# Patient Record
Sex: FEMALE | Race: ASIAN | NOT HISPANIC OR LATINO | Employment: FULL TIME | URBAN - METROPOLITAN AREA
[De-identification: names, ages, dates, MRNs, and addresses within clinical notes are randomized per-mention and may not be internally consistent; named-entity substitution may affect disease eponyms.]

---

## 2017-02-08 ENCOUNTER — OFFICE VISIT (OUTPATIENT)
Dept: FAMILY MEDICINE | Facility: CLINIC | Age: 57
End: 2017-02-08
Payer: COMMERCIAL

## 2017-02-08 VITALS
RESPIRATION RATE: 14 BRPM | HEIGHT: 65 IN | TEMPERATURE: 98.2 F | OXYGEN SATURATION: 95 % | HEART RATE: 104 BPM | SYSTOLIC BLOOD PRESSURE: 128 MMHG | WEIGHT: 187.5 LBS | BODY MASS INDEX: 31.24 KG/M2 | DIASTOLIC BLOOD PRESSURE: 60 MMHG

## 2017-02-08 DIAGNOSIS — Z79.4 TYPE 2 DIABETES MELLITUS WITH HYPERGLYCEMIA, WITH LONG-TERM CURRENT USE OF INSULIN (H): ICD-10-CM

## 2017-02-08 DIAGNOSIS — Z11.59 NEED FOR HEPATITIS C SCREENING TEST: ICD-10-CM

## 2017-02-08 DIAGNOSIS — E11.65 TYPE 2 DIABETES MELLITUS WITH HYPERGLYCEMIA, WITH LONG-TERM CURRENT USE OF INSULIN (H): ICD-10-CM

## 2017-02-08 DIAGNOSIS — I10 ESSENTIAL HYPERTENSION: ICD-10-CM

## 2017-02-08 DIAGNOSIS — J20.9 ACUTE BRONCHITIS, UNSPECIFIED ORGANISM: ICD-10-CM

## 2017-02-08 PROCEDURE — 82043 UR ALBUMIN QUANTITATIVE: CPT | Performed by: FAMILY MEDICINE

## 2017-02-08 PROCEDURE — 99213 OFFICE O/P EST LOW 20 MIN: CPT | Performed by: PHYSICIAN ASSISTANT

## 2017-02-08 PROCEDURE — 84443 ASSAY THYROID STIM HORMONE: CPT | Performed by: FAMILY MEDICINE

## 2017-02-08 PROCEDURE — 86803 HEPATITIS C AB TEST: CPT | Performed by: FAMILY MEDICINE

## 2017-02-08 PROCEDURE — 36415 COLL VENOUS BLD VENIPUNCTURE: CPT | Performed by: FAMILY MEDICINE

## 2017-02-08 PROCEDURE — 80053 COMPREHEN METABOLIC PANEL: CPT | Performed by: FAMILY MEDICINE

## 2017-02-08 RX ORDER — AZITHROMYCIN 250 MG/1
TABLET, FILM COATED ORAL
Qty: 6 TABLET | Refills: 0 | Status: SHIPPED | OUTPATIENT
Start: 2017-02-08 | End: 2017-03-06

## 2017-02-08 NOTE — PROGRESS NOTES
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 Ortiz.    2nd week of cough.  Cough is productive with white sputum.  Feels tight in chest.  Using albuterol inhaler every 4 hours which causes heart to race.  When symptoms started seemed more likely related to allergies and started allegra and nasal spray and mucinex.  No fever.  Has had body aches.    Blood sugar of 238.  Has been playing with her short acting insulin since blood sugars have been high.  Added 25-30 units.  No low blood sugars.     Problem list and histories reviewed & adjusted, as indicated.  Additional history: as documented    Patient Active Problem List   Diagnosis     Type 2 diabetes mellitus with hyperglycemia (H)     ? 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     Displacement of lumbar intervertebral disc without myelopathy     Nonallopathic lesion of lumbar region     Nonallopathic lesion of thoracic region     Nonallopathic lesion of sacral region     Bartholin gland cyst     Cataract     Irritable bowel syndrome     Esophageal reflux     Seasonal allergic rhinitis     Latex sensitivity     Colon polyp     Adenomatous polyp     Subclinical hyperthyroidism      HYPERLIPIDEMIA LDL GOAL <100     Ovarian cyst     Breast pain     Essential hypertension     Advanced directives, counseling/discussion     Obesity     Hypomagnesemia     Type 2 diabetes mellitus with hyperglycemia, with long-term current use of insulin (H)     Past Surgical History   Procedure Laterality Date     Hc dilation/curettage diag/ther non ob       Hc tympanoplasty w/o mastoid, init/rev w/o oss chain reconst       Cataract iol, rt/lt  2008     LE     Yag capsulotomy os (left eye)  2011       Social History   Substance Use Topics     Smoking status: Never Smoker      Smokeless tobacco: Never Used     Alcohol Use: Yes      Comment: rarely     Family History   Problem Relation Age of Onset     DIABETES Father       of dm 70's     C.A.D. Father      DIABETES Paternal Grandfather      DIABETES Brother      CANCER Brother 59     Lung cancer     DIABETES Sister      CANCER Maternal Aunt      ovarian cancer.      DIABETES Brother      DIABETES Brother      DIABETES Sister      Glaucoma Maternal Grandmother          Current Outpatient Prescriptions   Medication Sig Dispense Refill            blood glucose monitoring (NO BRAND SPECIFIED) meter device kit Use to test blood sugar 4 times daily or as directed. 1 kit 0     exenatide ER (BYDUREON) 2 MG pen Inject 2 mg Subcutaneous once a week 12 each 3     rosuvastatin (CRESTOR) 10 MG tablet Take 1 tablet (10 mg) by mouth daily 90 tablet 3     hydrochlorothiazide 12.5 MG TABS TAKE 1 TABLET BY MOUTH ONCE DAILY 90 tablet 1     albuterol (PROAIR HFA, PROVENTIL HFA, VENTOLIN HFA) 108 (90 BASE) MCG/ACT inhaler Inhale 2 puffs into the lungs every 4 hours as needed for shortness of breath / dyspnea 1 Inhaler 1     LANTUS SOLOSTAR 100 UNIT/ML soln INJECT 70-80 UNITS UNDER THE SKIN PER DAY 75 mL 3     NOVOLOG FLEXPEN 100 UNIT/ML soln INJECT UP  UNITS UNDER THE SKIN PER DAY AS DIRECTED 120 mL 3     pantoprazole (PROTONIX) 40 MG enteric coated tablet  "Take 1 tablet (40 mg) by mouth daily 90 tablet 3     insulin glargine (LANTUS SOLOSTAR) 100 UNIT/ML PEN 80 units daily 75 mL 3     amLODIPine (NORVASC) 5 MG tablet Take 1 tablet (5 mg) by mouth daily 90 tablet 3     pentoxifylline (TRENTAL) 400 MG CR tablet Take 1 tablet (400 mg) by mouth 2 times daily 180 tablet 3     metFORMIN (GLUCOPHAGE-XR) 500 MG 24 hr tablet Take 2 tablets (1,000 mg) by mouth 2 times daily 360 tablet 3     losartan (COZAAR) 100 MG tablet Take 1 tablet (100 mg) by mouth daily 90 tablet 3     magnesium 250 MG tablet Take 1 tablet by mouth daily 30 tablet 5     fluticasone (FLONASE) 50 MCG/ACT nasal spray Spray 1-2 sprays into both nostrils daily 16 g 11     NEEDLES, ANY SIZE Pen needles for Novolog insulin pen. Use to inject 5-6  times daily. 4 Box 3     ORDER FOR DME Equipment being ordered: [id number] Walking Boot, Size Small 1 Device 0     FREESTYLE LITE test strip Test 4-5 times daily 450 strip 3     NEEDLES, ANY SIZE Pen needles for Victoza pen. 1 Box 11     fish oil-omega-3 fatty acids (FISH OIL) 1000 MG capsule Take 1 capsule by mouth daily.       aspirin EC 81 MG tablet Take 325 mg by mouth daily        fexofenadine (ALLEGRA) 180 MG tablet Take 1 tablet by mouth daily. 1 TABLET DAILY Failed claritin for symptom cotrol. Zyrtec increases heart rate. 90 tablet 3     VITAMIN D 2000 UNIT OR TABS Take 1 tablet by mouth daily.       MULTIVITAMIN OR Take 1 tablet by mouth daily.       CALCIUM + D OR Take 1 tablet by mouth 2 times daily.         ROS:  Constitutional, HEENT, cardiovascular, pulmonary, gi and gu systems are negative, except as otherwise noted.    OBJECTIVE:                                                    /60 mmHg  Pulse 104  Temp(Src) 98.2  F (36.8  C) (Oral)  Resp 14  Ht 1.638 m (5' 4.5\")  Wt 85.049 kg (187 lb 8 oz)  BMI 31.70 kg/m2  SpO2 95%  LMP 03/19/2009  Body mass index is 31.7 kg/(m^2).  GENERAL: healthy, alert and no distress  EYES: Eyes grossly normal to " inspection, PERRL and conjunctivae and sclerae normal  HENT: ear canals and TM's normal, nose and mouth without ulcers or lesions  NECK: no adenopathy, no asymmetry, masses, or scars and thyroid normal to palpation  RESP: lungs clear to auscultation - no rales, rhonchi or wheezes  CV: regular rate and rhythm, normal S1 S2, no S3 or S4, no murmur, click or rub, no peripheral edema and peripheral pulses strong  MS: no gross musculoskeletal defects noted, no edema    Diagnostic Test Results:  none      ASSESSMENT/PLAN:                                                            1. Acute bronchitis, unspecified organism  Did not want to prescribe oral steroid given diabetes and high blood sugars.  Will treat with antibiotic since going on week two.   Lungs sound clear to auscultation did not feel needed chest xray   - azithromycin (ZITHROMAX) 250 MG tablet; Two tablets first day, then one tablet daily for four days.  Dispense: 6 tablet; Refill: 0    2. Type 2 diabetes mellitus with hyperglycemia, with long-term current use of insulin (H)  Glucometer is broken.  Blood sugars have been high.  Follows with endocrinology and due for follow up with them in march.  Would like labs drawn today.  Not fasting so will hold off on lipids.   - blood glucose monitoring (NO BRAND SPECIFIED) meter device kit; Use to test blood sugar 4 times daily or as directed.  Dispense: 1 kit; Refill: 0    Patient Instructions   Take zithromax daily for five days  Follow up with us if no improvement over the next 5 days  Return urgently if any change in symptoms like fever, increasing cough or other change in symptoms.          Tesha Carcamo PA-C  [facility]

## 2017-02-08 NOTE — NURSING NOTE
"Chief Complaint   Patient presents with     URI       Initial /60 mmHg  Pulse 104  Temp(Src) 98.2  F (36.8  C) (Oral)  Resp 14  Ht 1.638 m (5' 4.5\")  Wt 85.049 kg (187 lb 8 oz)  BMI 31.70 kg/m2  SpO2 95%  LMP 03/19/2009 Estimated body mass index is 31.7 kg/(m^2) as calculated from the following:    Height as of this encounter: 1.638 m (5' 4.5\").    Weight as of this encounter: 85.049 kg (187 lb 8 oz).  Medication Reconciliation: complete     Adela Santillan        "

## 2017-02-08 NOTE — PATIENT INSTRUCTIONS
Take zithromax daily for five days  Follow up with us if no improvement over the next 5 days  Return urgently if any change in symptoms like fever, increasing cough or other change in symptoms.

## 2017-02-08 NOTE — MR AVS SNAPSHOT
After Visit Summary   2/8/2017    Maliha Pedro    MRN: [id number]           Patient Information     Date Of Birth          1960        Visit Information        Provider Department      2/8/2017 5:40 PM Tesha Carcamo PA-C [facility]        Today's Diagnoses     Asymptomatic postmenopausal status    -  1     Need for hepatitis C screening test         Screening for diabetic peripheral neuropathy         Need for prophylactic vaccination and inoculation against influenza         Acute bronchitis, unspecified organism         Type 2 diabetes mellitus with hyperglycemia, with long-term current use of insulin (H)           Care Instructions    Take zithromax daily for five days  Follow up with us if no improvement over the next 5 days  Return urgently if any change in symptoms like fever, increasing cough or other change in symptoms.         Follow-ups after your visit        Your next 10 appointments already scheduled     Mar 08, 2017  8:00 AM   (Arrive by 7:30 AM)   RETURN DIABETES with Shaheen Mccarthy MD   [facility] Endocrinology ([facility])    [address] [facility] [address] [facility]  [address] [facility] [id number] [phone]            Apr 19, 2017  8:45 AM   VISUAL FIELD with [facility] EYE VISUAL FIELD   [facility] ([facility])    Nathaniel Haleg  [address] [place] [address] Clin [address] [place] [id number] [phone]            Apr 19, 2017  9:15 AM   RETURN GLAUCOMA with Umu Rosario MD   [facility] ([facility])    Nathaniel Haleg  [address] Clin [address] [place] [id number]   [phone]              Who to contact     If you have questions or need follow up information about today's clinic visit or your schedule please contact [facility] directly at [phone].  Normal or non-critical lab and imaging results will be communicated to you by MyChart, letter or phone within 4 business  "days after the clinic has received the results. If you do not hear from us within 7 days, please contact the clinic through [employer] or phone. If you have a critical or abnormal lab result, we will notify you by phone as soon as possible.  Submit refill requests through [employer] or call your pharmacy and they will forward the refill request to us. Please allow 3 business days for your refill to be completed.          Additional Information About Your Visit        [employer] Information     [employer] lets you send messages to your doctor, view your test results, renew your prescriptions, schedule appointments and more. To sign up, go to [email or web address] . Click on \"Log in\" on the left side of the screen, which will take you to the Welcome page. Then click on \"Sign up Now\" on the right side of the page.     You will be asked to enter the access code listed below, as well as some personal information. Please follow the directions to create your username and password.     Your access code is: [email or web address]  Expires: 3/7/2017  6:31 AM     Your access code will  in 90 days. If you need help or a new code, please call your [facility] clinic or [phone].        Care EveryWhere ID     This is your Care EveryWhere ID. This could be used by other organizations to access your [facility] medical records  [id number]        Your Vitals Were     Pulse Temperature Respirations Height BMI (Body Mass Index) Pulse Oximetry    104 98.2  F (36.8  C) (Oral) 14 1.638 m (5' 4.5\") 31.70 kg/m2 95%    Last Period                   2009            Blood Pressure from Last 3 Encounters:   17 128/60   16 136/88   16 124/75    Weight from Last 3 Encounters:   17 85.049 kg (187 lb 8 oz)   16 85.322 kg (188 lb 1.6 oz)   16 83.462 kg (184 lb)              Today, you had the following     No orders found for display         Today's Medication Changes          These changes are accurate as of: 17  " 5:48 PM.  If you have any questions, ask your nurse or doctor.               Start taking these medicines.        Dose/Directions    azithromycin 250 MG tablet   Commonly known as:  ZITHROMAX   Used for:  Acute bronchitis, unspecified organism   Started by:  Tesha Carcamo PA-C        Two tablets first day, then one tablet daily for four days.   Quantity:  6 tablet   Refills:  0       blood glucose monitoring meter device kit   Commonly known as:  no brand specified   Used for:  Type 2 diabetes mellitus with hyperglycemia, with long-term current use of insulin (H)   Started by:  Tesha Carcamo PA-C        Use to test blood sugar 4 times daily or as directed.   Quantity:  1 kit   Refills:  0            Where to get your medicines      These medications were sent to [facility] [place], [place] - [address]  [address] [facility] [address]     Phone:  [phone]    - azithromycin 250 MG tablet  - blood glucose monitoring meter device kit             Primary Care Provider Office Phone # Fax #    Elva Peggy Ortiz [phone]       [address]  [facility] [address]        Thank you!     Thank you for choosing [facility]  for your care. Our goal is always to provide you with excellent care. Hearing back from our patients is one way we can continue to improve our services. Please take a few minutes to complete the written survey that you may receive in the mail after your visit with us. Thank you!             Your Updated Medication List - Protect others around you: Learn how to safely use, store and throw away your medicines at www.disposemymeds.org.          This list is accurate as of: 2/8/17  5:48 PM.  Always use your most recent med list.                   Brand Name Dispense Instructions for use    albuterol 108 (90 BASE) MCG/ACT Inhaler    PROAIR HFA/PROVENTIL HFA/VENTOLIN HFA    1 Inhaler     Inhale 2 puffs into the lungs every 4 hours as needed for shortness of breath / dyspnea       amLODIPine 5 MG tablet    NORVASC    90 tablet    Take 1 tablet (5 mg) by mouth daily       aspirin EC 81 MG EC tablet      Take 325 mg by mouth daily       azithromycin 250 MG tablet    ZITHROMAX    6 tablet    Two tablets first day, then one tablet daily for four days.       blood glucose monitoring meter device kit    no brand specified    1 kit    Use to test blood sugar 4 times daily or as directed.       CALCIUM + D PO      Take 1 tablet by mouth 2 times daily.       exenatide ER 2 MG pen    BYDUREON    12 each    Inject 2 mg Subcutaneous once a week       fexofenadine 180 MG tablet    ALLEGRA    90 tablet    Take 1 tablet by mouth daily. 1 TABLET DAILY Failed claritin for symptom cotrol. Zyrtec increases heart rate.       fish oil-omega-3 fatty acids 1000 MG capsule      Take 1 capsule by mouth daily.       fluticasone 50 MCG/ACT spray    FLONASE    16 g    Spray 1-2 sprays into both nostrils daily       FREESTYLE LITE test strip   Generic drug:  blood glucose monitoring     450 strip    Test 4-5 times daily       hydrochlorothiazide 12.5 MG Tabs tablet     90 tablet    TAKE 1 TABLET BY MOUTH ONCE DAILY       * insulin glargine 100 UNIT/ML injection    LANTUS SOLOSTAR    75 mL    80 units daily       * LANTUS SOLOSTAR 100 UNIT/ML injection   Generic drug:  insulin glargine     75 mL    INJECT 70-80 UNITS UNDER THE SKIN PER DAY       losartan 100 MG tablet    COZAAR    90 tablet    Take 1 tablet (100 mg) by mouth daily       magnesium 250 MG tablet     30 tablet    Take 1 tablet by mouth daily       metFORMIN 500 MG 24 hr tablet    GLUCOPHAGE-XR    360 tablet    Take 2 tablets (1,000 mg) by mouth 2 times daily       MULTIVITAMIN PO      Take 1 tablet by mouth daily.       NEEDLES, ANY SIZE     1 Box    Pen needles for Victoza pen.       NEEDLES, ANY SIZE     4 Box    Pen needles for Novolog insulin pen. Use to inject  5-6  times daily.       NovoLOG FLEXPEN 100 UNIT/ML injection   Generic drug:  insulin aspart     120 mL    INJECT UP  UNITS UNDER THE SKIN PER DAY AS DIRECTED       order for DME     1 Device    Equipment being ordered: [id number] Walking Boot, Size Small       pantoprazole 40 MG EC tablet    PROTONIX    90 tablet    Take 1 tablet (40 mg) by mouth daily       pentoxifylline 400 MG CR tablet    TRENtal    180 tablet    Take 1 tablet (400 mg) by mouth 2 times daily       rosuvastatin 10 MG tablet    CRESTOR    90 tablet    Take 1 tablet (10 mg) by mouth daily       vitamin D 2000 UNITS tablet      Take 1 tablet by mouth daily.       * Notice:  This list has 2 medication(s) that are the same as other medications prescribed for you. Read the directions carefully, and ask your doctor or other care provider to review them with you.

## 2017-02-09 LAB
ALBUMIN SERPL-MCNC: 3.9 G/DL (ref 3.4–5)
ALP SERPL-CCNC: 66 U/L (ref 40–150)
ALT SERPL W P-5'-P-CCNC: 70 U/L (ref 0–50)
ANION GAP SERPL CALCULATED.3IONS-SCNC: 10 MMOL/L (ref 3–14)
AST SERPL W P-5'-P-CCNC: 47 U/L (ref 0–45)
BILIRUB SERPL-MCNC: 0.3 MG/DL (ref 0.2–1.3)
BUN SERPL-MCNC: 11 MG/DL (ref 7–30)
CALCIUM SERPL-MCNC: 9.1 MG/DL (ref 8.5–10.1)
CHLORIDE SERPL-SCNC: 99 MMOL/L (ref 94–109)
CO2 SERPL-SCNC: 27 MMOL/L (ref 20–32)
CREAT SERPL-MCNC: 0.68 MG/DL (ref 0.52–1.04)
CREAT UR-MCNC: 159 MG/DL
GFR SERPL CREATININE-BSD FRML MDRD: 88 ML/MIN/1.7M2
GLUCOSE SERPL-MCNC: 249 MG/DL (ref 70–99)
HCV AB SERPL QL IA: NORMAL
MICROALBUMIN UR-MCNC: 10 MG/L
MICROALBUMIN/CREAT UR: 6.28 MG/G CR (ref 0–25)
POTASSIUM SERPL-SCNC: 4 MMOL/L (ref 3.4–5.3)
PROT SERPL-MCNC: 7.4 G/DL (ref 6.8–8.8)
SODIUM SERPL-SCNC: 136 MMOL/L (ref 133–144)
TSH SERPL DL<=0.005 MIU/L-ACNC: 0.76 MU/L (ref 0.4–4)

## 2017-02-22 ENCOUNTER — RADIANT APPOINTMENT (OUTPATIENT)
Dept: MAMMOGRAPHY | Facility: CLINIC | Age: 57
End: 2017-02-22

## 2017-02-22 DIAGNOSIS — Z12.31 VISIT FOR SCREENING MAMMOGRAM: ICD-10-CM

## 2017-03-06 ENCOUNTER — OFFICE VISIT (OUTPATIENT)
Dept: FAMILY MEDICINE | Facility: CLINIC | Age: 57
End: 2017-03-06
Payer: COMMERCIAL

## 2017-03-06 VITALS
WEIGHT: 188 LBS | HEIGHT: 64 IN | BODY MASS INDEX: 32.1 KG/M2 | DIASTOLIC BLOOD PRESSURE: 80 MMHG | SYSTOLIC BLOOD PRESSURE: 136 MMHG | OXYGEN SATURATION: 98 % | TEMPERATURE: 98.4 F | HEART RATE: 79 BPM

## 2017-03-06 DIAGNOSIS — Z78.0 MENOPAUSE: ICD-10-CM

## 2017-03-06 DIAGNOSIS — R79.89 ELEVATED LFTS: ICD-10-CM

## 2017-03-06 DIAGNOSIS — E83.42 HYPOMAGNESEMIA: ICD-10-CM

## 2017-03-06 DIAGNOSIS — I10 ESSENTIAL HYPERTENSION: ICD-10-CM

## 2017-03-06 DIAGNOSIS — E05.90 SUBCLINICAL HYPERTHYROIDISM: ICD-10-CM

## 2017-03-06 DIAGNOSIS — J20.9 ACUTE BRONCHITIS, UNSPECIFIED ORGANISM: ICD-10-CM

## 2017-03-06 DIAGNOSIS — Z00.00 ENCOUNTER FOR ROUTINE ADULT HEALTH EXAMINATION WITHOUT ABNORMAL FINDINGS: Primary | ICD-10-CM

## 2017-03-06 DIAGNOSIS — E66.9 NON MORBID OBESITY, UNSPECIFIED OBESITY TYPE: ICD-10-CM

## 2017-03-06 DIAGNOSIS — R10.13 DYSPEPSIA: ICD-10-CM

## 2017-03-06 DIAGNOSIS — E78.5 HYPERLIPIDEMIA LDL GOAL <100: ICD-10-CM

## 2017-03-06 DIAGNOSIS — Z79.4 TYPE 2 DIABETES MELLITUS WITH HYPERGLYCEMIA, WITH LONG-TERM CURRENT USE OF INSULIN (H): ICD-10-CM

## 2017-03-06 DIAGNOSIS — E11.9 TYPE 2 DIABETES MELLITUS WITHOUT OPHTHALMIC MANIFESTATIONS (H): ICD-10-CM

## 2017-03-06 DIAGNOSIS — E11.65 TYPE 2 DIABETES MELLITUS WITH HYPERGLYCEMIA, WITH LONG-TERM CURRENT USE OF INSULIN (H): ICD-10-CM

## 2017-03-06 LAB
CHOLEST SERPL-MCNC: 165 MG/DL
HDLC SERPL-MCNC: 52 MG/DL
LDLC SERPL CALC-MCNC: 73 MG/DL
MAGNESIUM SERPL-MCNC: 1.7 MG/DL (ref 1.6–2.3)
NONHDLC SERPL-MCNC: 113 MG/DL
TRIGL SERPL-MCNC: 199 MG/DL
VIT B12 SERPL-MCNC: 726 PG/ML (ref 193–986)

## 2017-03-06 PROCEDURE — 36415 COLL VENOUS BLD VENIPUNCTURE: CPT | Performed by: FAMILY MEDICINE

## 2017-03-06 PROCEDURE — 99396 PREV VISIT EST AGE 40-64: CPT | Performed by: FAMILY MEDICINE

## 2017-03-06 PROCEDURE — 82607 VITAMIN B-12: CPT | Performed by: FAMILY MEDICINE

## 2017-03-06 PROCEDURE — G0145 SCR C/V CYTO,THINLAYER,RESCR: HCPCS | Performed by: FAMILY MEDICINE

## 2017-03-06 PROCEDURE — 87624 HPV HI-RISK TYP POOLED RSLT: CPT | Performed by: FAMILY MEDICINE

## 2017-03-06 PROCEDURE — 83735 ASSAY OF MAGNESIUM: CPT | Performed by: FAMILY MEDICINE

## 2017-03-06 PROCEDURE — 83690 ASSAY OF LIPASE: CPT | Performed by: FAMILY MEDICINE

## 2017-03-06 PROCEDURE — 80061 LIPID PANEL: CPT | Performed by: FAMILY MEDICINE

## 2017-03-06 RX ORDER — BLOOD SUGAR DIAGNOSTIC
STRIP MISCELLANEOUS
COMMUNITY
Start: 2017-02-08 | End: 2017-04-06

## 2017-03-06 RX ORDER — MULTIVITAMIN WITH IRON
1 TABLET ORAL DAILY
Qty: 90 TABLET | Refills: 3 | Status: SHIPPED | OUTPATIENT
Start: 2017-03-06

## 2017-03-06 NOTE — PATIENT INSTRUCTIONS
Please call [facility] (formerly called [facility]) at [phone] to schedule abdominal ultrasound and bone density scan.     Message me in 1 week if your respiratory symptoms are not improving and you are still using inhaler regularly and I will send in a prescription for a steroid inhaler.     Discuss abdominal symptoms with Dr. Mccarthy during 3/8 appointment.       Preventive Health Recommendations  Female Ages 50 - 64    Yearly exam: See your health care provider every year in order to  o Review health changes.   o Discuss preventive care.    o Review your medicines if your doctor has prescribed any.      Get a Pap test every three years (unless you have an abnormal result and your provider advises testing more often).    If you get Pap tests with HPV test, you only need to test every 5 years, unless you have an abnormal result.     You do not need a Pap test if your uterus was removed (hysterectomy) and you have not had cancer.    You should be tested each year for STDs (sexually transmitted diseases) if you're at risk.     Have a mammogram every 1 to 2 years.    Have a colonoscopy at age 50, or have a yearly FIT test (stool test). These exams screen for colon cancer.      Have a cholesterol test every 5 years, or more often if advised.    Have a diabetes test (fasting glucose) every three years. If you are at risk for diabetes, you should have this test more often.     If you are at risk for osteoporosis (brittle bone disease), think about having a bone density scan (DEXA).    Shots: Get a flu shot each year. Get a tetanus shot every 10 years.    Nutrition:     Eat at least 5 servings of fruits and vegetables each day.    Eat whole-grain bread, whole-wheat pasta and brown rice instead of white grains and rice.    Talk to your provider about Calcium and Vitamin D.     Lifestyle    Exercise at least 150 minutes a week (30 minutes a day, 5 days a week). This will  help you control your weight and prevent disease.    Limit alcohol to one drink per day.    No smoking.     Wear sunscreen to prevent skin cancer.     See your dentist every six months for an exam and cleaning.    See your eye doctor every 1 to 2 years.

## 2017-03-06 NOTE — LETTER
[address]  [address]  [phone]    March 7, 2017      Maliha Pedro  [address] [facility] [address]              Dear Maliha,    It was a pleasure seeing you at your recent visit. Your labs have been reviewed and are attached.     The magnesium and vitamin B12 levels are normal. Please continue your current supplements.   The cholesterol looks good except for the elevated triglyceride level. Keeping your blood sugars controlled and loosing weight will help to lower the triglyceride level.       Sincerely,    Elva Ortiz M.D.        Results for orders placed or performed in visit on 03/06/17   Lipid panel reflex to direct LDL   Result Value Ref Range    Cholesterol 165 <200 mg/dL    Triglycerides 199 (H) <150 mg/dL    HDL Cholesterol 52 >49 mg/dL    LDL Cholesterol Calculated 73 <100 mg/dL    Non HDL Cholesterol 113 <130 mg/dL   Magnesium   Result Value Ref Range    Magnesium 1.7 1.6 - 2.3 mg/dL   Vitamin B12   Result Value Ref Range    Vitamin B12 726 193 - 986 pg/mL

## 2017-03-06 NOTE — MR AVS SNAPSHOT
After Visit Summary   3/6/2017    Maliha Pedro    MRN: [id number]           Patient Information     Date Of Birth          1960        Visit Information        Provider Department      3/6/2017 8:20 AM Elva Ortiz MD [facility]        Today's Diagnoses     Encounter for routine adult health examination without abnormal findings    -  1    Type 2 diabetes mellitus with hyperglycemia, with long-term current use of insulin (H)        Type 2 diabetes mellitus without ophthalmic manifestations (H)        Subclinical hyperthyroidism        Essential hypertension        Non morbid obesity, unspecified obesity type        Hyperlipidemia LDL goal <100        Dyspepsia        Elevated LFTs        Acute bronchitis, unspecified organism        Hypomagnesemia        Menopause          Care Instructions    Please call [facility] (formerly called [facility]) at [phone] to schedule abdominal ultrasound and bone density scan.     Message me in 1 week if your respiratory symptoms are not improving and you are still using inhaler regularly and I will send in a prescription for a steroid inhaler.     Discuss abdominal symptoms with Dr. Mccarthy during 3/8 appointment.       Preventive Health Recommendations  Female Ages 50 - 64    Yearly exam: See your health care provider every year in order to  o Review health changes.   o Discuss preventive care.    o Review your medicines if your doctor has prescribed any.      Get a Pap test every three years (unless you have an abnormal result and your provider advises testing more often).    If you get Pap tests with HPV test, you only need to test every 5 years, unless you have an abnormal result.     You do not need a Pap test if your uterus was removed (hysterectomy) and you have not had cancer.    You should be tested each year for STDs (sexually transmitted diseases) if you're at  risk.     Have a mammogram every 1 to 2 years.    Have a colonoscopy at age 50, or have a yearly FIT test (stool test). These exams screen for colon cancer.      Have a cholesterol test every 5 years, or more often if advised.    Have a diabetes test (fasting glucose) every three years. If you are at risk for diabetes, you should have this test more often.     If you are at risk for osteoporosis (brittle bone disease), think about having a bone density scan (DEXA).    Shots: Get a flu shot each year. Get a tetanus shot every 10 years.    Nutrition:     Eat at least 5 servings of fruits and vegetables each day.    Eat whole-grain bread, whole-wheat pasta and brown rice instead of white grains and rice.    Talk to your provider about Calcium and Vitamin D.     Lifestyle    Exercise at least 150 minutes a week (30 minutes a day, 5 days a week). This will help you control your weight and prevent disease.    Limit alcohol to one drink per day.    No smoking.     Wear sunscreen to prevent skin cancer.     See your dentist every six months for an exam and cleaning.    See your eye doctor every 1 to 2 years.          Follow-ups after your visit        Your next 10 appointments already scheduled     Mar 08, 2017  8:00 AM CST   (Arrive by 7:30 AM)   RETURN DIABETES with Shaheen Mccarthy MD   [facility] ([facility])    [address] [place] [id number]   [phone]            Apr 19, 2017  8:45 AM CDT   VISUAL FIELD with [facility] EYE VISUAL FIELD   Eye Clinic ([facility])    Nathaniel Haleg  [address] [place] [address] Clin [address] [place] [id number]   [phone]            Apr 19, 2017  9:15 AM CDT   RETURN GLAUCOMA with Umu Rosario MD   Eye Clinic ([facility])    Nathaniel Haleg  [address] [place] [id number]   [phone]              Future tests that were ordered for you today     Open Future  "Orders        Priority Expected Expires Ordered    DX Hip/Pelvis/Spine Routine  3/6/2018 3/6/2017    US Abdomen Complete Routine 2017 3/6/2018 3/6/2017            Who to contact     If you have questions or need follow up information about today's clinic visit or your schedule please contact [facility] directly at [phone].  Normal or non-critical lab and imaging results will be communicated to you by MyChart, letter or phone within 4 business days after the clinic has received the results. If you do not hear from us within 7 days, please contact the clinic through "[employer]hart or phone. If you have a critical or abnormal lab result, we will notify you by phone as soon as possible.  Submit refill requests through [employer] or call your pharmacy and they will forward the refill request to us. Please allow 3 business days for your refill to be completed.          Additional Information About Your Visit        "[employer] Information     [employer] lets you send messages to your doctor, view your test results, renew your prescriptions, schedule appointments and more. To sign up, go to [email or web address] . Click on \"Log in\" on the left side of the screen, which will take you to the Welcome page. Then click on \"Sign up Now\" on the right side of the page.     You will be asked to enter the access code listed below, as well as some personal information. Please follow the directions to create your username and password.     Your access code is: [email or web address]  Expires: 3/7/2017  6:31 AM     Your access code will  in 90 days. If you need help or a new code, please call your [facility] clinic or [phone].        Care EveryWhere ID     This is your Care EveryWhere ID. This could be used by other organizations to access your [facility] medical records  [id number]        Your Vitals Were     Pulse Temperature Height Last Period Pulse Oximetry Breastfeeding?    79 98.4  F (36.9  C) (Oral) 1.619 m (5' 3.75\") " 03/19/2009 98% No    BMI (Body Mass Index)                   32.52 kg/m2            Blood Pressure from Last 3 Encounters:   03/06/17 136/80   02/08/17 128/60   12/21/16 136/88    Weight from Last 3 Encounters:   03/06/17 85.3 kg (188 lb)   02/08/17 85 kg (187 lb 8 oz)   12/21/16 85.3 kg (188 lb 1.6 oz)              We Performed the Following     HPV High Risk Types DNA Cervical     Lipid panel reflex to direct LDL     Magnesium     Pap imaged thin layer screen with HPV - recommended age 30 - 65 years (select HPV order below)     Vitamin B12          Today's Medication Changes          These changes are accurate as of: 3/6/17  9:08 AM.  If you have any questions, ask your nurse or doctor.               These medicines have changed or have updated prescriptions.        Dose/Directions    ACCU-CHEK VANESA PLUS test strip   This may have changed:  Another medication with the same name was removed. Continue taking this medication, and follow the directions you see here.   Generic drug:  blood glucose monitoring   Changed by:  Elva Ortiz MD        Refills:  0       insulin glargine 100 UNIT/ML injection   Commonly known as:  LANTUS SOLOSTAR   This may have changed:  Another medication with the same name was removed. Continue taking this medication, and follow the directions you see here.   Used for:  Diabetes mellitus, type 2 (H)   Changed by:  Kirstin Hickman RN        80 units daily   Quantity:  75 mL   Refills:  3         Stop taking these medicines if you haven't already. Please contact your care team if you have questions.     azithromycin 250 MG tablet   Commonly known as:  ZITHROMAX   Stopped by:  Elva Ortiz MD           order for DME   Stopped by:  Elva Ortiz MD                Where to get your medicines      These medications were sent to [facility] - [place], [place] - [address], [address]  [address], [facility]  [place] [address]     Phone:  [phone]     magnesium 250 MG tablet                Primary Care Provider Office Phone # Fax #    Elva Ortiz [phone]       [facility] [address]  [place] [address]        Thank you!     Thank you for choosing [facility]  for your care. Our goal is always to provide you with excellent care. Hearing back from our patients is one way we can continue to improve our services. Please take a few minutes to complete the written survey that you may receive in the mail after your visit with us. Thank you!             Your Updated Medication List - Protect others around you: Learn how to safely use, store and throw away your medicines at www.disposemymeds.org.          This list is accurate as of: 3/6/17  9:08 AM.  Always use your most recent med list.                   Brand Name Dispense Instructions for use    ACCU-CHEK VANESA PLUS test strip   Generic drug:  blood glucose monitoring          albuterol 108 (90 BASE) MCG/ACT Inhaler    PROAIR HFA/PROVENTIL HFA/VENTOLIN HFA    1 Inhaler    Inhale 2 puffs into the lungs every 4 hours as needed for shortness of breath / dyspnea       amLODIPine 5 MG tablet    NORVASC    90 tablet    Take 1 tablet (5 mg) by mouth daily       aspirin EC 81 MG EC tablet      Take 325 mg by mouth daily       blood glucose monitoring meter device kit    no brand specified    1 kit    Use to test blood sugar 4 times daily or as directed.       CALCIUM + D PO      Take 1 tablet by mouth 2 times daily.       exenatide ER 2 MG pen    BYDUREON    12 each    Inject 2 mg Subcutaneous once a week       fexofenadine 180 MG tablet    ALLEGRA    90 tablet    Take 1 tablet by mouth daily. 1 TABLET DAILY Failed claritin for symptom cotrol. Zyrtec increases heart rate.       fish oil-omega-3 fatty acids 1000 MG capsule      Take 1 capsule by mouth daily.       fluticasone 50 MCG/ACT spray    FLONASE    16 g     Spray 1-2 sprays into both nostrils daily       hydrochlorothiazide 12.5 MG Tabs tablet     90 tablet    TAKE 1 TABLET BY MOUTH ONCE DAILY       insulin glargine 100 UNIT/ML injection    LANTUS SOLOSTAR    75 mL    80 units daily       losartan 100 MG tablet    COZAAR    90 tablet    Take 1 tablet (100 mg) by mouth daily       magnesium 250 MG tablet     90 tablet    Take 1 tablet by mouth daily       metFORMIN 500 MG 24 hr tablet    GLUCOPHAGE-XR    360 tablet    Take 2 tablets (1,000 mg) by mouth 2 times daily       MULTIVITAMIN PO      Take 1 tablet by mouth daily.       NEEDLES, ANY SIZE     1 Box    Pen needles for Victoza pen.       NEEDLES, ANY SIZE     4 Box    Pen needles for Novolog insulin pen. Use to inject 5-6  times daily.       NovoLOG FLEXPEN 100 UNIT/ML injection   Generic drug:  insulin aspart     120 mL    INJECT UP  UNITS UNDER THE SKIN PER DAY AS DIRECTED       pantoprazole 40 MG EC tablet    PROTONIX    90 tablet    Take 1 tablet (40 mg) by mouth daily       pentoxifylline 400 MG CR tablet    TRENtal    180 tablet    Take 1 tablet (400 mg) by mouth 2 times daily       rosuvastatin 10 MG tablet    CRESTOR    90 tablet    Take 1 tablet (10 mg) by mouth daily       vitamin D 2000 UNITS tablet      Take 1 tablet by mouth daily.

## 2017-03-06 NOTE — LETTER
[address] [id number] [phone]      March 12, 2017    Maliha Pedro  [address] [phone]    Dear Maliha,  We are happy to inform you that your PAP smear result from 3/6/17 is normal.  We are now able to do a follow up test on PAP smears. The DNA test is for HPV (Human Papilloma Virus). Cervical cancer is closely linked with certain types of HPV. Your result showed no evidence of high risk HPV.  Therefore we recommend you return in 3 years for your next pap smear.  You will still need to return to the clinic every year for an annual exam and other preventive tests.  Please contact the clinic with any questions.  Sincerely,  Elva Ortiz MD/romina

## 2017-03-06 NOTE — PROGRESS NOTES
SUBJECTIVE:     CC: Maliha Pedro is an 57 year old woman who presents for preventive health visit.     Healthy Habits:    Do you get at least three servings of calcium containing foods daily (dairy, green leafy vegetables, etc.)? yes    Amount of exercise or daily activities, outside of work: 3 day(s) per week    Problems taking medications regularly No    Medication side effects: Yes occasional low blood sugar    Have you had an eye exam in the past two years? yes    Do you see a dentist twice per year? yes    Do you have sleep apnea, excessive snoring or daytime drowsiness?no          Diabetes Follow-up    Patient is checking blood sugars: twice daily.    Blood sugar testing frequency justification: diabetic  Results are as follows:         8:00 am  elevated         6:00 pm elevated    Diabetic concerns: blood sugar frequently over 200     Symptoms of hypoglycemia (low blood sugar): shaky     Paresthesias (numbness or burning in feet) or sores: No     Date of last diabetic eye exam: will be due this April--has exam scheduled     Hyperlipidemia Follow-Up      Rate your low fat/cholesterol diet?: not monitoring fat    Taking statin?  Yes, no muscle aches from statin    Other lipid medications/supplements?:  Fish oil/Omega 3, dose  without side effects     Hypertension Follow-up      Outpatient blood pressures are being checked at home.  Results are good.    Low Salt Diet: low salt         Today's PHQ-2 Score:   PHQ-2 ( 1999 Pfizer) 3/6/2017 9/10/2015   Q1: Little interest or pleasure in doing things 0 0   Q2: Feeling down, depressed or hopeless 0 0   PHQ-2 Score 0 0       Abuse: Current or Past(Physical, Sexual or Emotional)- No  Do you feel safe in your environment - Yes    Social History   Substance Use Topics     Smoking status: Never Smoker     Smokeless tobacco: Never Used     Alcohol use Yes      Comment: rarely     The patient does not drink >3 drinks per day nor >7 drinks per week.    Recent Labs    Lab Test  05/04/15   1016  12/16/13   1028   CHOL  170  192   HDL  46*  42*   LDL  90  125   TRIG  171*  124   CHOLHDLRATIO  3.7  4.6       Reviewed orders with patient.  Reviewed health maintenance and updated orders accordingly - Yes    Cold: Maliha was here 3 weeks ago for a cough and was given Z-cruz and she declined steroid medication due to her blood sugars. Had diarrhea for 5 days with Z-pac and she started taking yogurt which was helpful. Denies nausea, vomiting. She reports she is 80% better but the cough has persisted and she is still having some mucus in her throat. Denies sinus pressure/pain. She is still having some wheezing. Initially, she was using albuterol inhaler every 4 hours, but now she is using it 1x a day. Notes heart races with inhaler- she has checked her pulse with this and it can be in the 130 but it is regular. She also notes the heart racing is likely psychological. She will try techniques to calm herself down and this usually resolves. In general, when she has trouble breathing she feels panicky.     Additional Notes.   -she is taking Flonase and Allegra daily which controls her allergies. Notes she was on vacation she missed two days of taking her Allegra and this could have contributed to the flare of her sx's  -Her blood sugars have been elevated. She is on Bydureon. She has a f/u with Dr. Mccarthy this Wednesday (3/8) and will check A1c with him. Notes that when she overeats and she is full, her stomach feels full and it will go into her back and believes this started with Bydureon. Notes she d/c soda 8 months ago.   -LFTs were elevated with her last labs. She had an abdominal US(2/5/16) which showed a fatty liver and mildly dilated bile duct. Reports she is sensitive to fatty foods. She discussed US with a  surgeon at the hospital who recommended just monitoring as she is asymptomatic. Prior to that I had recommended GI f/u  -She had a colonoscopy last year which showed polyps.  F/U Q5 years.   -she states that she believes she has overstretched her left hip, pain is worsening. She states that yoga, stretching, and liminent oil havve been helpful for management.   -She is seeing an ophthalmologist for glaucoma and cataracts.    -She has been using Aquaphor for her legs for what she believes is psoriasis.  -She is taking Protonix daily and states heartburn is controlled.   -has some leaking with coughing and some vaginal dryness.   -She is considering quitting her jobs in 22 months and retiring in the [email or web address].   -Muscle twitching in face has resolved with the start of magnesium. She has not been taking B12 supplement.       Mammo Decision Support:  Patient over age 50, mutual decision to screen reflected in health maintenance.    Pertinent mammograms are reviewed under the imaging tab.  History of abnormal Pap smear:   Last 3 Pap Results:   PAP (no units)   Date Value   2013 NIL   2011 NIL   03/10/2008 NIL       Reviewed and updated as needed this visit by clinical staff  Tobacco  Meds  Med Hx  Surg Hx  Fam Hx  Soc Hx        Reviewed and updated as needed this visit by Provider        Past Medical History   Diagnosis Date     Adjustment disorder with mixed anxiety and depressed mood      following death of her adoptive father and then again after adoptive mother      Allergic rhinitis, cause unspecified      uses benadryl prn (sneezing/hoarse voice)     Bartholin gland cyst 2008     Chest pain, unspecified      neg dobutamine stress test  (reacted to dobutamine)     Dry eyes      ERM OD (epiretinal membrane, right eye)      BE mild     Esophageal reflux      EGD: -neg     Glaucoma suspect      Irregular menstrual cycle      s/p D&C, since then more regular     Irritable bowel syndrome      colonoscopy -neg. Sx's especially prior to menses     MEDICAL HISTORY OF -      had health directive with : Karl Arias, DANIEL.  Full Code. Aunt (Charlie  Maggi) will be decision maker     Meningitis, unspecified(322.9) age 8     hospitalized for 9 mo     Need for prophylactic vaccination with tuberculosis (BCG) vaccine      Has pos Mantoux. Gets yearly cxr, all neg.      Other and unspecified hyperlipidemia      Pain in joint, shoulder region      bone spurs on MRI, s/p pool therapy     Type II or unspecified type diabetes mellitus without mention of complication, not stated as uncontrolled Age 32     Follows [facility] endocrine [facility]: Shayne Lane MD     Unspecified cataract      RE     Unspecified sinusitis (chronic)           Wheezing      Has seen pulm  & 2007. Told night ashtma, ? vocal chord spasm due to cold air.       Obstetric History       T0      TAB0   SAB0   E0   M0   L0            Patient Active Problem List   Diagnosis     Type 2 diabetes mellitus with hyperglycemia (H)     ? 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     Displacement of lumbar intervertebral disc without myelopathy     Nonallopathic lesion of lumbar region     Nonallopathic lesion of thoracic region     Nonallopathic lesion of sacral region     Bartholin gland cyst     Cataract     Irritable bowel syndrome     Esophageal reflux     Seasonal allergic rhinitis     Latex sensitivity     Colon polyp     Adenomatous polyp     Subclinical hyperthyroidism     HYPERLIPIDEMIA LDL GOAL <100     Ovarian cyst     Breast pain     Essential hypertension     Advanced directives, counseling/discussion     Obesity     Hypomagnesemia     Type 2 diabetes mellitus with hyperglycemia, with long-term current use of insulin (H)     Past Surgical History   Procedure Laterality Date     Hc dilation/curettage diag/ther non ob       Hc tympanoplasty w/o mastoid, init/rev w/o oss chain reconst       Cataract iol, rt/lt  2008     LE     Yag capsulotomy os (left eye)  2011       Social History   Substance Use Topics     Smoking status: Never Smoker      Smokeless tobacco: Never Used     Alcohol use Yes      Comment: rarely     Family History   Problem Relation Age of Onset     DIABETES Brother      CANCER Brother 59     Lung cancer     DIABETES Father       of dm 70's     C.A.D. Father      DIABETES Paternal Grandfather      DIABETES Sister      CANCER Maternal Aunt      ovarian cancer.      DIABETES Brother      DIABETES Brother      DIABETES Sister      Glaucoma Maternal Grandmother          Current Outpatient Prescriptions   Medication Sig Dispense Refill     ACCU-CHEK VANESA PLUS test strip        blood glucose monitoring (NO BRAND SPECIFIED) meter device kit Use to test blood sugar 4 times daily or as directed. 1 kit 0     exenatide ER (BYDUREON) 2 MG pen Inject 2 mg Subcutaneous once a week 12 each 3     rosuvastatin (CRESTOR) 10 MG tablet Take 1 tablet (10 mg) by mouth daily 90 tablet 3     hydrochlorothiazide 12.5 MG TABS TAKE 1 TABLET BY MOUTH ONCE DAILY 90 tablet 1     albuterol (PROAIR HFA, PROVENTIL HFA, VENTOLIN HFA) 108 (90 BASE) MCG/ACT inhaler Inhale 2 puffs into the lungs every 4 hours as needed for shortness of breath / dyspnea 1 Inhaler 1     NOVOLOG FLEXPEN 100 UNIT/ML soln INJECT UP  UNITS UNDER THE SKIN PER DAY AS DIRECTED 120 mL 3     pantoprazole (PROTONIX) 40 MG enteric coated tablet Take 1 tablet (40 mg) by mouth daily 90 tablet 3     insulin glargine (LANTUS SOLOSTAR) 100 UNIT/ML PEN 80 units daily 75 mL 3     amLODIPine (NORVASC) 5 MG tablet Take 1 tablet (5 mg) by mouth daily 90 tablet 3     pentoxifylline (TRENTAL) 400 MG CR tablet Take 1 tablet (400 mg) by mouth 2 times daily 180 tablet 3     metFORMIN (GLUCOPHAGE-XR) 500 MG 24 hr tablet Take 2 tablets (1,000 mg) by mouth 2 times daily 360 tablet 3     losartan (COZAAR) 100 MG tablet Take 1 tablet (100 mg) by mouth daily 90 tablet 3     magnesium 250 MG tablet Take 1 tablet by mouth daily 30 tablet 5     fluticasone (FLONASE) 50 MCG/ACT nasal spray Spray 1-2 sprays into both  "nostrils daily 16 g 11     NEEDLES, ANY SIZE Pen needles for Novolog insulin pen. Use to inject 5-6  times daily. 4 Box 3     NEEDLES, ANY SIZE Pen needles for Victoza pen. 1 Box 11     fish oil-omega-3 fatty acids (FISH OIL) 1000 MG capsule Take 1 capsule by mouth daily.       aspirin EC 81 MG tablet Take 325 mg by mouth daily        fexofenadine (ALLEGRA) 180 MG tablet Take 1 tablet by mouth daily. 1 TABLET DAILY Failed claritin for symptom cotrol. Zyrtec increases heart rate. 90 tablet 3     VITAMIN D 2000 UNIT OR TABS Take 1 tablet by mouth daily.       MULTIVITAMIN OR Take 1 tablet by mouth daily.       CALCIUM + D OR Take 1 tablet by mouth 2 times daily.       Allergies   Allergen Reactions     Latex Swelling     Mold      Tetanus Antitoxin Swelling     Tetracycline Swelling       ROS:   ROS: 10 point ROS neg other than the symptoms noted above in the HPI.      OBJECTIVE:     /80 (BP Location: Right arm, Patient Position: Chair, Cuff Size: Adult Large)  Pulse 79  Temp 98.4  F (36.9  C) (Oral)  Ht 1.619 m (5' 3.75\")  Wt 85.3 kg (188 lb)  LMP 03/19/2009  SpO2 98%  Breastfeeding? No  BMI 32.52 kg/m2  EXAM:  GENERAL APPEARANCE: healthy, alert and no distress  EYES: Eyes grossly normal to inspection, PERRL and conjunctivae and sclerae normal  HENT: ear canals normal. Air fluid levels present behind TM's bilaterally, nasal mucosal edema. Mouth without ulcers or lesions, oropharynx clear and oral mucous membranes moist. Mild sinus tenderness  NECK: no adenopathy, no asymmetry, masses, or scars and thyroid normal to palpation  RESP: lungs clear to auscultation - no rales, rhonchi or wheezes  BREAST: normal without masses, tenderness or nipple discharge and no palpable axillary masses or adenopathy. Dense breast tissue.   CV: regular rate and rhythm, normal S1 S2, no S3 or S4, no murmur, click or rub, no peripheral edema and peripheral pulses strong  ABDOMEN: soft, nontender, no hepatosplenomegaly, no masses and " bowel sounds normal   (female): normal female external genitalia, normal urethral meatus, vaginal mucosal atrophy noted, normal cervix, adnexae, and uterus without masses or abnormal discharge. Difficult exam due to patient's discomfort. Pap was done partially blinded with pediatric speculum.   MS: no musculoskeletal defects are noted and gait is age appropriate without ataxia  SKIN: no suspicious lesions or rashes. hyperPigmentation in mid-abdomen.   NEURO: Normal strength and tone, sensory exam grossly normal, mentation intact and speech normal  PSYCH: mentation appears normal and affect normal/bright    Component      Latest Ref Rng & Units 2/8/2017 3/6/2017   Sodium      133 - 144 mmol/L 136    Potassium      3.4 - 5.3 mmol/L 4.0    Chloride      94 - 109 mmol/L 99    Carbon Dioxide      20 - 32 mmol/L 27    Anion Gap      3 - 14 mmol/L 10    Glucose      70 - 99 mg/dL 249 (H)    Urea Nitrogen      7 - 30 mg/dL 11    Creatinine      0.52 - 1.04 mg/dL 0.68    GFR Estimate      >60 mL/min/1.7m2 88    GFR Estimate If Black      >60 mL/min/1.7m2 >90 . . .    Calcium      8.5 - 10.1 mg/dL 9.1    Bilirubin Total      0.2 - 1.3 mg/dL 0.3    Albumin      3.4 - 5.0 g/dL 3.9    Protein Total      6.8 - 8.8 g/dL 7.4    Alkaline Phosphatase      40 - 150 U/L 66    ALT      0 - 50 U/L 70 (H)    AST      0 - 45 U/L 47 (H)    Creatinine Urine      mg/dL 159    Albumin Urine mg/L      mg/L 10    Albumin Urine mg/g Cr      0 - 25 mg/g Cr 6.28    TSH      0.40 - 4.00 mU/L 0.76    Hepatitis C Antibody      NR Nonreactive . . .    Magnesium      1.6 - 2.3 mg/dL  1.7   Vitamin B12      193 - 986 pg/mL  726   Study Result   Abdominal ultrasound.     HISTORY: History of fall with left upper quadrant pain     COMPARISON: None available.     FINDINGS: The liver measures 17.2 cm in length and has diffusely  increased echogenicity. This limits evaluation for intrahepatic mass.  No definite biliary dilatation is noted.. . The  gallbladder  contains several mobile shadowing stones. The gallbladder wall is  within normal limits. Sonographic Guido's sign is negative. The  common bile duct measures 6.5 mm in diameter. This is mildly dilated..  The pancreas body is visualized and appears within normal limits, but  the head and tail are obscured.. The spleen measures 9.1 cm in length  and has normal echogenicity. The aorta and inferior vena cava are  visualized. The right kidney measures 12.2 cm and the left kidney  measures 11.8 cm. There is normal echogenicity with no evidence for  hydronephrosis, mass or stone in either kidney.         IMPRESSION:   1. Hepatic steatosis with hepatomegaly.  2. Cholelithiasis without evidence for cholecystitis.     SANDRA MARTÍNEZ MD         ASSESSMENT/PLAN:     1. Encounter for routine adult health examination without abnormal findings  - Pap imaged thin layer screen with HPV - recommended age 30 - 65 years (select HPV order below)  - HPV High Risk Types DNA Cervical    2. Type 2 diabetes mellitus with hyperglycemia, with long-term current use of insulin (H)  3. Type 2 diabetes mellitus without ophthalmic manifestations (H)  Not controlled. F/U with endocrinology as planned.   - Magnesium  - Vitamin B12    4. Subclinical hyperthyroidism  Monitoring function, no meds currently.     5. Essential hypertension  Controlled. Continue current medications.    6. Non morbid obesity, unspecified obesity type  Reviewed diet and exercise.     7. Hyperlipidemia LDL goal <100  Labs today.   - Lipid panel reflex to direct LDL    8. Dyspepsia  Possibly from bydureon. F/U for US abdomen to monitor the borderline dilated cbd. May need further w/u with egd/ct abd if ongoing sx's/abnl u/s abd.   - US Abdomen Complete; Future    9. Elevated LFTs  Known fatty liver/poorly controlled dm/onstatin. F/U for US abdomen. Continue low fat-diet. Will need to monitor. Consider hep B testing (has been vaccinated/RN)  - US Abdomen Complete;  Future    10. Acute bronchitis, unspecified organism  Mild persistent sx's present still. Pt hesitant about starting steroid inhaler. Will monitor resp sx's for now. Cont albuterol prn. If not improving, message me and I will send in Rx for inhaler.     11. Hypomagnesemia  Due to Metformin. Currently supplementing.   - magnesium 250 MG tablet; Take 1 tablet by mouth daily  Dispense: 90 tablet; Refill: 3  - Magnesium    12. Menopause  F/U for DEXA scan.   - DX Hip/Pelvis/Spine; Future    Patient Instructions   Please call [facility] ([facility] at [phone] to schedule abdominal ultrasound and bone density scan.     Message me in 1 week if your respiratory symptoms are not improving and you are still using inhaler regularly and I will send in a prescription for a steroid inhaler.     Discuss abdominal symptoms with Dr. Mccarthy during 3/8 appointment.       Preventive Health Recommendations  Female Ages 50 - 64    Yearly exam: See your health care provider every year in order to  o Review health changes.   o Discuss preventive care.    o Review your medicines if your doctor has prescribed any.      Get a Pap test every three years (unless you have an abnormal result and your provider advises testing more often).    If you get Pap tests with HPV test, you only need to test every 5 years, unless you have an abnormal result.     You do not need a Pap test if your uterus was removed (hysterectomy) and you have not had cancer.    You should be tested each year for STDs (sexually transmitted diseases) if you're at risk.     Have a mammogram every 1 to 2 years.    Have a colonoscopy at age 50, or have a yearly FIT test (stool test). These exams screen for colon cancer.      Have a cholesterol test every 5 years, or more often if advised.    Have a diabetes test (fasting glucose) every three years. If you are at risk for diabetes, you should have this test  "more often.     If you are at risk for osteoporosis (brittle bone disease), think about having a bone density scan (DEXA).    Shots: Get a flu shot each year. Get a tetanus shot every 10 years.    Nutrition:     Eat at least 5 servings of fruits and vegetables each day.    Eat whole-grain bread, whole-wheat pasta and brown rice instead of white grains and rice.    Talk to your provider about Calcium and Vitamin D.     Lifestyle    Exercise at least 150 minutes a week (30 minutes a day, 5 days a week). This will help you control your weight and prevent disease.    Limit alcohol to one drink per day.    No smoking.     Wear sunscreen to prevent skin cancer.     See your dentist every six months for an exam and cleaning.    See your eye doctor every 1 to 2 years.          COUNSELING:   Reviewed preventive health counseling, as reflected in patient instructions       Regular exercise       Healthy diet/nutrition       Vision screening       Hearing screening       Osteoporosis Prevention/Bone Health       Colon cancer screening            reports that she has never smoked. She has never used smokeless tobacco.    Estimated body mass index is 32.52 kg/(m^2) as calculated from the following:    Height as of this encounter: 1.619 m (5' 3.75\").    Weight as of this encounter: 85.3 kg (188 lb).   Weight management plan: Discussed healthy diet and exercise guidelines and patient will follow up in 12 months in clinic to re-evaluate.    Counseling Resources:  ATP IV Guidelines  Pooled Cohorts Equation Calculator  Breast Cancer Risk Calculator  FRAX Risk Assessment  ICSI Preventive Guidelines  Dietary Guidelines for Americans, 2010  USDA's MyPlate  ASA Prophylaxis  Lung CA Screening    This document serves as a record of the services and decisions personally performed and made by Elva Ortiz MD. It was created on her behalf by Tamika Ramirez,a trained medical scribe. The creation of this document is based the " provider's statements to the medical scribe.  Tamikachance Ramirez March 6, 2017 9:46 AM     The information in this document, created by a scribe for me, accurately reflects the services I personally performed and the decisions made by me. I have reviewed and approved this document for accuracy.    MD Elva Flores MD  [facility]

## 2017-03-06 NOTE — NURSING NOTE
"Chief Complaint   Patient presents with     Physical       Initial /80 (BP Location: Right arm, Patient Position: Chair, Cuff Size: Adult Large)  Pulse 79  Temp 98.4  F (36.9  C) (Oral)  Ht 1.619 m (5' 3.75\")  Wt 85.3 kg (188 lb)  LMP 03/19/2009  SpO2 98%  Breastfeeding? No  BMI 32.52 kg/m2 Estimated body mass index is 32.52 kg/(m^2) as calculated from the following:    Height as of this encounter: 1.619 m (5' 3.75\").    Weight as of this encounter: 85.3 kg (188 lb).  Medication Reconciliation: complete   Halle Oneil CMA  March 6, 2017 8:08 AM        "

## 2017-03-07 LAB — LIPASE SERPL-CCNC: 207 U/L (ref 73–393)

## 2017-03-08 ENCOUNTER — OFFICE VISIT (OUTPATIENT)
Dept: ENDOCRINOLOGY | Facility: CLINIC | Age: 57
End: 2017-03-08

## 2017-03-08 VITALS
DIASTOLIC BLOOD PRESSURE: 75 MMHG | WEIGHT: 188 LBS | HEART RATE: 80 BPM | SYSTOLIC BLOOD PRESSURE: 114 MMHG | BODY MASS INDEX: 32.1 KG/M2 | HEIGHT: 64 IN

## 2017-03-08 DIAGNOSIS — Z79.4 TYPE 2 DIABETES MELLITUS WITH HYPERGLYCEMIA, WITH LONG-TERM CURRENT USE OF INSULIN (H): Primary | ICD-10-CM

## 2017-03-08 DIAGNOSIS — E11.65 TYPE 2 DIABETES MELLITUS WITH HYPERGLYCEMIA, WITH LONG-TERM CURRENT USE OF INSULIN (H): Primary | ICD-10-CM

## 2017-03-08 LAB
COPATH REPORT: NORMAL
HBA1C MFR BLD: 9.2 % (ref 4.3–6)
PAP: NORMAL

## 2017-03-08 ASSESSMENT — PAIN SCALES - GENERAL: PAINLEVEL: NO PAIN (0)

## 2017-03-08 NOTE — PROGRESS NOTES
DIAGNOSIS:  A 57-year-old woman with type 2 diabetes since age 32.  Microalbuminuria on an angiotensin receptor blocker, but no other evidence of significant microvascular complications.  Severe insulin resistance requiring high doses of insulin and multiple medications for management of diabetes.      HISTORY OF PRESENT ILLNESS:  Ms. Pedro is here for a followup visit.  I had seen her in mid December.  At that time, her A1c was 8.4%, which was an improved level for her.  We decided not to make any major changes in her regimen of Lantus, metformin, NovoLog and Bydureon.  We did talk about trying to make sure she takes her Bydureon on a regular basis as she was forgetting it some weeks.      She returns to followup.  She has had a prolonged cold and bronchitis she says over the past couple of months.  Her blood sugars have been higher during that time.  She temporarily increased her Lantus from 75 to 100 units a day.  She also increased her NovoLog with meals up to 30 units, sometimes with meals.  She is feeling somewhat better now, so she has cut her Lantus back to the previous dose of 75 units.      Her hemoglobin A1c today was 9.2%, so her control has deteriorated.  She did bring her meter in, but she is checking her blood sugars quite sporadically.  Looking at the pattern, it appears she tends to check more frequently on days she is not working, but often on days she is working she may check only once a day and sometimes not at all.        Her average glucose from the meter does not all correlate with her A1c.  Her average meter glucose is 150.  One of the things that complicates her management is she works nights and does not check her blood sugar at night.  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  We had previously done continuous glucose monitoring several years ago and it appeared the  major time her blood sugars were high was predictably at night.      She also says she tends to have early satiety.  She tends to have fullness over the left side of the abdomen after she eats.  She is not having any problems with constipation.  She does not feel like she is eating that much, but I do note that her weight has not changed.      At this point, her regimen is Lantus insulin 75 units once a day.  She takes this in the afternoon/evening when she gets up before she goes to work.  Metformin 1000 mg twice a day.  NovoLog with her meals, typically only 2 meals a day on work days.  On non-work days, she may eat 3 meals.  She typically will take about 25-30 units of NovoLog with her meals.  Bydureon 2 mg once a week.      She has had some occasional blood sugars in the 60s but has had no severe hypoglycemic episodes.      She has no acute visual complaints.  She had an eye exam in October.  There was no evidence of retinopathy.  She has no symptoms of peripheral neuropathy.      MEDICATIONS:  Her other medications include hydrochlorothiazide, amlodipine and losartan for blood pressure.  She is on Crestor for lipid control.      REVIEW OF SYSTEMS:  Her primary physician had recommended going on a steroid inhaler to help with her respiratory complaints with bronchitis.  She was concerned about this as far as her diabetes so has not started it yet.  The GI complaints as above.  She still continues to do some yoga.      PHYSICAL EXAMINATION:   VITAL SIGNS:  Her weight is 188 pounds, identical to last visit.  Pulse 80, blood pressure 114/75.     GENERAL:  She is anxious, but appears well.     HEENT:  Conjunctivae are clear.   CHEST:  Clear breath sounds.  I do not hear any wheezes.   CARDIAC:  Distant heart sounds, but regular rate and rhythm.   ABDOMEN:  Obese, but no masses noted.   EXTREMITIES:  No edema.  Feet are in good repair.  No ulcers or sores.  She has intact sensation to light touch with filament  testing.      LABORATORY TESTS:  She had recent laboratory tests done through her primary clinic for renal function, urine microalbumin and lipid levels.  These were all fine.  She also had a normal TSH.      ASSESSMENT:  A 57-year-old woman who has had longstanding type 2 diabetes now for 25 years.  The patient had some microalbuminuria, but no other evidence of significant microvascular complications.  Ms. Pedro's diabetes management is complicated by her work schedule.  She has also had problems or issues with a number of diabetes medications over the years for various reasons.      I discussed options with her.  If the Bydureon is causing her a lot of gastrointestinal complaints, we could stop that, but after reflecting on things she is not inclined to do that at this point.  She had been on Actos in the past.  She was concerned about bladder cancer.  I told her I thought this was low risk and we could certainly consider going back to that, but she did not want to try that.  We discussed trying one of the new SGLT2 inhibitors.  She does have a history of problems with yeast infection, so she is not inclined to try that at this point, although I think that could be an option.      I discussed other options such as stopping the Lantus and NovoLog and going to 3 times a day NPH insulin.  Finally, we also discussed once again trying to check blood sugars at night when she is at work and taking some NovoLog at night if the blood sugars are high.  After reflecting on all the options, she is agreeable to that.      PLAN:   1.  Continue the Lantus 75 units once a day for now, continue the NovoLog with meals during the day as she is doing.   2.  We will continue the Bydureon and the metformin.   3.  She is going to take her glucose meter and NovoLog pen to work.  She says she does not eat a meal at work, although I suspect she probably has some food.  At any rate, I suggested checking her blood sugars about midway  through her shift.  If her blood sugar is in the high 100 range of 180 or greater then she could take some NovoLog.  We discussed starting with about 10-15 units.   4.  We will see how things go with this.  If necessary, we can consider other options such as a trial of a SGLT2 inhibitor, possibly 3 times a day NPH.      She will bring her meter in in about a month to download in clinic.  We will have her come back to clinic in 3-4 months.

## 2017-03-08 NOTE — PATIENT INSTRUCTIONS
A1c = 9.2% - I suspect the high bs are mainly at work.    Continue the metformin, bydureon, lantus 75 units and Novolog during the day with meals.    Take your meter and Novolog to work - check bs about midway during shift and if bs is 180 or greater, take a dose of Novolog 10 - 15 units to help bring it down at work.    Can bring your meter in to clinic in about 1 month to download.

## 2017-03-08 NOTE — LETTER
3/8/2017       RE: Maliha Pedro  [address]  [place] [address]     Dear Colleague,    Thank you for referring your patient, Maliha Pedro, to the [facility]. Please see a copy of my visit note below.    DIAGNOSIS:  A 57-year-old woman with type 2 diabetes since age 32.  Microalbuminuria on an angiotensin receptor blocker, but no other evidence of significant microvascular complications.  Severe insulin resistance requiring high doses of insulin and multiple medications for management of diabetes.      HISTORY OF PRESENT ILLNESS:  Ms. Pedro is here for a followup visit.  I had seen her in mid December.  At that time, her A1c was 8.4%, which was an improved level for her.  We decided not to make any major changes in her regimen of Lantus, metformin, NovoLog and Bydureon.  We did talk about trying to make sure she takes her Bydureon on a regular basis as she was forgetting it some weeks.      She returns to followup.  She has had a prolonged cold and bronchitis she says over the past couple of months.  Her blood sugars have been higher during that time.  She temporarily increased her Lantus from 75 to 100 units a day.  She also increased her NovoLog with meals up to 30 units, sometimes with meals.  She is feeling somewhat better now, so she has cut her Lantus back to the previous dose of 75 units.      Her hemoglobin A1c today was 9.2%, so her control has deteriorated.  She did bring her meter in, but she is checking her blood sugars quite sporadically.  Looking at the pattern, it appears she tends to check more frequently on days she is not working, but often on days she is working she may check only once a day and sometimes not at all.        Her average glucose from the meter does not all correlate with her A1c.  Her average meter glucose is 150.  One of the things that complicates her management is she works nights and  does not check her blood sugar at night.  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  We had previously done continuous glucose monitoring several years ago and it appeared the major time her blood sugars were high was predictably at night.      She also says she tends to have early satiety.  She tends to have fullness over the left side of the abdomen after she eats.  She is not having any problems with constipation.  She does not feel like she is eating that much, but I do note that her weight has not changed.      At this point, her regimen is Lantus insulin 75 units once a day.  She takes this in the afternoon/evening when she gets up before she goes to work.  Metformin 1000 mg twice a day.  NovoLog with her meals, typically only 2 meals a day on work days.  On non-work days, she may eat 3 meals.  She typically will take about 25-30 units of NovoLog with her meals.  Bydureon 2 mg once a week.      She has had some occasional blood sugars in the 60s but has had no severe hypoglycemic episodes.      She has no acute visual complaints.  She had an eye exam in October.  There was no evidence of retinopathy.  She has no symptoms of peripheral neuropathy.      MEDICATIONS:  Her other medications include hydrochlorothiazide, amlodipine and losartan for blood pressure.  She is on Crestor for lipid control.      REVIEW OF SYSTEMS:  Her primary physician had recommended going on a steroid inhaler to help with her respiratory complaints with bronchitis.  She was concerned about this as far as her diabetes so has not started it yet.  The GI complaints as above.  She still continues to do some yoga.      PHYSICAL EXAMINATION:   VITAL SIGNS:  Her weight is 188 pounds, identical to last visit.  Pulse 80, blood pressure 114/75.     GENERAL:  She is anxious, but appears well.     HEENT:  Conjunctivae are clear.    CHEST:  Clear breath sounds.  I do not hear any wheezes.   CARDIAC:  Distant heart sounds, but regular rate and rhythm.   ABDOMEN:  Obese, but no masses noted.   EXTREMITIES:  No edema.  Feet are in good repair.  No ulcers or sores.  She has intact sensation to light touch with filament testing.      LABORATORY TESTS:  She had recent laboratory tests done through her primary clinic for renal function, urine microalbumin and lipid levels.  These were all fine.  She also had a normal TSH.      ASSESSMENT:  A 57-year-old woman who has had longstanding type 2 diabetes now for 25 years.  The patient had some microalbuminuria, but no other evidence of significant microvascular complications.  Ms. Pedro's diabetes management is complicated by her work schedule.  She has also had problems or issues with a number of diabetes medications over the years for various reasons.      I discussed options with her.  If the Bydureon is causing her a lot of gastrointestinal complaints, we could stop that, but after reflecting on things she is not inclined to do that at this point.  She had been on Actos in the past.  She was concerned about bladder cancer.  I told her I thought this was low risk and we could certainly consider going back to that, but she did not want to try that.  We discussed trying one of the new SGLT2 inhibitors.  She does have a history of problems with yeast infection, so she is not inclined to try that at this point, although I think that could be an option.      I discussed other options such as stopping the Lantus and NovoLog and going to 3 times a day NPH insulin.  Finally, we also discussed once again trying to check blood sugars at night when she is at work and taking some NovoLog at night if the blood sugars are high.  After reflecting on all the options, she is agreeable to that.      PLAN:   1.  Continue the Lantus 75 units once a day for now, continue the NovoLog with meals during the day as she is  doing.   2.  We will continue the Bydureon and the metformin.   3.  She is going to take her glucose meter and NovoLog pen to work.  She says she does not eat a meal at work, although I suspect she probably has some food.  At any rate, I suggested checking her blood sugars about midway through her shift.  If her blood sugar is in the high 100 range of 180 or greater then she could take some NovoLog.  We discussed starting with about 10-15 units.   4.  We will see how things go with this.  If necessary, we can consider other options such as a trial of a SGLT2 inhibitor, possibly 3 times a day NPH.      She will bring her meter in in about a month to download in clinic.  We will have her come back to clinic in 3-4 months.       Again, thank you for allowing me to participate in the care of your patient.      Sincerely,    Shaheen Mccarthy MD

## 2017-03-08 NOTE — NURSING NOTE
Chief Complaint   Patient presents with     RECHECK     F/U TYPE II DM     Alyse Stafford, CMA  Endocrinology & Diabetes 3G    Capillary Fingerstick performed for an Hemoglobin A1C test

## 2017-03-08 NOTE — MR AVS SNAPSHOT
After Visit Summary   3/8/2017    Maliha Pedro    MRN: [id number]           Patient Information     Date Of Birth          1960        Visit Information        Provider Department      3/8/2017 8:00 AM Shaheen Mccarthy MD [facility] End[facility]        Today's Diagnoses     Type 2 diabetes mellitus with hyperglycemia, with long-term current use of insulin (H)    -  1      Care Instructions    A1c = 9.2% - I suspect the high bs are mainly at work.    Continue the metformin, bydureon, lantus 75 units and Novolog during the day with meals.    Take your meter and Novolog to work - check bs about midway during shift and if bs is 180 or greater, take a dose of Novolog 10 - 15 units to help bring it down at work.    Can bring your meter in to clinic in about 1 month to download.        Follow-ups after your visit        Follow-up notes from your care team     Return in about 3 months (around 6/8/2017).      Your next 10 appointments already scheduled     Mar 20, 2017  8:30 AM CDT   DX HIP/PELVIS/SPINE with MGDX[facility]   [facility]    [address] [place] [address] [phone]           Please do not take any of the following 48 hours prior to your exam: vitamins, calcium tablets, antacids.            Mar 20, 2017  9:00 AM CDT   US ABDOMEN COMPLETE with MGUS1, MG US TECH   [facility]    [address] [place] [address] [phone]           Please bring a list of your medicines (including vitamins, minerals and over-the-counter drugs). Also, tell your doctor about any allergies you may have. Wear comfortable clothes and leave your valuables at home.  Adults: No eating or drinking for 8 hours before the exam. You may take medicine with a small sip of water.  Children: - Children 6+ years: No food or drink for 6 hours before exam. - Children 1-5 years: No food or drink for  4 hours before exam. - Infants, breast-fed: may have breast milk up to 2 hours before exam. - Infants, formula: may have bottle until 4 hours before exam.  Please call the Imaging Department at your exam site with any questions.            Apr 19, 2017  8:45 AM CDT   VISUAL FIELD with [facility] EYE VISUAL FIELD   [facility] ([facility])    [address]  [address]   [address] [address]  [place] [address]   [phone]            Apr 19, 2017  9:15 AM CDT   RETURN GLAUCOMA with Umu Rosario MD   [facility] Clinic ([facility])    [address]  [address]   [address]  [facility] [address]   [phone]            Jun 14, 2017 10:00 AM CDT   (Arrive by 9:45 AM)   RETURN DIABETES with Shaheen Mccarthy MD   [facility] Endocrinology ([facility])    [address]  3rd Floor  [place] [address] [phone]              Who to contact     Please call your clinic at [phone] to:    Ask questions about your health    Make or cancel appointments    Discuss your medicines    Learn about your test results    Speak to your doctor   If you have compliments or concerns about an experience at your clinic, or if you wish to file a complaint, please contact [facility] Patient Relations at [phone] or email us at [email or web address]         Additional Information About Your Visit        [employer] Information     [employer] is an electronic gateway that provides easy, online access to your medical records. With [email or web address], you can request a clinic appointment, read your test results, renew a prescription or communicate with your care team.     To sign up for [email or web address] visit the website at [email or web address]   You will be asked to enter the access code listed below, as well as some personal information. Please follow the directions to create your username and password.     Your access code is:  "[email or web address]  Expires: 2017 10:06 AM     Your access code will  in 90 days. If you need help or a new code, please contact your [facility] or call [phone] for assistance.        Care EveryWhere ID     This is your Care EveryWhere ID. This could be used by other organizations to access your [facility] medical records  [id number]        Your Vitals Were     Pulse Height Last Period BMI (Body Mass Index)          80 1.618 m (5' 3.7\") 2009 32.57 kg/m2         Blood Pressure from Last 3 Encounters:   17 114/75   17 136/80   17 128/60    Weight from Last 3 Encounters:   17 85.3 kg (188 lb)   17 85.3 kg (188 lb)   17 85 kg (187 lb 8 oz)              We Performed the Following     Hemoglobin A1c POCT          Today's Medication Changes          These changes are accurate as of: 3/8/17 11:59 PM.  If you have any questions, ask your nurse or doctor.               Start taking these medicines.        Dose/Directions    blood glucose monitoring lancets   Used for:  Type 2 diabetes mellitus with hyperglycemia, with long-term current use of insulin (H)   Started by:  Shaheen Mccarthy MD        Use to test blood sugar 3 times daily or as directed.   Quantity:  5 Box   Refills:  3            Where to get your medicines      These medications were sent to [facility] - [place], [place] - [address], [address]  [address], [address], [facility] [address]     Phone:  [phone]     blood glucose monitoring lancets                Primary Care Provider Office Phone # Fax #    Elva Ortiz [phone]       [facility] [address] [place] [address] N  [facility] [address]        Thank you!     Thank you for choosing [facility] ENDOCRINOLOGY  for your care. Our goal is always to provide you with excellent care. Hearing back from our patients is one way we can continue to improve our " services. Please take a few minutes to complete the written survey that you may receive in the mail after your visit with us. Thank you!             Your Updated Medication List - Protect others around you: Learn how to safely use, store and throw away your medicines at www.disposemymeds.org.          This list is accurate as of: 3/8/17 11:59 PM.  Always use your most recent med list.                   Brand Name Dispense Instructions for use    ACCU-CHEK VANESA PLUS test strip   Generic drug:  blood glucose monitoring          albuterol 108 (90 BASE) MCG/ACT Inhaler    PROAIR HFA/PROVENTIL HFA/VENTOLIN HFA    1 Inhaler    Inhale 2 puffs into the lungs every 4 hours as needed for shortness of breath / dyspnea       amLODIPine 5 MG tablet    NORVASC    90 tablet    Take 1 tablet (5 mg) by mouth daily       aspirin EC 81 MG EC tablet      Take 325 mg by mouth daily       blood glucose monitoring lancets     5 Box    Use to test blood sugar 3 times daily or as directed.       blood glucose monitoring meter device kit    no brand specified    1 kit    Use to test blood sugar 4 times daily or as directed.       CALCIUM + D PO      Take 1 tablet by mouth 2 times daily.       exenatide ER 2 MG pen    BYDUREON    12 each    Inject 2 mg Subcutaneous once a week       fexofenadine 180 MG tablet    ALLEGRA    90 tablet    Take 1 tablet by mouth daily. 1 TABLET DAILY Failed claritin for symptom cotrol. Zyrtec increases heart rate.       fish oil-omega-3 fatty acids 1000 MG capsule      Take 1 capsule by mouth daily.       fluticasone 50 MCG/ACT spray    FLONASE    16 g    Spray 1-2 sprays into both nostrils daily       hydrochlorothiazide 12.5 MG Tabs tablet     90 tablet    TAKE 1 TABLET BY MOUTH ONCE DAILY       insulin glargine 100 UNIT/ML injection    LANTUS SOLOSTAR    75 mL    80 units daily       magnesium 250 MG tablet     90 tablet    Take 1 tablet by mouth daily       metFORMIN 500 MG 24 hr tablet    GLUCOPHAGE-XR     360 tablet    Take 2 tablets (1,000 mg) by mouth 2 times daily       MULTIVITAMIN PO      Take 1 tablet by mouth daily.       NEEDLES, ANY SIZE     1 Box    Pen needles for Victoza pen.       NEEDLES, ANY SIZE     4 Box    Pen needles for Novolog insulin pen. Use to inject 5-6  times daily.       NovoLOG FLEXPEN 100 UNIT/ML injection   Generic drug:  insulin aspart     120 mL    INJECT UP  UNITS UNDER THE SKIN PER DAY AS DIRECTED       pantoprazole 40 MG EC tablet    PROTONIX    90 tablet    Take 1 tablet (40 mg) by mouth daily       pentoxifylline 400 MG CR tablet    TRENtal    180 tablet    Take 1 tablet (400 mg) by mouth 2 times daily       rosuvastatin 10 MG tablet    CRESTOR    90 tablet    Take 1 tablet (10 mg) by mouth daily       vitamin D 2000 UNITS tablet      Take 1 tablet by mouth daily.

## 2017-03-09 DIAGNOSIS — I10 HYPERTENSION GOAL BP (BLOOD PRESSURE) < 140/90: ICD-10-CM

## 2017-03-09 LAB
FINAL DIAGNOSIS: NORMAL
HPV HR 12 DNA CVX QL NAA+PROBE: NEGATIVE
HPV16 DNA SPEC QL NAA+PROBE: NEGATIVE
HPV18 DNA SPEC QL NAA+PROBE: NEGATIVE
SPECIMEN DESCRIPTION: NORMAL

## 2017-03-09 RX ORDER — LOSARTAN POTASSIUM 100 MG/1
TABLET ORAL
Qty: 90 TABLET | Refills: 3 | Status: SHIPPED | OUTPATIENT
Start: 2017-03-09 | End: 2018-05-25

## 2017-03-09 NOTE — TELEPHONE ENCOUNTER
Hello,  last fill date:11-  Last quantity:90    Thank You,  Anisha Raygoza  Pharmacy Technician  [facility]  [phone]

## 2017-03-13 RX ORDER — LANCETS
EACH MISCELLANEOUS
Qty: 5 BOX | Refills: 3 | Status: SHIPPED | OUTPATIENT
Start: 2017-03-13 | End: 2018-08-06

## 2017-03-20 ENCOUNTER — RADIANT APPOINTMENT (OUTPATIENT)
Dept: ULTRASOUND IMAGING | Facility: CLINIC | Age: 57
End: 2017-03-20
Attending: FAMILY MEDICINE
Payer: COMMERCIAL

## 2017-03-20 ENCOUNTER — RADIANT APPOINTMENT (OUTPATIENT)
Dept: BONE DENSITY | Facility: CLINIC | Age: 57
End: 2017-03-20
Attending: FAMILY MEDICINE
Payer: COMMERCIAL

## 2017-03-20 DIAGNOSIS — Z78.0 MENOPAUSE: ICD-10-CM

## 2017-03-20 DIAGNOSIS — R79.89 ELEVATED LFTS: ICD-10-CM

## 2017-03-20 DIAGNOSIS — R10.13 DYSPEPSIA: ICD-10-CM

## 2017-03-20 PROCEDURE — 76700 US EXAM ABDOM COMPLETE: CPT

## 2017-03-20 PROCEDURE — 77080 DXA BONE DENSITY AXIAL: CPT | Performed by: RADIOLOGY

## 2017-04-06 DIAGNOSIS — E11.65 TYPE 2 DIABETES MELLITUS WITH HYPERGLYCEMIA, WITH LONG-TERM CURRENT USE OF INSULIN (H): Primary | ICD-10-CM

## 2017-04-06 DIAGNOSIS — Z79.4 TYPE 2 DIABETES MELLITUS WITH HYPERGLYCEMIA, WITH LONG-TERM CURRENT USE OF INSULIN (H): Primary | ICD-10-CM

## 2017-04-06 RX ORDER — BLOOD SUGAR DIAGNOSTIC
STRIP MISCELLANEOUS
Qty: 360 STRIP | Refills: 3 | Status: SHIPPED | OUTPATIENT
Start: 2017-04-06 | End: 2017-11-01

## 2017-04-06 NOTE — TELEPHONE ENCOUNTER
Prescription refilled.  Please verify with patient if would like 3 month supply as I sent that way but may only get one month at a time if desires.

## 2017-04-06 NOTE — TELEPHONE ENCOUNTER
Hello,  last fill date:02-  Last quantity:100    Thank You,  Anisha Raygoza  Pharmacy Technician  [facility]  [phone]

## 2017-04-19 ENCOUNTER — OFFICE VISIT (OUTPATIENT)
Dept: OPHTHALMOLOGY | Facility: CLINIC | Age: 57
End: 2017-04-19
Attending: OPHTHALMOLOGY
Payer: COMMERCIAL

## 2017-04-19 DIAGNOSIS — H40.003 BORDERLINE GLAUCOMA, BILATERAL: ICD-10-CM

## 2017-04-19 PROCEDURE — 99214 OFFICE O/P EST MOD 30 MIN: CPT | Mod: 25

## 2017-04-19 PROCEDURE — 92083 EXTENDED VISUAL FIELD XM: CPT | Mod: ZF | Performed by: OPHTHALMOLOGY

## 2017-04-19 ASSESSMENT — VISUAL ACUITY
CORRECTION_TYPE: GLASSES
OD_CC: 20/20
METHOD: SNELLEN - LINEAR
OS_CC: 20/20

## 2017-04-19 ASSESSMENT — CUP TO DISC RATIO
OS_RATIO: 0.7
OD_RATIO: 0.9

## 2017-04-19 ASSESSMENT — CONF VISUAL FIELD
OS_NORMAL: 1
METHOD: COUNTING FINGERS
OD_NORMAL: 1

## 2017-04-19 ASSESSMENT — SLIT LAMP EXAM - LIDS
COMMENTS: MEIBOMIAN GLAND DYSFUNCTION
COMMENTS: MEIBOMIAN GLAND DYSFUNCTION

## 2017-04-19 ASSESSMENT — EXTERNAL EXAM - RIGHT EYE: OD_EXAM: DERMATOCHALASIS

## 2017-04-19 ASSESSMENT — TONOMETRY
IOP_METHOD: APPLANATION
OS_IOP_MMHG: 16
OD_IOP_MMHG: 15

## 2017-04-19 ASSESSMENT — EXTERNAL EXAM - LEFT EYE: OS_EXAM: DERMATOCHALASIS

## 2017-04-19 ASSESSMENT — REFRACTION_WEARINGRX
SPECS_TYPE: PAL
OS_SPHERE: -4.25
OD_CYLINDER: +0.75
OS_ADD: +2.50
OS_CYLINDER: +1.25
OD_AXIS: 100
OD_ADD: +2.50
OS_AXIS: 085
OD_SPHERE: -4.25

## 2017-04-19 ASSESSMENT — REFRACTION_MANIFEST
OD_ADD: +2.50
OS_CYLINDER: +1.25
OS_AXIS: 085
OD_SPHERE: -3.50
OD_CYLINDER: +0.50
OS_SPHERE: -4.25
OD_AXIS: 120
OS_ADD: +2.50

## 2017-04-19 NOTE — PROGRESS NOTES
Denies any vision changes.     Ocular meds:  ATs as needed     Ocular history:  Cataract extraction / intraocular lens left eye 7-8 years ago     A/P:  1) Glaucoma suspect, bilateral  - large cup to disc ratio R>L  - favorable pachmetry 583/613  - Octopus visual field   Right eye: Reliable,stable   Left eye: Reliable. stable.     2) Type 2 diabetes mellitus. A1c 9.1% (March 2017, had been ill). On insulin  - without retinopathy  - follows Dr. Tracy     3) Cataract, right eye  -still seeing well  -observe     3) Pseudophakia, left eye    Follow up 6 months with with OCT retinal nerve fiber layer      Attending Physician Attestation:  Complete documentation of historical and exam elements from today's encounter can be found in the full encounter summary report (not reduplicated in this progress note). I personally obtained the chief complaint(s) and history of present illness. I confirmed and edited asnecessary the review of systems, past medical/surgical history, family history, social history, and examination findings as documented by others; and I examined the patient myself. I personally reviewed the relevant tests, images, and reports as documented above. I formulated and edited as necessary the assessment and plan and discussed the findings and management plan with the patient and family.  - Umu Rosario MD 9:56 AM 4/19/2017

## 2017-04-19 NOTE — NURSING NOTE
Chief Complaints and History of Present Illnesses   Patient presents with     Glaucoma Suspect Follow Up     HPI    Affected eye(s):  Both   Symptoms:     No decreased vision   Floaters   Flashes         Do you have eye pain now?:  No      Comments:  No VA changes. Hasn't gotten new glasses yet.     Gayatri Heard COA April 19, 2017 9:08 AM

## 2017-04-19 NOTE — MR AVS SNAPSHOT
After Visit Summary   4/19/2017    Maliha Pedro    MRN: [id number]           Patient Information     Date Of Birth          1960        Visit Information        Provider Department      4/19/2017 9:15 AM Umu Rosario MD [facility]        Today's Diagnoses     Borderline glaucoma, bilateral [H40.003]           Follow-ups after your visit        Follow-up notes from your care team     Return in about 6 months (around 10/19/2017) for OCT rnfl.      Your next 10 appointments already scheduled     Jun 14, 2017 10:00 AM CDT   (Arrive by 9:45 AM)   RETURN DIABETES with Shaheen Mccarthy MD   [facility] Endocrinology ([facility])    [address]  3rd Floor  [place] [facility] [address] [phone]            Jun 21, 2017  8:45 AM CDT   RETURN RETINA with Lesly Tracy MD   Eye Clinic ([facility])    [address]  [address]   [address] [address]  [place] [facility] [address] [phone]            Oct 25, 2017  8:45 AM CDT   RETURN GLAUCOMA with Umu Rosario MD   Eye Clinic ([facility])    [address]  [address]   [address] [address]  [facility] [address] [phone]              Who to contact     Please call your clinic at [phone] to:    Ask questions about your health    Make or cancel appointments    Discuss your medicines    Learn about your test results    Speak to your doctor   If you have compliments or concerns about an experience at your clinic, or if you wish to file a complaint, please contact [facility] Patient Relations at [phone] or email us at [email or web address]         Additional Information About Your Visit        MyChart Information     iHeartt is an electronic gateway that provides easy, online access to your medical records. With [employer], you can request a clinic appointment, read your test results, renew a prescription or  communicate with your care team.     To sign up for [employer] visit the website at [email or web address]   You will be asked to enter the access code listed below, as well as some personal information. Please follow the directions to create your username and password.     Your access code is: [email or web address]  Expires: 2017 10:06 AM     Your access code will  in 90 days. If you need help or a new code, please contact your [facility] or call [phone] for assistance.        Care EveryWhere ID     This is your Care EveryWhere ID. This could be used by other organizations to access your [place] medical records  [id number]        Your Vitals Were     Last Period                   2009            Blood Pressure from Last 3 Encounters:   17 114/75   17 136/80   17 128/60    Weight from Last 3 Encounters:   17 85.3 kg (188 lb)   17 85.3 kg (188 lb)   17 85 kg (187 lb 8 oz)              We Performed the Following     OVF 24-2 Dynamic OU        Primary Care Provider Office Phone # Fax #    Elva Ortiz [phone]       [address] [address]        Thank you!     Thank you for choosing [facility]IN[facility]  for your care. Our goal is always to provide you with excellent care. Hearing back from our patients is one way we can continue to improve our services. Please take a few minutes to complete the written survey that you may receive in the mail after your visit with us. Thank you!             Your Updated Medication List - Protect others around you: Learn how to safely use, store and throw away your medicines at www.disposemymeds.org.          This list is accurate as of: 17 10:06 AM.  Always use your most recent med list.                   Brand Name Dispense Instructions for use    ACCU-CHEK VANESA PLUS test strip   Generic drug:  blood glucose monitoring     360 strip     Test four times a day       albuterol 108 (90 BASE) MCG/ACT Inhaler    PROAIR HFA/PROVENTIL HFA/VENTOLIN HFA    1 Inhaler    Inhale 2 puffs into the lungs every 4 hours as needed for shortness of breath / dyspnea       amLODIPine 5 MG tablet    NORVASC    90 tablet    Take 1 tablet (5 mg) by mouth daily       aspirin EC 81 MG EC tablet      Take 325 mg by mouth daily       blood glucose monitoring lancets     5 Box    Use to test blood sugar 3 times daily or as directed.       blood glucose monitoring meter device kit    no brand specified    1 kit    Use to test blood sugar 4 times daily or as directed.       CALCIUM + D PO      Take 1 tablet by mouth 2 times daily.       exenatide ER 2 MG pen    BYDUREON    12 each    Inject 2 mg Subcutaneous once a week       fexofenadine 180 MG tablet    ALLEGRA    90 tablet    Take 1 tablet by mouth daily. 1 TABLET DAILY Failed claritin for symptom cotrol. Zyrtec increases heart rate.       fish oil-omega-3 fatty acids 1000 MG capsule      Take 1 capsule by mouth daily.       fluticasone 50 MCG/ACT spray    FLONASE    16 g    Spray 1-2 sprays into both nostrils daily       hydrochlorothiazide 12.5 MG Tabs tablet     90 tablet    TAKE 1 TABLET BY MOUTH ONCE DAILY       insulin glargine 100 UNIT/ML injection    LANTUS SOLOSTAR    75 mL    80 units daily       losartan 100 MG tablet    COZAAR    90 tablet    TAKE ONE TABLET BY MOUTH EVERY DAY       magnesium 250 MG tablet     90 tablet    Take 1 tablet by mouth daily       metFORMIN 500 MG 24 hr tablet    GLUCOPHAGE-XR    360 tablet    Take 2 tablets (1,000 mg) by mouth 2 times daily       MULTIVITAMIN PO      Take 1 tablet by mouth daily.       NEEDLES, ANY SIZE     1 Box    Pen needles for Victoza pen.       NEEDLES, ANY SIZE     4 Box    Pen needles for Novolog insulin pen. Use to inject 5-6  times daily.       NovoLOG FLEXPEN 100 UNIT/ML injection   Generic drug:  insulin aspart     120 mL    INJECT UP  UNITS UNDER  THE SKIN PER DAY AS DIRECTED       pantoprazole 40 MG EC tablet    PROTONIX    90 tablet    Take 1 tablet (40 mg) by mouth daily       pentoxifylline 400 MG CR tablet    TRENtal    180 tablet    Take 1 tablet (400 mg) by mouth 2 times daily       rosuvastatin 10 MG tablet    CRESTOR    90 tablet    Take 1 tablet (10 mg) by mouth daily       vitamin D 2000 UNITS tablet      Take 1 tablet by mouth daily.

## 2017-06-13 DIAGNOSIS — I10 ESSENTIAL HYPERTENSION: ICD-10-CM

## 2017-06-13 DIAGNOSIS — E10.9 DIABETES MELLITUS TYPE I (H): ICD-10-CM

## 2017-06-13 DIAGNOSIS — E11.9 DIABETES MELLITUS, TYPE II (H): ICD-10-CM

## 2017-06-13 RX ORDER — HYDROCHLOROTHIAZIDE 12.5 MG/1
TABLET ORAL
Qty: 90 TABLET | Refills: 1 | Status: SHIPPED | OUTPATIENT
Start: 2017-06-13 | End: 2017-06-14

## 2017-06-13 RX ORDER — PENTOXIFYLLINE 400 MG/1
TABLET, EXTENDED RELEASE ORAL
Qty: 180 TABLET | Refills: 3 | Status: CANCELLED | OUTPATIENT
Start: 2017-06-13

## 2017-06-13 RX ORDER — AMLODIPINE BESYLATE 5 MG/1
TABLET ORAL
Qty: 90 TABLET | Refills: 3 | Status: SHIPPED | OUTPATIENT
Start: 2017-06-13 | End: 2017-06-14

## 2017-06-13 RX ORDER — METFORMIN HCL 500 MG
TABLET, EXTENDED RELEASE 24 HR ORAL
Qty: 360 TABLET | Refills: 3 | Status: CANCELLED | OUTPATIENT
Start: 2017-06-13

## 2017-06-13 NOTE — TELEPHONE ENCOUNTER
Hello,  last fill date:03-  Last quantity:90 days    Thank You,  Anisha Raygoza  Pharmacy Technician  [facility]  [phone]

## 2017-06-13 NOTE — TELEPHONE ENCOUNTER
hydrochlorothiazide 12.5 MG TABS      Last Written Prescription Date: 11/17/16  Last Fill Quantity: 90, # refills: 1  Last Office Visit with [facility], [facility] or [facility] prescribing provider: 03/06/17 Dr. Ortiz         Potassium   Date Value Ref Range Status   02/08/2017 4.0 3.4 - 5.3 mmol/L Final     Creatinine   Date Value Ref Range Status   02/08/2017 0.68 0.52 - 1.04 mg/dL Final     BP Readings from Last 3 Encounters:   03/08/17 114/75   03/06/17 136/80   02/08/17 128/60     amLODIPine (NORVASC) 5 MG tablet      Last Written Prescription Date: 606/07/16  Last Fill Quantity: 90, # refills: 3    Last Office Visit with [facility], [facility] or [facility] prescribing provider:  03/06/17 Dr. Ortiz  Future Office Visit:        BP Readings from Last 3 Encounters:   03/08/17 114/75   03/06/17 136/80   02/08/17 128/60

## 2017-06-14 ENCOUNTER — OFFICE VISIT (OUTPATIENT)
Dept: ENDOCRINOLOGY | Facility: CLINIC | Age: 57
End: 2017-06-14

## 2017-06-14 VITALS
DIASTOLIC BLOOD PRESSURE: 76 MMHG | HEART RATE: 72 BPM | HEIGHT: 64 IN | WEIGHT: 188 LBS | SYSTOLIC BLOOD PRESSURE: 110 MMHG | BODY MASS INDEX: 32.1 KG/M2

## 2017-06-14 DIAGNOSIS — E11.21 TYPE 2 DIABETES MELLITUS WITH DIABETIC NEPHROPATHY, WITH LONG-TERM CURRENT USE OF INSULIN (H): ICD-10-CM

## 2017-06-14 DIAGNOSIS — I10 ESSENTIAL HYPERTENSION: ICD-10-CM

## 2017-06-14 DIAGNOSIS — Z79.4 TYPE 2 DIABETES MELLITUS WITH DIABETIC NEPHROPATHY, WITH LONG-TERM CURRENT USE OF INSULIN (H): ICD-10-CM

## 2017-06-14 LAB — HBA1C MFR BLD: 8.8 % (ref 4.3–6)

## 2017-06-14 ASSESSMENT — PAIN SCALES - GENERAL: PAINLEVEL: NO PAIN (0)

## 2017-06-14 NOTE — PROGRESS NOTES
DIAGNOSIS:  A 57-year-old woman with type 2 diabetes since age 32.  Microalbuminuria, on an angiotensin receptor blocker.  No other evidence of significant microvascular complications.  History of difficulty tolerating medications for diabetes.      HISTORY OF PRESENT ILLNESS:  Ms. Pedro is here for a followup visit.  I had seen her in March.  At that time, her hemoglobin A1c had deteriorated some, and it was 9.2%.  We discussed various options.  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  She works overnight, and typically has not been checking her blood sugars during that time.      She returns to follow up.  She has had no major issues or problems.  She says that earlier in the spring she was walking more regularly, about 35-45 minutes a day.  She had stopped drinking regular soda, and she found that her blood sugars were much better.  However, more recently over the last few weeks she has been less active because of the weather, she says.  She started drinking some sweetened iced tea, which she thinks probably has a lot of sugar.  Because of this, she thinks that her blood sugars have been higher of late.      Significantly, she stopped the Bydureon since we saw her last.  It was giving her abdominal symptoms.  She continues on metformin 1000 mg twice a day, Lantus once a day (her dose is 75 units at night), she is taking NovoLog 25 units in the morning when she gets home from work before her morning meal.  If it is her day off or if she gets up during the day around midday and has lunch, she will take about 10 units of NovoLog.  Otherwise, if she is sleeping throughout the day, she will take NovoLog again about 25 units in the late afternoon before she goes to work.  She says she had tried to check her blood sugars some at work, and had been able to do that for the first few weeks after  we saw her, but then things were just too busy, so she has not been monitoring them.      Her hemoglobin A1c today was 8.8%, so it has improved slightly.  She has been checking her blood sugars about 1-2 times a day on average.  Overall, her average blood sugar over the last month is 188.  Her blood sugars average around 180-190 in the morning when she first checks.  Later in the day, from around 1-6 p.m., her blood sugars average around 180-200.  She has no blood sugars recorded below 70.  She has had no severe hypoglycemia.      She had an eye check in April.  There was no evidence of retinopathy.  She has no symptoms of peripheral neuropathy.      MEDICATIONS:  Her diabetes medications include amlodipine, hydrochlorothiazide, losartan.  She takes a magnesium supplement.  Crestor.  She has a prescription for an albuterol inhaler.  Trental.  She takes a vitamin D supplement, a multivitamin, and an aspirin daily.      REVIEW OF SYSTEMS:  She feels well.  She has no acute complaints today.  She is continuing to do her yoga.      PHYSICAL EXAMINATION:   VITAL SIGNS:  Her weight is 188 pounds.  It has been unchanged for the last 6 months.  Pulse 72, blood pressure 110/76.   GENERAL:  She appears well.  She has centralized obesity.   CHEST:  Clear.   CARDIAC:  Regular rate and rhythm, normal heart sounds.   EXTREMITIES:  She has no edema.  She has grossly intact sensation to light touch.        She is up-to-date on routine monitoring.      ASSESSMENT:  A 57-year-old woman who has had type 2 diabetes for 25 years now.  Ms. Pedro had good diabetes control for a number of years, but we have struggled somewhat over the last 3-4 years.  We have tried multiple medications and regimens.  She has had difficulty tolerating many of these.  There has been concern about side effects.      We discussed options again.  We have not tried the SGLT2 inhibitors.  She is worried about yeast infections with these.  She feels that if she  cuts back some on the iced tea she is drinking and gets more active, this will help the blood sugars.      After discussion, we agreed on the following plan:   1.  We will continue the Lantus, NovoLog and metformin for now with her current regimen.   2.  She will make an effort to make the lifestyle changes above, and we will see where her diabetes control is at the next visit.   3.  We agreed that a reasonable goal was try to get her hemoglobin A1c below 8%.  We may need to make alterations in her insulin regimen, as it appears that insulin and metformin are the primary medications she is able to tolerate or wants to take at this point.  She is requiring quite a bit of Lantus, so we could consider going back to twice a day Lantus or changing to 2 or 3 injections a day of NPH intermediate-acting insulin.

## 2017-06-14 NOTE — PATIENT INSTRUCTIONS
A1c = 8.8% - has come down some.    Try cutting back on the sweetened tea; try to check some blood sugars at work if you can - can take some Novolog at work if you find the blood sugar is high.

## 2017-06-14 NOTE — MR AVS SNAPSHOT
After Visit Summary   6/14/2017    Maliha Pedro    MRN: [id number]           Patient Information     Date Of Birth          1960        Visit Information        Provider Department      6/14/2017 10:00 AM Shaheen Mccarthy MD  [facility]        Today's Diagnoses     Essential hypertension        Diabetes mellitus, type II (H)        Diabetes mellitus type I (H)          Care Instructions    A1c = 8.8% - has come down some.    Try cutting back on the sweetened tea; try to check some blood sugars at work if you can - can take some Novolog at work if you find the blood sugar is high.          Follow-ups after your visit        Follow-up notes from your care team     Return in about 3 months (around 9/14/2017).      Your next 10 appointments already scheduled     Jun 14, 2017 10:00 AM CDT   (Arrive by 9:45 AM)   RETURN DIABETES with Shaheen Mccarthy MD   [facility] Endocrinology ([facility])    [address] [id number] [phone]            Jun 21, 2017  8:45 AM CDT   RETURN RETINA with Lesly Tracy MD   Eye Clinic ([facility])    [address]  [address]  [facility] [address]   [phone]            Oct 25, 2017  8:45 AM CDT   RETURN GLAUCOMA with Umu Rosario MD   Eye Clinic ([facility])    [address]  [address]  [address]  [facility] [address]   [phone]            Nov 01, 2017  9:30 AM CDT   (Arrive by 9:15 AM)   RETURN DIABETES with Shaheen Mccarthy MD   [facility] Endocrinology ([facility])    [address] [id number] [phone]              Who to contact     Please call your clinic at [phone] to:    Ask questions about your health    Make or cancel appointments    Discuss your medicines    Learn about your test results    Speak to your doctor  "  If you have compliments or concerns about an experience at your clinic, or if you wish to file a complaint, please contact [facility] Patient Relations at [phone] or email us at [email or web address]         Additional Information About Your Visit        Upptalk[employer]ET Information Systems Information     [employer] is an electronic gateway that provides easy, online access to your medical records. With [employer], you can request a clinic appointment, read your test results, renew a prescription or communicate with your care team.     To sign up for [employer] visit the website at [email or web address]   You will be asked to enter the access code listed below, as well as some personal information. Please follow the directions to create your username and password.     Your access code is: [email or web address]  Expires: 2017  9:17 AM     Your access code will  in 90 days. If you need help or a new code, please contact your [facility] or call [phone] for assistance.        Care EveryWhere ID     This is your Care EveryWhere ID. This could be used by other organizations to access your [place] medical records  [id number]        Your Vitals Were     Pulse Height Last Period BMI (Body Mass Index)          72 1.618 m (5' 3.7\") 2009 32.57 kg/m2         Blood Pressure from Last 3 Encounters:   17 110/76   17 114/75   17 136/80    Weight from Last 3 Encounters:   17 85.3 kg (188 lb)   17 85.3 kg (188 lb)   17 85.3 kg (188 lb)              Today, you had the following     No orders found for display         Today's Medication Changes          These changes are accurate as of: 17  9:17 AM.  If you have any questions, ask your nurse or doctor.               Stop taking these medicines if you haven't already. Please contact your care team if you have questions.     exenatide ER 2 MG pen   Commonly known as:  BYDUREON   Stopped " by:  Shaheen Mccarthy MD                    Primary Care Provider Office Phone # Fax #    Elva Peggy Ortiz [phone]       [facility] [address] [place] [address] [address]  [facility] [address]        Thank you!     Thank you for choosing [facility]  for your care. Our goal is always to provide you with excellent care. Hearing back from our patients is one way we can continue to improve our services. Please take a few minutes to complete the written survey that you may receive in the mail after your visit with us. Thank you!             Your Updated Medication List - Protect others around you: Learn how to safely use, store and throw away your medicines at www.disposemymeds.org.          This list is accurate as of: 6/14/17  9:17 AM.  Always use your most recent med list.                   Brand Name Dispense Instructions for use    ACCU-CHEK VANESA PLUS test strip   Generic drug:  blood glucose monitoring     360 strip    Test four times a day       albuterol 108 (90 BASE) MCG/ACT Inhaler    PROAIR HFA/PROVENTIL HFA/VENTOLIN HFA    1 Inhaler    Inhale 2 puffs into the lungs every 4 hours as needed for shortness of breath / dyspnea       amLODIPine 5 MG tablet    NORVASC    90 tablet    TAKE ONE TABLET BY MOUTH EVERY DAY       aspirin EC 81 MG EC tablet      Take 325 mg by mouth daily       blood glucose monitoring lancets     5 Box    Use to test blood sugar 3 times daily or as directed.       blood glucose monitoring meter device kit    no brand specified    1 kit    Use to test blood sugar 4 times daily or as directed.       CALCIUM + D PO      Take 1 tablet by mouth 2 times daily.       fexofenadine 180 MG tablet    ALLEGRA    90 tablet    Take 1 tablet by mouth daily. 1 TABLET DAILY Failed claritin for symptom cotrol. Zyrtec increases heart rate.       fish oil-omega-3 fatty acids 1000 MG capsule      Take 1 capsule by mouth daily.       fluticasone 50 MCG/ACT spray    FLONASE     16 g    Spray 1-2 sprays into both nostrils daily       hydrochlorothiazide 12.5 MG Tabs tablet     90 tablet    TAKE 1 TABLET BY MOUTH ONCE DAILY       insulin glargine 100 UNIT/ML injection    LANTUS SOLOSTAR    75 mL    80 units daily       losartan 100 MG tablet    COZAAR    90 tablet    TAKE ONE TABLET BY MOUTH EVERY DAY       magnesium 250 MG tablet     90 tablet    Take 1 tablet by mouth daily       metFORMIN 500 MG 24 hr tablet    GLUCOPHAGE-XR    360 tablet    Take 2 tablets (1,000 mg) by mouth 2 times daily       MULTIVITAMIN PO      Take 1 tablet by mouth daily.       NEEDLES, ANY SIZE     1 Box    Pen needles for Victoza pen.       NEEDLES, ANY SIZE     4 Box    Pen needles for Novolog insulin pen. Use to inject 5-6  times daily.       NovoLOG FLEXPEN 100 UNIT/ML injection   Generic drug:  insulin aspart     120 mL    INJECT UP  UNITS UNDER THE SKIN PER DAY AS DIRECTED       pantoprazole 40 MG EC tablet    PROTONIX    90 tablet    Take 1 tablet (40 mg) by mouth daily       pentoxifylline 400 MG CR tablet    TRENtal    180 tablet    Take 1 tablet (400 mg) by mouth 2 times daily       rosuvastatin 10 MG tablet    CRESTOR    90 tablet    Take 1 tablet (10 mg) by mouth daily       vitamin D 2000 UNITS tablet      Take 1 tablet by mouth daily.

## 2017-06-14 NOTE — LETTER
6/14/2017       RE: Maliha Pedro  [address]  [place] [facility] [address]     Dear Colleague,    Thank you for referring your patient, Maliha Pedro, to the [facility] ENDOCRINOLOGY at [facility]. Please see a copy of my visit note below.    DIAGNOSIS:  A 57-year-old woman with type 2 diabetes since age 32.  Microalbuminuria, on an angiotensin receptor blocker.  No other evidence of significant microvascular complications.  History of difficulty tolerating medications for diabetes.      HISTORY OF PRESENT ILLNESS:  Ms. Pedro is here for a followup visit.  I had seen her in March.  At that time, her hemoglobin A1c had deteriorated some, and it was 9.2%.  We discussed various options.  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  She works overnight, and typically has not been checking her blood sugars during that time.      She returns to follow up.  She has had no major issues or problems.  She says that earlier in the spring she was walking more regularly, about 35-45 minutes a day.  She had stopped drinking regular soda, and she found that her blood sugars were much better.  However, more recently over the last few weeks she has been less active because of the weather, she says.  She started drinking some sweetened iced tea, which she thinks probably has a lot of sugar.  Because of this, she thinks that her blood sugars have been higher of late.      Significantly, she stopped the Bydureon since we saw her last.  It was giving her abdominal symptoms.  She continues on metformin 1000 mg twice a day, Lantus once a day (her dose is 75 units at night), she is taking NovoLog 25 units in the morning when she gets home from work before her morning meal.  If it is her day off or if she gets up during the day around midday and has lunch, she will  take about 10 units of NovoLog.  Otherwise, if she is sleeping throughout the day, she will take NovoLog again about 25 units in the late afternoon before she goes to work.  She says she had tried to check her blood sugars some at work, and had been able to do that for the first few weeks after we saw her, but then things were just too busy, so she has not been monitoring them.      Her hemoglobin A1c today was 8.8%, so it has improved slightly.  She has been checking her blood sugars about 1-2 times a day on average.  Overall, her average blood sugar over the last month is 188.  Her blood sugars average around 180-190 in the morning when she first checks.  Later in the day, from around 1-6 p.m., her blood sugars average around 180-200.  She has no blood sugars recorded below 70.  She has had no severe hypoglycemia.      She had an eye check in April.  There was no evidence of retinopathy.  She has no symptoms of peripheral neuropathy.      MEDICATIONS:  Her diabetes medications include amlodipine, hydrochlorothiazide, losartan.  She takes a magnesium supplement.  Crestor.  She has a prescription for an albuterol inhaler.  Trental.  She takes a vitamin D supplement, a multivitamin, and an aspirin daily.      REVIEW OF SYSTEMS:  She feels well.  She has no acute complaints today.  She is continuing to do her yoga.      PHYSICAL EXAMINATION:   VITAL SIGNS:  Her weight is 188 pounds.  It has been unchanged for the last 6 months.  Pulse 72, blood pressure 110/76.   GENERAL:  She appears well.  She has centralized obesity.   CHEST:  Clear.   CARDIAC:  Regular rate and rhythm, normal heart sounds.   EXTREMITIES:  She has no edema.  She has grossly intact sensation to light touch.        She is up-to-date on routine monitoring.      ASSESSMENT:  A 57-year-old woman who has had type 2 diabetes for 25 years now.  Ms. Pedro had good diabetes control for a number of years, but we have struggled somewhat over the last 3-4  years.  We have tried multiple medications and regimens.  She has had difficulty tolerating many of these.  There has been concern about side effects.      We discussed options again.  We have not tried the SGLT2 inhibitors.  She is worried about yeast infections with these.  She feels that if she cuts back some on the iced tea she is drinking and gets more active, this will help the blood sugars.      After discussion, we agreed on the following plan:   1.  We will continue the Lantus, NovoLog and metformin for now with her current regimen.   2.  She will make an effort to make the lifestyle changes above, and we will see where her diabetes control is at the next visit.   3.  We agreed that a reasonable goal was try to get her hemoglobin A1c below 8%.  We may need to make alterations in her insulin regimen, as it appears that insulin and metformin are the primary medications she is able to tolerate or wants to take at this point.  She is requiring quite a bit of Lantus, so we could consider going back to twice a day Lantus or changing to 2 or 3 injections a day of NPH intermediate-acting insulin.     Shaheen Mccarthy MD

## 2017-06-15 DIAGNOSIS — E11.65 TYPE 2 DIABETES MELLITUS WITH HYPERGLYCEMIA, UNSPECIFIED LONG TERM INSULIN USE STATUS: Primary | ICD-10-CM

## 2017-06-16 ENCOUNTER — TELEPHONE (OUTPATIENT)
Dept: ENDOCRINOLOGY | Facility: CLINIC | Age: 57
End: 2017-06-16

## 2017-06-16 RX ORDER — METFORMIN HCL 500 MG
TABLET, EXTENDED RELEASE 24 HR ORAL
Qty: 360 TABLET | Refills: 3 | Status: SHIPPED | OUTPATIENT
Start: 2017-06-16 | End: 2017-11-01

## 2017-06-16 RX ORDER — PENTOXIFYLLINE 400 MG/1
TABLET, EXTENDED RELEASE ORAL
Qty: 180 TABLET | Refills: 3 | Status: SHIPPED | OUTPATIENT
Start: 2017-06-16 | End: 2018-07-03

## 2017-06-16 NOTE — TELEPHONE ENCOUNTER
Verbal orders called to [facility]. Approved by Dr. ROSA Reyes in absence of Dr. Mccarthy. Called in by writer. Prescriptions for metformin and trental.

## 2017-06-21 ENCOUNTER — OFFICE VISIT (OUTPATIENT)
Dept: OPHTHALMOLOGY | Facility: CLINIC | Age: 57
End: 2017-06-21
Attending: OPHTHALMOLOGY
Payer: COMMERCIAL

## 2017-06-21 DIAGNOSIS — Z96.1 PSEUDOPHAKIA: ICD-10-CM

## 2017-06-21 DIAGNOSIS — Z79.4 TYPE 2 DIABETES MELLITUS WITH HYPERGLYCEMIA, WITH LONG-TERM CURRENT USE OF INSULIN (H): Primary | ICD-10-CM

## 2017-06-21 DIAGNOSIS — H26.9 CATARACTS, BILATERAL: ICD-10-CM

## 2017-06-21 DIAGNOSIS — E11.65 TYPE 2 DIABETES MELLITUS WITH HYPERGLYCEMIA, WITH LONG-TERM CURRENT USE OF INSULIN (H): Primary | ICD-10-CM

## 2017-06-21 DIAGNOSIS — H43.812 VITREOUS DEGENERATION, LEFT: ICD-10-CM

## 2017-06-21 PROCEDURE — 99213 OFFICE O/P EST LOW 20 MIN: CPT | Mod: ZF

## 2017-06-21 RX ORDER — PENTOXIFYLLINE 400 MG/1
400 TABLET, EXTENDED RELEASE ORAL 2 TIMES DAILY
Qty: 180 TABLET | Refills: 3 | Status: SHIPPED | OUTPATIENT
Start: 2017-06-21 | End: 2017-10-30

## 2017-06-21 RX ORDER — AMLODIPINE BESYLATE 5 MG/1
5 TABLET ORAL DAILY
Qty: 90 TABLET | Refills: 3 | Status: SHIPPED | OUTPATIENT
Start: 2017-06-21 | End: 2018-07-31

## 2017-06-21 RX ORDER — HYDROCHLOROTHIAZIDE 12.5 MG/1
12.5 TABLET ORAL DAILY
Qty: 90 TABLET | Refills: 1 | Status: SHIPPED | OUTPATIENT
Start: 2017-06-21 | End: 2018-05-24

## 2017-06-21 RX ORDER — METFORMIN HCL 500 MG
1000 TABLET, EXTENDED RELEASE 24 HR ORAL 2 TIMES DAILY
Qty: 360 TABLET | Refills: 3 | Status: SHIPPED | OUTPATIENT
Start: 2017-06-21 | End: 2017-10-30

## 2017-06-21 ASSESSMENT — REFRACTION_WEARINGRX
OS_AXIS: 085
OS_ADD: +2.50
OS_SPHERE: -4.25
OD_AXIS: 100
OD_ADD: +2.50
OS_CYLINDER: +1.25
OD_CYLINDER: +0.75
OD_SPHERE: -4.25
SPECS_TYPE: PAL

## 2017-06-21 ASSESSMENT — CUP TO DISC RATIO
OD_RATIO: 0.9
OS_RATIO: 0.7

## 2017-06-21 ASSESSMENT — VISUAL ACUITY
OS_CC: 20/20
OD_CC: 20/25
METHOD: SNELLEN - LINEAR

## 2017-06-21 ASSESSMENT — SLIT LAMP EXAM - LIDS
COMMENTS: MEIBOMIAN GLAND DYSFUNCTION
COMMENTS: MEIBOMIAN GLAND DYSFUNCTION

## 2017-06-21 ASSESSMENT — CONF VISUAL FIELD
OD_NORMAL: 1
OS_NORMAL: 1
METHOD: COUNTING FINGERS

## 2017-06-21 ASSESSMENT — TONOMETRY
OD_IOP_MMHG: 16
OS_IOP_MMHG: 16
IOP_METHOD: APPLANATION

## 2017-06-21 ASSESSMENT — EXTERNAL EXAM - RIGHT EYE: OD_EXAM: DERMATOCHALASIS

## 2017-06-21 ASSESSMENT — EXTERNAL EXAM - LEFT EYE: OS_EXAM: DERMATOCHALASIS

## 2017-06-21 NOTE — MR AVS SNAPSHOT
After Visit Summary   6/21/2017    Maliha Pedro    MRN: [id number]           Patient Information     Date Of Birth          1960        Visit Information        Provider Department      6/21/2017 8:45 AM Lesly Tracy MD [facility]        Today's Diagnoses     Type 2 diabetes mellitus with hyperglycemia, with long-term current use of insulin (H)    -  1    Vitreous degeneration, left        Pseudophakia        Cataracts, bilateral - Right Eye           Follow-ups after your visit        Follow-up notes from your care team     Return in about 1 year (around 6/21/2018).      Your next 10 appointments already scheduled     Oct 25, 2017  8:45 AM CDT   RETURN GLAUCOMA with Umu Rosario MD   Eye Clinic ([facility])    [address]  [address]  [facility] [id number] [phone]            Nov 01, 2017  9:30 AM CDT   (Arrive by 9:15 AM)   RETURN DIABETES with Shaheen Mccarthy MD   [facility] ([facility])    [address] [address] [phone]              Who to contact     Please call your clinic at [phone] to:    Ask questions about your health    Make or cancel appointments    Discuss your medicines    Learn about your test results    Speak to your doctor   If you have compliments or concerns about an experience at your clinic, or if you wish to file a complaint, please contact [facility] Patient Relations at [phone] or email us at [email or web address]         Additional Information About Your Visit        MyChart Information     [employer] gives you secure access to your electronic health record. If you see a primary care provider, you can also send messages to your care team and make appointments. If you have questions, please call your primary care clinic.  If you do not have a primary care provider, please call  [phone] and they will assist you.      [employer] is an electronic gateway that provides easy, online access to your medical records. With [employer], you can request a clinic appointment, read your test results, renew a prescription or communicate with your care team.     To access your existing account, please contact your [facility] or call [phone] for assistance.        Care EveryWhere ID     This is your Care EveryWhere ID. This could be used by other organizations to access your [place] medical records  [id number]        Your Vitals Were     Last Period                   03/19/2009            Blood Pressure from Last 3 Encounters:   06/14/17 110/76   03/08/17 114/75   03/06/17 136/80    Weight from Last 3 Encounters:   06/14/17 85.3 kg (188 lb)   03/08/17 85.3 kg (188 lb)   03/06/17 85.3 kg (188 lb)              Today, you had the following     No orders found for display       Primary Care Provider Office Phone # Fax #    Elva Peggy Ortiz [phone]       [facility] [address]  [facility] [address]        Equal Access to Services     JAMES COLLINS : Hadii aad ku hadasho Soomaali, waaxda luqadaha, qaybta kaalmada adeegyada, poly roberts. So [facility] [phone].    ATENCIÓN: Si habla español, tiene a ford disposición servicios gratuitos de asistencia lingüística. Hernando al [phone].    We comply with applicable federal civil rights laws and [place] laws. We do not discriminate on the basis of race, color, national origin, age, disability sex, sexual orientation or gender identity.            Thank you!     Thank you for choosing EYE CLINIC  for your care. Our goal is always to provide you with excellent care. Hearing back from our patients is one way we can continue to improve our services. Please take a few minutes to complete the written survey that you may receive in the mail after your visit with  us. Thank you!             Your Updated Medication List - Protect others around you: Learn how to safely use, store and throw away your medicines at www.disposemymeds.org.          This list is accurate as of: 6/21/17  9:48 AM.  Always use your most recent med list.                   Brand Name Dispense Instructions for use Diagnosis    ACCU-CHEK VANESA PLUS test strip   Generic drug:  blood glucose monitoring     360 strip    Test four times a day    Type 2 diabetes mellitus with hyperglycemia, with long-term current use of insulin (H)       albuterol 108 (90 BASE) MCG/ACT Inhaler    PROAIR HFA/PROVENTIL HFA/VENTOLIN HFA    1 Inhaler    Inhale 2 puffs into the lungs every 4 hours as needed for shortness of breath / dyspnea    Cough       amLODIPine 5 MG tablet    NORVASC    90 tablet    TAKE ONE TABLET BY MOUTH EVERY DAY    Essential hypertension       aspirin EC 81 MG EC tablet      Take 325 mg by mouth daily        blood glucose monitoring lancets     5 Box    Use to test blood sugar 3 times daily or as directed.    Type 2 diabetes mellitus with hyperglycemia, with long-term current use of insulin (H)       blood glucose monitoring meter device kit    no brand specified    1 kit    Use to test blood sugar 4 times daily or as directed.    Type 2 diabetes mellitus with hyperglycemia, with long-term current use of insulin (H)       CALCIUM + D PO      Take 1 tablet by mouth 2 times daily.        fexofenadine 180 MG tablet    ALLEGRA    90 tablet    Take 1 tablet by mouth daily. 1 TABLET DAILY Failed claritin for symptom cotrol. Zyrtec increases heart rate.    Seasonal allergic rhinitis       fish oil-omega-3 fatty acids 1000 MG capsule      Take 1 capsule by mouth daily.        fluticasone 50 MCG/ACT spray    FLONASE    16 g    Spray 1-2 sprays into both nostrils daily    Seasonal allergic rhinitis       hydrochlorothiazide 12.5 MG Tabs tablet     90 tablet    TAKE 1 TABLET BY MOUTH ONCE DAILY    Essential  hypertension       insulin glargine 100 UNIT/ML injection    LANTUS SOLOSTAR    75 mL    80 units daily    Diabetes mellitus, type 2 (H)       losartan 100 MG tablet    COZAAR    90 tablet    TAKE ONE TABLET BY MOUTH EVERY DAY    Hypertension goal BP (blood pressure) < 140/90       magnesium 250 MG tablet     90 tablet    Take 1 tablet by mouth daily    Hypomagnesemia       metFORMIN 500 MG 24 hr tablet    GLUCOPHAGE-XR    360 tablet    TAKE TWO TABLETS BY MOUTH TWICE A DAY    Type 2 diabetes mellitus with hyperglycemia, unspecified long term insulin use status (H)       MULTIVITAMIN PO      Take 1 tablet by mouth daily.        NEEDLES, ANY SIZE     1 Box    Pen needles for Victoza pen.    Type 2 diabetes, HbA1c goal < 7% (H)       NEEDLES, ANY SIZE     4 Box    Pen needles for Novolog insulin pen. Use to inject 5-6  times daily.    Type 2 diabetes, HbA1c goal < 7% (H)       NovoLOG FLEXPEN 100 UNIT/ML injection   Generic drug:  insulin aspart     120 mL    INJECT UP  UNITS UNDER THE SKIN PER DAY AS DIRECTED    Diabetes mellitus, type 2 (H)       pantoprazole 40 MG EC tablet    PROTONIX    90 tablet    Take 1 tablet (40 mg) by mouth daily    Gastroesophageal reflux disease       pentoxifylline 400 MG CR tablet    TRENtal    180 tablet    TAKE ONE TABLET BY MOUTH TWICE A DAY    Type 2 diabetes mellitus with hyperglycemia, unspecified long term insulin use status (H)       rosuvastatin 10 MG tablet    CRESTOR    90 tablet    Take 1 tablet (10 mg) by mouth daily    Hyperlipidemia LDL goal <100       vitamin D 2000 UNITS tablet      Take 1 tablet by mouth daily.

## 2017-06-21 NOTE — NURSING NOTE
Chief Complaints and History of Present Illnesses   Patient presents with     Eye Exam For Diabetes     HPI    Affected eye(s):  Both   Symptoms:     No decreased vision   No floaters   No flashes         Do you have eye pain now?:  No      Comments:  No Va changes since last exam.     a1c 6/14/17 8.8    Gayatri WILLIAMSON June 21, 2017 8:31 AM

## 2017-07-27 ENCOUNTER — OFFICE VISIT (OUTPATIENT)
Dept: FAMILY MEDICINE | Facility: CLINIC | Age: 57
End: 2017-07-27
Payer: COMMERCIAL

## 2017-07-27 VITALS
HEART RATE: 84 BPM | DIASTOLIC BLOOD PRESSURE: 82 MMHG | WEIGHT: 188.8 LBS | TEMPERATURE: 97.9 F | BODY MASS INDEX: 32.71 KG/M2 | SYSTOLIC BLOOD PRESSURE: 130 MMHG | RESPIRATION RATE: 21 BRPM | OXYGEN SATURATION: 98 %

## 2017-07-27 DIAGNOSIS — E11.65 TYPE 2 DIABETES MELLITUS WITH HYPERGLYCEMIA, WITH LONG-TERM CURRENT USE OF INSULIN (H): ICD-10-CM

## 2017-07-27 DIAGNOSIS — L03.90 CELLULITIS, UNSPECIFIED CELLULITIS SITE: Primary | ICD-10-CM

## 2017-07-27 DIAGNOSIS — Z79.4 TYPE 2 DIABETES MELLITUS WITH HYPERGLYCEMIA, WITH LONG-TERM CURRENT USE OF INSULIN (H): ICD-10-CM

## 2017-07-27 PROCEDURE — 99214 OFFICE O/P EST MOD 30 MIN: CPT | Performed by: PHYSICIAN ASSISTANT

## 2017-07-27 RX ORDER — CEPHALEXIN 500 MG/1
500 CAPSULE ORAL 4 TIMES DAILY
Qty: 40 CAPSULE | Refills: 0 | Status: SHIPPED | OUTPATIENT
Start: 2017-07-27 | End: 2017-10-30

## 2017-07-27 ASSESSMENT — PAIN SCALES - GENERAL: PAINLEVEL: MILD PAIN (2)

## 2017-07-27 NOTE — PROGRESS NOTES
SUBJECTIVE:                                                    Maliha Pedro is a 57 year old female who presents to clinic today for the following health issues:      Stung by bees 7/24/2017.       Was out in garden weeding and stung by bees.  Areas of edema, redness, swelling and pain seem to be increasing in size.  No fever, sweats ,chills.   No difficulty swallowing or change in breathing.     History of diabetes.  Last A1C was 8.8 in June.   Reports this morning blood sugar was 140.  Takes lantus and novolog insulin (25 units before she eats)  If takes lantus (75 units) too early has dropped blood sugar to 55 but back to 100 with juice within 15 minutes.  Is followed by endocrine  Is walking 30-35 minutes a day and yoga twice a week    Problem list and histories reviewed & adjusted, as indicated.  Additional history: as documented    Patient Active Problem List   Diagnosis     Type 2 diabetes mellitus with hyperglycemia (H)     ? 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     Displacement of lumbar intervertebral disc without myelopathy     Nonallopathic lesion of lumbar region     Nonallopathic lesion of thoracic region     Nonallopathic lesion of sacral region     Bartholin gland cyst     Cataract     Irritable bowel syndrome     Esophageal reflux     Seasonal allergic rhinitis     Latex sensitivity     Colon polyp     Adenomatous polyp     Subclinical hyperthyroidism     HYPERLIPIDEMIA LDL GOAL <100     Ovarian cyst     Breast pain     Essential hypertension     Advanced directives, counseling/discussion     Obesity     Hypomagnesemia     Type 2 diabetes mellitus with hyperglycemia, with long-term current use of insulin (H)     Past Surgical History:   Procedure Laterality Date     CATARACT IOL, RT/LT  9/23/2008    LE     HC DILATION/CURETTAGE DIAG/THER NON OB  1992     HC TYMPANOPLASTY W/O MASTOID, INIT/REV W/O OSS CHAIN RECONST  12/09     YAG CAPSULOTOMY OS (LEFT EYE)  4/23/2011        Social History   Substance Use Topics     Smoking status: Never Smoker     Smokeless tobacco: Never Used     Alcohol use Yes      Comment: rarely     Family History   Problem Relation Age of Onset     DIABETES Brother      CANCER Brother 59     Lung cancer     DIABETES Father       of dm 70's     C.A.D. Father      DIABETES Paternal Grandfather      DIABETES Sister      CANCER Maternal Aunt      ovarian cancer.      DIABETES Brother      DIABETES Brother      DIABETES Sister      Glaucoma Maternal Grandmother          Current Outpatient Prescriptions   Medication Sig Dispense Refill            hydrochlorothiazide 12.5 MG TABS tablet Take 1 tablet (12.5 mg) by mouth daily 90 tablet 1     metFORMIN (GLUCOPHAGE-XR) 500 MG 24 hr tablet Take 2 tablets (1,000 mg) by mouth 2 times daily 360 tablet 3     pentoxifylline (TRENTAL) 400 MG CR tablet Take 1 tablet (400 mg) by mouth 2 times daily 180 tablet 3     amLODIPine (NORVASC) 5 MG tablet Take 1 tablet (5 mg) by mouth daily 90 tablet 3     metFORMIN (GLUCOPHAGE-XR) 500 MG 24 hr tablet TAKE TWO TABLETS BY MOUTH TWICE A  tablet 3     ACCU-CHEK VANESA PLUS test strip Test four times a day 360 strip 3     blood glucose monitoring (SOFTCLIX) lancets Use to test blood sugar 3 times daily or as directed. 5 Box 3     losartan (COZAAR) 100 MG tablet TAKE ONE TABLET BY MOUTH EVERY DAY 90 tablet 3     magnesium 250 MG tablet Take 1 tablet by mouth daily 90 tablet 3     blood glucose monitoring (NO BRAND SPECIFIED) meter device kit Use to test blood sugar 4 times daily or as directed. 1 kit 0     rosuvastatin (CRESTOR) 10 MG tablet Take 1 tablet (10 mg) by mouth daily 90 tablet 3     albuterol (PROAIR HFA, PROVENTIL HFA, VENTOLIN HFA) 108 (90 BASE) MCG/ACT inhaler Inhale 2 puffs into the lungs every 4 hours as needed for shortness of breath / dyspnea 1 Inhaler 1     NOVOLOG FLEXPEN 100 UNIT/ML soln INJECT UP  UNITS UNDER THE SKIN PER DAY AS DIRECTED 120 mL 3      pantoprazole (PROTONIX) 40 MG enteric coated tablet Take 1 tablet (40 mg) by mouth daily 90 tablet 3     insulin glargine (LANTUS SOLOSTAR) 100 UNIT/ML PEN 80 units daily 75 mL 3     fluticasone (FLONASE) 50 MCG/ACT nasal spray Spray 1-2 sprays into both nostrils daily 16 g 11     NEEDLES, ANY SIZE Pen needles for Novolog insulin pen. Use to inject 5-6  times daily. 4 Box 3     NEEDLES, ANY SIZE Pen needles for Victoza pen. 1 Box 11     fish oil-omega-3 fatty acids (FISH OIL) 1000 MG capsule Take 1 capsule by mouth daily.       aspirin EC 81 MG tablet Take 325 mg by mouth daily        fexofenadine (ALLEGRA) 180 MG tablet Take 1 tablet by mouth daily. 1 TABLET DAILY Failed claritin for symptom cotrol. Zyrtec increases heart rate. 90 tablet 3     VITAMIN D 2000 UNIT OR TABS Take 1 tablet by mouth daily.       MULTIVITAMIN OR Take 1 tablet by mouth daily.       CALCIUM + D OR Take 1 tablet by mouth 2 times daily.       pentoxifylline (TRENTAL) 400 MG CR tablet TAKE ONE TABLET BY MOUTH TWICE A DAY (Patient not taking: Reported on 7/27/2017) 180 tablet 3         Reviewed and updated as needed this visit by clinical staffTobacco  Allergies  Meds       Reviewed and updated as needed this visit by Provider         ROS:  Constitutional, HEENT, cardiovascular, pulmonary, gi and gu systems are negative, except as otherwise noted.      OBJECTIVE:   /82 (BP Location: Right arm, Patient Position: Chair, Cuff Size: Adult Regular)  Pulse 84  Temp 97.9  F (36.6  C) (Oral)  Resp 21  Wt 85.6 kg (188 lb 12.8 oz)  LMP 03/19/2009  SpO2 98%  BMI 32.71 kg/m2  Body mass index is 32.71 kg/(m^2).  GENERAL: alert, no distress and obese  NECK: no adenopathy, no asymmetry, masses, or scars and thyroid normal to palpation  RESP: lungs clear to auscultation - no rales, rhonchi or wheezes  CV: regular rate and rhythm, normal S1 S2, no S3 or S4, no murmur, click or rub, no peripheral edema and peripheral pulses strong  MS: no gross  musculoskeletal defects noted, no edema  SKIN: left abdomen with puncture wound with approximately 1 centimeter diameter of erythema.  Left lower leg with puncture wound with approximately 8 mm of erythema and indurated.  Right medial thigh near groin with puncture wound with erythema approximately 5 mm erythema.  No areas of fluctuance or pustule    Diagnostic Test Results:  none     ASSESSMENT/PLAN:             1. Cellulitis, unspecified cellulitis site  Will treat with keflex.  Seem more reactionary than infectious cellulitis but patient very concerned given her diabetes.      - cephALEXin (KEFLEX) 500 MG capsule; Take 1 capsule (500 mg) by mouth 4 times daily  Dispense: 40 capsule; Refill: 0    2. Type 2 diabetes mellitus with hyperglycemia, with long-term current use of insulin (H)  Blood sugars improving.   Congratulated on lifestyle changes and encouraged to continue.      CC chart to PCP for irasema Carcamo PA-C  [facility]

## 2017-07-27 NOTE — NURSING NOTE
"Chief Complaint   Patient presents with     Insect Bites     several bee stings on monday       Initial /82 (BP Location: Right arm, Patient Position: Chair, Cuff Size: Adult Regular)  Pulse 84  Temp 97.9  F (36.6  C) (Oral)  Resp 21  Wt 85.6 kg (188 lb 12.8 oz)  LMP 03/19/2009  SpO2 98%  BMI 32.71 kg/m2 Estimated body mass index is 32.71 kg/(m^2) as calculated from the following:    Height as of 6/14/17: 1.618 m (5' 3.7\").    Weight as of this encounter: 85.6 kg (188 lb 12.8 oz).  Medication Reconciliation: complete   Neena Savage CMA      "

## 2017-07-27 NOTE — MR AVS SNAPSHOT
After Visit Summary   7/27/2017    Maliha Pedro    MRN: [id number]           Patient Information     Date Of Birth          1960        Visit Information        Provider Department      7/27/2017 9:40 AM Tesha Carcamo PA-C [facility]        Today's Diagnoses     Cellulitis, unspecified cellulitis site    -  1      Care Instructions    Take keflex 500mg four times a day for 10 days  Return urgently if any change in symptoms like increasing redness, increasing pain, increasing swelling, fever, or other change in symptoms.           Follow-ups after your visit        Your next 10 appointments already scheduled     Oct 25, 2017  8:45 AM CDT   RETURN GLAUCOMA with Umu Rosario MD   Eye Clinic ([facility])    [address]  [address]  [address] [address]  [facility] [id number] [phone]            Nov 01, 2017  9:30 AM CDT   (Arrive by 9:15 AM)   RETURN DIABETES with Shaheen Mccarthy MD   [facility] ([facility])    [address]  [address] [facility] [address] [phone]              Who to contact     If you have questions or need follow up information about today's clinic visit or your schedule please contact [facility] directly at [phone].  Normal or non-critical lab and imaging results will be communicated to you by MyChart, letter or phone within 4 business days after the clinic has received the results. If you do not hear from us within 7 days, please contact the clinic through MyChart or phone. If you have a critical or abnormal lab result, we will notify you by phone as soon as possible.  Submit refill requests through [employer] or call your pharmacy and they will forward the refill request to us. Please allow 3 business days for your refill to be completed.          Additional Information About Your Visit        [employer] Information     [employer] gives you  secure access to your electronic health record. If you see a primary care provider, you can also send messages to your care team and make appointments. If you have questions, please call your primary care clinic.  If you do not have a primary care provider, please call [phone] and they will assist you.        Care EveryWhere ID     This is your Care EveryWhere ID. This could be used by other organizations to access your [facility] medical records  [id number]        Your Vitals Were     Pulse Temperature Respirations Last Period Pulse Oximetry BMI (Body Mass Index)    84 97.9  F (36.6  C) (Oral) 21 03/19/2009 98% 32.71 kg/m2       Blood Pressure from Last 3 Encounters:   07/27/17 130/82   06/14/17 110/76   03/08/17 114/75    Weight from Last 3 Encounters:   07/27/17 85.6 kg (188 lb 12.8 oz)   06/14/17 85.3 kg (188 lb)   03/08/17 85.3 kg (188 lb)              Today, you had the following     No orders found for display         Today's Medication Changes          These changes are accurate as of: 7/27/17 10:03 AM.  If you have any questions, ask your nurse or doctor.               Start taking these medicines.        Dose/Directions    cephALEXin 500 MG capsule   Commonly known as:  KEFLEX   Used for:  Cellulitis, unspecified cellulitis site   Started by:  Tesha Carcamo PA-C        Dose:  500 mg   Take 1 capsule (500 mg) by mouth 4 times daily   Quantity:  40 capsule   Refills:  0            Where to get your medicines      These medications were sent to [facility] - [place], [place] - [address], [address]  [address], [address], [facility] [address]     Phone:  [phone]     cephALEXin 500 MG capsule                Primary Care Provider Office Phone # Fax #    Elva Ortiz [phone]       [facility] [address] [place] [address] N  [facility] [address]        Equal Access to Services     JASSON COLLINS AH: Hadii fortino Barth  deliavicente, poly montiel. So [facility] [phone].    ATENCIÓN: Si roney pulido, tiene a ford disposición servicios gratuitos de asistencia lingüística. Hernando al [phone].    We comply with applicable federal civil rights laws and [place] laws. We do not discriminate on the basis of race, color, national origin, age, disability sex, sexual orientation or gender identity.            Thank you!     Thank you for choosing [facility]  for your care. Our goal is always to provide you with excellent care. Hearing back from our patients is one way we can continue to improve our services. Please take a few minutes to complete the written survey that you may receive in the mail after your visit with us. Thank you!             Your Updated Medication List - Protect others around you: Learn how to safely use, store and throw away your medicines at www.disposemymeds.org.          This list is accurate as of: 7/27/17 10:03 AM.  Always use your most recent med list.                   Brand Name Dispense Instructions for use Diagnosis    ACCU-CHEK VANESA PLUS test strip   Generic drug:  blood glucose monitoring     360 strip    Test four times a day    Type 2 diabetes mellitus with hyperglycemia, with long-term current use of insulin (H)       albuterol 108 (90 BASE) MCG/ACT Inhaler    PROAIR HFA/PROVENTIL HFA/VENTOLIN HFA    1 Inhaler    Inhale 2 puffs into the lungs every 4 hours as needed for shortness of breath / dyspnea    Cough       amLODIPine 5 MG tablet    NORVASC    90 tablet    Take 1 tablet (5 mg) by mouth daily    Essential hypertension, Type 2 diabetes mellitus with diabetic nephropathy, with long-term current use of insulin (H)       aspirin EC 81 MG EC tablet      Take 325 mg by mouth daily        blood glucose monitoring lancets     5 Box    Use to test blood sugar 3 times daily or as directed.    Type 2 diabetes mellitus with hyperglycemia,  with long-term current use of insulin (H)       blood glucose monitoring meter device kit    no brand specified    1 kit    Use to test blood sugar 4 times daily or as directed.    Type 2 diabetes mellitus with hyperglycemia, with long-term current use of insulin (H)       CALCIUM + D PO      Take 1 tablet by mouth 2 times daily.        cephALEXin 500 MG capsule    KEFLEX    40 capsule    Take 1 capsule (500 mg) by mouth 4 times daily    Cellulitis, unspecified cellulitis site       fexofenadine 180 MG tablet    ALLEGRA    90 tablet    Take 1 tablet by mouth daily. 1 TABLET DAILY Failed claritin for symptom cotrol. Zyrtec increases heart rate.    Seasonal allergic rhinitis       fish oil-omega-3 fatty acids 1000 MG capsule      Take 1 capsule by mouth daily.        fluticasone 50 MCG/ACT spray    FLONASE    16 g    Spray 1-2 sprays into both nostrils daily    Seasonal allergic rhinitis       hydrochlorothiazide 12.5 MG Tabs tablet     90 tablet    Take 1 tablet (12.5 mg) by mouth daily    Essential hypertension, Type 2 diabetes mellitus with diabetic nephropathy, with long-term current use of insulin (H)       insulin glargine 100 UNIT/ML injection    LANTUS SOLOSTAR    75 mL    80 units daily    Diabetes mellitus, type 2 (H)       losartan 100 MG tablet    COZAAR    90 tablet    TAKE ONE TABLET BY MOUTH EVERY DAY    Hypertension goal BP (blood pressure) < 140/90       magnesium 250 MG tablet     90 tablet    Take 1 tablet by mouth daily    Hypomagnesemia       * metFORMIN 500 MG 24 hr tablet    GLUCOPHAGE-XR    360 tablet    TAKE TWO TABLETS BY MOUTH TWICE A DAY    Type 2 diabetes mellitus with hyperglycemia, unspecified long term insulin use status (H)       * metFORMIN 500 MG 24 hr tablet    GLUCOPHAGE-XR    360 tablet    Take 2 tablets (1,000 mg) by mouth 2 times daily    Type 2 diabetes mellitus with diabetic nephropathy, with long-term current use of insulin (H), Essential hypertension       MULTIVITAMIN PO       Take 1 tablet by mouth daily.        NEEDLES, ANY SIZE     1 Box    Pen needles for Victoza pen.    Type 2 diabetes, HbA1c goal < 7% (H)       NEEDLES, ANY SIZE     4 Box    Pen needles for Novolog insulin pen. Use to inject 5-6  times daily.    Type 2 diabetes, HbA1c goal < 7% (H)       NovoLOG FLEXPEN 100 UNIT/ML injection   Generic drug:  insulin aspart     120 mL    INJECT UP  UNITS UNDER THE SKIN PER DAY AS DIRECTED    Diabetes mellitus, type 2 (H)       pantoprazole 40 MG EC tablet    PROTONIX    90 tablet    Take 1 tablet (40 mg) by mouth daily    Gastroesophageal reflux disease       * pentoxifylline 400 MG CR tablet    TRENtal    180 tablet    TAKE ONE TABLET BY MOUTH TWICE A DAY    Type 2 diabetes mellitus with hyperglycemia, unspecified long term insulin use status (H)       * pentoxifylline 400 MG CR tablet    TRENtal    180 tablet    Take 1 tablet (400 mg) by mouth 2 times daily    Essential hypertension, Type 2 diabetes mellitus with diabetic nephropathy, with long-term current use of insulin (H)       rosuvastatin 10 MG tablet    CRESTOR    90 tablet    Take 1 tablet (10 mg) by mouth daily    Hyperlipidemia LDL goal <100       vitamin D 2000 UNITS tablet      Take 1 tablet by mouth daily.        * Notice:  This list has 4 medication(s) that are the same as other medications prescribed for you. Read the directions carefully, and ask your doctor or other care provider to review them with you.

## 2017-07-27 NOTE — PATIENT INSTRUCTIONS
Take keflex 500mg four times a day for 10 days  Return urgently if any change in symptoms like increasing redness, increasing pain, increasing swelling, fever, or other change in symptoms.

## 2017-08-04 DIAGNOSIS — K21.9 GASTROESOPHAGEAL REFLUX DISEASE: ICD-10-CM

## 2017-08-04 RX ORDER — PANTOPRAZOLE SODIUM 40 MG/1
40 TABLET, DELAYED RELEASE ORAL DAILY
Qty: 90 TABLET | Refills: 3 | Status: SHIPPED | OUTPATIENT
Start: 2017-08-04 | End: 2017-11-01

## 2017-08-04 NOTE — TELEPHONE ENCOUNTER
pantoprazole (PROTONIX) 40 MG EC tablet      Last Written Prescription Date:  6-7-17  Last Fill Quantity: 90,   # refills: 3  Last Office Visit : 9-8-17  Future Office visit:  11-1-17    Kathleen M Doege RN

## 2017-09-15 DIAGNOSIS — E11.9 DIABETES MELLITUS, TYPE 2 (H): ICD-10-CM

## 2017-09-15 DIAGNOSIS — J30.2 SEASONAL ALLERGIC RHINITIS, UNSPECIFIED CHRONICITY, UNSPECIFIED TRIGGER: Primary | ICD-10-CM

## 2017-09-15 RX ORDER — FLUTICASONE PROPIONATE 50 MCG
1-2 SPRAY, SUSPENSION (ML) NASAL DAILY
Qty: 16 G | Refills: 11 | Status: SHIPPED | OUTPATIENT
Start: 2017-09-15 | End: 2019-05-28

## 2017-09-15 RX ORDER — INSULIN ASPART 100 [IU]/ML
INJECTION, SOLUTION INTRAVENOUS; SUBCUTANEOUS
Qty: 120 ML | Refills: 3 | Status: CANCELLED | OUTPATIENT
Start: 2017-09-15

## 2017-09-15 NOTE — TELEPHONE ENCOUNTER
Flonase      Last Written Prescription Date: 9-16-15  Last Fill Quantity: 1,  # refills: 11   Last Office Visit with [facility], [facility] or [facility] prescribing provider: 7-27-17    Manjeet Bauer  [facility]  On Behalf of [facility]

## 2017-10-11 ENCOUNTER — ALLIED HEALTH/NURSE VISIT (OUTPATIENT)
Dept: FAMILY MEDICINE | Facility: CLINIC | Age: 57
End: 2017-10-11
Payer: COMMERCIAL

## 2017-10-11 DIAGNOSIS — I10 ESSENTIAL HYPERTENSION: Primary | ICD-10-CM

## 2017-10-11 PROCEDURE — 99207 ZZC NO CHARGE NURSE ONLY: CPT | Performed by: FAMILY MEDICINE

## 2017-10-11 NOTE — MR AVS SNAPSHOT
After Visit Summary   10/11/2017    Maliha Pedro    MRN: [id number]           Patient Information     Date Of Birth          1960        Visit Information        Provider Department      10/11/2017 8:38 AM Elva Ortiz MD [facility]        Today's Diagnoses     Essential hypertension    -  1       Follow-ups after your visit        Your next 10 appointments already scheduled     Oct 25, 2017  8:45 AM CDT   RETURN GLAUCOMA with Umu Rosario MD   Eye Clinic ([facility])    [address]  [address]  [address] Clin 9a  [facility] [id number] [phone]            Nov 01, 2017  9:30 AM CDT   (Arrive by 9:15 AM)   RETURN DIABETES with Shaheen Mccarthy MD   [facility] ([facility])    [address]  [address]rd [facility] [id number] [phone]              Who to contact     If you have questions or need follow up information about today's clinic visit or your schedule please contact [facility] directly at [phone].  Normal or non-critical lab and imaging results will be communicated to you by [employer], letter or phone within 4 business days after the clinic has received the results. If you do not hear from us within 7 days, please contact the clinic through [employer] or phone. If you have a critical or abnormal lab result, we will notify you by phone as soon as possible.  Submit refill requests through [employer] or call your pharmacy and they will forward the refill request to us. Please allow 3 business days for your refill to be completed.          Additional Information About Your Visit        [employer] Information     [employer] gives you secure access to your electronic health record. If you see a primary care provider, you can also send messages to your care team and make appointments. If you have questions, please call your primary care clinic.  If you do not have a  primary care provider, please call [phone] and they will assist you.        Care EveryWhere ID     This is your Care EveryWhere ID. This could be used by other organizations to access your [place] medical records  [id number]        Your Vitals Were     Pulse Last Period                91 03/19/2009           Blood Pressure from Last 3 Encounters:   10/11/17 129/74   07/27/17 130/82   06/14/17 110/76    Weight from Last 3 Encounters:   07/27/17 85.6 kg (188 lb 12.8 oz)   06/14/17 85.3 kg (188 lb)   03/08/17 85.3 kg (188 lb)              Today, you had the following     No orders found for display       Primary Care Provider Office Phone # Fax #    Elva Ortiz [phone] [address]  [facility] [address]        Equal Access to Services     [facility]: Hadii aad ku hadasho Sovanessaali, waaxda luqadaha, qaybta kaalmada adeegyada, poly izaguirre haybrettn roxanna harrison . So [facility] [phone].    ATENCIÓN: Si habla español, tiene a ford disposición servicios gratuitos de asistencia lingüística. Hernando al [phone].    We comply with applicable federal civil rights laws and [place] laws. We do not discriminate on the basis of race, color, national origin, age, disability, sex, sexual orientation, or gender identity.            Thank you!     Thank you for choosing [facility]  for your care. Our goal is always to provide you with excellent care. Hearing back from our patients is one way we can continue to improve our services. Please take a few minutes to complete the written survey that you may receive in the mail after your visit with us. Thank you!             Your Updated Medication List - Protect others around you: Learn how to safely use, store and throw away your medicines at www.disposemymeds.org.          This list is accurate as of: 10/11/17 11:59 PM.  Always use your most recent med list.                   Brand Name Dispense Instructions  for use Diagnosis    ACCU-CHEK VANESA PLUS test strip   Generic drug:  blood glucose monitoring     360 strip    Test four times a day    Type 2 diabetes mellitus with hyperglycemia, with long-term current use of insulin (H)       albuterol 108 (90 BASE) MCG/ACT Inhaler    PROAIR HFA/PROVENTIL HFA/VENTOLIN HFA    1 Inhaler    Inhale 2 puffs into the lungs every 4 hours as needed for shortness of breath / dyspnea    Cough       amLODIPine 5 MG tablet    NORVASC    90 tablet    Take 1 tablet (5 mg) by mouth daily    Essential hypertension, Type 2 diabetes mellitus with diabetic nephropathy, with long-term current use of insulin (H)       aspirin EC 81 MG EC tablet      Take 325 mg by mouth daily        blood glucose monitoring lancets     5 Box    Use to test blood sugar 3 times daily or as directed.    Type 2 diabetes mellitus with hyperglycemia, with long-term current use of insulin (H)       blood glucose monitoring meter device kit    no brand specified    1 kit    Use to test blood sugar 4 times daily or as directed.    Type 2 diabetes mellitus with hyperglycemia, with long-term current use of insulin (H)       CALCIUM + D PO      Take 1 tablet by mouth 2 times daily.        cephALEXin 500 MG capsule    KEFLEX    40 capsule    Take 1 capsule (500 mg) by mouth 4 times daily    Cellulitis, unspecified cellulitis site       fexofenadine 180 MG tablet    ALLEGRA    90 tablet    Take 1 tablet by mouth daily. 1 TABLET DAILY Failed claritin for symptom cotrol. Zyrtec increases heart rate.    Seasonal allergic rhinitis       fish oil-omega-3 fatty acids 1000 MG capsule      Take 1 capsule by mouth daily.        fluticasone 50 MCG/ACT spray    FLONASE    16 g    Spray 1-2 sprays into both nostrils daily    Seasonal allergic rhinitis, unspecified chronicity, unspecified trigger       hydrochlorothiazide 12.5 MG Tabs tablet     90 tablet    Take 1 tablet (12.5 mg) by mouth daily    Essential hypertension, Type 2 diabetes  mellitus with diabetic nephropathy, with long-term current use of insulin (H)       insulin aspart 100 UNIT/ML injection    NovoLOG FLEXPEN    120 mL    Uses  120 units daily subcutaneously    Diabetes mellitus, type 2 (H)       insulin glargine 100 UNIT/ML injection    LANTUS SOLOSTAR    75 mL    80 units daily    Diabetes mellitus, type 2 (H)       losartan 100 MG tablet    COZAAR    90 tablet    TAKE ONE TABLET BY MOUTH EVERY DAY    Hypertension goal BP (blood pressure) < 140/90       magnesium 250 MG tablet     90 tablet    Take 1 tablet by mouth daily    Hypomagnesemia       * metFORMIN 500 MG 24 hr tablet    GLUCOPHAGE-XR    360 tablet    TAKE TWO TABLETS BY MOUTH TWICE A DAY    Type 2 diabetes mellitus with hyperglycemia, unspecified long term insulin use status (H)       * metFORMIN 500 MG 24 hr tablet    GLUCOPHAGE-XR    360 tablet    Take 2 tablets (1,000 mg) by mouth 2 times daily    Type 2 diabetes mellitus with diabetic nephropathy, with long-term current use of insulin (H), Essential hypertension       MULTIVITAMIN PO      Take 1 tablet by mouth daily.        NEEDLES, ANY SIZE     1 Box    Pen needles for Victoza pen.    Type 2 diabetes, HbA1c goal < 7% (H)       NEEDLES, ANY SIZE     4 Box    Pen needles for Novolog insulin pen. Use to inject 5-6  times daily.    Type 2 diabetes, HbA1c goal < 7% (H)       pantoprazole 40 MG EC tablet    PROTONIX    90 tablet    Take 1 tablet (40 mg) by mouth daily    Gastroesophageal reflux disease       * pentoxifylline 400 MG CR tablet    TRENtal    180 tablet    TAKE ONE TABLET BY MOUTH TWICE A DAY    Type 2 diabetes mellitus with hyperglycemia, unspecified long term insulin use status (H)       * pentoxifylline 400 MG CR tablet    TRENtal    180 tablet    Take 1 tablet (400 mg) by mouth 2 times daily    Essential hypertension, Type 2 diabetes mellitus with diabetic nephropathy, with long-term current use of insulin (H)       rosuvastatin 10 MG tablet    CRESTOR     90 tablet    Take 1 tablet (10 mg) by mouth daily    Hyperlipidemia LDL goal <100       vitamin D 2000 UNITS tablet      Take 1 tablet by mouth daily.        * Notice:  This list has 4 medication(s) that are the same as other medications prescribed for you. Read the directions carefully, and ask your doctor or other care provider to review them with you.

## 2017-10-13 VITALS — DIASTOLIC BLOOD PRESSURE: 74 MMHG | HEART RATE: 91 BPM | SYSTOLIC BLOOD PRESSURE: 129 MMHG

## 2017-10-13 NOTE — PROGRESS NOTES
Maliha Pedro is enrolled/participating in the retail pharmacy Blood Pressure Goals Achievement Program (BPGAP).  Maliha Pedro was evaluated at [facility] on October 13, 2017 at which time her blood pressure was:    BP Readings from Last 3 Encounters:   10/11/17 129/74   07/27/17 130/82   06/14/17 110/76     Reviewed lifestyle modifications for blood pressure control and reduction: including making healthy food choices, managing weight, getting regular exercise, smoking cessation, reducing alcohol consumption, monitoring blood pressure regularly.     Maliha Pedro is not experiencing symptoms.    Follow-Up: BP is at goal of < 140/90mmHg (patient 18+ years of age with or without diabetes).  Recommended follow-up at pharmacy in 6 months.     Recommendation to Provider: no changes    Maliha Pedro was evaluated for enrollment into the PGEN study today.    Patient eligible for enrollment:  No  Patient interested in enrollment:  No    Completed by: VINICIUS House. Ph.  [facility]

## 2017-10-25 ENCOUNTER — OFFICE VISIT (OUTPATIENT)
Dept: OPHTHALMOLOGY | Facility: CLINIC | Age: 57
End: 2017-10-25
Attending: OPHTHALMOLOGY
Payer: COMMERCIAL

## 2017-10-25 DIAGNOSIS — H40.003 BORDERLINE GLAUCOMA, BILATERAL: ICD-10-CM

## 2017-10-25 DIAGNOSIS — H40.9 GLAUCOMA: Primary | ICD-10-CM

## 2017-10-25 PROCEDURE — 99212 OFFICE O/P EST SF 10 MIN: CPT | Mod: ZF

## 2017-10-25 PROCEDURE — 92133 CPTRZD OPH DX IMG PST SGM ON: CPT | Mod: ZF | Performed by: OPHTHALMOLOGY

## 2017-10-25 ASSESSMENT — REFRACTION_WEARINGRX
OS_CYLINDER: +1.25
OD_AXIS: 100
OS_AXIS: 085
OS_ADD: +2.50
OD_SPHERE: -4.25
OD_CYLINDER: +0.75
OD_ADD: +2.50
SPECS_TYPE: PAL
OS_SPHERE: -4.25

## 2017-10-25 ASSESSMENT — TONOMETRY
OD_IOP_MMHG: 16
IOP_METHOD: APPLANATION
OS_IOP_MMHG: 18

## 2017-10-25 ASSESSMENT — VISUAL ACUITY
OS_CC: 20/25
OS_CC+: +1
CORRECTION_TYPE: GLASSES
OD_CC: 20/20
OD_CC+: -2
METHOD: SNELLEN - LINEAR

## 2017-10-25 ASSESSMENT — EXTERNAL EXAM - LEFT EYE: OS_EXAM: DERMATOCHALASIS

## 2017-10-25 ASSESSMENT — SLIT LAMP EXAM - LIDS
COMMENTS: MEIBOMIAN GLAND DYSFUNCTION
COMMENTS: MEIBOMIAN GLAND DYSFUNCTION

## 2017-10-25 ASSESSMENT — CUP TO DISC RATIO
OD_RATIO: 0.9
OS_RATIO: 0.7

## 2017-10-25 ASSESSMENT — EXTERNAL EXAM - RIGHT EYE: OD_EXAM: DERMATOCHALASIS

## 2017-10-25 ASSESSMENT — CONF VISUAL FIELD
OD_NORMAL: 1
OS_NORMAL: 1

## 2017-10-25 NOTE — NURSING NOTE
Chief Complaints and History of Present Illnesses   Patient presents with     Follow Up For     s/p Borderline glaucoma, bilateral      HPI    Affected eye(s):  Both   Symptoms:     No blurred vision   No decreased vision   No floaters   No flashes   Dryness      Duration:  6 months   Frequency:  Constant       Do you have eye pain now?:  No      Comments:  States va is the same since last visit.     Last A1C  8.8   6-14-17                       A1C                      9.8                 12/16/2013                 A1C                      9.5                 04/15/2013                 A1C                      9.6                 01/16/2013                 A1C                      9.4                 09/28/2012                 A1C                      8.3                 06/22/2012    BS:  ?           Mario Rene  8:39 AM October 25, 2017

## 2017-10-25 NOTE — MR AVS SNAPSHOT
After Visit Summary   10/25/2017    Maliha Pedro    MRN: [id number]           Patient Information     Date Of Birth          1960        Visit Information        Provider Department      10/25/2017 8:45 AM [facility]        Today's Diagnoses     Glaucoma    -  1    Borderline glaucoma, bilateral [H40.003]           Follow-ups after your visit        Follow-up notes from your care team     Return in about 6 months (around 4/25/2018) for visual field 24-2.      Your next 10 appointments already scheduled     Oct 30, 2017  8:20 AM CDT   Office Visit with Elva Ortiz MD   [facility] ([facility])    [address] [place] [id number] [phone]           Bring a current list of meds and any records pertaining to this visit. For Physicals, please bring immunization records and any forms needing to be filled out. Please arrive 10 minutes early to complete paperwork.            Nov 01, 2017  9:30 AM CDT   (Arrive by 9:15 AM)   RETURN DIABETES with Shaheen Mccarthy MD   [facility] ([facility])    [address] [id number] [phone]            May 09, 2018  8:45 AM CDT   VISUAL FIELD with [facility] EYE VISUAL FIELD   Eye Clinic ([facility])    [address]  [address] [place] [id number]   [phone]            May 09, 2018  9:15 AM CDT   RETURN GLAUCOMA with Umu Rosario MD   [facility] ([facility])    [address]  [address] [place] [address]   [phone]              Who to contact     Please call your clinic at [phone] to:    Ask questions about your health    Make or cancel appointments    Discuss your medicines    Learn about your test results    Speak to your doctor   If you have compliments or concerns about an experience at  your clinic, or if you wish to file a complaint, please contact [facility] Patient Relations at [phone] or email us at [email or web address]         Additional Information About Your Visit        ShareThehart Information     Loccie gives you secure access to your electronic health record. If you see a primary care provider, you can also send messages to your care team and make appointments. If you have questions, please call your primary care clinic.  If you do not have a primary care provider, please call [phone] and they will assist you.      Loccie is an electronic gateway that provides easy, online access to your medical records. With Loccie, you can request a clinic appointment, read your test results, renew a prescription or communicate with your care team.     To access your existing account, please contact your [facility] or call [phone] for assistance.        Care EveryWhere ID     This is your Care EveryWhere ID. This could be used by other organizations to access your [facility] medical records  [id number]        Your Vitals Were     Last Period                   03/19/2009            Blood Pressure from Last 3 Encounters:   10/11/17 129/74   07/27/17 130/82   06/14/17 110/76    Weight from Last 3 Encounters:   07/27/17 85.6 kg (188 lb 12.8 oz)   06/14/17 85.3 kg (188 lb)   03/08/17 85.3 kg (188 lb)              We Performed the Following     OCT Optic Nerve RNFL Spectralis OU (both eyes)        Primary Care Provider Office Phone # Fax #    Elva Peggy Ortiz [phone] [address]  [facility] [address]        Equal Access to Services     JASSON COLLINS : Hadii angelica Khan, wakinseyda luromero, qaybta kaalpoly tate. So [facility] [phone].    ATENCIÓN: Si habla español, tiene a ford disposición servicios gratuitos de asistencia  lingüísticaAnjum Villagomez al [phone].    We comply with applicable federal civil rights laws and [place] laws. We do not discriminate on the basis of race, color, national origin, age, disability, sex, sexual orientation, or gender identity.            Thank you!     Thank you for choosing EYE CLINIC  for your care. Our goal is always to provide you with excellent care. Hearing back from our patients is one way we can continue to improve our services. Please take a few minutes to complete the written survey that you may receive in the mail after your visit with us. Thank you!             Your Updated Medication List - Protect others around you: Learn how to safely use, store and throw away your medicines at www.disposemymeds.org.          This list is accurate as of: 10/25/17  9:32 AM.  Always use your most recent med list.                   Brand Name Dispense Instructions for use Diagnosis    ACCU-CHEK VANESA PLUS test strip   Generic drug:  blood glucose monitoring     360 strip    Test four times a day    Type 2 diabetes mellitus with hyperglycemia, with long-term current use of insulin (H)       albuterol 108 (90 BASE) MCG/ACT Inhaler    PROAIR HFA/PROVENTIL HFA/VENTOLIN HFA    1 Inhaler    Inhale 2 puffs into the lungs every 4 hours as needed for shortness of breath / dyspnea    Cough       amLODIPine 5 MG tablet    NORVASC    90 tablet    Take 1 tablet (5 mg) by mouth daily    Essential hypertension, Type 2 diabetes mellitus with diabetic nephropathy, with long-term current use of insulin (H)       aspirin EC 81 MG EC tablet      Take 325 mg by mouth daily        blood glucose monitoring lancets     5 Box    Use to test blood sugar 3 times daily or as directed.    Type 2 diabetes mellitus with hyperglycemia, with long-term current use of insulin (H)       blood glucose monitoring meter device kit    no brand specified    1 kit    Use to test blood sugar 4 times daily or as directed.    Type 2 diabetes mellitus  with hyperglycemia, with long-term current use of insulin (H)       CALCIUM + D PO      Take 1 tablet by mouth 2 times daily.        cephALEXin 500 MG capsule    KEFLEX    40 capsule    Take 1 capsule (500 mg) by mouth 4 times daily    Cellulitis, unspecified cellulitis site       fexofenadine 180 MG tablet    ALLEGRA    90 tablet    Take 1 tablet by mouth daily. 1 TABLET DAILY Failed claritin for symptom cotrol. Zyrtec increases heart rate.    Seasonal allergic rhinitis       fish oil-omega-3 fatty acids 1000 MG capsule      Take 1 capsule by mouth daily.        fluticasone 50 MCG/ACT spray    FLONASE    16 g    Spray 1-2 sprays into both nostrils daily    Seasonal allergic rhinitis, unspecified chronicity, unspecified trigger       hydrochlorothiazide 12.5 MG Tabs tablet     90 tablet    Take 1 tablet (12.5 mg) by mouth daily    Essential hypertension, Type 2 diabetes mellitus with diabetic nephropathy, with long-term current use of insulin (H)       insulin aspart 100 UNIT/ML injection    NovoLOG FLEXPEN    120 mL    Uses  120 units daily subcutaneously    Diabetes mellitus, type 2 (H)       insulin glargine 100 UNIT/ML injection    LANTUS SOLOSTAR    75 mL    80 units daily    Diabetes mellitus, type 2 (H)       losartan 100 MG tablet    COZAAR    90 tablet    TAKE ONE TABLET BY MOUTH EVERY DAY    Hypertension goal BP (blood pressure) < 140/90       magnesium 250 MG tablet     90 tablet    Take 1 tablet by mouth daily    Hypomagnesemia       * metFORMIN 500 MG 24 hr tablet    GLUCOPHAGE-XR    360 tablet    TAKE TWO TABLETS BY MOUTH TWICE A DAY    Type 2 diabetes mellitus with hyperglycemia, unspecified long term insulin use status (H)       * metFORMIN 500 MG 24 hr tablet    GLUCOPHAGE-XR    360 tablet    Take 2 tablets (1,000 mg) by mouth 2 times daily    Type 2 diabetes mellitus with diabetic nephropathy, with long-term current use of insulin (H), Essential hypertension       MULTIVITAMIN PO      Take 1 tablet  by mouth daily.        NEEDLES, ANY SIZE     1 Box    Pen needles for Victoza pen.    Type 2 diabetes, HbA1c goal < 7% (H)       NEEDLES, ANY SIZE     4 Box    Pen needles for Novolog insulin pen. Use to inject 5-6  times daily.    Type 2 diabetes, HbA1c goal < 7% (H)       pantoprazole 40 MG EC tablet    PROTONIX    90 tablet    Take 1 tablet (40 mg) by mouth daily    Gastroesophageal reflux disease       * pentoxifylline 400 MG CR tablet    TRENtal    180 tablet    TAKE ONE TABLET BY MOUTH TWICE A DAY    Type 2 diabetes mellitus with hyperglycemia, unspecified long term insulin use status (H)       * pentoxifylline 400 MG CR tablet    TRENtal    180 tablet    Take 1 tablet (400 mg) by mouth 2 times daily    Essential hypertension, Type 2 diabetes mellitus with diabetic nephropathy, with long-term current use of insulin (H)       rosuvastatin 10 MG tablet    CRESTOR    90 tablet    Take 1 tablet (10 mg) by mouth daily    Hyperlipidemia LDL goal <100       vitamin D 2000 UNITS tablet      Take 1 tablet by mouth daily.        * Notice:  This list has 4 medication(s) that are the same as other medications prescribed for you. Read the directions carefully, and ask your doctor or other care provider to review them with you.

## 2017-10-25 NOTE — PROGRESS NOTES
Interval history:     Ocular meds:  ATs as needed     Ocular history:  Cataract extraction / intraocular lens left eye 7-8 years ago       OCT retinal nerve fiber layer 10/25/17:  Right eye: stable superior temporal and inferior thinning, watch IT area-may be progressing   Left eye:  Stable superior, inferior, and nasal thinning.    A/P:  1) Glaucoma suspect, bilateral  - large cup to disc ratio R>L  - favorable pachmetry 583/613    -IOP stable mid-upper teens today    2) Type 2 diabetes mellitus. A1c 9.1% (March 2017, had been ill). On insulin  - without retinopathy  - follows Dr. Tracy     3) Cataract, right eye  -still seeing well  -observe     3) Pseudophakia, left eye    Follow up 6 months with with visual field testing       Attending Physician Attestation:  Complete documentation of historical and exam elements from today's encounter can be found in the full encounter summary report (not reduplicated in this progress note). I personally obtained the chief complaint(s) and history of present illness. I confirmed and edited asnecessary the review of systems, past medical/surgical history, family history, social history, and examination findings as documented by others; and I examined the patient myself. I personally reviewed the relevant tests, images, and reports as documented above. I formulated and edited as necessary the assessment and plan and discussed the findings and management plan with the patient and family.  - Umu Rosario MD 9:21 AM 10/25/2017

## 2017-10-30 ENCOUNTER — OFFICE VISIT (OUTPATIENT)
Dept: FAMILY MEDICINE | Facility: CLINIC | Age: 57
End: 2017-10-30
Payer: COMMERCIAL

## 2017-10-30 VITALS
SYSTOLIC BLOOD PRESSURE: 134 MMHG | HEIGHT: 64 IN | TEMPERATURE: 98.2 F | DIASTOLIC BLOOD PRESSURE: 78 MMHG | OXYGEN SATURATION: 97 % | BODY MASS INDEX: 32.35 KG/M2 | RESPIRATION RATE: 16 BRPM | WEIGHT: 189.5 LBS | HEART RATE: 72 BPM

## 2017-10-30 DIAGNOSIS — K12.1 STOMATITIS: Primary | ICD-10-CM

## 2017-10-30 DIAGNOSIS — Z79.4 TYPE 2 DIABETES MELLITUS WITH HYPERGLYCEMIA, WITH LONG-TERM CURRENT USE OF INSULIN (H): ICD-10-CM

## 2017-10-30 DIAGNOSIS — E11.65 TYPE 2 DIABETES MELLITUS WITH HYPERGLYCEMIA, WITH LONG-TERM CURRENT USE OF INSULIN (H): ICD-10-CM

## 2017-10-30 LAB
ALBUMIN UR-MCNC: NEGATIVE MG/DL
APPEARANCE UR: CLEAR
BASOPHILS # BLD AUTO: 0 10E9/L (ref 0–0.2)
BASOPHILS NFR BLD AUTO: 0.5 %
BILIRUB UR QL STRIP: NEGATIVE
COLOR UR AUTO: YELLOW
DIFFERENTIAL METHOD BLD: ABNORMAL
EOSINOPHIL # BLD AUTO: 0.4 10E9/L (ref 0–0.7)
EOSINOPHIL NFR BLD AUTO: 5 %
ERYTHROCYTE [DISTWIDTH] IN BLOOD BY AUTOMATED COUNT: 14.3 % (ref 10–15)
ERYTHROCYTE [SEDIMENTATION RATE] IN BLOOD BY WESTERGREN METHOD: 11 MM/H (ref 0–30)
GLUCOSE UR STRIP-MCNC: 500 MG/DL
HCT VFR BLD AUTO: 45.4 % (ref 35–47)
HGB BLD-MCNC: 15.1 G/DL (ref 11.7–15.7)
HGB UR QL STRIP: NEGATIVE
HIV 1+2 AB+HIV1 P24 AG SERPL QL IA: NONREACTIVE
KETONES UR STRIP-MCNC: NEGATIVE MG/DL
LEUKOCYTE ESTERASE UR QL STRIP: NEGATIVE
LYMPHOCYTES # BLD AUTO: 2.7 10E9/L (ref 0.8–5.3)
LYMPHOCYTES NFR BLD AUTO: 32.5 %
MCH RBC QN AUTO: 27.4 PG (ref 26.5–33)
MCHC RBC AUTO-ENTMCNC: 33.3 G/DL (ref 31.5–36.5)
MCV RBC AUTO: 82 FL (ref 78–100)
MONOCYTES # BLD AUTO: 0.6 10E9/L (ref 0–1.3)
MONOCYTES NFR BLD AUTO: 7.7 %
NEUTROPHILS # BLD AUTO: 4.5 10E9/L (ref 1.6–8.3)
NEUTROPHILS NFR BLD AUTO: 54.3 %
NITRATE UR QL: NEGATIVE
PH UR STRIP: 7.5 PH (ref 5–7)
PLATELET # BLD AUTO: 349 10E9/L (ref 150–450)
RBC # BLD AUTO: 5.51 10E12/L (ref 3.8–5.2)
SOURCE: ABNORMAL
SP GR UR STRIP: 1.02 (ref 1–1.03)
T PALLIDUM IGG+IGM SER QL: NEGATIVE
TSH SERPL DL<=0.005 MIU/L-ACNC: 1.29 MU/L (ref 0.4–4)
UROBILINOGEN UR STRIP-ACNC: 0.2 EU/DL (ref 0.2–1)
VIT B12 SERPL-MCNC: 1001 PG/ML (ref 193–986)
WBC # BLD AUTO: 8.4 10E9/L (ref 4–11)

## 2017-10-30 PROCEDURE — 85025 COMPLETE CBC W/AUTO DIFF WBC: CPT | Performed by: FAMILY MEDICINE

## 2017-10-30 PROCEDURE — 36415 COLL VENOUS BLD VENIPUNCTURE: CPT | Performed by: FAMILY MEDICINE

## 2017-10-30 PROCEDURE — 86780 TREPONEMA PALLIDUM: CPT | Performed by: FAMILY MEDICINE

## 2017-10-30 PROCEDURE — 86431 RHEUMATOID FACTOR QUANT: CPT | Performed by: FAMILY MEDICINE

## 2017-10-30 PROCEDURE — 87389 HIV-1 AG W/HIV-1&-2 AB AG IA: CPT | Performed by: FAMILY MEDICINE

## 2017-10-30 PROCEDURE — 85652 RBC SED RATE AUTOMATED: CPT | Performed by: FAMILY MEDICINE

## 2017-10-30 PROCEDURE — 86038 ANTINUCLEAR ANTIBODIES: CPT | Performed by: FAMILY MEDICINE

## 2017-10-30 PROCEDURE — 99214 OFFICE O/P EST MOD 30 MIN: CPT | Performed by: FAMILY MEDICINE

## 2017-10-30 PROCEDURE — 84443 ASSAY THYROID STIM HORMONE: CPT | Performed by: FAMILY MEDICINE

## 2017-10-30 PROCEDURE — 82607 VITAMIN B-12: CPT | Performed by: FAMILY MEDICINE

## 2017-10-30 PROCEDURE — 81003 URINALYSIS AUTO W/O SCOPE: CPT | Performed by: FAMILY MEDICINE

## 2017-10-30 ASSESSMENT — PAIN SCALES - GENERAL: PAINLEVEL: NO PAIN (0)

## 2017-10-30 NOTE — NURSING NOTE
"Chief Complaint   Patient presents with     UTI       Initial /78 (BP Location: Right arm, Patient Position: Right side, Cuff Size: Adult Regular)  Pulse 72  Temp 98.2  F (36.8  C) (Oral)  Resp 16  Ht 1.618 m (5' 3.7\")  Wt 86 kg (189 lb 8 oz)  LMP 03/19/2009  SpO2 97%  BMI 32.83 kg/m2 Estimated body mass index is 32.83 kg/(m^2) as calculated from the following:    Height as of this encounter: 1.618 m (5' 3.7\").    Weight as of this encounter: 86 kg (189 lb 8 oz).  Medication Reconciliation: complete       Will Candida DUFF      "

## 2017-10-30 NOTE — PROGRESS NOTES
"  SUBJECTIVE:   Maliha Pedro is a 57 year old female who presents to clinic today for the following health issues:      Concern - sores in mouth  Onset: 3 weeks, but an ongoing issue    Description:   Painful, white, intermittent sx's (have had them on upper lip, under tongue, cheek, gums)  - currently doesn't have any sx's except a healing sore on her upper lip    Intensity: no sx's currently    Progression of Symptoms:  waxing and waning    Accompanying Signs & Symptoms:  Unable to eat, drink, talk with sx's  Denies fever/chills.  Body aches - yes but no different from baseline  Other URI symptoms: no  Vaginal sores: no  Nose sores: no  - She also notes \"nasty crusts\" on her lips around the sores which she washes with salt water    Previous history of similar problem:   Has had sores in mouth before (over a year ago) - normally just one at a time, has been worse    Precipitating factors:   Worsened by: eating, drinking -- unsure what causes sores (maybe biting on cheek?)    Alleviating factors:  Improved by: Valcyte - given by surgical fellow at her workplace. This was \"maybe\" helpful - she does note she has no active sores today in clinic.   - She also brushes the sores because she thought this would clear up the white parts of the sore. She also rinses with salt water.   - Nystatin swish and swallow which helps to numb the area so she can eat without pain    Therapies Tried and outcome: see above    The last time she was tested for HIV was around 18 years ago, but she denies any risk factors for this.   Has had joint pains (PIP hand joints and hip joints and wrist joints), especially in the cold. Hip pain is chronic issue for her.     -She also notes her skin is getting sensitive. She got bitten by a bee on her arm and stomach, which irritated her skin and made it very swollen.   - Her blood glucose readings have been high; she is scheduled with her endocrinologist this week. She is struggling with her " diabetes, and is on 25 units of insulin which is distressing her.   - She also notes LUQ abdominal soreness which she notices on exertion at work. This area will feel hard as well, but she does not notice any bulging. She massages this area which helps.     Problem list and histories reviewed & adjusted, as indicated.  Additional history: as documented    Patient Active Problem List   Diagnosis     Type 2 diabetes mellitus with hyperglycemia (H)     ? 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     Displacement of lumbar intervertebral disc without myelopathy     Nonallopathic lesion of lumbar region     Nonallopathic lesion of thoracic region     Nonallopathic lesion of sacral region     Bartholin gland cyst     Cataract     Irritable bowel syndrome     Esophageal reflux     Seasonal allergic rhinitis     Latex sensitivity     Colon polyp     Adenomatous polyp     Subclinical hyperthyroidism     HYPERLIPIDEMIA LDL GOAL <100     Ovarian cyst     Breast pain     Essential hypertension     Advanced directives, counseling/discussion     Obesity     Hypomagnesemia     Type 2 diabetes mellitus with hyperglycemia, with long-term current use of insulin (H)     Past Surgical History:   Procedure Laterality Date     CATARACT IOL, RT/LT  2008    LE     HC DILATION/CURETTAGE DIAG/THER NON OB       HC TYMPANOPLASTY W/O MASTOID, INIT/REV W/O OSS CHAIN RECONST       YAG CAPSULOTOMY OS (LEFT EYE)  2011       Social History   Substance Use Topics     Smoking status: Never Smoker     Smokeless tobacco: Never Used     Alcohol use Yes      Comment: rarely     Family History   Problem Relation Age of Onset     DIABETES Brother      CANCER Brother 59     Lung cancer     DIABETES Father       of dm 70's     C.A.D. Father      DIABETES Paternal Grandfather      DIABETES Sister      CANCER Maternal Aunt      ovarian cancer.      DIABETES Brother      DIABETES Brother      DIABETES Sister      Glaucoma  "Maternal Grandmother            ROS:  Constitutional, HEENT, cardiovascular, pulmonary, gi and gu systems are negative, except as otherwise noted.    This document serves as a record of the services and decisions personally performed and made by Elva Ortiz MD. It was created on her behalf by Tonja Pathak, a trained medical scribe. The creation of this document is based the provider's statements to the medical scribe.  Tonja Pathak October 30, 2017 8:59 AM    OBJECTIVE:   /78 (BP Location: Right arm, Patient Position: Right side, Cuff Size: Adult Regular)  Pulse 72  Temp 98.2  F (36.8  C) (Oral)  Resp 16  Ht 1.618 m (5' 3.7\")  Wt 86 kg (189 lb 8 oz)  LMP 03/19/2009  SpO2 97%  BMI 32.83 kg/m2  Body mass index is 32.83 kg/(m^2).     GENERAL: healthy, alert and no distress  EYES: Eyes grossly normal to inspection, PERRL and conjunctivae and sclerae normal  HENT: ear canals and TM's normal, nose and mouth without ulcers or lesions  NECK: no adenopathy, no asymmetry, masses, or scars and thyroid normal to palpation  RESP: lungs clear to auscultation - no rales, rhonchi or wheezes  CV: regular rate and rhythm, normal S1 S2, no S3 or S4, no murmur, click or rub, no peripheral edema and peripheral pulses strong  ABDOMEN: soft, nontender, no hepatosplenomegaly, no masses and bowel sounds normal   PSYCH: mentation appears normal, affect normal/bright    Diagnostic Test Results:  none     ASSESSMENT/PLAN:     1. Stomatitis  Resolving. Likely viral. Follow up will depend on lab results. She can call to request Magic Mouthwash if she develops more sores.   - HIV Antigen Antibody Combo  - TSH with free T4 reflex  - CBC with platelets differential  - Vitamin B12  - Anti Treponema  - Rheumatoid factor  - Erythrocyte sedimentation rate auto  - Anti Nuclear Katherine IgG by IFA with Reflex      2. Type 2 diabetes mellitus with hyperglycemia, with long-term current use of insulin (H)  Patient requested a " UA to check for ketones in her urine. She is having no UTI symptoms.  - UA reflex to Microscopic and Culture     The information in this document, created by the medical scribe for me, accurately reflects the services I personally performed and the decisions made by me. I have reviewed and approved this document for accuracy prior to leaving the patient care area. October 30, 2017 9:00 AM       Elva Ortiz MD  [facility]

## 2017-10-30 NOTE — MR AVS SNAPSHOT
After Visit Summary   10/30/2017    Maliha Pedro    MRN: [id number]           Patient Information     Date Of Birth          1960        Visit Information        Provider Department      10/30/2017 8:20 AM Elva Ortiz MD [facility]        Today's Diagnoses     Stomatitis    -  1       Follow-ups after your visit        Your next 10 appointments already scheduled     Nov 01, 2017  9:30 AM CDT   (Arrive by 9:15 AM)   RETURN DIABETES with Shaheen Mccarthy MD   [facility] ([facility] [place])    [address]  3rd Floor  [facility] [address]   [phone]            May 09, 2018  8:45 AM CDT   VISUAL FIELD with [facility] EYE VISUAL FIELD   Eye Clinic ([facility])    [address]  [address] [address] [phone]            May 09, 2018  9:15 AM CDT   RETURN GLAUCOMA with Umu Rosario MD   Eye Clinic ([facility])    [address]  [address] [address]   [phone]              Who to contact     If you have questions or need follow up information about today's clinic visit or your schedule please contact [facility] directly at [phone].  Normal or non-critical lab and imaging results will be communicated to you by MyChart, letter or phone within 4 business days after the clinic has received the results. If you do not hear from us within 7 days, please contact the clinic through MyChart or phone. If you have a critical or abnormal lab result, we will notify you by phone as soon as possible.  Submit refill requests through [employer] or call your pharmacy and they will forward the refill request to us. Please allow 3 business days for your refill to be completed.          Additional Information About Your Visit        [employer] Information     [employer] gives you secure access to your electronic  "health record. If you see a primary care provider, you can also send messages to your care team and make appointments. If you have questions, please call your primary care clinic.  If you do not have a primary care provider, please call [phone] and they will assist you.        Care EveryWhere ID     This is your Care EveryWhere ID. This could be used by other organizations to access your [place] medical records  [id number]        Your Vitals Were     Pulse Temperature Respirations Height Last Period Pulse Oximetry    72 98.2  F (36.8  C) (Oral) 16 1.618 m (5' 3.7\") 03/19/2009 97%    BMI (Body Mass Index)                   32.83 kg/m2            Blood Pressure from Last 3 Encounters:   10/30/17 134/78   10/11/17 129/74   07/27/17 130/82    Weight from Last 3 Encounters:   10/30/17 86 kg (189 lb 8 oz)   07/27/17 85.6 kg (188 lb 12.8 oz)   06/14/17 85.3 kg (188 lb)              We Performed the Following     Anti Nuclear Katherine IgG by IFA with Reflex     Anti Treponema     CBC with platelets differential     Erythrocyte sedimentation rate auto     HIV Antigen Antibody Combo     Rheumatoid factor     TSH with free T4 reflex     Vitamin B12        Primary Care Provider Office Phone # Fax #    Elva Peggy Ortiz [phone] [address]  [facility] [address]        Equal Access to Services     [facility]: Hadii aad ku hadasho Soomaali, waaxda luqadaha, qaybta kaalmada adeegyada, poly harrison . So [facility] [phone].    ATENCIÓN: Si habla español, tiene a ford disposición servicios gratuitos de asistencia lingüística. Llame al [phone].    We comply with applicable federal civil rights laws and [place] laws. We do not discriminate on the basis of race, color, national origin, age, disability, sex, sexual orientation, or gender identity.            Thank you!     Thank you for choosing [facility]  for your care. Our goal is always to " provide you with excellent care. Hearing back from our patients is one way we can continue to improve our services. Please take a few minutes to complete the written survey that you may receive in the mail after your visit with us. Thank you!             Your Updated Medication List - Protect others around you: Learn how to safely use, store and throw away your medicines at www.disposemymeds.org.          This list is accurate as of: 10/30/17  9:20 AM.  Always use your most recent med list.                   Brand Name Dispense Instructions for use Diagnosis    ACCU-CHEK VANESA PLUS test strip   Generic drug:  blood glucose monitoring     360 strip    Test four times a day    Type 2 diabetes mellitus with hyperglycemia, with long-term current use of insulin (H)       albuterol 108 (90 BASE) MCG/ACT Inhaler    PROAIR HFA/PROVENTIL HFA/VENTOLIN HFA    1 Inhaler    Inhale 2 puffs into the lungs every 4 hours as needed for shortness of breath / dyspnea    Cough       amLODIPine 5 MG tablet    NORVASC    90 tablet    Take 1 tablet (5 mg) by mouth daily    Essential hypertension, Type 2 diabetes mellitus with diabetic nephropathy, with long-term current use of insulin (H)       aspirin EC 81 MG EC tablet      Take 325 mg by mouth daily        blood glucose monitoring lancets     5 Box    Use to test blood sugar 3 times daily or as directed.    Type 2 diabetes mellitus with hyperglycemia, with long-term current use of insulin (H)       blood glucose monitoring meter device kit    no brand specified    1 kit    Use to test blood sugar 4 times daily or as directed.    Type 2 diabetes mellitus with hyperglycemia, with long-term current use of insulin (H)       CALCIUM + D PO      Take 1 tablet by mouth 2 times daily.        fexofenadine 180 MG tablet    ALLEGRA    90 tablet    Take 1 tablet by mouth daily. 1 TABLET DAILY Failed claritin for symptom cotrol. Zyrtec increases heart rate.    Seasonal allergic rhinitis       fish  oil-omega-3 fatty acids 1000 MG capsule      Take 1 capsule by mouth daily.        fluticasone 50 MCG/ACT spray    FLONASE    16 g    Spray 1-2 sprays into both nostrils daily    Seasonal allergic rhinitis, unspecified chronicity, unspecified trigger       hydrochlorothiazide 12.5 MG Tabs tablet     90 tablet    Take 1 tablet (12.5 mg) by mouth daily    Essential hypertension, Type 2 diabetes mellitus with diabetic nephropathy, with long-term current use of insulin (H)       insulin aspart 100 UNIT/ML injection    NovoLOG FLEXPEN    120 mL    Uses  120 units daily subcutaneously    Diabetes mellitus, type 2 (H)       insulin glargine 100 UNIT/ML injection    LANTUS SOLOSTAR    75 mL    80 units daily    Diabetes mellitus, type 2 (H)       losartan 100 MG tablet    COZAAR    90 tablet    TAKE ONE TABLET BY MOUTH EVERY DAY    Hypertension goal BP (blood pressure) < 140/90       magnesium 250 MG tablet     90 tablet    Take 1 tablet by mouth daily    Hypomagnesemia       metFORMIN 500 MG 24 hr tablet    GLUCOPHAGE-XR    360 tablet    TAKE TWO TABLETS BY MOUTH TWICE A DAY    Type 2 diabetes mellitus with hyperglycemia, unspecified long term insulin use status (H)       MULTIVITAMIN PO      Take 1 tablet by mouth daily.        NEEDLES, ANY SIZE     1 Box    Pen needles for Victoza pen.    Type 2 diabetes, HbA1c goal < 7% (H)       NEEDLES, ANY SIZE     4 Box    Pen needles for Novolog insulin pen. Use to inject 5-6  times daily.    Type 2 diabetes, HbA1c goal < 7% (H)       pantoprazole 40 MG EC tablet    PROTONIX    90 tablet    Take 1 tablet (40 mg) by mouth daily    Gastroesophageal reflux disease       pentoxifylline 400 MG CR tablet    TRENtal    180 tablet    TAKE ONE TABLET BY MOUTH TWICE A DAY    Type 2 diabetes mellitus with hyperglycemia, unspecified long term insulin use status (H)       rosuvastatin 10 MG tablet    CRESTOR    90 tablet    Take 1 tablet (10 mg) by mouth daily    Hyperlipidemia LDL goal <100        vitamin D 2000 UNITS tablet      Take 1 tablet by mouth daily.

## 2017-10-31 LAB
ANA SER QL IF: NEGATIVE
RHEUMATOID FACT SER NEPH-ACNC: <20 IU/ML (ref 0–20)

## 2017-11-01 ENCOUNTER — TELEPHONE (OUTPATIENT)
Dept: ENDOCRINOLOGY | Facility: CLINIC | Age: 57
End: 2017-11-01

## 2017-11-01 ENCOUNTER — OFFICE VISIT (OUTPATIENT)
Dept: ENDOCRINOLOGY | Facility: CLINIC | Age: 57
End: 2017-11-01

## 2017-11-01 VITALS
SYSTOLIC BLOOD PRESSURE: 142 MMHG | BODY MASS INDEX: 32.9 KG/M2 | HEIGHT: 64 IN | WEIGHT: 192.7 LBS | HEART RATE: 74 BPM | DIASTOLIC BLOOD PRESSURE: 90 MMHG

## 2017-11-01 DIAGNOSIS — E11.65 TYPE 2 DIABETES MELLITUS WITH HYPERGLYCEMIA, UNSPECIFIED LONG TERM INSULIN USE STATUS: ICD-10-CM

## 2017-11-01 DIAGNOSIS — E11.65 TYPE 2 DIABETES MELLITUS WITH HYPERGLYCEMIA, WITH LONG-TERM CURRENT USE OF INSULIN (H): ICD-10-CM

## 2017-11-01 DIAGNOSIS — Z79.4 TYPE 2 DIABETES MELLITUS WITH HYPERGLYCEMIA, WITH LONG-TERM CURRENT USE OF INSULIN (H): ICD-10-CM

## 2017-11-01 LAB — HBA1C MFR BLD: 9.1 % (ref 4.3–6)

## 2017-11-01 RX ORDER — PANTOPRAZOLE SODIUM 40 MG/1
40 TABLET, DELAYED RELEASE ORAL DAILY
Qty: 90 TABLET | Refills: 3 | Status: SHIPPED | OUTPATIENT
Start: 2017-11-01 | End: 2018-11-06

## 2017-11-01 RX ORDER — BLOOD SUGAR DIAGNOSTIC
STRIP MISCELLANEOUS
Qty: 360 STRIP | Refills: 3 | Status: SHIPPED | OUTPATIENT
Start: 2017-11-01 | End: 2018-08-06

## 2017-11-01 RX ORDER — METFORMIN HCL 500 MG
TABLET, EXTENDED RELEASE 24 HR ORAL
Qty: 360 TABLET | Refills: 3 | Status: SHIPPED | OUTPATIENT
Start: 2017-11-01 | End: 2019-01-17

## 2017-11-01 NOTE — PROGRESS NOTES
DIAGNOSIS:  A 57-year-old woman with type 2 diabetes since age 32.  Microalbuminuria on an angiotensin receptor blocker.  No other evidence of significant microvascular complications.  Suboptimal control with significant insulin resistance and difficulty with cost issues or tolerating various diabetes medications in the past.      HISTORY OF PRESENT ILLNESS:  Ms. Pedro is here for a followup visit.  I had seen her in June.  At that time her hemoglobin A1c was 8.8%.  We discussed various options.  At that time she was not interested in trying the new SGLT2 inhibitors.  We agreed we would continue her regimen of Lantus, NovoLog and metformin and she would work on trying to make lifestyle changes.      She returns to follow up.  She has had no major medical issues or problems.  However, her diabetes control continues to be suboptimal.  Her hemoglobin A1c today was 9.1%.      She continues on her regimen of Lantus insulin 75 units in the evening when she gets up to go to work.  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  She takes NovoLog with meals.  She is now taking a fixed dose of 25 units.  On days she works she only eats 2 meals, in the morning and in the early evening before she goes to work.  On days when she is not working if she is eating 3 meals she will take the NovoLog 3 times a day.      She did not bring a meter in with her and she says she has not been checking her blood sugars recently.  She was frustrated by the high blood sugars.  She has had no problems with hypoglycemia.      She had an eye exam in October.  She has no history of diabetic retinopathy.  She does have cataracts and is being followed for glaucoma.  She has no symptoms of peripheral neuropathy.      In addition to her Lantus and NovoLog insulin, she is taking metformin 1000 mg twice a day.      MEDICATIONS:  Other medications are Protonix, hydrochlorothiazide, amlodipine and losartan, Crestor.  She takes  several over-the-counter vitamin supplements.      REVIEW OF SYSTEMS:  She is frustrated by her diabetes control, but has no acute cardiac or respiratory complaints.  Overall, she feels well.      PHYSICAL EXAMINATION:   VITAL SIGNS:  Her weight 192 pounds.  There has been little change since June.  Pulse 74, blood pressure 142/90.   GENERAL:  She has generalized obesity.   EXTREMITIES:  She has no edema.      She had tests done last March.  She had a recent TSH normal at 1.29.  Hemoglobin was 15.1.      ASSESSMENT:  A 57-year-old woman who has had type 2 diabetes since age 32.  The patient had good control for a number of years but has struggles over the last 4-5 years.  We tried numerous medications and virtually every class of diabetes medications and she has had difficulty finding a stable regimen due to a variety of issues including cost, concern about adverse effects and side effects of some medications.      We once again reviewed our options.  She did have good results in the past on Actos, but she is reluctant to restart that.  After discussion, we finally agreed we would try an SGLT2 inhibitor.  She does have a history of bladder and genitourinary infections so we will need to monitor that.  I did point out that hyperglycemia with glycosuria might contribute to these so things might actually improve if we can improve her blood sugar control.      PLAN:  After assessment, we agreed on the following plan:   1.  Continue current doses of Lantus and NovoLog for now.   2.  We will start Jardiance 10 mg once a day.  We did discuss its possible side effects and the importance of making sure she stays adequately hydrated.   3.  I urged her to check blood sugars 2-3 times a day at least initially when we start the Jardiance as if she starts to have low blood sugars we may need to adjust her insulin regimen.  We would start with the rapid acting insulin.   4.  Her diastolic blood pressure was marginal today.  It has  been in a good range in the past.  We can follow this.  If she tolerates the Jardiance this might potentially help her blood pressure as well.      [facility].  We will plan to have her return in 3-4 months to follow up with one of my colleagues as I will be retiring from clinical practice at the end of the year.

## 2017-11-01 NOTE — PATIENT INSTRUCTIONS
A1c = 9.1%    We can try the Jardiance - let Dr Mccarthy know via my chart how it is going.  Be sure and check blood sugars 2-3 times a day when you start it as we may need to cut your insulin doses back if it works well.

## 2017-11-01 NOTE — MR AVS SNAPSHOT
After Visit Summary   11/1/2017    Maliha Pedro    MRN: [id number]           Patient Information     Date Of Birth          1960        Visit Information        Provider Department      11/1/2017 9:30 AM Shaheen Mccarthy MD [facility]        Today's Diagnoses     Type 2 diabetes mellitus with hyperglycemia, unspecified long term insulin use status (H)        Type 2 diabetes mellitus with hyperglycemia, with long-term current use of insulin (H)        Gastroesophageal reflux disease          Care Instructions    A1c = 9.1%    We can try the Jardiance - let Dr Mccarthy know via my chart how it is going.  Be sure and check blood sugars 2-3 times a day when you start it as we may need to cut your insulin doses back if it works well.          Follow-ups after your visit        Follow-up notes from your care team     Return in about 3 months (around 2/1/2018).      Your next 10 appointments already scheduled     Feb 02, 2018  9:00 AM CST   (Arrive by 8:45 AM)   RETURN DIABETES with Shai Dong MD   [facility] ([facility])    [address] [id number] [phone]            May 09, 2018  8:45 AM CDT   VISUAL FIELD with [facility] EYE VISUAL FIELD   Eye Clinic ([facility])    [address]  [address] [place] [address]   [phone]            May 09, 2018  9:15 AM CDT   RETURN GLAUCOMA with Umu Rosario MD   Eye Clinic ([facility])    [address]  [address] [place] [address]   [phone]              Who to contact     Please call your clinic at [phone] to:    Ask questions about your health    Make or cancel appointments    Discuss your medicines    Learn about your test results    Speak to your doctor   If you have compliments or concerns about an experience at your clinic, or if you wish to file a  "complaint, please contact [facility] Patient Relations at [phone] or email us at [email or web address]         Additional Information About Your Visit        [employer] Information     [employer] gives you secure access to your electronic health record. If you see a primary care provider, you can also send messages to your care team and make appointments. If you have questions, please call your primary care clinic.  If you do not have a primary care provider, please call [phone] and they will assist you.      [employer] is an electronic gateway that provides easy, online access to your medical records. With [employer], you can request a clinic appointment, read your test results, renew a prescription or communicate with your care team.     To access your existing account, please contact your [facility] or call [phone] for assistance.        Care EveryWhere ID     This is your Care EveryWhere ID. This could be used by other organizations to access your [place] medical records  [id number]        Your Vitals Were     Pulse Height Last Period BMI (Body Mass Index)          74 1.626 m (5' 4\") 03/19/2009 33.08 kg/m2         Blood Pressure from Last 3 Encounters:   11/01/17 142/90   10/30/17 134/78   10/11/17 129/74    Weight from Last 3 Encounters:   11/01/17 87.4 kg (192 lb 11.2 oz)   10/30/17 86 kg (189 lb 8 oz)   07/27/17 85.6 kg (188 lb 12.8 oz)              Today, you had the following     No orders found for display         Today's Medication Changes          These changes are accurate as of: 11/1/17 10:25 AM.  If you have any questions, ask your nurse or doctor.               Start taking these medicines.        Dose/Directions    empagliflozin 10 MG Tabs tablet   Commonly known as:  JARDIANCE   Used for:  Type 2 diabetes mellitus with hyperglycemia, with long-term current use of insulin (H)   Started by:  Shaheen Mccarthy MD        Dose: "  10 mg   Take 1 tablet (10 mg) by mouth daily   Quantity:  30 tablet   Refills:  11         These medicines have changed or have updated prescriptions.        Dose/Directions    metFORMIN 500 MG 24 hr tablet   Commonly known as:  GLUCOPHAGE-XR   This may have changed:  See the new instructions.   Used for:  Type 2 diabetes mellitus with hyperglycemia, unspecified long term insulin use status (H)   Changed by:  Shaheen Mccarthy MD        Take 2 tabs twice a day   Quantity:  360 tablet   Refills:  3            Where to get your medicines      These medications were sent to [facility] - [place], [place] - [address], [address]  [address], [facility] [address]     Phone:  [phone]     ACCU-CHEK VANESA PLUS test strip    empagliflozin 10 MG Tabs tablet    insulin glargine 100 UNIT/ML injection    metFORMIN 500 MG 24 hr tablet    NEEDLES, ANY SIZE    pantoprazole 40 MG EC tablet                Primary Care Provider Office Phone # Fax #    Elva Ortiz [phone] [address]  [facility] [address]        Equal Access to Services     [facility]: Hadii angelica ku hadasho Soomaali, waaxda luqadaha, qaybta kaalmada adeegyada, poly izaguirre haycindi harrison . So [facility] [phone].    ATENCIÓN: Si habla español, tiene a ford disposición servicios gratuitos de asistencia lingüística. [facility] [phone].    We comply with applicable federal civil rights laws and [place] laws. We do not discriminate on the basis of race, color, national origin, age, disability, sex, sexual orientation, or gender identity.            Thank you!     Thank you for choosing [facility]OCRIN[facility]  for your care. Our goal is always to provide you with excellent care. Hearing back from our patients is one way we can continue to improve our services. Please take a few minutes to complete the written survey that you may receive in the mail after  your visit with us. Thank you!             Your Updated Medication List - Protect others around you: Learn how to safely use, store and throw away your medicines at www.disposemymeds.org.          This list is accurate as of: 11/1/17 10:25 AM.  Always use your most recent med list.                   Brand Name Dispense Instructions for use Diagnosis    ACCU-CHEK VANESA PLUS test strip   Generic drug:  blood glucose monitoring     360 strip    Test four times a day    Type 2 diabetes mellitus with hyperglycemia, with long-term current use of insulin (H)       albuterol 108 (90 BASE) MCG/ACT Inhaler    PROAIR HFA/PROVENTIL HFA/VENTOLIN HFA    1 Inhaler    Inhale 2 puffs into the lungs every 4 hours as needed for shortness of breath / dyspnea    Cough       amLODIPine 5 MG tablet    NORVASC    90 tablet    Take 1 tablet (5 mg) by mouth daily    Essential hypertension, Type 2 diabetes mellitus with diabetic nephropathy, with long-term current use of insulin (H)       aspirin EC 81 MG EC tablet      Take 325 mg by mouth daily        blood glucose monitoring lancets     5 Box    Use to test blood sugar 3 times daily or as directed.    Type 2 diabetes mellitus with hyperglycemia, with long-term current use of insulin (H)       blood glucose monitoring meter device kit    no brand specified    1 kit    Use to test blood sugar 4 times daily or as directed.    Type 2 diabetes mellitus with hyperglycemia, with long-term current use of insulin (H)       CALCIUM + D PO      Take 1 tablet by mouth 2 times daily.        empagliflozin 10 MG Tabs tablet    JARDIANCE    30 tablet    Take 1 tablet (10 mg) by mouth daily    Type 2 diabetes mellitus with hyperglycemia, with long-term current use of insulin (H)       fexofenadine 180 MG tablet    ALLEGRA    90 tablet    Take 1 tablet by mouth daily. 1 TABLET DAILY Failed claritin for symptom cotrol. Zyrtec increases heart rate.    Seasonal allergic rhinitis       fish oil-omega-3 fatty  acids 1000 MG capsule      Take 1 capsule by mouth daily.        fluticasone 50 MCG/ACT spray    FLONASE    16 g    Spray 1-2 sprays into both nostrils daily    Seasonal allergic rhinitis, unspecified chronicity, unspecified trigger       hydrochlorothiazide 12.5 MG Tabs tablet     90 tablet    Take 1 tablet (12.5 mg) by mouth daily    Essential hypertension, Type 2 diabetes mellitus with diabetic nephropathy, with long-term current use of insulin (H)       insulin aspart 100 UNIT/ML injection    NovoLOG FLEXPEN    120 mL    Uses  120 units daily subcutaneously    Diabetes mellitus, type 2 (H)       insulin glargine 100 UNIT/ML injection    LANTUS SOLOSTAR    75 mL    80 units daily    Type 2 diabetes mellitus with hyperglycemia, unspecified long term insulin use status (H)       losartan 100 MG tablet    COZAAR    90 tablet    TAKE ONE TABLET BY MOUTH EVERY DAY    Hypertension goal BP (blood pressure) < 140/90       magnesium 250 MG tablet     90 tablet    Take 1 tablet by mouth daily    Hypomagnesemia       metFORMIN 500 MG 24 hr tablet    GLUCOPHAGE-XR    360 tablet    Take 2 tabs twice a day    Type 2 diabetes mellitus with hyperglycemia, unspecified long term insulin use status (H)       MULTIVITAMIN PO      Take 1 tablet by mouth daily.        NEEDLES, ANY SIZE     1 Box    Pen needles for Victoza pen.    Type 2 diabetes, HbA1c goal < 7% (H)       NEEDLES, ANY SIZE     4 Box    Pen needles for Novolog insulin pen. Use to inject 5-6  times daily.    Type 2 diabetes mellitus with hyperglycemia, with long-term current use of insulin (H)       pantoprazole 40 MG EC tablet    PROTONIX    90 tablet    Take 1 tablet (40 mg) by mouth daily    Gastroesophageal reflux disease       pentoxifylline 400 MG CR tablet    TRENtal    180 tablet    TAKE ONE TABLET BY MOUTH TWICE A DAY    Type 2 diabetes mellitus with hyperglycemia, unspecified long term insulin use status (H)       rosuvastatin 10 MG tablet    CRESTOR    90  tablet    Take 1 tablet (10 mg) by mouth daily    Hyperlipidemia LDL goal <100       vitamin D 2000 UNITS tablet      Take 1 tablet by mouth daily.

## 2017-11-01 NOTE — NURSING NOTE
"Chief Complaint   Patient presents with     RECHECK     DIABETES TYPE 2 F/U        Initial /90 (BP Location: Right arm, Patient Position: Sitting, Cuff Size: Adult Regular)  Pulse 74  Ht 1.626 m (5' 4\")  Wt 87.4 kg (192 lb 11.2 oz)  LMP 03/19/2009  BMI 33.08 kg/m2 Estimated body mass index is 33.08 kg/(m^2) as calculated from the following:    Height as of this encounter: 1.626 m (5' 4\").    Weight as of this encounter: 87.4 kg (192 lb 11.2 oz).  Medication Reconciliation: complete     Performed A1C test - patient tolerated well.    [place], [facility]       "

## 2017-11-01 NOTE — LETTER
11/1/2017       RE: Maliha Pedro  [address]  [place] [facility] [address]     Dear Colleague,    Thank you for referring your patient, Maliha Pedro, to the [facility] ENDOCRINOLOGY at [facility]. Please see a copy of my visit note below.    DIAGNOSIS:  A 57-year-old woman with type 2 diabetes since age 32.  Microalbuminuria on an angiotensin receptor blocker.  No other evidence of significant microvascular complications.  Suboptimal control with significant insulin resistance and difficulty with cost issues or tolerating various diabetes medications in the past.      HISTORY OF PRESENT ILLNESS:  Ms. Pedro is here for a followup visit.  I had seen her in June.  At that time her hemoglobin A1c was 8.8%.  We discussed various options.  At that time she was not interested in trying the new SGLT2 inhibitors.  We agreed we would continue her regimen of Lantus, NovoLog and metformin and she would work on trying to make lifestyle changes.      She returns to follow up.  She has had no major medical issues or problems.  However, her diabetes control continues to be suboptimal.  Her hemoglobin A1c today was 9.1%.      She continues on her regimen of Lantus insulin 75 units in the evening when she gets up to go to work.  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  She takes NovoLog with meals.  She is now taking a fixed dose of 25 units.  On days she works she only eats 2 meals, in the morning and in the early evening before she goes to work.  On days when she is not working if she is eating 3 meals she will take the NovoLog 3 times a day.      She did not bring a meter in with her and she says she has not been checking her blood sugars recently.  She was frustrated by the high blood sugars.  She has had no problems with hypoglycemia.      She had an eye exam in October.  She has no history of diabetic retinopathy.  She does have cataracts and  is being followed for glaucoma.  She has no symptoms of peripheral neuropathy.      In addition to her Lantus and NovoLog insulin, she is taking metformin 1000 mg twice a day.      MEDICATIONS:  Other medications are Protonix, hydrochlorothiazide, amlodipine and losartan, Crestor.  She takes several over-the-counter vitamin supplements.      REVIEW OF SYSTEMS:  She is frustrated by her diabetes control, but has no acute cardiac or respiratory complaints.  Overall, she feels well.      PHYSICAL EXAMINATION:   VITAL SIGNS:  Her weight 192 pounds.  There has been little change since June.  Pulse 74, blood pressure 142/90.   GENERAL:  She has generalized obesity.   EXTREMITIES:  She has no edema.      She had tests done last March.  She had a recent TSH normal at 1.29.  Hemoglobin was 15.1.      ASSESSMENT:  A 57-year-old woman who has had type 2 diabetes since age 32.  The patient had good control for a number of years but has struggles over the last 4-5 years.  We tried numerous medications and virtually every class of diabetes medications and she has had difficulty finding a stable regimen due to a variety of issues including cost, concern about adverse effects and side effects of some medications.      We once again reviewed our options.  She did have good results in the past on Actos, but she is reluctant to restart that.  After discussion, we finally agreed we would try an SGLT2 inhibitor.  She does have a history of bladder and genitourinary infections so we will need to monitor that.  I did point out that hyperglycemia with glycosuria might contribute to these so things might actually improve if we can improve her blood sugar control.      PLAN:  After assessment, we agreed on the following plan:   1.  Continue current doses of Lantus and NovoLog for now.   2.  We will start Jardiance 10 mg once a day.  We did discuss its possible side effects and the importance of making sure she stays adequately hydrated.   3.   I urged her to check blood sugars 2-3 times a day at least initially when we start the Jardiance as if she starts to have low blood sugars we may need to adjust her insulin regimen.  We would start with the rapid acting insulin.   4.  Her diastolic blood pressure was marginal today.  It has been in a good range in the past.  We can follow this.  If she tolerates the Jardiance this might potentially help her blood pressure as well.      She will follow up with me through [facility] on the experience with the Jardiance.  We will plan to have her return in 3-4 months to follow up with one of my colleagues as I will be retiring from clinical practice at the end of the year.           Again, thank you for allowing me to participate in the care of your patient.      Sincerely,    Shaheen Mccarthy MD

## 2017-11-01 NOTE — TELEPHONE ENCOUNTER
.Prior Authorization Retail Medication Request  Medication/Dose: Jardiance  Diagnosis and ICD code: Type 2 diabetes mellitus with hyperglycemia, with long-term current use of insulin (H) [E11.65, Z79.4]   New/Renewal/Insurance Change PA: See chart  Previously Tried and Failed Therapies: See chart    Insurance ID (if provided): See chart  Insurance Phone (if provided): See chart    Any additional info from fax request:     If you received a fax notification from an outside Pharmacy:  Pharmacy Name: [facility]  Pharmacy #: [phone]  Pharmacy Fax: [phone]

## 2017-11-02 NOTE — TELEPHONE ENCOUNTER
[facility] Prior Authorization Team   Phone: [phone]  Fax: [phone]      PA Initiation    Medication: Jardiance  Insurance Company: [employer] - Phone [phone] Fax [phone]  Pharmacy Filling the Rx: [facility] [place], [place] - [address], [address]  Filling Pharmacy Phone: [phone]  Filling Pharmacy Fax:    Start Date: 11/2/2017

## 2017-11-03 NOTE — TELEPHONE ENCOUNTER
[facility] Prior Authorization Team   Phone: [phone]  Fax: [phone]      Prior Authorization Not Needed per Insurance    Medication: Jardiance- PA not needed  Insurance Company: [employer] - Phone [phone] Fax [phone]  Expected CoPay: $60.00    Pharmacy Filling the Rx: [facility] [place], [place] - [address], [address]  Pharmacy Notified: Yes  Patient Notified: Yes

## 2017-11-07 ENCOUNTER — TELEPHONE (OUTPATIENT)
Dept: ENDOCRINOLOGY | Facility: CLINIC | Age: 57
End: 2017-11-07

## 2017-11-07 NOTE — TELEPHONE ENCOUNTER
----- Message from Shaheen Mccarthy MD sent at 11/3/2017 11:07 AM CDT -----  Regarding: RE: not covered  Is there an SGLT2 drug they do cover?  If so we can try that.    BR      ----- Message -----     From: Maegan Miramontes RN     Sent: 11/2/2017  11:12 PM       To: Shaheen Mccarthy MD  Subject: not covered                                      Jardiance not covered by insurance . Do youwant to change it or do a PA

## 2017-11-07 NOTE — TELEPHONE ENCOUNTER
----- Message from Mulu Flanagan sent at 11/7/2017 11:21 AM CST -----  Regarding: FW: PA Jardiance  10 mg daily  urgent   Pa was not needed patient has a $60.00 copay documented in chart   ----- Message -----     From: Maegan Miramontes RN     Sent: 11/7/2017  10:51 AM       To: [facility]  Subject: PA Jardiance  10 mg daily  urgent                PA for Jardiance 10 mg daily  For type 2 diabetes E 11.9  Aic  9.1 %     With Suboptimal control of diabetes. The Jrdiance will help  Improve  Blood sugars and well as Blood pressure as she is at a cardiac increased risk.   She  Is currently on Metformin and  Insulin .  Has tried and failed   Glipizide, Rafauvia, Bydureon, byetta, Victoza , Actos

## 2017-12-01 DIAGNOSIS — Z79.4 TYPE 2 DIABETES MELLITUS WITH HYPERGLYCEMIA, WITH LONG-TERM CURRENT USE OF INSULIN (H): ICD-10-CM

## 2017-12-01 DIAGNOSIS — E11.65 TYPE 2 DIABETES MELLITUS WITH HYPERGLYCEMIA, WITH LONG-TERM CURRENT USE OF INSULIN (H): ICD-10-CM

## 2017-12-01 NOTE — TELEPHONE ENCOUNTER
empagliflozin (JARDIANCE) . Pt requests 90 day supply. rx sent per  WO DR Mccarthy/ LADARIUS Pierson RN

## 2017-12-27 DIAGNOSIS — E11.65 TYPE 2 DIABETES MELLITUS WITH HYPERGLYCEMIA, UNSPECIFIED LONG TERM INSULIN USE STATUS: ICD-10-CM

## 2018-01-20 ENCOUNTER — MYC MEDICAL ADVICE (OUTPATIENT)
Dept: FAMILY MEDICINE | Facility: CLINIC | Age: 58
End: 2018-01-20

## 2018-01-20 DIAGNOSIS — Z13.6 SCREENING FOR ABDOMINAL AORTIC ANEURYSM: Primary | ICD-10-CM

## 2018-01-20 DIAGNOSIS — R93.7 ABNORMAL X-RAY OF LUMBAR SPINE: ICD-10-CM

## 2018-01-22 ENCOUNTER — ALLIED HEALTH/NURSE VISIT (OUTPATIENT)
Dept: PEDIATRICS | Facility: CLINIC | Age: 58
End: 2018-01-22
Payer: COMMERCIAL

## 2018-01-22 ENCOUNTER — RADIANT APPOINTMENT (OUTPATIENT)
Dept: ULTRASOUND IMAGING | Facility: CLINIC | Age: 58
End: 2018-01-22
Attending: FAMILY MEDICINE
Payer: COMMERCIAL

## 2018-01-22 VITALS — HEART RATE: 85 BPM | SYSTOLIC BLOOD PRESSURE: 125 MMHG | DIASTOLIC BLOOD PRESSURE: 66 MMHG

## 2018-01-22 DIAGNOSIS — Z01.30 BLOOD PRESSURE CHECK: Primary | ICD-10-CM

## 2018-01-22 DIAGNOSIS — Z13.6 SCREENING FOR ABDOMINAL AORTIC ANEURYSM: ICD-10-CM

## 2018-01-22 DIAGNOSIS — R93.7 ABNORMAL X-RAY OF LUMBAR SPINE: ICD-10-CM

## 2018-01-22 PROCEDURE — 76775 US EXAM ABDO BACK WALL LIM: CPT | Performed by: RADIOLOGY

## 2018-01-22 PROCEDURE — 99207 ZZC NO CHARGE NURSE ONLY: CPT | Performed by: FAMILY MEDICINE

## 2018-01-22 NOTE — MR AVS SNAPSHOT
After Visit Summary   1/22/2018    Maliha Pedro    MRN: [id number]           Patient Information     Date Of Birth          1960        Visit Information        Provider Department      1/22/2018 6:29 PM Elva Ortiz MD [facility]        Today's Diagnoses     Blood pressure check    -  1       Follow-ups after your visit        Follow-up notes from your care team     Return in about 6 months (around 7/22/2018).      Your next 10 appointments already scheduled     Feb 02, 2018  9:00 AM CST   (Arrive by 8:45 AM)   RETURN DIABETES with Shai Dong MD   [facility] End[facility] ([email or web address])    [address]  3rd Floor  [facility] [address]   [phone]            May 09, 2018  8:45 AM CDT   VISUAL FIELD with [facility]   [facility] Clinic ([facility])    [address]  [address] [place] [address]   [phone]            May 09, 2018  9:15 AM CDT   RETURN GLAUCOMA with Umu Rosario MD   [facility] Clinic ([facility])    [address]  [address] [facility]  [address] [address]   [phone]              Who to contact     If you have questions or need follow up information about today's clinic visit or your schedule please contact [facility] directly at [phone].  Normal or non-critical lab and imaging results will be communicated to you by MyChart, letter or phone within 4 business days after the clinic has received the results. If you do not hear from us within 7 days, please contact the clinic through MyChart or phone. If you have a critical or abnormal lab result, we will notify you by phone as soon as possible.  Submit refill requests through [employer] or call your pharmacy and they will forward the refill request to us. Please allow 3 business days for your refill to be completed.          Additional  Information About Your Visit        OpenStudyhart Information     Joberator gives you secure access to your electronic health record. If you see a primary care provider, you can also send messages to your care team and make appointments. If you have questions, please call your primary care clinic.  If you do not have a primary care provider, please call [phone] and they will assist you.      Joberator is an electronic gateway that provides easy, online access to your medical records. With Joberator, you can request a clinic appointment, read your test results, renew a prescription or communicate with your care team.     To access your existing account, please contact your [facility] or call [phone] for assistance.        Care EveryWhere ID     This is your Care EveryWhere ID. This could be used by other organizations to access your [facility] medical records  [id number]        Your Vitals Were     Pulse Last Period                85 03/19/2009           Blood Pressure from Last 3 Encounters:   01/22/18 125/66   11/01/17 142/90   10/30/17 134/78    Weight from Last 3 Encounters:   11/01/17 192 lb 11.2 oz (87.4 kg)   10/30/17 189 lb 8 oz (86 kg)   07/27/17 188 lb 12.8 oz (85.6 kg)              Today, you had the following     No orders found for display       Primary Care Provider Office Phone # Fax #    Elva Ortiz [phone] [address] [facility] [address]        Equal Access to Services     JAMES [facility] : Hadii angelica ku hadasho Soomaali, waaxda luqadaha, qaybta kaalmada adeegyada, waxkristin zina harrison . So [facility] [phone].    ATENCIÓN: Si habla español, tiene a ford disposición servicios gratuitos de asistencia lingüística. Llame al [phone].    We comply with applicable federal civil rights laws and [place] laws. We do not discriminate on the basis of race, color, national origin, age, disability, sex, sexual  orientation, or gender identity.            Thank you!     Thank you for choosing [facility]  for your care. Our goal is always to provide you with excellent care. Hearing back from our patients is one way we can continue to improve our services. Please take a few minutes to complete the written survey that you may receive in the mail after your visit with us. Thank you!             Your Updated Medication List - Protect others around you: Learn how to safely use, store and throw away your medicines at www.disposemymeds.org.          This list is accurate as of: 1/22/18  6:30 PM.  Always use your most recent med list.                   Brand Name Dispense Instructions for use Diagnosis    ACCU-CHEK VANESA PLUS test strip   Generic drug:  blood glucose monitoring     360 strip    Test four times a day    Type 2 diabetes mellitus with hyperglycemia, with long-term current use of insulin (H)       albuterol 108 (90 BASE) MCG/ACT Inhaler    PROAIR HFA/PROVENTIL HFA/VENTOLIN HFA    1 Inhaler    Inhale 2 puffs into the lungs every 4 hours as needed for shortness of breath / dyspnea    Cough       amLODIPine 5 MG tablet    NORVASC    90 tablet    Take 1 tablet (5 mg) by mouth daily    Essential hypertension, Type 2 diabetes mellitus with diabetic nephropathy, with long-term current use of insulin (H)       aspirin EC 81 MG EC tablet      Take 325 mg by mouth daily        blood glucose monitoring lancets     5 Box    Use to test blood sugar 3 times daily or as directed.    Type 2 diabetes mellitus with hyperglycemia, with long-term current use of insulin (H)       blood glucose monitoring meter device kit    no brand specified    1 kit    Use to test blood sugar 4 times daily or as directed.    Type 2 diabetes mellitus with hyperglycemia, with long-term current use of insulin (H)       CALCIUM + D PO      Take 1 tablet by mouth 2 times daily.        empagliflozin 10 MG Tabs tablet    JARDIANCE    90 tablet     Take 1 tablet (10 mg) by mouth daily    Type 2 diabetes mellitus with hyperglycemia, with long-term current use of insulin (H)       fexofenadine 180 MG tablet    ALLEGRA    90 tablet    Take 1 tablet by mouth daily. 1 TABLET DAILY Failed claritin for symptom cotrol. Zyrtec increases heart rate.    Seasonal allergic rhinitis       fish oil-omega-3 fatty acids 1000 MG capsule      Take 1 capsule by mouth daily.        fluticasone 50 MCG/ACT spray    FLONASE    16 g    Spray 1-2 sprays into both nostrils daily    Seasonal allergic rhinitis, unspecified chronicity, unspecified trigger       hydrochlorothiazide 12.5 MG Tabs tablet     90 tablet    Take 1 tablet (12.5 mg) by mouth daily    Essential hypertension, Type 2 diabetes mellitus with diabetic nephropathy, with long-term current use of insulin (H)       insulin aspart 100 UNIT/ML injection    NovoLOG FLEXPEN    120 mL    Uses  120 units daily subcutaneously    Diabetes mellitus, type 2 (H)       insulin glargine 100 UNIT/ML injection    LANTUS SOLOSTAR    75 mL    80 units daily    Type 2 diabetes mellitus with hyperglycemia, unspecified long term insulin use status (H)       losartan 100 MG tablet    COZAAR    90 tablet    TAKE ONE TABLET BY MOUTH EVERY DAY    Hypertension goal BP (blood pressure) < 140/90       magnesium 250 MG tablet     90 tablet    Take 1 tablet by mouth daily    Hypomagnesemia       metFORMIN 500 MG 24 hr tablet    GLUCOPHAGE-XR    360 tablet    Take 2 tabs twice a day    Type 2 diabetes mellitus with hyperglycemia, unspecified long term insulin use status (H)       MULTIVITAMIN PO      Take 1 tablet by mouth daily.        NEEDLES, ANY SIZE     1 Box    Pen needles for Victoza pen.    Type 2 diabetes, HbA1c goal < 7% (H)       NEEDLES, ANY SIZE     4 Box    Pen needles for Novolog insulin pen. Use to inject 5-6  times daily.    Type 2 diabetes mellitus with hyperglycemia, with long-term current use of insulin (H)       pantoprazole 40 MG  EC tablet    PROTONIX    90 tablet    Take 1 tablet (40 mg) by mouth daily        pentoxifylline 400 MG CR tablet    TRENtal    180 tablet    TAKE ONE TABLET BY MOUTH TWICE A DAY    Type 2 diabetes mellitus with hyperglycemia, unspecified long term insulin use status (H)       rosuvastatin 10 MG tablet    CRESTOR    90 tablet    Take 1 tablet (10 mg) by mouth daily    Hyperlipidemia LDL goal <100       vitamin D 2000 UNITS tablet      Take 1 tablet by mouth daily.

## 2018-01-23 NOTE — PROGRESS NOTES
Maliha Pedro is enrolled/participating in the retail pharmacy Blood Pressure Goals Achievement Program (BPGAP).  Maliha Pedro was evaluated at [facility] on January 22, 2018 at which time her blood pressure was:    BP Readings from Last 3 Encounters:   01/22/18 125/66   11/01/17 142/90   10/30/17 134/78     Reviewed lifestyle modifications for blood pressure control and reduction: including making healthy food choices, managing weight, getting regular exercise, smoking cessation, reducing alcohol consumption, monitoring blood pressure regularly.     Maliha Pedro is not experiencing symptoms.    Follow-Up: BP is at goal of < 140/90mmHg (patient 18+ years of age with or without diabetes).  Recommended follow-up at pharmacy in 6 months.     Recommendation to Provider: none     Maliha Pedro was evaluated for enrollment into the PGEN study today.    Patient eligible for enrollment:  No  Patient interested in enrollment:  No    Completed by: Aidan Dunbar Pharm. D.  [facility] Pharmacy Manager  [phone]  1/22/2018

## 2018-01-29 ENCOUNTER — OFFICE VISIT (OUTPATIENT)
Dept: FAMILY MEDICINE | Facility: CLINIC | Age: 58
End: 2018-01-29
Payer: COMMERCIAL

## 2018-01-29 VITALS
WEIGHT: 188 LBS | OXYGEN SATURATION: 97 % | RESPIRATION RATE: 16 BRPM | HEIGHT: 65 IN | BODY MASS INDEX: 31.32 KG/M2 | SYSTOLIC BLOOD PRESSURE: 118 MMHG | HEART RATE: 86 BPM | DIASTOLIC BLOOD PRESSURE: 60 MMHG | TEMPERATURE: 98.1 F

## 2018-01-29 DIAGNOSIS — Z13.6 SCREENING FOR ISCHEMIC HEART DISEASE: Primary | ICD-10-CM

## 2018-01-29 DIAGNOSIS — Z79.4 TYPE 2 DIABETES MELLITUS WITH HYPERGLYCEMIA, WITH LONG-TERM CURRENT USE OF INSULIN (H): ICD-10-CM

## 2018-01-29 DIAGNOSIS — R93.89 ABNORMAL X-RAY: ICD-10-CM

## 2018-01-29 DIAGNOSIS — R06.09 DYSPNEA ON EXERTION: ICD-10-CM

## 2018-01-29 DIAGNOSIS — E78.5 HYPERLIPIDEMIA LDL GOAL <100: ICD-10-CM

## 2018-01-29 DIAGNOSIS — E11.65 TYPE 2 DIABETES MELLITUS WITH HYPERGLYCEMIA, WITH LONG-TERM CURRENT USE OF INSULIN (H): ICD-10-CM

## 2018-01-29 DIAGNOSIS — I10 ESSENTIAL HYPERTENSION: ICD-10-CM

## 2018-01-29 PROCEDURE — 99214 OFFICE O/P EST MOD 30 MIN: CPT | Performed by: FAMILY MEDICINE

## 2018-01-29 NOTE — PROGRESS NOTES
SUBJECTIVE:   Maliha Pedro is a 58 year old female who presents to clinic today for the following health issues:      Patient returned to the clinic to go over Abdominal Ultrasound with PCP. Patient was sent to [facility] for US on 1/22/18.      Pt went to the chiropractor because she was feeling sore in her back. They repeated x-rays of her spine and told her they were worried about an aneuyrsm in aorta due bulging/whiteness on the xray. They recommended she f/u with me and have further imaging. Pt has a disc with results from the xray (we are unable to pull up the image here).     Patient does have gaspar but not sure if due to aging or more concerning etiology. No cp, orthpnea, abd pain    She has completed advanced directives with her today.    Pt has f/u with new endocrinologist this week.     Diabetes: Blood sugars are well controlled- around 150. She decreased from 80 units of insulinto 75 units.  Lab Results   Component Value Date    A1C 9.8 12/16/2013    A1C 9.5 04/15/2013    A1C 9.6 01/16/2013    A1C 9.4 09/28/2012    A1C 8.3 06/22/2012         Problem list and histories reviewed & adjusted, as indicated.  Additional history: as documented    Patient Active Problem List   Diagnosis     Type 2 diabetes mellitus with hyperglycemia (H)     ? 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     Displacement of lumbar intervertebral disc without myelopathy     Nonallopathic lesion of lumbar region     Nonallopathic lesion of thoracic region     Nonallopathic lesion of sacral region     Bartholin gland cyst     Cataract     Irritable bowel syndrome     Esophageal reflux     Seasonal allergic rhinitis     Latex sensitivity     Colon polyp     Adenomatous polyp     Subclinical hyperthyroidism     HYPERLIPIDEMIA LDL GOAL <100     Ovarian cyst     Breast pain     Essential hypertension     Advanced directives, counseling/discussion     Obesity     Hypomagnesemia     Type 2 diabetes mellitus with  hyperglycemia, with long-term current use of insulin (H)     Past Surgical History:   Procedure Laterality Date     CATARACT IOL, RT/LT  2008    LE     HC DILATION/CURETTAGE DIAG/THER NON OB       HC TYMPANOPLASTY W/O MASTOID, INIT/REV W/O OSS CHAIN RECONST       YAG CAPSULOTOMY OS (LEFT EYE)  2011       Social History   Substance Use Topics     Smoking status: Never Smoker     Smokeless tobacco: Never Used     Alcohol use Yes      Comment: rarely     Family History   Problem Relation Age of Onset     DIABETES Brother      CANCER Brother 59     Lung cancer     DIABETES Father       of dm 70's     C.A.D. Father      DIABETES Paternal Grandfather      DIABETES Sister      CANCER Maternal Aunt      ovarian cancer.      DIABETES Brother      DIABETES Brother      DIABETES Sister      Glaucoma Maternal Grandmother          Current Outpatient Prescriptions   Medication Sig Dispense Refill     Glucosamine-Chondroitin (GLUCOSAMINE CHONDR COMPLEX PO)        insulin glargine (LANTUS SOLOSTAR) 100 UNIT/ML injection 80 units daily 75 mL 3     empagliflozin (JARDIANCE) 10 MG TABS tablet Take 1 tablet (10 mg) by mouth daily 90 tablet 3     metFORMIN (GLUCOPHAGE-XR) 500 MG 24 hr tablet Take 2 tabs twice a day 360 tablet 3     ACCU-CHEK VANESA PLUS test strip Test four times a day 360 strip 3     NEEDLES, ANY SIZE Pen needles for Novolog insulin pen. Use to inject 5-6  times daily. 4 Box 3     pantoprazole (PROTONIX) 40 MG EC tablet Take 1 tablet (40 mg) by mouth daily 90 tablet 3     fluticasone (FLONASE) 50 MCG/ACT spray Spray 1-2 sprays into both nostrils daily 16 g 11     insulin aspart (NOVOLOG FLEXPEN) 100 UNIT/ML injection Uses  120 units daily subcutaneously 120 mL 3     hydrochlorothiazide 12.5 MG TABS tablet Take 1 tablet (12.5 mg) by mouth daily 90 tablet 1     amLODIPine (NORVASC) 5 MG tablet Take 1 tablet (5 mg) by mouth daily 90 tablet 3     pentoxifylline (TRENTAL) 400 MG CR tablet TAKE ONE  TABLET BY MOUTH TWICE A  tablet 3     blood glucose monitoring (SOFTCLIX) lancets Use to test blood sugar 3 times daily or as directed. 5 Box 3     losartan (COZAAR) 100 MG tablet TAKE ONE TABLET BY MOUTH EVERY DAY 90 tablet 3     magnesium 250 MG tablet Take 1 tablet by mouth daily 90 tablet 3     blood glucose monitoring (NO BRAND SPECIFIED) meter device kit Use to test blood sugar 4 times daily or as directed. 1 kit 0     rosuvastatin (CRESTOR) 10 MG tablet Take 1 tablet (10 mg) by mouth daily 90 tablet 3     albuterol (PROAIR HFA, PROVENTIL HFA, VENTOLIN HFA) 108 (90 BASE) MCG/ACT inhaler Inhale 2 puffs into the lungs every 4 hours as needed for shortness of breath / dyspnea 1 Inhaler 1     NEEDLES, ANY SIZE Pen needles for Victoza pen. 1 Box 11     fish oil-omega-3 fatty acids (FISH OIL) 1000 MG capsule Take 1 capsule by mouth daily.       aspirin EC 81 MG tablet Take 325 mg by mouth daily        fexofenadine (ALLEGRA) 180 MG tablet Take 1 tablet by mouth daily. 1 TABLET DAILY Failed claritin for symptom cotrol. Zyrtec increases heart rate. 90 tablet 3     VITAMIN D 2000 UNIT OR TABS Take 1 tablet by mouth daily.       MULTIVITAMIN OR Take 1 tablet by mouth daily.       CALCIUM + D OR Take 1 tablet by mouth 2 times daily.       Allergies   Allergen Reactions     Latex Swelling     Mold      Tetanus Antitoxin Swelling     Tetracycline Swelling       Reviewed and updated as needed this visit by clinical staff  Tobacco  Allergies  Meds  Med Hx  Surg Hx  Fam Hx  Soc Hx      Reviewed and updated as needed this visit by Provider Tobacco  Allergies  Meds  Med Hx  Surg Hx  Fam Hx  Soc Hx            ROS:  Constitutional, HEENT, cardiovascular, pulmonary, gi and gu systems are negative, except as otherwise noted.    This document serves as a record of the services and decisions personally performed and made by Elva Ortiz MD. It was created on her behalf by Stacia Morales, a trained  "medical scribe. The creation of this document is based the provider's statements to the medical scribe.  Stacia Morales January 29, 2018 2:34 PM      OBJECTIVE:     /60 (BP Location: Right arm, Patient Position: Sitting, Cuff Size: Adult Regular)  Pulse 86  Temp 98.1  F (36.7  C) (Oral)  Resp 16  Ht 1.638 m (5' 4.5\")  Wt 85.3 kg (188 lb)  LMP 03/19/2009  SpO2 97%  BMI 31.77 kg/m2  Body mass index is 31.77 kg/(m^2).  GENERAL: healthy, alert and no distress, obese  EYES: Eyes grossly normal to inspection, PERRL and conjunctivae and sclerae normal  HENT: ear canals and TM's normal, nose and mouth without ulcers or lesions  NECK: no adenopathy, no asymmetry, masses, or scars and thyroid normal to palpation  RESP: lungs clear to auscultation - no rales, rhonchi or wheezes  CV: regular rate and rhythm, normal S1 S2, no S3 or S4, no murmur, click or rub, no peripheral edema and peripheral pulses strong  ABDOMEN: soft, nontender, no hepatosplenomegaly, no masses and bowel sounds normal, no abdominal bruits   SKIN: no suspicious lesions or rashes to visible skin  NEURO: Normal strength and tone, mentation intact and speech normal  PSYCH: mentation appears normal, affect normal/bright    Diagnostic Test Results:  No results found for this or any previous visit (from the past 24 hour(s)).      EXAMINATION: US ABDOMINAL AORTA? IMAGING, 1/22/2018 10:12 AM    COMPARISON: None.   HISTORY: Screening for abdominal aortic aneurysm. Abnormal x-ray of  lumbar spine   TECHNIQUE: Gray-scale and color Doppler ultrasound of the abdominal  aorta.  FINDINGS: Examination of the abdominal aorta is performed.  Proximal abdominal aorta: 2.1 cm.   Mid abdominal aorta: 1.6 cm.   Distal abdominal aorta: 2.0 cm.   Proximal right common iliac artery is normal in caliber measuring 0.9  cm in maximal diameter.   Proximal left common iliac artery is normal in caliber measuring 0.9  cm in maximal diameter.   IMPRESSION:  No evidence of an " abdominal aortic aneurysm with maximal infrarenal  abdominal aorta measuring 2.0 cm.   I have personally reviewed the examination and initial interpretation  and I agree with the findings.  REAL DRISCOLL MD    ASSESSMENT/PLAN:     1. Screening for ischemic heart disease  2. Dyspnea on exertion  3. Abnormal x-ray  Reassuring us of aorta. However given her gaspar and multiple rf's for heart disease, pt is to f/u with CT cardiac. Unable to review the disk containing her xray in the office as it did not work. . Reviewed red flag symptoms that would precipitate the need for routine, urgent or emergent f/u  - CT Coronary Calcium Scan; Future    4. Type 2 diabetes mellitus with hyperglycemia, with long-term current use of insulin (H)  Following with endocrinology. Reporting improving glucose levels    5. Essential hypertension  Controlled. Continue same medication.     6. Hyperlipidemia LDL goal <100  on statin      Patient Instructions   Please call [facility] (formerly called [facility]) at [phone] to schedule CT      The information in this document, created by the medical scribe for me, accurately reflects the services I personally performed and the decisions made by me. I have reviewed and approved this document for accuracy.   MD Elva Chavis MD  [facility]

## 2018-01-29 NOTE — NURSING NOTE
"Chief Complaint   Patient presents with     Results     go over abdominal US       Initial Ht 1.638 m (5' 4.5\")  Wt 85.3 kg (188 lb)  LMP 03/19/2009  BMI 31.77 kg/m2 Estimated body mass index is 31.77 kg/(m^2) as calculated from the following:    Height as of this encounter: 1.638 m (5' 4.5\").    Weight as of this encounter: 85.3 kg (188 lb).  Medication Reconciliation: complete     Susan Granados, Medical Assistant      "

## 2018-01-29 NOTE — LETTER
[facility]  [address] [id number] [phone]          January 29, 2018    RE:  Maliha Pedro                                                                                                                                                       [address] [address]            To whom it may concern:    Maliha Pedro was seen in the office 1/29/2018. Her recent aortic ultrasound was reviewed and was normal. She has no significant aneurysm.       Sincerely,        Elva Ortiz MD      Results for orders placed or performed in visit on 01/22/18   US abdominal aorta limited    Narrative    EXAMINATION: US ABDOMINAL AORTA? IMAGING, 1/22/2018 10:12 AM     COMPARISON: None.    HISTORY: Screening for abdominal aortic aneurysm. Abnormal x-ray of  lumbar spine    TECHNIQUE: Gray-scale and color Doppler ultrasound of the abdominal  aorta.    FINDINGS: Examination of the abdominal aorta is performed.    Proximal abdominal aorta: 2.1 cm.    Mid abdominal aorta: 1.6 cm.    Distal abdominal aorta: 2.0 cm.    Proximal right common iliac artery is normal in caliber measuring 0.9  cm in maximal diameter.    Proximal left common iliac artery is normal in caliber measuring 0.9  cm in maximal diameter.      Impression    IMPRESSION:  No evidence of an abdominal aortic aneurysm with maximal infrarenal  abdominal aorta measuring 2.0 cm.       I have personally reviewed the examination and initial interpretation  and I agree with the findings.    REAL DRISCOLL MD

## 2018-01-29 NOTE — MR AVS SNAPSHOT
After Visit Summary   1/29/2018    Maliha Pedro    MRN: [id number]           Patient Information     Date Of Birth          1960        Visit Information        Provider Department      1/29/2018 2:00 PM Elva Ortiz MD [facility]        Today's Diagnoses     Screening for ischemic heart disease    -  1    Dyspnea on exertion        Abnormal x-ray        Type 2 diabetes mellitus with hyperglycemia, with long-term current use of insulin (H)        Essential hypertension        Hyperlipidemia LDL goal <100          Care Instructions    Please call [facility] (formerly called [facility]) at [phone] to schedule CT          Follow-ups after your visit        Your next 10 appointments already scheduled     Feb 02, 2018  9:00 AM CST   (Arrive by 8:45 AM)   RETURN DIABETES with Shai Dong MD   [facility] ([facility])    [address] [facility] [address] [phone]            May 09, 2018  8:45 AM CDT   VISUAL FIELD with [facility] EYE VISUAL FIELD   [facility] ([facility])    [address]  [address] [place] [address]   [phone]            May 09, 2018  9:15 AM CDT   RETURN GLAUCOMA with Umu Rosario MD   [facility] Clinic ([facility])    [address]  [address]  [facility] [address]   [phone]              Future tests that were ordered for you today     Open Future Orders        Priority Expected Expires Ordered    CT Coronary Calcium Scan Routine  1/29/2019 1/29/2018            Who to contact     If you have questions or need follow up information about today's clinic visit or your schedule please contact [facility] directly at [phone].  Normal or non-critical lab and imaging results will be communicated to you by Patty, letter  "or phone within 4 business days after the clinic has received the results. If you do not hear from us within 7 days, please contact the clinic through [employer] or phone. If you have a critical or abnormal lab result, we will notify you by phone as soon as possible.  Submit refill requests through [employer] or call your pharmacy and they will forward the refill request to us. Please allow 3 business days for your refill to be completed.          Additional Information About Your Visit        [employer] Information     [employer] gives you secure access to your electronic health record. If you see a primary care provider, you can also send messages to your care team and make appointments. If you have questions, please call your primary care clinic.  If you do not have a primary care provider, please call [phone] and they will assist you.        Care EveryWhere ID     This is your Care EveryWhere ID. This could be used by other organizations to access your [place] medical records  [id number]        Your Vitals Were     Pulse Temperature Respirations Height Last Period Pulse Oximetry    86 98.1  F (36.7  C) (Oral) 16 1.638 m (5' 4.5\") 03/19/2009 97%    BMI (Body Mass Index)                   31.77 kg/m2            Blood Pressure from Last 3 Encounters:   01/29/18 118/60   01/22/18 125/66   11/01/17 142/90    Weight from Last 3 Encounters:   01/29/18 85.3 kg (188 lb)   11/01/17 87.4 kg (192 lb 11.2 oz)   10/30/17 86 kg (189 lb 8 oz)               Primary Care Provider Office Phone # Fax #    Elva Ortiz [phone] [address]  [facility] [address]        Equal Access to Services     [facility] Hospital[facility] : Hadii angelica Khan, wakinseyda luqadaha, qaybta kaalmada rebecca, poly roberts. So [facility] [phone].    ATENCIÓN: Si habla español, tiene a ford disposición servicios gratuitos de asistencia lingüística. Llame al [phone].    We comply with " applicable federal civil rights laws and [place] laws. We do not discriminate on the basis of race, color, national origin, age, disability, sex, sexual orientation, or gender identity.            Thank you!     Thank you for choosing [facility]  for your care. Our goal is always to provide you with excellent care. Hearing back from our patients is one way we can continue to improve our services. Please take a few minutes to complete the written survey that you may receive in the mail after your visit with us. Thank you!             Your Updated Medication List - Protect others around you: Learn how to safely use, store and throw away your medicines at www.disposemymeds.org.          This list is accurate as of 1/29/18  2:53 PM.  Always use your most recent med list.                   Brand Name Dispense Instructions for use Diagnosis    ACCU-CHEK VANESA PLUS test strip   Generic drug:  blood glucose monitoring     360 strip    Test four times a day    Type 2 diabetes mellitus with hyperglycemia, with long-term current use of insulin (H)       albuterol 108 (90 BASE) MCG/ACT Inhaler    PROAIR HFA/PROVENTIL HFA/VENTOLIN HFA    1 Inhaler    Inhale 2 puffs into the lungs every 4 hours as needed for shortness of breath / dyspnea    Cough       amLODIPine 5 MG tablet    NORVASC    90 tablet    Take 1 tablet (5 mg) by mouth daily    Essential hypertension, Type 2 diabetes mellitus with diabetic nephropathy, with long-term current use of insulin (H)       aspirin EC 81 MG EC tablet      Take 325 mg by mouth daily        blood glucose monitoring lancets     5 Box    Use to test blood sugar 3 times daily or as directed.    Type 2 diabetes mellitus with hyperglycemia, with long-term current use of insulin (H)       blood glucose monitoring meter device kit    no brand specified    1 kit    Use to test blood sugar 4 times daily or as directed.    Type 2 diabetes mellitus with hyperglycemia, with long-term  current use of insulin (H)       CALCIUM + D PO      Take 1 tablet by mouth 2 times daily.        empagliflozin 10 MG Tabs tablet    JARDIANCE    90 tablet    Take 1 tablet (10 mg) by mouth daily    Type 2 diabetes mellitus with hyperglycemia, with long-term current use of insulin (H)       fexofenadine 180 MG tablet    ALLEGRA    90 tablet    Take 1 tablet by mouth daily. 1 TABLET DAILY Failed claritin for symptom cotrol. Zyrtec increases heart rate.    Seasonal allergic rhinitis       fish oil-omega-3 fatty acids 1000 MG capsule      Take 1 capsule by mouth daily.        fluticasone 50 MCG/ACT spray    FLONASE    16 g    Spray 1-2 sprays into both nostrils daily    Seasonal allergic rhinitis, unspecified chronicity, unspecified trigger       GLUCOSAMINE CHONDR COMPLEX PO           hydrochlorothiazide 12.5 MG Tabs tablet     90 tablet    Take 1 tablet (12.5 mg) by mouth daily    Essential hypertension, Type 2 diabetes mellitus with diabetic nephropathy, with long-term current use of insulin (H)       insulin aspart 100 UNIT/ML injection    NovoLOG FLEXPEN    120 mL    Uses  120 units daily subcutaneously    Diabetes mellitus, type 2 (H)       insulin glargine 100 UNIT/ML injection    LANTUS SOLOSTAR    75 mL    80 units daily    Type 2 diabetes mellitus with hyperglycemia, unspecified long term insulin use status (H)       losartan 100 MG tablet    COZAAR    90 tablet    TAKE ONE TABLET BY MOUTH EVERY DAY    Hypertension goal BP (blood pressure) < 140/90       magnesium 250 MG tablet     90 tablet    Take 1 tablet by mouth daily    Hypomagnesemia       metFORMIN 500 MG 24 hr tablet    GLUCOPHAGE-XR    360 tablet    Take 2 tabs twice a day    Type 2 diabetes mellitus with hyperglycemia, unspecified long term insulin use status (H)       MULTIVITAMIN PO      Take 1 tablet by mouth daily.        NEEDLES, ANY SIZE     1 Box    Pen needles for Victoza pen.    Type 2 diabetes, HbA1c goal < 7% (H)       NEEDLES, ANY SIZE      4 Box    Pen needles for Novolog insulin pen. Use to inject 5-6  times daily.    Type 2 diabetes mellitus with hyperglycemia, with long-term current use of insulin (H)       pantoprazole 40 MG EC tablet    PROTONIX    90 tablet    Take 1 tablet (40 mg) by mouth daily        pentoxifylline 400 MG CR tablet    TRENtal    180 tablet    TAKE ONE TABLET BY MOUTH TWICE A DAY    Type 2 diabetes mellitus with hyperglycemia, unspecified long term insulin use status (H)       rosuvastatin 10 MG tablet    CRESTOR    90 tablet    Take 1 tablet (10 mg) by mouth daily    Hyperlipidemia LDL goal <100       vitamin D 2000 UNITS tablet      Take 1 tablet by mouth daily.

## 2018-01-29 NOTE — PATIENT INSTRUCTIONS
Please call [facility] (formerly called [facility]) at [phone] to schedule CT

## 2018-02-02 ENCOUNTER — OFFICE VISIT (OUTPATIENT)
Dept: ENDOCRINOLOGY | Facility: CLINIC | Age: 58
End: 2018-02-02
Payer: COMMERCIAL

## 2018-02-02 VITALS
HEART RATE: 94 BPM | HEIGHT: 65 IN | WEIGHT: 186.7 LBS | SYSTOLIC BLOOD PRESSURE: 119 MMHG | DIASTOLIC BLOOD PRESSURE: 79 MMHG | BODY MASS INDEX: 31.11 KG/M2

## 2018-02-02 DIAGNOSIS — I10 ESSENTIAL HYPERTENSION: ICD-10-CM

## 2018-02-02 DIAGNOSIS — E11.21 TYPE 2 DIABETES MELLITUS WITH DIABETIC NEPHROPATHY, WITH LONG-TERM CURRENT USE OF INSULIN (H): Primary | ICD-10-CM

## 2018-02-02 DIAGNOSIS — Z79.4 TYPE 2 DIABETES MELLITUS WITH DIABETIC NEPHROPATHY, WITH LONG-TERM CURRENT USE OF INSULIN (H): Primary | ICD-10-CM

## 2018-02-02 DIAGNOSIS — E78.5 HYPERLIPIDEMIA LDL GOAL <100: ICD-10-CM

## 2018-02-02 LAB — HBA1C MFR BLD: 8.4 % (ref 4.3–6)

## 2018-02-02 ASSESSMENT — PAIN SCALES - GENERAL: PAINLEVEL: NO PAIN (0)

## 2018-02-02 NOTE — PROGRESS NOTES
Endocrinology Clinic Visit 2/2/2018    NAME:  Maliha Pedro  PCP:  Elva Ortiz  MRN:  [id number]  Reason for Consult:  Type 2 Diabetes  Requesting Provider:  Elva Ackerman*    Chief Complaint     Chief Complaint   Patient presents with     RECHECK     diabetes follow up        History of Present Illness     Maliha Pedro is a 58 year old female who is seen in clinic for diabetes management.   She is a former patient of Dr. Mccarthy who is transitioning care since his USP.   She has had diabetes since age 32. Has tried many different meds. Did not tolerate GLP-1 agonists. Stopped Actos due to concerns about adverse effects. Was on Lantus and Novolog with poor control, with recent improvement since addition of Jardiance November 2017. She has retinopathy and nephropathy.     She recently had Xray of the chest showing a shadow around the arota, which prompted evaluation of AAA, which was negative. She is getting a cardiac CT next week.     A1c today is 8.4 improved from 9.1    Associated Signs/Symptoms  Hypoglycemia: no. Hyperglycemia: none.Neuropathy: none. Vascular Symtpoms: none. Angina/CHF: dyspnea on exertion. Ulcers: No. Amputations: No    Current treatment strategy: Novolog 15-20 with main meals, 10-15 with snack. Lantus 75 units daily. Metformin 1000 mg po BID, Jardiance 10 mg po daily    Blood Glucose Monitoring: did not bring meter but reports BG improved since starting Jardiance.     Diet: 2-3 meals per day. Occasional snacks.   Cut down on rice. Eating more vegetables and fish.     Exercise: yoga.     Weight:   Wt Readings from Last 4 Encounters:   02/02/18 84.7 kg (186 lb 11.2 oz)   01/29/18 85.3 kg (188 lb)   11/01/17 87.4 kg (192 lb 11.2 oz)   10/30/17 86 kg (189 lb 8 oz)     Problem List     Patient Active Problem List   Diagnosis     Type 2 diabetes mellitus with hyperglycemia (H)     ? 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     Displacement  of lumbar intervertebral disc without myelopathy     Nonallopathic lesion of lumbar region     Nonallopathic lesion of thoracic region     Nonallopathic lesion of sacral region     Bartholin gland cyst     Cataract     Irritable bowel syndrome     Esophageal reflux     Seasonal allergic rhinitis     Latex sensitivity     Colon polyp     Adenomatous polyp     Subclinical hyperthyroidism     HYPERLIPIDEMIA LDL GOAL <100     Ovarian cyst     Breast pain     Essential hypertension     Advanced directives, counseling/discussion     Obesity     Hypomagnesemia     Type 2 diabetes mellitus with hyperglycemia, with long-term current use of insulin (H)        Medications     Current Outpatient Prescriptions   Medication     empagliflozin (JARDIANCE) 25 MG TABS tablet     Glucosamine-Chondroitin (GLUCOSAMINE CHONDR COMPLEX PO)     insulin glargine (LANTUS SOLOSTAR) 100 UNIT/ML injection     metFORMIN (GLUCOPHAGE-XR) 500 MG 24 hr tablet     ACCU-CHEK VANESA PLUS test strip     NEEDLES, ANY SIZE     pantoprazole (PROTONIX) 40 MG EC tablet     fluticasone (FLONASE) 50 MCG/ACT spray     insulin aspart (NOVOLOG FLEXPEN) 100 UNIT/ML injection     hydrochlorothiazide 12.5 MG TABS tablet     amLODIPine (NORVASC) 5 MG tablet     pentoxifylline (TRENTAL) 400 MG CR tablet     blood glucose monitoring (SOFTCLIX) lancets     losartan (COZAAR) 100 MG tablet     magnesium 250 MG tablet     blood glucose monitoring (NO BRAND SPECIFIED) meter device kit     rosuvastatin (CRESTOR) 10 MG tablet     albuterol (PROAIR HFA, PROVENTIL HFA, VENTOLIN HFA) 108 (90 BASE) MCG/ACT inhaler     NEEDLES, ANY SIZE     fish oil-omega-3 fatty acids (FISH OIL) 1000 MG capsule     aspirin EC 81 MG tablet     fexofenadine (ALLEGRA) 180 MG tablet     VITAMIN D 2000 UNIT OR TABS     MULTIVITAMIN OR     CALCIUM + D OR     No current facility-administered medications for this visit.      Facility-Administered Medications Ordered in Other Visits   Medication      midazolam (VERSED) injection 0.5-1 mg     flumazenil (ROMAZICON) injection 0.2 mg     fentaNYL (SUBLIMAZE) injection 25-50 mcg     naloxone (NARCAN) injection 0.1-0.4 mg        Allergies     Allergies   Allergen Reactions     Latex Swelling     Mold      Tetanus Antitoxin Swelling     Tetracycline Swelling       Medical / Surgical History     Past Medical History:   Diagnosis Date     Adjustment disorder with mixed anxiety and depressed mood     following death of her adoptive father and then again after adoptive mother      Allergic rhinitis, cause unspecified     uses benadryl prn (sneezing/hoarse voice)     Bartholin gland cyst 2008     Chest pain, unspecified     neg dobutamine stress test  (reacted to dobutamine)     Dry eyes      ERM OD (epiretinal membrane, right eye)     BE mild     Esophageal reflux     EGD: -neg     Glaucoma suspect      Hypertension      Irregular menstrual cycle     s/p D&C, since then more regular     Irritable bowel syndrome     colonoscopy -neg. Sx's especially prior to menses     MEDICAL HISTORY OF -     had health directive with : Karl Arias, DANIEL.  Full Code. Aunt (Charlie Tay) will be decision maker     Meningitis, unspecified(322.9) age 8    hospitalized for 9 mo     Need for prophylactic vaccination with tuberculosis (BCG) vaccine     Has pos Mantoux. Gets yearly cxr, all neg.      Other and unspecified hyperlipidemia      Pain in joint, shoulder region     bone spurs on MRI, s/p pool therapy     Type II or unspecified type diabetes mellitus without mention of complication, not stated as uncontrolled Age 32    Follows with endocrine at [facility]: Shayne Lane MD     Unspecified cataract     RE     Unspecified sinusitis (chronic)     2006     Wheezing     Has seen pulm  & . Told night ashtma, ? vocal chord spasm due to cold air.      Past Surgical History:   Procedure Laterality Date     CATARACT IOL, RT/LT  2008    LE       DILATION/CURETTAGE DIAG/THER NON OB       HC TYMPANOPLASTY W/O MASTOID, INIT/REV W/O OSS CHAIN RECONST       YAG CAPSULOTOMY OS (LEFT EYE)  2011       Social History     Social History     Social History     Marital status: Single     Spouse name: N/A     Number of children: N/A     Years of education: N/A     Occupational History     Not on file.     Social History Main Topics     Smoking status: Never Smoker     Smokeless tobacco: Never Used     Alcohol use Yes      Comment: rarely     Drug use: No     Sexual activity: Not Currently     Other Topics Concern     Parent/Sibling W/ Cabg, Mi Or Angioplasty Before 65f 55m? 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 Moved to  in . Initially lived in [place].    No children        2 cats       Family History     Family History   Problem Relation Age of Onset     DIABETES Brother      CANCER Brother 59     Lung cancer     DIABETES Father       of dm 70's     C.A.D. Father      DIABETES Paternal Grandfather      DIABETES Sister      CANCER Maternal Aunt      ovarian cancer.      DIABETES Brother      DIABETES Brother      DIABETES Sister      Glaucoma Maternal Grandmother        ROS     Constitutional: no fevers, chills, night sweats. No weight loss or fatigue. Good appetite  Eyes: no vision changes, no eye redness, no diplopia  Ears, Nose, mouth, throat: no hearing changes, no tinnitus, no rhinorrhea, no nasal congestion  Cardiovascular: no chest pain, no orthopnea or PND, no edema, no palpitations  Respiratory: no dyspnea, no cough,  "no sputum, no wheezing  Gastrointestinal: no nausea, no vomiting, no abdominal pain, no diarrhea, no constipation  Genitourinary: no dysuria, no frequency, no urgency, no nocturia  Musculoskeletal: no joint pains, no back pain, no cramps, no fractures  Skin: no rash, no itching, no dryness, no ulcers, no hair loss  Neurological: no headache, no weakness, no numbness, no dizziness, no tremors  Psychiatric: no anxiety, no sadness  Hematologic/lymphatic: no easy bruising, no bleeding, no palor    Physical Exam   /79  Pulse 94  Ht 1.638 m (5' 4.5\")  Wt 84.7 kg (186 lb 11.2 oz)  LMP 03/19/2009  BMI 31.55 kg/m2     General: Comfortable, no obvious distress, normal body habitus  Eyes: Sclera anicteric, moist conjunctiva  HENT: Atraumatic, oropharynx clear, moist mucous membranes with no mucosal ulcerations  Neck: Trachea midline, supple. Thyroid: Thyroid is normal in size and texture  CV: Regular rhythm, normal rate. No murmurs auscultated  Resp: Clear to auscultation bilaterally, good effort  Abdomen:  Soft, non tender, non distended. Bowel sounds heard. No organomegaly.  Skin: No rashes, lesions, or subcutaneous nodules.   Psych: Alert and oriented x 3. Appropriate affect, good insight  Extremities: No peripheral edema  Foot exam:  Pulses:  Dorsalis pedis: present bilaterally  Posterior tibial: present bilaterally  Foot exam:  Vibration/Light touch: sensation present bilaterally  Skin of foot: intact bilaterally  Musculoskeletal: Appropriate muscle bulk and strength  Lymphatic: No cervical lymphadenopathy  Neuro: Moves all four extremities. No focal deficits on limited exam. Gait normal.       Labs/Imaging and Outside Records     Pertinent Labs were reviewed and updated in [facility].  Radiology Results were  reviewed and updated in EPIC.    Summary of recent findings:   Lab Results   Component Value Date    A1C 9.8 12/16/2013    A1C 9.5 04/15/2013    A1C 9.6 01/16/2013    A1C 9.4 09/28/2012    A1C 8.3 06/22/2012 "       TSH   Date Value Ref Range Status   10/30/2017 1.29 0.40 - 4.00 mU/L Final   02/08/2017 0.76 0.40 - 4.00 mU/L Final   11/17/2015 0.98 0.40 - 4.00 mU/L Final   05/04/2015 0.76 0.40 - 4.00 mU/L Final   12/16/2013 1.24 0.4 - 5.0 mU/L Final     T4 Free   Date Value Ref Range Status   06/21/2010 1.50 0.70 - 1.85 ng/dL Final       Creatinine   Date Value Ref Range Status   02/08/2017 0.68 0.52 - 1.04 mg/dL Final       Recent Labs   Lab Test  03/06/17   0905  05/04/15   1016  12/16/13   1028   CHOL  165  170  192   HDL  52  46*  42*   LDL  73  90  125   TRIG  199*  171*  124   CHOLHDLRATIO   --   3.7  4.6     Impression / Plan     1. Diabetes Mellitus: Type 2  Current glycemic control can be considered fair.  It has improved since last visit.   Patient is fearful of low BG. She is happy with the progress she is making.   I discussed increasing Jardiance dose to 25 mg since she is tolerating it well. I do not expect a huge change in BG, but they may get a bit better.   Patient is motivated to make more changes in her lifestyle.     I discussed dietary concepts today with the patient, including: weight reduction and consistent meals.     I also discussed exercise recommendations with the patient, advising for a goal of moderate physical activity 30 minutes 5 days a week. Specific examples were discussed such as brisk walking.    2. Diabetes Complications: With retinopathy and nephropathy.   - she is getting workup for CAD. I will follow up omn this next week and advise.     3. Blood Pressure Management: Blood pressure is controlled. Currently is on pharmacotherapy for this.     4.Lipid Management: Per the new ACC/JILLIAN/NHLBI guidelines, statins are recommended for individuals with diabetes aged 40-75 with LDL  without ASCVD, and for any individual with ASCVD. Currently the patient is on a statin.     5. Smoking Status: Patient Pt is smoke free..     Follow up: 3 months      Shai Dong MD  Endocrinology, Diabetes  and Metabolism  [facility]

## 2018-02-02 NOTE — MR AVS SNAPSHOT
After Visit Summary   2/2/2018    Maliha Pedro    MRN: [id number]           Patient Information     Date Of Birth          1960        Visit Information        Provider Department      2/2/2018 9:00 AM Shai Dong MD [facility] Endocrinology        Today's Diagnoses     Type 2 diabetes mellitus with diabetic nephropathy, with long-term current use of insulin (H)    -  1       Follow-ups after your visit        Follow-up notes from your care team     Return in about 3 months (around 5/2/2018).      Your next 10 appointments already scheduled     Feb 05, 2018  9:45 AM CST   (Arrive by 9:30 AM)   CT CALCIUM SCREENING with MGCT1, MG IMAGING NURSE   [facility]    [address] [place] [address] [phone]           It is best to avoid caffeine on the day of your test.  Be sure to tell your doctor:   If there s any chance you are pregnant.   If you have any special needs.  Please wear loose clothing, such as a sweat suit or jogging clothes. Avoid snaps, zippers and other metal. We may ask you to undress and put on a hospital gown.  If you have any questions, please call the Imaging Department where you will have your exam.            May 09, 2018  8:45 AM CDT   VISUAL FIELD with [facility] EYE VISUAL FIELD   [facility]    [address]  [address] [place] [address]   [phone]            May 09, 2018  9:15 AM CDT   RETURN GLAUCOMA with Umu Rosario MD   [facility]    [address]  [address] [address]   [phone]            May 11, 2018  8:40 AM CDT   (Arrive by 8:25 AM)   RETURN DIABETES with Shai Dong MD   [facility] Endocrinology ([facility])    [address] [facility]  [address] [facility] [id number] [phone]              Who to contact     Please  "call your clinic at [phone] to:    Ask questions about your health    Make or cancel appointments    Discuss your medicines    Learn about your test results    Speak to your doctor   If you have compliments or concerns about an experience at your clinic, or if you wish to file a complaint, please contact [facility] Patient Relations at [phone] or email us at [email or web address]         Additional Information About Your Visit        [employer] Information     [employer] gives you secure access to your electronic health record. If you see a primary care provider, you can also send messages to your care team and make appointments. If you have questions, please call your primary care clinic.  If you do not have a primary care provider, please call [phone] and they will assist you.      [employer] is an electronic gateway that provides easy, online access to your medical records. With [employer], you can request a clinic appointment, read your test results, renew a prescription or communicate with your care team.     To access your existing account, please contact your [facility] or call [phone] for assistance.        Care EveryWhere ID     This is your Care EveryWhere ID. This could be used by other organizations to access your [facility] medical records  [id number]        Your Vitals Were     Pulse Height Last Period BMI (Body Mass Index)          94 1.638 m (5' 4.5\") 03/19/2009 31.55 kg/m2         Blood Pressure from Last 3 Encounters:   02/02/18 119/79   01/29/18 118/60   01/22/18 125/66    Weight from Last 3 Encounters:   02/02/18 84.7 kg (186 lb 11.2 oz)   01/29/18 85.3 kg (188 lb)   11/01/17 87.4 kg (192 lb 11.2 oz)              Today, you had the following     No orders found for display         Today's Medication Changes          These changes are accurate as of 2/2/18  9:28 AM.  If you have any questions, ask your nurse or doctor.    "            These medicines have changed or have updated prescriptions.        Dose/Directions    empagliflozin 25 MG Tabs tablet   Commonly known as:  JARDIANCE   This may have changed:    - medication strength  - how much to take   Used for:  Type 2 diabetes mellitus with diabetic nephropathy, with long-term current use of insulin (H)        Dose:  25 mg   Take 1 tablet (25 mg) by mouth daily   Quantity:  90 tablet   Refills:  3            Where to get your medicines      These medications were sent to [facility] - [place], [place] - [address], [address]  [address], [facility] [address]     Phone:  [phone]     empagliflozin 25 MG Tabs tablet                Primary Care Provider Office Phone # Fax #    Elva Ortiz [phone] [address] [facility] [address]  [facility] [address]        Equal Access to Services     [facility]: Hadii angelica yepez hadasho Soomaali, waaxda luqadaha, qaybta kaalmada adeegyada, poly izaguirre haycindi harrison . So [facility] [phone].    ATENCIÓN: Si habla español, tiene a ford disposición servicios gratuitos de asistencia lingüística. Hernando al [phone].    We comply with applicable federal civil rights laws and [place] laws. We do not discriminate on the basis of race, color, national origin, age, disability, sex, sexual orientation, or gender identity.            Thank you!     Thank you for choosing [facility] ENDOCRINOLOGY  for your care. Our goal is always to provide you with excellent care. Hearing back from our patients is one way we can continue to improve our services. Please take a few minutes to complete the written survey that you may receive in the mail after your visit with us. Thank you!             Your Updated Medication List - Protect others around you: Learn how to safely use, store and throw away your medicines at www.disposemymeds.org.          This list is accurate as of 2/2/18  9:28  AM.  Always use your most recent med list.                   Brand Name Dispense Instructions for use Diagnosis    ACCU-CHEK VANESA PLUS test strip   Generic drug:  blood glucose monitoring     360 strip    Test four times a day    Type 2 diabetes mellitus with hyperglycemia, with long-term current use of insulin (H)       albuterol 108 (90 BASE) MCG/ACT Inhaler    PROAIR HFA/PROVENTIL HFA/VENTOLIN HFA    1 Inhaler    Inhale 2 puffs into the lungs every 4 hours as needed for shortness of breath / dyspnea    Cough       amLODIPine 5 MG tablet    NORVASC    90 tablet    Take 1 tablet (5 mg) by mouth daily    Essential hypertension, Type 2 diabetes mellitus with diabetic nephropathy, with long-term current use of insulin (H)       aspirin EC 81 MG EC tablet      Take 325 mg by mouth daily        blood glucose monitoring lancets     5 Box    Use to test blood sugar 3 times daily or as directed.    Type 2 diabetes mellitus with hyperglycemia, with long-term current use of insulin (H)       blood glucose monitoring meter device kit    no brand specified    1 kit    Use to test blood sugar 4 times daily or as directed.    Type 2 diabetes mellitus with hyperglycemia, with long-term current use of insulin (H)       CALCIUM + D PO      Take 1 tablet by mouth 2 times daily.        empagliflozin 25 MG Tabs tablet    JARDIANCE    90 tablet    Take 1 tablet (25 mg) by mouth daily    Type 2 diabetes mellitus with diabetic nephropathy, with long-term current use of insulin (H)       fexofenadine 180 MG tablet    ALLEGRA    90 tablet    Take 1 tablet by mouth daily. 1 TABLET DAILY Failed claritin for symptom cotrol. Zyrtec increases heart rate.    Seasonal allergic rhinitis       fish oil-omega-3 fatty acids 1000 MG capsule      Take 1 capsule by mouth daily.        fluticasone 50 MCG/ACT spray    FLONASE    16 g    Spray 1-2 sprays into both nostrils daily    Seasonal allergic rhinitis, unspecified chronicity, unspecified trigger        GLUCOSAMINE CHONDR COMPLEX PO           hydrochlorothiazide 12.5 MG Tabs tablet     90 tablet    Take 1 tablet (12.5 mg) by mouth daily    Essential hypertension, Type 2 diabetes mellitus with diabetic nephropathy, with long-term current use of insulin (H)       insulin aspart 100 UNIT/ML injection    NovoLOG FLEXPEN    120 mL    Uses  120 units daily subcutaneously    Diabetes mellitus, type 2 (H)       insulin glargine 100 UNIT/ML injection    LANTUS SOLOSTAR    75 mL    80 units daily    Type 2 diabetes mellitus with hyperglycemia, unspecified long term insulin use status (H)       losartan 100 MG tablet    COZAAR    90 tablet    TAKE ONE TABLET BY MOUTH EVERY DAY    Hypertension goal BP (blood pressure) < 140/90       magnesium 250 MG tablet     90 tablet    Take 1 tablet by mouth daily    Hypomagnesemia       metFORMIN 500 MG 24 hr tablet    GLUCOPHAGE-XR    360 tablet    Take 2 tabs twice a day    Type 2 diabetes mellitus with hyperglycemia, unspecified long term insulin use status (H)       MULTIVITAMIN PO      Take 1 tablet by mouth daily.        NEEDLES, ANY SIZE     1 Box    Pen needles for Victoza pen.    Type 2 diabetes, HbA1c goal < 7% (H)       NEEDLES, ANY SIZE     4 Box    Pen needles for Novolog insulin pen. Use to inject 5-6  times daily.    Type 2 diabetes mellitus with hyperglycemia, with long-term current use of insulin (H)       pantoprazole 40 MG EC tablet    PROTONIX    90 tablet    Take 1 tablet (40 mg) by mouth daily        pentoxifylline 400 MG CR tablet    TRENtal    180 tablet    TAKE ONE TABLET BY MOUTH TWICE A DAY    Type 2 diabetes mellitus with hyperglycemia, unspecified long term insulin use status (H)       rosuvastatin 10 MG tablet    CRESTOR    90 tablet    Take 1 tablet (10 mg) by mouth daily    Hyperlipidemia LDL goal <100       vitamin D 2000 UNITS tablet      Take 1 tablet by mouth daily.

## 2018-02-02 NOTE — LETTER
2/2/2018       RE: Maliha Pedro  [address]  [place] [facility] [address]     Dear Colleague,    Thank you for referring your patient, Maliha Pedro, to the [facility] ENDOCRINOLOGY at [facility]. Please see a copy of my visit note below.    Endocrinology Clinic Visit 2/2/2018    NAME:  Maliha Pedro  PCP:  Elva Ortiz  MRN:  [id number]  Reason for Consult:  Type 2 Diabetes  Requesting Provider:  Elva Ackerman*    Chief Complaint     Chief Complaint   Patient presents with     RECHECK     diabetes follow up        History of Present Illness     Maliha Pedro is a 58 year old female who is seen in clinic for diabetes management.   She is a former patient of Dr. Mccarthy who is transitioning care since his longterm.   She has had diabetes since age 32. Has tried many different meds. Did not tolerate GLP-1 agonists. Stopped Actos due to concerns about adverse effects. Was on Lantus and Novolog with poor control, with recent improvement since addition of Jardiance November 2017. She has retinopathy and nephropathy.     She recently had Xray of the chest showing a shadow around the arota, which prompted evaluation of AAA, which was negative. She is getting a cardiac CT next week.     A1c today is 8.4 improved from 9.1    Associated Signs/Symptoms  Hypoglycemia: no. Hyperglycemia: none.Neuropathy: none. Vascular Symtpoms: none. Angina/CHF: dyspnea on exertion. Ulcers: No. Amputations: No    Current treatment strategy: Novolog 15-20 with main meals, 10-15 with snack. Lantus 75 units daily. Metformin 1000 mg po BID, Jardiance 10 mg po daily    Blood Glucose Monitoring: did not bring meter but reports BG improved since starting Jardiance.     Diet: 2-3 meals per day. Occasional snacks.   Cut down on rice. Eating more vegetables and fish.     Exercise: yoga.     Weight:   Wt Readings from Last 4 Encounters:   02/02/18 84.7 kg (186 lb 11.2  oz)   01/29/18 85.3 kg (188 lb)   11/01/17 87.4 kg (192 lb 11.2 oz)   10/30/17 86 kg (189 lb 8 oz)     Problem List     Patient Active Problem List   Diagnosis     Type 2 diabetes mellitus with hyperglycemia (H)     ? 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     Displacement of lumbar intervertebral disc without myelopathy     Nonallopathic lesion of lumbar region     Nonallopathic lesion of thoracic region     Nonallopathic lesion of sacral region     Bartholin gland cyst     Cataract     Irritable bowel syndrome     Esophageal reflux     Seasonal allergic rhinitis     Latex sensitivity     Colon polyp     Adenomatous polyp     Subclinical hyperthyroidism     HYPERLIPIDEMIA LDL GOAL <100     Ovarian cyst     Breast pain     Essential hypertension     Advanced directives, counseling/discussion     Obesity     Hypomagnesemia     Type 2 diabetes mellitus with hyperglycemia, with long-term current use of insulin (H)        Medications     Current Outpatient Prescriptions   Medication     empagliflozin (JARDIANCE) 25 MG TABS tablet     Glucosamine-Chondroitin (GLUCOSAMINE CHONDR COMPLEX PO)     insulin glargine (LANTUS SOLOSTAR) 100 UNIT/ML injection     metFORMIN (GLUCOPHAGE-XR) 500 MG 24 hr tablet     ACCU-CHEK VANESA PLUS test strip     NEEDLES, ANY SIZE     pantoprazole (PROTONIX) 40 MG EC tablet     fluticasone (FLONASE) 50 MCG/ACT spray     insulin aspart (NOVOLOG FLEXPEN) 100 UNIT/ML injection     hydrochlorothiazide 12.5 MG TABS tablet     amLODIPine (NORVASC) 5 MG tablet     pentoxifylline (TRENTAL) 400 MG CR tablet     blood glucose monitoring (SOFTCLIX) lancets     losartan (COZAAR) 100 MG tablet     magnesium 250 MG tablet     blood glucose monitoring (NO BRAND SPECIFIED) meter device kit     rosuvastatin (CRESTOR) 10 MG tablet     albuterol (PROAIR HFA, PROVENTIL HFA, VENTOLIN HFA) 108 (90 BASE) MCG/ACT inhaler     NEEDLES, ANY SIZE     fish oil-omega-3 fatty acids (FISH OIL) 1000 MG  capsule     aspirin EC 81 MG tablet     fexofenadine (ALLEGRA) 180 MG tablet     VITAMIN D 2000 UNIT OR TABS     MULTIVITAMIN OR     CALCIUM + D OR     No current facility-administered medications for this visit.      Facility-Administered Medications Ordered in Other Visits   Medication     midazolam (VERSED) injection 0.5-1 mg     flumazenil (ROMAZICON) injection 0.2 mg     fentaNYL (SUBLIMAZE) injection 25-50 mcg     naloxone (NARCAN) injection 0.1-0.4 mg        Allergies     Allergies   Allergen Reactions     Latex Swelling     Mold      Tetanus Antitoxin Swelling     Tetracycline Swelling       Medical / Surgical History     Past Medical History:   Diagnosis Date     Adjustment disorder with mixed anxiety and depressed mood     following death of her adoptive father and then again after adoptive mother      Allergic rhinitis, cause unspecified     uses benadryl prn (sneezing/hoarse voice)     Bartholin gland cyst 2008     Chest pain, unspecified     neg dobutamine stress test  (reacted to dobutamine)     Dry eyes      ERM OD (epiretinal membrane, right eye)     BE mild     Esophageal reflux     EGD: -neg     Glaucoma suspect      Hypertension      Irregular menstrual cycle     s/p D&C, since then more regular     Irritable bowel syndrome     colonoscopy -neg. Sx's especially prior to menses     MEDICAL HISTORY OF -     had health directive with : DANIEL Ford.  Full Code. Aunt (Charlie Tay) will be decision maker     Meningitis, unspecified(322.9) age 8    hospitalized for 9 mo     Need for prophylactic vaccination with tuberculosis (BCG) vaccine     Has pos Mantoux. Gets yearly cxr, all neg.      Other and unspecified hyperlipidemia      Pain in joint, shoulder region     bone spurs on MRI, s/p pool therapy     Type II or unspecified type diabetes mellitus without mention of complication, not stated as uncontrolled Age 32    Follows with endocrine at : [facility]ayne [facility] MD      Unspecified cataract     RE     Unspecified sinusitis (chronic)     2006     Wheezing     Has seen pulm  & . Told night ashtma, ? vocal chord spasm due to cold air.      Past Surgical History:   Procedure Laterality Date     CATARACT IOL, RT/LT  2008    LE     HC DILATION/CURETTAGE DIAG/THER NON OB       HC TYMPANOPLASTY W/O MASTOID, INIT/REV W/O OSS CHAIN RECONST       YAG CAPSULOTOMY OS (LEFT EYE)  2011       Social History     Social History     Social History     Marital status: Single     Spouse name: N/A     Number of children: N/A     Years of education: N/A     Occupational History     Not on file.     Social History Main Topics     Smoking status: Never Smoker     Smokeless tobacco: Never Used     Alcohol use Yes      Comment: rarely     Drug use: No     Sexual activity: Not Currently     Other Topics Concern     Parent/Sibling W/ Cabg, Mi Or Angioplasty Before 65f 55m? 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 Moved to  in . Initially lived in [place].    No children        2 cats       Family History     Family History   Problem Relation Age of Onset     DIABETES Brother      CANCER Brother 59     Lung cancer     DIABETES Father       of dm 70's     C.A.D. Father      DIABETES Paternal Grandfather      DIABETES Sister      CANCER Maternal Aunt      ovarian cancer.      DIABETES Brother      DIABETES Brother      DIABETES Sister      Glaucoma Maternal Grandmother        ROS     Constitutional: no fevers, chills, night sweats. No weight loss or  "fatigue. Good appetite  Eyes: no vision changes, no eye redness, no diplopia  Ears, Nose, mouth, throat: no hearing changes, no tinnitus, no rhinorrhea, no nasal congestion  Cardiovascular: no chest pain, no orthopnea or PND, no edema, no palpitations  Respiratory: no dyspnea, no cough, no sputum, no wheezing  Gastrointestinal: no nausea, no vomiting, no abdominal pain, no diarrhea, no constipation  Genitourinary: no dysuria, no frequency, no urgency, no nocturia  Musculoskeletal: no joint pains, no back pain, no cramps, no fractures  Skin: no rash, no itching, no dryness, no ulcers, no hair loss  Neurological: no headache, no weakness, no numbness, no dizziness, no tremors  Psychiatric: no anxiety, no sadness  Hematologic/lymphatic: no easy bruising, no bleeding, no palor    Physical Exam   /79  Pulse 94  Ht 1.638 m (5' 4.5\")  Wt 84.7 kg (186 lb 11.2 oz)  LMP 03/19/2009  BMI 31.55 kg/m2     General: Comfortable, no obvious distress, normal body habitus  Eyes: Sclera anicteric, moist conjunctiva  HENT: Atraumatic, oropharynx clear, moist mucous membranes with no mucosal ulcerations  Neck: Trachea midline, supple. Thyroid: Thyroid is normal in size and texture  CV: Regular rhythm, normal rate. No murmurs auscultated  Resp: Clear to auscultation bilaterally, good effort  Abdomen:  Soft, non tender, non distended. Bowel sounds heard. No organomegaly.  Skin: No rashes, lesions, or subcutaneous nodules.   Psych: Alert and oriented x 3. Appropriate affect, good insight  Extremities: No peripheral edema  Foot exam:  Pulses:  Dorsalis pedis: present bilaterally  Posterior tibial: present bilaterally  Foot exam:  Vibration/Light touch: sensation present bilaterally  Skin of foot: intact bilaterally  Musculoskeletal: Appropriate muscle bulk and strength  Lymphatic: No cervical lymphadenopathy  Neuro: Moves all four extremities. No focal deficits on limited exam. Gait normal.       Labs/Imaging and Outside Records "     Pertinent [facility]  Radiology Results were  reviewed and updated in EPIC.    Summary of recent findings:   Lab Results   Component Value Date    A1C 9.8 12/16/2013    A1C 9.5 04/15/2013    A1C 9.6 01/16/2013    A1C 9.4 09/28/2012    A1C 8.3 06/22/2012       TSH   Date Value Ref Range Status   10/30/2017 1.29 0.40 - 4.00 mU/L Final   02/08/2017 0.76 0.40 - 4.00 mU/L Final   11/17/2015 0.98 0.40 - 4.00 mU/L Final   05/04/2015 0.76 0.40 - 4.00 mU/L Final   12/16/2013 1.24 0.4 - 5.0 mU/L Final     T4 Free   Date Value Ref Range Status   06/21/2010 1.50 0.70 - 1.85 ng/dL Final       Creatinine   Date Value Ref Range Status   02/08/2017 0.68 0.52 - 1.04 mg/dL Final       Recent Labs   Lab Test  03/06/17   0905  05/04/15   1016  12/16/13   1028   CHOL  165  170  192   HDL  52  46*  42*   LDL  73  90  125   TRIG  199*  171*  124   CHOLHDLRATIO   --   3.7  4.6     Impression / Plan     1. Diabetes Mellitus: Type 2  Current glycemic control can be considered fair.  It has improved since last visit.   Patient is fearful of low BG. She is happy with the progress she is making.   I discussed increasing Jardiance dose to 25 mg since she is tolerating it well. I do not expect a huge change in BG, but they may get a bit better.   Patient is motivated to make more changes in her lifestyle.     I discussed dietary concepts today with the patient, including: weight reduction and consistent meals.     I also discussed exercise recommendations with the patient, advising for a goal of moderate physical activity 30 minutes 5 days a week. Specific examples were discussed such as brisk walking.    2. Diabetes Complications: With retinopathy and nephropathy.   - she is getting workup for CAD. I will follow up omn this next week and advise.     3. Blood Pressure Management: Blood pressure is controlled. Currently is on pharmacotherapy for this.     4.Lipid Management: Per the new ACC/JILLIAN/NHLBI guidelines,  statins are recommended for individuals with diabetes aged 40-75 with LDL  without ASCVD, and for any individual with ASCVD. Currently the patient is on a statin.     5. Smoking Status: Patient Pt is smoke free..     Follow up: 3 months      Shai Dong MD  Endocrinology, Diabetes and Metabolism  [facility]

## 2018-02-05 ENCOUNTER — RADIANT APPOINTMENT (OUTPATIENT)
Dept: CT IMAGING | Facility: CLINIC | Age: 58
End: 2018-02-05
Attending: FAMILY MEDICINE
Payer: COMMERCIAL

## 2018-02-05 DIAGNOSIS — R06.09 DYSPNEA ON EXERTION: ICD-10-CM

## 2018-02-05 DIAGNOSIS — E78.5 HYPERLIPIDEMIA LDL GOAL <100: ICD-10-CM

## 2018-02-05 DIAGNOSIS — Z13.6 SCREENING FOR ISCHEMIC HEART DISEASE: ICD-10-CM

## 2018-02-05 PROCEDURE — 75571 CT HRT W/O DYE W/CA TEST: CPT | Performed by: INTERNAL MEDICINE

## 2018-02-05 RX ORDER — ROSUVASTATIN CALCIUM 10 MG/1
TABLET, COATED ORAL
Qty: 90 TABLET | Refills: 3 | Status: CANCELLED | OUTPATIENT
Start: 2018-02-05

## 2018-02-05 RX ORDER — ROSUVASTATIN CALCIUM 10 MG/1
10 TABLET, COATED ORAL DAILY
Qty: 90 TABLET | Refills: 3 | Status: SHIPPED | OUTPATIENT
Start: 2018-02-05 | End: 2019-03-05

## 2018-02-06 DIAGNOSIS — R93.1 AGATSTON CORONARY ARTERY CALCIUM SCORE BETWEEN 200 AND 399: Primary | ICD-10-CM

## 2018-02-11 ENCOUNTER — MYC MEDICAL ADVICE (OUTPATIENT)
Dept: FAMILY MEDICINE | Facility: CLINIC | Age: 58
End: 2018-02-11

## 2018-02-12 ENCOUNTER — MYC MEDICAL ADVICE (OUTPATIENT)
Dept: ENDOCRINOLOGY | Facility: CLINIC | Age: 58
End: 2018-02-12

## 2018-02-22 DIAGNOSIS — G56.01 CARPAL TUNNEL SYNDROME OF RIGHT WRIST: Primary | ICD-10-CM

## 2018-02-28 ENCOUNTER — RADIANT APPOINTMENT (OUTPATIENT)
Dept: MAMMOGRAPHY | Facility: CLINIC | Age: 58
End: 2018-02-28
Payer: COMMERCIAL

## 2018-02-28 DIAGNOSIS — Z12.31 VISIT FOR SCREENING MAMMOGRAM: ICD-10-CM

## 2018-03-13 ENCOUNTER — OFFICE VISIT (OUTPATIENT)
Dept: CARDIOLOGY | Facility: CLINIC | Age: 58
End: 2018-03-13
Attending: FAMILY MEDICINE
Payer: COMMERCIAL

## 2018-03-13 VITALS
SYSTOLIC BLOOD PRESSURE: 127 MMHG | BODY MASS INDEX: 31.89 KG/M2 | WEIGHT: 188.7 LBS | HEART RATE: 78 BPM | DIASTOLIC BLOOD PRESSURE: 77 MMHG | OXYGEN SATURATION: 98 %

## 2018-03-13 DIAGNOSIS — R06.09 DOE (DYSPNEA ON EXERTION): Primary | ICD-10-CM

## 2018-03-13 PROCEDURE — 99214 OFFICE O/P EST MOD 30 MIN: CPT | Performed by: INTERNAL MEDICINE

## 2018-03-13 PROCEDURE — 93000 ELECTROCARDIOGRAM COMPLETE: CPT | Performed by: INTERNAL MEDICINE

## 2018-03-13 ASSESSMENT — PAIN SCALES - GENERAL: PAINLEVEL: NO PAIN (0)

## 2018-03-13 NOTE — PROGRESS NOTES
2018          [place] [address]      RE:  Maliha Pedro, MR# [id number],  1960      Dear Dr. Ortiz:      It was a pleasure participating in the care of your patient, Ms. Maliha Pedro.  As you know, she is a 58-year-old lady who I see today for exertional dyspnea.      Her past medical history is significant for the followin.  Type 2 diabetes.   2.  Hypertension.   3.  Hyperlipidemia   4.  Hyperthyroidism.   5.  Colon polyps.   6.  Seasonal allergies.   7.  Latex allergy   8.  IBS.      She denies having a significant history of cardiac disease.      In terms of her present symptom complex, apparently she had an abdominal ultrasound that revealed some calcifications.  Therefore, you had ordered a calcium score test on 2018 which showed an elevated calcium score of 335.  She was referred here for further evaluation.        In terms of the patient's symptoms, she complains that over the past year to year and a half she has been short of breath.  When she carries a laundry basket up the 4 levels in her home she gets quite short of breath and has to stop and rest.  She does walk for 30 minutes a day, but if she has to walk up any type of incline she will have to stop due to exertional dyspnea.  It is not accompanied by gross chest pains or pressure.  There is no radiation of symptoms.  No nausea, vomiting, or diaphoresis; she simply loses her breath.      She otherwise has been doing well.  Blood pressures have been under good control.  She otherwise denies significant PND, orthopnea, edema, palpitations, syncope or near-syncope.      In terms of her cardiac risk factors, she has had diabetes for the past 28 years.  She does have hypertension.  She does not smoke.  She drinks rarely.  Her aunt had a heart attack in her 70s.  She does have hyperlipidemia.      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      SOCIAL HISTORY:  She is planning to retire next year and would like to return to the [facility] with I think 4 or 5 siblings that currently live there now, 2 step siblings, as well and her mother who is still living at age 94.      CURRENT MEDICATIONS:  Crestor 10 mg a day, empagliflozin, metformin, pantoprazole, insulin, hydrochlorothiazide 12.5 mg a day, amlodipine 5 mg a day, Losartan 100 mg every evening, aspirin 81 mg a day.      PHYSICAL EXAMINATION:   VITAL SIGNS:  Blood pressure is 127/77 with a pulse of 78.  Her weight is 188 pounds.   NECK:  Normal jugular venous pressure.  No significant hepatojugular reflux.  No carotid bruits are appreciated.   LUNGS:  Clear to auscultation.  Respiratory effort is normal.   CARDIAC:  Regular rate and rhythm.  No obvious murmur or gallop appreciated.   ABDOMEN:  Soft, nontender.   EXTREMITIES:  Without gross edema.      EKG today reveals normal sinus rhythm, borderline axis.  There is a Q-wave in lead III, indeterminate and aVF.      Calcium score 02/05/2018, total score 335.     Left main 57.   .   Circumflex 121.   RCA 0.      LABORATORY DATA:  On 02/08/2017, potassium 4.0, GFR normal.      On 10/30/2017, hemoglobin and TSH normal.      Abdominal ultrasound 01/22/2018 was negative for gross aneurysm.        IMPRESSION:      Fco is a 58-year-old lady without a prior documented history of cardiac disease who presents with exertional dyspnea and positive calcium score.  Her symptoms of exertional dyspnea have worsened over the past year to year and a half and happen every time she carries laundry up the stairway in her home.  She also gets short of breath walking up an incline.  With her symptoms of exertional dyspnea in combination with her long 28-year history of diabetes, hypertension, hyperlipidemia and recent positive calcium score, further noninvasive evaluation would be indicated.        PLAN:     1.   Echocardiogram to rule out significant structural pathology.     2.  Pharmacologic nuclear stress test in order to rule out significant inducible ischemia.      Further updates will follow pending the above test results.      Once again, it was a pleasure participating in the care of your patient, Ms. Deven Becker.  Please feel free to contact me anytime if you have any questions regarding her care in the future.         Sincerely,      JHOAN MATOS MD             D: 2018   T: 2018   MT: JOSS      Name:     DEVEN BECKRE   MRN:      [id number]        Account:      [id number]   :      1960      Document: [id number]       cc: Elva Ortiz MD

## 2018-03-13 NOTE — PATIENT INSTRUCTIONS
The following is a summary of your office visit:    Medications started today:none    Medications stopped today:none    Medication dose change: none    Nurse contact information: Margareth Whipple RN  Cardiology Care Coordinator  [phone] Phone  [phone] Fax    Appointments made today: Echo and issa stress test    Patient instructions:No prep for echo, issa prep below    Please arrive at check-in B at least 15 minutes prior to your scheduled time.    Why do I need this test?  Your doctor has ordered a nuclear stress test to check how well blood is flowing through your heart. Please allow 3  to 4 hours for this test.  How do I get ready for the test?  - You may need to stop some medicines before the  test.  - For patients with diabetes: Call your diabetic care team. Ask if you should take a   dose the morning of your test.  - If you take diabetes medicine by mouth, don t take it on the morning of your test. Bring it with you to take after the test. (If you have questions, call your diabetes  care team.)  - Do not take nitrates on the day of your test. Do not wear your Nitro-Patch.  - Stop all caffeine 12 hours before the test.  This includes coffee, tea, soda pop, chocolate and certain medicines (such as Anacin, Excedrin and NoDoz). Also avoid decaf coffee and tea, as these  contain caffeine.  - No alcohol, smoking or other tobacco for 12 hours before the test.  - Stop eating 3 hours before the test. You may drink water or juice.  - Wear a loose, two piece outfit.  When you arrive, please tell us if you:  - have diabetes  - are breast-feeding  - may be pregnant  - have an implanted pacemaker or defibrillator (ICD)  What happens during this test?  1. We will place an IV (small needle) in the vein of your arm or hand.  2. We will inject a liquid (tracer) through the IV. The liquid contains radioactive material. This helps your heart show up better on the pictures we will take.  3. About 30 minutes later, you  will lie down on a table. A special camera will rotate around your chest taking pictures of your heart. This lasts about 20 minutes.  4. You may return to the waiting room for about an hour, or we may start the stress test right away in a different room.  5. To prepare for the stress test, we will check your blood pressure. We will also attach small pads to your chest. The pads are hooked to EKG  (electrocardiogram) machine. The machine shows how your heart is working during the test.  6. You will begin the stress test.  - You will be given medicine through the IV that will mimic the effects of exercise. The medicine goes slowly through the IV.  7. During the stress test, we will again inject liquid (tracer) through your IV. (See step 2.)  8. After the stress test, you will wait 20 minutes. We will then take more pictures of your heart.  What are the risks and benefits of this test?  This test will tell us more about your heart. It helps your doctor decide what kind of treatment you may need (if any). The test carries some risks. Common risks include:  - changes in blood pressure  - headache  - nausea  -flushing or sweating  - feeling dizzy or sweating  - rapid or slow heart rate  --- If you receive medicine to mimic exercise, you may  also have:  - shortness of breath  - irregular heart beat  - pain in chest, neck, jaw, head or back  - tingling or numbness in the arms  - feeling light-headed  The risk for heart attack, stroke or death is very low. Please tell your care team if you have any symptoms. Before and during the test, your care team will check your heart, pulse and blood pressure often. If you have concerns about the radiation used during this test, please talk to your doctor.        If you have had any blood work, imaging or other testing completed we will be in touch within 1-2 weeks regarding the results. If you have any questions, concerns or need to schedule a follow up, please contact us at  [phone]. If you are needing refills please contact your pharmacy. For urgent after hour care please call the [facility] at [phone] or the [facility] at [phone] and ask to speak to the cardiologist on call.    It was a pleasure meeting with you today. Please let us know if there is anything else we can do for you so that we can be sure you are leaving completely satisfied with your care experience.     Your Cardiology Team at [facility]  RN Care Coordinator: Margareth  CMA: Pat

## 2018-03-13 NOTE — NURSING NOTE
"Maliha Pedro's goals for this visit include: .,rfv    She requests these members of her care team be copied on today's visit information: PCP    PCP: Elva Ortiz    Referring Provider:  Elva Ortiz MD  [address] [place] [address]    Chief Complaint   Patient presents with     Consult     Abnormal CTA       Initial /77 (BP Location: Left arm, Patient Position: Chair, Cuff Size: Adult Large)  Pulse 78  Wt 85.6 kg (188 lb 11.2 oz)  LMP 03/19/2009  SpO2 98%  BMI 31.89 kg/m2 Estimated body mass index is 31.89 kg/(m^2) as calculated from the following:    Height as of 2/2/18: 1.638 m (5' 4.5\").    Weight as of this encounter: 85.6 kg (188 lb 11.2 oz).  Medication Reconciliation: complete         Medication Refills: none        Pat Johnson CMA        "

## 2018-03-13 NOTE — MR AVS SNAPSHOT
After Visit Summary   3/13/2018    Maliha Pedro    MRN: [id number]           Patient Information     Date Of Birth          1960        Visit Information        Provider Department      3/13/2018 1:00 PM Javier Newton MD [facility]        Today's Diagnoses     PEREZ (dyspnea on exertion)    -  1      Care Instructions      The following is a summary of your office visit:    Medications started today:none    Medications stopped today:none    Medication dose change: none    Nurse contact information: Margareth Whipple RN  Cardiology Care Coordinator  [phone] Phone  [phone] Fax    Appointments made today: Echo and issa stress test    Patient instructions:No prep for echo, issa prep below    Please arrive at check-in B at least 15 minutes prior to your scheduled time.    Why do I need this test?  Your doctor has ordered a nuclear stress test to check how well blood is flowing through your heart. Please allow 3  to 4 hours for this test.  How do I get ready for the test?  - You may need to stop some medicines before the  test.  - For patients with diabetes: Call your diabetic care team. Ask if you should take a   dose the morning of your test.  - If you take diabetes medicine by mouth, don t take it on the morning of your test. Bring it with you to take after the test. (If you have questions, call your diabetes  care team.)  - Do not take nitrates on the day of your test. Do not wear your Nitro-Patch.  - Stop all caffeine 12 hours before the test.  This includes coffee, tea, soda pop, chocolate and certain medicines (such as Anacin, Excedrin and NoDoz). Also avoid decaf coffee and tea, as these  contain caffeine.  - No alcohol, smoking or other tobacco for 12 hours before the test.  - Stop eating 3 hours before the test. You may drink water or juice.  - Wear a loose, two piece outfit.  When you arrive, please tell us if you:  - have diabetes  - are breast-feeding  -  may be pregnant  - have an implanted pacemaker or defibrillator (ICD)  What happens during this test?  1. We will place an IV (small needle) in the vein of your arm or hand.  2. We will inject a liquid (tracer) through the IV. The liquid contains radioactive material. This helps your heart show up better on the pictures we will take.  3. About 30 minutes later, you will lie down on a table. A special camera will rotate around your chest taking pictures of your heart. This lasts about 20 minutes.  4. You may return to the waiting room for about an hour, or we may start the stress test right away in a different room.  5. To prepare for the stress test, we will check your blood pressure. We will also attach small pads to your chest. The pads are hooked to EKG  (electrocardiogram) machine. The machine shows how your heart is working during the test.  6. You will begin the stress test.  - You will be given medicine through the IV that will mimic the effects of exercise. The medicine goes slowly through the IV.  7. During the stress test, we will again inject liquid (tracer) through your IV. (See step 2.)  8. After the stress test, you will wait 20 minutes. We will then take more pictures of your heart.  What are the risks and benefits of this test?  This test will tell us more about your heart. It helps your doctor decide what kind of treatment you may need (if any). The test carries some risks. Common risks include:  - changes in blood pressure  - headache  - nausea  -flushing or sweating  - feeling dizzy or sweating  - rapid or slow heart rate  --- If you receive medicine to mimic exercise, you may  also have:  - shortness of breath  - irregular heart beat  - pain in chest, neck, jaw, head or back  - tingling or numbness in the arms  - feeling light-headed  The risk for heart attack, stroke or death is very low. Please tell your care team if you have any symptoms. Before and during the test, your care team will check  your heart, pulse and blood pressure often. If you have concerns about the radiation used during this test, please talk to your doctor.        If you have had any blood work, imaging or other testing completed we will be in touch within 1-2 weeks regarding the results. If you have any questions, concerns or need to schedule a follow up, please contact us at [phone]. If you are needing refills please contact your pharmacy. For urgent after hour care please call the [facility] at [phone] or the [facility] at [phone] and ask to speak to the cardiologist on call.    It was a pleasure meeting with you today. Please let us know if there is anything else we can do for you so that we can be sure you are leaving completely satisfied with your care experience.     Your Cardiology Team at [facility]  RN Care Coordinator: Margareth Stewart                    Follow-ups after your visit        Your next 10 appointments already scheduled     Apr 16, 2018  8:30 AM CDT   (Arrive by 8:15 AM)   NM INJECTION with MGNMINJ1   [facility])    [address] [place] [address]   [phone]            Apr 16, 2018  9:15 AM CDT   (Arrive by 9:00 AM)   NM SCAN3 with MGNM1   [facility])    [address] [place] [address]   [phone]            Apr 16, 2018  9:45 AM CDT   (Arrive by 9:30 AM)   Ekg Stress Nm Lexiscan with MG STRESS RM, MG IMAGING NURSE, MG PC CARD, MG CV TECH   [facility])    [address] [place] [address]   [phone]            Apr 16, 2018 10:15 AM CDT   (Arrive by 10:00 AM)   NM MPI WITH LEXISCAN with MGNM1   [facility])    [address] [facility] [address]   [phone]           For a ONE day exam:  Allow 3-4 hours for test. For a TWO day exam: Allow  minutes PER day for test.  On the day of your resting scan: Please stop eating 3 hours before the test. You may drink water or juice.  On the day of your stress test: Stop all caffeine 12 hours before the test. This includes coffee, tea, soda pop, chocolate and certain medicines (such as Anacin, Excedrin and NoDoz). Also avoid decaf coffee and tea, as these contain small amounts of caffeine.  Stop eating 3 hours before the test. You may drink water.  You may need to stop some medicines before the test. Follow your doctor s orders. - If you take a beta blocker: Do not take your beta-blocker on the day before your test, unless specifically told to by your doctor. And do not take it on the day of your test. Bring it with you to take after the test. - If you take Aggrenox or dipyridamole (Persantine, Permole), stop taking it 48 hours before your test. - If you take Viagra, Cialis or Levitra, stop taking it 48 hours before your test. - If you take theophylline or aminophylline, stop taking it 12 hours before your test.  Do not take nitrates on the day of your test. Do not wear your Nitro-Patch.  Please wear a loose two-piece outfit. If you will have an exercise test, bring rubber-soled walking shoes.  When you arrive, please tell us if you have a pacemaker or ICD (implantable defibrillator).  Please call your Imaging Department at your exam site with any questions.            Apr 16, 2018 11:30 AM CDT   Ech Complete with MGECHR1, MG ECHO TECH   [facility] ([facility])    [address] [place] [address] [phone]           1. Please bring or wear a comfortable two-piece outfit. 2. You may eat, drink and take your normal medicines. 3. For any questions that cannot be answered, please contact the ordering physician            May 09, 2018  8:45 AM CDT   VISUAL FIELD with [facility] EYE VISUAL FIELD   Eye Clinic ([facility]  [facility])    [address]  9th [address] Clin 9a  [facility] [address]   [phone]              Future tests that were ordered for you today     Open Future Orders        Priority Expected Expires Ordered    Echocardiogram Routine  3/13/2019 3/13/2018    NM Lexiscan stress test Routine  3/13/2019 3/13/2018            Who to contact     If you have questions or need follow up information about today's clinic visit or your schedule please contact [facility] directly at [phone].  Normal or non-critical lab and imaging results will be communicated to you by [employer], letter or phone within 4 business days after the clinic has received the results. If you do not hear from us within 7 days, please contact the clinic through [employer] or phone. If you have a critical or abnormal lab result, we will notify you by phone as soon as possible.  Submit refill requests through [employer] or call your pharmacy and they will forward the refill request to us. Please allow 3 business days for your refill to be completed.          Additional Information About Your Visit        Niko [employer]o Information     [employer] gives you secure access to your electronic health record. If you see a primary care provider, you can also send messages to your care team and make appointments. If you have questions, please call your primary care clinic.  If you do not have a primary care provider, please call [phone] and they will assist you.      [employer] is an electronic gateway that provides easy, online access to your medical records. With [employer], you can request a clinic appointment, read your test results, renew a prescription or communicate with your care team.     To access your existing account, please contact your [facility] or call [phone] for assistance.        Care EveryWhere ID     This is your Care EveryWhere ID. This could be used by other  organizations to access your [facility] medical records  [id number]        Your Vitals Were     Pulse Last Period Pulse Oximetry BMI (Body Mass Index)          78 03/19/2009 98% 31.89 kg/m2         Blood Pressure from Last 3 Encounters:   03/13/18 127/77   02/02/18 119/79   01/29/18 118/60    Weight from Last 3 Encounters:   03/13/18 85.6 kg (188 lb 11.2 oz)   02/02/18 84.7 kg (186 lb 11.2 oz)   01/29/18 85.3 kg (188 lb)               Primary Care Provider Office Phone # Fax #    Elva Peggy Ortiz [phone] [address] [facility] [address]        Equal Access to Services     JASSON COLLINS : Hadii angelica yepez hadasho Soomaali, waaxda luqadaha, qaybta kaalmada adeegyada, poly izaguirre haycindi harrison . So [facility] [phone].    ATENCIÓN: Si habla español, tiene a ford disposición servicios gratuitos de asistencia lingüística. Llame al [phone].    We comply with applicable federal civil rights laws and [place] laws. We do not discriminate on the basis of race, color, national origin, age, disability, sex, sexual orientation, or gender identity.            Thank you!     Thank you for choosing [facility]  for your care. Our goal is always to provide you with excellent care. Hearing back from our patients is one way we can continue to improve our services. Please take a few minutes to complete the written survey that you may receive in the mail after your visit with us. Thank you!             Your Updated Medication List - Protect others around you: Learn how to safely use, store and throw away your medicines at www.disposemymeds.org.          This list is accurate as of 3/13/18  2:09 PM.  Always use your most recent med list.                   Brand Name Dispense Instructions for use Diagnosis    ACCU-CHEK VANESA PLUS test strip   Generic drug:  blood glucose monitoring     360 strip    Test four times a day    Type 2 diabetes mellitus with  hyperglycemia, with long-term current use of insulin (H)       albuterol 108 (90 BASE) MCG/ACT Inhaler    PROAIR HFA/PROVENTIL HFA/VENTOLIN HFA    1 Inhaler    Inhale 2 puffs into the lungs every 4 hours as needed for shortness of breath / dyspnea    Cough       amLODIPine 5 MG tablet    NORVASC    90 tablet    Take 1 tablet (5 mg) by mouth daily    Essential hypertension, Type 2 diabetes mellitus with diabetic nephropathy, with long-term current use of insulin (H)       aspirin EC 81 MG EC tablet      Take 325 mg by mouth daily        blood glucose monitoring lancets     5 Box    Use to test blood sugar 3 times daily or as directed.    Type 2 diabetes mellitus with hyperglycemia, with long-term current use of insulin (H)       blood glucose monitoring meter device kit    no brand specified    1 kit    Use to test blood sugar 4 times daily or as directed.    Type 2 diabetes mellitus with hyperglycemia, with long-term current use of insulin (H)       CALCIUM + D PO      Take 1 tablet by mouth 2 times daily.        empagliflozin 25 MG Tabs tablet    JARDIANCE    90 tablet    Take 1 tablet (25 mg) by mouth daily    Type 2 diabetes mellitus with diabetic nephropathy, with long-term current use of insulin (H)       fexofenadine 180 MG tablet    ALLEGRA    90 tablet    Take 1 tablet by mouth daily. 1 TABLET DAILY Failed claritin for symptom cotrol. Zyrtec increases heart rate.    Seasonal allergic rhinitis       fish oil-omega-3 fatty acids 1000 MG capsule      Take 1 capsule by mouth daily.        fluticasone 50 MCG/ACT spray    FLONASE    16 g    Spray 1-2 sprays into both nostrils daily    Seasonal allergic rhinitis, unspecified chronicity, unspecified trigger       GLUCOSAMINE CHONDR COMPLEX PO           hydrochlorothiazide 12.5 MG Tabs tablet     90 tablet    Take 1 tablet (12.5 mg) by mouth daily    Essential hypertension, Type 2 diabetes mellitus with diabetic nephropathy, with long-term current use of insulin (H)        insulin aspart 100 UNIT/ML injection    NovoLOG FLEXPEN    120 mL    Uses  120 units daily subcutaneously    Diabetes mellitus, type 2 (H)       insulin glargine 100 UNIT/ML injection    LANTUS SOLOSTAR    75 mL    80 units daily    Type 2 diabetes mellitus with hyperglycemia, unspecified long term insulin use status (H)       losartan 100 MG tablet    COZAAR    90 tablet    TAKE ONE TABLET BY MOUTH EVERY DAY    Hypertension goal BP (blood pressure) < 140/90       magnesium 250 MG tablet     90 tablet    Take 1 tablet by mouth daily    Hypomagnesemia       metFORMIN 500 MG 24 hr tablet    GLUCOPHAGE-XR    360 tablet    Take 2 tabs twice a day    Type 2 diabetes mellitus with hyperglycemia, unspecified long term insulin use status (H)       MULTIVITAMIN PO      Take 1 tablet by mouth daily.        NEEDLES, ANY SIZE     1 Box    Pen needles for Victoza pen.    Type 2 diabetes, HbA1c goal < 7% (H)       NEEDLES, ANY SIZE     4 Box    Pen needles for Novolog insulin pen. Use to inject 5-6  times daily.    Type 2 diabetes mellitus with hyperglycemia, with long-term current use of insulin (H)       pantoprazole 40 MG EC tablet    PROTONIX    90 tablet    Take 1 tablet (40 mg) by mouth daily        pentoxifylline 400 MG CR tablet    TRENtal    180 tablet    TAKE ONE TABLET BY MOUTH TWICE A DAY    Type 2 diabetes mellitus with hyperglycemia, unspecified long term insulin use status (H)       rosuvastatin 10 MG tablet    CRESTOR    90 tablet    Take 1 tablet (10 mg) by mouth daily    Hyperlipidemia LDL goal <100       vitamin D 2000 UNITS tablet      Take 1 tablet by mouth daily.

## 2018-04-13 ENCOUNTER — RADIANT APPOINTMENT (OUTPATIENT)
Dept: CARDIOLOGY | Facility: CLINIC | Age: 58
End: 2018-04-13
Attending: INTERNAL MEDICINE
Payer: COMMERCIAL

## 2018-04-13 ENCOUNTER — TELEPHONE (OUTPATIENT)
Dept: CARDIOLOGY | Facility: CLINIC | Age: 58
End: 2018-04-13

## 2018-04-13 DIAGNOSIS — R06.09 DOE (DYSPNEA ON EXERTION): ICD-10-CM

## 2018-04-13 PROCEDURE — 93306 TTE W/DOPPLER COMPLETE: CPT

## 2018-04-13 NOTE — TELEPHONE ENCOUNTER
Spoke with patient at clinic today to review preparation prior to nuclear stress test (Lexiscan) scheduled for Apr 17 and to assess for any questions or concerns. Reminded patient to remain NPO for 3 hours prior to test with the exception of water, to remain free of caffeine (including decaf, chocolate or Excedrin) for 12 hr, to take all medications as scheduled especially for blood pressure. Pt verbalized understanding, all questions answered.

## 2018-04-13 NOTE — NURSING NOTE
Patient presents today for resting echo ordered by MD.   Echo Tech provided patient education.    Echo technician completed resting echo. Data transferred to [facility] for final interpretation through [employer].   Patient education provided about cardiology interpretation and primary provider will be notified of results.    Halle Florez RDCS

## 2018-04-27 ENCOUNTER — TELEPHONE (OUTPATIENT)
Dept: CARDIOLOGY | Facility: CLINIC | Age: 58
End: 2018-04-27

## 2018-04-27 NOTE — TELEPHONE ENCOUNTER
Called patient to review preparation prior to nuclear stress test (Lexiscan) scheduled for Apr 30 and to assess for any questions or concerns. Reminded patient to remain NPO for 3 hours prior to test with the exception of water, to remain free of caffeine (including decaf, chocolate or Excedrin) for 12 hr, to take all medications as scheduled especially for blood pressure. Pt verbalized understanding, all questions answered.

## 2018-04-30 ENCOUNTER — RADIANT APPOINTMENT (OUTPATIENT)
Dept: NUCLEAR MEDICINE | Facility: CLINIC | Age: 58
End: 2018-04-30
Attending: INTERNAL MEDICINE
Payer: COMMERCIAL

## 2018-04-30 ENCOUNTER — OFFICE VISIT (OUTPATIENT)
Dept: CARDIOLOGY | Facility: CLINIC | Age: 58
End: 2018-04-30
Attending: INTERNAL MEDICINE
Payer: COMMERCIAL

## 2018-04-30 VITALS — SYSTOLIC BLOOD PRESSURE: 116 MMHG | DIASTOLIC BLOOD PRESSURE: 77 MMHG | HEART RATE: 77 BPM

## 2018-04-30 DIAGNOSIS — R06.09 DOE (DYSPNEA ON EXERTION): ICD-10-CM

## 2018-04-30 DIAGNOSIS — R06.09 DOE (DYSPNEA ON EXERTION): Primary | ICD-10-CM

## 2018-04-30 PROCEDURE — 78452 HT MUSCLE IMAGE SPECT MULT: CPT | Performed by: INTERNAL MEDICINE

## 2018-04-30 PROCEDURE — 93016 CV STRESS TEST SUPVJ ONLY: CPT

## 2018-04-30 PROCEDURE — A9502 TC99M TETROFOSMIN: HCPCS | Performed by: INTERNAL MEDICINE

## 2018-04-30 PROCEDURE — 93017 CV STRESS TEST TRACING ONLY: CPT

## 2018-04-30 RX ORDER — REGADENOSON 0.08 MG/ML
0.4 INJECTION, SOLUTION INTRAVENOUS ONCE
Status: COMPLETED | OUTPATIENT
Start: 2018-04-30 | End: 2018-04-30

## 2018-04-30 RX ADMIN — REGADENOSON 0.4 MG: 0.08 INJECTION, SOLUTION INTRAVENOUS at 09:37

## 2018-04-30 NOTE — MR AVS SNAPSHOT
MRN:[id number]                      After Visit Summary   4/30/2018    Maliha Pedro    MRN: [id number]           Visit Information        Provider Department      4/30/2018 9:45 AM MG CV TECH; MG PC CARD; MG IMAGING NURSE; MG STRESS RM [facility]        Your next 10 appointments already scheduled     Apr 30, 2018  9:45 AM CDT   (Arrive by 9:30 AM)   Ekg Stress Nm Lexiscan with MG STRESS RM, MG IMAGING NURSE, MG PC CARD, MG CV TECH   [facility])    [address] [place] [id number] [phone]            Apr 30, 2018 10:15 AM CDT   (Arrive by 10:00 AM)   NM MPI WITH LEXISCAN with MGNM1   [facility])    [address] [place] [id number] [phone]           For a ONE day exam: Allow 3-4 hours for test. For a TWO day exam: Allow  minutes PER day for test.  On the day of your resting scan: Please stop eating 3 hours before the test. You may drink water or juice.  On the day of your stress test: Stop all caffeine 12 hours before the test. This includes coffee, tea, soda pop, chocolate and certain medicines (such as Anacin, Excedrin and NoDoz). Also avoid decaf coffee and tea, as these contain small amounts of caffeine.  Stop eating 3 hours before the test. You may drink water.  You may need to stop some medicines before the test. Follow your doctor s orders. - If you take a beta blocker: Do not take your beta-blocker on the day before your test, unless specifically told to by your doctor. And do not take it on the day of your test. Bring it with you to take after the test. - If you take Aggrenox or dipyridamole (Persantine, Permole), stop taking it 48 hours before your test. - If you take Viagra, Cialis or Levitra, stop taking it 48 hours before your test. - If you take theophylline or aminophylline, stop taking it 12 hours before your test.  Do  not take nitrates on the day of your test. Do not wear your Nitro-Patch.  Please wear a loose two-piece outfit. If you will have an exercise test, bring rubber-soled walking shoes.  When you arrive, please tell us if you have a pacemaker or ICD (implantable defibrillator).  Please call your Imaging Department at your exam site with any questions.            May 09, 2018  8:45 AM CDT   VISUAL FIELD with [facility] EYE VISUAL FIELD   Eye Clinic ([facility])    [address] [address]   [phone]            May 09, 2018  9:15 AM CDT   RETURN GLAUCOMA with Umu Rosario MD   Eye Clinic ([facility])    [address] [address] [phone]            May 11, 2018  8:40 AM CDT   (Arrive by 8:25 AM)   RETURN DIABETES with Shai Dong MD   [facility] ([facility])    [address] [id number] [phone]              Numari Information     Numari gives you secure access to your electronic health record. If you see a primary care provider, you can also send messages to your care team and make appointments. If you have questions, please call your primary care clinic.  If you do not have a primary care provider, please call [phone] and they will assist you.      Numari is an electronic gateway that provides easy, online access to your medical records. With [employer], you can request a clinic appointment, read your test results, renew a prescription or communicate with your care team.     To access your existing account, please contact your [facility] or call [phone] for assistance.        Care EveryWhere ID     This is your Care EveryWhere ID. This could be used by other organizations to access your [facility] medical records  [id number]        Equal Access to  Services     [facility]: Yohana Khan, wakinseyda luqadaha, qaybta kaalradha fernandez, poly roberts. [facility] [phone].    ATENCIÓN: Si habla español, tiene a ford disposición servicios gratuitos de asistencia lingüística. Llame al [phone].    We comply with applicable federal civil rights laws and [place] laws. We do not discriminate on the basis of race, color, national origin, age, disability, sex, sexual orientation, or gender identity.

## 2018-05-09 ENCOUNTER — OFFICE VISIT (OUTPATIENT)
Dept: OPHTHALMOLOGY | Facility: CLINIC | Age: 58
End: 2018-05-09
Attending: OPHTHALMOLOGY
Payer: COMMERCIAL

## 2018-05-09 DIAGNOSIS — H40.003 BORDERLINE GLAUCOMA, BILATERAL: ICD-10-CM

## 2018-05-09 DIAGNOSIS — H40.9 GLAUCOMA: Primary | ICD-10-CM

## 2018-05-09 PROCEDURE — G0463 HOSPITAL OUTPT CLINIC VISIT: HCPCS | Mod: ZF

## 2018-05-09 PROCEDURE — 92083 EXTENDED VISUAL FIELD XM: CPT | Mod: ZF | Performed by: OPHTHALMOLOGY

## 2018-05-09 ASSESSMENT — REFRACTION_WEARINGRX
OD_AXIS: 135
SPECS_TYPE: PAL
OD_SPHERE: -3.25
OS_AXIS: 090
OS_SPHERE: -4.00
OD_ADD: +2.50
OD_CYLINDER: +0.50
OS_ADD: +2.50
OS_CYLINDER: +1.50

## 2018-05-09 ASSESSMENT — TONOMETRY
OD_IOP_MMHG: 17
OS_IOP_MMHG: 17
IOP_METHOD: TONOPEN

## 2018-05-09 ASSESSMENT — VISUAL ACUITY
METHOD: SNELLEN - LINEAR
OD_CC: 20/20
OS_CC: 20/20
CORRECTION_TYPE: GLASSES

## 2018-05-09 ASSESSMENT — EXTERNAL EXAM - LEFT EYE: OS_EXAM: DERMATOCHALASIS

## 2018-05-09 ASSESSMENT — EXTERNAL EXAM - RIGHT EYE: OD_EXAM: DERMATOCHALASIS

## 2018-05-09 ASSESSMENT — CUP TO DISC RATIO
OD_RATIO: 0.9
OS_RATIO: 0.7

## 2018-05-09 ASSESSMENT — SLIT LAMP EXAM - LIDS
COMMENTS: MEIBOMIAN GLAND DYSFUNCTION
COMMENTS: MEIBOMIAN GLAND DYSFUNCTION

## 2018-05-09 ASSESSMENT — CONF VISUAL FIELD
OS_NORMAL: 1
OD_NORMAL: 1

## 2018-05-09 NOTE — MR AVS SNAPSHOT
After Visit Summary   5/9/2018    Maliha Pedro    MRN: [id number]           Patient Information     Date Of Birth          1960        Visit Information        Provider Department      5/9/2018 9:15 AM [facility]ario [facility]        Today's Diagnoses     Glaucoma    -  1    Borderline glaucoma, bilateral [H40.003]           Follow-ups after your visit        Follow-up notes from your care team     Return in about 6 months (around 11/9/2018) for OCT rnfl.      Your next 10 appointments already scheduled     May 11, 2018  8:40 AM CDT   (Arrive by 8:25 AM)   RETURN DIABETES with Shai Dong MD   [facility] Endocrinology ([facility])    [address]  [address] [facility] [address] [phone]            Jun 27, 2018  8:45 AM CDT   RETURN RETINA with Lesly Tracy MD   [facility] Clinic ([facility])    [address]  [facility] [id number] [phone]            Nov 14, 2018  8:45 AM CST   RETURN GLAUCOMA with Umu Rosario MD   [facility] Clinic ([facility])    [address]  [facility] [address] [phone]              Who to contact     Please call your clinic at [phone] to:    Ask questions about your health    Make or cancel appointments    Discuss your medicines    Learn about your test results    Speak to your doctor            Additional Information About Your Visit        GigDropperhart Information     V[employer] Vio gives you secure access to your electronic health record. If you see a primary care provider, you can also send messages to your care team and make appointments. If you have questions, please call your primary care clinic.  If you do not have a primary care provider, please call [phone] and they will assist you.      [employer] V[employer] is an electronic gateway that provides easy, online access to your medical  records. With Cell[employer], you can request a clinic appointment, read your test results, renew a prescription or communicate with your care team.     To access your existing account, please contact your [facility] or call [phone] for assistance.        Care EveryWhere ID     This is your Care EveryWhere ID. This could be used by other organizations to access your [facility] medical records  [id number]        Your Vitals Were     Last Period                   03/19/2009            Blood Pressure from Last 3 Encounters:   04/30/18 116/77   03/13/18 127/77   02/02/18 119/79    Weight from Last 3 Encounters:   03/13/18 85.6 kg (188 lb 11.2 oz)   02/02/18 84.7 kg (186 lb 11.2 oz)   01/29/18 85.3 kg (188 lb)              We Performed the Following     OVF 24-2 Dynamic OU        Primary Care Provider Office Phone # Fax #    Elva Ortiz [phone] [address]  [facility] [address]        Equal Access to Services     [facility]: Hadii aad ku hadasho Soomaali, waaxda luqadaha, qaybta kaalmada adeegyada, waxkristin izaguirre haycindi harrison . So [facility] [phone].    ATENCIÓN: Si habla español, tiene a ford disposición servicios gratuitos de asistencia lingüística. Llame al [phone].    We comply with applicable federal civil rights laws and [place] laws. We do not discriminate on the basis of race, color, national origin, age, disability, sex, sexual orientation, or gender identity.            Thank you!     Thank you for choosing EYE CLINIC  for your care. Our goal is always to provide you with excellent care. Hearing back from our patients is one way we can continue to improve our services. Please take a few minutes to complete the written survey that you may receive in the mail after your visit with us. Thank you!             Your Updated Medication List - Protect others around you: Learn how to safely use, store and throw away your  medicines at www.disposemymeds.org.          This list is accurate as of 5/9/18 10:40 AM.  Always use your most recent med list.                   Brand Name Dispense Instructions for use Diagnosis    ACCU-CHEK VANESA PLUS test strip   Generic drug:  blood glucose monitoring     360 strip    Test four times a day    Type 2 diabetes mellitus with hyperglycemia, with long-term current use of insulin (H)       albuterol 108 (90 Base) MCG/ACT Inhaler    PROAIR HFA/PROVENTIL HFA/VENTOLIN HFA    1 Inhaler    Inhale 2 puffs into the lungs every 4 hours as needed for shortness of breath / dyspnea    Cough       amLODIPine 5 MG tablet    NORVASC    90 tablet    Take 1 tablet (5 mg) by mouth daily    Essential hypertension, Type 2 diabetes mellitus with diabetic nephropathy, with long-term current use of insulin (H)       aspirin EC 81 MG EC tablet      Take 325 mg by mouth daily        blood glucose monitoring lancets     5 Box    Use to test blood sugar 3 times daily or as directed.    Type 2 diabetes mellitus with hyperglycemia, with long-term current use of insulin (H)       blood glucose monitoring meter device kit    no brand specified    1 kit    Use to test blood sugar 4 times daily or as directed.    Type 2 diabetes mellitus with hyperglycemia, with long-term current use of insulin (H)       CALCIUM + D PO      Take 1 tablet by mouth 2 times daily.        empagliflozin 25 MG Tabs tablet    JARDIANCE    90 tablet    Take 1 tablet (25 mg) by mouth daily    Type 2 diabetes mellitus with diabetic nephropathy, with long-term current use of insulin (H)       fexofenadine 180 MG tablet    ALLEGRA    90 tablet    Take 1 tablet by mouth daily. 1 TABLET DAILY Failed claritin for symptom cotrol. Zyrtec increases heart rate.    Seasonal allergic rhinitis       fish oil-omega-3 fatty acids 1000 MG capsule      Take 1 capsule by mouth daily.        fluticasone 50 MCG/ACT spray    FLONASE    16 g    Spray 1-2 sprays into both  nostrils daily    Seasonal allergic rhinitis, unspecified chronicity, unspecified trigger       GLUCOSAMINE CHONDR COMPLEX PO           hydrochlorothiazide 12.5 MG Tabs tablet     90 tablet    Take 1 tablet (12.5 mg) by mouth daily    Essential hypertension, Type 2 diabetes mellitus with diabetic nephropathy, with long-term current use of insulin (H)       insulin aspart 100 UNIT/ML injection    NovoLOG FLEXPEN    120 mL    Uses  120 units daily subcutaneously    Diabetes mellitus, type 2 (H)       insulin glargine 100 UNIT/ML injection    LANTUS SOLOSTAR    75 mL    80 units daily    Type 2 diabetes mellitus with hyperglycemia, unspecified long term insulin use status (H)       losartan 100 MG tablet    COZAAR    90 tablet    TAKE ONE TABLET BY MOUTH EVERY DAY    Hypertension goal BP (blood pressure) < 140/90       magnesium 250 MG tablet     90 tablet    Take 1 tablet by mouth daily    Hypomagnesemia       metFORMIN 500 MG 24 hr tablet    GLUCOPHAGE-XR    360 tablet    Take 2 tabs twice a day    Type 2 diabetes mellitus with hyperglycemia, unspecified long term insulin use status (H)       MULTIVITAMIN PO      Take 1 tablet by mouth daily.        NEEDLES, ANY SIZE     1 Box    Pen needles for Victoza pen.    Type 2 diabetes, HbA1c goal < 7% (H)       NEEDLES, ANY SIZE     4 Box    Pen needles for Novolog insulin pen. Use to inject 5-6  times daily.    Type 2 diabetes mellitus with hyperglycemia, with long-term current use of insulin (H)       pantoprazole 40 MG EC tablet    PROTONIX    90 tablet    Take 1 tablet (40 mg) by mouth daily        pentoxifylline 400 MG CR tablet    TRENtal    180 tablet    TAKE ONE TABLET BY MOUTH TWICE A DAY    Type 2 diabetes mellitus with hyperglycemia, unspecified long term insulin use status (H)       rosuvastatin 10 MG tablet    CRESTOR    90 tablet    Take 1 tablet (10 mg) by mouth daily    Hyperlipidemia LDL goal <100       vitamin D 2000 units tablet      Take 1 tablet by mouth  daily.

## 2018-05-09 NOTE — PROGRESS NOTES
Interval history:     Ocular meds:  ATs as needed     Ocular history:  Cataract extraction / intraocular lens left eye 7-8 years ago     Octopus visual field 24-2    Stable both eyes     A/P:  1) Glaucoma suspect, bilateral  - large cup to disc ratio R>L  - favorable pachmetry 583/613    -IOP stable mid-upper teens today    2) Type 2 diabetes mellitus. A1c 9.1% (March 2017, had been ill). On insulin  - without retinopathy  - follows Dr. Tracy     3) Cataract, right eye  -still seeing well  -observe     3) Pseudophakia, left eye    Follow up 6 months with with OCT retinal nerve fiber layer     Attending Physician Attestation:  Complete documentation of historical and exam elements from today's encounter can be found in the full encounter summary report (not reduplicated in this progress note). I personally obtained the chief complaint(s) and history of present illness. I confirmed and edited asnecessary the review of systems, past medical/surgical history, family history, social history, and examination findings as documented by others; and I examined the patient myself. I personally reviewed the relevant tests, images, and reports as documented above. I formulated and edited as necessary the assessment and plan and discussed the findings and management plan with the patient and family.  - Umu Rosario MD 10:28 AM 5/9/2018

## 2018-05-09 NOTE — NURSING NOTE
Chief Complaints and History of Present Illnesses   Patient presents with     Follow Up For      Glaucoma suspect, bilateral     HPI    Affected eye(s):  Both   Symptoms:        Duration:  9 months   Frequency:  Constant       Do you have eye pain now?:  No      Comments:  Pt. States that she is doing well.  New glasses are working well.  No c/o comfort BE.  Savannah Chamberlain COT 9:05 AM May 9, 2018

## 2018-05-11 ENCOUNTER — OFFICE VISIT (OUTPATIENT)
Dept: ENDOCRINOLOGY | Facility: CLINIC | Age: 58
End: 2018-05-11
Payer: COMMERCIAL

## 2018-05-11 VITALS
DIASTOLIC BLOOD PRESSURE: 75 MMHG | HEART RATE: 76 BPM | BODY MASS INDEX: 31.47 KG/M2 | HEIGHT: 65 IN | WEIGHT: 188.9 LBS | SYSTOLIC BLOOD PRESSURE: 117 MMHG

## 2018-05-11 DIAGNOSIS — Z79.4 TYPE 2 DIABETES MELLITUS WITH DIABETIC NEPHROPATHY, WITH LONG-TERM CURRENT USE OF INSULIN (H): Primary | ICD-10-CM

## 2018-05-11 DIAGNOSIS — E11.21 TYPE 2 DIABETES MELLITUS WITH DIABETIC NEPHROPATHY, WITH LONG-TERM CURRENT USE OF INSULIN (H): Primary | ICD-10-CM

## 2018-05-11 LAB — HBA1C MFR BLD: 8.2 % (ref 4.3–6)

## 2018-05-11 NOTE — LETTER
5/11/2018       RE: Maliha Pedro  [address]  [place] [facility] [address]     Dear Colleague,    Thank you for referring your patient, Maliha Pedro, to the [facility] ENDOCRINOLOGY at [facility]. Please see a copy of my visit note below.    Endocrinology Clinic Visit 05/11/18  NAME:  Maliha Pedro  PCP:  Elva Ortiz  MRN:  [id number]  Reason for Consult:  Type 2 Diabetes  Requesting Provider:  Elva Ackerman*    Chief Complaint     Chief Complaint   Patient presents with     RECHECK     Type II Diabetes f/u        History of Present Illness     Maliha Pedro is a 58 year old female who is seen in clinic for diabetes management.   She is a former patient of Dr. Mccarthy who is transitioning care since his USP.   She has had diabetes since age 32. Has tried many different meds. Did not tolerate GLP-1 agonists. Stopped Actos due to concerns about adverse effects. Was on Lantus and Novolog with poor control, with recent improvement since addition of Jardiance November 2017. She has retinopathy and nephropathy.     Interval History:   A1c today is 8.2 today (down from 8.4)    Associated Signs/Symptoms  Hypoglycemia: no. Hyperglycemia: none.Neuropathy: none. Vascular Symtpoms: none. Angina/CHF: dyspnea on exertion. Ulcers: No. Amputations: No    Current treatment strategy: Novolog 20 with main meals, 10-15 with snack. Lantus 75 units daily. Metformin 1000 mg po BID, Jardiance 25 mg po daily    Blood Glucose Monitoring: did not bring meter     Diet: 2-3 meals per day. Occasional snacks.   Cut down on rice. Eating more vegetables and fish.     Exercise: yoga.     Weight:   Wt Readings from Last 4 Encounters:   05/11/18 85.7 kg (188 lb 14.4 oz)   03/13/18 85.6 kg (188 lb 11.2 oz)   02/02/18 84.7 kg (186 lb 11.2 oz)   01/29/18 85.3 kg (188 lb)     Problem List     Patient Active Problem List   Diagnosis     Type 2 diabetes mellitus  with hyperglycemia (H)     ? 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     Displacement of lumbar intervertebral disc without myelopathy     Nonallopathic lesion of lumbar region     Nonallopathic lesion of thoracic region     Nonallopathic lesion of sacral region     Bartholin gland cyst     Cataract     Irritable bowel syndrome     Esophageal reflux     Seasonal allergic rhinitis     Latex sensitivity     Colon polyp     Adenomatous polyp     Subclinical hyperthyroidism     HYPERLIPIDEMIA LDL GOAL <100     Ovarian cyst     Breast pain     Essential hypertension     Advanced directives, counseling/discussion     Obesity     Hypomagnesemia     Type 2 diabetes mellitus with hyperglycemia, with long-term current use of insulin (H)        Medications     Current Outpatient Prescriptions   Medication     ACCU-CHEK VANESA PLUS test strip     albuterol (PROAIR HFA, PROVENTIL HFA, VENTOLIN HFA) 108 (90 BASE) MCG/ACT inhaler     amLODIPine (NORVASC) 5 MG tablet     aspirin EC 81 MG tablet     blood glucose monitoring (NO BRAND SPECIFIED) meter device kit     blood glucose monitoring (SOFTCLIX) lancets     CALCIUM + D OR     empagliflozin (JARDIANCE) 25 MG TABS tablet     fexofenadine (ALLEGRA) 180 MG tablet     fish oil-omega-3 fatty acids (FISH OIL) 1000 MG capsule     fluticasone (FLONASE) 50 MCG/ACT spray     Glucosamine-Chondroitin (GLUCOSAMINE CHONDR COMPLEX PO)     hydrochlorothiazide 12.5 MG TABS tablet     insulin aspart (NOVOLOG FLEXPEN) 100 UNIT/ML injection     insulin glargine (LANTUS SOLOSTAR) 100 UNIT/ML injection     losartan (COZAAR) 100 MG tablet     magnesium 250 MG tablet     metFORMIN (GLUCOPHAGE-XR) 500 MG 24 hr tablet     MULTIVITAMIN OR     NEEDLES, ANY SIZE     NEEDLES, ANY SIZE     pantoprazole (PROTONIX) 40 MG EC tablet     pentoxifylline (TRENTAL) 400 MG CR tablet     rosuvastatin (CRESTOR) 10 MG tablet     VITAMIN D 2000 UNIT OR TABS     No current facility-administered  medications for this visit.      Facility-Administered Medications Ordered in Other Visits   Medication     fentaNYL (SUBLIMAZE) injection 25-50 mcg     flumazenil (ROMAZICON) injection 0.2 mg     midazolam (VERSED) injection 0.5-1 mg     naloxone (NARCAN) injection 0.1-0.4 mg        Allergies     Allergies   Allergen Reactions     Latex Swelling     Mold      Tetanus Antitoxin Swelling     Tetracycline Swelling       Medical / Surgical History     Past Medical History:   Diagnosis Date     Adjustment disorder with mixed anxiety and depressed mood     following death of her adoptive father and then again after adoptive mother      Allergic rhinitis, cause unspecified     uses benadryl prn (sneezing/hoarse voice)     Bartholin gland cyst 2008     Chest pain, unspecified     neg dobutamine stress test  (reacted to dobutamine)     Dry eyes      ERM OD (epiretinal membrane, right eye)     BE mild     Esophageal reflux     EGD: -neg     Glaucoma suspect      Hypertension      Irregular menstrual cycle     s/p D&C, since then more regular     Irritable bowel syndrome     colonoscopy -neg. Sx's especially prior to menses     MEDICAL HISTORY OF -     had health directive with : DANIEL Ford.  Full Code. Aunt (Charlie Tay) will be decision maker     Meningitis, unspecified(322.9) age 8    hospitalized for 9 mo     Need for prophylactic vaccination with tuberculosis (BCG) vaccine     Has pos Mantoux. Gets yearly cxr, all neg.      Other and unspecified hyperlipidemia      Pain in joint, shoulder region     bone spurs on MRI, s/p pool therapy     Type II or unspecified type diabetes mellitus without mention of complication, not stated as uncontrolled Age 32    Follows with endocrine at [facility]: Shayne Lane MD     Unspecified cataract     RE     Unspecified sinusitis (chronic)     2006     Wheezing     Has seen pulm  & . Told night ashtma, ? vocal chord spasm due to cold air.      Past  Surgical History:   Procedure Laterality Date     CATARACT IOL, RT/LT  2008    LE     HC DILATION/CURETTAGE DIAG/THER NON OB       HC TYMPANOPLASTY W/O MASTOID, INIT/REV W/O OSS CHAIN RECONST       YAG CAPSULOTOMY OS (LEFT EYE)  2011       Social History     Social History     Social History     Marital status: Single     Spouse name: N/A     Number of children: N/A     Years of education: N/A     Occupational History     Not on file.     Social History Main Topics     Smoking status: Never Smoker     Smokeless tobacco: Never Used     Alcohol use Yes      Comment: rarely     Drug use: No     Sexual activity: Not Currently     Other Topics Concern     Parent/Sibling W/ Cabg, Mi Or Angioplasty Before 65f 55m? 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 Moved to  in . Initially lived in [place].    No children        2 cats       Family History     Family History   Problem Relation Age of Onset     DIABETES Brother      CANCER Brother 59     Lung cancer     DIABETES Father       of dm 70's     C.A.D. Father      DIABETES Paternal Grandfather      DIABETES Sister      CANCER Maternal Aunt      ovarian cancer.      DIABETES Brother      DIABETES Brother      DIABETES Sister      Glaucoma Maternal Grandmother        ROS     Constitutional: no fevers, chills, night sweats. No weight loss or fatigue. Good appetite  Eyes: no vision changes, no eye redness, no diplopia  Ears, Nose, mouth, throat: no hearing changes, no tinnitus, no rhinorrhea, no nasal congestion  Cardiovascular: no  "chest pain, no orthopnea or PND, no edema, no palpitations  Respiratory: no dyspnea, no cough, no sputum, no wheezing  Gastrointestinal: no nausea, no vomiting, no abdominal pain, no diarrhea, no constipation  Genitourinary: no dysuria, no frequency, no urgency, no nocturia  Musculoskeletal: no joint pains, no back pain, no cramps, no fractures  Skin: no rash, no itching, no dryness, no ulcers, no hair loss  Neurological: no headache, no weakness, no numbness, no dizziness, no tremors  Psychiatric: no anxiety, no sadness  Hematologic/lymphatic: no easy bruising, no bleeding, no palor    Physical Exam   /75 (BP Location: Right arm, Patient Position: Sitting, Cuff Size: Adult Regular)  Pulse 76  Ht 1.638 m (5' 4.5\")  Wt 85.7 kg (188 lb 14.4 oz)  LMP 03/19/2009  BMI 31.92 kg/m2     General: Comfortable, no obvious distress, normal body habitus  Eyes: Sclera anicteric, moist conjunctiva  HENT: Atraumatic, oropharynx clear, moist mucous membranes with no mucosal ulcerations  Neck: Trachea midline, supple. Thyroid: Thyroid is normal in size and texture  CV: Regular rhythm, normal rate. No murmurs auscultated  Resp: Clear to auscultation bilaterally, good effort  Abdomen:  Soft, non tender, non distended. Bowel sounds heard. No organomegaly.  Skin: No rashes, lesions, or subcutaneous nodules.   Psych: Alert and oriented x 3. Appropriate affect, good insight  Extremities: No peripheral edema  Musculoskeletal: Appropriate muscle bulk and strength  Lymphatic: No cervical lymphadenopathy  Neuro: Moves all four extremities. No focal deficits on limited exam. Gait normal.       Labs/Imaging and Outside Records     Pertinent Labs were reviewed and updated in EPIC.    Summary of recent findings:   Lab Results   Component Value Date    A1C 9.8 12/16/2013    A1C 9.5 04/15/2013    A1C 9.6 01/16/2013    A1C 9.4 09/28/2012    A1C 8.3 06/22/2012       TSH   Date Value Ref Range Status   10/30/2017 1.29 0.40 - 4.00 mU/L Final "   02/08/2017 0.76 0.40 - 4.00 mU/L Final   11/17/2015 0.98 0.40 - 4.00 mU/L Final   05/04/2015 0.76 0.40 - 4.00 mU/L Final   12/16/2013 1.24 0.4 - 5.0 mU/L Final     T4 Free   Date Value Ref Range Status   06/21/2010 1.50 0.70 - 1.85 ng/dL Final       Creatinine   Date Value Ref Range Status   02/08/2017 0.68 0.52 - 1.04 mg/dL Final       Recent Labs   Lab Test  03/06/17   0905  05/04/15   1016  12/16/13   1028   CHOL  165  170  192   HDL  52  46*  42*   LDL  73  90  125   TRIG  199*  171*  124   CHOLHDLRATIO   --   3.7  4.6     Impression / Plan     1. Diabetes Mellitus: Type 2  Current glycemic control can be considered suboptimal.   It is slowly improving but no at goal. She did not bring her BG meter.   The patient is expecting an improvement in her activity and diet during the summer months.   I suggested that she may need a slight increase in Lantus, but we will defer this decision until the next visit, since she is expecting some improvements.     2. Diabetes Complications: With retinopathy and nephropathy.   Recent CAD workup: normal stress test and echo    3. Blood Pressure Management: Blood pressure is controlled. Currently is on pharmacotherapy for this.     4.Lipid Management: Per the new ACC/JILLIAN/NHLBI guidelines, statins are recommended for individuals with diabetes aged 40-75 with LDL  without ASCVD, and for any individual with ASCVD. Currently the patient is on a statin.     5. Smoking Status: Patient Pt is smoke free..     Follow up: 3 months      Shai Dong MD  Endocrinology, Diabetes and Metabolism  [facility]    Again, thank you for allowing me to participate in the care of your patient.      Sincerely,    Shai Dong MD

## 2018-05-11 NOTE — MR AVS SNAPSHOT
After Visit Summary   5/11/2018    Maliha Pedro    MRN: [id number]           Patient Information     Date Of Birth          1960        Visit Information        Provider Department      5/11/2018 8:40 AM Shai Dong MD [facility] Endocrinology        Today's Diagnoses     Diabetes mellitus, type 2 (H)    -  1       Follow-ups after your visit        Follow-up notes from your care team     Return in about 3 months (around 8/11/2018).      Your next 10 appointments already scheduled     Jun 27, 2018  8:45 AM CDT   RETURN RETINA with Lesly Tracy MD   Eye Clinic ([facility])    [facility]  [address]  [address] [address]  [facility] [address] [phone]            Aug 10, 2018  8:40 AM CDT   (Arrive by 8:25 AM)   RETURN DIABETES with Shai Dong MD   [facility] Endocrinology ([facility])    [address] [id number] [phone]            Sep 12, 2018  9:00 AM CDT   (Arrive by 8:45 AM)   RETURN DIABETES with Shai Dong MD   [facility] Endocrinology ([facility])    [address] [id number] [phone]            Nov 14, 2018  8:45 AM CST   RETURN GLAUCOMA with Umu Rosario MD   Eye Clinic ([facility])    [facility]  [address]  [address] [address]  [facility] [address] [phone]              Who to contact     Please call your clinic at [phone] to:    Ask questions about your health    Make or cancel appointments    Discuss your medicines    Learn about your test results    Speak to your doctor            Additional Information About Your Visit        MyChart Information     [employer] gives you secure access to your electronic health record. If you see a primary care provider, you can also send messages to your care team and make appointments. If you have questions,  "please call your primary care clinic.  If you do not have a primary care provider, please call [phone] and they will assist you.      [employer] is an electronic gateway that provides easy, online access to your medical records. With [employer], you can request a clinic appointment, read your test results, renew a prescription or communicate with your care team.     To access your existing account, please contact your [facility] or call [phone] for assistance.        Care EveryWhere ID     This is your Care EveryWhere ID. This could be used by other organizations to access your [place] medical records  [id number]        Your Vitals Were     Pulse Height Last Period BMI (Body Mass Index)          76 1.638 m (5' 4.5\") 03/19/2009 31.92 kg/m2         Blood Pressure from Last 3 Encounters:   05/11/18 117/75   04/30/18 116/77   03/13/18 127/77    Weight from Last 3 Encounters:   05/11/18 85.7 kg (188 lb 14.4 oz)   03/13/18 85.6 kg (188 lb 11.2 oz)   02/02/18 84.7 kg (186 lb 11.2 oz)              We Performed the Following     Hemoglobin A1c POCT        Primary Care Provider Office Phone # Fax #    Elva Ortiz [phone] [address]  [facility] [address]        Equal Access to Services     [facility]: Hadii aad ku hadasho Soomaali, waaxda luqadaha, qaybta kaalmada adeegyada, poly harrison . So [facility] [phone].    ATENCIÓN: Si habla español, tiene a ford disposición servicios gratuitos de asistencia lingüística. Llame al [phone].    We comply with applicable federal civil rights laws and [place] laws. We do not discriminate on the basis of race, color, national origin, age, disability, sex, sexual orientation, or gender identity.            Thank you!     Thank you for choosing [facility]  for your care. Our goal is always to provide you with excellent care. Hearing back from our patients is " one way we can continue to improve our services. Please take a few minutes to complete the written survey that you may receive in the mail after your visit with us. Thank you!             Your Updated Medication List - Protect others around you: Learn how to safely use, store and throw away your medicines at www.disposemymeds.org.          This list is accurate as of 5/11/18  9:19 AM.  Always use your most recent med list.                   Brand Name Dispense Instructions for use Diagnosis    ACCU-CHEK VANESA PLUS test strip   Generic drug:  blood glucose monitoring     360 strip    Test four times a day    Type 2 diabetes mellitus with hyperglycemia, with long-term current use of insulin (H)       albuterol 108 (90 Base) MCG/ACT Inhaler    PROAIR HFA/PROVENTIL HFA/VENTOLIN HFA    1 Inhaler    Inhale 2 puffs into the lungs every 4 hours as needed for shortness of breath / dyspnea    Cough       amLODIPine 5 MG tablet    NORVASC    90 tablet    Take 1 tablet (5 mg) by mouth daily    Essential hypertension, Type 2 diabetes mellitus with diabetic nephropathy, with long-term current use of insulin (H)       aspirin 81 MG EC tablet      Take 325 mg by mouth daily        blood glucose monitoring lancets     5 Box    Use to test blood sugar 3 times daily or as directed.    Type 2 diabetes mellitus with hyperglycemia, with long-term current use of insulin (H)       blood glucose monitoring meter device kit    no brand specified    1 kit    Use to test blood sugar 4 times daily or as directed.    Type 2 diabetes mellitus with hyperglycemia, with long-term current use of insulin (H)       CALCIUM + D PO      Take 1 tablet by mouth 2 times daily.        empagliflozin 25 MG Tabs tablet    JARDIANCE    90 tablet    Take 1 tablet (25 mg) by mouth daily    Type 2 diabetes mellitus with diabetic nephropathy, with long-term current use of insulin (H)       fexofenadine 180 MG tablet    ALLEGRA    90 tablet    Take 1 tablet by mouth  daily. 1 TABLET DAILY Failed claritin for symptom cotrol. Zyrtec increases heart rate.    Seasonal allergic rhinitis       fish oil-omega-3 fatty acids 1000 MG capsule      Take 1 capsule by mouth daily.        fluticasone 50 MCG/ACT spray    FLONASE    16 g    Spray 1-2 sprays into both nostrils daily    Seasonal allergic rhinitis, unspecified chronicity, unspecified trigger       GLUCOSAMINE CHONDR COMPLEX PO           hydrochlorothiazide 12.5 MG Tabs tablet     90 tablet    Take 1 tablet (12.5 mg) by mouth daily    Essential hypertension, Type 2 diabetes mellitus with diabetic nephropathy, with long-term current use of insulin (H)       insulin aspart 100 UNIT/ML injection    NovoLOG FLEXPEN    120 mL    Uses  120 units daily subcutaneously    Diabetes mellitus, type 2 (H)       insulin glargine 100 UNIT/ML injection    LANTUS SOLOSTAR    75 mL    80 units daily    Type 2 diabetes mellitus with hyperglycemia, unspecified long term insulin use status (H)       losartan 100 MG tablet    COZAAR    90 tablet    TAKE ONE TABLET BY MOUTH EVERY DAY    Hypertension goal BP (blood pressure) < 140/90       magnesium 250 MG tablet     90 tablet    Take 1 tablet by mouth daily    Hypomagnesemia       metFORMIN 500 MG 24 hr tablet    GLUCOPHAGE-XR    360 tablet    Take 2 tabs twice a day    Type 2 diabetes mellitus with hyperglycemia, unspecified long term insulin use status (H)       MULTIVITAMIN PO      Take 1 tablet by mouth daily.        NEEDLES, ANY SIZE     1 Box    Pen needles for Victoza pen.    Type 2 diabetes, HbA1c goal < 7% (H)       NEEDLES, ANY SIZE     4 Box    Pen needles for Novolog insulin pen. Use to inject 5-6  times daily.    Type 2 diabetes mellitus with hyperglycemia, with long-term current use of insulin (H)       pantoprazole 40 MG EC tablet    PROTONIX    90 tablet    Take 1 tablet (40 mg) by mouth daily        pentoxifylline 400 MG CR tablet    TRENtal    180 tablet    TAKE ONE TABLET BY MOUTH TWICE  A DAY    Type 2 diabetes mellitus with hyperglycemia, unspecified long term insulin use status (H)       rosuvastatin 10 MG tablet    CRESTOR    90 tablet    Take 1 tablet (10 mg) by mouth daily    Hyperlipidemia LDL goal <100       vitamin D 2000 units tablet      Take 1 tablet by mouth daily.

## 2018-05-11 NOTE — PROGRESS NOTES
Endocrinology Clinic Visit 05/11/18  NAME:  Maliha Pedro  PCP:  Elva Ortiz  MRN:  [id number]  Reason for Consult:  Type 2 Diabetes  Requesting Provider:  Elva Ackerman*    Chief Complaint     Chief Complaint   Patient presents with     RECHECK     Type II Diabetes f/u        History of Present Illness     Maliha Pedro is a 58 year old female who is seen in clinic for diabetes management.   She is a former patient of Dr. Mccarthy who is transitioning care since his senior care.   She has had diabetes since age 32. Has tried many different meds. Did not tolerate GLP-1 agonists. Stopped Actos due to concerns about adverse effects. Was on Lantus and Novolog with poor control, with recent improvement since addition of Jardiance November 2017. She has retinopathy and nephropathy.     Interval History:   A1c today is 8.2 today (down from 8.4)    Associated Signs/Symptoms  Hypoglycemia: no. Hyperglycemia: none.Neuropathy: none. Vascular Symtpoms: none. Angina/CHF: dyspnea on exertion. Ulcers: No. Amputations: No    Current treatment strategy: Novolog 20 with main meals, 10-15 with snack. Lantus 75 units daily. Metformin 1000 mg po BID, Jardiance 25 mg po daily    Blood Glucose Monitoring: did not bring meter     Diet: 2-3 meals per day. Occasional snacks.   Cut down on rice. Eating more vegetables and fish.     Exercise: yoga.     Weight:   Wt Readings from Last 4 Encounters:   05/11/18 85.7 kg (188 lb 14.4 oz)   03/13/18 85.6 kg (188 lb 11.2 oz)   02/02/18 84.7 kg (186 lb 11.2 oz)   01/29/18 85.3 kg (188 lb)     Problem List     Patient Active Problem List   Diagnosis     Type 2 diabetes mellitus with hyperglycemia (H)     ? 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     Displacement of lumbar intervertebral disc without myelopathy     Nonallopathic lesion of lumbar region     Nonallopathic lesion of thoracic region     Nonallopathic lesion of sacral region     [place]  gland cyst     Cataract     Irritable bowel syndrome     Esophageal reflux     Seasonal allergic rhinitis     Latex sensitivity     Colon polyp     Adenomatous polyp     Subclinical hyperthyroidism     HYPERLIPIDEMIA LDL GOAL <100     Ovarian cyst     Breast pain     Essential hypertension     Advanced directives, counseling/discussion     Obesity     Hypomagnesemia     Type 2 diabetes mellitus with hyperglycemia, with long-term current use of insulin (H)        Medications     Current Outpatient Prescriptions   Medication     ACCU-CHEK VANESA PLUS test strip     albuterol (PROAIR HFA, PROVENTIL HFA, VENTOLIN HFA) 108 (90 BASE) MCG/ACT inhaler     amLODIPine (NORVASC) 5 MG tablet     aspirin EC 81 MG tablet     blood glucose monitoring (NO BRAND SPECIFIED) meter device kit     blood glucose monitoring (SOFTCLIX) lancets     CALCIUM + D OR     empagliflozin (JARDIANCE) 25 MG TABS tablet     fexofenadine (ALLEGRA) 180 MG tablet     fish oil-omega-3 fatty acids (FISH OIL) 1000 MG capsule     fluticasone (FLONASE) 50 MCG/ACT spray     Glucosamine-Chondroitin (GLUCOSAMINE CHONDR COMPLEX PO)     hydrochlorothiazide 12.5 MG TABS tablet     insulin aspart (NOVOLOG FLEXPEN) 100 UNIT/ML injection     insulin glargine (LANTUS SOLOSTAR) 100 UNIT/ML injection     losartan (COZAAR) 100 MG tablet     magnesium 250 MG tablet     metFORMIN (GLUCOPHAGE-XR) 500 MG 24 hr tablet     MULTIVITAMIN OR     NEEDLES, ANY SIZE     NEEDLES, ANY SIZE     pantoprazole (PROTONIX) 40 MG EC tablet     pentoxifylline (TRENTAL) 400 MG CR tablet     rosuvastatin (CRESTOR) 10 MG tablet     VITAMIN D 2000 UNIT OR TABS     No current facility-administered medications for this visit.      Facility-Administered Medications Ordered in Other Visits   Medication     fentaNYL (SUBLIMAZE) injection 25-50 mcg     flumazenil (ROMAZICON) injection 0.2 mg     midazolam (VERSED) injection 0.5-1 mg     naloxone (NARCAN) injection 0.1-0.4 mg        Allergies      Allergies   Allergen Reactions     Latex Swelling     Mold      Tetanus Antitoxin Swelling     Tetracycline Swelling       Medical / Surgical History     Past Medical History:   Diagnosis Date     Adjustment disorder with mixed anxiety and depressed mood     following death of her adoptive father and then again after adoptive mother      Allergic rhinitis, cause unspecified     uses benadryl prn (sneezing/hoarse voice)     Bartholin gland cyst 2008     Chest pain, unspecified     neg dobutamine stress test  (reacted to dobutamine)     Dry eyes      ERM OD (epiretinal membrane, right eye)     BE mild     Esophageal reflux     EGD: -neg     Glaucoma suspect      Hypertension      Irregular menstrual cycle     s/p D&C, since then more regular     Irritable bowel syndrome     colonoscopy -neg. Sx's especially prior to menses     MEDICAL HISTORY OF -     had health directive with : DANIEL Ford.  Full Code. Aunt (Charlie Tay) will be decision maker     Meningitis, unspecified(322.9) age 8    hospitalized for 9 mo     Need for prophylactic vaccination with tuberculosis (BCG) vaccine     Has pos Mantoux. Gets yearly cxr, all neg.      Other and unspecified hyperlipidemia      Pain in joint, shoulder region     bone spurs on MRI, s/p pool therapy     Type II or unspecified type diabetes mellitus without mention of complication, not stated as uncontrolled Age 32    Follows with endocrine at [facility]: Shayne Lane MD     Unspecified cataract     RE     Unspecified sinusitis (chronic)     2006     Wheezing     Has seen pulm  & . Told night ashtma, ? vocal chord spasm due to cold air.      Past Surgical History:   Procedure Laterality Date     CATARACT IOL, RT/LT  2008    LE     HC DILATION/CURETTAGE DIAG/THER NON OB       HC TYMPANOPLASTY W/O MASTOID, INIT/REV W/O OSS CHAIN RECONST       YAG CAPSULOTOMY OS (LEFT EYE)  2011       Social History     Social History      Social History     Marital status: Single     Spouse name: N/A     Number of children: N/A     Years of education: N/A     Occupational History     Not on file.     Social History Main Topics     Smoking status: Never Smoker     Smokeless tobacco: Never Used     Alcohol use Yes      Comment: rarely     Drug use: No     Sexual activity: Not Currently     Other Topics Concern     Parent/Sibling W/ Cabg, Mi Or Angioplasty Before 65f 55m? 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 Moved to  in . Initially lived in [place].    No children        2 cats       Family History     Family History   Problem Relation Age of Onset     DIABETES Brother      CANCER Brother 59     Lung cancer     DIABETES Father       of dm 70's     C.A.D. Father      DIABETES Paternal Grandfather      DIABETES Sister      CANCER Maternal Aunt      ovarian cancer.      DIABETES Brother      DIABETES Brother      DIABETES Sister      Glaucoma Maternal Grandmother        ROS     Constitutional: no fevers, chills, night sweats. No weight loss or fatigue. Good appetite  Eyes: no vision changes, no eye redness, no diplopia  Ears, Nose, mouth, throat: no hearing changes, no tinnitus, no rhinorrhea, no nasal congestion  Cardiovascular: no chest pain, no orthopnea or PND, no edema, no palpitations  Respiratory: no dyspnea, no cough, no sputum, no wheezing  Gastrointestinal: no nausea, no vomiting, no abdominal pain, no diarrhea, no constipation  Genitourinary: no dysuria, no frequency, no urgency, no nocturia  Musculoskeletal:  "no joint pains, no back pain, no cramps, no fractures  Skin: no rash, no itching, no dryness, no ulcers, no hair loss  Neurological: no headache, no weakness, no numbness, no dizziness, no tremors  Psychiatric: no anxiety, no sadness  Hematologic/lymphatic: no easy bruising, no bleeding, no palor    Physical Exam   /75 (BP Location: Right arm, Patient Position: Sitting, Cuff Size: Adult Regular)  Pulse 76  Ht 1.638 m (5' 4.5\")  Wt 85.7 kg (188 lb 14.4 oz)  LMP 03/19/2009  BMI 31.92 kg/m2     General: Comfortable, no obvious distress, normal body habitus  Eyes: Sclera anicteric, moist conjunctiva  HENT: Atraumatic, oropharynx clear, moist mucous membranes with no mucosal ulcerations  Neck: Trachea midline, supple. Thyroid: Thyroid is normal in size and texture  CV: Regular rhythm, normal rate. No murmurs auscultated  Resp: Clear to auscultation bilaterally, good effort  Abdomen:  Soft, non tender, non distended. Bowel sounds heard. No organomegaly.  Skin: No rashes, lesions, or subcutaneous nodules.   Psych: Alert and oriented x 3. Appropriate affect, good insight  Extremities: No peripheral edema  Musculoskeletal: Appropriate muscle bulk and strength  Lymphatic: No cervical lymphadenopathy  Neuro: Moves all four extremities. No focal deficits on limited exam. Gait normal.       Labs/Imaging and Outside Records     Pertinent Labs were reviewed and updated in EPIC.    Summary of recent findings:   Lab Results   Component Value Date    A1C 9.8 12/16/2013    A1C 9.5 04/15/2013    A1C 9.6 01/16/2013    A1C 9.4 09/28/2012    A1C 8.3 06/22/2012       TSH   Date Value Ref Range Status   10/30/2017 1.29 0.40 - 4.00 mU/L Final   02/08/2017 0.76 0.40 - 4.00 mU/L Final   11/17/2015 0.98 0.40 - 4.00 mU/L Final   05/04/2015 0.76 0.40 - 4.00 mU/L Final   12/16/2013 1.24 0.4 - 5.0 mU/L Final     T4 Free   Date Value Ref Range Status   06/21/2010 1.50 0.70 - 1.85 ng/dL Final       Creatinine   Date Value Ref Range Status "   02/08/2017 0.68 0.52 - 1.04 mg/dL Final       Recent Labs   Lab Test  03/06/17   0905  05/04/15   1016  12/16/13   1028   CHOL  165  170  192   HDL  52  46*  42*   LDL  73  90  125   TRIG  199*  171*  124   CHOLHDLRATIO   --   3.7  4.6     Impression / Plan     1. Diabetes Mellitus: Type 2  Current glycemic control can be considered suboptimal.   It is slowly improving but no at goal. She did not bring her BG meter.   The patient is expecting an improvement in her activity and diet during the summer months.   I suggested that she may need a slight increase in Lantus, but we will defer this decision until the next visit, since she is expecting some improvements.     2. Diabetes Complications: With retinopathy and nephropathy.   Recent CAD workup: normal stress test and echo    3. Blood Pressure Management: Blood pressure is controlled. Currently is on pharmacotherapy for this.     4.Lipid Management: Per the new ACC/JILLIAN/NHLBI guidelines, statins are recommended for individuals with diabetes aged 40-75 with LDL  without ASCVD, and for any individual with ASCVD. Currently the patient is on a statin.     5. Smoking Status: Patient Pt is smoke free..     Follow up: 3 months      Shai Dong MD  Endocrinology, Diabetes and Metabolism  [facility]

## 2018-05-24 DIAGNOSIS — E11.21 TYPE 2 DIABETES MELLITUS WITH DIABETIC NEPHROPATHY, WITH LONG-TERM CURRENT USE OF INSULIN (H): ICD-10-CM

## 2018-05-24 DIAGNOSIS — Z79.4 TYPE 2 DIABETES MELLITUS WITH DIABETIC NEPHROPATHY, WITH LONG-TERM CURRENT USE OF INSULIN (H): ICD-10-CM

## 2018-05-24 DIAGNOSIS — I10 ESSENTIAL HYPERTENSION: ICD-10-CM

## 2018-05-25 ENCOUNTER — TELEPHONE (OUTPATIENT)
Dept: FAMILY MEDICINE | Facility: CLINIC | Age: 58
End: 2018-05-25

## 2018-05-25 DIAGNOSIS — Z79.4 TYPE 2 DIABETES MELLITUS WITH HYPERGLYCEMIA, WITH LONG-TERM CURRENT USE OF INSULIN (H): Primary | ICD-10-CM

## 2018-05-25 DIAGNOSIS — I10 HYPERTENSION GOAL BP (BLOOD PRESSURE) < 140/90: ICD-10-CM

## 2018-05-25 DIAGNOSIS — E11.65 TYPE 2 DIABETES MELLITUS WITH HYPERGLYCEMIA, WITH LONG-TERM CURRENT USE OF INSULIN (H): Primary | ICD-10-CM

## 2018-05-25 RX ORDER — LOSARTAN POTASSIUM 100 MG/1
TABLET ORAL
Qty: 90 TABLET | Refills: 0 | Status: SHIPPED | OUTPATIENT
Start: 2018-05-25 | End: 2018-08-06

## 2018-05-25 NOTE — TELEPHONE ENCOUNTER
Routing refill request to provider for review/approval because:  Labs not current:  Potassium, creatinine    Reta Hathaway RN, BSN

## 2018-05-25 NOTE — TELEPHONE ENCOUNTER
Please send  mychart to patient, she is due for fasting labs   Inge palomino sent  Placed lab orders.

## 2018-05-25 NOTE — TELEPHONE ENCOUNTER
"Requested Prescriptions   Pending Prescriptions Disp Refills     losartan (COZAAR) 100 MG tablet [Pharmacy Med Name: LOSARTAN POTASSIUM 100MG TABS]  Last Written Prescription Date:  3/9/17  Last Fill Quantity: 90 tablet,  # refills: 3   Last office visit: 1/29/2018 with prescribing provider:  Dr. Ortiz  Future Office Visit:   90 tablet 3     Sig: TAKE ONE TABLET BY MOUTH EVERY DAY    Angiotensin-II Receptors Failed    5/25/2018 11:35 AM       Failed - Normal serum creatinine on file in past 12 months    Recent Labs   Lab Test  02/08/17   1754   CR  0.68            Failed - Normal serum potassium on file in past 12 months    Recent Labs   Lab Test  02/08/17   1754   POTASSIUM  4.0                   Passed - Blood pressure under 140/90 in past 12 months    BP Readings from Last 3 Encounters:   05/11/18 117/75   04/30/18 116/77   03/13/18 127/77                Passed - Recent (12 mo) or future (30 days) visit within the authorizing provider's specialty    Patient had office visit in the last 12 months or has a visit in the next 30 days with authorizing provider or within the authorizing provider's specialty.  See \"Patient Info\" tab in inbasket, or \"Choose Columns\" in Meds & Orders section of the refill encounter.           Passed - Patient is age 18 or older       Passed - No active pregnancy on record       Passed - No positive pregnancy test in past 12 months          "

## 2018-05-26 RX ORDER — HYDROCHLOROTHIAZIDE 12.5 MG/1
12.5 TABLET ORAL DAILY
Qty: 90 TABLET | Refills: 3 | Status: SHIPPED | OUTPATIENT
Start: 2018-05-26 | End: 2019-06-19

## 2018-05-26 NOTE — TELEPHONE ENCOUNTER
hydrochlorothiazide 12.5 MG TABS tablet     Last Written Prescription Date:  6/21/17  Last Fill Quantity: 90,   # refills: 1  Last Office Visit :  5/11/18  Future Office visit:  8/10/18      Routing refill request to provider for review/approval because: labs

## 2018-06-27 ENCOUNTER — OFFICE VISIT (OUTPATIENT)
Dept: OPHTHALMOLOGY | Facility: CLINIC | Age: 58
End: 2018-06-27
Attending: OPHTHALMOLOGY
Payer: COMMERCIAL

## 2018-06-27 DIAGNOSIS — Z79.4 TYPE 2 DIABETES MELLITUS WITH HYPERGLYCEMIA, WITH LONG-TERM CURRENT USE OF INSULIN (H): ICD-10-CM

## 2018-06-27 DIAGNOSIS — H40.003 BORDERLINE GLAUCOMA, BILATERAL: ICD-10-CM

## 2018-06-27 DIAGNOSIS — E11.65 TYPE 2 DIABETES MELLITUS WITH HYPERGLYCEMIA, WITH LONG-TERM CURRENT USE OF INSULIN (H): ICD-10-CM

## 2018-06-27 DIAGNOSIS — H43.812 VITREOUS DEGENERATION, LEFT: Primary | ICD-10-CM

## 2018-06-27 PROCEDURE — G0463 HOSPITAL OUTPT CLINIC VISIT: HCPCS | Mod: ZF

## 2018-06-27 ASSESSMENT — SLIT LAMP EXAM - LIDS
COMMENTS: MEIBOMIAN GLAND DYSFUNCTION
COMMENTS: MEIBOMIAN GLAND DYSFUNCTION

## 2018-06-27 ASSESSMENT — TONOMETRY
OD_IOP_MMHG: 16
OS_IOP_MMHG: 16
IOP_METHOD: TONOPEN

## 2018-06-27 ASSESSMENT — CONF VISUAL FIELD
OD_NORMAL: 1
OS_NORMAL: 1
METHOD: COUNTING FINGERS

## 2018-06-27 ASSESSMENT — REFRACTION_WEARINGRX
OS_ADD: +2.50
OD_ADD: +2.50
OS_SPHERE: -4.00
OD_SPHERE: -3.25
OS_CYLINDER: +1.50
OD_AXIS: 135
OS_AXIS: 090
OD_CYLINDER: +0.50
SPECS_TYPE: PAL

## 2018-06-27 ASSESSMENT — VISUAL ACUITY
OS_CC: 20/25
OS_CC+: +1
CORRECTION_TYPE: GLASSES
METHOD: SNELLEN - LINEAR
OD_CC+: -1
OD_CC: 20/20

## 2018-06-27 ASSESSMENT — EXTERNAL EXAM - LEFT EYE: OS_EXAM: DERMATOCHALASIS

## 2018-06-27 ASSESSMENT — CUP TO DISC RATIO
OS_RATIO: 0.7
OD_RATIO: 0.9

## 2018-06-27 ASSESSMENT — EXTERNAL EXAM - RIGHT EYE: OD_EXAM: DERMATOCHALASIS

## 2018-06-27 NOTE — MR AVS SNAPSHOT
After Visit Summary   6/27/2018    Maliha Pedro    MRN: [id number]           Patient Information     Date Of Birth          1960        Visit Information        Provider Department      6/27/2018 8:45 AM Lesly Tracy MD [facility]        Today's Diagnoses     Vitreous degeneration, left - Both Eyes    -  1    Borderline glaucoma, bilateral [H40.003] - Both Eyes        Type 2 diabetes mellitus with hyperglycemia, with long-term current use of insulin (H)           Follow-ups after your visit        Follow-up notes from your care team     Return in about 1 year (around 6/27/2019) for OCT.      Your next 10 appointments already scheduled     Jul 09, 2018  8:20 AM CDT   PHYSICAL with Elva Ortiz MD   [facility] ([address] [address]   [phone]            Aug 10, 2018  8:30 AM CDT   (Arrive by 8:15 AM)   RETURN DIABETES with Shai Dong MD   [facility] ([facility])    [address] [place] [id number] [phone]            Sep 12, 2018  9:00 AM CDT   (Arrive by 8:45 AM)   RETURN DIABETES with Shai Dong MD   [facility] ([facility])    [address] [place] [id number] [phone]            Nov 14, 2018  8:45 AM CST   RETURN GLAUCOMA with Umu Rosario MD   [facility] ([facility])    [address] [id number] [phone]              Who to contact     Please call your clinic at [phone] to:    Ask questions about your health    Make or cancel appointments    Discuss your medicines    Learn about your test results    Speak to your doctor            Additional Information About Your Visit        MyChart Information     [employer] gives you secure access to your  electronic health record. If you see a primary care provider, you can also send messages to your care team and make appointments. If you have questions, please call your primary care clinic.  If you do not have a primary care provider, please call [phone] and they will assist you.      [employer] is an electronic gateway that provides easy, online access to your medical records. With [employer], you can request a clinic appointment, read your test results, renew a prescription or communicate with your care team.     To access your existing account, please contact your [facility] or call [phone] for assistance.        Care EveryWhere ID     This is your Care EveryWhere ID. This could be used by other organizations to access your [facility] medical records  [id number]        Your Vitals Were     Last Period                   03/19/2009            Blood Pressure from Last 3 Encounters:   05/11/18 117/75   04/30/18 116/77   03/13/18 127/77    Weight from Last 3 Encounters:   05/11/18 85.7 kg (188 lb 14.4 oz)   03/13/18 85.6 kg (188 lb 11.2 oz)   02/02/18 84.7 kg (186 lb 11.2 oz)              Today, you had the following     No orders found for display       Primary Care Provider Office Phone # Fax #    Elva Ortiz [phone] [address]  [facility] [address]        Equal Access to Services     [facility]: Hadii aad ku hadasho Soomaali, waaxda luqadaha, qaybta kaalmada adeegyada, poly harrison . So [facility] [phone].    ATENCIÓN: Si habla español, tiene a ford disposición servicios gratuitos de asistencia lingüística. Llame al [phone].    We comply with applicable federal civil rights laws and [place] laws. We do not discriminate on the basis of race, color, national origin, age, disability, sex, sexual orientation, or gender identity.            Thank you!     Thank you for choosing [facility]  for your  care. Our goal is always to provide you with excellent care. Hearing back from our patients is one way we can continue to improve our services. Please take a few minutes to complete the written survey that you may receive in the mail after your visit with us. Thank you!             Your Updated Medication List - Protect others around you: Learn how to safely use, store and throw away your medicines at www.disposemymeds.org.          This list is accurate as of 6/27/18  9:56 AM.  Always use your most recent med list.                   Brand Name Dispense Instructions for use Diagnosis    ACCU-CHEK VANESA PLUS test strip   Generic drug:  blood glucose monitoring     360 strip    Test four times a day    Type 2 diabetes mellitus with hyperglycemia, with long-term current use of insulin (H)       albuterol 108 (90 Base) MCG/ACT Inhaler    PROAIR HFA/PROVENTIL HFA/VENTOLIN HFA    1 Inhaler    Inhale 2 puffs into the lungs every 4 hours as needed for shortness of breath / dyspnea    Cough       amLODIPine 5 MG tablet    NORVASC    90 tablet    Take 1 tablet (5 mg) by mouth daily    Essential hypertension, Type 2 diabetes mellitus with diabetic nephropathy, with long-term current use of insulin (H)       aspirin 81 MG EC tablet      Take 325 mg by mouth daily        blood glucose monitoring lancets     5 Box    Use to test blood sugar 3 times daily or as directed.    Type 2 diabetes mellitus with hyperglycemia, with long-term current use of insulin (H)       blood glucose monitoring meter device kit    no brand specified    1 kit    Use to test blood sugar 4 times daily or as directed.    Type 2 diabetes mellitus with hyperglycemia, with long-term current use of insulin (H)       CALCIUM + D PO      Take 1 tablet by mouth 2 times daily.        empagliflozin 25 MG Tabs tablet    JARDIANCE    90 tablet    Take 1 tablet (25 mg) by mouth daily    Type 2 diabetes mellitus with diabetic nephropathy, with long-term current use of  insulin (H)       fexofenadine 180 MG tablet    ALLEGRA    90 tablet    Take 1 tablet by mouth daily. 1 TABLET DAILY Failed claritin for symptom cotrol. Zyrtec increases heart rate.    Seasonal allergic rhinitis       fish oil-omega-3 fatty acids 1000 MG capsule      Take 1 capsule by mouth daily.        fluticasone 50 MCG/ACT spray    FLONASE    16 g    Spray 1-2 sprays into both nostrils daily    Seasonal allergic rhinitis, unspecified chronicity, unspecified trigger       GLUCOSAMINE CHONDR COMPLEX PO           hydrochlorothiazide 12.5 MG Tabs tablet     90 tablet    Take 1 tablet (12.5 mg) by mouth daily    Essential hypertension, Type 2 diabetes mellitus with diabetic nephropathy, with long-term current use of insulin (H)       insulin aspart 100 UNIT/ML injection    NovoLOG FLEXPEN    120 mL    Uses  120 units daily subcutaneously    Diabetes mellitus, type 2 (H)       insulin glargine 100 UNIT/ML injection    LANTUS SOLOSTAR    75 mL    80 units daily    Type 2 diabetes mellitus with hyperglycemia, unspecified long term insulin use status (H)       losartan 100 MG tablet    COZAAR    90 tablet    TAKE ONE TABLET BY MOUTH EVERY DAY    Hypertension goal BP (blood pressure) < 140/90       magnesium 250 MG tablet     90 tablet    Take 1 tablet by mouth daily    Hypomagnesemia       metFORMIN 500 MG 24 hr tablet    GLUCOPHAGE-XR    360 tablet    Take 2 tabs twice a day    Type 2 diabetes mellitus with hyperglycemia, unspecified long term insulin use status (H)       MULTIVITAMIN PO      Take 1 tablet by mouth daily.        NEEDLES, ANY SIZE     1 Box    Pen needles for Victoza pen.    Type 2 diabetes, HbA1c goal < 7% (H)       NEEDLES, ANY SIZE     4 Box    Pen needles for Novolog insulin pen. Use to inject 5-6  times daily.    Type 2 diabetes mellitus with hyperglycemia, with long-term current use of insulin (H)       pantoprazole 40 MG EC tablet    PROTONIX    90 tablet    Take 1 tablet (40 mg) by mouth daily         pentoxifylline 400 MG CR tablet    TRENtal    180 tablet    TAKE ONE TABLET BY MOUTH TWICE A DAY    Type 2 diabetes mellitus with hyperglycemia, unspecified long term insulin use status (H)       rosuvastatin 10 MG tablet    CRESTOR    90 tablet    Take 1 tablet (10 mg) by mouth daily    Hyperlipidemia LDL goal <100       vitamin D 2000 units tablet      Take 1 tablet by mouth daily.

## 2018-06-27 NOTE — PROGRESS NOTES
Maliha Pedro is a  58 year old year-old patient presenting for follow up Diabetes mellitus, states Diabetes mellitus is under better control. No new new flashes and floaters and VA stable     Optical Coherence Tomography 5-13-15  Right eye:  (was 261) good foveal contour, no cysts or subretinal fluid   Left eye  (was 266) good foveal contour, no cysts or subretinal fluid     Assessment & Plan:     1. Type 2 diabetes mellitus without retinopathy  -hgb a1c 8.8   6-14-17   Blood pressure (<120/80) and blood glucose (HbA1c <7.0) control discussed with patient. Patient advised that failure to adequately control each may lead to vision loss. The patient expressed understanding.    2. Glaucoma suspect, bilateral  - large cup to disc ratio R>L  - favorable pachmetry 583/613  - Octopus visual field   Right eye: Reliable,stable   Left eye: Reliable. Largely normal.   - OCT retinal nerve fiber layer 10/14/15   Stable both eyes   Follow up on nov with dr. Rosario     3. Cataract, right eye  -early visually significant  -observe     4. Pseudophakia, left eye  -stable  -observe    5. Posterior vitreous detachment of right eye  -stable  -observe    6. Old inferior temporal small tear surrounded by pigment, no subretinal fluid right eye   Retina attached, no other tears  Retina detachment precautions were discussed with the patient (presence or increased in flashes, floaters or a curtain in the visual field) and was asked to return if any of the those occur    7. Dry eye syndrome  artificial tears  As needed and warm compresses      Follow up 1 yr sooner as needed       ~~~~~~~~~~~~~~~~~~~~~~~~~~~~~~~~~~   Complete documentation of historical and exam elements from today's encounter can be found in the full encounter summary report (not reduplicated in this progress note).  I personally obtained the chief complaint(s) and history of present illness.  I confirmed and edited as necessary the review of systems,  past medical/surgical history, family history, social history, and examination findings as documented by others; and I examined the patient myself.  I personally reviewed the relevant tests, images, and reports as documented above.  I formulated and edited as necessary the assessment and plan and discussed the findings and management plan with the patient and family    Lesly Tracy MD  .  Retina Service   Department of Ophthalmology and Visual Neurosciences   [facility]  Phone: [phone]   Fax: [phone]

## 2018-06-27 NOTE — NURSING NOTE
Chief Complaints and History of Present Illnesses   Patient presents with     Eye Exam For Diabetes     Yearly follow up both eyes.     HPI    Affected eye(s):  Both   Symptoms:     Floaters (Comment: Occasional floaters in BE, no changes.)   No flashes   No redness   No itching         Do you have eye pain now?:  No      Comments:  Pt states vision is the same as last visit. Dryness in both eyes, relief with drops.  DM2 BS: Pt doesn't check sugars daily.  Lab Results       Component                Value               Date                  A1C: 8.1  Taken about 1 month ago per pt.      A1C                      9.8                 12/16/2013                 A1C                      9.5                 04/15/2013                 A1C                      9.6                 01/16/2013                 A1C                      9.4                 09/28/2012                 A1C                      8.3                 06/22/2012                 Monica VOGEL June 27, 2018 8:57 AM

## 2018-07-03 DIAGNOSIS — E11.65 TYPE 2 DIABETES MELLITUS WITH HYPERGLYCEMIA, UNSPECIFIED LONG TERM INSULIN USE STATUS: ICD-10-CM

## 2018-07-05 NOTE — TELEPHONE ENCOUNTER
pentoxifylline (TRENTAL) 400 MG CR tablet  Last Written Prescription Date:  6/16/17  Last Fill Quantity: 180,   # refills: 3  Last Office Visit : 5/11/18  Future Office visit: 9/12/18    Routing refill request to provider for review/approval because:  Not on protocol,  labs

## 2018-07-06 RX ORDER — PENTOXIFYLLINE 400 MG/1
400 TABLET, EXTENDED RELEASE ORAL 2 TIMES DAILY
Qty: 60 TABLET | Refills: 0 | Status: SHIPPED | OUTPATIENT
Start: 2018-07-06 | End: 2018-07-31

## 2018-07-30 DIAGNOSIS — E11.65 TYPE 2 DIABETES MELLITUS WITH HYPERGLYCEMIA, UNSPECIFIED LONG TERM INSULIN USE STATUS: ICD-10-CM

## 2018-07-31 ENCOUNTER — MYC MEDICAL ADVICE (OUTPATIENT)
Dept: ENDOCRINOLOGY | Facility: CLINIC | Age: 58
End: 2018-07-31

## 2018-07-31 DIAGNOSIS — Z79.4 TYPE 2 DIABETES MELLITUS WITH DIABETIC NEPHROPATHY, WITH LONG-TERM CURRENT USE OF INSULIN (H): ICD-10-CM

## 2018-07-31 DIAGNOSIS — E11.65 TYPE 2 DIABETES MELLITUS WITH HYPERGLYCEMIA, UNSPECIFIED LONG TERM INSULIN USE STATUS: ICD-10-CM

## 2018-07-31 DIAGNOSIS — E11.65 TYPE 2 DIABETES MELLITUS WITH HYPERGLYCEMIA (H): Primary | ICD-10-CM

## 2018-07-31 DIAGNOSIS — I10 ESSENTIAL HYPERTENSION: ICD-10-CM

## 2018-07-31 DIAGNOSIS — E11.21 TYPE 2 DIABETES MELLITUS WITH DIABETIC NEPHROPATHY, WITH LONG-TERM CURRENT USE OF INSULIN (H): ICD-10-CM

## 2018-07-31 RX ORDER — PENTOXIFYLLINE 400 MG/1
400 TABLET, EXTENDED RELEASE ORAL 2 TIMES DAILY
Qty: 60 TABLET | Refills: 0 | Status: CANCELLED | OUTPATIENT
Start: 2018-07-31

## 2018-07-31 NOTE — TELEPHONE ENCOUNTER
pentoxifylline (TRENTAL) 400 MG      Last Written Prescription Date:  7/6/18  Last Fill Quantity: 60,   # refills: 0  Last Office Visit : 5/11/18  Future Office visit: 912/18    Routing refill request to provider for review/approval because:  Drug not on the refill protocol   30 DAY RF PENDING ( REPEAT) OVER DUE LABS  AST/ALT

## 2018-07-31 NOTE — TELEPHONE ENCOUNTER
"Requested Prescriptions   Pending Prescriptions Disp Refills     pentoxifylline (TRENTAL) 400 MG CR tablet  Last Written Prescription Date:  7/6/18  Last Fill Quantity: 60 tablet,  # refills: 0   Last office visit: 1/29/2018 with prescribing provider:  Dr. Ortiz   Future Office Visit:   Next 5 appointments (look out 90 days)     Aug 06, 2018  8:20 AM CDT   PHYSICAL with Elva Ortiz MD   [facility] ([address] [address] [phone]                60 tablet 0     Sig: Take 1 tablet (400 mg) by mouth 2 times daily    Platelet Inhibitors Failed    7/31/2018  4:38 PM       Failed - Normal ALT on file in past 12 months    Recent Labs   Lab Test  02/08/17   1754   ALT  70*            Failed - Normal AST on file in past 12 months    Recent Labs   Lab Test  02/08/17   1754   AST  47*            Failed - Normal serum creatinine on file in past 12 months    Recent Labs   Lab Test  02/08/17   1754   CR  0.68            Passed - Normal HGB on file in past 12 months    Recent Labs   Lab Test  10/30/17   0922   HGB  15.1              Passed - Normal Platelets on file in past 12 months    Recent Labs   Lab Test  10/30/17   0922   PLT  349              Passed - Recent (12 mo) or future (30 days) visit within the authorizing provider's specialty    Patient had office visit in the last 12 months or has a visit in the next 30 days with authorizing provider or within the authorizing provider's specialty.  See \"Patient Info\" tab in inbasket, or \"Choose Columns\" in Meds & Orders section of the refill encounter.           Passed - Patient is age 18 or older       Passed - No active pregnancy on record       Passed - No positive pregnancy test in past 12 months                  amLODIPine (NORVASC) 5 MG tablet  Last Written Prescription Date:  6/21/17  Last Fill Quantity: 90 tablet,  # refills: 3  Last office visit: 1/29/2018 with " "prescribing provider:  Dr. Ortiz   Future Office Visit:   Next 5 appointments (look out 90 days)     Aug 06, 2018  8:20 AM CDT   [facility]    [address] [place] [address] [phone]                90 tablet 3     Sig: Take 1 tablet (5 mg) by mouth daily    Calcium Channel Blockers Protocol  Failed    7/31/2018  4:38 PM       Failed - Normal serum creatinine on file in past 12 months    Recent Labs   Lab Test  02/08/17   1754   CR  0.68            Passed - Blood pressure under 140/90 in past 12 months    BP Readings from Last 3 Encounters:   05/11/18 117/75   04/30/18 116/77   03/13/18 127/77                Passed - Recent (12 mo) or future (30 days) visit within the authorizing provider's specialty    Patient had office visit in the last 12 months or has a visit in the next 30 days with authorizing provider or within the authorizing provider's specialty.  See \"Patient Info\" tab in inbasket, or \"Choose Columns\" in Meds & Orders section of the refill encounter.           Passed - Patient is age 18 or older       Passed - No active pregnancy on record       Passed - No positive pregnancy test in past 12 months          "

## 2018-08-01 ENCOUNTER — MYC MEDICAL ADVICE (OUTPATIENT)
Dept: ENDOCRINOLOGY | Facility: CLINIC | Age: 58
End: 2018-08-01

## 2018-08-01 DIAGNOSIS — E11.42 TYPE 2 DIABETES MELLITUS WITH DIABETIC POLYNEUROPATHY, WITH LONG-TERM CURRENT USE OF INSULIN (H): Primary | ICD-10-CM

## 2018-08-01 DIAGNOSIS — Z79.4 TYPE 2 DIABETES MELLITUS WITH DIABETIC POLYNEUROPATHY, WITH LONG-TERM CURRENT USE OF INSULIN (H): Primary | ICD-10-CM

## 2018-08-01 RX ORDER — FLASH GLUCOSE SENSOR
1 KIT MISCELLANEOUS DAILY
Qty: 1 DEVICE | Refills: 1 | Status: SHIPPED | OUTPATIENT
Start: 2018-08-01 | End: 2018-12-12

## 2018-08-01 RX ORDER — FLASH GLUCOSE SENSOR
KIT MISCELLANEOUS
Qty: 10 EACH | Refills: 6 | Status: SHIPPED | OUTPATIENT
Start: 2018-08-01 | End: 2018-12-12

## 2018-08-01 RX ORDER — FLASH GLUCOSE SENSOR
KIT MISCELLANEOUS
Qty: 3 EACH | Refills: 11 | Status: SHIPPED | OUTPATIENT
Start: 2018-08-01 | End: 2018-08-06

## 2018-08-01 RX ORDER — FLASH GLUCOSE SENSOR
1 KIT MISCELLANEOUS DAILY
Qty: 1 DEVICE | Refills: 1 | Status: SHIPPED | OUTPATIENT
Start: 2018-08-01 | End: 2018-08-06

## 2018-08-01 NOTE — TELEPHONE ENCOUNTER
Seramary was notified to conact her PCP for  refills on Trental. This is not an Endocrine medication.

## 2018-08-03 DIAGNOSIS — E11.65 TYPE 2 DIABETES MELLITUS WITH HYPERGLYCEMIA, UNSPECIFIED LONG TERM INSULIN USE STATUS: ICD-10-CM

## 2018-08-03 RX ORDER — PENTOXIFYLLINE 400 MG/1
400 TABLET, EXTENDED RELEASE ORAL 2 TIMES DAILY
Qty: 60 TABLET | Refills: 0 | Status: SHIPPED | OUTPATIENT
Start: 2018-08-03 | End: 2018-08-06

## 2018-08-03 RX ORDER — AMLODIPINE BESYLATE 5 MG/1
5 TABLET ORAL DAILY
Qty: 90 TABLET | Refills: 0 | Status: SHIPPED | OUTPATIENT
Start: 2018-08-03 | End: 2018-08-06

## 2018-08-03 NOTE — TELEPHONE ENCOUNTER
Routing refill request to provider for review/approval because:  Labs not current:  None of the following are available.   Normal ALT on file in past 12 months    Normal AST on file in past 12 months    Normal serum creatinine on file in past 12 months       Elo Angeles RN, [facility]

## 2018-08-06 ENCOUNTER — OFFICE VISIT (OUTPATIENT)
Dept: FAMILY MEDICINE | Facility: CLINIC | Age: 58
End: 2018-08-06
Payer: COMMERCIAL

## 2018-08-06 VITALS
DIASTOLIC BLOOD PRESSURE: 78 MMHG | WEIGHT: 186 LBS | SYSTOLIC BLOOD PRESSURE: 120 MMHG | TEMPERATURE: 98.2 F | HEIGHT: 64 IN | HEART RATE: 70 BPM | OXYGEN SATURATION: 97 % | RESPIRATION RATE: 18 BRPM | BODY MASS INDEX: 31.76 KG/M2

## 2018-08-06 DIAGNOSIS — Z23 NEED FOR VACCINATION: ICD-10-CM

## 2018-08-06 DIAGNOSIS — J30.2 SEASONAL ALLERGIC RHINITIS, UNSPECIFIED CHRONICITY, UNSPECIFIED TRIGGER: ICD-10-CM

## 2018-08-06 DIAGNOSIS — I10 ESSENTIAL HYPERTENSION: ICD-10-CM

## 2018-08-06 DIAGNOSIS — Z79.4 TYPE 2 DIABETES MELLITUS WITH DIABETIC NEPHROPATHY, WITH LONG-TERM CURRENT USE OF INSULIN (H): ICD-10-CM
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
## 2018-08-06 DIAGNOSIS — Z13.89 SCREENING FOR DIABETIC PERIPHERAL NEUROPATHY: ICD-10-CM

## 2018-08-06 DIAGNOSIS — E11.21 TYPE 2 DIABETES MELLITUS WITH DIABETIC NEPHROPATHY, WITH LONG-TERM CURRENT USE OF INSULIN (H): ICD-10-CM

## 2018-08-06 DIAGNOSIS — R06.09 DYSPNEA ON EXERTION: ICD-10-CM

## 2018-08-06 DIAGNOSIS — E11.65 TYPE 2 DIABETES MELLITUS WITH HYPERGLYCEMIA, UNSPECIFIED LONG TERM INSULIN USE STATUS: ICD-10-CM

## 2018-08-06 DIAGNOSIS — Z00.00 ENCOUNTER FOR ROUTINE ADULT HEALTH EXAMINATION WITHOUT ABNORMAL FINDINGS: Primary | ICD-10-CM

## 2018-08-06 LAB
ALBUMIN SERPL-MCNC: 4.1 G/DL (ref 3.4–5)
ALP SERPL-CCNC: 54 U/L (ref 40–150)
ALT SERPL W P-5'-P-CCNC: 39 U/L (ref 0–50)
ANION GAP SERPL CALCULATED.3IONS-SCNC: 10 MMOL/L (ref 3–14)
AST SERPL W P-5'-P-CCNC: 22 U/L (ref 0–45)
BILIRUB SERPL-MCNC: 0.4 MG/DL (ref 0.2–1.3)
BUN SERPL-MCNC: 19 MG/DL (ref 7–30)
CALCIUM SERPL-MCNC: 9.5 MG/DL (ref 8.5–10.1)
CHLORIDE SERPL-SCNC: 103 MMOL/L (ref 94–109)
CHOLEST SERPL-MCNC: 142 MG/DL
CO2 SERPL-SCNC: 27 MMOL/L (ref 20–32)
CREAT SERPL-MCNC: 0.64 MG/DL (ref 0.52–1.04)
CREAT UR-MCNC: 70 MG/DL
GFR SERPL CREATININE-BSD FRML MDRD: >90 ML/MIN/1.7M2
GLUCOSE SERPL-MCNC: 164 MG/DL (ref 70–99)
HDLC SERPL-MCNC: 41 MG/DL
LDLC SERPL CALC-MCNC: 60 MG/DL
MICROALBUMIN UR-MCNC: 7 MG/L
MICROALBUMIN/CREAT UR: 10.37 MG/G CR (ref 0–25)
NONHDLC SERPL-MCNC: 101 MG/DL
POTASSIUM SERPL-SCNC: 3.8 MMOL/L (ref 3.4–5.3)
PROT SERPL-MCNC: 7.8 G/DL (ref 6.8–8.8)
SODIUM SERPL-SCNC: 140 MMOL/L (ref 133–144)
TRIGL SERPL-MCNC: 206 MG/DL

## 2018-08-06 PROCEDURE — 82043 UR ALBUMIN QUANTITATIVE: CPT | Performed by: FAMILY MEDICINE

## 2018-08-06 PROCEDURE — 80061 LIPID PANEL: CPT | Performed by: FAMILY MEDICINE

## 2018-08-06 PROCEDURE — 90750 HZV VACC RECOMBINANT IM: CPT | Performed by: FAMILY MEDICINE

## 2018-08-06 PROCEDURE — 80053 COMPREHEN METABOLIC PANEL: CPT | Performed by: FAMILY MEDICINE

## 2018-08-06 PROCEDURE — 90471 IMMUNIZATION ADMIN: CPT | Performed by: FAMILY MEDICINE

## 2018-08-06 PROCEDURE — 99396 PREV VISIT EST AGE 40-64: CPT | Mod: 25 | Performed by: FAMILY MEDICINE

## 2018-08-06 PROCEDURE — 36415 COLL VENOUS BLD VENIPUNCTURE: CPT | Performed by: FAMILY MEDICINE

## 2018-08-06 RX ORDER — MONTELUKAST SODIUM 10 MG/1
10 TABLET ORAL AT BEDTIME
Qty: 30 TABLET | Refills: 11 | Status: SHIPPED | OUTPATIENT
Start: 2018-08-06 | End: 2019-08-12

## 2018-08-06 RX ORDER — PENTOXIFYLLINE 400 MG/1
TABLET, EXTENDED RELEASE ORAL
Qty: 60 TABLET | Refills: 0 | OUTPATIENT
Start: 2018-08-06

## 2018-08-06 RX ORDER — AMLODIPINE BESYLATE 5 MG/1
5 TABLET ORAL DAILY
Qty: 90 TABLET | Refills: 3 | Status: SHIPPED | OUTPATIENT
Start: 2018-08-06 | End: 2019-06-19

## 2018-08-06 RX ORDER — LOSARTAN POTASSIUM 100 MG/1
100 TABLET ORAL DAILY
Qty: 90 TABLET | Refills: 3 | Status: SHIPPED | OUTPATIENT
Start: 2018-08-06 | End: 2019-06-19

## 2018-08-06 RX ORDER — TRIAMCINOLONE ACETONIDE 1 MG/G
CREAM TOPICAL
Qty: 80 G | Refills: 0 | Status: SHIPPED | OUTPATIENT
Start: 2018-08-06 | End: 2018-12-17

## 2018-08-06 RX ORDER — PENTOXIFYLLINE 400 MG/1
400 TABLET, EXTENDED RELEASE ORAL 2 TIMES DAILY
Qty: 180 TABLET | Refills: 3 | Status: SHIPPED | OUTPATIENT
Start: 2018-08-06 | End: 2019-08-12

## 2018-08-06 ASSESSMENT — PAIN SCALES - GENERAL: PAINLEVEL: NO PAIN (0)

## 2018-08-06 NOTE — NURSING NOTE
Screening Questionnaire for Adult Immunization    Are you sick today?   No   Do you have allergies to medications, food, a vaccine component or latex?   Yes   Have you ever had a serious reaction after receiving a vaccination?   No   Do you have a long-term health problem with heart disease, lung disease, asthma, kidney disease, metabolic disease (e.g. diabetes), anemia, or other blood disorder?   Yes   Do you have cancer, leukemia, HIV/AIDS, or any other immune system problem?   No   In the past 3 months, have you taken medications that affect  your immune system, such as prednisone, other steroids, or anticancer drugs; drugs for the treatment of rheumatoid arthritis, Crohn s disease, or psoriasis; or have you had radiation treatments?   No   Have you had a seizure, or a brain or other nervous system problem?   No   During the past year, have you received a transfusion of blood or blood     products, or been given immune (gamma) globulin or antiviral drug?   No   For women: Are you pregnant or is there a chance you could become        pregnant during the next month?   No   Have you received any vaccinations in the past 4 weeks?   No     Immunization questionnaire was positive for at least one answer.  Notified known by provider.        Patient instructed to remain in clinic for 15 minutes afterwards, and to report any adverse reaction to me immediately.       Screening performed by Emperatriz Goddard on 8/6/2018 at 9:56 AM.

## 2018-08-06 NOTE — PATIENT INSTRUCTIONS
You can try wipes instead of bug spray.     Try Singulair at your bedtime to help your morning congestion.     At [facility], we strive to deliver an exceptional experience to you, every time we see you.  If you receive a survey in the mail, please send us back your thoughts. We really do value your feedback.    Suggested websites for health information:  Www.[place].org : Up to date and easily searchable information on multiple topics.  Www.medlineplus.gov : medication info, interactive tutorials, watch real surgeries online  Www.familydoctor.org : good info from the Academy of Family Physicians  Www.cdc.gov : public health info, travel advisories, epidemics (H1N1)  Www.aap.org : children's health info, normal development, vaccinations  Www.health.[facility].[place].us : [place] dept of health, public health issues in [place], N1N1    Your care team:     Family Medicine   MELANIE Escudero MD Emily Bunt, [facility]   S. MD Marilia Park MD Angela Wermerskirchen, MD         Clinic hours: Monday - Wednesday 7 am-7 pm   Thursdays and Fridays 7 am-5 pm.     [facility]: Monday - Friday 11 am-9 pm,   Saturday and Sunday 9 am-5 pm.    [facility]: Monday -Thursday 8 am-8 pm; Friday 8 am-6 pm; Saturday and Sunday 9 am-5 pm.     [facility]: Monday Thursday 8 am   7 pm; Friday 8 am   6 pm    Clinic: [phone]   [facility]: [phone]   [facility]: [phone]            Preventive Health Recommendations  Female Ages 50 - 64    Yearly exam: See your health care provider every year in order to  o Review health changes.   o Discuss preventive care.    o Review your medicines if your doctor has prescribed any.      Get a Pap test every three years (unless you have an abnormal result and your provider advises testing more often).    If you get Pap tests with HPV test, you only need to test  every 5 years, unless you have an abnormal result.     You do not need a Pap test if your uterus was removed (hysterectomy) and you have not had cancer.    You should be tested each year for STDs (sexually transmitted diseases) if you're at risk.     Have a mammogram every 1 to 2 years.    Have a colonoscopy at age 50, or have a yearly FIT test (stool test). These exams screen for colon cancer.      Have a cholesterol test every 5 years, or more often if advised.    Have a diabetes test (fasting glucose) every three years. If you are at risk for diabetes, you should have this test more often.     If you are at risk for osteoporosis (brittle bone disease), think about having a bone density scan (DEXA).    Shots: Get a flu shot each year. Get a tetanus shot every 10 years.    Nutrition:     Eat at least 5 servings of fruits and vegetables each day.    Eat whole-grain bread, whole-wheat pasta and brown rice instead of white grains and rice.    Get adequate Calcium and Vitamin D.     Lifestyle    Exercise at least 150 minutes a week (30 minutes a day, 5 days a week). This will help you control your weight and prevent disease.    Limit alcohol to one drink per day.    No smoking.     Wear sunscreen to prevent skin cancer.     See your dentist every six months for an exam and cleaning.    See your eye doctor every 1 to 2 years.

## 2018-08-06 NOTE — MR AVS SNAPSHOT
After Visit Summary   8/6/2018    Maliha Pedro    MRN: [id number]           Patient Information     Date Of Birth          1960        Visit Information        Provider Department      8/6/2018 8:20 AM Elva Ortiz MD [facility]        Today's Diagnoses     Encounter for routine adult health examination without abnormal findings    -  1    Type 2 diabetes mellitus with hyperglycemia, unspecified long term insulin use status (H)        Essential hypertension        Type 2 diabetes mellitus with diabetic nephropathy, with long-term current use of insulin (H)        Screening for diabetic peripheral neuropathy        Seasonal allergic rhinitis, unspecified chronicity, unspecified trigger        Bug bite, initial encounter        Dyspnea on exertion        Need for vaccination          Care Instructions    You can try wipes instead of bug spray.     Try Singulair at your bedtime to help your morning congestion.     At [facility], we strive to deliver an exceptional experience to you, every time we see you.  If you receive a survey in the mail, please send us back your thoughts. We really do value your feedback.    Suggested websites for health information:  Www.[facility] HospitalPrimoris Energy Solutions.MyNewFinancialAdvisor : Up to date and easily searchable information on multiple topics.  Www.medlineplus.gov : medication info, interactive tutorials, watch real surgeries online  Www.familydoctor.org : good info from the Academy of Family Physicians  Www.cdc.gov : public health info, travel advisories, epidemics (H1N1)  Www.aap.org : children's health info, normal development, vaccinations  Www.health.state.[place] : [place] dept of health, public health issues in [place], N1N1    Your care team:     Family Medicine   MELANIE Escudero MD Emily Bunt, APRN [facility]   S. MD Marilia Park MD Angela Wermerskirchen, MD         Clinic hours: Monday - Wednesday 7 am-7  pm   Thursdays and Fridays 7 am-5 pm.     [facility]: Monday - Friday 11 am-9 pm,   Saturday and Sunday 9 am-5 pm.    [facility]: Monday -Thursday 8 am-8 pm; Friday 8 am-6 pm; Saturday and Sunday 9 am-5 pm.     [facility]: Monday - Thursday 8 am - 7 pm; Friday 8 am - 6 pm    Clinic: [phone]   [facility]: [phone]   [facility]: [phone]            Preventive Health Recommendations  Female Ages 50 - 64    Yearly exam: See your health care provider every year in order to  o Review health changes.   o Discuss preventive care.    o Review your medicines if your doctor has prescribed any.      Get a Pap test every three years (unless you have an abnormal result and your provider advises testing more often).    If you get Pap tests with HPV test, you only need to test every 5 years, unless you have an abnormal result.     You do not need a Pap test if your uterus was removed (hysterectomy) and you have not had cancer.    You should be tested each year for STDs (sexually transmitted diseases) if you're at risk.     Have a mammogram every 1 to 2 years.    Have a colonoscopy at age 50, or have a yearly FIT test (stool test). These exams screen for colon cancer.      Have a cholesterol test every 5 years, or more often if advised.    Have a diabetes test (fasting glucose) every three years. If you are at risk for diabetes, you should have this test more often.     If you are at risk for osteoporosis (brittle bone disease), think about having a bone density scan (DEXA).    Shots: Get a flu shot each year. Get a tetanus shot every 10 years.    Nutrition:     Eat at least 5 servings of fruits and vegetables each day.    Eat whole-grain bread, whole-wheat pasta and brown rice instead of white grains and rice.    Get adequate Calcium and Vitamin D.     Lifestyle    Exercise at least 150 minutes a week (30 minutes a day, 5 days a week).  This will help you control your weight and prevent disease.    Limit alcohol to one drink per day.    No smoking.     Wear sunscreen to prevent skin cancer.     See your dentist every six months for an exam and cleaning.    See your eye doctor every 1 to 2 years.            Follow-ups after your visit        Your next 10 appointments already scheduled     Sep 12, 2018  9:00 AM CDT   (Arrive by 8:45 AM)   RETURN DIABETES with Shai Dong MD   [facility] ([facility])    [address]  3rd Floor  [facility] [id number] [phone]            Nov 14, 2018  8:45 AM CST   RETURN GLAUCOMA with Umu Rosario MD   [facility] ([facility])    [address] [id number] [phone]              Who to contact     If you have questions or need follow up information about today's clinic visit or your schedule please contact [facility] directly at [phone].  Normal or non-critical lab and imaging results will be communicated to you by [employer], letter or phone within 4 business days after the clinic has received the results. If you do not hear from us within 7 days, please contact the clinic through [employer] or phone. If you have a critical or abnormal lab result, we will notify you by phone as soon as possible.  Submit refill requests through [employer] or call your pharmacy and they will forward the refill request to us. Please allow 3 business days for your refill to be completed.          Additional Information About Your Visit        Metail Information     [employer] gives you secure access to your electronic health record. If you see a primary care provider, you can also send messages to your care team and make appointments. If you have questions, please call your primary care clinic.  If you do not have a primary care provider, please call [phone] and they will assist you.    "     Care EveryWhere ID     This is your Care EveryWhere ID. This could be used by other organizations to access your [facility] medical records  [id number]        Your Vitals Were     Pulse Temperature Respirations Height Last Period Pulse Oximetry    70 98.2  F (36.8  C) (Oral) 18 1.619 m (5' 3.75\") 03/19/2009 (Exact Date) 97%    Breastfeeding? BMI (Body Mass Index)                No 32.18 kg/m2           Blood Pressure from Last 3 Encounters:   08/06/18 120/78   05/11/18 117/75   04/30/18 116/77    Weight from Last 3 Encounters:   08/06/18 84.4 kg (186 lb)   05/11/18 85.7 kg (188 lb 14.4 oz)   03/13/18 85.6 kg (188 lb 11.2 oz)              We Performed the Following     ADMIN 1st VACCINE     Albumin Random Urine Quantitative with Creat Ratio     Comprehensive metabolic panel     Lipid panel reflex to direct LDL Fasting     ZOSTER VACCINE RECOMBINANT ADJUVANTED IM NJX          Today's Medication Changes          These changes are accurate as of 8/6/18  9:27 AM.  If you have any questions, ask your nurse or doctor.               Start taking these medicines.        Dose/Directions    montelukast 10 MG tablet   Commonly known as:  SINGULAIR   Used for:  Seasonal allergic rhinitis, unspecified chronicity, unspecified trigger   Started by:  Elva Ortiz MD        Dose:  10 mg   Take 1 tablet (10 mg) by mouth At Bedtime   Quantity:  30 tablet   Refills:  11       triamcinolone 0.1 % cream   Commonly known as:  KENALOG   Used for:  Bug bite, initial encounter   Started by:  Elva Ortiz MD        Apply sparingly to affected area three times daily as needed   Quantity:  80 g   Refills:  0         These medicines have changed or have updated prescriptions.        Dose/Directions    losartan 100 MG tablet   Commonly known as:  COZAAR   This may have changed:  See the new instructions.   Used for:  Essential hypertension   Changed by:  Elva Ortiz MD        Dose:  100 mg   Take " 1 tablet (100 mg) by mouth daily   Quantity:  90 tablet   Refills:  3            Where to get your medicines      These medications were sent to [facility] - [place], [place] - [address], [address]  [address], [address], [facility] [address]     Phone:  [phone]     amLODIPine 5 MG tablet    losartan 100 MG tablet    montelukast 10 MG tablet    pentoxifylline 400 MG CR tablet    triamcinolone 0.1 % cream                Primary Care Provider Office Phone # Fax #    Elva Ortiz [phone] [address]  [facility] [address]        Equal Access to Services     JAMES COLLINS : Hadii aad ku hadasho Sodaija, waaxda luqadaha, qaybta kaalmada adeegyada, poly harrison . So [facility] [phone].    ATENCIÓN: Si habla español, tiene a ford disposición servicios gratuitos de asistencia lingüística. [facility] [phone].    We comply with applicable federal civil rights laws and [place] laws. We do not discriminate on the basis of race, color, national origin, age, disability, sex, sexual orientation, or gender identity.            Thank you!     Thank you for choosing [facility]  for your care. Our goal is always to provide you with excellent care. Hearing back from our patients is one way we can continue to improve our services. Please take a few minutes to complete the written survey that you may receive in the mail after your visit with us. Thank you!             Your Updated Medication List - Protect others around you: Learn how to safely use, store and throw away your medicines at www.disposemymeds.org.          This list is accurate as of 8/6/18  9:27 AM.  Always use your most recent med list.                   Brand Name Dispense Instructions for use Diagnosis    albuterol 108 (90 Base) MCG/ACT Inhaler    PROAIR HFA/PROVENTIL HFA/VENTOLIN HFA    1 Inhaler    Inhale 2 puffs into the lungs every 4  hours as needed for shortness of breath / dyspnea    Cough       amLODIPine 5 MG tablet    NORVASC    90 tablet    Take 1 tablet (5 mg) by mouth daily    Essential hypertension, Type 2 diabetes mellitus with diabetic nephropathy, with long-term current use of insulin (H)       aspirin 81 MG EC tablet      Take 325 mg by mouth daily        CALCIUM + D PO      Take 1 tablet by mouth 2 times daily.        continuous blood glucose monitoring sensor     10 each    For use with Freestyle Leslie Flash  for continuous monitioring of blood glucose levels. Replace sensor every 10 days.    Type 2 diabetes mellitus with diabetic polyneuropathy, with long-term current use of insulin (H)       empagliflozin 25 MG Tabs tablet    JARDIANCE    90 tablet    Take 1 tablet (25 mg) by mouth daily    Type 2 diabetes mellitus with diabetic nephropathy, with long-term current use of insulin (H)       fexofenadine 180 MG tablet    ALLEGRA    90 tablet    Take 1 tablet by mouth daily. 1 TABLET DAILY Failed claritin for symptom cotrol. Zyrtec increases heart rate.    Seasonal allergic rhinitis       fish oil-omega-3 fatty acids 1000 MG capsule      Take 1 capsule by mouth daily.        fluticasone 50 MCG/ACT spray    FLONASE    16 g    Spray 1-2 sprays into both nostrils daily    Seasonal allergic rhinitis, unspecified chronicity, unspecified trigger       FREESTYLE LESLIE READER Felipa     1 Device    1 each daily    Type 2 diabetes mellitus with hyperglycemia (H)       GLUCOSAMINE CHONDR COMPLEX PO           hydrochlorothiazide 12.5 MG Tabs tablet     90 tablet    Take 1 tablet (12.5 mg) by mouth daily    Essential hypertension, Type 2 diabetes mellitus with diabetic nephropathy, with long-term current use of insulin (H)       insulin aspart 100 UNIT/ML injection    NovoLOG FLEXPEN    120 mL    Uses  120 units daily subcutaneously    Diabetes mellitus, type 2 (H)       insulin glargine 100 UNIT/ML injection    LANTUS SOLOSTAR    75 mL     80 units daily    Type 2 diabetes mellitus with hyperglycemia, unspecified long term insulin use status (H)       losartan 100 MG tablet    COZAAR    90 tablet    Take 1 tablet (100 mg) by mouth daily    Essential hypertension       magnesium 250 MG tablet     90 tablet    Take 1 tablet by mouth daily    Hypomagnesemia       metFORMIN 500 MG 24 hr tablet    GLUCOPHAGE-XR    360 tablet    Take 2 tabs twice a day    Type 2 diabetes mellitus with hyperglycemia, unspecified long term insulin use status (H)       montelukast 10 MG tablet    SINGULAIR    30 tablet    Take 1 tablet (10 mg) by mouth At Bedtime    Seasonal allergic rhinitis, unspecified chronicity, unspecified trigger       MULTIVITAMIN PO      Take 1 tablet by mouth daily.        NEEDLES, ANY SIZE     4 Box    Pen needles for Novolog insulin pen. Use to inject 5-6  times daily.    Type 2 diabetes mellitus with hyperglycemia, with long-term current use of insulin (H)       pantoprazole 40 MG EC tablet    PROTONIX    90 tablet    Take 1 tablet (40 mg) by mouth daily        pentoxifylline 400 MG CR tablet    TRENtal    180 tablet    Take 1 tablet (400 mg) by mouth 2 times daily    Type 2 diabetes mellitus with hyperglycemia, unspecified long term insulin use status (H)       rosuvastatin 10 MG tablet    CRESTOR    90 tablet    Take 1 tablet (10 mg) by mouth daily    Hyperlipidemia LDL goal <100       triamcinolone 0.1 % cream    KENALOG    80 g    Apply sparingly to affected area three times daily as needed    Bug bite, initial encounter       vitamin D 2000 units tablet      Take 1 tablet by mouth daily.

## 2018-08-06 NOTE — PROGRESS NOTES
SUBJECTIVE:   CC: Maliha Pedro is an 58 year old woman who presents for preventive health visit.     Healthy Habits:    Do you get at least three servings of calcium containing foods daily (dairy, green leafy vegetables, etc.)? yes    Amount of exercise or daily activities, outside of work: Yoga    Problems taking medications regularly No    Medication side effects: No    Have you had an eye exam in the past two years? yes    Do you see a dentist twice per year? yes    Do you have sleep apnea, excessive snoring or daytime drowsiness?yes    -Patient has been seeing an endocrinologist for her diabetes. Her dose of jardiance was increase to 25 mg daily, and this has been going well. She has a continuous glucose monitor at this time. She will follow up later this month with endocrine .  -Patient mentions that she has not taken her Norvasc for 3 days. .  -Patient has excoriations on her LE due to bug bites. She has been putting hydrocortisone cream on them, but they have not been healing well. She doesn't use bug spray due to allergies and the smell makes her wheeze. She feels her allergies are well controlled on Allegra. She will have some congestion and post nasal drip in the morning, but this resolves throughout the day.   - Patient reports that she has been feeling depressed lately due to a close friend of hers passing away. She has some support from her friends, and feels that this is enough for her at this time. She denies a change in appetite, but reports she may be sleeping a little more than usual.   -Patient mentions that she has been having ongoing dyspnea on exertion. She has been worked up by cardiology for this in the past. She has not had PFT's. Her albuterol and nasal spray seem to help with this. She feels that this could also be due to deconditioning, she has not bee exercising like usual. She usually would go to yoga but has not since grieving.   -Patient has been taking trental for leg  cramps, and this has been working well for her. She has tried to discontinue this in the past, but her leg cramps returned. Was rx'd by endocrine in past but since change of endocrinologist requests this be rx'd here.         Today's PHQ-2 Score:   PHQ-2 ( 1999 Pf[employer]) 8/6/2018 1/29/2018   Q1: Little interest or pleasure in doing things 0 0   Q2: Feeling down, depressed or hopeless 0 0   PHQ-2 Score 0 0       Abuse: Current or Past(Physical, Sexual or Emotional)- No  Do you feel safe in your environment - Yes    Social History   Substance Use Topics     Smoking status: Never Smoker     Smokeless tobacco: Never Used     Alcohol use Yes      Comment: rarely     If you drink alcohol do you typically have >3 drinks per day or >7 drinks per week? No                     Reviewed orders with patient.  Reviewed health maintenance and updated orders accordingly - Yes  Patient Active Problem List   Diagnosis     Type 2 diabetes mellitus with hyperglycemia (H)     ? 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     Displacement of lumbar intervertebral disc without myelopathy     Nonallopathic lesion of lumbar region     Nonallopathic lesion of thoracic region     Nonallopathic lesion of sacral region     Bartholin gland cyst     Cataract     Irritable bowel syndrome     Esophageal reflux     Seasonal allergic rhinitis     Latex sensitivity     Colon polyp     Adenomatous polyp     Subclinical hyperthyroidism     HYPERLIPIDEMIA LDL GOAL <100     Ovarian cyst     Breast pain     Essential hypertension     Advanced directives, counseling/discussion     Obesity     Hypomagnesemia     Type 2 diabetes mellitus with hyperglycemia, with long-term current use of insulin (H)     Past Surgical History:   Procedure Laterality Date     CATARACT IOL, RT/LT  9/23/2008    LE     HC DILATION/CURETTAGE DIAG/THER NON OB  1992     HC TYMPANOPLASTY W/O MASTOID, INIT/REV W/O OSS CHAIN RECONST  12/09     YAG CAPSULOTOMY OS (LEFT EYE)   2011       Social History   Substance Use Topics     Smoking status: Never Smoker     Smokeless tobacco: Never Used     Alcohol use Yes      Comment: rarely     Family History   Problem Relation Age of Onset     Diabetes Brother      Cancer Brother 59     Lung cancer     Diabetes Father       of dm 70's     C.A.D. Father      Diabetes Paternal Grandfather      Diabetes Sister      Cancer Maternal Aunt      ovarian cancer.      Diabetes Brother      Diabetes Brother      Diabetes Sister      Glaucoma Maternal Grandmother      Macular Degeneration No family hx of            Patient over age 50, mutual decision to screen reflected in health maintenance.    Pertinent mammograms are reviewed under the imaging tab.  History of abnormal Pap smear: NO - age 30- 65 PAP every 3 years recommended  PAP / HPV Latest Ref Rng & Units 3/6/2017 2013 2011   PAP - NIL NIL NIL   HPV 16 DNA NEG Negative - -   HPV 18 DNA NEG Negative - -   OTHER HR HPV NEG Negative - -     Reviewed and updated as needed this visit by clinical staff  Tobacco  Allergies  Meds  Med Hx  Surg Hx  Fam Hx  Soc Hx        Reviewed and updated as needed this visit by Provider            ROS:  CONSTITUTIONAL: NEGATIVE for fever, chills, change in weight  INTEGUMENTARY/SKIN: NEGATIVE for worrisome rashes, moles or lesions  EYES: NEGATIVE for vision changes or irritation  ENT: NEGATIVE for ear, mouth and throat problems  RESP: NEGATIVE for significant cough or SOB  BREAST: NEGATIVE for masses, tenderness or discharge  CV: NEGATIVE for chest pain, palpitations or peripheral edema  GI: NEGATIVE for nausea, abdominal pain, heartburn, or change in bowel habits  : NEGATIVE for unusual urinary or vaginal symptoms. No vaginal bleeding.  MUSCULOSKELETAL: NEGATIVE for significant arthralgias or myalgia  NEURO: NEGATIVE for weakness, dizziness or paresthesias  PSYCHIATRIC: NEGATIVE for changes in mood or affect     This document serves as a record  "of the services and decisions personally performed by DARRICK ACHARYA. It was created on his/her behalf by Esperanza Ravi, a trained medical scribe. The creation of this document is based on the provider's statements to the medical scribe. Esperanza Ravi, August 6, 2018 8:49 AM    OBJECTIVE:   /78 (BP Location: Right arm, Patient Position: Sitting, Cuff Size: Adult Large)  Pulse 70  Temp 98.2  F (36.8  C) (Oral)  Resp 18  Ht 1.619 m (5' 3.75\")  Wt 84.4 kg (186 lb)  LMP 03/19/2009 (Exact Date)  SpO2 97%  Breastfeeding? No  BMI 32.18 kg/m2  EXAM:  GENERAL APPEARANCE: healthy, alert and no distress  EYES: Eyes grossly normal to inspection, PERRL and conjunctivae and sclerae normal  HENT: ear canals and TM's normal, nose and mouth without ulcers or lesions, oropharynx clear and oral mucous membranes moist  NECK: no adenopathy, no asymmetry, masses, or scars and thyroid normal to palpation  RESP: lungs clear to auscultation - no rales, rhonchi or wheezes  BREAST: normal without masses, tenderness or nipple discharge and no palpable axillary masses or adenopathy  CV: regular rate and rhythm, normal S1 S2, no S3 or S4, no murmur, click or rub, no peripheral edema and peripheral pulses strong  ABDOMEN: soft, nontender, no hepatosplenomegaly, no masses and bowel sounds normal   (female): normal female external genitalia  MS: no musculoskeletal defects are noted and gait is age appropriate without ataxia  SKIN: multiple bites with inflammation/excoritations on LE o/w no suspicious lesions or rashes  NEURO: Normal strength and tone, sensory exam grossly normal, mentation intact and speech normal  PSYCH: mentation appears normal and affect normal/bright    Reviewed cardiac ct      ASSESSMENT/PLAN:   1. Encounter for routine adult health examination without abnormal findings  All other health maintenance updated per chart.    2. Type 2 diabetes mellitus with hyperglycemia, unspecified long term insulin use " status (H)   has been poorly controlled in the past, Patient is followed by endocrinology and medications are being titrated.  - Lipid panel reflex to direct LDL Fasting  - Albumin Random Urine Quantitative with Creat Ratio  - pentoxifylline (TRENTAL) 400 MG CR tablet; Take 1 tablet (400 mg) by mouth 2 times daily  Dispense: 180 tablet; Refill: 3  - Comprehensive metabolic panel    3. Essential hypertension  At goal.  Continue current medications.  - losartan (COZAAR) 100 MG tablet; Take 1 tablet (100 mg) by mouth daily  Dispense: 90 tablet; Refill: 3  - amLODIPine (NORVASC) 5 MG tablet; Take 1 tablet (5 mg) by mouth daily  Dispense: 90 tablet; Refill: 3    4. Type 2 diabetes mellitus with diabetic nephropathy, with long-term current use of insulin (H)  See above.  - amLODIPine (NORVASC) 5 MG tablet; Take 1 tablet (5 mg) by mouth daily  Dispense: 90 tablet; Refill: 3    5. Screening for diabetic peripheral neuropathy  Health maintenance.    6. Seasonal allergic rhinitis, unspecified chronicity, unspecified trigger  Patient reports congestion and post nasal drip in the morning that resolves throughout the day. I prescribed her Singulair to help control this congestion and advised her to take this at her bedtime. ? If this will also help her dyspnea- ? If mild exercise induced asthma  - montelukast (SINGULAIR) 10 MG tablet; Take 1 tablet (10 mg) by mouth At Bedtime  Dispense: 30 tablet; Refill: 11    7. Bug bite, initial encounter  Patient had multiple bug bites on her lower extremities. Trial stronger steroid to help with itch and hopefully help healing. Discussed importance of skin protection from insects. Consider deet towlette's as the spray triggers allergy sx's for her.   - triamcinolone (KENALOG) 0.1 % cream; Apply sparingly to affected area three times daily as needed  Dispense: 80 g; Refill: 0    8. Dyspnea on exertion   She has had a full cardiac workup. I ordered PFT's  For next step. Also will trial  "singulair.      9. Need for vaccination  Health maintenance.  - ZOSTER VACCINE RECOMBINANT ADJUVANTED IM NJX  - ADMIN 1st VACCINE    COUNSELING:   Reviewed preventive health counseling, as reflected in patient instructions    BP Readings from Last 1 Encounters:   08/06/18 120/78     Estimated body mass index is 32.18 kg/(m^2) as calculated from the following:    Height as of this encounter: 1.619 m (5' 3.75\").    Weight as of this encounter: 84.4 kg (186 lb).      Weight management plan: Discussed healthy diet and exercise guidelines and patient will follow up in 12 months in clinic to re-evaluate.     reports that she has never smoked. She has never used smokeless tobacco.    Patient Instructions     You can try wipes instead of bug spray.     Try Singulair at your bedtime to help your morning congestion.     At [facility], we strive to deliver an exceptional experience to you, every time we see you.  If you receive a survey in the mail, please send us back your thoughts. We really do value your feedback.    Suggested websites for health information:  Www.[email or web address].[email or web address] : Up to date and easily searchable information on multiple topics.  Www.medlineplus.gov : medication info, interactive tutorials, watch real surgeries online  Www.familydoctor.org : good info from the Academy of Family Physicians  Www.cdc.gov : public health info, travel advisories, epidemics (H1N1)  Www.aap.org : children's health info, normal development, vaccinations  Www.health.[facility].[place].us : [place] dept of health, public health issues in [place], N1N1    Your care team:     Family Medicine   MELANIE Escudero MD Emily Bunt, APRN CNP   S. MD Marilia Park MD Angela Wermerskirchen, MD         Clinic hours: Monday - Wednesday 7 am-7 pm   Thursdays and Fridays 7 am-5 pm.     [facility]: Monday - Friday 11 am-9 pm,   Saturday and Sunday 9 am-5 pm.    [facility]: " Monday -Thursday 8 am-8 pm; Friday 8 am-6 pm; Saturday and Sunday 9 am-5 pm.     [facility]: Monday - Thursday 8 am - 7 pm; Friday 8 am - 6 pm    Clinic: [phone]   [facility]: [phone]   [facility]: [phone]            Preventive Health Recommendations  Female Ages 50 - 64    Yearly exam: See your health care provider every year in order to  o Review health changes.   o Discuss preventive care.    o Review your medicines if your doctor has prescribed any.      Get a Pap test every three years (unless you have an abnormal result and your provider advises testing more often).    If you get Pap tests with HPV test, you only need to test every 5 years, unless you have an abnormal result.     You do not need a Pap test if your uterus was removed (hysterectomy) and you have not had cancer.    You should be tested each year for STDs (sexually transmitted diseases) if you're at risk.     Have a mammogram every 1 to 2 years.    Have a colonoscopy at age 50, or have a yearly FIT test (stool test). These exams screen for colon cancer.      Have a cholesterol test every 5 years, or more often if advised.    Have a diabetes test (fasting glucose) every three years. If you are at risk for diabetes, you should have this test more often.     If you are at risk for osteoporosis (brittle bone disease), think about having a bone density scan (DEXA).    Shots: Get a flu shot each year. Get a tetanus shot every 10 years.    Nutrition:     Eat at least 5 servings of fruits and vegetables each day.    Eat whole-grain bread, whole-wheat pasta and brown rice instead of white grains and rice.    Get adequate Calcium and Vitamin D.     Lifestyle    Exercise at least 150 minutes a week (30 minutes a day, 5 days a week). This will help you control your weight and prevent disease.    Limit alcohol to one drink per day.    No smoking.     Wear sunscreen to prevent skin cancer.      See your dentist every six months for an exam and cleaning.    See your eye doctor every 1 to 2 years.          Counseling Resources:  ATP IV Guidelines  Pooled Cohorts Equation Calculator  Breast Cancer Risk Calculator  FRAX Risk Assessment  ICSI Preventive Guidelines  Dietary Guidelines for Americans, 2010  USDA's MyPlate  ASA Prophylaxis  Lung CA Screening    Elva Ortiz MD  [facility]    The information in this document, created by the medical scribe Esperanza Ravi for me, accurately reflects the services I personally performed and the decisions made by me. I have reviewed and approved this document for accuracy prior to leaving the patient care area.

## 2018-09-07 DIAGNOSIS — G56.21 ULNAR NEUROPATHY AT ELBOW OF RIGHT UPPER EXTREMITY: Primary | ICD-10-CM

## 2018-09-12 ENCOUNTER — OFFICE VISIT (OUTPATIENT)
Dept: ENDOCRINOLOGY | Facility: CLINIC | Age: 58
End: 2018-09-12
Payer: COMMERCIAL

## 2018-09-12 ENCOUNTER — TELEPHONE (OUTPATIENT)
Dept: SURGERY | Facility: CLINIC | Age: 58
End: 2018-09-12

## 2018-09-12 VITALS
DIASTOLIC BLOOD PRESSURE: 78 MMHG | HEIGHT: 64 IN | SYSTOLIC BLOOD PRESSURE: 121 MMHG | BODY MASS INDEX: 32.35 KG/M2 | HEART RATE: 80 BPM | WEIGHT: 189.5 LBS

## 2018-09-12 DIAGNOSIS — R21 RASH AND NONSPECIFIC SKIN ERUPTION: ICD-10-CM

## 2018-09-12 DIAGNOSIS — B00.9 HSV (HERPES SIMPLEX VIRUS) INFECTION: Primary | ICD-10-CM

## 2018-09-12 DIAGNOSIS — E11.42 TYPE 2 DIABETES MELLITUS WITH DIABETIC POLYNEUROPATHY, WITH LONG-TERM CURRENT USE OF INSULIN (H): Primary | ICD-10-CM

## 2018-09-12 DIAGNOSIS — Z79.4 TYPE 2 DIABETES MELLITUS WITH DIABETIC POLYNEUROPATHY, WITH LONG-TERM CURRENT USE OF INSULIN (H): Primary | ICD-10-CM

## 2018-09-12 RX ORDER — ACYCLOVIR 50 MG/G
CREAM TOPICAL
Qty: 5 G | Refills: 3 | Status: SHIPPED | OUTPATIENT
Start: 2018-09-12 | End: 2018-12-17

## 2018-09-12 ASSESSMENT — PAIN SCALES - GENERAL: PAINLEVEL: NO PAIN (0)

## 2018-09-12 NOTE — PROGRESS NOTES
Endocrinology Clinic Visit 18  NAME:  Maliha Pedro  PCP:  Elva Ortiz  MRN:  [id number]    Chief Complaint     Chief Complaint   Patient presents with     RECHECK     DM 2       History of Present Illness     Maliha Pedro is a 58 year old female who is seen in clinic for diabetes management.   She is a former patient of Dr. Mccarthy who is transitioning care since his half-way.   She has had diabetes since age 32. Has tried many different meds. Did not tolerate GLP-1 agonists. Stopped Actos due to concerns about adverse effects. Was on Lantus and Novolog with poor control, with recent improvement since addition of Jardiance 2017. She has retinopathy and nephropathy.     Interval History:   A1c today is 8.2 today, which is stable.   Since last visit, she has been using the Abbott freestyle shannon system. She likes it a lot. Her BG improved with using that, ut the past month has been stressful with the death of her close friend.    Associated Signs/Symptoms  Hypoglycemia: no. Hyperglycemia: none.Neuropathy: none. Vascular Symtpoms: none. Angina/CHF: dyspnea on exertion. Ulcers: No. Amputations: No    Current treatment strategy: Novolog 20 with main meals, 10-15 with snack. Lantus 75 units daily. Metformin 1000 mg po BID, Jardiance 25 mg po daily    Blood Glucose Monitoring: CGM download: shannon: 14 day data:  Avg 143. SD 85.8. Hypo: 22%, within ran%, above range: 30%.     Diet: 2-3 meals per day. Occasional snacks.   Cut down on rice. Eating more vegetables and fish.     Exercise: yoga.     Weight:   Wt Readings from Last 4 Encounters:   18 86 kg (189 lb 8 oz)   18 84.4 kg (186 lb)   18 85.7 kg (188 lb 14.4 oz)   18 85.6 kg (188 lb 11.2 oz)     Problem List     Patient Active Problem List   Diagnosis     Type 2 diabetes mellitus with hyperglycemia (H)     ? 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     Displacement of lumbar  intervertebral disc without myelopathy     Nonallopathic lesion of lumbar region     Nonallopathic lesion of thoracic region     Nonallopathic lesion of sacral region     Bartholin gland cyst     Cataract     Irritable bowel syndrome     Esophageal reflux     Seasonal allergic rhinitis     Latex sensitivity     Colon polyp     Adenomatous polyp     Subclinical hyperthyroidism     HYPERLIPIDEMIA LDL GOAL <100     Ovarian cyst     Breast pain     Essential hypertension     Advanced directives, counseling/discussion     Obesity     Hypomagnesemia     Type 2 diabetes mellitus with hyperglycemia, with long-term current use of insulin (H)        Medications     Current Outpatient Prescriptions   Medication     albuterol (PROAIR HFA, PROVENTIL HFA, VENTOLIN HFA) 108 (90 BASE) MCG/ACT inhaler     amLODIPine (NORVASC) 5 MG tablet     aspirin EC 81 MG tablet     CALCIUM + D OR     Continuous Blood Gluc  (FREESTYLE BUSHRA READER) KIRIT     continuous blood glucose monitoring (FREESTYLE BUSHRA) sensor     empagliflozin (JARDIANCE) 25 MG TABS tablet     fexofenadine (ALLEGRA) 180 MG tablet     fish oil-omega-3 fatty acids (FISH OIL) 1000 MG capsule     fluticasone (FLONASE) 50 MCG/ACT spray     Glucosamine-Chondroitin (GLUCOSAMINE CHONDR COMPLEX PO)     hydrochlorothiazide 12.5 MG TABS tablet     insulin aspart (NOVOLOG FLEXPEN) 100 UNIT/ML injection     insulin glargine (LANTUS SOLOSTAR) 100 UNIT/ML injection     losartan (COZAAR) 100 MG tablet     magnesium 250 MG tablet     metFORMIN (GLUCOPHAGE-XR) 500 MG 24 hr tablet     montelukast (SINGULAIR) 10 MG tablet     MULTIVITAMIN OR     NEEDLES, ANY SIZE     pantoprazole (PROTONIX) 40 MG EC tablet     pentoxifylline (TRENTAL) 400 MG CR tablet     rosuvastatin (CRESTOR) 10 MG tablet     triamcinolone (KENALOG) 0.1 % cream     VITAMIN D 2000 UNIT OR TABS     No current facility-administered medications for this visit.      Facility-Administered Medications Ordered in Other  Visits   Medication     fentaNYL (SUBLIMAZE) injection 25-50 mcg     flumazenil (ROMAZICON) injection 0.2 mg     midazolam (VERSED) injection 0.5-1 mg     naloxone (NARCAN) injection 0.1-0.4 mg        Allergies     Allergies   Allergen Reactions     Latex Swelling     Mold      Tetanus Antitoxin Swelling     Tetracycline Swelling       Medical / Surgical History     Past Medical History:   Diagnosis Date     Adjustment disorder with mixed anxiety and depressed mood     following death of her adoptive father and then again after adoptive mother      Allergic rhinitis, cause unspecified     uses benadryl prn (sneezing/hoarse voice)     Bartholin gland cyst 2008     Chest pain, unspecified     neg dobutamine stress test  (reacted to dobutamine)     Dry eyes      ERM OD (epiretinal membrane, right eye)     BE mild     Esophageal reflux     EGD: -neg     Glaucoma suspect      Hypertension      Irregular menstrual cycle     s/p D&C, since then more regular     Irritable bowel syndrome     colonoscopy -neg. Sx's especially prior to menses     MEDICAL HISTORY OF -     had health directive with : DANIEL Ford.  Full Code. Aunt (Charlie Tay) will be decision maker     Meningitis, unspecified(322.9) age 8    hospitalized for 9 mo     Need for prophylactic vaccination with tuberculosis (BCG) vaccine     Has pos Mantoux. Gets yearly cxr, all neg.      Other and unspecified hyperlipidemia      Pain in joint, shoulder region     bone spurs on MRI, s/p pool therapy     Type II or unspecified type diabetes mellitus without mention of complication, not stated as uncontrolled Age 32    [facility]s [facility] endocrine [facility]: Shayne Lane MD     Unspecified cataract     RE     Unspecified sinusitis (chronic)     2006     Wheezing     Has seen pulm  & 2007. Told night ashtma, ? vocal chord spasm due to cold air.      Past Surgical History:   Procedure Laterality Date     CATARACT IOL, RT/LT  2008     LE     HC DILATION/CURETTAGE DIAG/THER NON OB       HC TYMPANOPLASTY W/O MASTOID, INIT/REV W/O OSS CHAIN RECONST       YAG CAPSULOTOMY OS (LEFT EYE)  2011       Social History     Social History     Social History     Marital status: Single     Spouse name: N/A     Number of children: N/A     Years of education: N/A     Occupational History     Not on file.     Social History Main Topics     Smoking status: Never Smoker     Smokeless tobacco: Never Used     Alcohol use Yes      Comment: rarely     Drug use: No     Sexual activity: Not Currently     Partners: Male     Other Topics Concern     Parent/Sibling W/ Cabg, Mi Or Angioplasty Before 65f 55m? 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 Moved to  in . Initially lived in [place].    No children        2 cats       Family History     Family History   Problem Relation Age of Onset     Diabetes Brother      Cancer Brother 59     Lung cancer     Diabetes Father       of dm 70's     C.A.D. Father      Diabetes Paternal Grandfather      Diabetes Sister      Cancer Maternal Aunt      ovarian cancer.      Diabetes Brother      Diabetes Brother      Diabetes Sister      Glaucoma Maternal Grandmother      Macular Degeneration No family hx of        ROS     Constitutional: no fevers, chills, night sweats. No weight loss or fatigue. Good appetite  Eyes: no vision changes, no eye redness, no diplopia  Ears, Nose, mouth, throat: no hearing changes, no tinnitus, no rhinorrhea, no nasal congestion  Cardiovascular: no chest pain, no orthopnea  "or PND, no edema, no palpitations  Respiratory: no dyspnea, no cough, no sputum, no wheezing  Gastrointestinal: no nausea, no vomiting, no abdominal pain, no diarrhea, no constipation  Genitourinary: no dysuria, no frequency, no urgency, no nocturia  Musculoskeletal: no joint pains, no back pain, no cramps, no fractures  Skin: no rash, no itching, no dryness, no ulcers, no hair loss  Neurological: no headache, no weakness, no numbness, no dizziness, no tremors  Psychiatric: no anxiety, no sadness  Hematologic/lymphatic: no easy bruising, no bleeding, no palor    Physical Exam   /78  Pulse 80  Ht 1.619 m (5' 3.74\")  Wt 86 kg (189 lb 8 oz)  LMP 03/19/2009 (Exact Date)  BMI 32.79 kg/m2     General: Comfortable, no obvious distress, normal body habitus  Eyes: Sclera anicteric, moist conjunctiva  HENT: Atraumatic, oropharynx clear, moist mucous membranes with no mucosal ulcerations  Neck: Trachea midline, supple. Thyroid: Thyroid is normal in size and texture  CV: Regular rhythm, normal rate. No murmurs auscultated  Resp: Clear to auscultation bilaterally, good effort  Abdomen:  Soft, non tender, non distended. Bowel sounds heard. No organomegaly.  Skin: No rashes, lesions, or subcutaneous nodules.   Psych: Alert and oriented x 3. Appropriate affect, good insight  Extremities: No peripheral edema  Musculoskeletal: Appropriate muscle bulk and strength  Lymphatic: No cervical lymphadenopathy  Neuro: Moves all four extremities. No focal deficits on limited exam. Gait normal.       Labs/Imaging and Outside Records     Pertinent Labs were reviewed and updated in EPIC.    Summary of recent findings:   Lab Results   Component Value Date    A1C 9.8 12/16/2013    A1C 9.5 04/15/2013    A1C 9.6 01/16/2013    A1C 9.4 09/28/2012    A1C 8.3 06/22/2012       TSH   Date Value Ref Range Status   10/30/2017 1.29 0.40 - 4.00 mU/L Final   02/08/2017 0.76 0.40 - 4.00 mU/L Final   11/17/2015 0.98 0.40 - 4.00 mU/L Final   05/04/2015 " 0.76 0.40 - 4.00 mU/L Final   12/16/2013 1.24 0.4 - 5.0 mU/L Final     T4 Free   Date Value Ref Range Status   06/21/2010 1.50 0.70 - 1.85 ng/dL Final       Creatinine   Date Value Ref Range Status   08/06/2018 0.64 0.52 - 1.04 mg/dL Final       Recent Labs   Lab Test  03/06/17   0905  05/04/15   1016  12/16/13   1028   CHOL  165  170  192   HDL  52  46*  42*   LDL  73  90  125   TRIG  199*  171*  124   CHOLHDLRATIO   --   3.7  4.6     Impression / Plan     1. Diabetes Mellitus: Type 2  Current glycemic control can be considered suboptimal.   It is slowly improving but no at goal.   Plan:  - continue to use the shannon sensor  - continue efforts with diet: less carbs  - continue exercise efforts  - compliance with diabetes meds.     2. Diabetes Complications: With retinopathy and nephropathy.   Recent CAD workup: normal stress test and echo    3. Blood Pressure Management: Blood pressure is controlled. Currently is on pharmacotherapy for this.     4.Lipid Management: Per the new ACC/JILLIAN/NHLBI guidelines, statins are recommended for individuals with diabetes aged 40-75 with LDL  without ASCVD, and for any individual with ASCVD. Currently the patient is on a statin.     5. Smoking Status: Patient Pt is smoke free..     6. Skin rash:   From bug bites. Is healing very slowly.   If not healed in next few weeks, we may refer to dermatology.     Follow up: 3 months      Shai Dong MD  Endocrinology, Diabetes and Metabolism  [facility]

## 2018-09-12 NOTE — LETTER
2018       RE: Maliha Pedro  [address]  [place] [address]     Dear Colleague,    Thank you for referring your patient, Maliha Pedro, to the [facility] ENDOCRINOLOGY at [facility]. Please see a copy of my visit note below.    Endocrinology Clinic Visit 18  NAME:  Maliha Pedro  PCP:  Elva Ortiz  MRN:  [id number]    Chief Complaint     Chief Complaint   Patient presents with     RECHECK     DM 2       History of Present Illness     Maliha Pedro is a 58 year old female who is seen in clinic for diabetes management.   She is a former patient of Dr. Mccarthy who is transitioning care since his [facility].   She has had diabetes since age 32. Has tried many different meds. Did not tolerate GLP-1 agonists. Stopped Actos due to concerns about adverse effects. Was on Lantus and Novolog with poor control, with recent improvement since addition of Jardiance 2017. She has retinopathy and nephropathy.     Interval History:   A1c today is 8.2 today, which is stable.   Since last visit, she has been using the Abbott freestyle shannon system. She likes it a lot. Her BG improved with using that, ut the past month has been stressful with the death of her close friend.    Associated Signs/Symptoms  Hypoglycemia: no. Hyperglycemia: none.Neuropathy: none. Vascular Symtpoms: none. Angina/CHF: dyspnea on exertion. Ulcers: No. Amputations: No    Current treatment strategy: Novolog 20 with main meals, 10-15 with snack. Lantus 75 units daily. Metformin 1000 mg po BID, Jardiance 25 mg po daily    Blood Glucose Monitoring: CGM download: shannon: 14 day data:  Avg 143. SD 85.8. Hypo: 22%, within ran%, above range: 30%.     Diet: 2-3 meals per day. Occasional snacks.   Cut down on rice. Eating more vegetables and fish.     Exercise: yoga.     Weight:   Wt Readings from Last 4 Encounters:   18 86 kg (189 lb 8 oz)   18 84.4  kg (186 lb)   05/11/18 85.7 kg (188 lb 14.4 oz)   03/13/18 85.6 kg (188 lb 11.2 oz)     Problem List     Patient Active Problem List   Diagnosis     Type 2 diabetes mellitus with hyperglycemia (H)     ? 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     Displacement of lumbar intervertebral disc without myelopathy     Nonallopathic lesion of lumbar region     Nonallopathic lesion of thoracic region     Nonallopathic lesion of sacral region     Bartholin gland cyst     Cataract     Irritable bowel syndrome     Esophageal reflux     Seasonal allergic rhinitis     Latex sensitivity     Colon polyp     Adenomatous polyp     Subclinical hyperthyroidism     HYPERLIPIDEMIA LDL GOAL <100     Ovarian cyst     Breast pain     Essential hypertension     Advanced directives, counseling/discussion     Obesity     Hypomagnesemia     Type 2 diabetes mellitus with hyperglycemia, with long-term current use of insulin (H)        Medications     Current Outpatient Prescriptions   Medication     albuterol (PROAIR HFA, PROVENTIL HFA, VENTOLIN HFA) 108 (90 BASE) MCG/ACT inhaler     amLODIPine (NORVASC) 5 MG tablet     aspirin EC 81 MG tablet     CALCIUM + D OR     Continuous Blood Gluc  (FREESTYLE BUSHRA READER) KIRIT     continuous blood glucose monitoring (FREESTYLE BUSHRA) sensor     empagliflozin (JARDIANCE) 25 MG TABS tablet     fexofenadine (ALLEGRA) 180 MG tablet     fish oil-omega-3 fatty acids (FISH OIL) 1000 MG capsule     fluticasone (FLONASE) 50 MCG/ACT spray     Glucosamine-Chondroitin (GLUCOSAMINE CHONDR COMPLEX PO)     hydrochlorothiazide 12.5 MG TABS tablet     insulin aspart (NOVOLOG FLEXPEN) 100 UNIT/ML injection     insulin glargine (LANTUS SOLOSTAR) 100 UNIT/ML injection     losartan (COZAAR) 100 MG tablet     magnesium 250 MG tablet     metFORMIN (GLUCOPHAGE-XR) 500 MG 24 hr tablet     montelukast (SINGULAIR) 10 MG tablet     MULTIVITAMIN OR     NEEDLES, ANY SIZE     pantoprazole (PROTONIX) 40 MG EC  tablet     pentoxifylline (TRENTAL) 400 MG CR tablet     rosuvastatin (CRESTOR) 10 MG tablet     triamcinolone (KENALOG) 0.1 % cream     VITAMIN D 2000 UNIT OR TABS     No current facility-administered medications for this visit.      Facility-Administered Medications Ordered in Other Visits   Medication     fentaNYL (SUBLIMAZE) injection 25-50 mcg     flumazenil (ROMAZICON) injection 0.2 mg     midazolam (VERSED) injection 0.5-1 mg     naloxone (NARCAN) injection 0.1-0.4 mg        Allergies     Allergies   Allergen Reactions     Latex Swelling     Mold      Tetanus Antitoxin Swelling     Tetracycline Swelling       Medical / Surgical History     Past Medical History:   Diagnosis Date     Adjustment disorder with mixed anxiety and depressed mood     following death of her adoptive father and then again after adoptive mother      Allergic rhinitis, cause unspecified     uses benadryl prn (sneezing/hoarse voice)     Bartholin gland cyst 2008     Chest pain, unspecified     neg dobutamine stress test  (reacted to dobutamine)     Dry eyes      ERM OD (epiretinal membrane, right eye)     BE mild     Esophageal reflux     EGD: -neg     Glaucoma suspect      Hypertension      Irregular menstrual cycle     s/p D&C, since then more regular     Irritable bowel syndrome     colonoscopy -neg. Sx's especially prior to menses     MEDICAL HISTORY OF -     had health directive with : DANIEL Ford.  Full Code. Aunt (Charlie Tay) will be decision maker     Meningitis, unspecified(322.9) age 8    hospitalized for 9 mo     Need for prophylactic vaccination with tuberculosis (BCG) vaccine     Has pos Mantoux. Gets yearly cxr, all neg.      Other and unspecified hyperlipidemia      Pain in joint, shoulder region     bone spurs on MRI, s/p pool therapy     Type II or unspecified type diabetes mellitus without mention of complication, not stated as uncontrolled Age 32    Follows with endocrine at [facility]:  Shayne Lane MD     Unspecified cataract     RE     Unspecified sinusitis (chronic)     2006     Wheezing     Has seen pulm  & . Told night ashtma, ? vocal chord spasm due to cold air.      Past Surgical History:   Procedure Laterality Date     CATARACT IOL, RT/LT  2008    LE     HC DILATION/CURETTAGE DIAG/THER NON OB       HC TYMPANOPLASTY W/O MASTOID, INIT/REV W/O OSS CHAIN RECONST       YAG CAPSULOTOMY OS (LEFT EYE)  2011       Social History     Social History     Social History     Marital status: Single     Spouse name: N/A     Number of children: N/A     Years of education: N/A     Occupational History     Not on file.     Social History Main Topics     Smoking status: Never Smoker     Smokeless tobacco: Never Used     Alcohol use Yes      Comment: rarely     Drug use: No     Sexual activity: Not Currently     Partners: Male     Other Topics Concern     Parent/Sibling W/ Cabg, Mi Or Angioplasty Before 65f 55m? 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 Moved to  in . Initially lived in [place].    No children        2 cats       Family History     Family History   Problem Relation Age of Onset     Diabetes Brother      Cancer Brother 59     Lung cancer     Diabetes Father       of dm 70's     C.A.D. Father      Diabetes Paternal Grandfather      Diabetes Sister      Cancer Maternal Aunt      ovarian cancer.      Diabetes Brother      Diabetes Brother      Diabetes Sister      Glaucoma Maternal Grandmother      Macular Degeneration No family hx of        ROS  "    Constitutional: no fevers, chills, night sweats. No weight loss or fatigue. Good appetite  Eyes: no vision changes, no eye redness, no diplopia  Ears, Nose, mouth, throat: no hearing changes, no tinnitus, no rhinorrhea, no nasal congestion  Cardiovascular: no chest pain, no orthopnea or PND, no edema, no palpitations  Respiratory: no dyspnea, no cough, no sputum, no wheezing  Gastrointestinal: no nausea, no vomiting, no abdominal pain, no diarrhea, no constipation  Genitourinary: no dysuria, no frequency, no urgency, no nocturia  Musculoskeletal: no joint pains, no back pain, no cramps, no fractures  Skin: no rash, no itching, no dryness, no ulcers, no hair loss  Neurological: no headache, no weakness, no numbness, no dizziness, no tremors  Psychiatric: no anxiety, no sadness  Hematologic/lymphatic: no easy bruising, no bleeding, no palor    Physical Exam   /78  Pulse 80  Ht 1.619 m (5' 3.74\")  Wt 86 kg (189 lb 8 oz)  LMP 03/19/2009 (Exact Date)  BMI 32.79 kg/m2     General: Comfortable, no obvious distress, normal body habitus  Eyes: Sclera anicteric, moist conjunctiva  HENT: Atraumatic, oropharynx clear, moist mucous membranes with no mucosal ulcerations  Neck: Trachea midline, supple. Thyroid: Thyroid is normal in size and texture  CV: Regular rhythm, normal rate. No murmurs auscultated  Resp: Clear to auscultation bilaterally, good effort  Abdomen:  Soft, non tender, non distended. Bowel sounds heard. No organomegaly.  Skin: No rashes, lesions, or subcutaneous nodules.   Psych: Alert and oriented x 3. Appropriate affect, good insight  Extremities: No peripheral edema  Musculoskeletal: Appropriate muscle bulk and strength  Lymphatic: No cervical lymphadenopathy  Neuro: Moves all four extremities. No focal deficits on limited exam. Gait normal.       Labs/Imaging and Outside Records     Pertinent Labs were reviewed and updated in EPIC.    Summary of recent findings:   Lab Results   Component Value " Date    A1C 9.8 12/16/2013    A1C 9.5 04/15/2013    A1C 9.6 01/16/2013    A1C 9.4 09/28/2012    A1C 8.3 06/22/2012       TSH   Date Value Ref Range Status   10/30/2017 1.29 0.40 - 4.00 mU/L Final   02/08/2017 0.76 0.40 - 4.00 mU/L Final   11/17/2015 0.98 0.40 - 4.00 mU/L Final   05/04/2015 0.76 0.40 - 4.00 mU/L Final   12/16/2013 1.24 0.4 - 5.0 mU/L Final     T4 Free   Date Value Ref Range Status   06/21/2010 1.50 0.70 - 1.85 ng/dL Final       Creatinine   Date Value Ref Range Status   08/06/2018 0.64 0.52 - 1.04 mg/dL Final       Recent Labs   Lab Test  03/06/17   0905  05/04/15   1016  12/16/13   1028   CHOL  165  170  192   HDL  52  46*  42*   LDL  73  90  125   TRIG  199*  171*  124   CHOLHDLRATIO   --   3.7  4.6     Impression / Plan     1. Diabetes Mellitus: Type 2  Current glycemic control can be considered suboptimal.   It is slowly improving but no at goal.   Plan:  - continue to use the shanonn sensor  - continue efforts with diet: less carbs  - continue exercise efforts  - compliance with diabetes meds.     2. Diabetes Complications: With retinopathy and nephropathy.   Recent CAD workup: normal stress test and echo    3. Blood Pressure Management: Blood pressure is controlled. Currently is on pharmacotherapy for this.     4.Lipid Management: Per the new ACC/JILLIAN/NHLBI guidelines, statins are recommended for individuals with diabetes aged 40-75 with LDL  without ASCVD, and for any individual with ASCVD. Currently the patient is on a statin.     5. Smoking Status: Patient Pt is smoke free..     6. Skin rash:   From bug bites. Is healing very slowly.   If not healed in next few weeks, we may refer to dermatology.     Follow up: 3 months      Shai Dong MD  Endocrinology, Diabetes and Metabolism  [facility]

## 2018-09-12 NOTE — MR AVS SNAPSHOT
After Visit Summary   9/12/2018    Maliha Pedro    MRN: [id number]           Patient Information     Date Of Birth          1960        Visit Information        Provider Department      9/12/2018 9:00 AM Shai Dong MD  [facility]        Today's Diagnoses     Type 2 diabetes mellitus with diabetic polyneuropathy, with long-term current use of insulin (H)    -  1    Rash and nonspecific skin eruption           Follow-ups after your visit        Follow-up notes from your care team     Return in about 3 months (around 12/12/2018).      Your next 10 appointments already scheduled     Nov 14, 2018  8:45 AM CST   RETURN GLAUCOMA with Umu Rosario MD   Eye Clinic ([facility])    [address]  [address] [id number] [phone]            Dec 12, 2018  9:00 AM CST   (Arrive by 8:45 AM)   RETURN DIABETES with MD BIANKA Salcedo [facility] Endocrinology ([facility])    [address]  [address] [facility] [address] [phone]              Who to contact     Please call your clinic at [phone] to:    Ask questions about your health    Make or cancel appointments    Discuss your medicines    Learn about your test results    Speak to your doctor            Additional Information About Your Visit        MyChart Information     [employer] gives you secure access to your electronic health record. If you see a primary care provider, you can also send messages to your care team and make appointments. If you have questions, please call your primary care clinic.  If you do not have a primary care provider, please call [phone] and they will assist you.      [employer] is an electronic gateway that provides easy, online access to your medical records. With [employer], you can request a clinic appointment, read your test results, renew a prescription or communicate with your care  "team.     To access your existing account, please contact your [facility] or call [phone] for assistance.        Care EveryWhere ID     This is your Care EveryWhere ID. This could be used by other organizations to access your [place] medical records  [id number]        Your Vitals Were     Pulse Height Last Period BMI (Body Mass Index)          80 1.619 m (5' 3.74\") 03/19/2009 (Exact Date) 32.79 kg/m2         Blood Pressure from Last 3 Encounters:   09/12/18 121/78   08/06/18 120/78   05/11/18 117/75    Weight from Last 3 Encounters:   09/12/18 86 kg (189 lb 8 oz)   08/06/18 84.4 kg (186 lb)   05/11/18 85.7 kg (188 lb 14.4 oz)              We Performed the Following     Continuous Glucose Monitoring >=72 hours PHYS [facility]        Primary Care Provider Office Phone # Fax #    Elva Ortiz [phone] [address]  [facility] [address]        Equal Access to Services     [facility]: Hadii aad ku hadasho Soomaali, waaxda luqadaha, qaybta kaalmada adeegyaady, poly harrison . So [facility] [phone].    ATENCIÓN: Si habla español, tiene a ford disposición servicios gratuitos de asistencia lingüística. [facility] [phone].    We comply with applicable federal civil rights laws and [place] laws. We do not discriminate on the basis of race, color, national origin, age, disability, sex, sexual orientation, or gender identity.            Thank you!     Thank you for choosing [facility] ENDOCRINOLOGY  for your care. Our goal is always to provide you with excellent care. Hearing back from our patients is one way we can continue to improve our services. Please take a few minutes to complete the written survey that you may receive in the mail after your visit with us. Thank you!             Your Updated Medication List - Protect others around you: Learn how to safely use, store and throw away your medicines at " www.disposemymeds.org.          This list is accurate as of 9/12/18  9:20 AM.  Always use your most recent med list.                   Brand Name Dispense Instructions for use Diagnosis    albuterol 108 (90 Base) MCG/ACT inhaler    PROAIR HFA/PROVENTIL HFA/VENTOLIN HFA    1 Inhaler    Inhale 2 puffs into the lungs every 4 hours as needed for shortness of breath / dyspnea    Cough       amLODIPine 5 MG tablet    NORVASC    90 tablet    Take 1 tablet (5 mg) by mouth daily    Essential hypertension, Type 2 diabetes mellitus with diabetic nephropathy, with long-term current use of insulin (H)       aspirin 81 MG EC tablet      Take 325 mg by mouth daily        CALCIUM + D PO      Take 1 tablet by mouth 2 times daily.        continuous blood glucose monitoring sensor     10 each    For use with Freestyle Leslie Flash  for continuous monitioring of blood glucose levels. Replace sensor every 10 days.    Type 2 diabetes mellitus with diabetic polyneuropathy, with long-term current use of insulin (H)       empagliflozin 25 MG Tabs tablet    JARDIANCE    90 tablet    Take 1 tablet (25 mg) by mouth daily    Type 2 diabetes mellitus with diabetic nephropathy, with long-term current use of insulin (H)       fexofenadine 180 MG tablet    ALLEGRA    90 tablet    Take 1 tablet by mouth daily. 1 TABLET DAILY Failed claritin for symptom cotrol. Zyrtec increases heart rate.    Seasonal allergic rhinitis       fish oil-omega-3 fatty acids 1000 MG capsule      Take 1 capsule by mouth daily.        fluticasone 50 MCG/ACT spray    FLONASE    16 g    Spray 1-2 sprays into both nostrils daily    Seasonal allergic rhinitis, unspecified chronicity, unspecified trigger       FREESTYLE LESLIE READER Felipa     1 Device    1 each daily    Type 2 diabetes mellitus with hyperglycemia (H)       GLUCOSAMINE CHONDR COMPLEX PO           hydrochlorothiazide 12.5 MG Tabs tablet     90 tablet    Take 1 tablet (12.5 mg) by mouth daily    Essential  hypertension, Type 2 diabetes mellitus with diabetic nephropathy, with long-term current use of insulin (H)       insulin aspart 100 UNIT/ML injection    NovoLOG FLEXPEN    120 mL    Uses  120 units daily subcutaneously    Diabetes mellitus, type 2 (H)       insulin glargine 100 UNIT/ML injection    LANTUS SOLOSTAR    75 mL    80 units daily    Type 2 diabetes mellitus with hyperglycemia, unspecified long term insulin use status (H)       losartan 100 MG tablet    COZAAR    90 tablet    Take 1 tablet (100 mg) by mouth daily    Essential hypertension       magnesium 250 MG tablet     90 tablet    Take 1 tablet by mouth daily    Hypomagnesemia       metFORMIN 500 MG 24 hr tablet    GLUCOPHAGE-XR    360 tablet    Take 2 tabs twice a day    Type 2 diabetes mellitus with hyperglycemia, unspecified long term insulin use status (H)       montelukast 10 MG tablet    SINGULAIR    30 tablet    Take 1 tablet (10 mg) by mouth At Bedtime    Seasonal allergic rhinitis, unspecified chronicity, unspecified trigger       MULTIVITAMIN PO      Take 1 tablet by mouth daily.        NEEDLES, ANY SIZE     4 Box    Pen needles for Novolog insulin pen. Use to inject 5-6  times daily.    Type 2 diabetes mellitus with hyperglycemia, with long-term current use of insulin (H)       pantoprazole 40 MG EC tablet    PROTONIX    90 tablet    Take 1 tablet (40 mg) by mouth daily        pentoxifylline 400 MG CR tablet    TRENtal    180 tablet    Take 1 tablet (400 mg) by mouth 2 times daily    Type 2 diabetes mellitus with hyperglycemia, unspecified long term insulin use status (H)       rosuvastatin 10 MG tablet    CRESTOR    90 tablet    Take 1 tablet (10 mg) by mouth daily    Hyperlipidemia LDL goal <100       triamcinolone 0.1 % cream    KENALOG    80 g    Apply sparingly to affected area three times daily as needed    Bug bite, initial encounter       vitamin D 2000 units tablet      Take 1 tablet by mouth daily.

## 2018-09-19 ENCOUNTER — PATIENT OUTREACH (OUTPATIENT)
Dept: NEUROSURGERY | Facility: CLINIC | Age: 58
End: 2018-09-19

## 2018-09-19 NOTE — PROGRESS NOTES
Margir called and spoke with the pt and called again and left a voice mail.  Margir left the phone number of the scheduling line so she could set up an EMG.  Margir asked pt to call back once the EMG has been set so she can be scheduled to see Dr. Moran.

## 2018-09-26 ENCOUNTER — PATIENT OUTREACH (OUTPATIENT)
Dept: NEUROSURGERY | Facility: CLINIC | Age: 58
End: 2018-09-26

## 2018-09-26 NOTE — PROGRESS NOTES
Returned call.  Pt stated that she needs an appointment with Dr. Moran.  She stated that her PCP wrote an order for an EMG and a referral to neurosurgery clinic.  Writer did not see a referral to neurosurgery.

## 2018-10-15 ENCOUNTER — OFFICE VISIT (OUTPATIENT)
Dept: NEUROLOGY | Facility: CLINIC | Age: 58
End: 2018-10-15
Attending: NEUROLOGICAL SURGERY
Payer: COMMERCIAL

## 2018-10-15 DIAGNOSIS — G56.01 CARPAL TUNNEL SYNDROME OF RIGHT WRIST: ICD-10-CM

## 2018-10-15 DIAGNOSIS — G56.02 CARPAL TUNNEL SYNDROME OF LEFT WRIST: Primary | ICD-10-CM

## 2018-10-15 NOTE — MR AVS SNAPSHOT
After Visit Summary   10/15/2018    Maliha Pedro    MRN: [id number]           Patient Information     Date Of Birth          1960        Visit Information        Provider Department      10/15/2018 2:15 PM Delvis Figueroa MD [facility] EMG        Today's Diagnoses     Carpal tunnel syndrome of left wrist    -  1    Carpal tunnel syndrome of right wrist           Follow-ups after your visit        Your next 10 appointments already scheduled     Nov 14, 2018  8:45 AM CST   RETURN GLAUCOMA with Umu Rosario MD   Eye Clinic ([facility])    [address]  [address] [address]  [facility] [id number] [phone]            Dec 12, 2018  9:00 AM CST   (Arrive by 8:45 AM)   RETURN DIABETES with Shai Dong MD   [facility] Endocrinology ([facility])    [address]  [address] [facility] [address] [phone]              Who to contact     Please call your clinic at [phone] to:    Ask questions about your health    Make or cancel appointments    Discuss your medicines    Learn about your test results    Speak to your doctor            Additional Information About Your Visit        [email or web address]) Information     [employer] gives you secure access to your electronic health record. If you see a primary care provider, you can also send messages to your care team and make appointments. If you have questions, please call your primary care clinic.  If you do not have a primary care provider, please call [phone] and they will assist you.      [employer] is an electronic gateway that provides easy, online access to your medical records. With [employer], you can request a clinic appointment, read your test results, renew a prescription or communicate with your care team.     To access your existing account, please contact your [facility] or call [phone] for assistance.        Care  EveryWhere ID     This is your Care EveryWhere ID. This could be used by other organizations to access your [place] medical records  [id number]        Your Vitals Were     Last Period                   03/19/2009 (Exact Date)            Blood Pressure from Last 3 Encounters:   09/12/18 121/78   08/06/18 120/78   05/11/18 117/75    Weight from Last 3 Encounters:   09/12/18 86 kg (189 lb 8 oz)   08/06/18 84.4 kg (186 lb)   05/11/18 85.7 kg (188 lb 14.4 oz)              We Performed the Following     EMG     HC NCS MOTOR W OR W/O F-WAVE, 11 OR 12     HC NEEDLE EMG EA EXTREMTY W/PARASPINAL AREA COMPLETE        Primary Care Provider Office Phone # Fax #    Elva Ortiz [phone] [address]  [facility] [address]        Equal Access to Services     JAMES COLLINS : Hadii aad ku hadasho Soomaali, waaxda luqadaha, qaybta kaalmada adeegyada, waxay idiin haybrettn roxanna harrison . So [facility] [phone].    ATENCIÓN: Si habla español, tiene a ford disposición servicios gratuitos de asistencia lingüística. Hernando al [phone].    We comply with applicable federal civil rights laws and [place] laws. We do not discriminate on the basis of race, color, national origin, age, disability, sex, sexual orientation, or gender identity.            Thank you!     Thank you for choosing [facility]  for your care. Our goal is always to provide you with excellent care. Hearing back from our patients is one way we can continue to improve our services. Please take a few minutes to complete the written survey that you may receive in the mail after your visit with us. Thank you!             Your Updated Medication List - Protect others around you: Learn how to safely use, store and throw away your medicines at www.disposemymeds.org.          This list is accurate as of 10/15/18  2:21 PM.  Always use your most recent med list.                   Brand Name Dispense Instructions for use  Diagnosis    acyclovir 5 % cream    ZOVIRAX    5 g    Apply topically 5 times daily    HSV (herpes simplex virus) infection       albuterol 108 (90 Base) MCG/ACT inhaler    PROAIR HFA/PROVENTIL HFA/VENTOLIN HFA    1 Inhaler    Inhale 2 puffs into the lungs every 4 hours as needed for shortness of breath / dyspnea    Cough       amLODIPine 5 MG tablet    NORVASC    90 tablet    Take 1 tablet (5 mg) by mouth daily    Essential hypertension, Type 2 diabetes mellitus with diabetic nephropathy, with long-term current use of insulin (H)       aspirin 81 MG EC tablet      Take 325 mg by mouth daily        CALCIUM + D PO      Take 1 tablet by mouth 2 times daily.        continuous blood glucose monitoring sensor     10 each    For use with Freestyle Leslie Flash  for continuous monitioring of blood glucose levels. Replace sensor every 10 days.    Type 2 diabetes mellitus with diabetic polyneuropathy, with long-term current use of insulin (H)       empagliflozin 25 MG Tabs tablet    JARDIANCE    90 tablet    Take 1 tablet (25 mg) by mouth daily    Type 2 diabetes mellitus with diabetic nephropathy, with long-term current use of insulin (H)       fexofenadine 180 MG tablet    ALLEGRA    90 tablet    Take 1 tablet by mouth daily. 1 TABLET DAILY Failed claritin for symptom cotrol. Zyrtec increases heart rate.    Seasonal allergic rhinitis       fish oil-omega-3 fatty acids 1000 MG capsule      Take 1 capsule by mouth daily.        fluticasone 50 MCG/ACT spray    FLONASE    16 g    Spray 1-2 sprays into both nostrils daily    Seasonal allergic rhinitis, unspecified chronicity, unspecified trigger       FREESTYLE LESLIE READER Felipa     1 Device    1 each daily    Type 2 diabetes mellitus with hyperglycemia (H)       GLUCOSAMINE CHONDR COMPLEX PO           hydrochlorothiazide 12.5 MG Tabs tablet     90 tablet    Take 1 tablet (12.5 mg) by mouth daily    Essential hypertension, Type 2 diabetes mellitus with diabetic  nephropathy, with long-term current use of insulin (H)       insulin aspart 100 UNIT/ML injection    NovoLOG FLEXPEN    120 mL    Uses  120 units daily subcutaneously    Diabetes mellitus, type 2 (H)       insulin glargine 100 UNIT/ML injection    LANTUS SOLOSTAR    75 mL    80 units daily    Type 2 diabetes mellitus with hyperglycemia, unspecified long term insulin use status       losartan 100 MG tablet    COZAAR    90 tablet    Take 1 tablet (100 mg) by mouth daily    Essential hypertension       magnesium 250 MG tablet     90 tablet    Take 1 tablet by mouth daily    Hypomagnesemia       metFORMIN 500 MG 24 hr tablet    GLUCOPHAGE-XR    360 tablet    Take 2 tabs twice a day    Type 2 diabetes mellitus with hyperglycemia, unspecified long term insulin use status       montelukast 10 MG tablet    SINGULAIR    30 tablet    Take 1 tablet (10 mg) by mouth At Bedtime    Seasonal allergic rhinitis, unspecified chronicity, unspecified trigger       MULTIVITAMIN PO      Take 1 tablet by mouth daily.        NEEDLES, ANY SIZE     4 Box    Pen needles for Novolog insulin pen. Use to inject 5-6  times daily.    Type 2 diabetes mellitus with hyperglycemia, with long-term current use of insulin (H)       pantoprazole 40 MG EC tablet    PROTONIX    90 tablet    Take 1 tablet (40 mg) by mouth daily        pentoxifylline 400 MG CR tablet    TRENtal    180 tablet    Take 1 tablet (400 mg) by mouth 2 times daily    Type 2 diabetes mellitus with hyperglycemia, unspecified long term insulin use status       rosuvastatin 10 MG tablet    CRESTOR    90 tablet    Take 1 tablet (10 mg) by mouth daily    Hyperlipidemia LDL goal <100       triamcinolone 0.1 % cream    KENALOG    80 g    Apply sparingly to affected area three times daily as needed    Bug bite, initial encounter       vitamin D 2000 units tablet      Take 1 tablet by mouth daily.

## 2018-10-15 NOTE — LETTER
10/15/2018       RE: Maliha Pedro  [address]  [place] [address]     Dear Colleague,    Thank you for referring your patient, Maliha Pedro, to the [facility] EMG at [facility]. Please see a copy of my visit note below.        [facility]  Electrodiagnostic Laboratory    Nerve Conduction & EMG Report          Patient: Maliha Pedro YOB: 1960  Patient ID: [id number] Age: 58 Years 8 Months  Gender: Female      History & Examination:  58 year old woman with weakness, numbness, and pain in the right > left hands. Also reports neck pain radiating down right arm. Eval for focal neuropathy vs radiculopathy.      Techniques:  Sensory and motor conduction studies were done with surface recording electrodes. EMG was done with a concentric needle electrode.     Results:  Nerve conduction studies:   1. Bilateral median-D2 sensory responses show moderately reduced amplitudes and severely slowed CVs.   2. Bilateral ulnar-D5 sensory responses are normal.   3. Right median ulnar palmar interlatency difference is prolonged.   4. Bilateral median-APB motor responses show moderate to severe DL prolongation, normal amplitude and normal CV.   5. Bilateral ulnar-ADM motor responses are normal.   6. Bilateral median lumbrical compared to ulnar interossei latencies are prolonged.     Needle EMG of selected proximal and distal right upper limb muscles was performed as tabulated below. No abnormal spontaneous activity was observed in the sampled muscles. Motor unit potential morphology and recruitment patterns were normal.     Interpretation:  This is an abnormal study. There is electrophysiologic evidence of moderate to severe median neuropathies at the wrists on both sides (e.g., bilateral carpal tunnel syndrome). There is no evidence of a cervical raiduclopathy affecting the right upper limb on the basis of this study. Clinical correlation is  recommended.     Delvis Figueroa MD  Department of Neurology            Sensory NCS      Nerve / Sites Rec. Site Onset Peak Ref. NP Amp Ref. PP Amp Dist Fede Ref. Temp     ms ms ms  V  V  V cm m/s m/s  C   R MEDIAN - Dig II Anti      Wrist Dig II 5.21 6.77  6.6 10.0 11.5 14 26.9 48.0 32.6   L MEDIAN - Dig II Anti      Wrist Dig II 4.74 6.41  7.6 10.0 16.5 14 29.5 48.0 32.4   R ULNAR - Dig V Anti      Wrist Dig V 2.08 2.76  18.9 8.0 30.5 12.5 60.0 48.0 32.9   L ULNAR - Dig V Anti      Wrist Dig V 1.98 2.76  16.4 8.0 31.3 12.5 63.2 48.0 32.4   R MEDIAN - Ulnar - Palmar      Median Wrist 4.22 5.63 2.40 10.8  15.2 8 19.0  32.6      Ulnar Wrist 1.41 2.03 2.40 9.0  9.3 8 56.9  32.6       Motor NCS      Nerve / Sites Rec. Site Lat Ref. Amp Ref. Rel Amp Dist Fede Ref. Dur. Area Temp.     ms ms mV mV % cm m/s m/s ms %  C   R MEDIAN - APB      Wrist APB 7.24 4.40 8.6 5.0 100 8   7.03 100 32.7      Elbow APB 11.61  8.5  99.1 21 48.0 48.0 7.14 95.8 32.7   L MEDIAN - APB      Wrist APB 6.41 4.40 6.2 5.0 100 8   7.71 100 32.2      Elbow APB 10.78  5.8  93.9 21 48.0 48.0 7.60 80 32.4   R ULNAR - ADM      Wrist ADM 2.45 3.50 13.1 5.0 100 8   5.47 100 32.6      B.Elbow ADM 5.94  12.4  94.4 19 54.4 48.0 6.41 94.9 32.6      A.Elbow ADM 7.40  12.3  93.4 8 54.9 48.0 6.30 95.9 32.6   L ULNAR - ADM      Wrist ADM 2.50 3.50 12.7 5.0 100 8   7.24 100 32.4      B.Elbow ADM 6.09  12.0  94.2 19 52.9 48.0 7.40 97.7 32.2      A.Elbow ADM 7.45  11.5  90.2 8 59.1 48.0 6.77 96.3 32.2   R MEDIAN - II Lumb      Median II Lumb 7.76  0.3  100 10   6.61 100 32.5      Ulnar Palm Int 2.92  1.8  613 10   5.16 355 32.5   L MEDIAN - II Lumb      Median II Lumb 6.56  0.5  100 10   5.16 100 31.5      Ulnar Palm Int 2.76  1.2  250 10   5.99 201 31.7       EMG Summary Table     Spontaneous MUAP Recruitment    IA Fib/PSW Fasc H.F. Amp Dur. PPP Pattern   R. FIRST D INTEROSS N None None None N N None Normal   R. PRON TERES N None None None N N None Normal   R. BICEPS N  None None None N N None Normal   R. TRICEPS N None None None N N None Normal   R. DELTOID N None None None N N None Normal                                  Again, thank you for allowing me to participate in the care of your patient.      Sincerely,    Delvis Figueroa MD

## 2018-10-22 ENCOUNTER — MYC MEDICAL ADVICE (OUTPATIENT)
Dept: FAMILY MEDICINE | Facility: CLINIC | Age: 58
End: 2018-10-22

## 2018-10-22 DIAGNOSIS — G56.03 BILATERAL CARPAL TUNNEL SYNDROME: Primary | ICD-10-CM

## 2018-10-23 ENCOUNTER — TELEPHONE (OUTPATIENT)
Dept: FAMILY MEDICINE | Facility: CLINIC | Age: 58
End: 2018-10-23

## 2018-10-23 NOTE — TELEPHONE ENCOUNTER
Panel Management Review      Lab Results   Component Value Date    A1C 9.8 12/16/2013    A1C 9.5 04/15/2013     Last Office Visit with this department: 8/6/2018    Patient sees james DACOSTA, MEDICAL ASSISTANT

## 2018-11-06 DIAGNOSIS — K21.9 GASTROESOPHAGEAL REFLUX DISEASE: Primary | ICD-10-CM

## 2018-11-06 RX ORDER — PANTOPRAZOLE SODIUM 40 MG/1
40 TABLET, DELAYED RELEASE ORAL DAILY
Qty: 90 TABLET | Refills: 3 | Status: SHIPPED | OUTPATIENT
Start: 2018-11-06 | End: 2019-02-04

## 2018-11-07 ENCOUNTER — OFFICE VISIT (OUTPATIENT)
Dept: NEUROSURGERY | Facility: CLINIC | Age: 58
End: 2018-11-07
Payer: COMMERCIAL

## 2018-11-07 VITALS
HEART RATE: 82 BPM | OXYGEN SATURATION: 95 % | BODY MASS INDEX: 32.44 KG/M2 | HEIGHT: 64 IN | SYSTOLIC BLOOD PRESSURE: 138 MMHG | DIASTOLIC BLOOD PRESSURE: 58 MMHG | WEIGHT: 190 LBS

## 2018-11-07 DIAGNOSIS — G56.03 BILATERAL CARPAL TUNNEL SYNDROME: Primary | ICD-10-CM

## 2018-11-07 ASSESSMENT — ENCOUNTER SYMPTOMS
PARALYSIS: 0
SPEECH CHANGE: 0
DIZZINESS: 0
MUSCLE CRAMPS: 1
MYALGIAS: 1
NUMBNESS: 1
DISTURBANCES IN COORDINATION: 0
SEIZURES: 0
HEADACHES: 0
MEMORY LOSS: 0
TREMORS: 0
TINGLING: 1
LOSS OF CONSCIOUSNESS: 0
WEAKNESS: 0

## 2018-11-07 ASSESSMENT — PAIN SCALES - GENERAL: PAINLEVEL: NO PAIN (0)

## 2018-11-07 NOTE — LETTER
11/7/2018       RE: Maliha Pedro  [address]  [place] [address]     Dear Colleague,    Thank you for referring your patient, Maliha Pedro, to the [facility] NEUROSURGERY at [facility]. Please see a copy of my visit note below.    Dear Dr. Ortiz:    We saw Ms. Maliha Pedro for the first time today in Neurosurgery Clinic for bilateral carpal tunnel syndrome. She is a 58 year old right-handed woman with a history of weakness, numbness, and pain in her hands bilaterally, although there is greater pain in the right than the left. She also reports neck pain that radiates down her right arm. She was evaluated by Dr. Figueroa on 10/15/18 with an EMG study. Results showed bilateral carpal tunnel syndrome but no evidence of cervical radiculopathy to affect the right upper limb. As of 9/12/18, her diabetic control has been improving but still suboptimal. Her A1c is decreasing with a current recording of 8.5. Her peak A1c was 12.    Currently, both of her hands are tingling and her wrist pain is exacerbated by activity.  Her carpal tunnel is worsening and every 10 minutes her hands will go numb as she loses strength and drops objects. Her right index, middle, and ring fingers are most affected but her little finger can also be affected with activity. Her symptoms are worst when trying to sleep. Handwriting is difficult but she can manage and she uses her left hand nowadays to eat.     MEDICAL HISTORY:   1. Type II Diabetes with diabetic retinopathy and nephropathy  2. Hypertension  3. Nonallopathic lesion of lumbar, thoracic, and sacral region  4. ERM right eye   5. Irritable bowel syndrome  6. Hyperlipidemia  7. Unspecified cataract    FAMILY HISTORY:   1. Father, paternal grandfather, sister, and brother - Diabetes  2. Brother - Lung cancer  3. Maternal aunt - Ovarian cancer  4. Maternal Grandmother - Glaucoma    SOCIAL HISTORY: Non-smoker. 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 We have known her for many years.  with no children. Has two cats and lives alone. Born in [facility] and moved to [place] in 1982. Has a good support system to help her after surgery.    PHYSICAL EXAM: On exam, Tinel's sign is present in both wrists. She has good strength in all individual hand muscles. She does not have any atrophy in the hand muscles. Her general appearance is good. Extraocular movements intact.     REVIEW OF STUDIES: We went over her EMG which confirms bilateral median neuropathies at the wrist.     IMPRESSION AND PLAN: We agree that she has bilateral carpal tunnel syndrome. From a symptomatic standpoint, her right side is worse. We agree she is a good candidate for carpal tunnel release and we will start with the right side first. The majority of this 30 minute visit was spent discussing details of carpal tunnel release and the post-operative course. We went over the risks of nerve injury, failure to improve, need for reoperation, infection, and hemorrhage. We also talked about timing of both surgeries and the expected amount of time off work. She understands all of this and we will get her scheduled for right-sided carpal tunnel release.     I appreciate your confidence in involving us in her care. Please do not hesitate to contact us with questions. We will keep you informed of her progress.     I, Annabel Talley, am serving as a scribe to document services personally performed by Ivania Moran MD, based upon my observations and the provider's statements to me. All documentation has been reviewed by the aforementioned doctor prior to being entered into the official medical record.    I, Ivania Moran, attest that above named individual is acting in scribe capacity, has observed my performance of the services and has documented them in accordance with my direction. The documentation recorded by the scribe  accurately reflects the service I personally performed and the decisions made by me.    Again, thank you for allowing me to participate in the care of your patient.      Sincerely,    Ivania Moran MD

## 2018-11-07 NOTE — NURSING NOTE
Chief Complaint   Patient presents with     RECHECK     DISCUSS SURGERY FOR ULNAR NEUROPATHY       Tesha Hwang MA

## 2018-11-07 NOTE — PROGRESS NOTES
Dear Dr. Ortiz:    We saw Ms. Maliha Pedro for the first time today in Neurosurgery Clinic for bilateral carpal tunnel syndrome. She is a 58 year old right-handed woman with a history of weakness, numbness, and pain in her hands bilaterally, although there is greater pain in the right than the left. She also reports neck pain that radiates down her right arm. She was evaluated by Dr. Figueroa on 10/15/18 with an EMG study. Results showed bilateral carpal tunnel syndrome but no evidence of cervical radiculopathy to affect the right upper limb. As of 9/12/18, her diabetic control has been improving but still suboptimal. Her A1c is decreasing with a current recording of 8.5. Her peak A1c was 12.    Currently, both of her hands are tingling and her wrist pain is exacerbated by activity.  Her carpal tunnel is worsening and every 10 minutes her hands will go numb as she loses strength and drops objects. Her right index, middle, and ring fingers are most affected but her little finger can also be affected with activity. Her symptoms are worst when trying to sleep. Handwriting is difficult but she can manage and she uses her left hand nowadays to eat.     MEDICAL HISTORY:   1. Type II Diabetes with diabetic retinopathy and nephropathy  2. Hypertension  3. Nonallopathic lesion of lumbar, thoracic, and sacral region  4. ERM right eye   5. Irritable bowel syndrome  6. Hyperlipidemia  7. Unspecified cataract    FAMILY HISTORY:   1. Father, paternal grandfather, sister, and brother - Diabetes  2. Brother - Lung cancer  3. Maternal aunt - Ovarian cancer  4. Maternal Grandmother - Glaucoma    SOCIAL HISTORY: Non-smoker. 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 We have known her for many years.  with no children. Has two cats and lives alone. Born in [facility] and moved to [place] in 1982. Has a good support system to help her after surgery.    PHYSICAL EXAM: On exam, Tinel's sign is  present in both wrists. She has good strength in all individual hand muscles. She does not have any atrophy in the hand muscles. Her general appearance is good. Extraocular movements intact.     REVIEW OF STUDIES: We went over her EMG which confirms bilateral median neuropathies at the wrist.     IMPRESSION AND PLAN: We agree that she has bilateral carpal tunnel syndrome. From a symptomatic standpoint, her right side is worse. We agree she is a good candidate for carpal tunnel release and we will start with the right side first. The majority of this 30 minute visit was spent discussing details of carpal tunnel release and the post-operative course. We went over the risks of nerve injury, failure to improve, need for reoperation, infection, and hemorrhage. We also talked about timing of both surgeries and the expected amount of time off work. She understands all of this and we will get her scheduled for right-sided carpal tunnel release.     I appreciate your confidence in involving us in her care. Please do not hesitate to contact us with questions. We will keep you informed of her progress.     MD NATALIE DAVIS Asef Chowdhury, am serving as a scribe to document services personally performed by Ivania Moran MD, based upon my observations and the provider's statements to me. All documentation has been reviewed by the aforementioned doctor prior to being entered into the official medical record.    I, Ivania Moran, attest that above named individual is acting in scribe capacity, has observed my performance of the services and has documented them in accordance with my direction. The documentation recorded by the scribe accurately reflects the service I personally performed and the decisions made by me.

## 2018-11-07 NOTE — MR AVS SNAPSHOT
After Visit Summary   11/7/2018    Maliha Pedro    MRN: [id number]           Patient Information     Date Of Birth          1960        Visit Information        Provider Department      11/7/2018 9:00 AM Ivania Moran MD [facility] Neurosurgery        Today's Diagnoses     Bilateral carpal tunnel syndrome    -  1       Follow-ups after your visit        Your next 10 appointments already scheduled     Nov 14, 2018  8:45 AM CST   RETURN GLAUCOMA with Umu Rosario MD   Eye Clinic ([facility])    [address]  [address] [address]  [facility] [address] [phone]            Dec 12, 2018  9:00 AM CST   (Arrive by 8:45 AM)   RETURN DIABETES with Shai Dong MD   [facility] Endocrinology ([facility])    [address]  [address] [facility] [address] [phone]              Who to contact     Please call your clinic at [phone] to:    Ask questions about your health    Make or cancel appointments    Discuss your medicines    Learn about your test results    Speak to your doctor            Additional Information About Your Visit        Syncurity[employer] Information     P[employer]AR gives you secure access to your electronic health record. If you see a primary care provider, you can also send messages to your care team and make appointments. If you have questions, please call your primary care clinic.  If you do not have a primary care provider, please call [phone] and they will assist you.      [employer]GAR is an electronic gateway that provides easy, online access to your medical records. With [employer]AR, you can request a clinic appointment, read your test results, renew a prescription or communicate with your care team.     To access your existing account, please contact your [facility] or call [phone] for assistance.        Care EveryWhere ID     This is your Care  "EveryWhere ID. This could be used by other organizations to access your [facility] medical records  [id number]        Your Vitals Were     Pulse Height Last Period Pulse Oximetry BMI (Body Mass Index)       82 1.619 m (5' 3.75\") 03/19/2009 (Exact Date) 95% 32.87 kg/m2        Blood Pressure from Last 3 Encounters:   11/07/18 138/58   09/12/18 121/78   08/06/18 120/78    Weight from Last 3 Encounters:   11/07/18 86.2 kg (190 lb)   09/12/18 86 kg (189 lb 8 oz)   08/06/18 84.4 kg (186 lb)              We Performed the Following     Lakeisha-Operative Worksheet, Single Procedure     Lakeisha-Operative Worksheet, Single Procedure        Primary Care Provider Office Phone # Fax #    Elva Ortiz [phone] [address]  [facility] [address]        Equal Access to Services     [facility]: Hadii aad ku hadasho Soomaali, waaxda luqadaha, qaybta kaalmada adeegyada, poly harrison . So [facility] [phone].    ATENCIÓN: Si migdaliala tess, tiene a ford disposición servicios gratuitos de asistencia lingüística. Llame al [phone].    We comply with applicable federal civil rights laws and [place] laws. We do not discriminate on the basis of race, color, national origin, age, disability, sex, sexual orientation, or gender identity.            Thank you!     Thank you for choosing [facility] NEUROSURGERY  for your care. Our goal is always to provide you with excellent care. Hearing back from our patients is one way we can continue to improve our services. Please take a few minutes to complete the written survey that you may receive in the mail after your visit with us. Thank you!             Your Updated Medication List - Protect others around you: Learn how to safely use, store and throw away your medicines at www.disposemymeds.org.          This list is accurate as of 11/7/18 11:59 PM.  Always use your most recent med list.                   Brand Name Dispense " Instructions for use Diagnosis    acyclovir 5 % cream    ZOVIRAX    5 g    Apply topically 5 times daily    HSV (herpes simplex virus) infection       albuterol 108 (90 Base) MCG/ACT inhaler    PROAIR HFA/PROVENTIL HFA/VENTOLIN HFA    1 Inhaler    Inhale 2 puffs into the lungs every 4 hours as needed for shortness of breath / dyspnea    Cough       amLODIPine 5 MG tablet    NORVASC    90 tablet    Take 1 tablet (5 mg) by mouth daily    Essential hypertension, Type 2 diabetes mellitus with diabetic nephropathy, with long-term current use of insulin (H)       aspirin 81 MG EC tablet      Take 325 mg by mouth daily        CALCIUM + D PO      Take 1 tablet by mouth 2 times daily.        continuous blood glucose monitoring sensor     10 each    For use with Freestyle Leslie Flash  for continuous monitioring of blood glucose levels. Replace sensor every 10 days.    Type 2 diabetes mellitus with diabetic polyneuropathy, with long-term current use of insulin (H)       empagliflozin 25 MG Tabs tablet    JARDIANCE    90 tablet    Take 1 tablet (25 mg) by mouth daily    Type 2 diabetes mellitus with diabetic nephropathy, with long-term current use of insulin (H)       fexofenadine 180 MG tablet    ALLEGRA    90 tablet    Take 1 tablet by mouth daily. 1 TABLET DAILY Failed claritin for symptom cotrol. Zyrtec increases heart rate.    Seasonal allergic rhinitis       fish oil-omega-3 fatty acids 1000 MG capsule      Take 1 capsule by mouth daily.        fluticasone 50 MCG/ACT spray    FLONASE    16 g    Spray 1-2 sprays into both nostrils daily    Seasonal allergic rhinitis, unspecified chronicity, unspecified trigger       FREESTYLE LESLIE READER Felipa     1 Device    1 each daily    Type 2 diabetes mellitus with hyperglycemia (H)       GLUCOSAMINE CHONDR COMPLEX PO           hydrochlorothiazide 12.5 MG Tabs tablet     90 tablet    Take 1 tablet (12.5 mg) by mouth daily    Essential hypertension, Type 2 diabetes mellitus  with diabetic nephropathy, with long-term current use of insulin (H)       insulin aspart 100 UNIT/ML injection    NovoLOG FLEXPEN    120 mL    Uses  120 units daily subcutaneously    Diabetes mellitus, type 2 (H)       insulin glargine 100 UNIT/ML injection    LANTUS SOLOSTAR    75 mL    80 units daily    Type 2 diabetes mellitus with hyperglycemia, unspecified long term insulin use status       losartan 100 MG tablet    COZAAR    90 tablet    Take 1 tablet (100 mg) by mouth daily    Essential hypertension       magnesium 250 MG tablet     90 tablet    Take 1 tablet by mouth daily    Hypomagnesemia       metFORMIN 500 MG 24 hr tablet    GLUCOPHAGE-XR    360 tablet    Take 2 tabs twice a day    Type 2 diabetes mellitus with hyperglycemia, unspecified long term insulin use status       montelukast 10 MG tablet    SINGULAIR    30 tablet    Take 1 tablet (10 mg) by mouth At Bedtime    Seasonal allergic rhinitis, unspecified chronicity, unspecified trigger       MULTIVITAMIN PO      Take 1 tablet by mouth daily.        NEEDLES, ANY SIZE     4 Box    Pen needles for Novolog insulin pen. Use to inject 5-6  times daily.    Type 2 diabetes mellitus with hyperglycemia, with long-term current use of insulin (H)       pantoprazole 40 MG EC tablet    PROTONIX    90 tablet    Take 1 tablet (40 mg) by mouth daily    Gastroesophageal reflux disease       pentoxifylline 400 MG CR tablet    TRENtal    180 tablet    Take 1 tablet (400 mg) by mouth 2 times daily    Type 2 diabetes mellitus with hyperglycemia, unspecified long term insulin use status       rosuvastatin 10 MG tablet    CRESTOR    90 tablet    Take 1 tablet (10 mg) by mouth daily    Hyperlipidemia LDL goal <100       triamcinolone 0.1 % cream    KENALOG    80 g    Apply sparingly to affected area three times daily as needed    Bug bite, initial encounter       vitamin D 2000 units tablet      Take 1 tablet by mouth daily.

## 2018-11-07 NOTE — LETTER
11/7/2018      RE: Maliha Pedro  [address]  [place] [address]       Dear Dr. Ortiz:    We saw Ms. Maliha Pedro for the first time today in Neurosurgery Clinic for bilateral carpal tunnel syndrome. She is a 58 year old right-handed woman with a history of weakness, numbness, and pain in her hands bilaterally, although there is greater pain in the right than the left. She also reports neck pain that radiates down her right arm. She was evaluated by Dr. Figueroa on 10/15/18 with an EMG study. Results showed bilateral carpal tunnel syndrome but no evidence of cervical radiculopathy to affect the right upper limb. As of 9/12/18, her diabetic control has been improving but still suboptimal. Her A1c is decreasing with a current recording of 8.5. Her peak A1c was 12.    Currently, both of her hands are tingling and her wrist pain is exacerbated by activity.  Her carpal tunnel is worsening and every 10 minutes her hands will go numb as she loses strength and drops objects. Her right index, middle, and ring fingers are most affected but her little finger can also be affected with activity. Her symptoms are worst when trying to sleep. Handwriting is difficult but she can manage and she uses her left hand nowadays to eat.     MEDICAL HISTORY:   1. Type II Diabetes with diabetic retinopathy and nephropathy  2. Hypertension  3. Nonallopathic lesion of lumbar, thoracic, and sacral region  4. ERM right eye   5. Irritable bowel syndrome  6. Hyperlipidemia  7. Unspecified cataract    FAMILY HISTORY:   1. Father, paternal grandfather, sister, and brother - Diabetes  2. Brother - Lung cancer  3. Maternal aunt - Ovarian cancer  4. Maternal Grandmother - Glaucoma    SOCIAL HISTORY: Non-smoker. 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  with no children. Has two cats and lives alone. Born in [facility] and moved to [place] in 1982. Has  a good support system to help her after surgery.    PHYSICAL EXAM: On exam, Tinel's sign is present in both wrists. She has good strength in all individual hand muscles. She does not have any atrophy in the hand muscles. Her general appearance is good. Extraocular movements intact.     REVIEW OF STUDIES: We went over her EMG which confirms bilateral median neuropathies at the wrist.     IMPRESSION AND PLAN: We agree that she has bilateral carpal tunnel syndrome. From a symptomatic standpoint, her right side is worse. We agree she is a good candidate for carpal tunnel release and we will start with the right side first. The majority of this 30 minute visit was spent discussing details of carpal tunnel release and the post-operative course. We went over the risks of nerve injury, failure to improve, need for reoperation, infection, and hemorrhage. We also talked about timing of both surgeries and the expected amount of time off work. She understands all of this and we will get her scheduled for right-sided carpal tunnel release.     I appreciate your confidence in involving us in her care. Please do not hesitate to contact us with questions. We will keep you informed of her progress.     I, Annabel Talley, am serving as a scribe to document services personally performed by Ivania Moran MD, based upon my observations and the provider's statements to me. All documentation has been reviewed by the aforementioned doctor prior to being entered into the official medical record.    I, Ivania Moran, attest that above named individual is acting in scribe capacity, has observed my performance of the services and has documented them in accordance with my direction. The documentation recorded by the scribe accurately reflects the service I personally performed and the decisions made by me.    Ivania Moran MD

## 2018-11-14 ENCOUNTER — RADIANT APPOINTMENT (OUTPATIENT)
Dept: GENERAL RADIOLOGY | Facility: CLINIC | Age: 58
End: 2018-11-14
Attending: FAMILY MEDICINE
Payer: COMMERCIAL

## 2018-11-14 ENCOUNTER — OFFICE VISIT (OUTPATIENT)
Dept: OPHTHALMOLOGY | Facility: CLINIC | Age: 58
End: 2018-11-14
Attending: OPHTHALMOLOGY
Payer: COMMERCIAL

## 2018-11-14 ENCOUNTER — OFFICE VISIT (OUTPATIENT)
Dept: FAMILY MEDICINE | Facility: CLINIC | Age: 58
End: 2018-11-14
Payer: COMMERCIAL

## 2018-11-14 VITALS
HEIGHT: 64 IN | SYSTOLIC BLOOD PRESSURE: 124 MMHG | RESPIRATION RATE: 18 BRPM | WEIGHT: 189 LBS | DIASTOLIC BLOOD PRESSURE: 78 MMHG | OXYGEN SATURATION: 96 % | BODY MASS INDEX: 32.27 KG/M2 | TEMPERATURE: 98 F | HEART RATE: 81 BPM

## 2018-11-14 DIAGNOSIS — R06.00 DYSPNEA, UNSPECIFIED TYPE: ICD-10-CM

## 2018-11-14 DIAGNOSIS — Z01.818 PREOP GENERAL PHYSICAL EXAM: Primary | ICD-10-CM

## 2018-11-14 DIAGNOSIS — H40.9 GLAUCOMA (INCREASED EYE PRESSURE): Primary | ICD-10-CM

## 2018-11-14 DIAGNOSIS — Z91.040 LATEX SENSITIVITY: ICD-10-CM

## 2018-11-14 DIAGNOSIS — G56.03 BILATERAL CARPAL TUNNEL SYNDROME: ICD-10-CM

## 2018-11-14 DIAGNOSIS — H40.003 BORDERLINE GLAUCOMA, BILATERAL: Primary | ICD-10-CM

## 2018-11-14 DIAGNOSIS — Z79.4 TYPE 2 DIABETES MELLITUS WITH HYPERGLYCEMIA, WITH LONG-TERM CURRENT USE OF INSULIN (H): ICD-10-CM

## 2018-11-14 DIAGNOSIS — I10 ESSENTIAL HYPERTENSION: ICD-10-CM

## 2018-11-14 DIAGNOSIS — E11.65 TYPE 2 DIABETES MELLITUS WITH HYPERGLYCEMIA, WITH LONG-TERM CURRENT USE OF INSULIN (H): ICD-10-CM

## 2018-11-14 DIAGNOSIS — H40.9 GLAUCOMA (INCREASED EYE PRESSURE): ICD-10-CM

## 2018-11-14 LAB — HBA1C MFR BLD: 8.2 % (ref 0–5.6)

## 2018-11-14 PROCEDURE — 99214 OFFICE O/P EST MOD 30 MIN: CPT | Performed by: FAMILY MEDICINE

## 2018-11-14 PROCEDURE — 93000 ELECTROCARDIOGRAM COMPLETE: CPT | Performed by: FAMILY MEDICINE

## 2018-11-14 PROCEDURE — 83036 HEMOGLOBIN GLYCOSYLATED A1C: CPT | Performed by: FAMILY MEDICINE

## 2018-11-14 PROCEDURE — G0463 HOSPITAL OUTPT CLINIC VISIT: HCPCS | Mod: ZF

## 2018-11-14 PROCEDURE — 92133 CPTRZD OPH DX IMG PST SGM ON: CPT | Mod: ZF | Performed by: OPHTHALMOLOGY

## 2018-11-14 PROCEDURE — 80048 BASIC METABOLIC PNL TOTAL CA: CPT | Performed by: FAMILY MEDICINE

## 2018-11-14 PROCEDURE — 83735 ASSAY OF MAGNESIUM: CPT | Performed by: FAMILY MEDICINE

## 2018-11-14 PROCEDURE — 36415 COLL VENOUS BLD VENIPUNCTURE: CPT | Performed by: FAMILY MEDICINE

## 2018-11-14 PROCEDURE — 71046 X-RAY EXAM CHEST 2 VIEWS: CPT | Mod: FY

## 2018-11-14 ASSESSMENT — TONOMETRY
OD_IOP_MMHG: 16
IOP_METHOD: APPLANATION
OS_IOP_MMHG: 17

## 2018-11-14 ASSESSMENT — EXTERNAL EXAM - RIGHT EYE: OD_EXAM: DERMATOCHALASIS

## 2018-11-14 ASSESSMENT — REFRACTION_WEARINGRX
SPECS_TYPE: PAL
OD_AXIS: 135
OS_CYLINDER: +1.50
OD_ADD: +2.50
OS_ADD: +2.50
OS_AXIS: 090
OD_CYLINDER: +0.50
OD_SPHERE: -3.25
OS_SPHERE: -4.00

## 2018-11-14 ASSESSMENT — VISUAL ACUITY
OS_CC: 20/20
METHOD: SNELLEN - LINEAR
OS_CC+: -1
CORRECTION_TYPE: GLASSES
OD_CC+: +2
OD_CC: 20/25

## 2018-11-14 ASSESSMENT — CUP TO DISC RATIO
OD_RATIO: 0.9
OS_RATIO: 0.7

## 2018-11-14 ASSESSMENT — CONF VISUAL FIELD
OD_NORMAL: 1
METHOD: COUNTING FINGERS
OS_NORMAL: 1

## 2018-11-14 ASSESSMENT — SLIT LAMP EXAM - LIDS
COMMENTS: MEIBOMIAN GLAND DYSFUNCTION
COMMENTS: MEIBOMIAN GLAND DYSFUNCTION

## 2018-11-14 ASSESSMENT — EXTERNAL EXAM - LEFT EYE: OS_EXAM: DERMATOCHALASIS

## 2018-11-14 NOTE — NURSING NOTE
Chief Complaints and History of Present Illnesses   Patient presents with     Glaucoma Follow Up     6 month follow up both eyes.     HPI    Affected eye(s):  Both   Symptoms:     No floaters   No flashes   No redness   No Dryness   No itching         Do you have eye pain now?:  No      Comments:  Pt states vision is the same as last visit. No eye pain today. Pt still having redness and dryness, relief with drops.  DM2 BS: 188 yesterday morning.  Lab Results       Component                Value               Date                  A1C:    8.2 per pt, taken a few months ago.   A1C                      9.8                 12/16/2013                 A1C                      9.5                 04/15/2013                 A1C                      9.6                 01/16/2013                 A1C                      9.4                 09/28/2012                 A1C                      8.3                 06/22/2012                [facility] November 14, 2018 8:29 AM

## 2018-11-14 NOTE — PROGRESS NOTES
Interval history:     Ocular meds:  ATs as needed     Ocular history:  Cataract extraction / intraocular lens left eye 7-8 years ago     Octopus visual field 24-2 5/2018   Stable both eyes     OCT rNFL 11/14/18   Stable OU    A/P:  1) Glaucoma suspect, bilateral  - large cup to disc ratio R>L  - favorable pachmetry 583/613    -IOP stable mid-upper teens today    2) Type 2 diabetes mellitus. A1c 8.2 5/2018. On insulin  - without retinopathy  - follows Dr. Tracy     3) Cataract, right eye  -still seeing well  -observe     3) Pseudophakia, left eye    Follow up 6 months with OVF 24-2 both eyes  Having  Carpel tunnel surgery this month    Attending Physician Attestation:  Complete documentation of historical and exam elements from today's encounter can be found in the full encounter summary report (not reduplicated in this progress note). I personally obtained the chief complaint(s) and history of present illness. I confirmed and edited asnecessary the review of systems, past medical/surgical history, family history, social history, and examination findings as documented by others; and I examined the patient myself. I personally reviewed the relevant tests, images, and reports as documented above. I formulated and edited as necessary the assessment and plan and discussed the findings and management plan with the patient and family.  - Umu Rosario MD 9:41 AM 11/14/2018

## 2018-11-14 NOTE — MR AVS SNAPSHOT
After Visit Summary   11/14/2018    Maliha Pedro    MRN: [id number]           Patient Information     Date Of Birth          1960        Visit Information        Provider Department      11/14/2018 3:20 PM Elva Ortiz MD [facility]        Today's Diagnoses     Preop general physical exam    -  1    Bilateral carpal tunnel syndrome        Type 2 diabetes mellitus with hyperglycemia, with long-term current use of insulin (H)        Latex sensitivity        Essential hypertension        Dyspnea, unspecified type          Care Instructions    The night before surgery cut your dose of lantus in half. Take 40 units instead of 80 units. Do not take any novolog the morning of surgery.     Hold your trental the week of surgery.     Do not take NSAIDs (ibuprofen, Aleve, naproxen, Advil, aspirin) the week before surgery. Tylenol is fine for pain.     Stop your asa, fish oil and glucosamine for the week before surgery.     Do not take the jardiance or metformin before surgery. Only take them later if you are eating.     Skip your losartan, magnesium, and all vitamins the morning of surgery.     Use two puffs of albuterol the morning of surgery. Take your amlodopine, allergy pill, protonix, and Flonase that morning.         Before Your Surgery      Call your surgeon if there is any change in your health. This includes signs of a cold or flu (such as a sore throat, runny nose, cough, rash or fever).    Do not smoke, drink alcohol or take over the counter medicine (unless your surgeon or primary care doctor tells you to) for the 24 hours before and after surgery.    If you take prescribed drugs: Follow your doctor s orders about which medicines to take and which to stop until after surgery.    Eating and drinking prior to surgery: follow the instructions from your surgeon    Take a shower or bath the night before surgery. Use the soap your surgeon gave you to gently clean  your skin. If you do not have soap from your surgeon, use your regular soap. Do not shave or scrub the surgery site.  Wear clean pajamas and have clean sheets on your bed.           Follow-ups after your visit        Your next 10 appointments already scheduled     Nov 19, 2018   Procedure with Ivania Moran MD   [facility] ([facility])    [address] [place] [id number] [phone]           Located in the Clinics and Surgery Center at [address] [place] [address].   parking is very convenient and highly recommended.  is a $6 flat rate fee.  Both  and self parkers should enter the main arrival plaza from [facility]; parking attendants will direct you based on your parking preference.            Dec 03, 2018 10:30 AM CST   (Arrive by 10:15 AM)   Return Visit with Radha Pascal PA-C   [facility] Neurosurgery ([facility])    [address] [place] [id number]   [phone]            Dec 12, 2018  9:00 AM CST   (Arrive by 8:45 AM)   RETURN DIABETES with Shai Dong MD   [facility] Endocrinology ([facility])    [address] [place] [id number] [phone]            Dec 17, 2018   Procedure with Ivania Moran MD   [facility] ([facility])    [address] [place] [address] [phone]           Located in the [facility] at [address] [place] [address].   parking is very convenient and highly recommended.  is a $6 flat rate fee.  Both  and self parkers should enter the main arrival plaza from [facility]; parking attendants will direct you based on your parking preference.            Jan 02, 2019 12:00 PM CST   (Arrive by 11:45 AM)   Return Visit with  Radha Pascal PA-C   [facility] ([facility])    [address]  3rd Floor  [facility] [address] [phone]            May 22, 2019  8:45 AM CDT   RETURN GLAUCOMA with Umu Rosario MD   [facility] ([facility])    [address]  9th [address] Clin [address]a  [facility] [id number] [phone]              Future tests that were ordered for you today     Open Future Orders        Priority Expected Expires Ordered    XR Chest 2 Views Routine 11/14/2018 11/14/2019 11/14/2018            Who to contact     If you have questions or need follow up information about today's clinic visit or your schedule please contact [facility] directly at [phone].  Normal or non-critical lab and imaging results will be communicated to you by Voicebasehart, letter or phone within 4 business days after the clinic has received the results. If you do not hear from us within 7 days, please contact the clinic through Voicebasehart or phone. If you have a critical or abnormal lab result, we will notify you by phone as soon as possible.  Submit refill requests through MyStarAutograph or call your pharmacy and they will forward the refill request to us. Please allow 3 business days for your refill to be completed.          Additional Information About Your Visit        VoicebaseharGuaranteach Information     MyStarAutograph gives you secure access to your electronic health record. If you see a primary care provider, you can also send messages to your care team and make appointments. If you have questions, please call your primary care clinic.  If you do not have a primary care provider, please call [phone] and they will assist you.        Care EveryWhere ID     This is your Care EveryWhere ID. This could be used by other organizations to access your [facility] medical records  [id number]        Your Vitals Were     Pulse Temperature Respirations Height Last Period Pulse  "Oximetry    81 98  F (36.7  C) (Oral) 18 1.619 m (5' 3.75\") 03/19/2009 (Exact Date) 96%    BMI (Body Mass Index)                   32.7 kg/m2            Blood Pressure from Last 3 Encounters:   11/14/18 124/78   11/07/18 138/58   09/12/18 121/78    Weight from Last 3 Encounters:   11/14/18 85.7 kg (189 lb)   11/07/18 86.2 kg (190 lb)   09/12/18 86 kg (189 lb 8 oz)              We Performed the Following     Basic metabolic panel     EKG 12-lead complete w/read - Clinics     HEMOGLOBIN A1C     Magnesium        Primary Care Provider Office Phone # Fax #    Elva Ortiz [phone] [address]  [facility] [address]        Equal Access to Services     [facility]: Hadii angelica yepez hadasho Sodaija, waaxda luqadaha, qaybta kaalmada adeegyada, poly izaguirre haycindi harrison . So [facility] [phone].    ATENCIÓN: Si habla español, tiene a ford disposición servicios gratuitos de asistencia lingüística. Llame al [phone].    We comply with applicable federal civil rights laws and [place] laws. We do not discriminate on the basis of race, color, national origin, age, disability, sex, sexual orientation, or gender identity.            Thank you!     Thank you for choosing [facility]  for your care. Our goal is always to provide you with excellent care. Hearing back from our patients is one way we can continue to improve our services. Please take a few minutes to complete the written survey that you may receive in the mail after your visit with us. Thank you!             Your Updated Medication List - Protect others around you: Learn how to safely use, store and throw away your medicines at www.disposemymeds.org.          This list is accurate as of 11/14/18  4:02 PM.  Always use your most recent med list.                   Brand Name Dispense Instructions for use Diagnosis    acyclovir 5 % cream    ZOVIRAX    5 g    Apply topically 5 times daily    HSV " (herpes simplex virus) infection       albuterol 108 (90 Base) MCG/ACT inhaler    PROAIR HFA/PROVENTIL HFA/VENTOLIN HFA    1 Inhaler    Inhale 2 puffs into the lungs every 4 hours as needed for shortness of breath / dyspnea    Cough       amLODIPine 5 MG tablet    NORVASC    90 tablet    Take 1 tablet (5 mg) by mouth daily    Essential hypertension, Type 2 diabetes mellitus with diabetic nephropathy, with long-term current use of insulin (H)       aspirin 81 MG EC tablet      Take 325 mg by mouth daily        CALCIUM + D PO      Take 1 tablet by mouth 2 times daily.        continuous blood glucose monitoring sensor     10 each    For use with Freestyle Leslie Flash  for continuous monitioring of blood glucose levels. Replace sensor every 10 days.    Type 2 diabetes mellitus with diabetic polyneuropathy, with long-term current use of insulin (H)       empagliflozin 25 MG Tabs tablet    JARDIANCE    90 tablet    Take 1 tablet (25 mg) by mouth daily    Type 2 diabetes mellitus with diabetic nephropathy, with long-term current use of insulin (H)       fexofenadine 180 MG tablet    ALLEGRA    90 tablet    Take 1 tablet by mouth daily. 1 TABLET DAILY Failed claritin for symptom cotrol. Zyrtec increases heart rate.    Seasonal allergic rhinitis       fish oil-omega-3 fatty acids 1000 MG capsule      Take 1 capsule by mouth daily.        fluticasone 50 MCG/ACT spray    FLONASE    16 g    Spray 1-2 sprays into both nostrils daily    Seasonal allergic rhinitis, unspecified chronicity, unspecified trigger       FREESTYLE LESLIE READER Felipa     1 Device    1 each daily    Type 2 diabetes mellitus with hyperglycemia (H)       GLUCOSAMINE CHONDR COMPLEX PO           hydrochlorothiazide 12.5 MG Tabs tablet     90 tablet    Take 1 tablet (12.5 mg) by mouth daily    Essential hypertension, Type 2 diabetes mellitus with diabetic nephropathy, with long-term current use of insulin (H)       insulin aspart 100 UNIT/ML injection     NovoLOG FLEXPEN    120 mL    Uses  120 units daily subcutaneously    Diabetes mellitus, type 2 (H)       insulin glargine 100 UNIT/ML injection    LANTUS SOLOSTAR    75 mL    80 units daily    Type 2 diabetes mellitus with hyperglycemia, unspecified long term insulin use status       losartan 100 MG tablet    COZAAR    90 tablet    Take 1 tablet (100 mg) by mouth daily    Essential hypertension       magnesium 250 MG tablet     90 tablet    Take 1 tablet by mouth daily    Hypomagnesemia       metFORMIN 500 MG 24 hr tablet    GLUCOPHAGE-XR    360 tablet    Take 2 tabs twice a day    Type 2 diabetes mellitus with hyperglycemia, unspecified long term insulin use status       montelukast 10 MG tablet    SINGULAIR    30 tablet    Take 1 tablet (10 mg) by mouth At Bedtime    Seasonal allergic rhinitis, unspecified chronicity, unspecified trigger       MULTIVITAMIN PO      Take 1 tablet by mouth daily.        NEEDLES, ANY SIZE     4 Box    Pen needles for Novolog insulin pen. Use to inject 5-6  times daily.    Type 2 diabetes mellitus with hyperglycemia, with long-term current use of insulin (H)       pantoprazole 40 MG EC tablet    PROTONIX    90 tablet    Take 1 tablet (40 mg) by mouth daily    Gastroesophageal reflux disease       pentoxifylline 400 MG CR tablet    TRENtal    180 tablet    Take 1 tablet (400 mg) by mouth 2 times daily    Type 2 diabetes mellitus with hyperglycemia, unspecified long term insulin use status       rosuvastatin 10 MG tablet    CRESTOR    90 tablet    Take 1 tablet (10 mg) by mouth daily    Hyperlipidemia LDL goal <100       triamcinolone 0.1 % cream    KENALOG    80 g    Apply sparingly to affected area three times daily as needed    Bug bite, initial encounter       vitamin D 2000 units tablet      Take 1 tablet by mouth daily.

## 2018-11-14 NOTE — PATIENT INSTRUCTIONS
The night before surgery cut your dose of lantus in half. Take 40 units instead of 80 units. Do not take any novolog the morning of surgery.     Hold your trental the week of surgery.     Do not take NSAIDs (ibuprofen, Aleve, naproxen, Advil, aspirin) the week before surgery. Tylenol is fine for pain.     Stop your asa, fish oil and glucosamine for the week before surgery.     Do not take the jardiance or metformin before surgery. Only take them later if you are eating.     Skip your losartan, hydrochlorothiazide, magnesium, and all vitamins the morning of surgery.     Use two puffs of albuterol the morning of surgery. Take your amlodopine, allergy pill, protonix, and Flonase that morning.         Before Your Surgery      Call your surgeon if there is any change in your health. This includes signs of a cold or flu (such as a sore throat, runny nose, cough, rash or fever).    Do not smoke, drink alcohol or take over the counter medicine (unless your surgeon or primary care doctor tells you to) for the 24 hours before and after surgery.    If you take prescribed drugs: Follow your doctor s orders about which medicines to take and which to stop until after surgery.    Eating and drinking prior to surgery: follow the instructions from your surgeon    Take a shower or bath the night before surgery. Use the soap your surgeon gave you to gently clean your skin. If you do not have soap from your surgeon, use your regular soap. Do not shave or scrub the surgery site.  Wear clean pajamas and have clean sheets on your bed.

## 2018-11-14 NOTE — PROGRESS NOTES
[address] [address]  [phone]  Dept: [phone]    PRE-OP EVALUATION:  Today's date: 2018    Maliha Pedro (: 1960) presents for pre-operative evaluation assessment as requested by Ivania Caldera.  She requires evaluation and anesthesia risk assessment prior to undergoing surgery/procedure for treatment of Right and Left Carpal Tunnel .    Proposed Surgery/ Procedure: Right and Left Carpal Tunnel.   Date of Surgery/ Procedure: 18 RT, 18 LT  Time of Surgery/ Procedure: RT 7AM, LT 7:15AM  Hospital/Surgical Facility: [facility]   Fax number for surgical facility:   Primary Physician: Elva Ortiz  Type of Anesthesia Anticipated: to be determined    Patient has a Health Care Directive or Living Will:  YES     1. NO - Do you have a history of heart attack, stroke, stent, bypass or surgery on an artery in the head, neck, heart or legs?  2. NO - Do you ever have any pain or discomfort in your chest?  3. NO - Do you have a history of  Heart Failure?  4. NO - Are you troubled by shortness of breath when: walking on the level, up a slight hill or at night?  5. NO - Do you currently have a cold, bronchitis or other respiratory infection?  6. NO - Do you have a cough, shortness of breath or wheezing?  7. NO - Do you sometimes get pains in the calves of your legs when you walk?  8. NO - Do you or anyone in your family have previous history of blood clots?  9. NO - Do you or does anyone in your family have a serious bleeding problem such as prolonged bleeding following surgeries or cuts?  10. NO - Have you ever had problems with anemia or been told to take iron pills?  11. NO - Have you had any abnormal blood loss such as black, tarry or bloody stools, or abnormal vaginal bleeding?  12. NO - Have you ever had a blood transfusion?  13. NO - Have you or any of your relatives ever had  problems with anesthesia?  14. NO - Do you have sleep apnea, excessive snoring or daytime drowsiness?  15. NO - Do you have any prosthetic heart valves?  16. NO - Do you have prosthetic joints?  17. NO - Is there any chance that you may be pregnant?      HPI:     HPI related to upcoming procedure: Patient is having surgery to relieve carpal tunnel syndrome. She has chronic pain and tingling in both upper extremities, R > L. Procedure will involve only regional anesthesia       See problem list for active medical problems.  Problems all longstanding and stable, except as noted/documented.  See ROS for pertinent symptoms related to these conditions.                                                                                                                                                          .    MEDICAL HISTORY:     Patient Active Problem List    Diagnosis Date Noted     Type 2 diabetes mellitus with hyperglycemia, with long-term current use of insulin (H) 02/08/2017     Priority: Medium     Hypomagnesemia 11/18/2015     Priority: Medium     Obesity 10/25/2015     Priority: Medium     Advanced directives, counseling/discussion 09/10/2015     Priority: Medium     Advance Care Planning 9/10/2015: ACP Review and Resources Provided:  Reviewed chart for advance care plan.  Maliha Pedro has no plan or code status on file.Mailed available resources and provided with information. Confirmed code status reflects current choices pending further ACP discussions.  Confirmed/documented legally designated decision maker(s). Added by Vanessa Nix       Essential hypertension 05/15/2015     Priority: Medium     Breast pain 10/31/2012     Priority: Medium     Ovarian cyst 10/10/2011     Priority: Medium     Benign appearing, seen 10/2011. Needs annual follow-up to ensure resolution.        HYPERLIPIDEMIA LDL GOAL <100 10/31/2010     Priority: Medium     Subclinical hyperthyroidism 06/23/2010     Priority: Medium      depressed TSH noted 6/10.        Adenomatous polyp 2009     Priority: Medium      colonoscopy. Due for repeat 2012       Colon polyp 2009     Priority: Medium     Needs next colonoscopy in 3 years,        Seasonal allergic rhinitis 2008     Priority: Medium     Latex sensitivity 2008     Priority: Medium     Cataract      Priority: Medium     Problem list name updated by automated process. Provider to review       Irritable bowel syndrome      Priority: Medium     colonoscopy , -adenomatous polyp.  Sx's especially prior to menses       Esophageal reflux      Priority: Medium     EGD: -neg       Bartholin gland cyst 2008     Priority: Medium     Nonallopathic lesion of thoracic region 2008     Priority: Medium     Problem list name updated by automated process. Provider to review       Nonallopathic lesion of sacral region 2008     Priority: Medium     Problem list name updated by automated process. Provider to review       Displacement of lumbar intervertebral disc without myelopathy 2008     Priority: Medium     Nonallopathic lesion of lumbar region 2008     Priority: Medium     Problem list name updated by automated process. Provider to review       ? 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 Gets yearly cxr, all neg.         Past Medical History:   Diagnosis Date     Adjustment disorder with mixed anxiety and depressed mood     following death of her adoptive father and then again after adoptive mother      Allergic rhinitis, cause unspecified     uses benadryl prn (sneezing/hoarse voice)     Bartholin gland cyst 2008     Chest pain, unspecified     neg dobutamine stress test  (reacted to dobutamine)     Dry  eyes      ERM OD (epiretinal membrane, right eye)     BE mild     Esophageal reflux     EGD: 2000-neg     Glaucoma suspect      Hypertension      Irregular menstrual cycle     s/p D&C, since then more regular     Irritable bowel syndrome     colonoscopy 2000-neg. Sx's especially prior to menses     MEDICAL HISTORY OF -     had health directive with : DANIEL Ford.  Full Code. Aunt (Charlie Tay) will be decision maker     Meningitis, unspecified(322.9) age 8    hospitalized for 9 mo     Need for prophylactic vaccination with tuberculosis (BCG) vaccine     Has pos Mantoux. Gets yearly cxr, all neg.      Other and unspecified hyperlipidemia      Pain in joint, shoulder region     bone spurs on MRI, s/p pool therapy     Type II or unspecified type diabetes mellitus without mention of complication, not stated as uncontrolled Age 32    Follows with endocrine at : Shayne Lane MD     Unspecified cataract     RE     Unspecified sinusitis (chronic)     2006     Wheezing     Has seen pulm 2006 & 2007. Told night ashtma, ? vocal chord spasm due to cold air.      Past Surgical History:   Procedure Laterality Date     CATARACT IOL, RT/LT  9/23/2008    LE     HC DILATION/CURETTAGE DIAG/THER NON OB  1992     HC TYMPANOPLASTY W/O MASTOID, INIT/REV W/O OSS CHAIN RECONST  12/09     YAG CAPSULOTOMY OS (LEFT EYE)  4/23/2011     Current Outpatient Prescriptions   Medication Sig Dispense Refill     acyclovir (ZOVIRAX) 5 % cream Apply topically 5 times daily 5 g 3     albuterol (PROAIR HFA, PROVENTIL HFA, VENTOLIN HFA) 108 (90 BASE) MCG/ACT inhaler Inhale 2 puffs into the lungs every 4 hours as needed for shortness of breath / dyspnea 1 Inhaler 1     amLODIPine (NORVASC) 5 MG tablet Take 1 tablet (5 mg) by mouth daily 90 tablet 3     aspirin EC 81 MG tablet Take 325 mg by mouth daily        CALCIUM + D OR Take 1 tablet by mouth 2 times daily.       Continuous Blood Gluc  (FREESTYLE BUSHRA READER) KIRIT 1 each  daily 1 Device 1     continuous blood glucose monitoring (FREESTYLE BUSHRA) sensor For use with Freestyle Bushra Flash  for continuous monitioring of blood glucose levels. Replace sensor every 10 days. 10 each 6     empagliflozin (JARDIANCE) 25 MG TABS tablet Take 1 tablet (25 mg) by mouth daily 90 tablet 3     fexofenadine (ALLEGRA) 180 MG tablet Take 1 tablet by mouth daily. 1 TABLET DAILY Failed claritin for symptom cotrol. Zyrtec increases heart rate. 90 tablet 3     fish oil-omega-3 fatty acids (FISH OIL) 1000 MG capsule Take 1 capsule by mouth daily.       fluticasone (FLONASE) 50 MCG/ACT spray Spray 1-2 sprays into both nostrils daily 16 g 11     Glucosamine-Chondroitin (GLUCOSAMINE CHONDR COMPLEX PO)        hydrochlorothiazide 12.5 MG TABS tablet Take 1 tablet (12.5 mg) by mouth daily 90 tablet 3     insulin aspart (NOVOLOG FLEXPEN) 100 UNIT/ML injection Uses  120 units daily subcutaneously 120 mL 3     insulin glargine (LANTUS SOLOSTAR) 100 UNIT/ML injection 80 units daily 75 mL 3     losartan (COZAAR) 100 MG tablet Take 1 tablet (100 mg) by mouth daily 90 tablet 3     magnesium 250 MG tablet Take 1 tablet by mouth daily 90 tablet 3     metFORMIN (GLUCOPHAGE-XR) 500 MG 24 hr tablet Take 2 tabs twice a day 360 tablet 3     montelukast (SINGULAIR) 10 MG tablet Take 1 tablet (10 mg) by mouth At Bedtime 30 tablet 11     MULTIVITAMIN OR Take 1 tablet by mouth daily.       NEEDLES, ANY SIZE Pen needles for Novolog insulin pen. Use to inject 5-6  times daily. 4 Box 3     pantoprazole (PROTONIX) 40 MG EC tablet Take 1 tablet (40 mg) by mouth daily 90 tablet 3     pentoxifylline (TRENTAL) 400 MG CR tablet Take 1 tablet (400 mg) by mouth 2 times daily 180 tablet 3     rosuvastatin (CRESTOR) 10 MG tablet Take 1 tablet (10 mg) by mouth daily 90 tablet 3     triamcinolone (KENALOG) 0.1 % cream Apply sparingly to affected area three times daily as needed 80 g 0     VITAMIN D 2000 UNIT OR TABS Take 1 tablet by mouth  daily.       OTC products: None, except as noted above    Allergies   Allergen Reactions     Latex Swelling     Mold      Tetanus Antitoxin Swelling     Tetracycline Swelling      Latex Allergy: YES    Social History   Substance Use Topics     Smoking status: Never Smoker     Smokeless tobacco: Never Used     Alcohol use Yes      Comment: rarely     History   Drug Use No       REVIEW OF SYSTEMS:   CONSTITUTIONAL: NEGATIVE for fever, chills, change in weight  INTEGUMENTARY/SKIN: NEGATIVE for worrisome rashes, moles or lesions  EYES: NEGATIVE for vision changes or irritation  ENT/MOUTH: Complains of sores around her lips onset one week ago after using chapstick. Patient has had this in the past and taken acyclovir. Currently she is applying Vaseline or aquaphor to lips. NEGATIVE for ear and throat problems  RESP: Complains of some SOB going up and down stairs, attributes this to deconditioning onset multiple years ago. Patient is able to do chores like vacuuming and dishes without difficulty. NEGATIVE for significant cough   BREAST: NEGATIVE for masses, tenderness or discharge  CV: NEGATIVE for chest pain, palpitations or peripheral edema  GI: NEGATIVE for nausea, abdominal pain, heartburn, or change in bowel habits  : NEGATIVE for frequency, dysuria, or hematuria, different from baseline. Patient does urinate frequently since jardiance was increased  MUSCULOSKELETAL: NEGATIVE for significant arthralgias or myalgia  NEURO: NEGATIVE for weakness, dizziness or paresthesias  ENDOCRINE: NEGATIVE for temperature intolerance, skin/hair changes  HEME: NEGATIVE for bleeding problems  PSYCHIATRIC: NEGATIVE for changes in mood or affect    This document serves as a record of the services and decisions personally performed by DARRICK ACHARYA. It was created on his/her behalf by Kya Valderrama, a trained medical scribe. The creation of this document is based on the provider's statements to the medical scribe. Kya  "Jannie, November 14, 2018 3:45 PM  EXAM:   /78 (BP Location: Right arm, Patient Position: Sitting, Cuff Size: Adult Large)  Pulse 81  Temp 98  F (36.7  C) (Oral)  Resp 18  Ht 1.619 m (5' 3.75\")  Wt 85.7 kg (189 lb)  LMP 03/19/2009 (Exact Date)  SpO2 96%  BMI 32.7 kg/m2    GENERAL APPEARANCE: healthy, alert and no distress     EYES: EOMI, PERRL     HENT: scarring and air-fluid level behind left TM. ear canals normal and nose and mouth without ulcers or lesions     NECK: no adenopathy, no asymmetry, masses, or scars and thyroid normal to palpation     RESP: lungs clear to auscultation - no rales, rhonchi or wheezes     CV: regular rates and rhythm, normal S1 S2, no S3 or S4 and no murmur, click or rub     ABDOMEN:  soft, nontender, no HSM or masses and bowel sounds normal     MS: extremities normal- no gross deformities noted, no evidence of inflammation in joints, FROM in all extremities.     SKIN: no suspicious lesions or rashes. Lower extremity scarring from insect bites      NEURO: Normal strength and tone, sensory exam grossly normal, mentation intact and speech normal     PSYCH: mentation appears normal. and affect normal/bright     LYMPHATICS: No cervical adenopathy    DIAGNOSTICS:     Labs Drawn and in Process:   Results for orders placed or performed in visit on 11/14/18   HEMOGLOBIN A1C   Result Value Ref Range    Hemoglobin A1C 8.2 (H) 0 - 5.6 %   Basic metabolic panel   Result Value Ref Range    Sodium 141 133 - 144 mmol/L    Potassium 3.7 3.4 - 5.3 mmol/L    Chloride 106 94 - 109 mmol/L    Carbon Dioxide 25 20 - 32 mmol/L    Anion Gap 10 3 - 14 mmol/L    Glucose 128 (H) 70 - 99 mg/dL    Urea Nitrogen 15 7 - 30 mg/dL    Creatinine 0.72 0.52 - 1.04 mg/dL    GFR Estimate 82 >60 mL/min/1.7m2    GFR Estimate If Black >90 >60 mL/min/1.7m2    Calcium 9.0 8.5 - 10.1 mg/dL   Magnesium   Result Value Ref Range    Magnesium 2.0 1.6 - 2.3 mg/dL   EKG 12-lead complete w/read - Clinics    Impression    " appears normal, NSR, normal axis, normal intervals, no acute ST/T changes c/w ischemia, no LVH by voltage criteria, unchanged from previous tracings         Recent Labs   Lab Test  08/06/18   0912  10/30/17   0922  02/08/17   1754   11/17/15   1252   12/16/13   1028  04/15/13   0505   HGB   --   15.1   --    --   15.0   --    --    --    PLT   --   349   --    --   320   --    --    --    NA  140   --   136   < >   --    < >  136   --    POTASSIUM  3.8   --   4.0   < >   --    < >  4.0   --    CR  0.64   --   0.68   < >   --    < >  0.58   --    A1C   --    --    --    --    --    --   9.8*  9.5*    < > = values in this interval not displayed.      04/13/18 ECHO: Interpretation Summary     Global and regional left ventricular function is hyperkinetic with an EF >70%.  There is increase intracavitary velocities with Valsalva, suggestive for  intracavitary dynamic obstruction but can not exclude systolic anterior motion  of the mitral valve (MINOO).  Both atria appear normal.  No valvular pathology.  This study was compared with the study from 5/4/06 . Increase concentric  hypertrophy, but does not qualify for HOCM at this time.    04/30/18 NM Lexiscan stress test: IMPRESSION:  1. Normal myocardial SPECT study with a summed stress score of  0 . A  summed stress score of 0 is associated with an annual event rate of  0.8% and 0.9% for myocardial infarction and cardiac death,  respectively (Mario. Circulation 1998;98:535-43).  2. Normal left ventricular systolic function with a left ventricular  ejection fraction of %.   3. .   4. No prior study available for comparison    11/14/18 CXR: normal. Xray personally reviewed and evaluated by me    11/14/18 EKG - appears normal, NSR, normal axis, normal intervals, no acute ST/T changes c/w ischemia, no LVH by voltage criteria, unchanged from previous tracings  IMPRESSION:   Reason for surgery/procedure: carpal tunnel     The proposed surgical procedure is considered  INTERMEDIATE risk.    REVISED CARDIAC RISK INDEX  The patient has the following serious cardiovascular risks for perioperative complications such as (MI, PE, VFib and 3  AV Block):  Diabetes Mellitus (on Insulin)  INTERPRETATION: 1 risks: Class II (low risk - 0.9% complication rate)    The patient has the following additional risks for perioperative complications:  Morbid obesity      ICD-10-CM    1. Preop general physical exam Z01.818 HEMOGLOBIN A1C     EKG 12-lead complete w/read - Clinics     Basic metabolic panel     Magnesium   2. Bilateral carpal tunnel syndrome G56.03    3. Type 2 diabetes mellitus with hyperglycemia, with long-term current use of insulin (H) E11.65 HEMOGLOBIN A1C    Z79.4 Basic metabolic panel     Magnesium   4. Latex sensitivity Z91.040    5. Essential hypertension I10 Basic metabolic panel     Magnesium   6. Dyspnea, unspecified type R06.00 XR Chest 2 Views       RECOMMENDATIONS:       Cardiovascular Risk  Performs 4 METs exercise without symptoms (Light housework (dusting, washing dishes)) .       --Patient is to take all scheduled medications on the day of surgery EXCEPT for modifications listed below.    Diabetes Medication Use    -----Hold usual oral and non-insulin diabetic meds (e.g. Metformin, Actos, Glipizide) while NPO.   -----Take 50% of long acting insulin (e.g. Lantus, NPH) while NPO (fasting)      Anticoagulant or Antiplatelet Medication Use  ASPIRIN: Discontinue ASA 7-10 days prior to procedure to reduce bleeding risk.  It should be resumed post-operatively.          ACE Inhibitor or Angiotensin Receptor Blocker (ARB) Use  Ace inhibitor or Angiotensin Receptor Blocker (ARB) and should HOLD this medication for the 24 hours prior to surgery.      APPROVAL GIVEN to proceed with proposed procedure, without further diagnostic evaluation    Patient Instructions   The night before surgery cut your dose of lantus in half. Take 40 units instead of 80 units. Do not take any novolog the  morning of surgery.     Hold your trental the week of surgery.     Do not take NSAIDs (ibuprofen, Aleve, naproxen, Advil, aspirin) the week before surgery. Tylenol is fine for pain.     Stop your asa, fish oil and glucosamine for the week before surgery.     Do not take the jardiance or metformin before surgery. Only take them later if you are eating.     Skip your losartan, hydrochlorothiazide, magnesium, and all vitamins the morning of surgery.     Use two puffs of albuterol the morning of surgery. Take your amlodopine, allergy pill, protonix, and Flonase that morning.         Before Your Surgery      Call your surgeon if there is any change in your health. This includes signs of a cold or flu (such as a sore throat, runny nose, cough, rash or fever).    Do not smoke, drink alcohol or take over the counter medicine (unless your surgeon or primary care doctor tells you to) for the 24 hours before and after surgery.    If you take prescribed drugs: Follow your doctor s orders about which medicines to take and which to stop until after surgery.    Eating and drinking prior to surgery: follow the instructions from your surgeon    Take a shower or bath the night before surgery. Use the soap your surgeon gave you to gently clean your skin. If you do not have soap from your surgeon, use your regular soap. Do not shave or scrub the surgery site.  Wear clean pajamas and have clean sheets on your bed.     The information in this document, created by the medical scribe for me, accurately reflects the services I personally performed and the decisions made by me. I have reviewed and approved this document for accuracy.   Signed Electronically by: Elva Ortiz MD    Copy of this evaluation report is provided to requesting physician.    [facility] Preop Guidelines    Revised Cardiac Risk Index

## 2018-11-14 NOTE — MR AVS SNAPSHOT
After Visit Summary   11/14/2018    Maliha Pedro    MRN: [id number]           Patient Information     Date Of Birth          1960        Visit Information        Provider Department      11/14/2018 8:45 AM [facility]        Today's Diagnoses     Borderline glaucoma, bilateral [H40.003] - Both Eyes    -  1    Glaucoma (increased eye pressure)           Follow-ups after your visit        Follow-up notes from your care team     Return in about 6 months (around 5/14/2019) for OVF 24-2.      Your next 10 appointments already scheduled     Nov 14, 2018  3:20 PM CST   Pre-Op physical with Elva Ortiz MD   [facility] ([facility])    [address] [place] [address] [id number] [phone]            Nov 19, 2018   Procedure with Ivania Moran MD   [facility] ([facility] [place])    [address] [place] [id number] [phone]           Located in the Clinics and Surgery Center at [address] [place] [address].   parking is very convenient and highly recommended.  is a $6 flat rate fee.  Both  and self parkers should enter the main arrival plaza from [facility]; parking attendants will direct you based on your parking preference.            Dec 03, 2018 10:30 AM CST   (Arrive by 10:15 AM)   Return Visit with Radha Pascal PA-C   [facility] ([facility] [place])    [address] [place] [id number] [phone]            Dec 12, 2018  9:00 AM CST   (Arrive by 8:45 AM)   RETURN DIABETES with Shai Dong MD   [facility] Endocrinology ([facility] [place])    [address] [place] [address] [id number] [phone]            Dec 17, 2018   Procedure with Ivania Moran MD   [facility] ([facility])    [address]  5th Floor  [facility] [id number] [phone]           Located in the Clinics and Surgery Center at [address], [facility] [address].   parking is very convenient and highly recommended.  is a $6 flat rate fee.  Both  and self parkers should enter the main arrival plaza from [facility]; parking attendants will direct you based on your parking preference.            Jan 02, 2019 12:00 PM CST   (Arrive by 11:45 AM)   Return Visit with Radha Pascal PA-C   [facility] ([facility] [place])    [address]  3rd Floor  [facility] [id number] [phone]            May 22, 2019  8:45 AM CDT   RETURN GLAUCOMA with Umu Rosario MD   [facility] ([facility])    [address]  [facility] [phone]              Who to contact     Please call your clinic at [phone] to:    Ask questions about your health    Make or cancel appointments    Discuss your medicines    Learn about your test results    Speak to your doctor            Additional Information About Your Visit        [employer] Information     [employer] gives you secure access to your electronic health record. If you see a primary care provider, you can also send messages to your care team and make appointments. If you have questions, please call your primary care clinic.  If you do not have a primary care provider, please call [phone] and they will assist you.      [employer] is an electronic gateway that provides easy, online access to your medical records. With [employer], you can request a clinic appointment, read your test results, renew a prescription or communicate with your care team.     To access your existing account, please contact your [facility] or call [phone] for assistance.        Care EveryWhere ID      This is your Care EveryWhere ID. This could be used by other organizations to access your [place] medical records  [id number]        Your Vitals Were     Last Period                   03/19/2009 (Exact Date)            Blood Pressure from Last 3 Encounters:   11/07/18 138/58   09/12/18 121/78   08/06/18 120/78    Weight from Last 3 Encounters:   11/07/18 86.2 kg (190 lb)   09/12/18 86 kg (189 lb 8 oz)   08/06/18 84.4 kg (186 lb)              We Performed the Following     OCT Optic Nerve RNFL Spectralis OU (both eyes)        Primary Care Provider Office Phone # Fax #    Elva Ortiz [phone] [address]  [facility] [address]        Equal Access to Services     [facility] Center[facility]INIC[facility]US : Hadii angelica magallaneso Sodaija, waaxda luqadaha, qaybta kaalmada adeegyada, poly harrison . So [facility] [phone].    ATENCIÓN: Si habla español, tiene a ford disposición servicios gratuitos de asistencia lingüística. Hernando al [phone].    We comply with applicable federal civil rights laws and [place] laws. We do not discriminate on the basis of race, color, national origin, age, disability, sex, sexual orientation, or gender identity.            Thank you!     Thank you for choosing [facility]  for your care. Our goal is always to provide you with excellent care. Hearing back from our patients is one way we can continue to improve our services. Please take a few minutes to complete the written survey that you may receive in the mail after your visit with us. Thank you!             Your Updated Medication List - Protect others around you: Learn how to safely use, store and throw away your medicines at www.disposemymeds.org.          This list is accurate as of 11/14/18  9:50 AM.  Always use your most recent med list.                   Brand Name Dispense Instructions for use Diagnosis    acyclovir 5 % cream    ZOVIRAX    5 g    Apply topically 5 times daily    HSV  (herpes simplex virus) infection       albuterol 108 (90 Base) MCG/ACT inhaler    PROAIR HFA/PROVENTIL HFA/VENTOLIN HFA    1 Inhaler    Inhale 2 puffs into the lungs every 4 hours as needed for shortness of breath / dyspnea    Cough       amLODIPine 5 MG tablet    NORVASC    90 tablet    Take 1 tablet (5 mg) by mouth daily    Essential hypertension, Type 2 diabetes mellitus with diabetic nephropathy, with long-term current use of insulin (H)       aspirin 81 MG EC tablet      Take 325 mg by mouth daily        CALCIUM + D PO      Take 1 tablet by mouth 2 times daily.        continuous blood glucose monitoring sensor     10 each    For use with Freestyle Leslie Flash  for continuous monitioring of blood glucose levels. Replace sensor every 10 days.    Type 2 diabetes mellitus with diabetic polyneuropathy, with long-term current use of insulin (H)       empagliflozin 25 MG Tabs tablet    JARDIANCE    90 tablet    Take 1 tablet (25 mg) by mouth daily    Type 2 diabetes mellitus with diabetic nephropathy, with long-term current use of insulin (H)       fexofenadine 180 MG tablet    ALLEGRA    90 tablet    Take 1 tablet by mouth daily. 1 TABLET DAILY Failed claritin for symptom cotrol. Zyrtec increases heart rate.    Seasonal allergic rhinitis       fish oil-omega-3 fatty acids 1000 MG capsule      Take 1 capsule by mouth daily.        fluticasone 50 MCG/ACT spray    FLONASE    16 g    Spray 1-2 sprays into both nostrils daily    Seasonal allergic rhinitis, unspecified chronicity, unspecified trigger       FREESTYLE LESLIE READER Felipa     1 Device    1 each daily    Type 2 diabetes mellitus with hyperglycemia (H)       GLUCOSAMINE CHONDR COMPLEX PO           hydrochlorothiazide 12.5 MG Tabs tablet     90 tablet    Take 1 tablet (12.5 mg) by mouth daily    Essential hypertension, Type 2 diabetes mellitus with diabetic nephropathy, with long-term current use of insulin (H)       insulin aspart 100 UNIT/ML injection     NovoLOG FLEXPEN    120 mL    Uses  120 units daily subcutaneously    Diabetes mellitus, type 2 (H)       insulin glargine 100 UNIT/ML injection    LANTUS SOLOSTAR    75 mL    80 units daily    Type 2 diabetes mellitus with hyperglycemia, unspecified long term insulin use status       losartan 100 MG tablet    COZAAR    90 tablet    Take 1 tablet (100 mg) by mouth daily    Essential hypertension       magnesium 250 MG tablet     90 tablet    Take 1 tablet by mouth daily    Hypomagnesemia       metFORMIN 500 MG 24 hr tablet    GLUCOPHAGE-XR    360 tablet    Take 2 tabs twice a day    Type 2 diabetes mellitus with hyperglycemia, unspecified long term insulin use status       montelukast 10 MG tablet    SINGULAIR    30 tablet    Take 1 tablet (10 mg) by mouth At Bedtime    Seasonal allergic rhinitis, unspecified chronicity, unspecified trigger       MULTIVITAMIN PO      Take 1 tablet by mouth daily.        NEEDLES, ANY SIZE     4 Box    Pen needles for Novolog insulin pen. Use to inject 5-6  times daily.    Type 2 diabetes mellitus with hyperglycemia, with long-term current use of insulin (H)       pantoprazole 40 MG EC tablet    PROTONIX    90 tablet    Take 1 tablet (40 mg) by mouth daily    Gastroesophageal reflux disease       pentoxifylline 400 MG CR tablet    TRENtal    180 tablet    Take 1 tablet (400 mg) by mouth 2 times daily    Type 2 diabetes mellitus with hyperglycemia, unspecified long term insulin use status       rosuvastatin 10 MG tablet    CRESTOR    90 tablet    Take 1 tablet (10 mg) by mouth daily    Hyperlipidemia LDL goal <100       triamcinolone 0.1 % cream    KENALOG    80 g    Apply sparingly to affected area three times daily as needed    Bug bite, initial encounter       vitamin D 2000 units tablet      Take 1 tablet by mouth daily.

## 2018-11-15 LAB
ANION GAP SERPL CALCULATED.3IONS-SCNC: 10 MMOL/L (ref 3–14)
BUN SERPL-MCNC: 15 MG/DL (ref 7–30)
CALCIUM SERPL-MCNC: 9 MG/DL (ref 8.5–10.1)
CHLORIDE SERPL-SCNC: 106 MMOL/L (ref 94–109)
CO2 SERPL-SCNC: 25 MMOL/L (ref 20–32)
CREAT SERPL-MCNC: 0.72 MG/DL (ref 0.52–1.04)
GFR SERPL CREATININE-BSD FRML MDRD: 82 ML/MIN/1.7M2
GLUCOSE SERPL-MCNC: 128 MG/DL (ref 70–99)
MAGNESIUM SERPL-MCNC: 2 MG/DL (ref 1.6–2.3)
POTASSIUM SERPL-SCNC: 3.7 MMOL/L (ref 3.4–5.3)
SODIUM SERPL-SCNC: 141 MMOL/L (ref 133–144)

## 2018-11-16 ENCOUNTER — ANESTHESIA EVENT (OUTPATIENT)
Dept: SURGERY | Facility: AMBULATORY SURGERY CENTER | Age: 58
End: 2018-11-16

## 2018-11-19 ENCOUNTER — SURGERY (OUTPATIENT)
Age: 58
End: 2018-11-19

## 2018-11-19 ENCOUNTER — ANESTHESIA (OUTPATIENT)
Dept: SURGERY | Facility: AMBULATORY SURGERY CENTER | Age: 58
End: 2018-11-19

## 2018-11-19 ENCOUNTER — HOSPITAL ENCOUNTER (OUTPATIENT)
Facility: AMBULATORY SURGERY CENTER | Age: 58
End: 2018-11-19
Attending: NEUROLOGICAL SURGERY
Payer: COMMERCIAL

## 2018-11-19 VITALS
SYSTOLIC BLOOD PRESSURE: 123 MMHG | DIASTOLIC BLOOD PRESSURE: 65 MMHG | HEART RATE: 78 BPM | WEIGHT: 189 LBS | OXYGEN SATURATION: 95 % | HEIGHT: 64 IN | RESPIRATION RATE: 18 BRPM | BODY MASS INDEX: 32.27 KG/M2 | TEMPERATURE: 97.8 F

## 2018-11-19 DIAGNOSIS — G56.01 CARPAL TUNNEL SYNDROME OF RIGHT WRIST: Primary | ICD-10-CM

## 2018-11-19 LAB
GLUCOSE BLDC GLUCOMTR-MCNC: 147 MG/DL (ref 70–99)
GLUCOSE BLDC GLUCOMTR-MCNC: 149 MG/DL (ref 70–99)

## 2018-11-19 RX ORDER — ONDANSETRON 2 MG/ML
4 INJECTION INTRAMUSCULAR; INTRAVENOUS EVERY 30 MIN PRN
Status: DISCONTINUED | OUTPATIENT
Start: 2018-11-19 | End: 2018-11-20 | Stop reason: HOSPADM

## 2018-11-19 RX ORDER — ONDANSETRON 2 MG/ML
INJECTION INTRAMUSCULAR; INTRAVENOUS PRN
Status: DISCONTINUED | OUTPATIENT
Start: 2018-11-19 | End: 2018-11-19

## 2018-11-19 RX ORDER — ASPIRIN 81 MG/1
325 TABLET ORAL DAILY
COMMUNITY
Start: 2018-11-19 | End: 2018-12-17

## 2018-11-19 RX ORDER — HYDROCODONE BITARTRATE AND ACETAMINOPHEN 5; 325 MG/1; MG/1
1-2 TABLET ORAL EVERY 4 HOURS PRN
Qty: 30 TABLET | Refills: 0 | Status: SHIPPED | OUTPATIENT
Start: 2018-11-19 | End: 2019-02-04

## 2018-11-19 RX ORDER — LIDOCAINE HYDROCHLORIDE AND EPINEPHRINE 10; 10 MG/ML; UG/ML
INJECTION, SOLUTION INFILTRATION; PERINEURAL PRN
Status: DISCONTINUED | OUTPATIENT
Start: 2018-11-19 | End: 2018-11-19 | Stop reason: HOSPADM

## 2018-11-19 RX ORDER — OXYCODONE HYDROCHLORIDE 5 MG/1
5 TABLET ORAL EVERY 4 HOURS PRN
Status: DISCONTINUED | OUTPATIENT
Start: 2018-11-19 | End: 2018-11-20 | Stop reason: HOSPADM

## 2018-11-19 RX ORDER — SODIUM CHLORIDE, SODIUM LACTATE, POTASSIUM CHLORIDE, CALCIUM CHLORIDE 600; 310; 30; 20 MG/100ML; MG/100ML; MG/100ML; MG/100ML
INJECTION, SOLUTION INTRAVENOUS CONTINUOUS
Status: DISCONTINUED | OUTPATIENT
Start: 2018-11-19 | End: 2018-11-20 | Stop reason: HOSPADM

## 2018-11-19 RX ORDER — ACETAMINOPHEN 325 MG/1
975 TABLET ORAL ONCE
Status: DISCONTINUED | OUTPATIENT
Start: 2018-11-19 | End: 2018-11-20 | Stop reason: HOSPADM

## 2018-11-19 RX ORDER — KETOROLAC TROMETHAMINE 30 MG/ML
30 INJECTION, SOLUTION INTRAMUSCULAR; INTRAVENOUS EVERY 6 HOURS PRN
Status: DISCONTINUED | OUTPATIENT
Start: 2018-11-19 | End: 2018-11-20 | Stop reason: HOSPADM

## 2018-11-19 RX ORDER — MEPERIDINE HYDROCHLORIDE 25 MG/ML
12.5 INJECTION INTRAMUSCULAR; INTRAVENOUS; SUBCUTANEOUS
Status: DISCONTINUED | OUTPATIENT
Start: 2018-11-19 | End: 2018-11-20 | Stop reason: HOSPADM

## 2018-11-19 RX ORDER — NALOXONE HYDROCHLORIDE 0.4 MG/ML
.1-.4 INJECTION, SOLUTION INTRAMUSCULAR; INTRAVENOUS; SUBCUTANEOUS
Status: DISCONTINUED | OUTPATIENT
Start: 2018-11-19 | End: 2018-11-20 | Stop reason: HOSPADM

## 2018-11-19 RX ORDER — FENTANYL CITRATE 50 UG/ML
INJECTION, SOLUTION INTRAMUSCULAR; INTRAVENOUS PRN
Status: DISCONTINUED | OUTPATIENT
Start: 2018-11-19 | End: 2018-11-19

## 2018-11-19 RX ORDER — CEFAZOLIN SODIUM 1 G/50ML
1 SOLUTION INTRAVENOUS SEE ADMIN INSTRUCTIONS
Status: DISCONTINUED | OUTPATIENT
Start: 2018-11-19 | End: 2018-11-20 | Stop reason: HOSPADM

## 2018-11-19 RX ORDER — CEFAZOLIN SODIUM 2 G/50ML
2 SOLUTION INTRAVENOUS
Status: DISCONTINUED | OUTPATIENT
Start: 2018-11-19 | End: 2018-11-20 | Stop reason: HOSPADM

## 2018-11-19 RX ORDER — FENTANYL CITRATE 50 UG/ML
25-50 INJECTION, SOLUTION INTRAMUSCULAR; INTRAVENOUS
Status: DISCONTINUED | OUTPATIENT
Start: 2018-11-19 | End: 2018-11-20 | Stop reason: HOSPADM

## 2018-11-19 RX ORDER — SODIUM CHLORIDE, SODIUM LACTATE, POTASSIUM CHLORIDE, CALCIUM CHLORIDE 600; 310; 30; 20 MG/100ML; MG/100ML; MG/100ML; MG/100ML
INJECTION, SOLUTION INTRAVENOUS CONTINUOUS PRN
Status: DISCONTINUED | OUTPATIENT
Start: 2018-11-19 | End: 2018-11-19

## 2018-11-19 RX ORDER — ONDANSETRON 4 MG/1
4 TABLET, ORALLY DISINTEGRATING ORAL EVERY 30 MIN PRN
Status: DISCONTINUED | OUTPATIENT
Start: 2018-11-19 | End: 2018-11-20 | Stop reason: HOSPADM

## 2018-11-19 RX ADMIN — CEFAZOLIN SODIUM 1 G: 1 SOLUTION INTRAVENOUS at 07:15

## 2018-11-19 RX ADMIN — CEFAZOLIN SODIUM 1 G: 1 SOLUTION INTRAVENOUS at 07:18

## 2018-11-19 RX ADMIN — ONDANSETRON 4 MG: 2 INJECTION INTRAMUSCULAR; INTRAVENOUS at 07:14

## 2018-11-19 RX ADMIN — FENTANYL CITRATE 50 MCG: 50 INJECTION, SOLUTION INTRAMUSCULAR; INTRAVENOUS at 07:47

## 2018-11-19 RX ADMIN — LIDOCAINE HYDROCHLORIDE AND EPINEPHRINE 7 ML: 10; 10 INJECTION, SOLUTION INFILTRATION; PERINEURAL at 07:45

## 2018-11-19 RX ADMIN — SODIUM CHLORIDE, SODIUM LACTATE, POTASSIUM CHLORIDE, CALCIUM CHLORIDE: 600; 310; 30; 20 INJECTION, SOLUTION INTRAVENOUS at 06:58

## 2018-11-19 NOTE — IP AVS SNAPSHOT
MRN:[id number]                      After Visit Summary   11/19/2018    Maliha Pedro    MRN: [id number]           Thank you!     Thank you for choosing [facility] for your care. Our goal is always to provide you with excellent care. Hearing back from our patients is one way we can continue to improve our services. Please take a few minutes to complete the written survey that you may receive in the mail after you visit with us. Thank you!        Patient Information     Date Of Birth          1960        About your hospital stay     You were admitted on:  November 19, 2018 [facility]    You were discharged on:  November 19, 2018        Reason for your hospital stay       Right carpal tunnel                  Who to Call     For medical emergencies, please call 911.  For non-urgent questions about your medical care, please call your primary care provider or clinic, [phone]  For questions related to your surgery, please call your surgery clinic        Attending Provider     Provider Ivania Gould MD Neurosurgery       Primary Care Provider Office Phone # Fax #    Elva Peggy Ortiz [phone]      After Care Instructions     Activity       Your activity upon discharge: see above            Diet       Follow this diet upon discharge: regular                  Follow-up Appointments     [facility]ult [facility] Follow-up and recommended labs and tests       You underwent surgery for right carpal tunnel by Ivania Moran MD       - You will have follow up scheduled with the physician assistants and/or nurse practitioners in our clinic 2 weeks after your surgery.  If you live far away, you may see your primary care doctor for a wound check at 2 weeks.     - You should have follow up in our clinic in 1 month to see the surgeon.     - If you have not heard from our clinic about your follow  up visit by 3-4 days following your discharge, please call our clinic at [phone] to schedule an appointment with Dr. Moran      After discharge, your activity restrictions are:   -We encourage short frequent walks, increasing as tolerated.  - Avoid housework, vacuuming, laundry, leaf raking, lawn mowing and snow removal.   - You should not drive while on narcotic pain medication.   -While resting, try to keep your arm/leg elevated.    - No strenuous activity.  - No lifting more than a toothbrush or silverware until seen in clinic.       Wound care  - You are ok to shower three days after your surgery (2018), but do not soak your incisions. Pat them dry if they get damp.   - No baths, hot tubs or pools for 4-6 weeks after surgery.   - remove the ACE bandage on 2018 afternoon  - remove gauze dressing on 2018  - attempt to keep your arm elevated to decrease swelling    - hold aspirin for 5 days  - you may take ibuprofen to help with pain starting tomorrow, 2018    Call if you have any of the followin. Temperature greater than 101.5 F.   2. Any redness, swelling or discharge from the wound.   3. Any new weakness, numbness or altered mental status.  4. Worsening pain that is not improving with the pain medications you were prescribed.     Call [phone] or after 5:00 pm or on weekends call [phone] and ask for the neurosurgery resident on call. Thank You.       Appointments on [place] and/or [facility] (with [facility] or [facility] provider or service). Call [phone] if you haven't heard regarding these appointments within 7 days of discharge.                  Your next 10 appointments already scheduled     Dec 03, 2018 10:30 AM CST   (Arrive by 10:15 AM)   Return Visit with Radha Pascal PA-C   [facility] Neurosurgery ([email or web address] [place])    [address] [place] [address] [phone]            Dec 12, 2018  9:00 AM CST    (Arrive by 8:45 AM)   RETURN DIABETES with Shai Dong MD   [facility] End[facility]inology ([facility])    [address]  3rd Floor  [facility] [address] [phone]            Dec 17, 2018   Procedure with Ivania Moran MD   [facility] ([facility])    [address]  [address]th [facility] [address] [phone]           Located in the Clinics and Surgery Center at [address], [facility] [address].   parking is very convenient and highly recommended.  is a $6 flat rate fee.  Both  and self parkers should enter the main arrival plaza from [facility]; parking attendants will direct you based on your parking preference.            Jan 02, 2019 12:00 PM CST   (Arrive by 11:45 AM)   Return Visit with Radha Pascal PA-C   [facility] Neurosurgery ([facility])    [address]  3rd [facility] [address]   [phone]            May 22, 2019  8:45 AM CDT   RETURN GLAUCOMA with Umu Rosario MD   [facility] ([facility])    [address]  [facility] [address] [phone]              Further instructions from your care team       [facility]  Home Care Following Anesthesia  For 24 hours after surgery:  1. Get plenty of rest.  A responsible adult must stay with you for at least 24 hours after you leave the surgery center.  2. Do not drive or use heavy equipment.  If you have weakness or tingling, don't drive or use heavy equipment until this feeling goes away.   3. Do not drink alcohol.   4. Avoid strenuous or risky activities.  Ask for help when climbing stairs.  5. You may feel lightheaded.  IF so, sit for a few minutes before standing.  Have someone help you get up.   6. If you have nausea (feel sick to your stomach): Drink only clear  liquids such as apple juice, ginger ale, broth or 7-Up.  Rest may also help.  Be sure to drink enough fluids.  Move to a regular diet as you feel able.   7. You may have a slight fever.  Call the doctor if your fever is over 100 F (37.7 C) (taken under the tongue) or lasts longer than 24 hours.  8. You may have a dry mouth, a sore throat, muscle aches or trouble sleeping. These should go away after 24 hours.  9. Do not make important or legal decisions.               Tips for taking pain medications  To get the best pain relief possible, remember these points:    Take pain medications as directed, before pain becomes severe.    Pain medication can upset your stomach: taking it with food may help.    Constipation is a common side effect of pain medication. Drink plenty of  fluids.    Eat foods high in fiber. Take a stool softener if recommended by your doctor or pharmacist.    Do not drink alcohol, drive or operate machinery while taking pain medications.    Ask about other ways to control pain, such as with heat, ice or relaxation.    Tylenol/Acetaminophen Consumption  To help encourage the safe use of acetaminophen, the makers of TYLENOL  have lowered the maximum daily dose for single-ingredient Extra Strength TYLENOL  (acetaminophen) products sold in the [place] from 8 pills per day (4,000 mg) to 6 pills per day (3,000 mg). The dosing interval has also changed from 2 pills every 4-6 hours to 2 pills every 6 hours.    If you feel your pain relief is insufficient, you may take Tylenol/Acetaminophen in addition to your narcotic pain medication.     Be careful not to exceed 3,000 mg of Tylenol/Acetaminophen in a 24 hour period from all sources.    If you are taking extra strength Tylenol/acetaminophen (500 mg), the maximum dose is 6 tablets in 24 hours.    If you are taking regular strength acetaminophen (325 mg), the maximum dose is 9 tablets in 24 hours.    Call a doctor for any of the followin. Signs of infection  "(fever, growing tenderness at the surgery site, a large amount of drainage or bleeding, severe pain, foul-smelling drainage, redness, swelling).  2. It has been over 8 to 10 hours since surgery and you are still not able to urinate (pass water).  3. Headache for over 24 hours.    Your doctor is:       Dr. Moran, Orthopaedics: [phone]               Or dial [phone] and ask for the resident on call for:  Orthopaedics  For emergency care, call the:  [facility] Emergency Department: [phone] (TTY for hearing impaired: [phone])                Pending Results     No orders found from 11/17/2018 to 11/20/2018.            Admission Information     Date & Time Provider Department Dept. Phone    11/19/2018 Ivania Moran MD [facility] [phone]      Your Vitals Were     Blood Pressure Pulse Temperature Respirations Height Weight    137/65 78 98.2  F (36.8  C) (Oral) 16 1.619 m (5' 3.74\") 85.7 kg (189 lb)    Last Period Pulse Oximetry BMI (Body Mass Index)             03/19/2009 (Exact Date) 97% 32.71 kg/m2         [employer] Information     [employer] gives you secure access to your electronic health record. If you see a primary care provider, you can also send messages to your care team and make appointments. If you have questions, please call your primary care clinic.  If you do not have a primary care provider, please call [phone] and they will assist you.      [employer] is an electronic gateway that provides easy, online access to your medical records. With [employer], you can request a clinic appointment, read your test results, renew a prescription or communicate with your care team.     To access your existing account, please contact your [facility] or call [phone] for assistance.        Care EveryWhere ID     This is your Care EveryWhere ID. This could be used by other organizations to access your [facility] medical " records  [id number]        Equal Access to Services     JAMES COLLINS : Hadii aad ku hadkathneal Sodaija, waaxda luqadaha, qaybta kasushilaady fernandez, poly roberts. So [facility] [phone].    ATENCIÓN: Si habla español, tiene a ford disposición servicios gratuitos de asistencia lingüística. Prudencioame al [phone].    We comply with applicable federal civil rights laws and [place] laws. We do not discriminate on the basis of race, color, national origin, age, disability, sex, sexual orientation, or gender identity.               Review of your medicines      START taking        Dose / Directions    HYDROcodone-acetaminophen 5-325 MG per tablet   Commonly known as:  NORCO   Used for:  Carpal tunnel syndrome of right wrist        Dose:  1-2 tablet   Take 1-2 tablets by mouth every 4 hours as needed for pain   Quantity:  30 tablet   Refills:  0         CONTINUE these medicines which may have CHANGED, or have new prescriptions. If we are uncertain of the size of tablets/capsules you have at home, strength may be listed as something that might have changed.        Dose / Directions    aspirin 81 MG EC tablet   This may have changed:  additional instructions        Dose:  325 mg   Take 4 tablets (325 mg) by mouth daily Please resume in 5 days.  (11/24/2018)   Refills:  0         CONTINUE these medicines which have NOT CHANGED        Dose / Directions    acyclovir 5 % cream   Commonly known as:  ZOVIRAX   Used for:  HSV (herpes simplex virus) infection        Apply topically 5 times daily   Quantity:  5 g   Refills:  3       albuterol 108 (90 Base) MCG/ACT inhaler   Commonly known as:  PROAIR HFA/PROVENTIL HFA/VENTOLIN HFA   Used for:  Cough        Dose:  2 puff   Inhale 2 puffs into the lungs every 4 hours as needed for shortness of breath / dyspnea   Quantity:  1 Inhaler   Refills:  1       amLODIPine 5 MG tablet   Commonly known as:  NORVASC   Used for:  Essential hypertension, Type 2 diabetes  mellitus with diabetic nephropathy, with long-term current use of insulin (H)        Dose:  5 mg   Take 1 tablet (5 mg) by mouth daily   Quantity:  90 tablet   Refills:  3       CALCIUM + D PO        Dose:  1 tablet   Take 1 tablet by mouth 2 times daily.   Refills:  0       continuous blood glucose monitoring sensor   Used for:  Type 2 diabetes mellitus with diabetic polyneuropathy, with long-term current use of insulin (H)        For use with Freestyle Leslie Flash  for continuous monitioring of blood glucose levels. Replace sensor every 10 days.   Quantity:  10 each   Refills:  6       empagliflozin 25 MG Tabs tablet   Commonly known as:  JARDIANCE   Used for:  Type 2 diabetes mellitus with diabetic nephropathy, with long-term current use of insulin (H)        Dose:  25 mg   Take 1 tablet (25 mg) by mouth daily   Quantity:  90 tablet   Refills:  3       fexofenadine 180 MG tablet   Commonly known as:  ALLEGRA   Used for:  Seasonal allergic rhinitis        Dose:  180 mg   Take 1 tablet by mouth daily. 1 TABLET DAILY Failed claritin for symptom cotrol. Zyrtec increases heart rate.   Quantity:  90 tablet   Refills:  3       fish oil-omega-3 fatty acids 1000 MG capsule        Dose:  1 capsule   Take 1 capsule by mouth daily.   Refills:  0       fluticasone 50 MCG/ACT spray   Commonly known as:  FLONASE   Used for:  Seasonal allergic rhinitis, unspecified chronicity, unspecified trigger        Dose:  1-2 spray   Spray 1-2 sprays into both nostrils daily   Quantity:  16 g   Refills:  11       FREESTYLE LESLIE READER Felipa   Used for:  Type 2 diabetes mellitus with hyperglycemia (H)        Dose:  1 each   1 each daily   Quantity:  1 Device   Refills:  1       GLUCOSAMINE CHONDR COMPLEX PO        Refills:  0       hydrochlorothiazide 12.5 MG Tabs tablet   Used for:  Essential hypertension, Type 2 diabetes mellitus with diabetic nephropathy, with long-term current use of insulin (H)        Dose:  12.5 mg   Take 1  tablet (12.5 mg) by mouth daily   Quantity:  90 tablet   Refills:  3       insulin aspart 100 UNIT/ML injection   Commonly known as:  NovoLOG FLEXPEN   Used for:  Diabetes mellitus, type 2 (H)        Uses  120 units daily subcutaneously   Quantity:  120 mL   Refills:  3       insulin glargine 100 UNIT/ML injection   Commonly known as:  LANTUS SOLOSTAR   Used for:  Type 2 diabetes mellitus with hyperglycemia, unspecified long term insulin use status        80 units daily   Quantity:  75 mL   Refills:  3       losartan 100 MG tablet   Commonly known as:  COZAAR   Used for:  Essential hypertension        Dose:  100 mg   Take 1 tablet (100 mg) by mouth daily   Quantity:  90 tablet   Refills:  3       magnesium 250 MG tablet   Used for:  Hypomagnesemia        Dose:  1 tablet   Take 1 tablet by mouth daily   Quantity:  90 tablet   Refills:  3       metFORMIN 500 MG 24 hr tablet   Commonly known as:  GLUCOPHAGE-XR   Used for:  Type 2 diabetes mellitus with hyperglycemia, unspecified long term insulin use status        Take 2 tabs twice a day   Quantity:  360 tablet   Refills:  3       montelukast 10 MG tablet   Commonly known as:  SINGULAIR   Used for:  Seasonal allergic rhinitis, unspecified chronicity, unspecified trigger        Dose:  10 mg   Take 1 tablet (10 mg) by mouth At Bedtime   Quantity:  30 tablet   Refills:  11       MULTIVITAMIN PO        Dose:  1 tablet   Take 1 tablet by mouth daily.   Refills:  0       NEEDLES, ANY SIZE   Used for:  Type 2 diabetes mellitus with hyperglycemia, with long-term current use of insulin (H)        Pen needles for Novolog insulin pen. Use to inject 5-6  times daily.   Quantity:  4 Box   Refills:  3       pantoprazole 40 MG EC tablet   Commonly known as:  PROTONIX   Used for:  Gastroesophageal reflux disease        Dose:  40 mg   Take 1 tablet (40 mg) by mouth daily   Quantity:  90 tablet   Refills:  3       pentoxifylline 400 MG CR tablet   Commonly known as:  TRENtal   Used for:   Type 2 diabetes mellitus with hyperglycemia, unspecified long term insulin use status        Dose:  400 mg   Take 1 tablet (400 mg) by mouth 2 times daily   Quantity:  180 tablet   Refills:  3       rosuvastatin 10 MG tablet   Commonly known as:  CRESTOR   Used for:  Hyperlipidemia LDL goal <100        Dose:  10 mg   Take 1 tablet (10 mg) by mouth daily   Quantity:  90 tablet   Refills:  3       triamcinolone 0.1 % cream   Commonly known as:  KENALOG   Used for:  Bug bite, initial encounter        Apply sparingly to affected area three times daily as needed   Quantity:  80 g   Refills:  0       vitamin D3 2000 units tablet   Commonly known as:  CHOLECALCIFEROL        Dose:  1 tablet   Take 1 tablet by mouth daily.   Refills:  0            Where to get your medicines      Some of these will need a paper prescription and others can be bought over the counter. Ask your nurse if you have questions.     Bring a paper prescription for each of these medications     HYDROcodone-acetaminophen 5-325 MG per tablet                Protect others around you: Learn how to safely use, store and throw away your medicines at www.disposemymeds.org.        Information about OPIOIDS     PRESCRIPTION OPIOIDS: WHAT YOU NEED TO KNOW   We gave you an opioid (narcotic) pain medicine. It is important to manage your pain, but opioids are not always the best choice. You should first try all the other options your care team gave you. Take this medicine for as short a time (and as few doses) as possible.    Some activities can increase your pain, such as bandage changes or therapy sessions. It may help to take your pain medicine 30 to 60 minutes before these activities. Reduce your stress by getting enough sleep, working on hobbies you enjoy and practicing relaxation or meditation. Talk to your care team about ways to manage your pain beyond prescription opioids.    These medicines have risks:    DO NOT drive when on new or higher doses of pain  medicine. These medicines can affect your alertness and reaction times, and you could be arrested for driving under the influence (DUI). If you need to use opioids long-term, talk to your care team about driving.    DO NOT operate heavy machinery    DO NOT do any other dangerous activities while taking these medicines.    DO NOT drink any alcohol while taking these medicines.     If the opioid prescribed includes acetaminophen, DO NOT take with any other medicines that contain acetaminophen. Read all labels carefully. Look for the word  acetaminophen  or  Tylenol.  Ask your pharmacist if you have questions or are unsure.    You can get addicted to pain medicines, especially if you have a history of addiction (chemical, alcohol or substance dependence). Talk to your care team about ways to reduce this risk.    All opioids tend to cause constipation. Drink plenty of water and eat foods that have a lot of fiber, such as fruits, vegetables, prune juice, apple juice and high-fiber cereal. Take a laxative (Miralax, milk of magnesia, Colace, Senna) if you don t move your bowels at least every other day. Other side effects include upset stomach, sleepiness, dizziness, throwing up, tolerance (needing more of the medicine to have the same effect), physical dependence and slowed breathing.    Store your pills in a secure place, locked if possible. We will not replace any lost or stolen medicine. If you don t finish your medicine, please throw away (dispose) as directed by your pharmacist. The [place] Pollution Control Agency has more information about safe disposal: https://[email or web address]/living-green/managing-unwanted-medications             Medication List: This is a list of all your medications and when to take them. Check marks below indicate your daily home schedule. Keep this list as a reference.      Medications           Morning Afternoon Evening Bedtime As Needed    acyclovir 5 % cream   Commonly known as:   ZOVIRAX   Apply topically 5 times daily                                albuterol 108 (90 Base) MCG/ACT inhaler   Commonly known as:  PROAIR HFA/PROVENTIL HFA/VENTOLIN HFA   Inhale 2 puffs into the lungs every 4 hours as needed for shortness of breath / dyspnea                                amLODIPine 5 MG tablet   Commonly known as:  NORVASC   Take 1 tablet (5 mg) by mouth daily                                aspirin 81 MG EC tablet   Take 4 tablets (325 mg) by mouth daily Please resume in 5 days.  (11/24/2018)                                CALCIUM + D PO   Take 1 tablet by mouth 2 times daily.                                continuous blood glucose monitoring sensor   For use with Freestyle Leslie Flash  for continuous monitioring of blood glucose levels. Replace sensor every 10 days.                                empagliflozin 25 MG Tabs tablet   Commonly known as:  JARDIANCE   Take 1 tablet (25 mg) by mouth daily                                fexofenadine 180 MG tablet   Commonly known as:  ALLEGRA   Take 1 tablet by mouth daily. 1 TABLET DAILY Failed claritin for symptom cotrol. Zyrtec increases heart rate.                                fish oil-omega-3 fatty acids 1000 MG capsule   Take 1 capsule by mouth daily.                                fluticasone 50 MCG/ACT spray   Commonly known as:  FLONASE   Spray 1-2 sprays into both nostrils daily                                FREESTYLE LESLIE READER Felipa   1 each daily                                GLUCOSAMINE CHONDR COMPLEX PO                                hydrochlorothiazide 12.5 MG Tabs tablet   Take 1 tablet (12.5 mg) by mouth daily                                HYDROcodone-acetaminophen 5-325 MG per tablet   Commonly known as:  NORCO   Take 1-2 tablets by mouth every 4 hours as needed for pain                                insulin aspart 100 UNIT/ML injection   Commonly known as:  NovoLOG FLEXPEN   Uses  120 units daily subcutaneously                                 insulin glargine 100 UNIT/ML injection   Commonly known as:  LANTUS SOLOSTAR   80 units daily                                losartan 100 MG tablet   Commonly known as:  COZAAR   Take 1 tablet (100 mg) by mouth daily                                magnesium 250 MG tablet   Take 1 tablet by mouth daily                                metFORMIN 500 MG 24 hr tablet   Commonly known as:  GLUCOPHAGE-XR   Take 2 tabs twice a day                                montelukast 10 MG tablet   Commonly known as:  SINGULAIR   Take 1 tablet (10 mg) by mouth At Bedtime                                MULTIVITAMIN PO   Take 1 tablet by mouth daily.                                NEEDLES, ANY SIZE   Pen needles for Novolog insulin pen. Use to inject 5-6  times daily.                                pantoprazole 40 MG EC tablet   Commonly known as:  PROTONIX   Take 1 tablet (40 mg) by mouth daily                                pentoxifylline 400 MG CR tablet   Commonly known as:  TRENtal   Take 1 tablet (400 mg) by mouth 2 times daily                                rosuvastatin 10 MG tablet   Commonly known as:  CRESTOR   Take 1 tablet (10 mg) by mouth daily                                triamcinolone 0.1 % cream   Commonly known as:  KENALOG   Apply sparingly to affected area three times daily as needed                                vitamin D3 2000 units tablet   Commonly known as:  CHOLECALCIFEROL   Take 1 tablet by mouth daily.

## 2018-11-19 NOTE — ANESTHESIA CARE TRANSFER NOTE
Patient: Maliha Pedro    Procedure(s):  Right Carpal Tunnel Release    Diagnosis: Right Carpal Tunnel Syndrome  Diagnosis Additional Information: No value filed.    Anesthesia Type:   MAC     Note:  Airway :Room Air  Patient transferred to:Phase II  Comments: Arrive Phase II, Stable, Airway Intact  147/76, 80,16,95%  All questions answered.  Handoff Report: Identifed the Patient, Identified the Reponsible Provider, Reviewed the pertinent medical history, Discussed the surgical course, Reviewed Intra-OP anesthesia mangement and issues during anesthesia, Set expectations for post-procedure period and Allowed opportunity for questions and acknowledgement of understanding      Vitals: (Last set prior to Anesthesia Care Transfer)    CRNA VITALS  11/19/2018 0739 - 11/19/2018 0814      11/19/2018             Pulse: 79    SpO2: 95 %    Resp Rate (set): 10                Electronically Signed By: JEREL Whitmore CRNA  November 19, 2018  8:14 AM

## 2018-11-19 NOTE — ANESTHESIA POSTPROCEDURE EVALUATION
Anesthesia POST Procedure Evaluation    Patient: Maliha Pedro   MRN:     [id number] Gender:   female   Age:    58 year old :      1960        Preoperative Diagnosis: Right Carpal Tunnel Syndrome   Procedure(s):  Right Carpal Tunnel Release   Postop Comments: No value filed.       Anesthesia Type:  MAC    Reportable Event: NO     PAIN: Uncomplicated   Sign Out status: Comfortable, Well controlled pain     PONV: No PONV   Sign Out status:  No Nausea or Vomiting     Neuro/Psych: Uneventful perioperative course   Sign Out Status: Preoperative baseline; Age appropriate mentation     Airway/Resp.: Uneventful perioperative course   Sign Out Status: Non labored breathing, age appropriate RR; Resp. Status within EXPECTED Parameters     CV: Uneventful perioperative course   Sign Out status: Appropriate BP and perfusion indices; Appropriate HR/Rhythm     Disposition:   Sign Out in:  Phase II  Disposition:  Home  Recovery Course: Uneventful  Follow-Up: Not required           Last Anesthesia Record Vitals:  CRNA VITALS  2018 0739 - 2018 0839      2018             Pulse: 79    SpO2: 95 %    Resp Rate (set): 10          Last PACU/Preop Vitals:  Vitals:    18 0814 18 0829 18 0840   BP: 147/76 103/64 123/65   Pulse:      Resp:    Temp: 36.6  C (97.8  F)  36.6  C (97.8  F)   SpO2: 94% 93% 95%         Electronically Signed By: Abel Fernandes MD, 2018, 9:05 AM

## 2018-11-19 NOTE — DISCHARGE INSTRUCTIONS
[facility]  Home Care Following Anesthesia  For 24 hours after surgery:  1. Get plenty of rest.  A responsible adult must stay with you for at least 24 hours after you leave the surgery center.  2. Do not drive or use heavy equipment.  If you have weakness or tingling, don't drive or use heavy equipment until this feeling goes away.   3. Do not drink alcohol.   4. Avoid strenuous or risky activities.  Ask for help when climbing stairs.  5. You may feel lightheaded.  IF so, sit for a few minutes before standing.  Have someone help you get up.   6. If you have nausea (feel sick to your stomach): Drink only clear liquids such as apple juice, ginger ale, broth or 7-Up.  Rest may also help.  Be sure to drink enough fluids.  Move to a regular diet as you feel able.   7. You may have a slight fever.  Call the doctor if your fever is over 100 F (37.7 C) (taken under the tongue) or lasts longer than 24 hours.  8. You may have a dry mouth, a sore throat, muscle aches or trouble sleeping. These should go away after 24 hours.  9. Do not make important or legal decisions.               Tips for taking pain medications  To get the best pain relief possible, remember these points:    Take pain medications as directed, before pain becomes severe.    Pain medication can upset your stomach: taking it with food may help.    Constipation is a common side effect of pain medication. Drink plenty of  fluids.    Eat foods high in fiber. Take a stool softener if recommended by your doctor or pharmacist.    Do not drink alcohol, drive or operate machinery while taking pain medications.    Ask about other ways to control pain, such as with heat, ice or relaxation.    Tylenol/Acetaminophen Consumption  To help encourage the safe use of acetaminophen, the makers of TYLENOL  have lowered the maximum daily dose for single-ingredient Extra Strength TYLENOL  (acetaminophen) products sold in the [place] from 8  pills per day (4,000 mg) to 6 pills per day (3,000 mg). The dosing interval has also changed from 2 pills every 4-6 hours to 2 pills every 6 hours.    If you feel your pain relief is insufficient, you may take Tylenol/Acetaminophen in addition to your narcotic pain medication.     Be careful not to exceed 3,000 mg of Tylenol/Acetaminophen in a 24 hour period from all sources.    If you are taking extra strength Tylenol/acetaminophen (500 mg), the maximum dose is 6 tablets in 24 hours.    If you are taking regular strength acetaminophen (325 mg), the maximum dose is 9 tablets in 24 hours.    Call a doctor for any of the followin. Signs of infection (fever, growing tenderness at the surgery site, a large amount of drainage or bleeding, severe pain, foul-smelling drainage, redness, swelling).  2. It has been over 8 to 10 hours since surgery and you are still not able to urinate (pass water).  3. Headache for over 24 hours.    Your doctor is:       Dr. Moran, Orthopaedics: [phone]               Or dial [phone] and ask for the resident on call for:  Orthopaedics  For emergency care, call the:  [facility] Emergency Department: [phone] (TTY for hearing impaired: [phone])

## 2018-11-19 NOTE — ANESTHESIA PREPROCEDURE EVALUATION
Anesthesia Pre-Procedure Evaluation    Patient: Maliha Pedro   MRN:     [id number] Gender:   female   Age:    58 year old :      1960        Preoperative Diagnosis: Right Carpal Tunnel Syndrome   Procedure(s):  Right Carpal Tunnel Release     Past Medical History:   Diagnosis Date     Adjustment disorder with mixed anxiety and depressed mood     following death of her adoptive father and then again after adoptive mother      Allergic rhinitis, cause unspecified     uses benadryl prn (sneezing/hoarse voice)     Bartholin gland cyst 2008     Chest pain, unspecified     neg dobutamine stress test  (reacted to dobutamine)     Dry eyes      ERM OD (epiretinal membrane, right eye)     BE mild     Esophageal reflux     EGD: -neg     Glaucoma suspect      Hypertension      Irregular menstrual cycle     s/p D&C, since then more regular     Irritable bowel syndrome     colonoscopy -neg. Sx's especially prior to menses     MEDICAL HISTORY OF -     had health directive with : DANIEL Ford.  Full Code. Aunt (Charlie Tay) will be decision maker     Meningitis, unspecified(322.9) age 8    hospitalized for 9 mo     Need for prophylactic vaccination with tuberculosis (BCG) vaccine     Has pos Mantoux. Gets yearly cxr, all neg.      Other and unspecified hyperlipidemia      Pain in joint, shoulder region     bone spurs on MRI, s/p pool therapy     Type II or unspecified type diabetes mellitus without mention of complication, not stated as uncontrolled Age 32    Follows with endocrine at [facility]: Shayne Lane MD     Unspecified cataract     RE     Unspecified sinusitis (chronic)          Wheezing     Has seen pulm  & . Told night ashtma, ? vocal chord spasm due to cold air.       Past Surgical History:   Procedure Laterality Date     CATARACT IOL, RT/LT  2008    LE     HC DILATION/CURETTAGE DIAG/THER NON OB       HC TYMPANOPLASTY W/O MASTOID, INIT/REV W/O OSS CHAIN  RECONST  12/09     YAG CAPSULOTOMY OS (LEFT EYE)  4/23/2011          Anesthesia Evaluation     . Pt has had prior anesthetic.     No history of anesthetic complications          ROS/MED HX    ENT/Pulmonary:     (+)allergic rhinitis, asthma , . .    Neurologic:       Cardiovascular:     (+) hypertension----. : . . . :. .       METS/Exercise Tolerance:  >4 METS   Hematologic:         Musculoskeletal:         GI/Hepatic:     (+) GERD Asymptomatic on medication,       Renal/Genitourinary:         Endo:     (+) type II DM thyroid problem (subclinical)  hyperthyroidism, Obesity, .      Psychiatric:     (+) psychiatric history anxiety      Infectious Disease:         Malignancy:         Other:                         PHYSICAL EXAM:   Mental Status/Neuro: A/A/O   Airway: Facies: Feasible  Mallampati: II  Mouth/Opening: Full  TM distance: > 6 cm  Neck ROM: Full   Respiratory: Auscultation: CTAB     Resp. Rate: Normal     Resp. Effort: Normal      CV:    Comments:      Dental: Normal                  Lab Results   Component Value Date    WBC 8.4 10/30/2017    HGB 15.1 10/30/2017    HCT 45.4 10/30/2017     10/30/2017    SED 11 10/30/2017     11/14/2018    POTASSIUM 3.7 11/14/2018    CHLORIDE 106 11/14/2018    CO2 25 11/14/2018    BUN 15 11/14/2018    CR 0.72 11/14/2018     (H) 11/14/2018    SUSHANT 9.0 11/14/2018    MAG 2.0 11/14/2018    ALBUMIN 4.1 08/06/2018    PROTTOTAL 7.8 08/06/2018    ALT 39 08/06/2018    AST 22 08/06/2018    ALKPHOS 54 08/06/2018    BILITOTAL 0.4 08/06/2018    LIPASE 207 03/06/2017    TSH 1.29 10/30/2017    T4 1.50 06/21/2010       Preop Vitals  BP Readings from Last 3 Encounters:   11/19/18 137/65   11/14/18 124/78   11/07/18 138/58    Pulse Readings from Last 3 Encounters:   11/19/18 78   11/14/18 81   11/07/18 82      Resp Readings from Last 3 Encounters:   11/19/18 16   11/14/18 18   08/06/18 18    SpO2 Readings from Last 3 Encounters:   11/19/18 97%   11/14/18 96%   11/07/18 95%     "  Temp Readings from Last 1 Encounters:   11/19/18 36.8  C (98.2  F) (Oral)    Ht Readings from Last 1 Encounters:   11/19/18 1.619 m (5' 3.74\")      Wt Readings from Last 1 Encounters:   11/19/18 85.7 kg (189 lb)    Estimated body mass index is 32.71 kg/(m^2) as calculated from the following:    Height as of this encounter: 1.619 m (5' 3.74\").    Weight as of this encounter: 85.7 kg (189 lb).     LDA:  Peripheral IV 11/19/18 Left Lower forearm (Active)   Site Assessment WDL 11/19/2018  6:46 AM   Line Status Infusing 11/19/2018  6:46 AM   Phlebitis Scale 0-->no symptoms 11/19/2018  6:46 AM   Infiltration Scale 0 11/19/2018  6:46 AM   Infiltration Site Treatment Method  None 11/19/2018  6:46 AM   Extravasation? No 11/19/2018  6:46 AM   Dressing Intervention New dressing  11/19/2018  6:46 AM   Number of days:0            Assessment:   ASA SCORE: 2    NPO Status: > 2 hours since completed Clear Liquids; > 6 hours since completed Solid Foods   Documentation: H&P complete; Preop Testing complete; Consents complete   Proceeding: Proceed without further delay  Tobacco Use:  NO Active use of Tobacco/UNKNOWN Tobacco use status     Plan:   Anes. Type:  MAC   Pre-Induction: Midazolam IV   Induction:  IV (Standard)   Airway: Native Airway   Access/Monitoring: PIV   Maintenance: Propofol; IV   Emergence: Procedure Site   Logistics: Same Day Surgery     Postop Pain/Sedation Strategy:  Standard-Options: Opioids PRN     PONV Management:  Adult Risk Factors: Female, Non-Smoker, Postop Opioids  Prevention: Propofol Infusion; Ondansetron     CONSENT: Direct conversation   Plan and risks discussed with: Patient   Blood Products: Consent Deferred (Minimal Blood Loss)                         Abel Fernandes MD  "

## 2018-11-19 NOTE — IP AVS SNAPSHOT
[facility]    [address] [place] [address]    Phone:  [phone]    Fax:  [phone]                                       After Visit Summary   11/19/2018    Maliha Pedro    MRN: [id number]           After Visit Summary Signature Page     I have received my discharge instructions, and my questions have been answered. I have discussed any challenges I see with this plan with the nurse or doctor.    ..........................................................................................................................................  Patient/Patient Representative Signature      ..........................................................................................................................................  Patient Representative Print Name and Relationship to Patient    ..................................................               ................................................  Date                                   Time    ..........................................................................................................................................  Reviewed by Signature/Title    ...................................................              ..............................................  Date                                               Time          22EPIC Rev 08/18

## 2018-11-19 NOTE — OP NOTE
OPERATIVE DATE:  11/19/2018     PREOPERATIVE DIAGNOSIS: Right carpal tunnel syndrome  POSTOPERATIVE DIAGNOSIS: Right carpal tunnel syndrome     PROCEDURE PERFORMED: Right carpal tunnel release     ATTENDING SURGEON: Dr. Moran  RESIDENT SURGEON: Leda Golden MD  ANESTHESIA: MAC and local  ESTIMATED BLOOD LOSS: <5 mL.      INDICATIONS FOR PROCEDURE: Ms. Maliha Pedro is a 58 year old right handed woman with bilateral carpal tunnel syndrome, confirmed by EMG.  Her symptoms have worsened and have not responded to conservative therapy.  Her symptoms are more severe on the right and she presents for right carpal tunnel release.     DESCRIPTION OF PROCEDURE: After informed consent was verified, the patient was brought to the operating room where she was positioned supine on her gurney with her right arm abducted. A time-out was performed. Conscious sedation was initiated by the anesthesiology team. We then prepared and draped her right hand and arm in the usual sterile manner and proposed an incision of approximately 4 cm in length parallelling and just on the ulnar side of the midline palmar crease. Then 1% lidocaine with Epinephrine 1:100,000 was injected along the proposed incision.      We then incised the skin with a #15 blade after adequate anesthesia was confirmed. We dissected the subcutaneous tissues. Underneath we visualized the transverse carpal ligament, which we proceeded to divide with the 15 blade. The median nerve was ultimately exposed and we completed the division of the carpal ligament distally and proximally with scissors. A Penfield probe was used to inspect the  proximal and distal length of the nerve to determine satisfactory decompression.     Hemostasis was achieved with bipolar cautery. The wound was irrigated. We closed the wound in a single layer with interrupted 3-0 nylon vertical mattress stitches for the skin. The wound was then sterilely dressed and the patient was taken to the  recovery area.      Sponge and needle counts were correct.      Dr. Moran was present for the entire procedure.     Prepared by:  Leda Golden MD       ATTESTATION: My signature attests that I was present for the entire operation described above. I agree with the above findings.    MD Ivania DAVIS MD

## 2018-12-03 ENCOUNTER — OFFICE VISIT (OUTPATIENT)
Dept: NEUROSURGERY | Facility: CLINIC | Age: 58
End: 2018-12-03
Payer: COMMERCIAL

## 2018-12-03 VITALS
HEIGHT: 63 IN | HEART RATE: 85 BPM | WEIGHT: 187.8 LBS | DIASTOLIC BLOOD PRESSURE: 84 MMHG | SYSTOLIC BLOOD PRESSURE: 138 MMHG | OXYGEN SATURATION: 96 % | BODY MASS INDEX: 33.27 KG/M2

## 2018-12-03 DIAGNOSIS — G56.03 BILATERAL CARPAL TUNNEL SYNDROME: Primary | ICD-10-CM

## 2018-12-03 DIAGNOSIS — Z98.890 POST-OPERATIVE STATE: ICD-10-CM

## 2018-12-03 ASSESSMENT — PAIN SCALES - GENERAL: PAINLEVEL: MILD PAIN (2)

## 2018-12-03 NOTE — LETTER
12/3/2018       RE: Maliha Pedro  [address]  [place] [address]     Dear Colleague,    Thank you for referring your patient, Maliha Pedro, to the [facility] NEUROSURGERY at [facility]. Please see a copy of my visit note below.      NEUROSURGERY WOUND CHECK  December 3, 2018    ATTENDING: Luisa  PROCEDURE:  11/19/18  Right carpal tunnel release  INDICATIONS FOR PROCEDURE: Ms. Maliha Pedro is a 58 year old right handed woman with bilateral carpal tunnel syndrome, confirmed by EMG.  Her symptoms have worsened and have not responded to conservative therapy.  Her symptoms are more severe on the right and she presents for right carpal tunnel release.  HPI:  Maliha Pedro is a pleasant 58 year old right handed female now 2 weeks status post the above procedure.  The procedure itself was without incident.*  She recovered well and was discharged home in good condition.     Pre op she had weakness, numbness, and pain in her hands bilaterally, although there is greater pain in the right than the left. She also reported neck pain that radiating down her right arm. She was evaluated by Dr. Figueroa on 10/15/18 with an EMG study. Results showed bilateral carpal tunnel syndrome but no evidence of cervical radiculopathy to affect the right upper limb. As of 9/12/18, her diabetic control had been improving but still suboptimal. Her A1c was decreasing to 8.5, peak A1c was 12.     Both of her hands were tingling and her wrist pain was exacerbated by activity.  Her carpal tunnel was worsening and every 10 minutes her hands went numb as she lost strength and dropped objects. Her right index, middle, and ring fingers were most affected but her little finger  was also affected with activity.      Since surgery she has noticed some minor improvements in her right hand, she knows it is still early days.  We discussed that with diabetics improvement will be hard to  "predict.  She is off all pain medication except as needed Advil.  She has been doing light range of motion and stretching exercises in her right hand.  She presents for routine post op visit and suture removal    EXAM:  /84 (BP Location: Left arm, Patient Position: Chair, Cuff Size: Adult Regular)  Pulse 85  Ht 1.607 m (5' 3.25\")  Wt 85.2 kg (187 lb 12.8 oz)  LMP 03/19/2009 (Exact Date)  SpO2 96%  BMI 33 kg/m2  Well developed well nourished female found seated comfortably in exam chair.  No apparent distress. She is unaccompanied.  A&O X3.  Mood and affect WNL. Language and fund of knowledge intact.  Is able to sit and rise independently.   She has a nicely healing incision.  I prepped the wound with chloroprep and cleanly removed nylon sutures without difficulty.  I lightly redressed the wound.    ASSESSMENT/PLAN  1. Bilateral carpal tunnel syndrome    2. Post-operative state      Doing well now 2 weeks sp right carpal tunnel release.    Wound looks great.  It has been redressed.  She can remove the dressing in 2 more days and leave it off.   May swim or immerse wound when all scabbing has disappeared.  Continue activity restrictions until 1 month post op. I urged her to reduce her range of motion exercises, and allow the hand to heal more before starting to use it.  She will be having the left operated on in 2 more weeks, she can start using the right side at that point.  Follow up NS clinic 2 weeks after her left hand surgery.     It has been a pleasure looking after this very nice patient. We appreciate your confidence in the [facility], Department of Neurosurgery.    Radha Pascal PA-C  [facility]  Department of Neurosurgery  Phone: [phone]  Fax: [phone]      This note was generated using voice recognition software. While edited for content some inaccurate phrasing may be found.    "

## 2018-12-03 NOTE — NURSING NOTE
Chief Complaint   Patient presents with     RECHECK     [facility] RETURN 2 WK FU       Odessa Castro, EMT

## 2018-12-03 NOTE — PROGRESS NOTES
NEUROSURGERY WOUND CHECK  December 3, 2018    ATTENDING: Luisa  PROCEDURE:  11/19/18  Right carpal tunnel release  INDICATIONS FOR PROCEDURE: Ms. Maliha Pedro is a 58 year old right handed woman with bilateral carpal tunnel syndrome, confirmed by EMG.  Her symptoms have worsened and have not responded to conservative therapy.  Her symptoms are more severe on the right and she presents for right carpal tunnel release.  HPI:  Maliha Pedro is a pleasant 58 year old right handed female now 2 weeks status post the above procedure.  The procedure itself was without incident.*  She recovered well and was discharged home in good condition.     Pre op she had weakness, numbness, and pain in her hands bilaterally, although there is greater pain in the right than the left. She also reported neck pain that radiating down her right arm. She was evaluated by Dr. Figueroa on 10/15/18 with an EMG study. Results showed bilateral carpal tunnel syndrome but no evidence of cervical radiculopathy to affect the right upper limb. As of 9/12/18, her diabetic control had been improving but still suboptimal. Her A1c was decreasing to 8.5, peak A1c was 12.     Both of her hands were tingling and her wrist pain was exacerbated by activity.  Her carpal tunnel was worsening and every 10 minutes her hands went numb as she lost strength and dropped objects. Her right index, middle, and ring fingers were most affected but her little finger was also affected with activity.      Since surgery she has noticed some minor improvements in her right hand, she knows it is still early days.  We discussed that with diabetics improvement will be hard to predict.  She is off all pain medication except as needed Advil.  She has been doing light range of motion and stretching exercises in her right hand.  She presents for routine post op visit and suture removal    EXAM:  /84 (BP Location: Left arm, Patient Position: Chair, Cuff Size: Adult  "Regular)  Pulse 85  Ht 1.607 m (5' 3.25\")  Wt 85.2 kg (187 lb 12.8 oz)  LMP 03/19/2009 (Exact Date)  SpO2 96%  BMI 33 kg/m2  Well developed well nourished female found seated comfortably in exam chair.  No apparent distress. She is unaccompanied.  A&O X3.  Mood and affect WNL. Language and fund of knowledge intact.  Is able to sit and rise independently.   She has a nicely healing incision.  I prepped the wound with chloroprep and cleanly removed nylon sutures without difficulty.  I lightly redressed the wound.    ASSESSMENT/PLAN  1. Bilateral carpal tunnel syndrome    2. Post-operative state      Doing well now 2 weeks sp right carpal tunnel release.    Wound looks great.  It has been redressed.  She can remove the dressing in 2 more days and leave it off.   May swim or immerse wound when all scabbing has disappeared.  Continue activity restrictions until 1 month post op. I urged her to reduce her range of motion exercises, and allow the hand to heal more before starting to use it.  She will be having the left operated on in 2 more weeks, she can start using the right side at that point.  Follow up NS clinic 2 weeks after her left hand surgery.     It has been a pleasure looking after this very nice patient. We appreciate your confidence in the [facility], Department of Neurosurgery.    Radha Pascal PA-C  [facility]  Department of Neurosurgery  Phone: [phone]  Fax: [phone]      This note was generated using voice recognition software. While edited for content some inaccurate phrasing may be found.    "

## 2018-12-03 NOTE — MR AVS SNAPSHOT
After Visit Summary   12/3/2018    Maliha Pedro    MRN: [id number]           Patient Information     Date Of Birth          1960        Visit Information        Provider Department      12/3/2018 10:30 AM Radha Pascal PA-C [facility]        Today's Diagnoses     Bilateral carpal tunnel syndrome    -  1    Post-operative state           Follow-ups after your visit        Your next 10 appointments already scheduled     Dec 12, 2018  9:00 AM CST   (Arrive by 8:45 AM)   RETURN DIABETES with Shai Dong MD   [facility] ([facility] [place])    [address] [place] [address] [phone]            Dec 12, 2018 12:00 PM CST   Office Visit with Elva Ortiz MD   [facility] ([facility])    [address] [place] [id number] [phone]           Bring a current list of meds and any records pertaining to this visit. For Physicals, please bring immunization records and any forms needing to be filled out. Please arrive 10 minutes early to complete paperwork.            Dec 17, 2018   Procedure with Ivania Moran MD   [facility] ([facility])    [address] [place] [address] [phone]           Located in the Clinics and Surgery Center at [address] [place] [address].   parking is very convenient and highly recommended.  is a $6 flat rate fee.  Both  and self parkers should enter the main arrival plaza from [facility]; parking attendants will direct you based on your parking preference.            Jan 02, 2019 12:00 PM CST   (Arrive by 11:45 AM)   Return Visit with MELANIE [facility] ([facility])    [address] [place], [place] [address] [phone]          "   May 22, 2019  8:45 AM CDT   RETURN GLAUCOMA with Umu Rosario MD   [facility] ([facility])    [address]  9[address] Clin 9a  [facility] [id number] [phone]              Who to contact     Please call your clinic at [phone] to:    Ask questions about your health    Make or cancel appointments    Discuss your medicines    Learn about your test results    Speak to your doctor            Additional Information About Your Visit        [employer] Information     [email or web address] gives you secure access to your electronic health record. If you see a primary care provider, you can also send messages to your care team and make appointments. If you have questions, please call your primary care clinic.  If you do not have a primary care provider, please call [phone] and they will assist you.      [email or web address] is an electronic gateway that provides easy, online access to your medical records. With [email or web address], you can request a clinic appointment, read your test results, renew a prescription or communicate with your care team.     To access your existing account, please contact your [facility] or call [phone] for assistance.        Care EveryWhere ID     This is your Care EveryWhere ID. This could be used by other organizations to access your [facility] records  [id number]        Your Vitals Were     Pulse Height Last Period Pulse Oximetry BMI (Body Mass Index)       85 1.607 m (5' 3.25\") 03/19/2009 (Exact Date) 96% 33 kg/m2        Blood Pressure from Last 3 Encounters:   12/03/18 138/84   11/19/18 123/65   11/14/18 124/78    Weight from Last 3 Encounters:   12/03/18 85.2 kg (187 lb 12.8 oz)   11/19/18 85.7 kg (189 lb)   11/14/18 85.7 kg (189 lb)              Today, you had the following     No orders found for display       Primary Care Provider Office Phone # Fax #    Elva Ortiz [phone]  [facility] [address]        Equal Access to Services     JASSON COLLINS : Hadii angelica ku brando Khan, waaxda luqvicente, qaybta kawarren mariaelenaearl, poly rhiannonin hayaabrianne ferrellrodrick paganabhi harrison . So [facility] [phone].    ATENCIÓN: Si habla español, tiene a ford disposición servicios gratuitos de asistencia lingüística. Llame al [phone].    We comply with applicable federal civil rights laws and [place] laws. We do not discriminate on the basis of race, color, national origin, age, disability, sex, sexual orientation, or gender identity.            Thank you!     Thank you for choosing [facility]  for your care. Our goal is always to provide you with excellent care. Hearing back from our patients is one way we can continue to improve our services. Please take a few minutes to complete the written survey that you may receive in the mail after your visit with us. Thank you!             Your Updated Medication List - Protect others around you: Learn how to safely use, store and throw away your medicines at www.disposemymeds.org.          This list is accurate as of 12/3/18 11:02 AM.  Always use your most recent med list.                   Brand Name Dispense Instructions for use Diagnosis    acyclovir 5 % external cream    ZOVIRAX    5 g    Apply topically 5 times daily    HSV (herpes simplex virus) infection       albuterol 108 (90 Base) MCG/ACT inhaler    PROAIR HFA/PROVENTIL HFA/VENTOLIN HFA    1 Inhaler    Inhale 2 puffs into the lungs every 4 hours as needed for shortness of breath / dyspnea    Cough       amLODIPine 5 MG tablet    NORVASC    90 tablet    Take 1 tablet (5 mg) by mouth daily    Essential hypertension, Type 2 diabetes mellitus with diabetic nephropathy, with long-term current use of insulin (H)       aspirin 81 MG EC tablet      Take 4 tablets (325 mg) by mouth daily Please resume in 5 days.  (11/24/2018)        CALCIUM + D PO      Take 1 tablet by mouth 2 times daily.         continuous blood glucose monitoring sensor     10 each    For use with Freestyle Leslie Flash  for continuous monitioring of blood glucose levels. Replace sensor every 10 days.    Type 2 diabetes mellitus with diabetic polyneuropathy, with long-term current use of insulin (H)       empagliflozin 25 MG Tabs tablet    JARDIANCE    90 tablet    Take 1 tablet (25 mg) by mouth daily    Type 2 diabetes mellitus with diabetic nephropathy, with long-term current use of insulin (H)       fexofenadine 180 MG tablet    ALLEGRA    90 tablet    Take 1 tablet by mouth daily. 1 TABLET DAILY Failed claritin for symptom cotrol. Zyrtec increases heart rate.    Seasonal allergic rhinitis       fish oil-omega-3 fatty acids 1000 MG capsule      Take 1 capsule by mouth daily.        fluticasone 50 MCG/ACT nasal spray    FLONASE    16 g    Spray 1-2 sprays into both nostrils daily    Seasonal allergic rhinitis, unspecified chronicity, unspecified trigger       FREESTYLE LESLIE READER Felipa     1 Device    1 each daily    Type 2 diabetes mellitus with hyperglycemia (H)       GLUCOSAMINE CHONDR COMPLEX PO           hydrochlorothiazide 12.5 MG tablet    HYDRODIURIL    90 tablet    Take 1 tablet (12.5 mg) by mouth daily    Essential hypertension, Type 2 diabetes mellitus with diabetic nephropathy, with long-term current use of insulin (H)       HYDROcodone-acetaminophen 5-325 MG tablet    NORCO    30 tablet    Take 1-2 tablets by mouth every 4 hours as needed for pain    Carpal tunnel syndrome of right wrist       insulin aspart 100 UNIT/ML pen    NovoLOG FLEXPEN    120 mL    Uses  120 units daily subcutaneously    Diabetes mellitus, type 2 (H)       insulin glargine 100 UNIT/ML pen    LANTUS SOLOSTAR    75 mL    80 units daily    Type 2 diabetes mellitus with hyperglycemia, unspecified long term insulin use status       losartan 100 MG tablet    COZAAR    90 tablet    Take 1 tablet (100 mg) by mouth daily    Essential hypertension        magnesium 250 MG tablet     90 tablet    Take 1 tablet by mouth daily    Hypomagnesemia       metFORMIN 500 MG 24 hr tablet    GLUCOPHAGE-XR    360 tablet    Take 2 tabs twice a day    Type 2 diabetes mellitus with hyperglycemia, unspecified long term insulin use status       montelukast 10 MG tablet    SINGULAIR    30 tablet    Take 1 tablet (10 mg) by mouth At Bedtime    Seasonal allergic rhinitis, unspecified chronicity, unspecified trigger       MULTIVITAMIN PO      Take 1 tablet by mouth daily.        NEEDLES, ANY SIZE     4 Box    Pen needles for Novolog insulin pen. Use to inject 5-6  times daily.    Type 2 diabetes mellitus with hyperglycemia, with long-term current use of insulin (H)       pantoprazole 40 MG EC tablet    PROTONIX    90 tablet    Take 1 tablet (40 mg) by mouth daily    Gastroesophageal reflux disease       pentoxifylline  MG CR tablet    TRENtal    180 tablet    Take 1 tablet (400 mg) by mouth 2 times daily    Type 2 diabetes mellitus with hyperglycemia, unspecified long term insulin use status       rosuvastatin 10 MG tablet    CRESTOR    90 tablet    Take 1 tablet (10 mg) by mouth daily    Hyperlipidemia LDL goal <100       triamcinolone 0.1 % external cream    KENALOG    80 g    Apply sparingly to affected area three times daily as needed    Bug bite, initial encounter       vitamin D3 2000 units tablet    CHOLECALCIFEROL     Take 1 tablet by mouth daily.

## 2018-12-04 DIAGNOSIS — E11.9 DIABETES MELLITUS, TYPE 2 (H): ICD-10-CM

## 2018-12-04 DIAGNOSIS — E11.9 TYPE 2 DIABETES MELLITUS (H): Primary | ICD-10-CM

## 2018-12-06 DIAGNOSIS — E11.9 DIABETES MELLITUS, TYPE 2 (H): Primary | ICD-10-CM

## 2018-12-12 ENCOUNTER — OFFICE VISIT (OUTPATIENT)
Dept: FAMILY MEDICINE | Facility: CLINIC | Age: 58
End: 2018-12-12
Payer: COMMERCIAL

## 2018-12-12 ENCOUNTER — OFFICE VISIT (OUTPATIENT)
Dept: ENDOCRINOLOGY | Facility: CLINIC | Age: 58
End: 2018-12-12
Payer: COMMERCIAL

## 2018-12-12 VITALS
TEMPERATURE: 98 F | HEART RATE: 96 BPM | RESPIRATION RATE: 18 BRPM | OXYGEN SATURATION: 98 % | SYSTOLIC BLOOD PRESSURE: 112 MMHG | DIASTOLIC BLOOD PRESSURE: 62 MMHG

## 2018-12-12 VITALS
SYSTOLIC BLOOD PRESSURE: 127 MMHG | HEART RATE: 97 BPM | BODY MASS INDEX: 33.66 KG/M2 | WEIGHT: 190 LBS | DIASTOLIC BLOOD PRESSURE: 76 MMHG | HEIGHT: 63 IN

## 2018-12-12 DIAGNOSIS — G56.03 BILATERAL CARPAL TUNNEL SYNDROME: ICD-10-CM

## 2018-12-12 DIAGNOSIS — Z79.4 TYPE 2 DIABETES MELLITUS WITH DIABETIC POLYNEUROPATHY, WITH LONG-TERM CURRENT USE OF INSULIN (H): Primary | ICD-10-CM

## 2018-12-12 DIAGNOSIS — Z79.4 TYPE 2 DIABETES MELLITUS WITH HYPERGLYCEMIA, WITH LONG-TERM CURRENT USE OF INSULIN (H): ICD-10-CM

## 2018-12-12 DIAGNOSIS — I10 ESSENTIAL HYPERTENSION: ICD-10-CM

## 2018-12-12 DIAGNOSIS — Z91.040 LATEX SENSITIVITY: ICD-10-CM

## 2018-12-12 DIAGNOSIS — E11.65 TYPE 2 DIABETES MELLITUS WITH HYPERGLYCEMIA, WITH LONG-TERM CURRENT USE OF INSULIN (H): ICD-10-CM

## 2018-12-12 DIAGNOSIS — E11.42 TYPE 2 DIABETES MELLITUS WITH DIABETIC POLYNEUROPATHY, WITH LONG-TERM CURRENT USE OF INSULIN (H): Primary | ICD-10-CM

## 2018-12-12 DIAGNOSIS — Z01.818 PREOP GENERAL PHYSICAL EXAM: Primary | ICD-10-CM

## 2018-12-12 LAB
ANION GAP SERPL CALCULATED.3IONS-SCNC: 10 MMOL/L (ref 3–14)
BUN SERPL-MCNC: 15 MG/DL (ref 7–30)
CALCIUM SERPL-MCNC: 9.4 MG/DL (ref 8.5–10.1)
CHLORIDE SERPL-SCNC: 103 MMOL/L (ref 94–109)
CO2 SERPL-SCNC: 25 MMOL/L (ref 20–32)
CREAT SERPL-MCNC: 0.69 MG/DL (ref 0.52–1.04)
ERYTHROCYTE [DISTWIDTH] IN BLOOD BY AUTOMATED COUNT: 14.9 % (ref 10–15)
GFR SERPL CREATININE-BSD FRML MDRD: 87 ML/MIN/1.7M2
GLUCOSE SERPL-MCNC: 287 MG/DL (ref 70–99)
HCT VFR BLD AUTO: 45 % (ref 35–47)
HGB BLD-MCNC: 15.3 G/DL (ref 11.7–15.7)
MCH RBC QN AUTO: 27.5 PG (ref 26.5–33)
MCHC RBC AUTO-ENTMCNC: 34 G/DL (ref 31.5–36.5)
MCV RBC AUTO: 81 FL (ref 78–100)
PLATELET # BLD AUTO: 319 10E9/L (ref 150–450)
POTASSIUM SERPL-SCNC: 3.5 MMOL/L (ref 3.4–5.3)
RBC # BLD AUTO: 5.56 10E12/L (ref 3.8–5.2)
SODIUM SERPL-SCNC: 138 MMOL/L (ref 133–144)
WBC # BLD AUTO: 9.9 10E9/L (ref 4–11)

## 2018-12-12 PROCEDURE — 36415 COLL VENOUS BLD VENIPUNCTURE: CPT | Performed by: FAMILY MEDICINE

## 2018-12-12 PROCEDURE — 85027 COMPLETE CBC AUTOMATED: CPT | Performed by: FAMILY MEDICINE

## 2018-12-12 PROCEDURE — 80048 BASIC METABOLIC PNL TOTAL CA: CPT | Performed by: FAMILY MEDICINE

## 2018-12-12 PROCEDURE — 99214 OFFICE O/P EST MOD 30 MIN: CPT | Performed by: FAMILY MEDICINE

## 2018-12-12 RX ORDER — FLASH GLUCOSE SENSOR
1 KIT MISCELLANEOUS
Qty: 2 EACH | Refills: 11 | Status: SHIPPED | OUTPATIENT
Start: 2018-12-12 | End: 2019-06-20

## 2018-12-12 RX ORDER — FLASH GLUCOSE SCANNING READER
1 EACH MISCELLANEOUS DAILY
Qty: 1 DEVICE | Refills: 1 | Status: SHIPPED | OUTPATIENT
Start: 2018-12-12 | End: 2019-07-15

## 2018-12-12 ASSESSMENT — PAIN SCALES - GENERAL: PAINLEVEL: NO PAIN (0)

## 2018-12-12 ASSESSMENT — MIFFLIN-ST. JEOR: SCORE: 1405.96

## 2018-12-12 NOTE — PROGRESS NOTES
Endocrinology Clinic Visit 18  NAME:  Maliha Pedro  PCP:  Elva Ortiz  MRN:  [id number]    Chief Complaint     Chief Complaint   Patient presents with     RECHECK     type 2        History of Present Illness     Maliha Pedro is a 58 year old female who is seen in clinic for diabetes management.   She is a former patient of Dr. Mccarthy who is transitioning care since his half-way.   She has had diabetes since age 32. Has tried many different meds. Did not tolerate GLP-1 agonists. Stopped Actos due to concerns about adverse effects. Was on Lantus and Novolog with poor control, with recent improvement since addition of Jardiance 2017. She has retinopathy and nephropathy.     Interval History:   A1c last month 8.2. She had carpal tunnel surgery last month and has been recovering at home. Has been snacking more. She has been using the [employer] freestyle shannon system.     Associated Signs/Symptoms  Hypoglycemia: no. Hyperglycemia: none.Neuropathy: none. Vascular Symtpoms: none. Angina/CHF: dyspnea on exertion. Ulcers: No. Amputations: No    Current treatment strategy: Novolog 20 with main meals. Lantus 80 units daily. Metformin 1000 mg po BID, Jardiance 25 mg po daily    Blood Glucose Monitoring: CGM download: shannon: 14 day data:  Avg 209. SD 67.4. Hypo: 1%, within ran%, above range: 66%.   Pattern: progressive rise during the day, drop overnight.     Diet: 2-3 meals per day. Occasional snacks.   Cut down on rice. Eating more vegetables and fish.     Exercise: yoga.     Weight:   Wt Readings from Last 4 Encounters:   18 86.2 kg (190 lb)   18 85.2 kg (187 lb 12.8 oz)   18 85.7 kg (189 lb 0 oz)   18 85.7 kg (189 lb)     Problem List     Patient Active Problem List   Diagnosis     Type 2 diabetes mellitus with hyperglycemia (H)     ? 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     Displacement of lumbar intervertebral disc without  myelopathy     Nonallopathic lesion of lumbar region     Nonallopathic lesion of thoracic region     Nonallopathic lesion of sacral region     Bartholin gland cyst     Cataract     Irritable bowel syndrome     Esophageal reflux     Seasonal allergic rhinitis     Latex sensitivity     Colon polyp     Adenomatous polyp     Subclinical hyperthyroidism     HYPERLIPIDEMIA LDL GOAL <100     Ovarian cyst     Breast pain     Essential hypertension     Advanced directives, counseling/discussion     Obesity     Hypomagnesemia     Type 2 diabetes mellitus with hyperglycemia, with long-term current use of insulin (H)        Medications     Current Outpatient Medications   Medication     acyclovir (ZOVIRAX) 5 % cream     albuterol (PROAIR HFA, PROVENTIL HFA, VENTOLIN HFA) 108 (90 BASE) MCG/ACT inhaler     amLODIPine (NORVASC) 5 MG tablet     aspirin 81 MG EC tablet     CALCIUM + D OR     Continuous Blood Gluc  (FREESTYLE BUSHRA READER) KIRIT     continuous blood glucose monitoring (FREESTYLE BUSHRA) sensor     empagliflozin (JARDIANCE) 25 MG TABS tablet     fexofenadine (ALLEGRA) 180 MG tablet     fish oil-omega-3 fatty acids (FISH OIL) 1000 MG capsule     fluticasone (FLONASE) 50 MCG/ACT spray     Glucosamine-Chondroitin (GLUCOSAMINE CHONDR COMPLEX PO)     hydrochlorothiazide 12.5 MG TABS tablet     HYDROcodone-acetaminophen (NORCO) 5-325 MG per tablet     insulin aspart (NOVOLOG FLEXPEN) 100 UNIT/ML pen     insulin glargine (LANTUS SOLOSTAR PEN) 100 UNIT/ML pen     insulin pen needle (BD LISA U/F) 32G X 4 MM miscellaneous     losartan (COZAAR) 100 MG tablet     magnesium 250 MG tablet     metFORMIN (GLUCOPHAGE-XR) 500 MG 24 hr tablet     montelukast (SINGULAIR) 10 MG tablet     MULTIVITAMIN OR     NEEDLES, ANY SIZE     pantoprazole (PROTONIX) 40 MG EC tablet     pentoxifylline (TRENTAL) 400 MG CR tablet     rosuvastatin (CRESTOR) 10 MG tablet     triamcinolone (KENALOG) 0.1 % cream     VITAMIN D 2000 UNIT OR TABS     No  current facility-administered medications for this visit.      Facility-Administered Medications Ordered in Other Visits   Medication     fentaNYL (SUBLIMAZE) injection 25-50 mcg     flumazenil (ROMAZICON) injection 0.2 mg     midazolam (VERSED) injection 0.5-1 mg     naloxone (NARCAN) injection 0.1-0.4 mg        Allergies     Allergies   Allergen Reactions     Latex Swelling     Mold      Tetanus Antitoxin Swelling     Tetracycline Swelling       Medical / Surgical History     Past Medical History:   Diagnosis Date     Adjustment disorder with mixed anxiety and depressed mood     following death of her adoptive father and then again after adoptive mother      Allergic rhinitis, cause unspecified     uses benadryl prn (sneezing/hoarse voice)     Bartholin gland cyst 2008     Chest pain, unspecified     neg dobutamine stress test  (reacted to dobutamine)     Dry eyes      ERM OD (epiretinal membrane, right eye)     BE mild     Esophageal reflux     EGD: -neg     Glaucoma suspect      Hypertension      Irregular menstrual cycle     s/p D&C, since then more regular     Irritable bowel syndrome     colonoscopy -neg. Sx's especially prior to menses     MEDICAL HISTORY OF -     had health directive with : DANIEL Ford.  Full Code. Aunt (Charlie Tay) will be decision maker     Meningitis, unspecified(322.9) age 8    hospitalized for 9 mo     Need for prophylactic vaccination with tuberculosis (BCG) vaccine     Has pos Mantoux. Gets yearly cxr, all neg.      Other and unspecified hyperlipidemia      Pain in joint, shoulder region     bone spurs on MRI, s/p pool therapy     Type II or unspecified type diabetes mellitus without mention of complication, not stated as uncontrolled Age 32    [facility]s [facility] at [facility]: Shayne Lane MD     Unspecified cataract     RE     Unspecified sinusitis (chronic)     2006     Wheezing     Has seen pulm  & 2007. Told night ashtma, ? vocal chord  spasm due to cold air.      Past Surgical History:   Procedure Laterality Date     CATARACT IOL, RT/LT  9/23/2008    LE     HC DILATION/CURETTAGE DIAG/THER NON OB  1992     HC TYMPANOPLASTY W/O MASTOID, INIT/REV W/O OSS CHAIN RECONST  12/09     RELEASE CARPAL TUNNEL Right 11/19/2018    Procedure: Right Carpal Tunnel Release;  Surgeon: Ivania Moran MD;  Location: [facility] OR     YAG CAPSULOTOMY OS (LEFT EYE)  4/23/2011       Social History     Social History     Socioeconomic History     Marital status: Single     Spouse name: Not on file     Number of children: Not on file     Years of education: Not on file     Highest education level: Not on file   Social Needs     Financial resource strain: Not on file     Food insecurity - worry: Not on file     Food insecurity - inability: Not on file     Transportation needs - medical: Not on file     Transportation needs - non-medical: Not on file   Occupational History     Not on file   Tobacco Use     Smoking status: Never Smoker     Smokeless tobacco: Never Used   Substance and Sexual Activity     Alcohol use: Yes     Comment: rarely     Drug use: No     Sexual activity: Not Currently     Partners: Male   Other Topics Concern     Parent/sibling w/ CABG, MI or angioplasty before 65F 55M? 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 Moved to  in 1982. Initially lived in [place].    No children        2 cats       Family History     Family History   Problem Relation Age of Onset     Diabetes Brother      Cancer Brother  "59        Lung cancer     Diabetes Father          of dm 70's     C.A.D. Father      Diabetes Paternal Grandfather      Diabetes Sister      Cancer Maternal Aunt         ovarian cancer.      Diabetes Brother      Diabetes Brother      Diabetes Sister      Glaucoma Maternal Grandmother      Macular Degeneration No family hx of        ROS     Constitutional: no fevers, chills, night sweats. No weight loss or fatigue. Good appetite  Eyes: no vision changes, no eye redness, no diplopia  Ears, Nose, mouth, throat: no hearing changes, no tinnitus, no rhinorrhea, no nasal congestion  Cardiovascular: no chest pain, no orthopnea or PND, no edema, no palpitations  Respiratory: no dyspnea, no cough, no sputum, no wheezing  Gastrointestinal: no nausea, no vomiting, no abdominal pain, no diarrhea, no constipation  Genitourinary: no dysuria, no frequency, no urgency, no nocturia  Musculoskeletal: no joint pains, no back pain, no cramps, no fractures  Skin: no rash, no itching, no dryness, no ulcers, no hair loss  Neurological: no headache, no weakness, no numbness, no dizziness, no tremors  Psychiatric: no anxiety, no sadness  Hematologic/lymphatic: no easy bruising, no bleeding, no palor    Physical Exam   /76   Pulse 97   Ht 1.6 m (5' 3\")   Wt 86.2 kg (190 lb)   LMP 2009 (Exact Date)   BMI 33.66 kg/m       General: Comfortable, no obvious distress, normal body habitus  Eyes: Sclera anicteric, moist conjunctiva  HENT: Atraumatic, oropharynx clear, moist mucous membranes with no mucosal ulcerations  Neck: Trachea midline, supple. Thyroid: Thyroid is normal in size and texture  CV: Regular rhythm, normal rate. No murmurs auscultated  Resp: Clear to auscultation bilaterally, good effort  Abdomen:  Soft, non tender, non distended. Bowel sounds heard. No organomegaly.  Skin: No rashes, lesions, or subcutaneous nodules.   Psych: Alert and oriented x 3. Appropriate affect, good insight  Extremities: No peripheral " edema  Musculoskeletal: Appropriate muscle bulk and strength  Lymphatic: No cervical lymphadenopathy  Neuro: Moves all four extremities. No focal deficits on limited exam. Gait normal.       Labs/Imaging and Outside Records     Pertinent Labs were reviewed and updated in EPIC.    Summary of recent findings:   Lab Results   Component Value Date    A1C 9.8 12/16/2013    A1C 9.5 04/15/2013    A1C 9.6 01/16/2013    A1C 9.4 09/28/2012    A1C 8.3 06/22/2012       TSH   Date Value Ref Range Status   10/30/2017 1.29 0.40 - 4.00 mU/L Final   02/08/2017 0.76 0.40 - 4.00 mU/L Final   11/17/2015 0.98 0.40 - 4.00 mU/L Final   05/04/2015 0.76 0.40 - 4.00 mU/L Final   12/16/2013 1.24 0.4 - 5.0 mU/L Final     T4 Free   Date Value Ref Range Status   06/21/2010 1.50 0.70 - 1.85 ng/dL Final       Creatinine   Date Value Ref Range Status   11/14/2018 0.72 0.52 - 1.04 mg/dL Final       Recent Labs   Lab Test  03/06/17   0905  05/04/15   1016  12/16/13   1028   CHOL  165  170  192   HDL  52  46*  42*   LDL  73  90  125   TRIG  199*  171*  124   CHOLHDLRATIO   --   3.7  4.6     Impression / Plan     1. Diabetes Mellitus: Type 2  Current glycemic control can be considered suboptimal.   Plan:   - Raise Novolog to 25 units before meals  - reduce Lantus to 70 units at bedtime.   - cover snacks with 5-10 units    2. Diabetes Complications: With retinopathy and nephropathy.   Recent CAD workup: normal stress test and echo    3. Blood Pressure Management: Blood pressure is controlled. Currently is on pharmacotherapy for this.     4.Lipid Management: Per the new ACC/JILLIAN/NHLBI guidelines, statins are recommended for individuals with diabetes aged 40-75 with LDL  without ASCVD, and for any individual with ASCVD. Currently the patient is on a statin.     5. Smoking Status: Patient Pt is smoke free..     Follow up: 3 months      Shai Dong MD  Endocrinology, Diabetes and Metabolism  [facility]

## 2018-12-12 NOTE — PROGRESS NOTES
[address] [address]  [phone]  Dept: [phone]    PRE-OP EVALUATION:  Today's date: 2018    Maliha Pedro (: 1960) presents for pre-operative evaluation assessment as requested by Ivania Caldera.  She requires evaluation and anesthesia risk assessment prior to undergoing surgery/procedure for treatment of Left Carpal Tunnel Release .    Proposed Surgery/ Procedure: Left Carpal Tunnel Release  Date of Surgery/ Procedure: 18  Time of Surgery/ Procedure: 7:15AM  Hospital/Surgical Facility: [facility]  Fax number for surgical facility: not needed   Primary Physician: Elva Ortiz  Type of Anesthesia Anticipated: Combined MAC with Local    Patient has a Health Care Directive or Living Will:  YES    1. NO - Do you have a history of heart attack, stroke, stent, bypass or surgery on an artery in the head, neck, heart or legs?  2. NO - Do you ever have any pain or discomfort in your chest?  3. NO - Do you have a history of  Heart Failure?  4. NO - Are you troubled by shortness of breath when: walking on the level, up a slight hill or at night?  5. NO - Do you currently have a cold, bronchitis or other respiratory infection?  6. NO - Do you have a cough, shortness of breath or wheezing?  7. NO - Do you sometimes get pains in the calves of your legs when you walk?  8. NO - Do you or anyone in your family have previous history of blood clots?  9. NO - Do you or does anyone in your family have a serious bleeding problem such as prolonged bleeding following surgeries or cuts?  10. NO - Have you ever had problems with anemia or been told to take iron pills?  11. NO - Have you had any abnormal blood loss such as black, tarry or bloody stools, or abnormal vaginal bleeding?  12. NO - Have you ever had a blood transfusion?  13. NO - Have you or any of your relatives ever had problems with anesthesia?  14. NO - Do  you have sleep apnea, excessive snoring or daytime drowsiness?  15. NO - Do you have any prosthetic heart valves?  16. NO - Do you have prosthetic joints?  17. NO - Is there any chance that you may be pregnant?      HPI:     HPI related to upcoming procedure: Patient is having surgery to relieve carpal tunnel syndrome on left. She had surgery to relieve carpal tunnel on right less than one month ago on 11/19 which went well with no complications. Patient reports she was only given fentanyl and versed for anesthesia, she declined propofol.       See problem list for active medical problems.  Problems all longstanding and stable, except as noted/documented.  See ROS for pertinent symptoms related to these conditions.                                                                                                                                                            MEDICAL HISTORY:     Patient Active Problem List    Diagnosis Date Noted     Type 2 diabetes mellitus with hyperglycemia, with long-term current use of insulin (H) 02/08/2017     Priority: Medium     Hypomagnesemia 11/18/2015     Priority: Medium     Obesity 10/25/2015     Priority: Medium     Advanced directives, counseling/discussion 09/10/2015     Priority: Medium     Advance Care Planning 9/10/2015: ACP Review and Resources Provided:  Reviewed chart for advance care plan.  Maliha Pedro has no plan or code status on file.Mailed available resources and provided with information. Confirmed code status reflects current choices pending further ACP discussions.  Confirmed/documented legally designated decision maker(s). Added by Vanessa Nix       Essential hypertension 05/15/2015     Priority: Medium     Breast pain 10/31/2012     Priority: Medium     Ovarian cyst 10/10/2011     Priority: Medium     Benign appearing, seen 10/2011. Needs annual follow-up to ensure resolution.        HYPERLIPIDEMIA LDL GOAL <100 10/31/2010     Priority: Medium      Subclinical hyperthyroidism 2010     Priority: Medium     depressed TSH noted 6/10.        Adenomatous polyp 2009     Priority: Medium      colonoscopy. Due for repeat 2012       Colon polyp 2009     Priority: Medium     Needs next colonoscopy in 3 years, 2012       Seasonal allergic rhinitis 2008     Priority: Medium     Latex sensitivity 2008     Priority: Medium     Cataract      Priority: Medium     Problem list name updated by automated process. Provider to review       Irritable bowel syndrome      Priority: Medium     colonoscopy , -adenomatous polyp.  Sx's especially prior to menses       Esophageal reflux      Priority: Medium     EGD: -neg       Bartholin gland cyst 2008     Priority: Medium     Nonallopathic lesion of thoracic region 2008     Priority: Medium     Problem list name updated by automated process. Provider to review       Nonallopathic lesion of sacral region 2008     Priority: Medium     Problem list name updated by automated process. Provider to review       Displacement of lumbar intervertebral disc without myelopathy 2008     Priority: Medium     Nonallopathic lesion of lumbar region 2008     Priority: Medium     Problem list name updated by automated process. Provider to review       ? 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 Gets yearly cxr, all neg.         Past Medical History:   Diagnosis Date     Adjustment disorder with mixed anxiety and depressed mood     following death of her adoptive father and then again after adoptive mother      Allergic rhinitis, cause unspecified     uses benadryl prn (sneezing/hoarse voice)     Bartholin gland cyst 2008     Chest pain, unspecified     neg  dobutamine stress test 2007 (reacted to dobutamine)     Dry eyes      ERM OD (epiretinal membrane, right eye)     BE mild     Esophageal reflux     EGD: 2000-neg     Glaucoma suspect      Hypertension      Irregular menstrual cycle     s/p D&C, since then more regular     Irritable bowel syndrome     colonoscopy 2000-neg. Sx's especially prior to menses     MEDICAL HISTORY OF -     had health directive with : DANIEL Ford.  Full Code. Aunt (Charlie Tay) will be decision maker     Meningitis, unspecified(322.9) age 8    hospitalized for 9 mo     Need for prophylactic vaccination with tuberculosis (BCG) vaccine     Has pos Mantoux. Gets yearly cxr, all neg.      Other and unspecified hyperlipidemia      Pain in joint, shoulder region     bone spurs on MRI, s/p pool therapy     Type II or unspecified type diabetes mellitus without mention of complication, not stated as uncontrolled Age 32    Follows with endocrine at [facility]: Shayne Lane MD     Unspecified cataract     RE     Unspecified sinusitis (chronic)     2006     Wheezing     Has seen pulm 2006 & 2007. Told night ashtma, ? vocal chord spasm due to cold air.      Past Surgical History:   Procedure Laterality Date     CATARACT IOL, RT/LT  9/23/2008    LE     HC DILATION/CURETTAGE DIAG/THER NON OB  1992     HC TYMPANOPLASTY W/O MASTOID, INIT/REV W/O OSS CHAIN RECONST  12/09     RELEASE CARPAL TUNNEL Right 11/19/2018    Procedure: Right Carpal Tunnel Release;  Surgeon: Ivania Moran MD;  Location: [facility] OR     YAG CAPSULOTOMY OS (LEFT EYE)  4/23/2011     Current Outpatient Medications   Medication Sig Dispense Refill     acyclovir (ZOVIRAX) 5 % cream Apply topically 5 times daily 5 g 3     albuterol (PROAIR HFA, PROVENTIL HFA, VENTOLIN HFA) 108 (90 BASE) MCG/ACT inhaler Inhale 2 puffs into the lungs every 4 hours as needed for shortness of breath / dyspnea 1 Inhaler 1     amLODIPine (NORVASC) 5 MG tablet Take 1 tablet (5 mg) by mouth daily  90 tablet 3     aspirin 81 MG EC tablet Take 4 tablets (325 mg) by mouth daily Please resume in 5 days.  (11/24/2018)       CALCIUM + D OR Take 1 tablet by mouth 2 times daily.       Continuous Blood Gluc  (FREESTYLE BUSHRA 14 DAY READER) KIRIT 1 Device daily 1 Device 1     Continuous Blood Gluc Sensor (FREESTYLE BUSHRA 14 DAY SENSOR) MISC 1 Application every 14 days 2 each 11     empagliflozin (JARDIANCE) 25 MG TABS tablet Take 1 tablet (25 mg) by mouth daily 90 tablet 3     fexofenadine (ALLEGRA) 180 MG tablet Take 1 tablet by mouth daily. 1 TABLET DAILY Failed claritin for symptom cotrol. Zyrtec increases heart rate. 90 tablet 3     fish oil-omega-3 fatty acids (FISH OIL) 1000 MG capsule Take 1 capsule by mouth daily.       fluticasone (FLONASE) 50 MCG/ACT spray Spray 1-2 sprays into both nostrils daily 16 g 11     Glucosamine-Chondroitin (GLUCOSAMINE CHONDR COMPLEX PO)        hydrochlorothiazide 12.5 MG TABS tablet Take 1 tablet (12.5 mg) by mouth daily 90 tablet 3     HYDROcodone-acetaminophen (NORCO) 5-325 MG per tablet Take 1-2 tablets by mouth every 4 hours as needed for pain 30 tablet 0     insulin aspart (NOVOLOG FLEXPEN) 100 UNIT/ML pen Uses  120 units daily subcutaneously 120 mL 3     insulin glargine (LANTUS SOLOSTAR PEN) 100 UNIT/ML pen 80 units daily 75 mL 3     insulin pen needle (BD LISA U/F) 32G X 4 MM miscellaneous Use to inject 5-6 times daily. 500 each 3     losartan (COZAAR) 100 MG tablet Take 1 tablet (100 mg) by mouth daily 90 tablet 3     magnesium 250 MG tablet Take 1 tablet by mouth daily 90 tablet 3     metFORMIN (GLUCOPHAGE-XR) 500 MG 24 hr tablet Take 2 tabs twice a day 360 tablet 3     montelukast (SINGULAIR) 10 MG tablet Take 1 tablet (10 mg) by mouth At Bedtime 30 tablet 11     MULTIVITAMIN OR Take 1 tablet by mouth daily.       NEEDLES, ANY SIZE Pen needles for Novolog insulin pen. Use to inject 5-6  times daily. 4 Box 3     pantoprazole (PROTONIX) 40 MG EC tablet Take 1 tablet  (40 mg) by mouth daily 90 tablet 3     pentoxifylline (TRENTAL) 400 MG CR tablet Take 1 tablet (400 mg) by mouth 2 times daily 180 tablet 3     rosuvastatin (CRESTOR) 10 MG tablet Take 1 tablet (10 mg) by mouth daily 90 tablet 3     triamcinolone (KENALOG) 0.1 % cream Apply sparingly to affected area three times daily as needed 80 g 0     VITAMIN D 2000 UNIT OR TABS Take 1 tablet by mouth daily.       OTC products: None, except as noted above    Allergies   Allergen Reactions     Latex Swelling     Mold      Tetanus Antitoxin Swelling     Tetracycline Swelling      Latex Allergy: YES    Social History     Tobacco Use     Smoking status: Never Smoker     Smokeless tobacco: Never Used   Substance Use Topics     Alcohol use: Yes     Comment: rarely     History   Drug Use No       REVIEW OF SYSTEMS:   CONSTITUTIONAL: NEGATIVE for fever, chills, change in weight  INTEGUMENTARY/SKIN: NEGATIVE for worrisome rashes, moles or lesions. Sores around lips have improved   EYES: NEGATIVE for vision changes or irritation  ENT/MOUTH: NEGATIVE for ear, mouth and throat problems  RESP: No changes in SOB, continues to have some SOB with stairs attributed to deconditioning (cxr last pre-op was nl). Able to do chores like vacuuming and dishes without difficulty. NEGATIVE for significant cough or SOB  BREAST: NEGATIVE for masses, tenderness or discharge  CV: NEGATIVE for chest pain, palpitations or peripheral edema  GI: NEGATIVE for nausea, abdominal pain, heartburn, or change in bowel habits  : NEGATIVE for frequency, dysuria, or hematuria. Continues to urinate frequently since increasing jardiance  MUSCULOSKELETAL: Right wrist s/p carpal tunnel surgery and see HPI. Otherwise, NEGATIVE for significant arthralgias or myalgia  NEURO: NEGATIVE for weakness, dizziness or paresthesias  ENDOCRINE: NEGATIVE for temperature intolerance, skin/hair changes. DM control has been fair. Patient recently wore 24 hour glucometer that showed low blood  sugar overnight. Patient saw endocrine this morning who recommended increasing novalog to 25 and decreasing lantus to 70 units  HEME: NEGATIVE for bleeding problems  PSYCHIATRIC: NEGATIVE for changes in mood or affect    This document serves as a record of the services and decisions personally performed by DARRICK ACHARYA. It was created on his/her behalf by Kya Valderrama, a trained medical scribe. The creation of this document is based on the provider's statements to the medical scribe. Kya Valderrama, December 12, 2018 12:20 PM  EXAM:   /62 (BP Location: Right arm, Patient Position: Sitting, Cuff Size: Adult Large)   Pulse 96   Temp 98  F (36.7  C) (Oral)   Resp 18   LMP 03/19/2009 (Exact Date)   SpO2 98%     GENERAL APPEARANCE: healthy, alert and no distress     EYES: EOMI, PERRL     HENT: mild erythema left TM, otherwise ear canals and TM's normal and nose and mouth without ulcers or lesions     NECK: no adenopathy, no asymmetry, masses, or scars and thyroid normal to palpation     RESP: lungs clear to auscultation - no rales, rhonchi or wheezes     CV: regular rates and rhythm, normal S1 S2, no S3 or S4 and no murmur, click or rub     ABDOMEN:  soft, nontender, no HSM or masses and bowel sounds normal     MS: extremities normal- no gross deformities noted, no evidence of inflammation in joints, FROM in all extremities.     SKIN: no suspicious lesions or rashes- healing carpal tunnel surgical scar R wrist. Wound is c/d/i     NEURO: Normal strength and tone,  mentation intact and speech normal     PSYCH: mentation appears normal. and affect normal/bright     LYMPHATICS: No cervical adenopathy    DIAGNOSTICS:     Labs Drawn and in Process:   Results for orders placed or performed in visit on 12/12/18   Basic metabolic panel  (Ca, Cl, CO2, Creat, Gluc, K, Na, BUN)   Result Value Ref Range    Sodium 138 133 - 144 mmol/L    Potassium 3.5 3.4 - 5.3 mmol/L    Chloride 103 94 - 109 mmol/L    Carbon  Dioxide 25 20 - 32 mmol/L    Anion Gap 10 3 - 14 mmol/L    Glucose 287 (H) 70 - 99 mg/dL    Urea Nitrogen 15 7 - 30 mg/dL    Creatinine 0.69 0.52 - 1.04 mg/dL    GFR Estimate 87 >60 mL/min/1.7m2    GFR Estimate If Black >90 >60 mL/min/1.7m2    Calcium 9.4 8.5 - 10.1 mg/dL   CBC with platelets   Result Value Ref Range    WBC 9.9 4.0 - 11.0 10e9/L    RBC Count 5.56 (H) 3.8 - 5.2 10e12/L    Hemoglobin 15.3 11.7 - 15.7 g/dL    Hematocrit 45.0 35.0 - 47.0 %    MCV 81 78 - 100 fl    MCH 27.5 26.5 - 33.0 pg    MCHC 34.0 31.5 - 36.5 g/dL    RDW 14.9 10.0 - 15.0 %    Platelet Count 319 150 - 450 10e9/L         Recent Labs   Lab Test 11/14/18  1607 08/06/18  0912 10/30/17  0922  11/17/15  1252  12/16/13  1028   HGB  --   --  15.1  --  15.0  --   --    PLT  --   --  349  --  320  --   --     140  --    < >  --    < > 136   POTASSIUM 3.7 3.8  --    < >  --    < > 4.0   CR 0.72 0.64  --    < >  --    < > 0.58   A1C 8.2*  --   --   --   --   --  9.8*    < > = values in this interval not displayed.      04/13/18 ECHO: Interpretation Summary     Global and regional left ventricular function is hyperkinetic with an EF >70%.  There is increase intracavitary velocities with Valsalva, suggestive for  intracavitary dynamic obstruction but can not exclude systolic anterior motion  of the mitral valve (MINOO).  Both atria appear normal.  No valvular pathology.  This study was compared with the study from 5/4/06 . Increase concentric  hypertrophy, but does not qualify for HOCM at this time.     04/30/18 NM Lexiscan stress test: IMPRESSION:  1. Normal myocardial SPECT study with a summed stress score of  0 . A  summed stress score of 0 is associated with an annual event rate of  0.8% and 0.9% for myocardial infarction and cardiac death,  respectively (Mario. Circulation 1998;98:535-43).  2. Normal left ventricular systolic function with a left ventricular  ejection fraction of %.   3. .   4. No prior study available for  comparison     11/14/18 CXR: normal. Xray personally reviewed and evaluated by me     11/14/18 EKG - appears normal, NSR, normal axis, normal intervals, no acute ST/T changes c/w ischemia, no LVH by voltage criteria, unchanged from previous tracings    IMPRESSION:   Reason for surgery/procedure: carpal tunnel    The proposed surgical procedure is considered low risk.    REVISED CARDIAC RISK INDEX  The patient has the following serious cardiovascular risks for perioperative complications such as (MI, PE, VFib and 3  AV Block):  Diabetes Mellitus (on Insulin)  INTERPRETATION: 1 risks: Class II (low risk - 0.9% complication rate)        ICD-10-CM    1. Preop general physical exam Z01.818 Basic metabolic panel  (Ca, Cl, CO2, Creat, Gluc, K, Na, BUN)     CBC with platelets   2. Bilateral carpal tunnel syndrome G56.03    3. Essential hypertension I10 Basic metabolic panel  (Ca, Cl, CO2, Creat, Gluc, K, Na, BUN)     CBC with platelets   4. Type 2 diabetes mellitus with hyperglycemia, with long-term current use of insulin (H) E11.65 Basic metabolic panel  (Ca, Cl, CO2, Creat, Gluc, K, Na, BUN)    Z79.4 CBC with platelets   5. Latex sensitivity Z91.040        RECOMMENDATIONS:       Cardiovascular Risk  Performs 4 METs exercise without symptoms (Light housework (dusting, washing dishes)) .       --Patient is to take all scheduled medications on the day of surgery EXCEPT for modifications listed below (see instructions)      Diabetes Medication Use  -----Hold usual oral and non-insulin diabetic meds (e.g. Metformin, Actos, Glipizide) while NPO.   -----Take 50% of long acting insulin (e.g. Lantus, NPH) while NPO (fasting)      Anticoagulant or Antiplatelet Medication Use  ASPIRIN: Discontinue ASA 7-10 days prior to procedure to reduce bleeding risk.  It should be resumed post-operatively.   IBUPROFEN: Discontinue 6 days prior to procedure to reduce bleeding risk. Resume as needed for pain post-operatively     ACE Inhibitor or  Angiotensin Receptor Blocker (ARB) Use  Ace inhibitor or Angiotensin Receptor Blocker (ARB) and should HOLD this medication for the 24 hours prior to surgery.      APPROVAL GIVEN to proceed with proposed procedure, without further diagnostic evaluation    Patient Instructions   Make sure anesthesia is aware of your glucose monitor.     The night before surgery cut your dose of lantus in half. Take 35 units instead of 70 units. Do not take any novolog the morning of surgery.      Hold your trental the week of surgery.      Do not take NSAIDs (ibuprofen, Aleve, naproxen, Advil, aspirin) the week before surgery. Tylenol is fine for pain.      Stop your asa, fish oil and glucosamine for the week before surgery.      Do not take the jardiance or metformin before surgery. Only take them later if you are eating.      Skip your hydrochlorothiazide, magnesium, and all vitamins the morning of surgery.     You can take your losartan the night before.      Use two puffs of albuterol the morning of surgery. Take your amlodopine, allergy pill, protonix, and Flonase that morning.   Before Your Surgery      Call your surgeon if there is any change in your health. This includes signs of a cold or flu (such as a sore throat, runny nose, cough, rash or fever).    Do not smoke, drink alcohol or take over the counter medicine (unless your surgeon or primary care doctor tells you to) for the 24 hours before and after surgery.    If you take prescribed drugs: Follow your doctor s orders about which medicines to take and which to stop until after surgery.    Eating and drinking prior to surgery: follow the instructions from your surgeon    Take a shower or bath the night before surgery. Use the soap your surgeon gave you to gently clean your skin. If you do not have soap from your surgeon, use your regular soap. Do not shave or scrub the surgery site.  Wear clean pajamas and have clean sheets on your bed.        The information in this  document, created by the medical scribe for me, accurately reflects the services I personally performed and the decisions made by me. I have reviewed and approved this document for accuracy.   Signed Electronically by: Elva Ortiz MD    Copy of this evaluation report is provided to requesting physician.    [facility] Preop Guidelines    Revised Cardiac Risk Index

## 2018-12-12 NOTE — PATIENT INSTRUCTIONS
Make sure anesthesia is aware of your glucose monitor.     The night before surgery cut your dose of lantus in half. Take 35 units instead of 70 units. Do not take any novolog the morning of surgery.      Hold your trental the week of surgery.      Do not take NSAIDs (ibuprofen, Aleve, naproxen, Advil, aspirin) the week before surgery. Tylenol is fine for pain.      Stop your asa, fish oil and glucosamine for the week before surgery.      Do not take the jardiance or metformin before surgery. Only take them later if you are eating.      Skip your hydrochlorothiazide, magnesium, and all vitamins the morning of surgery.     You can take your losartan the night before.      Use two puffs of albuterol the morning of surgery. Take your amlodopine, allergy pill, protonix, and Flonase that morning.   Before Your Surgery      Call your surgeon if there is any change in your health. This includes signs of a cold or flu (such as a sore throat, runny nose, cough, rash or fever).    Do not smoke, drink alcohol or take over the counter medicine (unless your surgeon or primary care doctor tells you to) for the 24 hours before and after surgery.    If you take prescribed drugs: Follow your doctor s orders about which medicines to take and which to stop until after surgery.    Eating and drinking prior to surgery: follow the instructions from your surgeon    Take a shower or bath the night before surgery. Use the soap your surgeon gave you to gently clean your skin. If you do not have soap from your surgeon, use your regular soap. Do not shave or scrub the surgery site.  Wear clean pajamas and have clean sheets on your bed.

## 2018-12-12 NOTE — LETTER
2018       RE: Maliha Pedro  [address]  [place] [address]     Dear Colleague,    Thank you for referring your patient, Maliha Pedro, to the [facility] ENDOCRINOLOGY at [facility]. Please see a copy of my visit note below.    Endocrinology Clinic Visit 18  NAME:  Maliha Pedro  PCP:  Elva Ortiz  MRN:  [id number]    Chief Complaint     Chief Complaint   Patient presents with     RECHECK     type 2        History of Present Illness     Maliha Pedro is a 58 year old female who is seen in clinic for diabetes management.   She is a former patient of Dr. Mccarthy who is transitioning care since his FDC.   She has had diabetes since age 32. Has tried many different meds. Did not tolerate GLP-1 agonists. Stopped Actos due to concerns about adverse effects. Was on Lantus and Novolog with poor control, with recent improvement since addition of Jardiance 2017. She has retinopathy and nephropathy.     Interval History:   A1c last month 8.2. She had carpal tunnel surgery last month and has been recovering at home. Has been snacking more. She has been using the Abbott freestyle shannon system.     Associated Signs/Symptoms  Hypoglycemia: no. Hyperglycemia: none.Neuropathy: none. Vascular Symtpoms: none. Angina/CHF: dyspnea on exertion. Ulcers: No. Amputations: No    Current treatment strategy: Novolog 20 with main meals. Lantus 80 units daily. Metformin 1000 mg po BID, Jardiance 25 mg po daily    Blood Glucose Monitoring: CGM download: shannon: 14 day data:  Avg 209. SD 67.4. Hypo: 1%, within ran%, above range: 66%.   Pattern: progressive rise during the day, drop overnight.     Diet: 2-3 meals per day. Occasional snacks.   Cut down on rice. Eating more vegetables and fish.     Exercise: yoga.     Weight:   Wt Readings from Last 4 Encounters:   18 86.2 kg (190 lb)   18 85.2 kg (187 lb 12.8 oz)    11/19/18 85.7 kg (189 lb 0 oz)   11/14/18 85.7 kg (189 lb)     Problem List     Patient Active Problem List   Diagnosis     Type 2 diabetes mellitus with hyperglycemia (H)     ? 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     Displacement of lumbar intervertebral disc without myelopathy     Nonallopathic lesion of lumbar region     Nonallopathic lesion of thoracic region     Nonallopathic lesion of sacral region     Bartholin gland cyst     Cataract     Irritable bowel syndrome     Esophageal reflux     Seasonal allergic rhinitis     Latex sensitivity     Colon polyp     Adenomatous polyp     Subclinical hyperthyroidism     HYPERLIPIDEMIA LDL GOAL <100     Ovarian cyst     Breast pain     Essential hypertension     Advanced directives, counseling/discussion     Obesity     Hypomagnesemia     Type 2 diabetes mellitus with hyperglycemia, with long-term current use of insulin (H)        Medications     Current Outpatient Medications   Medication     acyclovir (ZOVIRAX) 5 % cream     albuterol (PROAIR HFA, PROVENTIL HFA, VENTOLIN HFA) 108 (90 BASE) MCG/ACT inhaler     amLODIPine (NORVASC) 5 MG tablet     aspirin 81 MG EC tablet     CALCIUM + D OR     Continuous Blood Gluc  (FREESTYLE BUSHRA READER) KIRIT     continuous blood glucose monitoring (FREESTYLE BUSHRA) sensor     empagliflozin (JARDIANCE) 25 MG TABS tablet     fexofenadine (ALLEGRA) 180 MG tablet     fish oil-omega-3 fatty acids (FISH OIL) 1000 MG capsule     fluticasone (FLONASE) 50 MCG/ACT spray     Glucosamine-Chondroitin (GLUCOSAMINE CHONDR COMPLEX PO)     hydrochlorothiazide 12.5 MG TABS tablet     HYDROcodone-acetaminophen (NORCO) 5-325 MG per tablet     insulin aspart (NOVOLOG FLEXPEN) 100 UNIT/ML pen     insulin glargine (LANTUS SOLOSTAR PEN) 100 UNIT/ML pen     insulin pen needle (BD LISA U/F) 32G X 4 MM miscellaneous     losartan (COZAAR) 100 MG tablet     magnesium 250 MG tablet     metFORMIN (GLUCOPHAGE-XR) 500 MG 24 hr  tablet     montelukast (SINGULAIR) 10 MG tablet     MULTIVITAMIN OR     NEEDLES, ANY SIZE     pantoprazole (PROTONIX) 40 MG EC tablet     pentoxifylline (TRENTAL) 400 MG CR tablet     rosuvastatin (CRESTOR) 10 MG tablet     triamcinolone (KENALOG) 0.1 % cream     VITAMIN D 2000 UNIT OR TABS     No current facility-administered medications for this visit.      Facility-Administered Medications Ordered in Other Visits   Medication     fentaNYL (SUBLIMAZE) injection 25-50 mcg     flumazenil (ROMAZICON) injection 0.2 mg     midazolam (VERSED) injection 0.5-1 mg     naloxone (NARCAN) injection 0.1-0.4 mg        Allergies     Allergies   Allergen Reactions     Latex Swelling     Mold      Tetanus Antitoxin Swelling     Tetracycline Swelling       Medical / Surgical History     Past Medical History:   Diagnosis Date     Adjustment disorder with mixed anxiety and depressed mood     following death of her adoptive father and then again after adoptive mother      Allergic rhinitis, cause unspecified     uses benadryl prn (sneezing/hoarse voice)     Bartholin gland cyst 2008     Chest pain, unspecified     neg dobutamine stress test  (reacted to dobutamine)     Dry eyes      ERM OD (epiretinal membrane, right eye)     BE mild     Esophageal reflux     EGD: -neg     Glaucoma suspect      Hypertension      Irregular menstrual cycle     s/p D&C, since then more regular     Irritable bowel syndrome     colonoscopy -neg. Sx's especially prior to menses     MEDICAL HISTORY OF -     had health directive with : DANIEL Ford.  Full Code. Aunt (Charlie Tay) will be decision maker     Meningitis, unspecified(322.9) age 8    hospitalized for 9 mo     Need for prophylactic vaccination with tuberculosis (BCG) vaccine     Has pos Mantoux. Gets yearly cxr, all neg.      Other and unspecified hyperlipidemia      Pain in joint, shoulder region     bone spurs on MRI, s/p pool therapy     Type II or  unspecified type diabetes mellitus without mention of complication, not stated as uncontrolled Age 32    [facility]s [facility] at [facility]: Shayne [facility] MD     Unspecified cataract     RE     Unspecified sinusitis (chronic)     2006     Wheezing     Has seen pulm 2006 & 2007. Told night ashtma, ? vocal chord spasm due to cold air.      Past Surgical History:   Procedure Laterality Date     CATARACT IOL, RT/LT  9/23/2008    LE     HC DILATION/CURETTAGE DIAG/THER NON OB  1992     HC TYMPANOPLASTY W/O MASTOID, INIT/REV W/O OSS CHAIN RECONST  12/09     RELEASE CARPAL TUNNEL Right 11/19/2018    Procedure: Right Carpal Tunnel Release;  Surgeon: Ivania Moran MD;  Location: [facility] OR     YAG CAPSULOTOMY OS (LEFT EYE)  4/23/2011       Social History     Social History     Socioeconomic History     Marital status: Single     Spouse name: Not on file     Number of children: Not on file     Years of education: Not on file     Highest education level: Not on file   Social Needs     Financial resource strain: Not on file     Food insecurity - worry: Not on file     Food insecurity - inability: Not on file     Transportation needs - medical: Not on file     Transportation needs - non-medical: Not on file   Occupational History     Not on file   Tobacco Use     Smoking status: Never Smoker     Smokeless tobacco: Never Used   Substance and Sexual Activity     Alcohol use: Yes     Comment: rarely     Drug use: No     Sexual activity: Not Currently     Partners: Male   Other Topics Concern     Parent/sibling w/ CABG, MI or angioplasty before 65F 55M? 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 Moved to [place] in . Initially lived in [place].    No children        2 cats       Family History     Family History   Problem Relation Age of Onset     Diabetes Brother      Cancer Brother 59        Lung cancer     Diabetes Father          of dm 70's     C.A.D. Father      Diabetes Paternal Grandfather      Diabetes Sister      Cancer Maternal Aunt         ovarian cancer.      Diabetes Brother      Diabetes Brother      Diabetes Sister      Glaucoma Maternal Grandmother      Macular Degeneration No family hx of        ROS     Constitutional: no fevers, chills, night sweats. No weight loss or fatigue. Good appetite  Eyes: no vision changes, no eye redness, no diplopia  Ears, Nose, mouth, throat: no hearing changes, no tinnitus, no rhinorrhea, no nasal congestion  Cardiovascular: no chest pain, no orthopnea or PND, no edema, no palpitations  Respiratory: no dyspnea, no cough, no sputum, no wheezing  Gastrointestinal: no nausea, no vomiting, no abdominal pain, no diarrhea, no constipation  Genitourinary: no dysuria, no frequency, no urgency, no nocturia  Musculoskeletal: no joint pains, no back pain, no cramps, no fractures  Skin: no rash, no itching, no dryness, no ulcers, no hair loss  Neurological: no headache, no weakness, no numbness, no dizziness, no tremors  Psychiatric: no anxiety, no sadness  Hematologic/lymphatic: no easy bruising, no bleeding, no palor    Physical Exam   /76   Pulse 97   Ht 1.6 m (5' 3\")   Wt 86.2 kg (190 lb)   LMP 2009 (Exact Date)   BMI 33.66 kg/m        General: Comfortable, no obvious distress, normal body habitus  Eyes: Sclera anicteric, moist conjunctiva  HENT: Atraumatic, oropharynx clear, moist mucous membranes with no mucosal ulcerations  Neck: Trachea midline, supple. Thyroid: Thyroid is normal in size and texture  CV: Regular rhythm, normal " rate. No murmurs auscultated  Resp: Clear to auscultation bilaterally, good effort  Abdomen:  Soft, non tender, non distended. Bowel sounds heard. No organomegaly.  Skin: No rashes, lesions, or subcutaneous nodules.   Psych: Alert and oriented x 3. Appropriate affect, good insight  Extremities: No peripheral edema  Musculoskeletal: Appropriate muscle bulk and strength  Lymphatic: No cervical lymphadenopathy  Neuro: Moves all four extremities. No focal deficits on limited exam. Gait normal.       Labs/Imaging and Outside Records     Pertinent Labs were reviewed and updated in EPIC.    Summary of recent findings:   Lab Results   Component Value Date    A1C 9.8 12/16/2013    A1C 9.5 04/15/2013    A1C 9.6 01/16/2013    A1C 9.4 09/28/2012    A1C 8.3 06/22/2012       TSH   Date Value Ref Range Status   10/30/2017 1.29 0.40 - 4.00 mU/L Final   02/08/2017 0.76 0.40 - 4.00 mU/L Final   11/17/2015 0.98 0.40 - 4.00 mU/L Final   05/04/2015 0.76 0.40 - 4.00 mU/L Final   12/16/2013 1.24 0.4 - 5.0 mU/L Final     T4 Free   Date Value Ref Range Status   06/21/2010 1.50 0.70 - 1.85 ng/dL Final       Creatinine   Date Value Ref Range Status   11/14/2018 0.72 0.52 - 1.04 mg/dL Final       Recent Labs   Lab Test  03/06/17   0905  05/04/15   1016  12/16/13   1028   CHOL  165  170  192   HDL  52  46*  42*   LDL  73  90  125   TRIG  199*  171*  124   CHOLHDLRATIO   --   3.7  4.6     Impression / Plan     1. Diabetes Mellitus: Type 2  Current glycemic control can be considered suboptimal.   Plan:   - Raise Novolog to 25 units before meals  - reduce Lantus to 70 units at bedtime.   - cover snacks with 5-10 units    2. Diabetes Complications: With retinopathy and nephropathy.   Recent CAD workup: normal stress test and echo    3. Blood Pressure Management: Blood pressure is controlled. Currently is on pharmacotherapy for this.     4.Lipid Management: Per the new ACC/JILLIAN/NHLBI guidelines, statins are recommended for individuals with diabetes  aged 40-75 with LDL  without ASCVD, and for any individual with ASCVD. Currently the patient is on a statin.     5. Smoking Status: Patient Pt is smoke free..     Follow up: 3 months      Shai Dong MD  Endocrinology, Diabetes and Metabolism  [facility]    Again, thank you for allowing me to participate in the care of your patient.      Sincerely,    Shai Dong MD

## 2018-12-14 ENCOUNTER — ANESTHESIA EVENT (OUTPATIENT)
Dept: SURGERY | Facility: AMBULATORY SURGERY CENTER | Age: 58
End: 2018-12-14

## 2018-12-17 ENCOUNTER — ANESTHESIA (OUTPATIENT)
Dept: SURGERY | Facility: AMBULATORY SURGERY CENTER | Age: 58
End: 2018-12-17

## 2018-12-17 ENCOUNTER — TRANSFERRED RECORDS (OUTPATIENT)
Dept: HEALTH INFORMATION MANAGEMENT | Facility: CLINIC | Age: 58
End: 2018-12-17

## 2018-12-17 ENCOUNTER — HOSPITAL ENCOUNTER (OUTPATIENT)
Facility: AMBULATORY SURGERY CENTER | Age: 58
End: 2018-12-17
Attending: NEUROLOGICAL SURGERY
Payer: COMMERCIAL

## 2018-12-17 VITALS
TEMPERATURE: 97.7 F | HEART RATE: 84 BPM | OXYGEN SATURATION: 94 % | SYSTOLIC BLOOD PRESSURE: 119 MMHG | DIASTOLIC BLOOD PRESSURE: 48 MMHG | RESPIRATION RATE: 16 BRPM

## 2018-12-17 RX ORDER — SODIUM CHLORIDE, SODIUM LACTATE, POTASSIUM CHLORIDE, CALCIUM CHLORIDE 600; 310; 30; 20 MG/100ML; MG/100ML; MG/100ML; MG/100ML
INJECTION, SOLUTION INTRAVENOUS CONTINUOUS
Status: DISCONTINUED | OUTPATIENT
Start: 2018-12-17 | End: 2018-12-18 | Stop reason: HOSPADM

## 2018-12-17 RX ORDER — LIDOCAINE HYDROCHLORIDE AND EPINEPHRINE 10; 10 MG/ML; UG/ML
INJECTION, SOLUTION INFILTRATION; PERINEURAL PRN
Status: DISCONTINUED | OUTPATIENT
Start: 2018-12-17 | End: 2018-12-17 | Stop reason: HOSPADM

## 2018-12-17 RX ORDER — ACETAMINOPHEN 325 MG/1
975 TABLET ORAL ONCE
Status: DISCONTINUED | OUTPATIENT
Start: 2018-12-17 | End: 2018-12-18 | Stop reason: HOSPADM

## 2018-12-17 RX ORDER — GABAPENTIN 300 MG/1
300 CAPSULE ORAL ONCE
Status: COMPLETED | OUTPATIENT
Start: 2018-12-17 | End: 2018-12-17

## 2018-12-17 RX ORDER — CEFAZOLIN SODIUM 2 G/50ML
2 SOLUTION INTRAVENOUS
Status: COMPLETED | OUTPATIENT
Start: 2018-12-17 | End: 2018-12-17

## 2018-12-17 RX ORDER — ASPIRIN 81 MG/1
81 TABLET ORAL DAILY
COMMUNITY
Start: 2018-12-17

## 2018-12-17 RX ORDER — ONDANSETRON 4 MG/1
4 TABLET, ORALLY DISINTEGRATING ORAL EVERY 30 MIN PRN
Status: DISCONTINUED | OUTPATIENT
Start: 2018-12-17 | End: 2018-12-18 | Stop reason: HOSPADM

## 2018-12-17 RX ORDER — ONDANSETRON 2 MG/ML
4 INJECTION INTRAMUSCULAR; INTRAVENOUS EVERY 30 MIN PRN
Status: DISCONTINUED | OUTPATIENT
Start: 2018-12-17 | End: 2018-12-18 | Stop reason: HOSPADM

## 2018-12-17 RX ORDER — ONDANSETRON 2 MG/ML
INJECTION INTRAMUSCULAR; INTRAVENOUS PRN
Status: DISCONTINUED | OUTPATIENT
Start: 2018-12-17 | End: 2018-12-17

## 2018-12-17 RX ORDER — CEFAZOLIN SODIUM 1 G/50ML
1 SOLUTION INTRAVENOUS SEE ADMIN INSTRUCTIONS
Status: DISCONTINUED | OUTPATIENT
Start: 2018-12-17 | End: 2018-12-18 | Stop reason: HOSPADM

## 2018-12-17 RX ORDER — FENTANYL CITRATE 50 UG/ML
25-50 INJECTION, SOLUTION INTRAMUSCULAR; INTRAVENOUS EVERY 5 MIN PRN
Status: DISCONTINUED | OUTPATIENT
Start: 2018-12-17 | End: 2018-12-18 | Stop reason: HOSPADM

## 2018-12-17 RX ORDER — NALOXONE HYDROCHLORIDE 0.4 MG/ML
.1-.4 INJECTION, SOLUTION INTRAMUSCULAR; INTRAVENOUS; SUBCUTANEOUS
Status: DISCONTINUED | OUTPATIENT
Start: 2018-12-17 | End: 2018-12-18 | Stop reason: HOSPADM

## 2018-12-17 RX ORDER — HYDROMORPHONE HYDROCHLORIDE 1 MG/ML
.3-.5 INJECTION, SOLUTION INTRAMUSCULAR; INTRAVENOUS; SUBCUTANEOUS EVERY 10 MIN PRN
Status: DISCONTINUED | OUTPATIENT
Start: 2018-12-17 | End: 2018-12-18 | Stop reason: HOSPADM

## 2018-12-17 RX ADMIN — SODIUM CHLORIDE, SODIUM LACTATE, POTASSIUM CHLORIDE, CALCIUM CHLORIDE: 600; 310; 30; 20 INJECTION, SOLUTION INTRAVENOUS at 06:32

## 2018-12-17 RX ADMIN — GABAPENTIN 300 MG: 300 CAPSULE ORAL at 06:32

## 2018-12-17 RX ADMIN — CEFAZOLIN SODIUM 2 G: 2 SOLUTION INTRAVENOUS at 07:20

## 2018-12-17 RX ADMIN — ONDANSETRON 4 MG: 2 INJECTION INTRAMUSCULAR; INTRAVENOUS at 07:21

## 2018-12-17 NOTE — BRIEF OP NOTE
[facility]    Brief Operative Note    Pre-operative diagnosis: Left Carpal Tunnel Syndrome  Post-operative diagnosis * No post-op diagnosis entered *  Procedure: Procedure(s):  Left Carpal Tunnel Release  Surgeon: Surgeon(s) and Role:     * Ivania Moran MD - Primary  Anesthesia: Combined MAC with Local   Estimated blood loss: Minimal  Drains: None  Specimens: * No specimens in log *  Findings:   None.  Complications: None.  Implants: None.

## 2018-12-17 NOTE — OP NOTE
OPERATIVE DATE:  12/17/2018      PREOPERATIVE DIAGNOSIS: Left carpal tunnel syndrome    POSTOPERATIVE DIAGNOSIS: Left carpal tunnel syndrome     PROCEDURE PERFORMED: Left carpal tunnel release     ATTENDING SURGEON: Dr. Ivania Moran    RESIDENT SURGEON: Leda Golden MD    ANESTHESIA: MAC and local    ESTIMATED BLOOD LOSS: <5 mL.      INDICATIONS FOR PROCEDURE: Ms. Maliha Pedro is a 58 year old right handed woman with bilateral carpal tunnel syndrome, confirmed by EMG.  Her symptoms have worsened and have not responded to conservative therapy. Her symptoms are more severe on the right and she presented for right carpal tunnel release on 11/19, from which she has recovered well.  She is here today for left carpal tunnel release.        DESCRIPTION OF PROCEDURE: After consent was verified, the patient was brought to the operating room where she was positioned supine on her gurney with her left arm abducted. A time-out was performed. Conscious sedation was initiated by the anesthesiology team. We then prepared and draped her left hand and arm in the usual sterile manner and proposed an incision of approximately 3 cm in length parallel to and just on the ulnar side of the midline palmar crease. Then 1% lidocaine with Epinephrine 1:100,000 was injected along the proposed incision.      We then incised the skin with a #15 blade after adequate anesthesia was confirmed. We dissected through the subcutaneous tissues. The wound edges were retracted with springs. Underneath, we visualized the transverse carpal ligament, which we proceeded to divide with the 15 blade. The median nerve was ultimately exposed and we completed the division of the carpal ligament distally and proximally with scissors. A Penfield probe was used to inspect the  proximal and distal length of the nerve to determine satisfactory decompression.      Hemostasis was achieved with bipolar cautery. The wound was irrigated. We closed the wound in  a single layer with interrupted 3-0 nylon vertical mattress stitches for the skin. The wound was then sterilely dressed and the patient was taken to the recovery area.      Sponge and needle counts were correct.      Dr. Carmona was present for the key portions and immediately available for the entire procedure.     Prepared by:  Leda Golden MD       ATTESTATION: My signature attests that I was present for the key portions and immediately available for the entire operation described above. I agree with the above findings.    ODALYS CARMONA MD

## 2018-12-17 NOTE — DISCHARGE INSTRUCTIONS
[facility]  Home Care Following Anesthesia  For 24 hours after surgery:  1. Get plenty of rest.  A responsible adult must stay with you for at least 24 hours after you leave the surgery center.  2. Do not drive or use heavy equipment.  If you have weakness or tingling, don't drive or use heavy equipment until this feeling goes away.   3. Do not drink alcohol.   4. Avoid strenuous or risky activities.  Ask for help when climbing stairs.  5. You may feel lightheaded.  IF so, sit for a few minutes before standing.  Have someone help you get up.   6. If you have nausea (feel sick to your stomach): Drink only clear liquids such as apple juice, ginger ale, broth or 7-Up.  Rest may also help.  Be sure to drink enough fluids.  Move to a regular diet as you feel able.   7. You may have a slight fever.  Call the doctor if your fever is over 100 F (37.7 C) (taken under the tongue) or lasts longer than 24 hours.  8. You may have a dry mouth, a sore throat, muscle aches or trouble sleeping. These should go away after 24 hours.  9. Do not make important or legal decisions.               Tips for taking pain medications  To get the best pain relief possible, remember these points:    Take pain medications as directed, before pain becomes severe.    Pain medication can upset your stomach: taking it with food may help.    Constipation is a common side effect of pain medication. Drink plenty of  fluids.    Eat foods high in fiber. Take a stool softener if recommended by your doctor or pharmacist.    Do not drink alcohol, drive or operate machinery while taking pain medications.    Ask about other ways to control pain, such as with heat, ice or relaxation.    Tylenol/Acetaminophen Consumption  To help encourage the safe use of acetaminophen, the makers of TYLENOL  have lowered the maximum daily dose for single-ingredient Extra Strength TYLENOL  (acetaminophen) products sold in the [place] from 8  pills per day (4,000 mg) to 6 pills per day (3,000 mg). The dosing interval has also changed from 2 pills every 4-6 hours to 2 pills every 6 hours.    If you feel your pain relief is insufficient, you may take Tylenol/Acetaminophen in addition to your narcotic pain medication.     Be careful not to exceed 3,000 mg of Tylenol/Acetaminophen in a 24 hour period from all sources.    If you are taking extra strength Tylenol/acetaminophen (500 mg), the maximum dose is 6 tablets in 24 hours.    If you are taking regular strength acetaminophen (325 mg), the maximum dose is 9 tablets in 24 hours.    Call a doctor for any of the followin. Signs of infection (fever, growing tenderness at the surgery site, a large amount of drainage or bleeding, severe pain, foul-smelling drainage, redness, swelling).  2. It has been over 8 to 10 hours since surgery and you are still not able to urinate (pass water).  3. Headache for over 24 hours.  4. Numbness, tingling or weakness the day after surgery (if you had spinal anesthesia).  Your doctor is:                         Or dial [phone] and ask for the resident on call for:  Orthopaedics  For emergency care, call the:  [facility]:  [phone] (TTY for hearing impaired: [phone])

## 2018-12-17 NOTE — ANESTHESIA POSTPROCEDURE EVALUATION
Anesthesia POST Procedure Evaluation    Patient: Maliha Pedro   MRN:     [id number] Gender:   female   Age:    58 year old :      1960        Preoperative Diagnosis: Left Carpal Tunnel Syndrome   Procedure(s):  Left Carpal Tunnel Release   Postop Comments: No value filed.       Anesthesia Type:  MAC    Reportable Event: NO     PAIN: Uncomplicated   Sign Out status: Comfortable, Well controlled pain     PONV: No PONV   Sign Out status:  No Nausea or Vomiting     Neuro/Psych: Uneventful perioperative course   Sign Out Status: Preoperative baseline; Age appropriate mentation     Airway/Resp.: Uneventful perioperative course   Sign Out Status: Non labored breathing, age appropriate RR; Resp. Status within EXPECTED Parameters     CV: Uneventful perioperative course   Sign Out status: Appropriate BP and perfusion indices; Appropriate HR/Rhythm     Disposition:   Sign Out in:  PACU  Disposition:  Phase II; Home  Recovery Course: Uneventful  Follow-Up: Not required           Last Anesthesia Record Vitals:  CRNA VITALS  2018 0754 - 2018 0854      2018             Pulse:  84    SpO2:  93 %    Resp Rate (set):  10          Last PACU/Preop Vitals:  Vitals:    18 0628 18 0830   BP: 141/80 119/48   Pulse: 83 84   Resp: 16 16   Temp: 36.7  C (98.1  F) 36.5  C (97.7  F)   SpO2: 97% 94%         Electronically Signed By: Kurt Monet MD, 2018, 12:07 PM

## 2018-12-17 NOTE — ANESTHESIA PREPROCEDURE EVALUATION
Anesthesia Pre-Procedure Evaluation    Patient: Maliha Pedro   MRN:     [id number] Gender:   female   Age:    58 year old :      1960        Preoperative Diagnosis: Left Carpal Tunnel Syndrome   Procedure(s):  Left Carpal Tunnel Release     Past Medical History:   Diagnosis Date     Adjustment disorder with mixed anxiety and depressed mood     following death of her adoptive father and then again after adoptive mother      Allergic rhinitis, cause unspecified     uses benadryl prn (sneezing/hoarse voice)     Bartholin gland cyst 2008     Chest pain, unspecified     neg dobutamine stress test  (reacted to dobutamine)     Dry eyes      ERM OD (epiretinal membrane, right eye)     BE mild     Esophageal reflux     EGD: -neg     Glaucoma suspect      Hypertension      Irregular menstrual cycle     s/p D&C, since then more regular     Irritable bowel syndrome     colonoscopy -neg. Sx's especially prior to menses     MEDICAL HISTORY OF -     had health directive with : DANIEL Ford.  Full Code. Aunt (Charlie Tay) will be decision maker     Meningitis, unspecified(322.9) age 8    hospitalized for 9 mo     Need for prophylactic vaccination with tuberculosis (BCG) vaccine     Has pos Mantoux. Gets yearly cxr, all neg.      Other and unspecified hyperlipidemia      Pain in joint, shoulder region     bone spurs on MRI, s/p pool therapy     Type II or unspecified type diabetes mellitus without mention of complication, not stated as uncontrolled Age 32    Follows [facility]: Shayne Lane MD     Unspecified cataract     RE     Unspecified sinusitis (chronic)          Wheezing     Has seen pulm  & . Told night ashtma, ? vocal chord spasm due to cold air.       Past Surgical History:   Procedure Laterality Date     CATARACT IOL, RT/LT  2008    LE     HC DILATION/CURETTAGE DIAG/THER NON OB       HC TYMPANOPLASTY W/O MASTOID, INIT/REV W/O OSS CHAIN  RECONST  12/09     RELEASE CARPAL TUNNEL Right 11/19/2018    Procedure: Right Carpal Tunnel Release;  Surgeon: Ivania Moran MD;  Location: [facility]     YAG CAPSULOTOMY OS (LEFT EYE)  4/23/2011          Anesthesia Evaluation     . Pt has had prior anesthetic.     No history of anesthetic complications          ROS/MED HX    ENT/Pulmonary:     (+)DEMETRIA risk factors hypertension, asthma , . .    Neurologic:  - neg neurologic ROS     Cardiovascular:     (+) Dyslipidemia, hypertension----. : . . . :. .       METS/Exercise Tolerance:  >4 METS   Hematologic:         Musculoskeletal:  - neg musculoskeletal ROS       GI/Hepatic:     (+) GERD       Renal/Genitourinary:         Endo:     (+) type I DM, thyroid problem Obesity, .      Psychiatric:     (+) psychiatric history anxiety      Infectious Disease:         Malignancy:         Other:    - neg other ROS                     PHYSICAL EXAM:   Mental Status/Neuro: A/A/O   Airway: Facies: Feasible  Mallampati: II  Mouth/Opening: Full  TM distance: > 6 cm  Neck ROM: Full   Respiratory: Auscultation: CTAB     Resp. Rate: Normal     Resp. Effort: Normal      CV: Rhythm: Regular  Rate: Age appropriate  Heart: Normal Sounds   Comments:      Dental: Normal                  Lab Results   Component Value Date    WBC 9.9 12/12/2018    HGB 15.3 12/12/2018    HCT 45.0 12/12/2018     12/12/2018    SED 11 10/30/2017     12/12/2018    POTASSIUM 3.5 12/12/2018    CHLORIDE 103 12/12/2018    CO2 25 12/12/2018    BUN 15 12/12/2018    CR 0.69 12/12/2018     (H) 12/12/2018    SUSHANT 9.4 12/12/2018    MAG 2.0 11/14/2018    ALBUMIN 4.1 08/06/2018    PROTTOTAL 7.8 08/06/2018    ALT 39 08/06/2018    AST 22 08/06/2018    ALKPHOS 54 08/06/2018    BILITOTAL 0.4 08/06/2018    LIPASE 207 03/06/2017    TSH 1.29 10/30/2017    T4 1.50 06/21/2010       Preop Vitals  BP Readings from Last 3 Encounters:   12/17/18 141/80   12/12/18 112/62   12/12/18 127/76    Pulse Readings from  "Last 3 Encounters:   12/17/18 83   12/12/18 96   12/12/18 97      Resp Readings from Last 3 Encounters:   12/17/18 16   12/12/18 18   11/19/18 18    SpO2 Readings from Last 3 Encounters:   12/17/18 97%   12/12/18 98%   12/03/18 96%      Temp Readings from Last 1 Encounters:   12/17/18 36.7  C (98.1  F) (Oral)    Ht Readings from Last 1 Encounters:   12/12/18 1.6 m (5' 3\")      Wt Readings from Last 1 Encounters:   12/12/18 86.2 kg (190 lb)    Estimated body mass index is 33.66 kg/m  as calculated from the following:    Height as of 12/12/18: 1.6 m (5' 3\").    Weight as of 12/12/18: 86.2 kg (190 lb).     LDA:  Peripheral IV 12/17/18 Right Hand (Active)   Site Assessment WDL 12/17/2018  6:41 AM   Line Status Infusing 12/17/2018  6:41 AM   Phlebitis Scale 0-->no symptoms 12/17/2018  6:41 AM   Infiltration Scale 0 12/17/2018  6:41 AM   Number of days: 0            Assessment:   ASA SCORE: 2    NPO Status: > 6 hours since completed [employer]   Documentation: H&P complete; Preop Testing complete; Consents complete   Proceeding: Proceed without further delay  Tobacco Use:  NO Active use of Tobacco/UNKNOWN Tobacco use status     Plan:   Anes. Type:  MAC      Induction:  Not applicable   Airway: Native Airway   Access/Monitoring: PIV   Maintenance: N/a   Emergence: N/a   Logistics: Same Day Surgery     Postop Pain/Sedation Strategy:  Standard-Options: Opioids PRN     PONV Management:  Adult Risk Factors: Female, Non-Smoker, Postop Opioids  Prevention: Granisetron; Dexamethasone     CONSENT: Direct conversation   Plan and risks discussed with: Patient   Blood Products: Consent Deferred (Minimal Blood Loss)                         Kurt Monet MD  "

## 2018-12-18 ENCOUNTER — PATIENT OUTREACH (OUTPATIENT)
Dept: NEUROSURGERY | Facility: CLINIC | Age: 58
End: 2018-12-18

## 2019-01-02 ENCOUNTER — OFFICE VISIT (OUTPATIENT)
Dept: NEUROSURGERY | Facility: CLINIC | Age: 59
End: 2019-01-02
Payer: COMMERCIAL

## 2019-01-02 VITALS
DIASTOLIC BLOOD PRESSURE: 41 MMHG | BODY MASS INDEX: 34.38 KG/M2 | WEIGHT: 194 LBS | TEMPERATURE: 98 F | OXYGEN SATURATION: 96 % | SYSTOLIC BLOOD PRESSURE: 138 MMHG | HEART RATE: 79 BPM | HEIGHT: 63 IN

## 2019-01-02 DIAGNOSIS — Z98.890 POST-OPERATIVE STATE: ICD-10-CM

## 2019-01-02 DIAGNOSIS — G56.03 BILATERAL CARPAL TUNNEL SYNDROME: Primary | ICD-10-CM

## 2019-01-02 RX ORDER — GUAIFENESIN AND DEXTROMETHORPHAN HYDROBROMIDE 600; 30 MG/1; MG/1
1 TABLET, EXTENDED RELEASE ORAL EVERY 12 HOURS
COMMUNITY
End: 2019-02-04

## 2019-01-02 ASSESSMENT — ENCOUNTER SYMPTOMS
SPUTUM PRODUCTION: 1
HOARSE VOICE: 1
SINUS CONGESTION: 1
COUGH: 1

## 2019-01-02 ASSESSMENT — MIFFLIN-ST. JEOR: SCORE: 1424.11

## 2019-01-02 ASSESSMENT — PAIN SCALES - GENERAL: PAINLEVEL: MILD PAIN (2)

## 2019-01-02 NOTE — PROGRESS NOTES
"  NEUROSURGERY WOUND CHECK  January 2, 2019    ATTENDING: Luisa  PROCEDURE:  12/17/18  Left carpal tunnel release     INDICATIONS FOR PROCEDURE: Ms. Maliha Pedro is a 58 year old right handed woman with bilateral carpal tunnel syndrome, confirmed by EMG.  Her symptoms have worsened and have not responded to conservative therapy. Her symptoms are more severe on the right and she presented for right carpal tunnel release on 11/19, from which she has recovered well.  She is here today for left carpal tunnel release.        ATTENDING: Luisa  PROCEDURE:  11/19/18  Right carpal tunnel release  INDICATIONS FOR PROCEDURE: Ms. Maliha Pedro is a 58 year old right handed woman with bilateral carpal tunnel syndrome, confirmed by EMG.  Her symptoms have worsened and have not responded to conservative therapy.  Her symptoms are more severe on the right and she presents for right carpal tunnel release.  HPI:  Maliha Pedro is a pleasant 58 year old right handed female now 2 weeks status post the above procedure.  The procedure itself was without incident.  She recovered well and was discharged home in good condition.     Since surgery her left ring finger is still numb and weak as pre op but otherwise there is improvement, she needs a work excuse to cover her until she returns to see her surgeon.    EXAM:  /41 (BP Location: Right arm)   Pulse 79   Temp 98  F (36.7  C) (Oral)   Ht 1.6 m (5' 3\")   Wt 88 kg (194 lb)   LMP 03/19/2009 (Exact Date)   SpO2 96%   BMI 34.37 kg/m    Well developed well nourished female found seated comfortably in exam chair.  No apparent distress. She is accompanied.  A&O X3.  Mood and affect WNL. Language and fund of knowledge intact.  Is able to sit and rise independently.   She has a nicely healing incision.  I prepped the wound with chloroprep and cleanly removed nylon sutures without difficulty.  I lightly redressed the wound.    ASSESSMENT/PLAN  No diagnosis found.  Doing " well now 2 weeks sp left carpal tunnel release.    Wound looks great.  It has been redressed.  She can remove the dressing in 2 more days and leave it off.   May swim or immerse wound when all scabbing has disappeared.  Continue activity restrictions until 1 month post op.  She can start using her right hand for most activities.  She will return to see Dr. Moran first available, which will be about the first week of February.  Meantime I have written a work excuse to keep her off until then.  Continue activity restrictions with the left hand until she sees Dr. Moran.     It has been a pleasure looking after this very nice patient. We appreciate your confidence in the [facility], Department of Neurosurgery.    Radha Pascal PA-C  [facility]  Department of Neurosurgery  Phone: [phone]  Fax: [phone]      This note was generated using voice recognition software. While edited for content some inaccurate phrasing may be found.

## 2019-01-02 NOTE — LETTER
Return to  Work Release    Date: 1/2/2019      Name: Maliha Pedro                       YOB: 1960    Medical Record Number: [id number]    The patient was seen at:  NEUROSURGERY      Resume Activity:   PATIENT MAY NOT RETURN TO WORK UNTIL:  Physician gives release.   Expected date for follow-up 2/6/19.        _________________________  Radha Pascal PA-C

## 2019-01-02 NOTE — LETTER
"1/2/2019       RE: Maliha Pedro  [address]  [place] [address]     Dear Colleague,    Thank you for referring your patient, Maliha Pedro, to the [facility]. Please see a copy of my visit note below.      NEUROSURGERY WOUND CHECK  January 2, 2019    ATTENDING: Luisa  PROCEDURE:  12/17/18  Left carpal tunnel release     INDICATIONS FOR PROCEDURE: Ms. Maliha Pedro is a 58 year old right handed woman with bilateral carpal tunnel syndrome, confirmed by EMG.  Her symptoms have worsened and have not responded to conservative therapy. Her symptoms are more severe on the right and she presented for right carpal tunnel release on 11/19, from which she has recovered well.  She is here today for left carpal tunnel release.        ATTENDING: Luisa  PROCEDURE:  11/19/18  Right carpal tunnel release  INDICATIONS FOR PROCEDURE: Ms. Maliha Pedro is a 58 year old right handed woman with bilateral carpal tunnel syndrome, confirmed by EMG.  Her symptoms have worsened and have not responded to conservative therapy.  Her symptoms are more severe on the right and she presents for right carpal tunnel release.  HPI:  Maliha Pedro is a pleasant 58 year old right handed female now 2 weeks status post the above procedure.  The procedure itself was without incident.  She recovered well and was discharged home in good condition.     Since surgery her left ring finger is still numb and weak as pre op but otherwise there is improvement, she needs a work excuse to cover her until she returns to see her surgeon.    EXAM:  /41 (BP Location: Right arm)   Pulse 79   Temp 98  F (36.7  C) (Oral)   Ht 1.6 m (5' 3\")   Wt 88 kg (194 lb)   LMP 03/19/2009 (Exact Date)   SpO2 96%   BMI 34.37 kg/m     Well developed well nourished female found seated comfortably in exam chair.  No apparent distress. She is accompanied.  A&O X3.  Mood and " affect WNL. Language and fund of knowledge intact.  Is able to sit and rise independently.   She has a nicely healing incision.  I prepped the wound with chloroprep and cleanly removed nylon sutures without difficulty.  I lightly redressed the wound.    ASSESSMENT/PLAN  No diagnosis found.  Doing well now 2 weeks sp left carpal tunnel release.    Wound looks great.  It has been redressed.  She can remove the dressing in 2 more days and leave it off.   May swim or immerse wound when all scabbing has disappeared.  Continue activity restrictions until 1 month post op.  She can start using her right hand for most activities.  She will return to see Dr. Moran first available, which will be about the first week of February.  Meantime I have written a work excuse to keep her off until then.  Continue activity restrictions with the left hand until she sees Dr. Moran.     It has been a pleasure looking after this very nice patient. We appreciate your confidence in the [facility], Department of Neurosurgery.    Radha Pascal PA-C  [facility]  Department of Neurosurgery  Phone: [phone]  Fax: [phone]      This note was generated using voice recognition software. While edited for content some inaccurate phrasing may be found.

## 2019-01-04 ENCOUNTER — ANCILLARY PROCEDURE (OUTPATIENT)
Dept: GENERAL RADIOLOGY | Facility: CLINIC | Age: 59
End: 2019-01-04
Payer: COMMERCIAL

## 2019-01-04 ENCOUNTER — OFFICE VISIT (OUTPATIENT)
Dept: FAMILY MEDICINE | Facility: CLINIC | Age: 59
End: 2019-01-04
Payer: COMMERCIAL

## 2019-01-04 VITALS
OXYGEN SATURATION: 97 % | SYSTOLIC BLOOD PRESSURE: 125 MMHG | WEIGHT: 195 LBS | HEIGHT: 64 IN | RESPIRATION RATE: 20 BRPM | BODY MASS INDEX: 33.29 KG/M2 | DIASTOLIC BLOOD PRESSURE: 84 MMHG | TEMPERATURE: 99.1 F | HEART RATE: 96 BPM

## 2019-01-04 DIAGNOSIS — E11.65 TYPE 2 DIABETES MELLITUS WITH HYPERGLYCEMIA, WITH LONG-TERM CURRENT USE OF INSULIN (H): ICD-10-CM

## 2019-01-04 DIAGNOSIS — Z79.4 TYPE 2 DIABETES MELLITUS WITH HYPERGLYCEMIA, WITH LONG-TERM CURRENT USE OF INSULIN (H): ICD-10-CM

## 2019-01-04 DIAGNOSIS — R39.15 URINARY URGENCY: ICD-10-CM

## 2019-01-04 DIAGNOSIS — R35.0 URINARY FREQUENCY: ICD-10-CM

## 2019-01-04 DIAGNOSIS — R05.9 COUGH: ICD-10-CM

## 2019-01-04 DIAGNOSIS — J18.9 PNEUMONIA OF LEFT LOWER LOBE DUE TO INFECTIOUS ORGANISM: Primary | ICD-10-CM

## 2019-01-04 LAB
ALBUMIN UR-MCNC: NEGATIVE MG/DL
APPEARANCE UR: CLEAR
BILIRUB UR QL STRIP: NEGATIVE
COLOR UR AUTO: YELLOW
GLUCOSE UR STRIP-MCNC: >=1000 MG/DL
HGB UR QL STRIP: NEGATIVE
KETONES UR STRIP-MCNC: NEGATIVE MG/DL
LEUKOCYTE ESTERASE UR QL STRIP: NEGATIVE
NITRATE UR QL: NEGATIVE
PH UR STRIP: 5.5 PH (ref 5–7)
SOURCE: ABNORMAL
SP GR UR STRIP: 1.01 (ref 1–1.03)
UROBILINOGEN UR STRIP-ACNC: 0.2 EU/DL (ref 0.2–1)

## 2019-01-04 PROCEDURE — 71046 X-RAY EXAM CHEST 2 VIEWS: CPT | Mod: FY

## 2019-01-04 PROCEDURE — 99214 OFFICE O/P EST MOD 30 MIN: CPT | Performed by: PHYSICIAN ASSISTANT

## 2019-01-04 PROCEDURE — 81003 URINALYSIS AUTO W/O SCOPE: CPT | Performed by: PHYSICIAN ASSISTANT

## 2019-01-04 RX ORDER — CODEINE PHOSPHATE AND GUAIFENESIN 10; 100 MG/5ML; MG/5ML
1-2 SOLUTION ORAL EVERY 4 HOURS PRN
Qty: 8 OZ | Refills: 0 | Status: SHIPPED | OUTPATIENT
Start: 2019-01-04 | End: 2019-02-04

## 2019-01-04 RX ORDER — LEVOFLOXACIN 500 MG/1
500 TABLET, FILM COATED ORAL DAILY
Qty: 10 TABLET | Refills: 0 | Status: SHIPPED | OUTPATIENT
Start: 2019-01-04 | End: 2019-02-04

## 2019-01-04 ASSESSMENT — PAIN SCALES - GENERAL: PAINLEVEL: NO PAIN (0)

## 2019-01-04 ASSESSMENT — MIFFLIN-ST. JEOR: SCORE: 1436.57

## 2019-01-04 NOTE — RESULT ENCOUNTER NOTE
Elmer Eagle   The radiologist read your chest xray as normal.   I recommend that we continue with the plan as we discussed but you do not need to be seen in approximately one month for repeat xray  Please call or MyChart my office with any questions or concerns.    Tesha Carcamo, PAC

## 2019-01-04 NOTE — PATIENT INSTRUCTIONS
Take levaquin 500 mg daily for 10 days  Follow up with us in clinic if no improvement in symptoms over the next 5 days   Return urgently if any change in symptoms like increasing cough, shortness of breath, fever or other change in symptoms.   Take robitussin with codeine up to every 4 hours as needed for cough. Do not take before driving or work   Follow up with Dr. Ortiz if blood sugars not improving as your symptoms improve  Schedule follow up with us in approximately one month to make sure chest xray finding resolve

## 2019-01-04 NOTE — PROGRESS NOTES
"  SUBJECTIVE:   Maliha Pedro is a 58 year old female who presents to clinic today for the following health issues:    Acute Illness   Acute illness concerns: cough  Onset: 6 days    Fever: no    Chills/Sweats: no    Headache (location?): YES    Sinus Pressure:YES    Conjunctivitis:  no    Ear Pain: YES: left    Rhinorrhea: YES    Congestion: YES    Sore Throat: no, dryness     Cough: YES-non-productive    Wheeze: YES, pain in left lung    Decreased Appetite: YES    Nausea: no    Vomiting: no    Diarrhea:  no    Dysuria/Freq.: no    Fatigue/Achiness: YES    Sick/Strep Exposure: no     Therapies Tried and outcome: mucinex, albuteral inhaler, nasonex, singulair, allegra          Wheezing and when cough feels like lungs going to explode. Complains of \"pain in left lung\"   Nonproductive but muscles in belly sore from coughing  Dry throat and voice changed.   Albuterol helps but minimize coughing  Blood sugars high- 14 day sensor 200 187 so giving self extra insulin  Couple minutes ago-181 now but morning dose of 25 units and in 2 hours another 10 units instead of 5 units   Has frequency and urgency of urination but no dysuria.  No fever, sweats, chills.   No abdominal pain.  No dysuria     Problem list and histories reviewed & adjusted, as indicated.  Additional history: as documented    Patient Active Problem List   Diagnosis     Type 2 diabetes mellitus with hyperglycemia (H)     ? 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     Displacement of lumbar intervertebral disc without myelopathy     Nonallopathic lesion of lumbar region     Nonallopathic lesion of thoracic region     Nonallopathic lesion of sacral region     Bartholin gland cyst     Cataract     Irritable bowel syndrome     Esophageal reflux     Seasonal allergic rhinitis     Latex sensitivity     Colon polyp     Adenomatous polyp     Subclinical hyperthyroidism     HYPERLIPIDEMIA LDL GOAL <100     Ovarian cyst     Breast pain     " Essential hypertension     Advanced directives, counseling/discussion     Obesity     Hypomagnesemia     Type 2 diabetes mellitus with hyperglycemia, with long-term current use of insulin (H)     Past Surgical History:   Procedure Laterality Date     CATARACT IOL, RT/LT  2008    LE     HC DILATION/CURETTAGE DIAG/THER NON OB       HC TYMPANOPLASTY W/O MASTOID, INIT/REV W/O OSS CHAIN RECONST       RELEASE CARPAL TUNNEL Right 2018    Procedure: Right Carpal Tunnel Release;  Surgeon: Ivania Moran MD;  Location: [facility] OR     RELEASE CARPAL TUNNEL Left 2018    Procedure: Left Carpal Tunnel Release;  Surgeon: Ivania Moran MD;  Location: [facility] OR     YAG CAPSULOTOMY OS (LEFT EYE)  2011       Social History     Tobacco Use     Smoking status: Never Smoker     Smokeless tobacco: Never Used   Substance Use Topics     Alcohol use: Yes     Comment: rarely     Family History   Problem Relation Age of Onset     Diabetes Brother      Cancer Brother 59        Lung cancer     Diabetes Father          of dm 70's     C.A.D. Father      Diabetes Paternal Grandfather      Diabetes Sister      Cancer Maternal Aunt         ovarian cancer.      Diabetes Brother      Diabetes Brother      Diabetes Sister      Glaucoma Maternal Grandmother      Macular Degeneration No family hx of          Current Outpatient Medications   Medication Sig Dispense Refill     albuterol (PROAIR HFA, PROVENTIL HFA, VENTOLIN HFA) 108 (90 BASE) MCG/ACT inhaler Inhale 2 puffs into the lungs every 4 hours as needed for shortness of breath / dyspnea 1 Inhaler 1     amLODIPine (NORVASC) 5 MG tablet Take 1 tablet (5 mg) by mouth daily 90 tablet 3     aspirin 81 MG EC tablet Take 4 tablets (325 mg) by mouth daily Please resume in 5 days.  (2018)       CALCIUM + D OR Take 1 tablet by mouth 2 times daily.       Continuous Blood Gluc  (FREESTYLE BUSHRA 14 DAY READER) KIRIT 1 Device daily 1 Device 1      Continuous Blood Gluc Sensor (FREESTYLE BUSHRA 14 DAY SENSOR) MISC 1 Application every 14 days 2 each 11     Dextromethorphan-Guaifenesin (MUCINEX DM)  MG 12 hr tablet Take 1 tablet by mouth every 12 hours       empagliflozin (JARDIANCE) 25 MG TABS tablet Take 1 tablet (25 mg) by mouth daily 90 tablet 3     fexofenadine (ALLEGRA) 180 MG tablet Take 1 tablet by mouth daily. 1 TABLET DAILY Failed claritin for symptom cotrol. Zyrtec increases heart rate. 90 tablet 3     fish oil-omega-3 fatty acids (FISH OIL) 1000 MG capsule Take 1 capsule by mouth daily.       fluticasone (FLONASE) 50 MCG/ACT spray Spray 1-2 sprays into both nostrils daily 16 g 11     Glucosamine-Chondroitin (GLUCOSAMINE CHONDR COMPLEX PO)               hydrochlorothiazide 12.5 MG TABS tablet Take 1 tablet (12.5 mg) by mouth daily 90 tablet 3     HYDROcodone-acetaminophen (NORCO) 5-325 MG per tablet Take 1-2 tablets by mouth every 4 hours as needed for pain 30 tablet 0     insulin aspart (NOVOLOG FLEXPEN) 100 UNIT/ML pen Uses  120 units daily subcutaneously 120 mL 3     insulin glargine (LANTUS SOLOSTAR PEN) 100 UNIT/ML pen 80 units daily 75 mL 3     insulin pen needle (BD LISA U/F) 32G X 4 MM miscellaneous Use to inject 5-6 times daily. 500 each 3            losartan (COZAAR) 100 MG tablet Take 1 tablet (100 mg) by mouth daily 90 tablet 3     magnesium 250 MG tablet Take 1 tablet by mouth daily 90 tablet 3     metFORMIN (GLUCOPHAGE-XR) 500 MG 24 hr tablet Take 2 tabs twice a day 360 tablet 3     montelukast (SINGULAIR) 10 MG tablet Take 1 tablet (10 mg) by mouth At Bedtime 30 tablet 11     MULTIVITAMIN OR Take 1 tablet by mouth daily.       NEEDLES, ANY SIZE Pen needles for Novolog insulin pen. Use to inject 5-6  times daily. 4 Box 3     pantoprazole (PROTONIX) 40 MG EC tablet Take 1 tablet (40 mg) by mouth daily 90 tablet 3     pentoxifylline (TRENTAL) 400 MG CR tablet Take 1 tablet (400 mg) by mouth 2 times daily 180 tablet 3     rosuvastatin  "(CRESTOR) 10 MG tablet Take 1 tablet (10 mg) by mouth daily 90 tablet 3     VITAMIN D 2000 UNIT OR TABS Take 1 tablet by mouth daily.       BP Readings from Last 3 Encounters:   01/04/19 125/84   01/02/19 138/41   12/17/18 119/48    Wt Readings from Last 3 Encounters:   01/04/19 88.5 kg (195 lb)   01/02/19 88 kg (194 lb)   12/12/18 86.2 kg (190 lb)                    Reviewed and updated as needed this visit by clinical staff  Tobacco  Allergies  Meds  Problems  Med Hx  Surg Hx  Fam Hx  Soc Hx        Reviewed and updated as needed this visit by Provider  Tobacco  Allergies  Meds  Problems  Med Hx  Surg Hx  Fam Hx  Soc Hx          ROS:  Constitutional, HEENT, cardiovascular, pulmonary, gi and gu systems are negative, except as otherwise noted.    OBJECTIVE:     /84 (BP Location: Right arm, Patient Position: Chair, Cuff Size: Adult Large)   Pulse 96   Temp 99.1  F (37.3  C) (Oral)   Resp 20   Ht 1.613 m (5' 3.5\")   Wt 88.5 kg (195 lb)   LMP 03/19/2009 (Exact Date)   SpO2 97%   Breastfeeding? No   BMI 34.00 kg/m    Body mass index is 34 kg/m .  GENERAL: alert, no distress, obese, fatigued and miserable   EYES: Eyes grossly normal to inspection, PERRL and conjunctivae and sclerae normal  HENT: ear canals and TM's normal, nose and mouth without ulcers or lesions  NECK: no adenopathy, no asymmetry, masses, or scars and thyroid normal to palpation  RESP: rhonchi L lower posterior otherwise clear to auscultation- no wheezing   CV: regular rate and rhythm, normal S1 S2, no S3 or S4, no murmur, click or rub, no peripheral edema and peripheral pulses strong  ABDOMEN: soft, nontender, no hepatosplenomegaly, no masses and bowel sounds normal  MS: no gross musculoskeletal defects noted, no edema    Diagnostic Test Results:  Results for orders placed or performed in visit on 01/04/19   *UA reflex to Microscopic   Result Value Ref Range    Color Urine Yellow     Appearance Urine Clear     Glucose Urine " ">=1000 (A) NEG^Negative mg/dL    Bilirubin Urine Negative NEG^Negative    Ketones Urine Negative NEG^Negative mg/dL    Specific Gravity Urine 1.010 1.003 - 1.035    Blood Urine Negative NEG^Negative    pH Urine 5.5 5.0 - 7.0 pH    Protein Albumin Urine Negative NEG^Negative mg/dL    Urobilinogen Urine 0.2 0.2 - 1.0 EU/dL    Nitrite Urine Negative NEG^Negative    Leukocyte Esterase Urine Negative NEG^Negative    Source Midstream Urine    chest xray - I initially read as pneumonia - but radiology did not feel pneumonia present     ASSESSMENT/PLAN:         BMI:   Estimated body mass index is 34 kg/m  as calculated from the following:    Height as of this encounter: 1.613 m (5' 3.5\").    Weight as of this encounter: 88.5 kg (195 lb).   Weight management plan: Discussed healthy diet and exercise guidelines      1. Pneumonia of left lower lobe due to infectious organism (H)  Will treat with levaquin  - levofloxacin (LEVAQUIN) 500 MG tablet; Take 1 tablet (500 mg) by mouth daily for 10 days  Dispense: 10 tablet; Refill: 0    2. Type 2 diabetes mellitus with hyperglycemia, with long-term current use of insulin (H)  Lots of glucose in urine- she is making adjustments in her insulin and sugars likely out of control given her current illness.  Follow up with Dr. Ortiz if blood sugars not improving as symptoms improve    3. Cough    - XR Chest 2 Views; Future  - guaiFENesin-codeine (ROBITUSSIN AC) 100-10 MG/5ML solution; Take 5-10 mLs by mouth every 4 hours as needed for cough  Dispense: 8 oz; Refill: 0    4. Urinary frequency  Normal urinalysis except glucose in urine symptoms likely related to uncontrolled diabetes   - *UA reflex to Microscopic    5. Urinary urgency  As above   - *UA reflex to Microscopic    Patient Instructions   Take levaquin 500 mg daily for 10 days  Follow up with us in clinic if no improvement in symptoms over the next 5 days   Return urgently if any change in symptoms like increasing cough, " shortness of breath, fever or other change in symptoms.   Take robitussin with codeine up to every 4 hours as needed for cough. Do not take before driving or work   Follow up with Dr. Ortiz if blood sugars not improving as your symptoms improve  Schedule follow up with us in approximately one month to make sure chest xray finding resolve       Tesha Carcamo PA-C  [facility]

## 2019-01-16 DIAGNOSIS — E11.65 TYPE 2 DIABETES MELLITUS WITH HYPERGLYCEMIA (H): ICD-10-CM

## 2019-01-16 NOTE — TELEPHONE ENCOUNTER
Medication: Metformin 500 ER    Last Fill: 10/08/18       Qty: 360   Last Rx Date: 11/01/17     Last MD Visit: 01/16/19

## 2019-01-17 RX ORDER — METFORMIN HCL 500 MG
TABLET, EXTENDED RELEASE 24 HR ORAL
Qty: 360 TABLET | Refills: 3 | Status: SHIPPED | OUTPATIENT
Start: 2019-01-17 | End: 2019-12-11

## 2019-02-04 ENCOUNTER — OFFICE VISIT (OUTPATIENT)
Dept: FAMILY MEDICINE | Facility: CLINIC | Age: 59
End: 2019-02-04
Payer: COMMERCIAL

## 2019-02-04 VITALS
TEMPERATURE: 97.9 F | OXYGEN SATURATION: 95 % | HEART RATE: 92 BPM | DIASTOLIC BLOOD PRESSURE: 86 MMHG | RESPIRATION RATE: 20 BRPM | WEIGHT: 195.6 LBS | HEIGHT: 64 IN | BODY MASS INDEX: 33.39 KG/M2 | SYSTOLIC BLOOD PRESSURE: 142 MMHG

## 2019-02-04 DIAGNOSIS — R05.9 COUGH: Primary | ICD-10-CM

## 2019-02-04 DIAGNOSIS — E11.65 TYPE 2 DIABETES MELLITUS WITH HYPERGLYCEMIA, WITH LONG-TERM CURRENT USE OF INSULIN (H): ICD-10-CM

## 2019-02-04 DIAGNOSIS — Z79.4 TYPE 2 DIABETES MELLITUS WITH HYPERGLYCEMIA, WITH LONG-TERM CURRENT USE OF INSULIN (H): ICD-10-CM

## 2019-02-04 DIAGNOSIS — R06.00 DYSPNEA, UNSPECIFIED TYPE: ICD-10-CM

## 2019-02-04 DIAGNOSIS — I10 ESSENTIAL HYPERTENSION: ICD-10-CM

## 2019-02-04 DIAGNOSIS — K21.9 GASTROESOPHAGEAL REFLUX DISEASE, ESOPHAGITIS PRESENCE NOT SPECIFIED: ICD-10-CM

## 2019-02-04 LAB — D DIMER PPP FEU-MCNC: 0.3 UG/ML FEU (ref 0–0.5)

## 2019-02-04 PROCEDURE — 99214 OFFICE O/P EST MOD 30 MIN: CPT | Performed by: FAMILY MEDICINE

## 2019-02-04 PROCEDURE — 36415 COLL VENOUS BLD VENIPUNCTURE: CPT | Performed by: FAMILY MEDICINE

## 2019-02-04 PROCEDURE — 85379 FIBRIN DEGRADATION QUANT: CPT | Performed by: FAMILY MEDICINE

## 2019-02-04 RX ORDER — PANTOPRAZOLE SODIUM 40 MG/1
40 TABLET, DELAYED RELEASE ORAL DAILY
Qty: 90 TABLET | Refills: 3 | Status: SHIPPED | OUTPATIENT
Start: 2019-02-04 | End: 2020-03-23

## 2019-02-04 RX ORDER — ALBUTEROL SULFATE 90 UG/1
2 AEROSOL, METERED RESPIRATORY (INHALATION) EVERY 4 HOURS PRN
Qty: 1 INHALER | Refills: 1 | Status: SHIPPED | OUTPATIENT
Start: 2019-02-04 | End: 2020-12-22

## 2019-02-04 RX ORDER — PREDNISONE 20 MG/1
40 TABLET ORAL DAILY
Qty: 10 TABLET | Refills: 0 | Status: SHIPPED | OUTPATIENT
Start: 2019-02-04 | End: 2019-08-12

## 2019-02-04 ASSESSMENT — MIFFLIN-ST. JEOR: SCORE: 1434.3

## 2019-02-04 ASSESSMENT — PAIN SCALES - GENERAL: PAINLEVEL: NO PAIN (0)

## 2019-02-04 NOTE — PROGRESS NOTES
SUBJECTIVE:   Maliha Pedro is a 59 year old female who presents to clinic today for the following health issues:    Follow up Cough-For One Month  -SOB  -Cough still present   Has been using the inhaler which helps but with any activity gets really SOB and tired    -Patient was seen one month ago for cough and SOB, did not have other cold symptoms. She had a clear CXR at that time and was given levaquin for possible pneumonia and Robitussin AC so she stopped Mucinex. She has been using her Nasonex spray and inhaler. The inhaler has helped but she is still somewhat SOB and coughing. She is wondering if she is deconditioned as she was less active for a while following carpal tunnel surgery, and becomes easily SOB with activities like laundry or dishes   -Does not have a neb machine at home. Patient used her friend's neb last year which she found helpful   -Denies history of similar symptoms, this feels different than patient's past respiratory symptoms  -Denies swelling in lower extremities, patient feels she has been active since surgery so she does not think she has a PE   -Patient denies vomiting, sinus congestion   -Patient does not want to try to prednisone due to concerns about hyperglycemia. She has currently been doing 25 units of insulin every 4 hours, even if she does not eat. Sugar has been 170-180, patient has been doing more fruit smoothies due to her illness. Follows with endocrine    Additional:  -Patient was trying to avoid NSAIDs and use Tylenol for her pain following carpal tunnel surgery, but she had too much swelling so started Advil. She did not take the Norco she was given after surgery       Problem list and histories reviewed & adjusted, as indicated.  Additional history: as documented    Patient Active Problem List   Diagnosis     Type 2 diabetes mellitus with hyperglycemia (H)     ? 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     Displacement of lumbar intervertebral  disc without myelopathy     Nonallopathic lesion of lumbar region     Nonallopathic lesion of thoracic region     Nonallopathic lesion of sacral region     Bartholin gland cyst     Cataract     Irritable bowel syndrome     Esophageal reflux     Seasonal allergic rhinitis     Latex sensitivity     Colon polyp     Adenomatous polyp     Subclinical hyperthyroidism     HYPERLIPIDEMIA LDL GOAL <100     Ovarian cyst     Breast pain     Essential hypertension     Advanced directives, counseling/discussion     Obesity     Hypomagnesemia     Type 2 diabetes mellitus with hyperglycemia, with long-term current use of insulin (H)     Past Surgical History:   Procedure Laterality Date     CATARACT IOL, RT/LT  2008    LE     HC DILATION/CURETTAGE DIAG/THER NON OB       HC TYMPANOPLASTY W/O MASTOID, INIT/REV W/O OSS CHAIN RECONST       RELEASE CARPAL TUNNEL Right 2018    Procedure: Right Carpal Tunnel Release;  Surgeon: Ivania Moran MD;  Location: [facility] OR     RELEASE CARPAL TUNNEL Left 2018    Procedure: Left Carpal Tunnel Release;  Surgeon: Ivania Moran MD;  Location: [facility] OR     YAG CAPSULOTOMY OS (LEFT EYE)  2011       Social History     Tobacco Use     Smoking status: Never Smoker     Smokeless tobacco: Never Used   Substance Use Topics     Alcohol use: Yes     Comment: rarely     Family History   Problem Relation Age of Onset     Diabetes Brother      Cancer Brother 59        Lung cancer     Diabetes Father          of dm 70's     C.A.D. Father      Diabetes Paternal Grandfather      Diabetes Sister      Cancer Maternal Aunt         ovarian cancer.      Diabetes Brother      Diabetes Brother      Diabetes Sister      Glaucoma Maternal Grandmother      Macular Degeneration No family hx of            Reviewed and updated as needed this visit by clinical staff  Tobacco  Allergies  Meds  Med Hx  Surg Hx  Fam Hx  Soc Hx      Reviewed and updated as needed this  "visit by Provider         ROS:  Constitutional, HEENT, cardiovascular, pulmonary, gi and gu systems are negative, except as otherwise noted.    This document serves as a record of the services and decisions personally performed by DARRICK ACHARYA. It was created on his/her behalf by Kya Valderrama, a trained medical scribe. The creation of this document is based on the provider's statements to the medical scribe. Kya Valderrama, February 4, 2019 1:29 PM  OBJECTIVE:     /86 (BP Location: Right arm, Patient Position: Sitting, Cuff Size: Adult Regular)   Pulse 92   Temp 97.9  F (36.6  C) (Oral)   Resp 20   Ht 1.613 m (5' 3.5\")   Wt 88.7 kg (195 lb 9.6 oz)   LMP 03/19/2009 (Exact Date)   SpO2 95%   BMI 34.11 kg/m    Body mass index is 34.11 kg/m .  GENERAL: healthy, alert and no distress  NECK: no adenopathy, no asymmetry, masses, or scars and thyroid normal to palpation  RESP: diminished airflow, otherwise lungs clear to auscultation - no rales, rhonchi or wheezes  CV: regular rate and rhythm, normal S1 S2, no S3 or S4, no murmur, click or rub, no peripheral edema and peripheral pulses strong  ABDOMEN: soft, nontender, no hepatosplenomegaly, no masses and bowel sounds normal  MS: no gross musculoskeletal defects noted, no edema  PSYCH: mentation appears normal, affect normal/bright    ASSESSMENT/PLAN:   1. Cough  2. Dyspnea, unspecified type  Rule out blood clot due to recent surgery and lack of cold symptoms associated with dyspnea onset (d-dimer sent stat and get CT chest today if + reviewed). Patient declined nebulizer. Start prednisone and continue albuterol. Reviewed controlling hyperglycemia with steroids.  Reviewed red flag symptoms that would precipitate the need for routine, urgent or emergent f/u. Recheck in 3 days if not improving. Would repeat xray at that time.   - albuterol (PROAIR HFA/PROVENTIL HFA/VENTOLIN HFA) 108 (90 Base) MCG/ACT inhaler; Inhale 2 puffs into the lungs every 4 " hours as needed for shortness of breath / dyspnea  Dispense: 1 Inhaler; Refill: 1  - D dimer, quantitative  - predniSONE (DELTASONE) 20 MG tablet; Take 40 mg by mouth daily.  Dispense: 10 tablet; Refill: 0    3. Gastroesophageal reflux disease, esophagitis presence not specified  Stable. Continue current medication   - pantoprazole (PROTONIX) 40 MG EC tablet; Take 1 tablet (40 mg) by mouth daily  Dispense: 90 tablet; Refill: 3    4. Essential hypertension  Slightly elevated today but due to acute illness, will not make changes to medication now   5. DM- hyperglycemia expected with prednisone use as above.       Patient Instructions   Start taking 600-1200 plain Mucinex twice daily again.    If your blood sugar is going up with the prednisone, contact your endocrinologist to ask them how they want you to adjust your insulin.    You do not have to do the full 5 days of prednisone if you are feeling better. If you are not getting better within 3 days, you need to be rechecked.     Keep going with the albuterol.          The information in this document, created by the medical scribe for me, accurately reflects the services I personally performed and the decisions made by me. I have reviewed and approved this document for accuracy.   Elva Ortiz MD  [facility]

## 2019-02-04 NOTE — PATIENT INSTRUCTIONS
Start taking 600-1200 plain Mucinex twice daily again.    If your blood sugar is going up with the prednisone, contact your endocrinologist to ask them how they want you to adjust your insulin.    You do not have to do the full 5 days of prednisone if you are feeling better. If you are not getting better within 3 days, you need to be rechecked.     Keep going with the albuterol.

## 2019-02-06 ENCOUNTER — OFFICE VISIT (OUTPATIENT)
Dept: NEUROSURGERY | Facility: CLINIC | Age: 59
End: 2019-02-06
Payer: COMMERCIAL

## 2019-02-06 VITALS
HEIGHT: 64 IN | OXYGEN SATURATION: 97 % | DIASTOLIC BLOOD PRESSURE: 73 MMHG | WEIGHT: 195.6 LBS | SYSTOLIC BLOOD PRESSURE: 148 MMHG | BODY MASS INDEX: 33.39 KG/M2 | HEART RATE: 90 BPM

## 2019-02-06 DIAGNOSIS — G56.00 MEDIAN NERVE COMPRESSION: Primary | ICD-10-CM

## 2019-02-06 DIAGNOSIS — Z98.890 S/P BILATERAL CARPAL TUNNEL RELEASE: Primary | ICD-10-CM

## 2019-02-06 SDOH — HEALTH STABILITY: MENTAL HEALTH: HOW OFTEN DO YOU HAVE A DRINK CONTAINING ALCOHOL?: NEVER

## 2019-02-06 ASSESSMENT — MIFFLIN-ST. JEOR: SCORE: 1434.3

## 2019-02-06 ASSESSMENT — PAIN SCALES - GENERAL: PAINLEVEL: MILD PAIN (2)

## 2019-02-06 NOTE — PROGRESS NOTES
Dear Dr. Ortiz:    We saw Ms. Maliha Pedro for the first time today in [facility] for bilateral carpal tunnel syndrome. Please see our previous note from 11/07/2018 for a more detailed history. She had right carpal tunnel release on 11/19/2018 and left carpal tunnel release on 12/17/2018. She has recovered reasonably well from both surgeries with improvement of her condition. However, she reports swelling in her left hand and flexing it is associated with pain in her forearm. She takes ibuprofen before bed and ices her hand to manage the pain and swelling, though she reports warmth helps more than icing. To her recollection, she has had a depression in her left forearm for a long time.     PHYSICAL EXAM: On exam, both hand incisions have healed well. The left hand is slightly more edematous than the right. She has some tenderness along the incision but we could not palpate any fluid under the incision. She has effort-dependent weakness in the left hand muscles throughout but she appears to have antigravity strength in every motion that we tested. Her range of motion in the left thumb appears most limited but she is able to move it in all directions. Her strength in the right hand is normal throughout    REVIEW OF STUDIES: There were no new images to review.    IMPRESSION AND PLAN: It is not clear if she has a hematoma or fluid collection at the surgical site in the left hand. We will start with a hand ultrasound and review the images with our musculoskeletal radiologists. We anticipate that her left hand will ultimately recover well but she needs more time to return to work. We will keep her off work for an additional 6 weeks.      I appreciate your confidence in involving us in her care. Please do not hesitate to contact us with questions. We will keep you informed of her progress.     ADDENDUM: We reviewed her hand ultrasound and discussed the findings with the radiologist. There is no  compression of either median nerve in the wrist. There is no discrete fluid collection. There is enlargement of each median nerve in the wrist (left greater than right) that appears to be postoperative change.    We will continue to follow her conservatively and see her back in the near future. She will need additional time off work.    MD NATALIE DAVIS, Annabel Talley, am serving as a scribe to document services personally performed by Ivania Moran MD, based upon my observations and the provider's statements to me. All documentation has been reviewed by the aforementioned doctor prior to being entered into the official medical record.    I, Ivania Moran, attest that above named individual is acting in scribe capacity, has observed my performance of the services and has documented them in accordance with my direction. The documentation recorded by the scribe accurately reflects the service I personally performed and the decisions made by me.

## 2019-02-06 NOTE — LETTER
2/6/2019       RE: Maliha Pedro  [address]  [place] [address]     Dear Colleague,    Thank you for referring your patient, Maliha Pedro, to the [facility] at [facility]. Please see a copy of my visit note below.    Dear Dr. Ortiz:    We saw Ms. Maliha Pedro for the first time today in [facility] for bilateral carpal tunnel syndrome. Please see our previous note from 11/07/2018 for a more detailed history. She had right carpal tunnel release on 11/19/2018 and left carpal tunnel release on 12/17/2018. She has recovered reasonably well from both surgeries with improvement of her condition. However, she reports swelling in her left hand and flexing it is associated with pain in her forearm. She takes ibuprofen before bed and ices her hand to manage the pain and swelling, though she reports warmth helps more than icing. To her recollection, she has had a depression in her left forearm for a long time.     PHYSICAL EXAM: On exam, both hand incisions have healed well. The left hand is slightly more edematous than the right. She has some tenderness along the incision but we could not palpate any fluid under the incision. She has effort-dependent weakness in the left hand muscles throughout but she appears to have antigravity strength in every motion that we tested. Her range of motion in the left thumb appears most limited but she is able to move it in all directions. Her strength in the right hand is normal throughout    REVIEW OF STUDIES: There were no new images to review.    IMPRESSION AND PLAN: It is not clear if she has a hematoma or fluid collection at the surgical site in the left hand. We will start with a hand ultrasound and review the images with our musculoskeletal radiologists. We anticipate that her left hand will ultimately recover well but she needs more time to return to work. We will keep her off work for  an additional 6 weeks.      I appreciate your confidence in involving us in her care. Please do not hesitate to contact us with questions. We will keep you informed of her progress.     ADDENDUM: We reviewed her hand ultrasound and discussed the findings with the radiologist. There is no compression of either median nerve in the wrist. There is no discrete fluid collection. There is enlargement of each median nerve in the wrist (left greater than right) that appears to be postoperative change.    We will continue to follow her conservatively and see her back in the near future. She will need additional time off work.    MD NATALIE DAVIS, Annabel Talley, am serving as a scribe to document services personally performed by Ivania Moran MD, based upon my observations and the provider's statements to me. All documentation has been reviewed by the aforementioned doctor prior to being entered into the official medical record.    I, Ivania Moran, attest that above named individual is acting in scribe capacity, has observed my performance of the services and has documented them in accordance with my direction. The documentation recorded by the scribe accurately reflects the service I personally performed and the decisions made by me.

## 2019-02-07 ENCOUNTER — MYC MEDICAL ADVICE (OUTPATIENT)
Dept: FAMILY MEDICINE | Facility: CLINIC | Age: 59
End: 2019-02-07

## 2019-02-08 ENCOUNTER — MYC MEDICAL ADVICE (OUTPATIENT)
Dept: FAMILY MEDICINE | Facility: CLINIC | Age: 59
End: 2019-02-08

## 2019-02-08 ENCOUNTER — TELEPHONE (OUTPATIENT)
Dept: NEUROSURGERY | Facility: CLINIC | Age: 59
End: 2019-02-08

## 2019-02-08 ENCOUNTER — PATIENT OUTREACH (OUTPATIENT)
Dept: NEUROSURGERY | Facility: CLINIC | Age: 59
End: 2019-02-08

## 2019-02-08 NOTE — PROGRESS NOTES
Appt for US of the left hand scheduled for 2/12 with a 11:15 check in for a 11:30 appt.  This information was relayed to the pt.  Pt expressed understanding of the information given.

## 2019-02-09 NOTE — TELEPHONE ENCOUNTER
Writer faxed to Lakeshia Dillard at [phone] [facility] completed and signed by provider.

## 2019-02-12 ENCOUNTER — MYC MEDICAL ADVICE (OUTPATIENT)
Dept: FAMILY MEDICINE | Facility: CLINIC | Age: 59
End: 2019-02-12

## 2019-02-12 ENCOUNTER — TELEPHONE (OUTPATIENT)
Dept: FAMILY MEDICINE | Facility: CLINIC | Age: 59
End: 2019-02-12

## 2019-02-12 ENCOUNTER — ANCILLARY PROCEDURE (OUTPATIENT)
Dept: ULTRASOUND IMAGING | Facility: CLINIC | Age: 59
End: 2019-02-12
Attending: NEUROLOGICAL SURGERY
Payer: COMMERCIAL

## 2019-02-12 DIAGNOSIS — G56.00 MEDIAN NERVE COMPRESSION: ICD-10-CM

## 2019-02-12 NOTE — TELEPHONE ENCOUNTER
Panel Management Review      Lab Results   Component Value Date    A1C 8.2 11/14/2018    A1C 9.8 12/16/2013     Last Office Visit with this department: 2/4/2019        Patient is due/failing the following:   A1C    Action needed:   Patient needs office visit for diabetic check.    Type of outreach:    Sent [employer] message. and Sent letter.    [id number], MEDICAL ASSISTANT

## 2019-02-12 NOTE — LETTER
February 12, 2019      Maliha Pedro  [address]  [place] [facility] [address]        Dear Maliha,     Your provider has reviewed your chart and noticed you will be due for a diabetic recheck in May. With your diabetes, it is important that we keep a close eye on your A1C and your blood pressure at least every 6 months, more if out of range. Please give us a call at [phone] to schedule your diabetic appointment. Thank you.       Thanks,   [facility]

## 2019-02-14 ENCOUNTER — PATIENT OUTREACH (OUTPATIENT)
Dept: NEUROSURGERY | Facility: CLINIC | Age: 59
End: 2019-02-14

## 2019-02-14 NOTE — PROGRESS NOTES
Pt called pt to give her the results of her hand ultra sound.  According to the US, the median nerve in her left hand is swollen.  The MD would like to monitor the hand for another 4 weeks.  MD would like the pt to continue with the therapy she is currently using (elevation, ice, and anti-inflammatory medication).  If the hand is still swollen more tests will be ordered.

## 2019-02-22 ENCOUNTER — PATIENT OUTREACH (OUTPATIENT)
Dept: NEUROSURGERY | Facility: CLINIC | Age: 59
End: 2019-02-22

## 2019-02-22 ENCOUNTER — TELEPHONE (OUTPATIENT)
Dept: NEUROSURGERY | Facility: CLINIC | Age: 59
End: 2019-02-22

## 2019-02-22 NOTE — TELEPHONE ENCOUNTER
Writer Faxed 40 pages of documentation to Samantha at [phone] attention to YAMIL Ruggiero. Documentation reviewed with supervisor ANIL prior to sending.  Media tab contained previous faxed and signed forms. Writer completed additional documentation as requested along with progress notes reviewed with supervisor; ANIL

## 2019-02-22 NOTE — TELEPHONE ENCOUNTER
Writer mily message for contacts at [facility]. Quita Mendoza at [phone] ext [phone] and for YANCY Mendoza at [phone]. Writer used pt  Claim number, detailed message regarding disability paperwork. Writer printed out from the media tab previous scanned/sent forms. In addition forms were scanned into the medica tab that were not completed by writer. Not know to writer.

## 2019-02-22 NOTE — PROGRESS NOTES
Pt called today and complained that her insurance company (short term insurance) has not received any paperwork from this clinic.  Pt stated that she feels like she is being held hostage and she cannot pay her bills.  Writer will talk with LPN.  The pt has called several times with the same issue.  Writer will speak with management too.  Pt relations has reached out to the pt.

## 2019-02-22 NOTE — TELEPHONE ENCOUNTER
Writer spoke with kojo and gave her the information that the two contacts at [facility] were left a voice mail. 40 pages of documenation faxed and scanned to her chart. Writer had a nice conversation with the patient who was very appreciative of the detail that writer provided. Writer verified that patient had her direct number. Writer again reminded patient that the two contacts at [facility] were left a message and along with that message writer asked for the [facility] contacts to call the patient to verify receipt of the documentation.   All parties expressed understanding. Patient was pleasant and expressed in kind words her gratitude.

## 2019-03-04 ENCOUNTER — PATIENT OUTREACH (OUTPATIENT)
Dept: NEUROSURGERY | Facility: CLINIC | Age: 59
End: 2019-03-04

## 2019-03-04 DIAGNOSIS — Z79.4 TYPE 2 DIABETES MELLITUS WITH DIABETIC NEPHROPATHY, WITH LONG-TERM CURRENT USE OF INSULIN (H): ICD-10-CM

## 2019-03-04 DIAGNOSIS — E11.21 TYPE 2 DIABETES MELLITUS WITH DIABETIC NEPHROPATHY, WITH LONG-TERM CURRENT USE OF INSULIN (H): ICD-10-CM

## 2019-03-04 NOTE — PROGRESS NOTES
Pt called and asked about her next appt with Dr. Moran.  There was no appt scheduled.  Writer sent a note to the .    Pt stated that her left hand is still swollen and painful.  She has more ROM in her fingers.

## 2019-03-05 DIAGNOSIS — E78.5 HYPERLIPIDEMIA LDL GOAL <100: ICD-10-CM

## 2019-03-05 RX ORDER — ROSUVASTATIN CALCIUM 10 MG/1
10 TABLET, COATED ORAL DAILY
Qty: 90 TABLET | Refills: 2 | Status: SHIPPED | OUTPATIENT
Start: 2019-03-05 | End: 2019-11-25

## 2019-03-13 ENCOUNTER — OFFICE VISIT (OUTPATIENT)
Dept: ENDOCRINOLOGY | Facility: CLINIC | Age: 59
End: 2019-03-13
Payer: COMMERCIAL

## 2019-03-13 VITALS
HEART RATE: 97 BPM | SYSTOLIC BLOOD PRESSURE: 125 MMHG | BODY MASS INDEX: 33.67 KG/M2 | HEIGHT: 64 IN | DIASTOLIC BLOOD PRESSURE: 79 MMHG | WEIGHT: 197.2 LBS

## 2019-03-13 DIAGNOSIS — Z79.4 TYPE 2 DIABETES MELLITUS WITH DIABETIC POLYNEUROPATHY, WITH LONG-TERM CURRENT USE OF INSULIN (H): Primary | ICD-10-CM

## 2019-03-13 DIAGNOSIS — E11.42 TYPE 2 DIABETES MELLITUS WITH DIABETIC POLYNEUROPATHY, WITH LONG-TERM CURRENT USE OF INSULIN (H): Primary | ICD-10-CM

## 2019-03-13 LAB — HBA1C MFR BLD: 7.9 % (ref 4.3–6)

## 2019-03-13 ASSESSMENT — PAIN SCALES - GENERAL: PAINLEVEL: NO PAIN (0)

## 2019-03-13 ASSESSMENT — MIFFLIN-ST. JEOR: SCORE: 1441.55

## 2019-03-13 NOTE — PROGRESS NOTES
Endocrinology Clinic Visit 19  NAME:  Maliha Pedro  PCP:  Elva Ortiz  MRN:  [id number]    Chief Complaint     Chief Complaint   Patient presents with     RECHECK     return diabetes        History of Present Illness     Maliha Pedro is a 58 year old female who is seen in clinic for diabetes management.   She is a former patient of Dr. Mccarthy who is transitioning care since his longterm.   She has had diabetes since age 32. Has tried many different meds. Did not tolerate GLP-1 agonists. Stopped Actos due to concerns about adverse effects. Was on Lantus and Novolog with poor control, with recent improvement since addition of Jardiance 2017. She has retinopathy and nephropathy.     Interval History:   A1c today is 7.9.  This is an improvement from her previous A1c, and this is despite having a rough winter with a prolonged upper respiratory infection that turned into a pneumonia, that required antibiotic treatment.  Also has still not fully recovered from her carpal tunnel surgery.  She needed few days of prednisone with her upper respiratory infection.  With that she took NovoLog more frequently throughout the day.    Associated Signs/Symptoms  Hypoglycemia: no. Hyperglycemia: none.Neuropathy: none. Vascular Symtpoms: none. Angina/CHF: dyspnea on exertion. Ulcers: No. Amputations: No    Current treatment strategy: Novolog 25 with main meals. Lantus 70 units daily. Metformin 1000 mg po BID, Jardiance 25 mg po daily    Blood Glucose Monitoring: CGM download: shannon: 14 day data:  Avg 203. SD 66.4. Hypo: 0%, within ran%, above range: 59%.   Pattern: At baseline of around 150-180, with a sharp rise after breakfast, not seen after lunch or dinner.    Diet: 2-3 meals per day. Occasional snacks.   Cut down on rice. Eating more vegetables and fish.     Exercise: yoga.     Weight:   Wt Readings from Last 4 Encounters:   19 89.4 kg (197 lb 3.2 oz)   19 88.7 kg (195 lb  9.6 oz)   02/04/19 88.7 kg (195 lb 9.6 oz)   01/04/19 88.5 kg (195 lb)     Problem List     Patient Active Problem List   Diagnosis     Type 2 diabetes mellitus with hyperglycemia (H)     ? 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     Displacement of lumbar intervertebral disc without myelopathy     Nonallopathic lesion of lumbar region     Nonallopathic lesion of thoracic region     Nonallopathic lesion of sacral region     Bartholin gland cyst     Cataract     Irritable bowel syndrome     Esophageal reflux     Seasonal allergic rhinitis     Latex sensitivity     Colon polyp     Adenomatous polyp     Subclinical hyperthyroidism     HYPERLIPIDEMIA LDL GOAL <100     Ovarian cyst     Breast pain     Essential hypertension     Advanced directives, counseling/discussion     Obesity     Hypomagnesemia     Type 2 diabetes mellitus with hyperglycemia, with long-term current use of insulin (H)        Medications     Current Outpatient Medications   Medication     albuterol (PROAIR HFA/PROVENTIL HFA/VENTOLIN HFA) 108 (90 Base) MCG/ACT inhaler     amLODIPine (NORVASC) 5 MG tablet     aspirin 81 MG EC tablet     CALCIUM + D OR     Continuous Blood Gluc  (FREESTYLE BUSHRA 14 DAY READER) KIRIT     Continuous Blood Gluc Sensor (HighlightCamSTYLE BUSHRA 14 DAY SENSOR) MISC     empagliflozin (JARDIANCE) 25 MG TABS tablet     fexofenadine (ALLEGRA) 180 MG tablet     fish oil-omega-3 fatty acids (FISH OIL) 1000 MG capsule     fluticasone (FLONASE) 50 MCG/ACT spray     Glucosamine-Chondroitin (GLUCOSAMINE CHONDR COMPLEX PO)     hydrochlorothiazide 12.5 MG TABS tablet     insulin aspart (NOVOLOG FLEXPEN) 100 UNIT/ML pen     insulin glargine (LANTUS SOLOSTAR PEN) 100 UNIT/ML pen     insulin pen needle (BD LISA U/F) 32G X 4 MM miscellaneous     losartan (COZAAR) 100 MG tablet     magnesium 250 MG tablet     metFORMIN (GLUCOPHAGE-XR) 500 MG 24 hr tablet     montelukast (SINGULAIR) 10 MG tablet     MULTIVITAMIN OR      NEEDLES, ANY SIZE     pantoprazole (PROTONIX) 40 MG EC tablet     pentoxifylline (TRENTAL) 400 MG CR tablet     predniSONE (DELTASONE) 20 MG tablet     rosuvastatin (CRESTOR) 10 MG tablet     VITAMIN D 2000 UNIT OR TABS     No current facility-administered medications for this visit.      Facility-Administered Medications Ordered in Other Visits   Medication     fentaNYL (SUBLIMAZE) injection 25-50 mcg     flumazenil (ROMAZICON) injection 0.2 mg     midazolam (VERSED) injection 0.5-1 mg     naloxone (NARCAN) injection 0.1-0.4 mg        Allergies     Allergies   Allergen Reactions     Latex Swelling     Mold      Tetanus Antitoxin Swelling     Tetracycline Swelling       Medical / Surgical History     Past Medical History:   Diagnosis Date     Adjustment disorder with mixed anxiety and depressed mood     following death of her adoptive father and then again after adoptive mother      Allergic rhinitis, cause unspecified     uses benadryl prn (sneezing/hoarse voice)     Bartholin gland cyst 2008     Chest pain, unspecified     neg dobutamine stress test  (reacted to dobutamine)     Dry eyes      ERM OD (epiretinal membrane, right eye)     BE mild     Esophageal reflux     EGD: -neg     Glaucoma suspect      Hypertension      Irregular menstrual cycle     s/p D&C, since then more regular     Irritable bowel syndrome     colonoscopy -neg. Sx's especially prior to menses     MEDICAL HISTORY OF -     had health directive with : DANIEL Ford.  Full Code. Aunt (Charlie Tay) will be decision maker     Meningitis, unspecified(322.9) age 8    hospitalized for 9 mo     Need for prophylactic vaccination with tuberculosis (BCG) vaccine     Has pos Mantoux. Gets yearly cxr, all neg.      Other and unspecified hyperlipidemia      Pain in joint, shoulder region     bone spurs on MRI, s/p pool therapy     Type II or unspecified type diabetes mellitus without mention of complication, not stated as  uncontrolled Age 32    [facility]s [facility]: Shayne Lane MD     Unspecified cataract     RE     Unspecified sinusitis (chronic)     2006     Wheezing     Has seen pulm 2006 & 2007. Told night ashtma, ? vocal chord spasm due to cold air.      Past Surgical History:   Procedure Laterality Date     CATARACT IOL, RT/LT  9/23/2008    LE     HC DILATION/CURETTAGE DIAG/THER NON OB  1992     HC TYMPANOPLASTY W/O MASTOID, INIT/REV W/O OSS CHAIN RECONST  12/09     RELEASE CARPAL TUNNEL Right 11/19/2018    Procedure: Right Carpal Tunnel Release;  Surgeon: Ivania Moran MD;  Location: [facility] OR     RELEASE CARPAL TUNNEL Left 12/17/2018    Procedure: Left Carpal Tunnel Release;  Surgeon: Ivania Moran MD;  Location: [facility] OR     YAG CAPSULOTOMY OS (LEFT EYE)  4/23/2011       Social History     Social History     Socioeconomic History     Marital status: Single     Spouse name: Not on file     Number of children: Not on file     Years of education: Not on file     Highest education level: Not on file   Occupational History     Not on file   Social Needs     Financial resource strain: Not on file     Food insecurity:     Worry: Not on file     Inability: Not on file     Transportation needs:     Medical: Not on file     Non-medical: Not on file   Tobacco Use     Smoking status: Never Smoker     Smokeless tobacco: Never Used   Substance and Sexual Activity     Alcohol use: No     Frequency: Never     Comment: rarely     Drug use: No     Sexual activity: Not Currently     Partners: Male   Lifestyle     Physical activity:     Days per week: Not on file     Minutes per session: Not on file     Stress: Not on file   Relationships     Social connections:     Talks on phone: Not on file     Gets together: Not on file     Attends Scientologist service: Not on file     Active member of club or organization: Not on file     Attends meetings of clubs or organizations: Not on file     Relationship status: Not on  file     Intimate partner violence:     Fear of current or ex partner: Not on file     Emotionally abused: Not on file     Physically abused: Not on file     Forced sexual activity: Not on file   Other Topics Concern     Parent/sibling w/ CABG, MI or angioplasty before 65F 55M? 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 Moved [place] . Initially lived in [place].    No children        2 cats       Family History     Family History   Problem Relation Age of Onset     Diabetes Brother      Cancer Brother 59        Lung cancer     Diabetes Father          of dm 70's     C.A.D. Father      Diabetes Paternal Grandfather      Diabetes Sister      Cancer Maternal Aunt         ovarian cancer.      Diabetes Brother      Diabetes Brother      Diabetes Sister      Glaucoma Maternal Grandmother      Macular Degeneration No family hx of        ROS     Constitutional: no fevers, chills, night sweats. No weight loss or fatigue. Good appetite  Eyes: no vision changes, no eye redness, no diplopia  Ears, Nose, mouth, throat: no hearing changes, no tinnitus, no rhinorrhea, no nasal congestion  Cardiovascular: no chest pain, no orthopnea or PND, no edema, no palpitations  Respiratory: no dyspnea, no cough, no sputum, no wheezing  Gastrointestinal: no nausea, no vomiting, no abdominal pain, no diarrhea, no constipation  Genitourinary: no dysuria, no frequency, no urgency, no nocturia  Musculoskeletal: no joint pains, no back pain, no cramps, no fractures  Skin: no rash, no  "itching, no dryness, no ulcers, no hair loss  Neurological: no headache, no weakness, no numbness, no dizziness, no tremors  Psychiatric: no anxiety, no sadness  Hematologic/lymphatic: no easy bruising, no bleeding, no palor    Physical Exam   /79   Pulse 97   Ht 1.613 m (5' 3.5\")   Wt 89.4 kg (197 lb 3.2 oz)   LMP 03/19/2009 (Exact Date)   BMI 34.38 kg/m       General: Comfortable, no obvious distress, normal body habitus  Eyes: Sclera anicteric, moist conjunctiva  HENT: Atraumatic, oropharynx clear, moist mucous membranes with no mucosal ulcerations  Neck: Trachea midline, supple. Thyroid: Thyroid is normal in size and texture  CV: Regular rhythm, normal rate. No murmurs auscultated  Resp: Clear to auscultation bilaterally, good effort  Abdomen:  Soft, non tender, non distended. Bowel sounds heard. No organomegaly.  Skin: No rashes, lesions, or subcutaneous nodules.   Psych: Alert and oriented x 3. Appropriate affect, good insight  Extremities: No peripheral edema  Exam of feet: Pulses are palpable bilaterally, no visible ulcers or lesions, intact to sensation monofilament test.  Musculoskeletal: Appropriate muscle bulk and strength  Lymphatic: No cervical lymphadenopathy  Neuro: Moves all four extremities. No focal deficits on limited exam. Gait normal.       Labs/Imaging and Outside Records     Pertinent Labs were reviewed and updated in EPIC.    Summary of recent findings:   Lab Results   Component Value Date    A1C 9.8 12/16/2013    A1C 9.5 04/15/2013    A1C 9.6 01/16/2013    A1C 9.4 09/28/2012    A1C 8.3 06/22/2012       TSH   Date Value Ref Range Status   10/30/2017 1.29 0.40 - 4.00 mU/L Final   02/08/2017 0.76 0.40 - 4.00 mU/L Final   11/17/2015 0.98 0.40 - 4.00 mU/L Final   05/04/2015 0.76 0.40 - 4.00 mU/L Final   12/16/2013 1.24 0.4 - 5.0 mU/L Final     T4 Free   Date Value Ref Range Status   06/21/2010 1.50 0.70 - 1.85 ng/dL Final       Creatinine   Date Value Ref Range Status   12/12/2018 " 0.69 0.52 - 1.04 mg/dL Final       Recent Labs   Lab Test  03/06/17   0905  05/04/15   1016  12/16/13   1028   CHOL  165  170  192   HDL  52  46*  42*   LDL  73  90  125   TRIG  199*  171*  124   CHOLHDLRATIO   --   3.7  4.6     Impression / Plan     1. Diabetes Mellitus: Type 2  Current glycemic control can be considered slightly improved, despite multiple setbacks this winter.  Pattern on her CGM shows a higher than usual baseline, and a sharp rise after breakfast.    We will go ahead and increase Lantus back up to 80 units a day, and increase NovoLog with breakfast to 30 units.  We will keep NovoLog 25 units before lunch and dinner and, 10 units before snack.    2. Diabetes Complications: With retinopathy and nephropathy.   Recent CAD workup: normal stress test and echo    3. Blood Pressure Management: Blood pressure is controlled. Currently is on pharmacotherapy for this.     4.Lipid Management: Per the new ACC/JILLIAN/NHLBI guidelines, statins are recommended for individuals with diabetes aged 40-75 with LDL  without ASCVD, and for any individual with ASCVD. Currently the patient is on a statin.     5. Smoking Status: Patient Pt is smoke free..     Follow up: 3 months      Shai Dong MD  Endocrinology, Diabetes and Metabolism  [facility]

## 2019-03-13 NOTE — LETTER
3/13/2019       RE: Maliha Pedro  [address]  [place] [address]     Dear Colleague,    Thank you for referring your patient, Maliha Pedro, to the [facility] ENDOCRINOLOGY at [facility]. Please see a copy of my visit note below.    Endocrinology Clinic Visit 19  NAME:  Maliha Pedro  PCP:  Elva Ortiz  MRN:  [id number]    Chief Complaint     Chief Complaint   Patient presents with     RECHECK     return diabetes        History of Present Illness     Maliha Pedro is a 58 year old female who is seen in clinic for diabetes management.   She is a former patient of Dr. Mccarthy who is transitioning care since his [facility].   She has had diabetes since age 32. Has tried many different meds. Did not tolerate GLP-1 agonists. Stopped Actos due to concerns about adverse effects. Was on Lantus and Novolog with poor control, with recent improvement since addition of Jardiance 2017. She has retinopathy and nephropathy.     Interval History:   A1c today is 7.9.  This is an improvement from her previous A1c, and this is despite having a rough winter with a prolonged upper respiratory infection that turned into a pneumonia, that required antibiotic treatment.  Also has still not fully recovered from her carpal tunnel surgery.  She needed few days of prednisone with her upper respiratory infection.  With that she took NovoLog more frequently throughout the day.    Associated Signs/Symptoms  Hypoglycemia: no. Hyperglycemia: none.Neuropathy: none. Vascular Symtpoms: none. Angina/CHF: dyspnea on exertion. Ulcers: No. Amputations: No    Current treatment strategy: Novolog 25 with main meals. Lantus 70 units daily. Metformin 1000 mg po BID, Jardiance 25 mg po daily    Blood Glucose Monitoring: CGM download: shannon: 14 day data:  Avg 203. SD 66.4. Hypo: 0%, within ran%, above range: 59%.   Pattern: At baseline of around 150-180,  with a sharp rise after breakfast, not seen after lunch or dinner.    Diet: 2-3 meals per day. Occasional snacks.   Cut down on rice. Eating more vegetables and fish.     Exercise: yoga.     Weight:   Wt Readings from Last 4 Encounters:   03/13/19 89.4 kg (197 lb 3.2 oz)   02/06/19 88.7 kg (195 lb 9.6 oz)   02/04/19 88.7 kg (195 lb 9.6 oz)   01/04/19 88.5 kg (195 lb)     Problem List     Patient Active Problem List   Diagnosis     Type 2 diabetes mellitus with hyperglycemia (H)     ? 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     Displacement of lumbar intervertebral disc without myelopathy     Nonallopathic lesion of lumbar region     Nonallopathic lesion of thoracic region     Nonallopathic lesion of sacral region     Bartholin gland cyst     Cataract     Irritable bowel syndrome     Esophageal reflux     Seasonal allergic rhinitis     Latex sensitivity     Colon polyp     Adenomatous polyp     Subclinical hyperthyroidism     HYPERLIPIDEMIA LDL GOAL <100     Ovarian cyst     Breast pain     Essential hypertension     Advanced directives, counseling/discussion     Obesity     Hypomagnesemia     Type 2 diabetes mellitus with hyperglycemia, with long-term current use of insulin (H)        Medications     Current Outpatient Medications   Medication     albuterol (PROAIR HFA/PROVENTIL HFA/VENTOLIN HFA) 108 (90 Base) MCG/ACT inhaler     amLODIPine (NORVASC) 5 MG tablet     aspirin 81 MG EC tablet     CALCIUM + D OR     Continuous Blood Gluc  (FREESTYLE BUSHRA 14 DAY READER) KIRIT     Continuous Blood Gluc Sensor (FREESTYLE BUSHRA 14 DAY SENSOR) MISC     empagliflozin (JARDIANCE) 25 MG TABS tablet     fexofenadine (ALLEGRA) 180 MG tablet     fish oil-omega-3 fatty acids (FISH OIL) 1000 MG capsule     fluticasone (FLONASE) 50 MCG/ACT spray     Glucosamine-Chondroitin (GLUCOSAMINE CHONDR COMPLEX PO)     hydrochlorothiazide 12.5 MG TABS tablet     insulin aspart (NOVOLOG FLEXPEN) 100 UNIT/ML pen      insulin glargine (LANTUS SOLOSTAR PEN) 100 UNIT/ML pen     insulin pen needle (BD LISA U/F) 32G X 4 MM miscellaneous     losartan (COZAAR) 100 MG tablet     magnesium 250 MG tablet     metFORMIN (GLUCOPHAGE-XR) 500 MG 24 hr tablet     montelukast (SINGULAIR) 10 MG tablet     MULTIVITAMIN OR     NEEDLES, ANY SIZE     pantoprazole (PROTONIX) 40 MG EC tablet     pentoxifylline (TRENTAL) 400 MG CR tablet     predniSONE (DELTASONE) 20 MG tablet     rosuvastatin (CRESTOR) 10 MG tablet     VITAMIN D 2000 UNIT OR TABS     No current facility-administered medications for this visit.      Facility-Administered Medications Ordered in Other Visits   Medication     fentaNYL (SUBLIMAZE) injection 25-50 mcg     flumazenil (ROMAZICON) injection 0.2 mg     midazolam (VERSED) injection 0.5-1 mg     naloxone (NARCAN) injection 0.1-0.4 mg        Allergies     Allergies   Allergen Reactions     Latex Swelling     Mold      Tetanus Antitoxin Swelling     Tetracycline Swelling       Medical / Surgical History     Past Medical History:   Diagnosis Date     Adjustment disorder with mixed anxiety and depressed mood     following death of her adoptive father and then again after adoptive mother      Allergic rhinitis, cause unspecified     uses benadryl prn (sneezing/hoarse voice)     Bartholin gland cyst 2008     Chest pain, unspecified     neg dobutamine stress test  (reacted to dobutamine)     Dry eyes      ERM OD (epiretinal membrane, right eye)     BE mild     Esophageal reflux     EGD: -neg     Glaucoma suspect      Hypertension      Irregular menstrual cycle     s/p D&C, since then more regular     Irritable bowel syndrome     colonoscopy -neg. Sx's especially prior to menses     MEDICAL HISTORY OF -     had health directive with : DANIEL Ford.  Full Code. Aunt (Charlie Tay) will be decision maker     Meningitis, unspecified(322.9) age 8    hospitalized for 9 mo     Need for prophylactic  vaccination with tuberculosis (BCG) vaccine     Has pos Mantoux. Gets yearly cxr, all neg.      Other and unspecified hyperlipidemia      Pain in joint, shoulder region     bone spurs on MRI, s/p pool therapy     Type II or unspecified type diabetes mellitus without mention of complication, not stated as uncontrolled Age 32    [facility]s [facility] at [facility]: Shayne Lane MD     Unspecified cataract     RE     Unspecified sinusitis (chronic)     2006     Wheezing     Has seen pulm 2006 & 2007. Told night ashtma, ? vocal chord spasm due to cold air.      Past Surgical History:   Procedure Laterality Date     CATARACT IOL, RT/LT  9/23/2008    LE     HC DILATION/CURETTAGE DIAG/THER NON OB  1992     HC TYMPANOPLASTY W/O MASTOID, INIT/REV W/O OSS CHAIN RECONST  12/09     RELEASE CARPAL TUNNEL Right 11/19/2018    Procedure: Right Carpal Tunnel Release;  Surgeon: Ivania Moran MD;  Location: [facility] OR     RELEASE CARPAL TUNNEL Left 12/17/2018    Procedure: Left Carpal Tunnel Release;  Surgeon: Ivania Moran MD;  Location: [facility] OR     YAG CAPSULOTOMY OS (LEFT EYE)  4/23/2011       Social History     Social History     Socioeconomic History     Marital status: Single     Spouse name: Not on file     Number of children: Not on file     Years of education: Not on file     Highest education level: Not on file   Occupational History     Not on file   Social Needs     Financial resource strain: Not on file     Food insecurity:     Worry: Not on file     Inability: Not on file     Transportation needs:     Medical: Not on file     Non-medical: Not on file   Tobacco Use     Smoking status: Never Smoker     Smokeless tobacco: Never Used   Substance and Sexual Activity     Alcohol use: No     Frequency: Never     Comment: rarely     Drug use: No     Sexual activity: Not Currently     Partners: Male   Lifestyle     Physical activity:     Days per week: Not on file     Minutes per session: Not on file     Stress:  Not on file   Relationships     Social connections:     Talks on phone: Not on file     Gets together: Not on file     Attends Latter day service: Not on file     Active member of club or organization: Not on file     Attends meetings of clubs or organizations: Not on file     Relationship status: Not on file     Intimate partner violence:     Fear of current or ex partner: Not on file     Emotionally abused: Not on file     Physically abused: Not on file     Forced sexual activity: Not on file   Other Topics Concern     Parent/sibling w/ CABG, MI or angioplasty before 65F 55M? 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 Moved to  in . Initially lived in [place].    No children        2 cats       Family History     Family History   Problem Relation Age of Onset     Diabetes Brother      Cancer Brother 59        Lung cancer     Diabetes Father          of dm 70's     C.A.D. Father      Diabetes Paternal Grandfather      Diabetes Sister      Cancer Maternal Aunt         ovarian cancer.      Diabetes Brother      Diabetes Brother      Diabetes Sister      Glaucoma Maternal Grandmother      Macular Degeneration No family hx of        ROS     Constitutional: no fevers, chills, night sweats. No weight loss or fatigue. Good appetite  Eyes: no vision changes, no eye redness, no diplopia  Ears, Nose, mouth, throat: no hearing changes, no tinnitus, no rhinorrhea, no nasal congestion  Cardiovascular: no chest pain, no orthopnea or PND, no edema, no  "palpitations  Respiratory: no dyspnea, no cough, no sputum, no wheezing  Gastrointestinal: no nausea, no vomiting, no abdominal pain, no diarrhea, no constipation  Genitourinary: no dysuria, no frequency, no urgency, no nocturia  Musculoskeletal: no joint pains, no back pain, no cramps, no fractures  Skin: no rash, no itching, no dryness, no ulcers, no hair loss  Neurological: no headache, no weakness, no numbness, no dizziness, no tremors  Psychiatric: no anxiety, no sadness  Hematologic/lymphatic: no easy bruising, no bleeding, no palor    Physical Exam   /79   Pulse 97   Ht 1.613 m (5' 3.5\")   Wt 89.4 kg (197 lb 3.2 oz)   LMP 03/19/2009 (Exact Date)   BMI 34.38 kg/m        General: Comfortable, no obvious distress, normal body habitus  Eyes: Sclera anicteric, moist conjunctiva  HENT: Atraumatic, oropharynx clear, moist mucous membranes with no mucosal ulcerations  Neck: Trachea midline, supple. Thyroid: Thyroid is normal in size and texture  CV: Regular rhythm, normal rate. No murmurs auscultated  Resp: Clear to auscultation bilaterally, good effort  Abdomen:  Soft, non tender, non distended. Bowel sounds heard. No organomegaly.  Skin: No rashes, lesions, or subcutaneous nodules.   Psych: Alert and oriented x 3. Appropriate affect, good insight  Extremities: No peripheral edema  Exam of feet: Pulses are palpable bilaterally, no visible ulcers or lesions, intact to sensation monofilament test.  Musculoskeletal: Appropriate muscle bulk and strength  Lymphatic: No cervical lymphadenopathy  Neuro: Moves all four extremities. No focal deficits on limited exam. Gait normal.       Labs/Imaging and Outside Records     Pertinent Labs were reviewed and updated in EPIC.    Summary of recent findings:   Lab Results   Component Value Date    A1C 9.8 12/16/2013    A1C 9.5 04/15/2013    A1C 9.6 01/16/2013    A1C 9.4 09/28/2012    A1C 8.3 06/22/2012       TSH   Date Value Ref Range Status   10/30/2017 1.29 0.40 - 4.00 " mU/L Final   02/08/2017 0.76 0.40 - 4.00 mU/L Final   11/17/2015 0.98 0.40 - 4.00 mU/L Final   05/04/2015 0.76 0.40 - 4.00 mU/L Final   12/16/2013 1.24 0.4 - 5.0 mU/L Final     T4 Free   Date Value Ref Range Status   06/21/2010 1.50 0.70 - 1.85 ng/dL Final       Creatinine   Date Value Ref Range Status   12/12/2018 0.69 0.52 - 1.04 mg/dL Final       Recent Labs   Lab Test  03/06/17   0905  05/04/15   1016  12/16/13   1028   CHOL  165  170  192   HDL  52  46*  42*   LDL  73  90  125   TRIG  199*  171*  124   CHOLHDLRATIO   --   3.7  4.6     Impression / Plan     1. Diabetes Mellitus: Type 2  Current glycemic control can be considered slightly improved, despite multiple setbacks this winter.  Pattern on her CGM shows a higher than usual baseline, and a sharp rise after breakfast.    We will go ahead and increase Lantus back up to 80 units a day, and increase NovoLog with breakfast to 30 units.  We will keep NovoLog 25 units before lunch and dinner and, 10 units before snack.    2. Diabetes Complications: With retinopathy and nephropathy.   Recent CAD workup: normal stress test and echo    3. Blood Pressure Management: Blood pressure is controlled. Currently is on pharmacotherapy for this.     4.Lipid Management: Per the new ACC/JILLIAN/NHLBI guidelines, statins are recommended for individuals with diabetes aged 40-75 with LDL  without ASCVD, and for any individual with ASCVD. Currently the patient is on a statin.     5. Smoking Status: Patient Pt is smoke free..     Follow up: 3 months      Shai Dong MD  Endocrinology, Diabetes and Metabolism  [facility]

## 2019-03-20 ENCOUNTER — OFFICE VISIT (OUTPATIENT)
Dept: NEUROSURGERY | Facility: CLINIC | Age: 59
End: 2019-03-20
Payer: COMMERCIAL

## 2019-03-20 ENCOUNTER — PATIENT OUTREACH (OUTPATIENT)
Dept: NEUROSURGERY | Facility: CLINIC | Age: 59
End: 2019-03-20

## 2019-03-20 VITALS
TEMPERATURE: 99.5 F | BODY MASS INDEX: 33.63 KG/M2 | DIASTOLIC BLOOD PRESSURE: 81 MMHG | OXYGEN SATURATION: 96 % | SYSTOLIC BLOOD PRESSURE: 135 MMHG | HEIGHT: 64 IN | HEART RATE: 98 BPM | WEIGHT: 197 LBS

## 2019-03-20 DIAGNOSIS — Z98.890 S/P BILATERAL CARPAL TUNNEL RELEASE: Primary | ICD-10-CM

## 2019-03-20 ASSESSMENT — PAIN SCALES - GENERAL: PAINLEVEL: NO PAIN (1)

## 2019-03-20 ASSESSMENT — MIFFLIN-ST. JEOR: SCORE: 1440.65

## 2019-03-20 NOTE — LETTER
3/20/2019      RE: Maliha Pedro  [address]  [place] [address]       Dear Dr. Ortiz:    We saw Ms. Maliha Pedro in Neurosurgery Clinic for bilateral carpal tunnel syndrome, which was treated with right carpal tunnel release on 11/19/2018 and left carpal tunnel release on 12/17/2018. She has recovered reasonably well from both surgeries with improvement of her condition. During her last visit, she described increased swelling in the left hand. She had an ultrasound of both hands that did not show any compression of the median nerve in the wrist. There was edema of the nerves. Her swelling is improving and is helped with icing the hand/wrist. The numbness and reduced range of motion in her fingers have resolved. She is starting to get back into yoga.    Currently, she is most concerned about her disability paperwork.     PHYSICAL EXAM: On exam, both hand incisions have healed well. The left hand is slightly more edematous than the right. She has some tenderness along the incision but we could not palpate any fluid under the incision. She has effort-dependent weakness in the left hand muscles throughout but she appears to have antigravity strength in every motion that we tested. Her range of motion in the left thumb appears most limited but she is able to move it in all directions. Her strength in the right hand is normal throughout.    REVIEW OF STUDIES: There were no new images to review.    IMPRESSION AND PLAN: We are pleased to see Ms. Pedro's hands are improving. She would like to return to work by 3/26/2019 and we will fill out and arrange the necessary paperwork so this can happen. We will see her as needed and she can contact us with any concerns.    Please do not hesitate to contact us with questions. We will keep you informed of her progress.     I, Annabel Talley, am serving as a scribe to document services personally performed by Ivania Moran MD, based upon my  observations and the provider's statements to me. All documentation has been reviewed by the aforementioned doctor prior to being entered into the official medical record.    I, Ivania Moran, attest that above named individual is acting in scribe capacity, has observed my performance of the services and has documented them in accordance with my direction. The documentation recorded by the scribe accurately reflects the service I personally performed and the decisions made by me.    Ivania Moran MD

## 2019-03-20 NOTE — PATIENT INSTRUCTIONS
Follow up with Dr. Moran as needed.    Lift 0 - 10 pounds with the left hand on a regular basis.  Lift 11 - 24 pounds with the left hand occasionally.  No not lift anything greater than 25 pounds with the left hand.

## 2019-03-20 NOTE — LETTER
3/20/2019       RE: Maliha Pedro  [address]  [place] [address]     Dear Colleague,    Thank you for referring your patient, Maliha Pedro, to the [facility] NEUROSURGERY at [facility]. Please see a copy of my visit note below.    Dear Dr. Ortiz:    We saw Ms. Maliha Pedro in Neurosurgery Clinic for bilateral carpal tunnel syndrome, which was treated with right carpal tunnel release on 11/19/2018 and left carpal tunnel release on 12/17/2018. She has recovered reasonably well from both surgeries with improvement of her condition. During her last visit, she described increased swelling in the left hand. She had an ultrasound of both hands that did not show any compression of the median nerve in the wrist. There was edema of the nerves. Her swelling is improving and is helped with icing the hand/wrist. The numbness and reduced range of motion in her fingers have resolved. She is starting to get back into yoga.    Currently, she is most concerned about her disability paperwork.     PHYSICAL EXAM: On exam, both hand incisions have healed well. The left hand is slightly more edematous than the right. She has some tenderness along the incision but we could not palpate any fluid under the incision. She has effort-dependent weakness in the left hand muscles throughout but she appears to have antigravity strength in every motion that we tested. Her range of motion in the left thumb appears most limited but she is able to move it in all directions. Her strength in the right hand is normal throughout.    REVIEW OF STUDIES: There were no new images to review.    IMPRESSION AND PLAN: We are pleased to see Ms. Pedro's hands are improving. She would like to return to work by 3/26/2019 and we will fill out and arrange the necessary paperwork so this can happen. We will see her as needed and she can contact us with any concerns.    Please do not hesitate  to contact us with questions. We will keep you informed of her progress.     I, Annabel Gallardodhury, am serving as a scribe to document services personally performed by Ivania Moran MD, based upon my observations and the provider's statements to me. All documentation has been reviewed by the aforementioned doctor prior to being entered into the official medical record.    I, Ivania Moran, attest that above named individual is acting in scribe capacity, has observed my performance of the services and has documented them in accordance with my direction. The documentation recorded by the scribe accurately reflects the service I personally performed and the decisions made by me.    Again, thank you for allowing me to participate in the care of your patient.      Sincerely,    Ivania Moran MD

## 2019-03-20 NOTE — PROGRESS NOTES
Dear Dr. Ortiz:    We saw Ms. Maliha Pedro in Neurosurgery Clinic for bilateral carpal tunnel syndrome, which was treated with right carpal tunnel release on 11/19/2018 and left carpal tunnel release on 12/17/2018. She has recovered reasonably well from both surgeries with improvement of her condition. During her last visit, she described increased swelling in the left hand. She had an ultrasound of both hands that did not show any compression of the median nerve in the wrist. There was edema of the nerves. Her swelling is improving and is helped with icing the hand/wrist. The numbness and reduced range of motion in her fingers have resolved. She is starting to get back into yoga.    Currently, she is most concerned about her disability paperwork.     PHYSICAL EXAM: On exam, both hand incisions have healed well. The left hand is slightly more edematous than the right. She has some tenderness along the incision but we could not palpate any fluid under the incision. She has effort-dependent weakness in the left hand muscles throughout but she appears to have antigravity strength in every motion that we tested. Her range of motion in the left thumb appears most limited but she is able to move it in all directions. Her strength in the right hand is normal throughout.    REVIEW OF STUDIES: There were no new images to review.    IMPRESSION AND PLAN: We are pleased to see Ms. Pedro's hands are improving. She would like to return to work by 3/26/2019 and we will fill out and arrange the necessary paperwork so this can happen. We will see her as needed and she can contact us with any concerns.    Please do not hesitate to contact us with questions. We will keep you informed of her progress.     ODALYS CARMONA MD    I, Annabel Talley, am serving as a scribe to document services personally performed by Odalys Carmona MD, based upon my observations and the provider's statements to me. All  documentation has been reviewed by the aforementioned doctor prior to being entered into the official medical record.    I, Ivania Moran, attest that above named individual is acting in scribe capacity, has observed my performance of the services and has documented them in accordance with my direction. The documentation recorded by the scribe accurately reflects the service I personally performed and the decisions made by me.

## 2019-03-22 ENCOUNTER — TELEPHONE (OUTPATIENT)
Dept: NEUROSURGERY | Facility: CLINIC | Age: 59
End: 2019-03-22

## 2019-03-27 ENCOUNTER — ANCILLARY PROCEDURE (OUTPATIENT)
Dept: MAMMOGRAPHY | Facility: CLINIC | Age: 59
End: 2019-03-27
Payer: COMMERCIAL

## 2019-03-27 DIAGNOSIS — Z12.31 VISIT FOR SCREENING MAMMOGRAM: ICD-10-CM

## 2019-05-28 DIAGNOSIS — J30.2 SEASONAL ALLERGIC RHINITIS, UNSPECIFIED TRIGGER: ICD-10-CM

## 2019-05-28 RX ORDER — FLUTICASONE PROPIONATE 50 MCG
SPRAY, SUSPENSION (ML) NASAL
Qty: 16 G | Refills: 11 | Status: SHIPPED | OUTPATIENT
Start: 2019-05-28 | End: 2020-12-22

## 2019-05-28 NOTE — TELEPHONE ENCOUNTER
Hello,  last fill date:12-  Last quantity:16gm    Thank You,  Anisha Raygoza  Pharmacy Technician  [facility]  [phone]

## 2019-06-19 ENCOUNTER — OFFICE VISIT (OUTPATIENT)
Dept: ENDOCRINOLOGY | Facility: CLINIC | Age: 59
End: 2019-06-19
Payer: COMMERCIAL

## 2019-06-19 ENCOUNTER — MYC MEDICAL ADVICE (OUTPATIENT)
Dept: ENDOCRINOLOGY | Facility: CLINIC | Age: 59
End: 2019-06-19

## 2019-06-19 VITALS — SYSTOLIC BLOOD PRESSURE: 114 MMHG | DIASTOLIC BLOOD PRESSURE: 77 MMHG | HEART RATE: 76 BPM

## 2019-06-19 DIAGNOSIS — E11.21 TYPE 2 DIABETES MELLITUS WITH DIABETIC NEPHROPATHY, WITH LONG-TERM CURRENT USE OF INSULIN (H): ICD-10-CM

## 2019-06-19 DIAGNOSIS — E11.3299 TYPE 2 DIABETES MELLITUS WITH MILD NONPROLIFERATIVE RETINOPATHY WITHOUT MACULAR EDEMA, WITH LONG-TERM CURRENT USE OF INSULIN, UNSPECIFIED LATERALITY (H): Primary | ICD-10-CM

## 2019-06-19 DIAGNOSIS — Z79.4 TYPE 2 DIABETES MELLITUS WITH DIABETIC NEPHROPATHY, WITH LONG-TERM CURRENT USE OF INSULIN (H): ICD-10-CM

## 2019-06-19 DIAGNOSIS — Z79.4 TYPE 2 DIABETES MELLITUS WITH MILD NONPROLIFERATIVE RETINOPATHY WITHOUT MACULAR EDEMA, WITH LONG-TERM CURRENT USE OF INSULIN, UNSPECIFIED LATERALITY (H): Primary | ICD-10-CM

## 2019-06-19 DIAGNOSIS — I10 ESSENTIAL HYPERTENSION: ICD-10-CM

## 2019-06-19 PROBLEM — E11.65 TYPE 2 DIABETES MELLITUS WITH HYPERGLYCEMIA, WITH LONG-TERM CURRENT USE OF INSULIN (H): Status: RESOLVED | Noted: 2017-02-08 | Resolved: 2019-06-19

## 2019-06-19 LAB — HBA1C MFR BLD: 7.7 % (ref 4.3–6)

## 2019-06-19 RX ORDER — AMLODIPINE BESYLATE 5 MG/1
5 TABLET ORAL DAILY
Qty: 90 TABLET | Refills: 3 | Status: CANCELLED | OUTPATIENT
Start: 2019-06-19

## 2019-06-19 RX ORDER — HYDROCHLOROTHIAZIDE 12.5 MG/1
12.5 TABLET ORAL DAILY
Qty: 90 TABLET | Refills: 3 | Status: SHIPPED | OUTPATIENT
Start: 2019-06-19 | End: 2020-03-24

## 2019-06-19 RX ORDER — LOSARTAN POTASSIUM 100 MG/1
100 TABLET ORAL DAILY
Qty: 90 TABLET | Refills: 3 | Status: SHIPPED | OUTPATIENT
Start: 2019-06-19 | End: 2020-06-15

## 2019-06-19 ASSESSMENT — MIFFLIN-ST. JEOR: SCORE: 1433.84

## 2019-06-19 NOTE — PROGRESS NOTES
Endocrinology Clinic Visit 19  NAME:  Maliha Pedro  PCP:  Elva Ortiz  MRN:  [id number]    Chief Complaint     Chief Complaint   Patient presents with     RECHECK     Type 2 Diabetes       History of Present Illness     Maliha Pedro is a 59 year old female who is seen in clinic for diabetes management.   She has had diabetes since age 32. Has tried many different meds. Did not tolerate GLP-1 agonists. Stopped Actos due to concerns about adverse effects. Was on Lantus and Novolog with poor control, with recent improvement since addition of Jardiance 2017. She has history of mild stable retinopathy.     Last urine MA: Aug 2018: 10.3, Cr 2018 normal  Neuropathy: none  Eye exam: last done 2018 - mild retinopathy, stable  Lipids: last checked Aug 2018. She is on statin.   HTN: on ARB, hydrochlorothiazide and CCB    Interval History:   Last visit I increased her Lantus and her AM Novolog slightly.   She has been noticing more hypoglycemia recently. Especially int he middle of night or morning.   A1c today is 7.7, down from 7.9.      Associated Signs/Symptoms  Hypoglycemia: no. Hyperglycemia: none.Neuropathy: none. Vascular Symtpoms: none. Angina/CHF: dyspnea on exertion. Ulcers: No. Amputations: No    Current treatment strategy: Novolog 15-25 units before meals (depending on BG and size of meal). Lantus 80 units daily. Metformin 1000 mg po BID, Jardiance 25 mg po daily    Blood Glucose Monitoring: CGM download: shannon: 14 day data:  Avg 139. SD 66.4. Hypo: 16%, within ran%, above range: 26%.   Pattern: drop overnight    Diet: 2-3 meals per day. Occasional snacks.   Cut down on rice. Eating more vegetables and fish.     Exercise: yoga.     Weight:   Wt Readings from Last 4 Encounters:   19 (P) 88.7 kg (195 lb 8 oz)   19 89.4 kg (197 lb)   19 89.4 kg (197 lb 3.2 oz)   19 88.7 kg (195 lb 9.6 oz)     Problem List     Patient Active Problem  List   Diagnosis     Type 2 diabetes mellitus with hyperglycemia (H)     ? 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     Displacement of lumbar intervertebral disc without myelopathy     Nonallopathic lesion of lumbar region     Nonallopathic lesion of thoracic region     Nonallopathic lesion of sacral region     Bartholin gland cyst     Cataract     Irritable bowel syndrome     Esophageal reflux     Seasonal allergic rhinitis     Latex sensitivity     Colon polyp     Adenomatous polyp     Subclinical hyperthyroidism     HYPERLIPIDEMIA LDL GOAL <100     Ovarian cyst     Breast pain     Essential hypertension     Advanced directives, counseling/discussion     Obesity     Hypomagnesemia     Type 2 diabetes mellitus with hyperglycemia, with long-term current use of insulin (H)        Medications     Current Outpatient Medications   Medication     albuterol (PROAIR HFA/PROVENTIL HFA/VENTOLIN HFA) 108 (90 Base) MCG/ACT inhaler     amLODIPine (NORVASC) 5 MG tablet     aspirin 81 MG EC tablet     CALCIUM + D OR     Continuous Blood Gluc  (FREESTYLE BUSHRA 14 DAY READER) KIRIT     Continuous Blood Gluc Sensor (FREESTYLE BUSHRA 14 DAY SENSOR) MISC     empagliflozin (JARDIANCE) 25 MG TABS tablet     fexofenadine (ALLEGRA) 180 MG tablet     fish oil-omega-3 fatty acids (FISH OIL) 1000 MG capsule     fluticasone (FLONASE) 50 MCG/ACT nasal spray     Glucosamine-Chondroitin (GLUCOSAMINE CHONDR COMPLEX PO)     hydrochlorothiazide 12.5 MG TABS tablet     insulin aspart (NOVOLOG FLEXPEN) 100 UNIT/ML pen     insulin glargine (LANTUS SOLOSTAR PEN) 100 UNIT/ML pen     insulin pen needle (BD LISA U/F) 32G X 4 MM miscellaneous     losartan (COZAAR) 100 MG tablet     magnesium 250 MG tablet     metFORMIN (GLUCOPHAGE-XR) 500 MG 24 hr tablet     montelukast (SINGULAIR) 10 MG tablet     MULTIVITAMIN OR     NEEDLES, ANY SIZE     pantoprazole (PROTONIX) 40 MG EC tablet     pentoxifylline (TRENTAL) 400 MG CR tablet      predniSONE (DELTASONE) 20 MG tablet     rosuvastatin (CRESTOR) 10 MG tablet     VITAMIN D 2000 UNIT OR TABS     No current facility-administered medications for this visit.      Facility-Administered Medications Ordered in Other Visits   Medication     fentaNYL (SUBLIMAZE) injection 25-50 mcg     flumazenil (ROMAZICON) injection 0.2 mg     midazolam (VERSED) injection 0.5-1 mg     naloxone (NARCAN) injection 0.1-0.4 mg        Allergies     Allergies   Allergen Reactions     Latex Swelling     Mold      Tetanus Antitoxin Swelling     Tetracycline Swelling       Medical / Surgical History     Past Medical History:   Diagnosis Date     Adjustment disorder with mixed anxiety and depressed mood     following death of her adoptive father and then again after adoptive mother      Allergic rhinitis, cause unspecified     uses benadryl prn (sneezing/hoarse voice)     Bartholin gland cyst 2008     Chest pain, unspecified     neg dobutamine stress test  (reacted to dobutamine)     Dry eyes      ERM OD (epiretinal membrane, right eye)     BE mild     Esophageal reflux     EGD: -neg     Glaucoma suspect      Hypertension      Irregular menstrual cycle     s/p D&C, since then more regular     Irritable bowel syndrome     colonoscopy -neg. Sx's especially prior to menses     MEDICAL HISTORY OF -     had health directive with : DANIEL Ford.  Full Code. Aunt (Charlie Tay) will be decision maker     Meningitis, unspecified(322.9) age 8    hospitalized for 9 mo     Need for prophylactic vaccination with tuberculosis (BCG) vaccine     Has pos Mantoux. Gets yearly cxr, all neg.      Other and unspecified hyperlipidemia      Pain in joint, shoulder region     bone spurs on MRI, s/p pool therapy     Type II or unspecified type diabetes mellitus without mention of complication, not stated as uncontrolled Age 32    Follows with endocrine at : [facility] MD     Unspecified cataract     RE      Unspecified sinusitis (chronic)     2006     Wheezing     Has seen pulm 2006 & 2007. Told night ashtma, ? vocal chord spasm due to cold air.      Past Surgical History:   Procedure Laterality Date     CATARACT IOL, RT/LT  9/23/2008    LE     HC DILATION/CURETTAGE DIAG/THER NON OB  1992     HC TYMPANOPLASTY W/O MASTOID, INIT/REV W/O OSS CHAIN RECONST  12/09     RELEASE CARPAL TUNNEL Right 11/19/2018    Procedure: Right Carpal Tunnel Release;  Surgeon: Ivania Moran MD;  Location: [facility] OR     RELEASE CARPAL TUNNEL Left 12/17/2018    Procedure: Left Carpal Tunnel Release;  Surgeon: Ivania Moran MD;  Location: [facility] OR     YAG CAPSULOTOMY OS (LEFT EYE)  4/23/2011       Social History     Social History     Socioeconomic History     Marital status: Single     Spouse name: Not on file     Number of children: Not on file     Years of education: Not on file     Highest education level: Not on file   Occupational History     Not on file   Social Needs     Financial resource strain: Not on file     Food insecurity:     Worry: Not on file     Inability: Not on file     Transportation needs:     Medical: Not on file     Non-medical: Not on file   Tobacco Use     Smoking status: Never Smoker     Smokeless tobacco: Never Used   Substance and Sexual Activity     Alcohol use: No     Frequency: Never     Comment: rarely     Drug use: No     Sexual activity: Not Currently     Partners: Male   Lifestyle     Physical activity:     Days per week: Not on file     Minutes per session: Not on file     Stress: Not on file   Relationships     Social connections:     Talks on phone: Not on file     Gets together: Not on file     Attends Orthodox service: Not on file     Active member of club or organization: Not on file     Attends meetings of clubs or organizations: Not on file     Relationship status: Not on file     Intimate partner violence:     Fear of current or ex partner: Not on file     Emotionally abused:  Not on file     Physically abused: Not on file     Forced sexual activity: Not on file   Other Topics Concern     Parent/sibling w/ CABG, MI or angioplasty before 65F 55M? No      Service No     Blood Transfusions No     Caffeine Concern No     Occupational Exposure Yes     Comment: exposed to X-ray     Hobby Hazards No     Sleep Concern Yes     Comment: Hot Flashes     Stress Concern Yes     Comment: Work     Weight Concern Yes     Special Diet No     Back Care Yes     Comment: Back pain     Exercise Yes     Bike Helmet No     Seat Belt Yes     Self-Exams Yes   Social History Narrative    Works at  in surgical ICU    Born [place] Moved to  in . Initially lived in [place].    No children        2 cats       Family History     Family History   Problem Relation Age of Onset     Diabetes Brother      Cancer Brother 59        Lung cancer     Diabetes Father          of dm 70's     C.A.D. Father      Diabetes Paternal Grandfather      Diabetes Sister      Cancer Maternal Aunt         ovarian cancer.      Diabetes Brother      Diabetes Brother      Diabetes Sister      Glaucoma Maternal Grandmother      Macular Degeneration No family hx of        ROS     Constitutional: no fevers, chills, night sweats. No weight loss or fatigue. Good appetite  Eyes: no vision changes, no eye redness, no diplopia  Ears, Nose, mouth, throat: no hearing changes, no tinnitus, no rhinorrhea, no nasal congestion  Cardiovascular: no chest pain, no orthopnea or PND, no edema, no palpitations  Respiratory: no dyspnea, no cough, no sputum, no wheezing  Gastrointestinal: no nausea, no vomiting, no abdominal pain, no diarrhea, no constipation  Genitourinary: no dysuria, no frequency, no urgency, no nocturia  Musculoskeletal: no joint pains, no back pain, no cramps, no fractures  Skin: no rash, no itching, no dryness, no ulcers, no hair loss  Neurological: no headache, no weakness, no numbness, no dizziness,  "no tremors  Psychiatric: no anxiety, no sadness  Hematologic/lymphatic: no easy bruising, no bleeding, no palor    Physical Exam   Ht (P) 1.613 m (5' 3.5\")   Wt (P) 88.7 kg (195 lb 8 oz)   LMP 03/19/2009 (Exact Date)   BMI (P) 34.09 kg/m       General: Comfortable, no obvious distress, normal body habitus  Eyes: Sclera anicteric, moist conjunctiva  HENT: Atraumatic, oropharynx clear, moist mucous membranes with no mucosal ulcerations  Neck: Trachea midline, supple. Thyroid: Thyroid is normal in size and texture  CV: Regular rhythm, normal rate. No murmurs auscultated  Resp: Clear to auscultation bilaterally, good effort  Abdomen:  Soft, non tender, non distended. Bowel sounds heard. No organomegaly.  Skin: No rashes, lesions, or subcutaneous nodules.   Psych: Alert and oriented x 3. Appropriate affect, good insight  Extremities: No peripheral edema  Musculoskeletal: Appropriate muscle bulk and strength  Lymphatic: No cervical lymphadenopathy  Neuro: Moves all four extremities. No focal deficits on limited exam. Gait normal.       Labs/Imaging and Outside Records     Pertinent Labs were reviewed and updated in EPIC.    Summary of recent findings:   Lab Results   Component Value Date    A1C 9.8 12/16/2013    A1C 9.5 04/15/2013    A1C 9.6 01/16/2013    A1C 9.4 09/28/2012    A1C 8.3 06/22/2012       TSH   Date Value Ref Range Status   10/30/2017 1.29 0.40 - 4.00 mU/L Final   02/08/2017 0.76 0.40 - 4.00 mU/L Final   11/17/2015 0.98 0.40 - 4.00 mU/L Final   05/04/2015 0.76 0.40 - 4.00 mU/L Final   12/16/2013 1.24 0.4 - 5.0 mU/L Final     T4 Free   Date Value Ref Range Status   06/21/2010 1.50 0.70 - 1.85 ng/dL Final       Creatinine   Date Value Ref Range Status   12/12/2018 0.69 0.52 - 1.04 mg/dL Final       Recent Labs   Lab Test  03/06/17   0905  05/04/15   1016  12/16/13   1028   CHOL  165  170  192   HDL  52  46*  42*   LDL  73  90  125   TRIG  199*  171*  124   CHOLHDLRATIO   --   3.7  4.6     Impression / Plan "     1. Diabetes Mellitus: Type 2  Current glycemic control can be considered improved. She is having more hypoglycemia.   Plan:   - reduce Lantus back down to 70 units  - continue same Novolog doses    2. Diabetes Complications: With retinopathy - stable    3. Blood Pressure Management: Blood pressure is controlled. Currently is on pharmacotherapy for this.   - I refilled losartan and hydrochlorothiazide. She will try off Norvasc and see what happens.     4.Lipid Management: Per the new ACC/JILLIAN/NHLBI guidelines, statins are recommended for individuals with diabetes aged 40-75 with LDL  without ASCVD, and for any individual with ASCVD. Currently the patient is on a statin.     5. Smoking Status: Patient Pt is smoke free..     Follow up: 3 months      Shai Dong MD  Endocrinology, Diabetes and Metabolism  [facility]

## 2019-06-19 NOTE — NURSING NOTE
Chief Complaint   Patient presents with     RECHECK     Type 2 Diabetes     Capillary puncture performed for Hemoglobin A1C test. Patient tolerated well.    Ashley Roman MA

## 2019-06-19 NOTE — LETTER
2019       RE: Maliha Pedro  [address] [place] [address]     Dear Colleague,    Thank you for referring your patient, Maliha Pedro, to the [facility] ENDOCRINOLOGY at [facility]. Please see a copy of my visit note below.    Endocrinology Clinic Visit 19  NAME:  Maliha Pedro  PCP:  Elva Ortiz  MRN:  [id number]    Chief Complaint     Chief Complaint   Patient presents with     RECHECK     Type 2 Diabetes       History of Present Illness     Maliha Pedro is a 59 year old female who is seen in clinic for diabetes management.   She has had diabetes since age 32. Has tried many different meds. Did not tolerate GLP-1 agonists. Stopped Actos due to concerns about adverse effects. Was on Lantus and Novolog with poor control, with recent improvement since addition of Jardiance 2017. She has history of mild stable retinopathy.     Last urine MA: Aug 2018: 10.3, Cr 2018 normal  Neuropathy: none  Eye exam: last done 2018 - mild retinopathy, stable  Lipids: last checked Aug 2018. She is on statin.   HTN: on ARB, hydrochlorothiazide and CCB    Interval History:   Last visit I increased her Lantus and her AM Novolog slightly.   She has been noticing more hypoglycemia recently. Especially int he middle of night or morning.   A1c today is 7.7, down from 7.9.      Associated Signs/Symptoms  Hypoglycemia: no. Hyperglycemia: none.Neuropathy: none. Vascular Symtpoms: none. Angina/CHF: dyspnea on exertion. Ulcers: No. Amputations: No    Current treatment strategy: Novolog 15-25 units before meals (depending on BG and size of meal). Lantus 80 units daily. Metformin 1000 mg po BID, Jardiance 25 mg po daily    Blood Glucose Monitoring: CGM download: shannon: 14 day data:  Avg 139. SD 66.4. Hypo: 16%, within ran%, above range: 26%.   Pattern: drop overnight    Diet: 2-3 meals per day. Occasional snacks.   Cut  down on rice. Eating more vegetables and fish.     Exercise: yoga.     Weight:   Wt Readings from Last 4 Encounters:   06/19/19 (P) 88.7 kg (195 lb 8 oz)   03/20/19 89.4 kg (197 lb)   03/13/19 89.4 kg (197 lb 3.2 oz)   02/06/19 88.7 kg (195 lb 9.6 oz)     Problem List     Patient Active Problem List   Diagnosis     Type 2 diabetes mellitus with hyperglycemia (H)     ? 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     Displacement of lumbar intervertebral disc without myelopathy     Nonallopathic lesion of lumbar region     Nonallopathic lesion of thoracic region     Nonallopathic lesion of sacral region     Bartholin gland cyst     Cataract     Irritable bowel syndrome     Esophageal reflux     Seasonal allergic rhinitis     Latex sensitivity     Colon polyp     Adenomatous polyp     Subclinical hyperthyroidism     HYPERLIPIDEMIA LDL GOAL <100     Ovarian cyst     Breast pain     Essential hypertension     Advanced directives, counseling/discussion     Obesity     Hypomagnesemia     Type 2 diabetes mellitus with hyperglycemia, with long-term current use of insulin (H)        Medications     Current Outpatient Medications   Medication     albuterol (PROAIR HFA/PROVENTIL HFA/VENTOLIN HFA) 108 (90 Base) MCG/ACT inhaler     amLODIPine (NORVASC) 5 MG tablet     aspirin 81 MG EC tablet     CALCIUM + D OR     Continuous Blood Gluc  (FREESTYLE BUSHRA 14 DAY READER) KIRIT     Continuous Blood Gluc Sensor (FREESTYLE BUSHRA 14 DAY SENSOR) MISC     empagliflozin (JARDIANCE) 25 MG TABS tablet     fexofenadine (ALLEGRA) 180 MG tablet     fish oil-omega-3 fatty acids (FISH OIL) 1000 MG capsule     fluticasone (FLONASE) 50 MCG/ACT nasal spray     Glucosamine-Chondroitin (GLUCOSAMINE CHONDR COMPLEX PO)     hydrochlorothiazide 12.5 MG TABS tablet     insulin aspart (NOVOLOG FLEXPEN) 100 UNIT/ML pen     insulin glargine (LANTUS SOLOSTAR PEN) 100 UNIT/ML pen     insulin pen needle (BD LISA U/F) 32G X 4 MM  miscellaneous     losartan (COZAAR) 100 MG tablet     magnesium 250 MG tablet     metFORMIN (GLUCOPHAGE-XR) 500 MG 24 hr tablet     montelukast (SINGULAIR) 10 MG tablet     MULTIVITAMIN OR     NEEDLES, ANY SIZE     pantoprazole (PROTONIX) 40 MG EC tablet     pentoxifylline (TRENTAL) 400 MG CR tablet     predniSONE (DELTASONE) 20 MG tablet     rosuvastatin (CRESTOR) 10 MG tablet     VITAMIN D 2000 UNIT OR TABS     No current facility-administered medications for this visit.      Facility-Administered Medications Ordered in Other Visits   Medication     fentaNYL (SUBLIMAZE) injection 25-50 mcg     flumazenil (ROMAZICON) injection 0.2 mg     midazolam (VERSED) injection 0.5-1 mg     naloxone (NARCAN) injection 0.1-0.4 mg        Allergies     Allergies   Allergen Reactions     Latex Swelling     Mold      Tetanus Antitoxin Swelling     Tetracycline Swelling       Medical / Surgical History     Past Medical History:   Diagnosis Date     Adjustment disorder with mixed anxiety and depressed mood     following death of her adoptive father and then again after adoptive mother      Allergic rhinitis, cause unspecified     uses benadryl prn (sneezing/hoarse voice)     Bartholin gland cyst 2008     Chest pain, unspecified     neg dobutamine stress test  (reacted to dobutamine)     Dry eyes      ERM OD (epiretinal membrane, right eye)     BE mild     Esophageal reflux     EGD: -neg     Glaucoma suspect      Hypertension      Irregular menstrual cycle     s/p D&C, since then more regular     Irritable bowel syndrome     colonoscopy -neg. Sx's especially prior to menses     MEDICAL HISTORY OF -     had health directive with : DANIEL Ford.  Full Code. Aunt (Charlie Tay) will be decision maker     Meningitis, unspecified(322.9) age 8    hospitalized for 9 mo     Need for prophylactic vaccination with tuberculosis (BCG) vaccine     Has pos Mantoux. Gets yearly cxr, all neg.      Other and  unspecified hyperlipidemia      Pain in joint, shoulder region     bone spurs on MRI, s/p pool therapy     Type II or unspecified type diabetes mellitus without mention of complication, not stated as uncontrolled Age 32    [facility]s [facility] endocrine at [facility]: Shayne Lane MD     Unspecified cataract     RE     Unspecified sinusitis (chronic)     2006     Wheezing     Has seen pulm 2006 & 2007. Told night ashtma, ? vocal chord spasm due to cold air.      Past Surgical History:   Procedure Laterality Date     CATARACT IOL, RT/LT  9/23/2008    LE     HC DILATION/CURETTAGE DIAG/THER NON OB  1992     HC TYMPANOPLASTY W/O MASTOID, INIT/REV W/O OSS CHAIN RECONST  12/09     RELEASE CARPAL TUNNEL Right 11/19/2018    Procedure: Right Carpal Tunnel Release;  Surgeon: Ivania Moran MD;  Location: [facility] OR     RELEASE CARPAL TUNNEL Left 12/17/2018    Procedure: Left Carpal Tunnel Release;  Surgeon: Ivania Moran MD;  Location: [facility] OR     YAG CAPSULOTOMY OS (LEFT EYE)  4/23/2011       Social History     Social History     Socioeconomic History     Marital status: Single     Spouse name: Not on file     Number of children: Not on file     Years of education: Not on file     Highest education level: Not on file   Occupational History     Not on file   Social Needs     Financial resource strain: Not on file     Food insecurity:     Worry: Not on file     Inability: Not on file     Transportation needs:     Medical: Not on file     Non-medical: Not on file   Tobacco Use     Smoking status: Never Smoker     Smokeless tobacco: Never Used   Substance and Sexual Activity     Alcohol use: No     Frequency: Never     Comment: rarely     Drug use: No     Sexual activity: Not Currently     Partners: Male   Lifestyle     Physical activity:     Days per week: Not on file     Minutes per session: Not on file     Stress: Not on file   Relationships     Social connections:     Talks on phone: Not on file     Gets together: Not  on file     Attends Restorationism service: Not on file     Active member of club or organization: Not on file     Attends meetings of clubs or organizations: Not on file     Relationship status: Not on file     Intimate partner violence:     Fear of current or ex partner: Not on file     Emotionally abused: Not on file     Physically abused: Not on file     Forced sexual activity: Not on file   Other Topics Concern     Parent/sibling w/ CABG, MI or angioplasty before 65F 55M? No      Service No     Blood Transfusions No     Caffeine Concern No     Occupational Exposure Yes     Comment: exposed to X-ray     Hobby Hazards No     Sleep Concern Yes     Comment: Hot Flashes     Stress Concern Yes     Comment: Work     Weight Concern Yes     Special Diet No     Back Care Yes     Comment: Back pain     Exercise Yes     Bike Helmet No     Seat Belt Yes     Self-Exams Yes   Social History Narrative    Works at  in surgical ICU    Born in the [facility]. Moved to  in . Initially lived in [place].    No children        2 cats       Family History     Family History   Problem Relation Age of Onset     Diabetes Brother      Cancer Brother 59        Lung cancer     Diabetes Father          of dm 70's     C.A.D. Father      Diabetes Paternal Grandfather      Diabetes Sister      Cancer Maternal Aunt         ovarian cancer.      Diabetes Brother      Diabetes Brother      Diabetes Sister      Glaucoma Maternal Grandmother      Macular Degeneration No family hx of        ROS     Constitutional: no fevers, chills, night sweats. No weight loss or fatigue. Good appetite  Eyes: no vision changes, no eye redness, no diplopia  Ears, Nose, mouth, throat: no hearing changes, no tinnitus, no rhinorrhea, no nasal congestion  Cardiovascular: no chest pain, no orthopnea or PND, no edema, no palpitations  Respiratory: no dyspnea, no cough, no sputum, no wheezing  Gastrointestinal: no nausea, no vomiting, no abdominal  "pain, no diarrhea, no constipation  Genitourinary: no dysuria, no frequency, no urgency, no nocturia  Musculoskeletal: no joint pains, no back pain, no cramps, no fractures  Skin: no rash, no itching, no dryness, no ulcers, no hair loss  Neurological: no headache, no weakness, no numbness, no dizziness, no tremors  Psychiatric: no anxiety, no sadness  Hematologic/lymphatic: no easy bruising, no bleeding, no palor    Physical Exam   Ht (P) 1.613 m (5' 3.5\")   Wt (P) 88.7 kg (195 lb 8 oz)   LMP 03/19/2009 (Exact Date)   BMI (P) 34.09 kg/m        General: Comfortable, no obvious distress, normal body habitus  Eyes: Sclera anicteric, moist conjunctiva  HENT: Atraumatic, oropharynx clear, moist mucous membranes with no mucosal ulcerations  Neck: Trachea midline, supple. Thyroid: Thyroid is normal in size and texture  CV: Regular rhythm, normal rate. No murmurs auscultated  Resp: Clear to auscultation bilaterally, good effort  Abdomen:  Soft, non tender, non distended. Bowel sounds heard. No organomegaly.  Skin: No rashes, lesions, or subcutaneous nodules.   Psych: Alert and oriented x 3. Appropriate affect, good insight  Extremities: No peripheral edema  Musculoskeletal: Appropriate muscle bulk and strength  Lymphatic: No cervical lymphadenopathy  Neuro: Moves all four extremities. No focal deficits on limited exam. Gait normal.       Labs/Imaging and Outside Records     Pertinent Labs were reviewed and updated in EPIC.    Summary of recent findings:   Lab Results   Component Value Date    A1C 9.8 12/16/2013    A1C 9.5 04/15/2013    A1C 9.6 01/16/2013    A1C 9.4 09/28/2012    A1C 8.3 06/22/2012       TSH   Date Value Ref Range Status   10/30/2017 1.29 0.40 - 4.00 mU/L Final   02/08/2017 0.76 0.40 - 4.00 mU/L Final   11/17/2015 0.98 0.40 - 4.00 mU/L Final   05/04/2015 0.76 0.40 - 4.00 mU/L Final   12/16/2013 1.24 0.4 - 5.0 mU/L Final     T4 Free   Date Value Ref Range Status   06/21/2010 1.50 0.70 - 1.85 ng/dL Final "       Creatinine   Date Value Ref Range Status   12/12/2018 0.69 0.52 - 1.04 mg/dL Final       Recent Labs   Lab Test  03/06/17   0905  05/04/15   1016  12/16/13   1028   CHOL  165  170  192   HDL  52  46*  42*   LDL  73  90  125   TRIG  199*  171*  124   CHOLHDLRATIO   --   3.7  4.6     Impression / Plan     1. Diabetes Mellitus: Type 2  Current glycemic control can be considered improved. She is having more hypoglycemia.   Plan:   - reduce Lantus back down to 70 units  - continue same Novolog doses    2. Diabetes Complications: With retinopathy - stable    3. Blood Pressure Management: Blood pressure is controlled. Currently is on pharmacotherapy for this.   - I refilled losartan and hydrochlorothiazide. She will try off Norvasc and see what happens.     4.Lipid Management: Per the new ACC/JILLIAN/NHLBI guidelines, statins are recommended for individuals with diabetes aged 40-75 with LDL  without ASCVD, and for any individual with ASCVD. Currently the patient is on a statin.     5. Smoking Status: Patient Pt is smoke free..     Follow up: 3 months      Shai Dong MD  Endocrinology, Diabetes and Metabolism  [facility]

## 2019-06-20 RX ORDER — FLASH GLUCOSE SENSOR
1 KIT MISCELLANEOUS
Qty: 6 EACH | Refills: 1 | Status: SHIPPED | OUTPATIENT
Start: 2019-06-20 | End: 2019-07-15

## 2019-07-13 DIAGNOSIS — Z98.890 S/P BILATERAL CARPAL TUNNEL RELEASE: Primary | ICD-10-CM

## 2019-07-15 ENCOUNTER — TELEPHONE (OUTPATIENT)
Dept: ENDOCRINOLOGY | Facility: CLINIC | Age: 59
End: 2019-07-15

## 2019-07-15 DIAGNOSIS — Z79.4 TYPE 2 DIABETES MELLITUS WITH DIABETIC NEPHROPATHY, WITH LONG-TERM CURRENT USE OF INSULIN (H): ICD-10-CM

## 2019-07-15 DIAGNOSIS — E11.21 TYPE 2 DIABETES MELLITUS WITH DIABETIC NEPHROPATHY, WITH LONG-TERM CURRENT USE OF INSULIN (H): ICD-10-CM

## 2019-07-15 RX ORDER — FLASH GLUCOSE SCANNING READER
1 EACH MISCELLANEOUS DAILY
Qty: 1 DEVICE | Refills: 1 | Status: SHIPPED | OUTPATIENT
Start: 2019-07-15 | End: 2022-12-06

## 2019-07-15 RX ORDER — FLASH GLUCOSE SENSOR
1 KIT MISCELLANEOUS
Qty: 6 EACH | Refills: 3 | Status: SHIPPED | OUTPATIENT
Start: 2019-07-15 | End: 2019-09-25

## 2019-07-15 NOTE — TELEPHONE ENCOUNTER
[facility] Call Center    Phone Message    May a detailed message be left on voicemail: yes    Reason for Call: Medication Refill Request    Has the patient contacted the pharmacy for the refill? Yes   Name of medication being requested: Continuous Blood Gluc Sensor (FREESTYLE BUSHRA 14 DAY SENSOR)    Provider who prescribed the medication: Dr. Logan  Pharmacy: PLEASE REDIRECT RX TO [facility]  Date medication is needed: asap         Action Taken: Message routed to:  [facility] ([facility]): med refill team

## 2019-08-12 ENCOUNTER — OFFICE VISIT (OUTPATIENT)
Dept: FAMILY MEDICINE | Facility: CLINIC | Age: 59
End: 2019-08-12
Payer: COMMERCIAL

## 2019-08-12 VITALS
HEIGHT: 63 IN | SYSTOLIC BLOOD PRESSURE: 112 MMHG | HEART RATE: 70 BPM | TEMPERATURE: 97.8 F | BODY MASS INDEX: 33.13 KG/M2 | OXYGEN SATURATION: 97 % | RESPIRATION RATE: 18 BRPM | DIASTOLIC BLOOD PRESSURE: 72 MMHG | WEIGHT: 187 LBS

## 2019-08-12 DIAGNOSIS — M25.532 PAIN IN BOTH WRISTS: ICD-10-CM

## 2019-08-12 DIAGNOSIS — Z79.4 TYPE 2 DIABETES MELLITUS WITH HYPERGLYCEMIA, WITH LONG-TERM CURRENT USE OF INSULIN (H): ICD-10-CM

## 2019-08-12 DIAGNOSIS — J30.2 SEASONAL ALLERGIC RHINITIS, UNSPECIFIED TRIGGER: ICD-10-CM

## 2019-08-12 DIAGNOSIS — I10 ESSENTIAL HYPERTENSION: ICD-10-CM

## 2019-08-12 DIAGNOSIS — E11.21 TYPE 2 DIABETES MELLITUS WITH DIABETIC NEPHROPATHY, WITH LONG-TERM CURRENT USE OF INSULIN (H): ICD-10-CM

## 2019-08-12 DIAGNOSIS — H91.92 DECREASED HEARING OF LEFT EAR: ICD-10-CM

## 2019-08-12 DIAGNOSIS — Z79.4 TYPE 2 DIABETES MELLITUS WITH DIABETIC NEPHROPATHY, WITH LONG-TERM CURRENT USE OF INSULIN (H): ICD-10-CM

## 2019-08-12 DIAGNOSIS — R21 RASH AND NONSPECIFIC SKIN ERUPTION: ICD-10-CM

## 2019-08-12 DIAGNOSIS — E28.39 ESTROGEN DEFICIENCY: ICD-10-CM

## 2019-08-12 DIAGNOSIS — Z13.220 SCREENING FOR HYPERLIPIDEMIA: ICD-10-CM

## 2019-08-12 DIAGNOSIS — Z00.00 ROUTINE GENERAL MEDICAL EXAMINATION AT A HEALTH CARE FACILITY: Primary | ICD-10-CM

## 2019-08-12 DIAGNOSIS — M85.80 OSTEOPENIA, UNSPECIFIED LOCATION: ICD-10-CM

## 2019-08-12 DIAGNOSIS — E11.65 TYPE 2 DIABETES MELLITUS WITH HYPERGLYCEMIA, WITH LONG-TERM CURRENT USE OF INSULIN (H): ICD-10-CM

## 2019-08-12 DIAGNOSIS — M25.531 PAIN IN BOTH WRISTS: ICD-10-CM

## 2019-08-12 LAB
ALBUMIN SERPL-MCNC: 4 G/DL (ref 3.4–5)
ALP SERPL-CCNC: 55 U/L (ref 40–150)
ALT SERPL W P-5'-P-CCNC: 34 U/L (ref 0–50)
ANION GAP SERPL CALCULATED.3IONS-SCNC: 8 MMOL/L (ref 3–14)
AST SERPL W P-5'-P-CCNC: 22 U/L (ref 0–45)
BILIRUB SERPL-MCNC: 0.5 MG/DL (ref 0.2–1.3)
BUN SERPL-MCNC: 16 MG/DL (ref 7–30)
CALCIUM SERPL-MCNC: 9.8 MG/DL (ref 8.5–10.1)
CHLORIDE SERPL-SCNC: 105 MMOL/L (ref 94–109)
CHOLEST SERPL-MCNC: 153 MG/DL
CO2 SERPL-SCNC: 26 MMOL/L (ref 20–32)
CREAT SERPL-MCNC: 0.64 MG/DL (ref 0.52–1.04)
GFR SERPL CREATININE-BSD FRML MDRD: >90 ML/MIN/{1.73_M2}
GLUCOSE SERPL-MCNC: 127 MG/DL (ref 70–99)
HDLC SERPL-MCNC: 47 MG/DL
LDLC SERPL CALC-MCNC: 74 MG/DL
NONHDLC SERPL-MCNC: 106 MG/DL
POTASSIUM SERPL-SCNC: 3.5 MMOL/L (ref 3.4–5.3)
PROT SERPL-MCNC: 7.7 G/DL (ref 6.8–8.8)
SODIUM SERPL-SCNC: 139 MMOL/L (ref 133–144)
TRIGL SERPL-MCNC: 159 MG/DL

## 2019-08-12 PROCEDURE — 90670 PCV13 VACCINE IM: CPT | Performed by: FAMILY MEDICINE

## 2019-08-12 PROCEDURE — 36415 COLL VENOUS BLD VENIPUNCTURE: CPT | Performed by: FAMILY MEDICINE

## 2019-08-12 PROCEDURE — 80053 COMPREHEN METABOLIC PANEL: CPT | Performed by: FAMILY MEDICINE

## 2019-08-12 PROCEDURE — 90471 IMMUNIZATION ADMIN: CPT | Performed by: FAMILY MEDICINE

## 2019-08-12 PROCEDURE — 99207 C FOOT EXAM  NO CHARGE: CPT | Mod: 25 | Performed by: FAMILY MEDICINE

## 2019-08-12 PROCEDURE — 80061 LIPID PANEL: CPT | Performed by: FAMILY MEDICINE

## 2019-08-12 PROCEDURE — 99213 OFFICE O/P EST LOW 20 MIN: CPT | Mod: 25 | Performed by: FAMILY MEDICINE

## 2019-08-12 PROCEDURE — 99396 PREV VISIT EST AGE 40-64: CPT | Mod: 25 | Performed by: FAMILY MEDICINE

## 2019-08-12 RX ORDER — PENTOXIFYLLINE 400 MG/1
400 TABLET, EXTENDED RELEASE ORAL 2 TIMES DAILY
Qty: 180 TABLET | Refills: 3 | Status: SHIPPED | OUTPATIENT
Start: 2019-08-12 | End: 2021-11-15

## 2019-08-12 RX ORDER — MONTELUKAST SODIUM 10 MG/1
10 TABLET ORAL AT BEDTIME
Qty: 30 TABLET | Refills: 11 | Status: SHIPPED | OUTPATIENT
Start: 2019-08-12 | End: 2020-06-15

## 2019-08-12 ASSESSMENT — MIFFLIN-ST. JEOR: SCORE: 1391.32

## 2019-08-12 ASSESSMENT — PAIN SCALES - GENERAL: PAINLEVEL: MILD PAIN (3)

## 2019-08-12 NOTE — NURSING NOTE
Screening Questionnaire for Adult Immunization    Are you sick today?   No   Do you have allergies to medications, food, a vaccine component or latex?   Yes   Have you ever had a serious reaction after receiving a vaccination?   No   Do you have a long-term health problem with heart disease, lung disease, asthma, kidney disease, metabolic disease (e.g. diabetes), anemia, or other blood disorder?   Yes   Do you have cancer, leukemia, HIV/AIDS, or any other immune system problem?   No   In the past 3 months, have you taken medications that affect  your immune system, such as prednisone, other steroids, or anticancer drugs; drugs for the treatment of rheumatoid arthritis, Crohn s disease, or psoriasis; or have you had radiation treatments?   No   Have you had a seizure, or a brain or other nervous system problem?   No   During the past year, have you received a transfusion of blood or blood     products, or been given immune (gamma) globulin or antiviral drug?   No   For women: Are you pregnant or is there a chance you could become        pregnant during the next month?   No   Have you received any vaccinations in the past 4 weeks?   No     Immunization questionnaire was positive for at least one answer.  Notified known diagnosis and allergy.        Patient instructed to remain in clinic for 15 minutes afterwards, and to report any adverse reaction to me immediately.       Screening performed by Emperatriz Goddard on 8/12/2019 at 10:32 AM.

## 2019-08-12 NOTE — PATIENT INSTRUCTIONS
Please review; protocol failed.     Hypothyroid Medications  Protocol Criteria:  Appointment scheduled in the past 12 months or the next 3 months  TSH resulted in the past 12 months that is normal  Recent Outpatient Visits            5 months ago Preop exam Wear a wrist brace on your right wrist to help with your pain.     Follow-up with ENT for your hearing.     Schedule with dermatology for your legs. Also have them look at the mole on your back.     Please call [facility] (formerly [facility]) at [phone] to schedule your DEXA bone scan.        Preventive Health Recommendations  Female Ages 50 - 64    Yearly exam: See your health care provider every year in order to  o Review health changes.   o Discuss preventive care.    o Review your medicines if your doctor has prescribed any.      Get a Pap test every three years (unless you have an abnormal result and your provider advises testing more often).    If you get Pap tests with HPV test, you only need to test every 5 years, unless you have an abnormal result.     You do not need a Pap test if your uterus was removed (hysterectomy) and you have not had cancer.    You should be tested each year for STDs (sexually transmitted diseases) if you're at risk.     Have a mammogram every 1 to 2 years.    Have a colonoscopy at age 50, or have a yearly FIT test (stool test). These exams screen for colon cancer.      Have a cholesterol test every 5 years, or more often if advised.    Have a diabetes test (fasting glucose) every three years. If you are at risk for diabetes, you should have this test more often.     If you are at risk for osteoporosis (brittle bone disease), think about having a bone density scan (DEXA).    Shots: Get a flu shot each year. Get a tetanus shot every 10 years.    Nutrition:     Eat at least 5 servings of fruits and vegetables each day.    Eat whole-grain bread, whole-wheat pasta and brown rice instead of white grains and rice.    Get adequate Calcium and Vitamin D.     Lifestyle    Exercise at least 150 minutes a week (30 minutes a day, 5 days a week). This will help you control your weight and prevent disease.    Limit alcohol to one drink  per day.    No smoking.     Wear sunscreen to prevent skin cancer.     See your dentist every six months for an exam and cleaning.    See your eye doctor every 1 to 2 years.

## 2019-08-12 NOTE — PROGRESS NOTES
SUBJECTIVE:   CC: Maliha Pedro is an 59 year old woman who presents for preventive health visit.     Healthy Habits:    Do you get at least three servings of calcium containing foods daily (dairy, green leafy vegetables, etc.)? Some days not all    Amount of exercise or daily activities, outside of work: not currently day(s) per week    Problems taking medications regularly No    Medication side effects: No    Have you had an eye exam in the past two years? yes    Do you see a dentist twice per year? yes    Do you have sleep apnea, excessive snoring or daytime drowsiness?snoring not excessive    -Patient is retiring and getting ready to put her house on the market. She is going to move back the [facility] and plans to get an apartment in [place] so she can continue to follow-up with her doctors/medications until her healthcare gets transferred.  -When patient flexes her right wrist she has pain in her thumb that radiates into her forearm. On 11/19/18 she had right carpal tunnel release surgery. Since April of this year she has noticed pain/discomfort with increased activity of her wrist. She takes Tylenol for the pain.  -She had pneumonia in January and she went on steroids for 5 days. She contacted her endocrinologist because her blood sugar was in the 400s. Patient reports they recommended she get  Prevnar 13 due to recurrent URIs. She has already had pneumococal  -Scheduled eye exam in September.   -Patent likes yoga but has not been able to do it for the past 6 weeks due to discomfort/soreness in her wrist and the feeling that her hips being misaligned She has seen the chiropractor in the past for her hips that has helped but she has not been back to them recently   -Pt notes she has lots of bug bites on her legs that she reacts to. She puts hydrocortisone cream on the bites. Would like to see dermatology because the spots are very dark and have scarred without her scratching them. She thinks she gets them  while she is doing yard work although she wears long pants  -She now takes long-acting Jardiance and uses 15-18 units insulin before meals.   -She usually walks 30 minutes per day, her weight has decreased 10 pounds       Wt Readings from Last 4 Encounters:   08/12/19 84.8 kg (187 lb)   06/19/19 (P) 88.7 kg (195 lb 8 oz)   03/20/19 89.4 kg (197 lb)   03/13/19 89.4 kg (197 lb 3.2 oz)     -Her bowels are regular. She drinks more water and eliminated pop from her diet  -She gets reflux if she does not take her protonix   -She had a flare of her allergies this summer. She has been using Flonase and taking Allegra daily that has worked for her. She uses her albuterol inhaler as needed.   -Pt follows up with the breast center annually. No new concerns  -She denies urinary changes, new vaginal symptoms     11/24/2015 US Pelvic Complete with Transvaginal   IMPRESSION: Normal pelvic ultrasound examination.     RICK FLOREZ MD    -Pt has noticed decreased hearing changes in her left ear. She has not had a follow-up with ENT in about 8 years.   -She no longer takes amlodipine per endocrinology, she takes hydrochlorothiazide and losartan which have controlled her bp well          Today's PHQ-2 Score:   PHQ-2 ( 1999 Pfizer) 8/12/2019 6/19/2019   Q1: Little interest or pleasure in doing things 0 0   Q2: Feeling down, depressed or hopeless 0 0   PHQ-2 Score 0 0       Abuse: Current or Past(Physical, Sexual or Emotional)- No  Do you feel safe in your environment? Yes    Social History     Tobacco Use     Smoking status: Never Smoker     Smokeless tobacco: Never Used   Substance Use Topics     Alcohol use: No     Frequency: Never     Comment: rarely     If you drink alcohol do you typically have >3 drinks per day or >7 drinks per week? No                     Reviewed orders with patient.  Reviewed health maintenance and updated orders accordingly - Yes  Patient Active Problem List   Diagnosis     ? 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     Displacement of lumbar intervertebral disc without myelopathy     Nonallopathic lesion of lumbar region     Nonallopathic lesion of thoracic region     Nonallopathic lesion of sacral region     Bartholin gland cyst     Cataract     Irritable bowel syndrome     Esophageal reflux     Seasonal allergic rhinitis     Latex sensitivity     Colon polyp     Adenomatous polyp     HYPERLIPIDEMIA LDL GOAL <100     Ovarian cyst     Breast pain     Essential hypertension     Advanced directives, counseling/discussion     Obesity     Hypomagnesemia     Type 2 diabetes mellitus with mild nonproliferative retinopathy without macular edema, with long-term current use of insulin, unspecified laterality (H)     Past Surgical History:   Procedure Laterality Date     CATARACT IOL, RT/LT  2008    LE     HC DILATION/CURETTAGE DIAG/THER NON OB       HC TYMPANOPLASTY W/O MASTOID, INIT/REV W/O OSS CHAIN RECONST       RELEASE CARPAL TUNNEL Right 2018    Procedure: Right Carpal Tunnel Release;  Surgeon: Ivania Moran MD;  Location: [facility] OR     RELEASE CARPAL TUNNEL Left 2018    Procedure: Left Carpal Tunnel Release;  Surgeon: Ivania Moran MD;  Location: [facility] OR     YAG CAPSULOTOMY OS (LEFT EYE)  2011       Social History     Tobacco Use     Smoking status: Never Smoker     Smokeless tobacco: Never Used   Substance Use Topics     Alcohol use: No     Frequency: Never     Comment: rarely     Family History   Problem Relation Age of Onset     Diabetes Brother      Cancer Brother 59        Lung cancer     Diabetes Father          of dm 70's     C.A.D. Father      Diabetes Paternal Grandfather      Diabetes Sister      Cancer Maternal Aunt         ovarian cancer.      Diabetes Brother      Diabetes Brother      Diabetes Sister      Glaucoma Maternal Grandmother      Macular Degeneration No family hx of            Mammogram Screening: Patient over age 50,  "mutual decision to screen reflected in health maintenance.    Pertinent mammograms are reviewed under the imaging tab.  History of abnormal Pap smear: NO - age 30-65 PAP every 5 years with negative HPV co-testing recommended  PAP / HPV Latest Ref Rng & Units 3/6/2017 12/16/2013 8/29/2011   PAP - NIL NIL NIL   HPV 16 DNA NEG Negative - -   HPV 18 DNA NEG Negative - -   OTHER HR HPV NEG Negative - -     Reviewed and updated as needed this visit by clinical staff  Tobacco  Allergies  Meds  Med Hx  Surg Hx  Fam Hx  Soc Hx        Reviewed and updated as needed this visit by Provider            ROS:   ROS: 10 point ROS neg other than the symptoms noted above in the HPI.    This document serves as a record of the services and decisions personally performed by DARRICK ACHARYA. It was created on his/her behalf by Mel Moise, a trained medical scribe. The creation of this document is based on the provider's statements to the medical scribe. Mel Moise, August 12, 2019 9:46 AM      OBJECTIVE:   /72 (BP Location: Right arm, Patient Position: Chair, Cuff Size: Adult Regular)   Pulse 70   Temp 97.8  F (36.6  C) (Oral)   Resp 18   Ht 1.607 m (5' 3.25\")   Wt 84.8 kg (187 lb)   LMP 03/19/2009 (Exact Date)   SpO2 97%   Breastfeeding? No   BMI 32.86 kg/m     EXAM:  GENERAL APPEARANCE: healthy, alert and no distress  EYES: Eyes grossly normal to inspection, PERRL and conjunctivae and sclerae normal  HENT: left TM is hazy, scarred and with and slight bulging, otherwise right ear canal and TM normal, nose and mouth without ulcers or lesions, oropharynx clear and oral mucous membranes moist  NECK: no adenopathy, no asymmetry, masses, or scars and thyroid normal to palpation  RESP: lungs clear to auscultation - no rales, rhonchi or wheezes  BREAST: Fibrocystic changes, otherwise normal without masses, tenderness or nipple discharge and no palpable axillary masses or adenopathy  CV: regular rate and " rhythm, normal S1 S2, no S3 or S4, no murmur, click or rub, no peripheral edema and peripheral pulses strong  ABDOMEN: soft, nontender, no hepatosplenomegaly, no masses and bowel sounds normal   (female): pt declined due to no concerns and prefers to avoid exam  MS: Finklestein negative right, flexion triggers pain, otherwise no musculoskeletal defects are noted and gait is age appropriate without ataxia  SKIN: post-inflammatory papules/scars and acute erythemetous papules with small central ulceration/?excoriation bilateral LE, otherwise no suspicious lesions or rashes  NEURO: Normal strength and tone, sensory exam grossly normal, mentation intact and speech normal  PSYCH: mentation appears normal and affect normal/bright  Diabetic foot exam: normal DP and PT pulses, no trophic changes or ulcerative lesions and normal sensory exam            ASSESSMENT/PLAN:   1. Routine general medical examination at a health care facility  Pt has advanced directive on file. PCV 13 given today.    2. Screening for hyperlipidemia  - Lipid panel reflex to direct LDL Fasting    3. Seasonal allergic rhinitis, unspecified trigger  Well controlled. Continue current medication.   - montelukast (SINGULAIR) 10 MG tablet; Take 1 tablet (10 mg) by mouth At Bedtime  Dispense: 30 tablet; Refill: 11    4. Pain in both wrists  Discussed benefits of wearing wrist brace, OT referral given if pt decides she wants it.  - OCCUPATIONAL THERAPY REFERRAL; Future    5. Type 2 diabetes mellitus with diabetic nephropathy, with long-term current use of insulin (H)  6. Type 2 diabetes mellitus with hyperglycemia, with long-term current use of insulin (H)- follows with endocrinology. Fair but improving control with her wt loss.   Albumin Random Urine Quantitative with Creat Ratio  - FOOT EXAM  - PNEUMOCOCCAL CONJ VACCINE 13 VALENT IM  - Comprehensive metabolic panel  - pentoxifylline ER (TRENTAL) 400 MG CR tablet; Take 1 tablet (400 mg) by mouth 2 times  "daily  Dispense: 180 tablet; Refill: 3    7. Osteopenia, unspecified location  8. Estrogen deficiency  - DEXA HIP/PELVIS/SPINE - Future; Future    9. Essential hypertension   Controlled on current meds  - Comprehensive metabolic panel    10. Rash and nonspecific skin eruption  Inflammatory papules LE with significant post-inflammatory scarring.  - DERMATOLOGY REFERRAL.     12. Decreased hearing of left ear  - OTOLARYNGOLOGY REFERRAL    13. Wrist pain- suspect tendonitis. Discussed RICE, bracing. Consider PHYSICAL THERAPY and or ortho referral if ongoing pain.     COUNSELING:   Reviewed preventive health counseling, as reflected in patient instructions       Regular exercise       Healthy diet/nutrition       Vision screening       Hearing screening       Immunizations      Estimated body mass index is 32.86 kg/m  as calculated from the following:    Height as of this encounter: 1.607 m (5' 3.25\").    Weight as of this encounter: 84.8 kg (187 lb).    Weight management plan: Discussed healthy diet and exercise guidelines     reports that she has never smoked. She has never used smokeless tobacco.    Patient Instructions   Wear a wrist brace on your right wrist to help with your pain.     Follow-up with ENT for your hearing.     Schedule with dermatology for your legs. Also have them look at the mole on your back.     Please call [facility] (formerly [facility] at [phone] to schedule your DEXA bone scan.        Preventive Health Recommendations  Female Ages 50 - 64    Yearly exam: See your health care provider every year in order to  o Review health changes.   o Discuss preventive care.    o Review your medicines if your doctor has prescribed any.      Get a Pap test every three years (unless you have an abnormal result and your provider advises testing more often).    If you get Pap tests with HPV test, you only need to test every 5 years, unless you have an " abnormal result.     You do not need a Pap test if your uterus was removed (hysterectomy) and you have not had cancer.    You should be tested each year for STDs (sexually transmitted diseases) if you're at risk.     Have a mammogram every 1 to 2 years.    Have a colonoscopy at age 50, or have a yearly FIT test (stool test). These exams screen for colon cancer.      Have a cholesterol test every 5 years, or more often if advised.    Have a diabetes test (fasting glucose) every three years. If you are at risk for diabetes, you should have this test more often.     If you are at risk for osteoporosis (brittle bone disease), think about having a bone density scan (DEXA).    Shots: Get a flu shot each year. Get a tetanus shot every 10 years.    Nutrition:     Eat at least 5 servings of fruits and vegetables each day.    Eat whole-grain bread, whole-wheat pasta and brown rice instead of white grains and rice.    Get adequate Calcium and Vitamin D.     Lifestyle    Exercise at least 150 minutes a week (30 minutes a day, 5 days a week). This will help you control your weight and prevent disease.    Limit alcohol to one drink per day.    No smoking.     Wear sunscreen to prevent skin cancer.     See your dentist every six months for an exam and cleaning.    See your eye doctor every 1 to 2 years.        Counseling Resources:  ATP IV Guidelines  Pooled Cohorts Equation Calculator  Breast Cancer Risk Calculator  FRAX Risk Assessment  ICSI Preventive Guidelines  Dietary Guidelines for Americans, 2010  USDA's MyPlate  ASA Prophylaxis  Lung CA Screening  The information in this document, created by the medical scribe for me, accurately reflects the services I personally performed and the decisions made by me. I have reviewed and approved this document for accuracy.   Elva Ortiz MD  [facility]

## 2019-08-15 ENCOUNTER — ANCILLARY PROCEDURE (OUTPATIENT)
Dept: BONE DENSITY | Facility: CLINIC | Age: 59
End: 2019-08-15
Attending: FAMILY MEDICINE
Payer: COMMERCIAL

## 2019-08-15 DIAGNOSIS — M85.80 OSTEOPENIA, UNSPECIFIED LOCATION: ICD-10-CM

## 2019-08-15 DIAGNOSIS — E28.39 ESTROGEN DEFICIENCY: ICD-10-CM

## 2019-08-15 DIAGNOSIS — M81.8 OTHER OSTEOPOROSIS WITHOUT CURRENT PATHOLOGICAL FRACTURE: ICD-10-CM

## 2019-08-15 PROCEDURE — 77081 DXA BONE DENSITY APPENDICULR: CPT | Mod: 59 | Performed by: RADIOLOGY

## 2019-08-15 PROCEDURE — 77080 DXA BONE DENSITY AXIAL: CPT | Performed by: RADIOLOGY

## 2019-08-19 NOTE — TELEPHONE ENCOUNTER
FUTURE VISIT INFORMATION      FUTURE VISIT INFORMATION:    Date: 9/10/19    Time: 9:00 am ENT  8:00 am Audiology    Location: [facility]  REFERRAL INFORMATION:    Referring provider:  Dr. Elva Ortiz    Referring providers clinic:  [facility]    Reason for visit/diagnosis  Decreased hearing left ear    RECORDS REQUESTED FROM:       Clinic name Comments Records Status Imaging Status   [facility] Office Visit/Referral-8/12/19-Dr. Ortiz [facility] E[facility] Office Visit-6/28/10-Dr. Ramsey [facility] Radiology MR Brain-8/28/15  CT Face Jaw-5/5/09 [facility] PACS   [facility] Neurology Office Visit-10/15/18 Epic

## 2019-08-22 ENCOUNTER — THERAPY VISIT (OUTPATIENT)
Dept: OCCUPATIONAL THERAPY | Facility: CLINIC | Age: 59
End: 2019-08-22
Attending: FAMILY MEDICINE
Payer: COMMERCIAL

## 2019-08-22 DIAGNOSIS — M65.4 RADIAL STYLOID TENOSYNOVITIS (DE QUERVAIN): ICD-10-CM

## 2019-08-22 DIAGNOSIS — M25.532 PAIN IN BOTH WRISTS: ICD-10-CM

## 2019-08-22 DIAGNOSIS — M25.531 PAIN IN BOTH WRISTS: ICD-10-CM

## 2019-08-22 PROCEDURE — 97140 MANUAL THERAPY 1/> REGIONS: CPT | Mod: GO | Performed by: OCCUPATIONAL THERAPIST

## 2019-08-22 PROCEDURE — 97165 OT EVAL LOW COMPLEX 30 MIN: CPT | Mod: GO | Performed by: OCCUPATIONAL THERAPIST

## 2019-08-22 PROCEDURE — 97110 THERAPEUTIC EXERCISES: CPT | Mod: GO | Performed by: OCCUPATIONAL THERAPIST

## 2019-08-22 PROCEDURE — 97035 APP MDLTY 1+ULTRASOUND EA 15: CPT | Mod: GO | Performed by: OCCUPATIONAL THERAPIST

## 2019-08-22 NOTE — PROGRESS NOTES
Hand Therapy Initial Evaluation    Current Date:  8/22/2019  Referring Physician:Elva Ortiz MD    Diagnosis: Bilateral wrist pain  DOI: 8/12/19 (Date of order)  DOS: (R) 11/2018, (L) 12/2018  Procedure: CTR    Subjective:  Maliha Pedro is a 59 year old right hand dominant female.    Patient reports symptoms of pain, stiffness/loss of motion, weakness/loss of strength and edema of the bilateral wrists and hands which occurred due to s/p the above stated surgery. Since onset symptoms are Gradually getting better.  Special tests:  none.  Previous treatment: surgery, braces.    General health as reported by patient is good.  Pertinent medical history includes:Diabetes, High Blood Pressure  Medical allergies:latex, sulfa, tetanus, toxoid.  Surgical history: orthopedic: (B) CTR, other: cataract.  Medication history: High Blood Pressure, diabetes medication.    Occupational Profile Information:  Current occupation is RN  Currently working in normal job without restrictions  Job Tasks: Computer Work, Lifting, Carrying, Prolonged Standing, Pushing, Pulling, Repetitive Tasks  Prior functional level:  no limitations  Barriers include:none  Mobility: No difficulty  Transportation: drives  Leisure activities/hobbies: yoga, puzzles, crocheting    Upper Extremity Functional Index Score:  SCORE:   Column Totals: /80: 35   (A lower score indicates greater disability.)    Objective:  Pain Level (Scale 0-10):   8/22/2019   At Rest 1-2/10   With Use 1-2/10     Pain Description:  Date 8/22/2019   Location wrist, thumb and forearm   Pain Quality Stretching and tightness   Frequency intermittent     Pain is worst  daytime   Exacerbated by  writing, opening bags, weight bearing,    Relieved by rest and Tylenol   Progression Unchanged      ROM  Pain Report: - none  + mild    ++ moderate    +++ severe   Wrist 8/22/2019 8/22/2019   AROM (PROM) Left Right   Extension 55 60   Flexion 55 45   RD 25 20   UD 45 45+   UD with  thumb flexed 45 30+      ROM  Thumb 8/22/2019 8/22/2019   AROM  (PROM) Left Right   MP / /55   IP / /70   RABD     PABD     Retropulsion     Kapandji Opposition Scale (0-10/10) 9/10 9/10      Strength   (Measured in pounds)  Pain Report: - none  + mild    ++ moderate    +++ severe    8/22/2019 8/22/2019   Trials Left Right   1  2  3 44  42  41 35  28  34   Average 42 32     Lat Pinch 8/22/2019 8/22/2019   Trials Left Right   1  2  3 16 14   Average       3 Pt Pinch 8/22/2019 8/22/2019   Trials Left Right   1  2  3 13 7+   Average        Functional Exam:  - no pain, + mild, ++ moderate, +++ severe  MMT:  Right 8/22/19   Thumb EPL  +   Thumb EPB  -   Thumb APL +   Radial Deviation  +   Ulnar Deviation  -   Wrist Ext -   Wrist Flex  -     Palpation:  Right 8/22/19   Radial Styloid  +   1st dorsal comp.  +   Intersection syndrome +++     Special Tests:    Right 8/22/19   Finkelstein   +   Radial Nerve Tinels +     Assessment:  Patient presents with symptoms consistent with diagnosis of DeQuervains tendonitis and intersection syndrom, with conservative intervention.     Patient's limitations or Problem List includes:  Pain, Decreased ROM/motion, Weakness, Decreased  and pinch strength and tightness in musculature of the right wrist, thumb and forearm which interferes with the patient's ability to perform Self Care Tasks (dressing), Work Tasks, Sleep Patterns, Recreational Activities, Household Chores and Driving  as compared to previous level of function.    Rehab Potential:  Excellent - Return to full activity, no limitations    Patient will benefit from skilled Occupational Therapy to increase wrist and thumb ROM, flexibility,  strength and pinch strength and decrease pain to return to previous activity level and resume normal daily tasks and to reach their rehab potential.    Barriers to Learning:  No barrier    Communication Issues:  Patient appears to be able to clearly communicate and understand verbal  and written communication and follow directions correctly.    Chart Review: Brief history including review of medical and/or therapy records relating to the presenting problem and Simple history review with patient    Identified Performance Deficits: dressing, driving and community mobility, home establishment and management, meal preparation and cleanup, work and leisure activities    Assessment of Occupational Performance:  5 or more Performance Deficits    Clinical Decision Making (Complexity): Low complexity    Treatment Explanation:  The following has been discussed with the patient:    RX ordered/plan of care  Anticipated outcomes  Possible risks and side effects    Plan:  Frequency:  1 X week, once daily  Duration:  for 8 weeks    Treatment Plan:    Modalities:    US   Therapeutic Exercise:   AROM of wrist and thumb  PROM with stretch to wrist and thumb extensors   Manual Techniques:   Friction massage over 1st dorsal compartment  Myofascial release of the thumb extensors and flexors  Neuromuscular:   Radial nerve gliding   Ismael taping  Orthotic Fabrication:    Forearm based thumb spica orthosis  Self Care:   Ergonomic consideration   Diagnostic education    Discharge Plan:    Achieve all LTG.  Independent in home treatment program.  Reach maximal therapeutic benefit.    Home Program:   Warmth for stiffness  Ice after activity for pain  Self TFM to 1st dorsal compartment  Self MFR to EPB/ABL  AROM of wrist and thumb  PROM with stretch into simultaneous thumb flexion and ulnar deviation  Thumb spica orthosis for sleeping and per symptoms day  Avoid activities that exacerbate pain in the wrist or thumb    Next Visit:  US  MFR to extensors and radial wrist  A/PROM of wrist and thumb

## 2019-08-24 ENCOUNTER — MYC REFILL (OUTPATIENT)
Dept: NEUROLOGY | Facility: CLINIC | Age: 59
End: 2019-08-24

## 2019-08-24 DIAGNOSIS — S66.911S STRAIN OF RIGHT HAND, SEQUELA: Primary | ICD-10-CM

## 2019-08-24 NOTE — TELEPHONE ENCOUNTER
Discussed with Ms. Pedro need for refill of kinesiology tape for her right hand physical therapy.  Prescribed refill to [facility].    Bo Thomson M.D.  Neurosurgery Resident, PGY-2    Please contact neurosurgery resident on call with questions.    Dial * [phone], enter [phone] when prompted.

## 2019-08-26 ENCOUNTER — THERAPY VISIT (OUTPATIENT)
Dept: OCCUPATIONAL THERAPY | Facility: CLINIC | Age: 59
End: 2019-08-26
Payer: COMMERCIAL

## 2019-08-26 DIAGNOSIS — M25.531 RIGHT WRIST PAIN: Primary | ICD-10-CM

## 2019-08-26 DIAGNOSIS — M65.4 RADIAL STYLOID TENOSYNOVITIS (DE QUERVAIN): ICD-10-CM

## 2019-08-26 PROCEDURE — 97140 MANUAL THERAPY 1/> REGIONS: CPT | Mod: GO | Performed by: OCCUPATIONAL THERAPIST

## 2019-08-26 PROCEDURE — 97110 THERAPEUTIC EXERCISES: CPT | Mod: GO | Performed by: OCCUPATIONAL THERAPIST

## 2019-08-26 PROCEDURE — 97035 APP MDLTY 1+ULTRASOUND EA 15: CPT | Mod: GO | Performed by: OCCUPATIONAL THERAPIST

## 2019-09-09 ENCOUNTER — OFFICE VISIT (OUTPATIENT)
Dept: OPHTHALMOLOGY | Facility: CLINIC | Age: 59
End: 2019-09-09
Attending: OPHTHALMOLOGY
Payer: COMMERCIAL

## 2019-09-09 DIAGNOSIS — H40.003 BORDERLINE GLAUCOMA, BILATERAL: ICD-10-CM

## 2019-09-09 DIAGNOSIS — H40.003 BORDERLINE GLAUCOMA, BILATERAL: Primary | ICD-10-CM

## 2019-09-09 PROCEDURE — 92015 DETERMINE REFRACTIVE STATE: CPT | Mod: ZF

## 2019-09-09 PROCEDURE — G0463 HOSPITAL OUTPT CLINIC VISIT: HCPCS | Mod: ZF

## 2019-09-09 PROCEDURE — 92083 EXTENDED VISUAL FIELD XM: CPT | Mod: ZF | Performed by: OPHTHALMOLOGY

## 2019-09-09 ASSESSMENT — VISUAL ACUITY
CORRECTION_TYPE: GLASSES
OD_CC+: -2
OS_CC: 20/20
METHOD: SNELLEN - LINEAR
OD_CC: 20/20
OS_CC+: -2

## 2019-09-09 ASSESSMENT — REFRACTION_WEARINGRX
OD_SPHERE: -3.25
OD_AXIS: 135
OS_AXIS: 090
OS_SPHERE: -4.00
OS_ADD: +2.50
OD_CYLINDER: +0.50
OD_ADD: +2.50
OS_CYLINDER: +1.50
SPECS_TYPE: PAL

## 2019-09-09 ASSESSMENT — REFRACTION_MANIFEST
OS_AXIS: 090
OS_CYLINDER: +1.00
OS_SPHERE: -3.75
OD_ADD: +2.50
OD_AXIS: 135
OS_ADD: +2.50
OD_SPHERE: -3.75
OD_CYLINDER: +0.50

## 2019-09-09 ASSESSMENT — TONOMETRY
OS_IOP_MMHG: 15
IOP_METHOD: APPLANATION
OD_IOP_MMHG: 14

## 2019-09-09 ASSESSMENT — SLIT LAMP EXAM - LIDS
COMMENTS: MEIBOMIAN GLAND DYSFUNCTION
COMMENTS: MEIBOMIAN GLAND DYSFUNCTION

## 2019-09-09 ASSESSMENT — CUP TO DISC RATIO
OD_RATIO: 0.9
OS_RATIO: 0.7

## 2019-09-09 ASSESSMENT — CONF VISUAL FIELD
METHOD: COUNTING FINGERS
OS_NORMAL: 1
OD_NORMAL: 1

## 2019-09-09 ASSESSMENT — EXTERNAL EXAM - RIGHT EYE: OD_EXAM: DERMATOCHALASIS

## 2019-09-09 ASSESSMENT — EXTERNAL EXAM - LEFT EYE: OS_EXAM: DERMATOCHALASIS

## 2019-09-09 NOTE — PROGRESS NOTES
Interval history:     Ocular meds:  ATs as needed     Ocular history:  Cataract extraction / intraocular lens left eye 7-8 years ago     Octopus visual field 24-2 9/9/19   Stable both eyes     OCT rNFL 11/14/18   Stable OU    A/P:  1) Glaucoma suspect, bilateral  - large cup to disc ratio R>L  - favorable pachmetry 583/613    -IOP stable mid-upper teens today    2) Type 2 diabetes mellitus. A1c 8.2 5/2018. On insulin  - without retinopathy  - follows Dr. Tracy     3) Cataract, right eye  -still seeing well  -observe     3) Pseudophakia, left eye    Follow up 6 months with OCT retinal nerve fiber layer       Attending Physician Attestation:  Complete documentation of historical and exam elements from today's encounter can be found in the full encounter summary report (not reduplicated in this progress note). I personally obtained the chief complaint(s) and history of present illness. I confirmed and edited asnecessary the review of systems, past medical/surgical history, family history, social history, and examination findings as documented by others; and I examined the patient myself. I personally reviewed the relevant tests, images, and reports as documented above. I formulated and edited as necessary the assessment and plan and discussed the findings and management plan with the patient and family.  - Umu Rosario MD 10:45 AM 9/9/2019

## 2019-09-09 NOTE — NURSING NOTE
"Chief Complaint(s) and History of Present Illness(es)     Glaucoma Follow-Up     In both eyes.  Associated symptoms include Negative for dryness, eye pain, redness and tearing.  Pain was noted as 0/10.              Comments     10 month f/u for Glaucoma suspect, BE. Vision is \"ok\" BE with the glasses. Pt states she could use an update in the RX. BE can get itchy due to allergies x the last few weeks.     Ocular meds: AT's prn BE.     A1C 7.7 taken back in June 2019    Lab Results       Component                Value               Date                       A1C                      8.2                 11/14/2018                 A1C                      9.8                 12/16/2013                 A1C                      9.5                 04/15/2013                 A1C                      9.6                 01/16/2013                 A1C                      9.4                 09/28/2012                            "

## 2019-09-10 ENCOUNTER — OFFICE VISIT (OUTPATIENT)
Dept: OTOLARYNGOLOGY | Facility: CLINIC | Age: 59
End: 2019-09-10
Attending: FAMILY MEDICINE
Payer: COMMERCIAL

## 2019-09-10 ENCOUNTER — PRE VISIT (OUTPATIENT)
Dept: OTOLARYNGOLOGY | Facility: CLINIC | Age: 59
End: 2019-09-10

## 2019-09-10 ENCOUNTER — OFFICE VISIT (OUTPATIENT)
Dept: AUDIOLOGY | Facility: CLINIC | Age: 59
End: 2019-09-10
Payer: COMMERCIAL

## 2019-09-10 VITALS
SYSTOLIC BLOOD PRESSURE: 146 MMHG | DIASTOLIC BLOOD PRESSURE: 76 MMHG | RESPIRATION RATE: 15 BRPM | WEIGHT: 191 LBS | HEART RATE: 74 BPM | BODY MASS INDEX: 32.61 KG/M2 | HEIGHT: 64 IN

## 2019-09-10 DIAGNOSIS — H93.13 TINNITUS OF BOTH EARS: ICD-10-CM

## 2019-09-10 DIAGNOSIS — H90.3 SENSORINEURAL HEARING LOSS, BILATERAL: Primary | ICD-10-CM

## 2019-09-10 DIAGNOSIS — H90.3 SENSORINEURAL HEARING LOSS (SNHL), BILATERAL: Primary | ICD-10-CM

## 2019-09-10 ASSESSMENT — MIFFLIN-ST. JEOR: SCORE: 1417.88

## 2019-09-10 ASSESSMENT — PAIN SCALES - GENERAL: PAINLEVEL: NO PAIN (0)

## 2019-09-10 NOTE — LETTER
9/10/2019       RE: Maliha Pedro  [address]  [place] [address]     Dear Colleague,    Thank you for referring your patient, Maliha Pedro, to the [facility] at [facility]. Please see a copy of my visit note below.    [facility] New Patient Visit Note        Otolaryngology        September 10, 2019    Referring Provider:  Elva Ortiz MD        HPI:  Maliha Pedro is a 59 year old female who presents for evaluation of possible fluid behind her left TM.  She had her annual exam in early August and her PCP thought she might have fluid behind her left TM.  Given her history and surgery, she was referred back to us for a follow up visit.    Fco was previously seen and cared for by Dr. Ramsey.  Dr. Ramsey performed a left postauricular tympanoplasty and canalplasty on 12/18/2009.  The last follow up visit with Dr. Ramsey was on 6/28/2010.    Fco is going to be retiring shortly and then moving back to the [facility].  She and her PCP thought she should be rechecked prior to moving    Fco reports that she's doing well.  She states that she has not noticed any significant change in her hearing.  Does occasionally notes that she reports is been stable since her surgery.  She knows she has some chronic hearing loss in both ears if people mumble or do not speak clearly, she cannot hear them or understand them well.  She also reports that when she gets a flareup of her allergies, she will have a little bit of decrease in her hearing in her left ear temporarily and it will feel plugged.  This is been an ongoing issue for many years.  As long she manages her allergies well, she can control this.  She manages her allergies with daily Allegra, as needed Nasonex and Singulair daily.    At the time of her visit today, she denies any plugged sensation of her left ear or allergy/sinus issues.  There is no ear pain  or drainage.  No dizziness or lightheadedness.  No double swallowing, mouth or throat pain, lymphadenopathy of the neck or facial numbness.  No tinnitus. No loss of balance.        ROS:  10 point review of systems completed and is negative except for those listed above    PMH:   Past Medical History:   Diagnosis Date     Adjustment disorder with mixed anxiety and depressed mood     following death of her adoptive father and then again after adoptive mother      Allergic rhinitis, cause unspecified     uses benadryl prn (sneezing/hoarse voice)     Bartholin gland cyst 2008     Chest pain, unspecified     neg dobutamine stress test  (reacted to dobutamine)     Dry eyes      ERM OD (epiretinal membrane, right eye)     BE mild     Esophageal reflux     EGD: -neg     Glaucoma suspect      Hearing problem      Hypertension      Irregular menstrual cycle     s/p D&C, since then more regular     Irritable bowel syndrome     colonoscopy -neg. Sx's especially prior to menses     MEDICAL HISTORY OF -     had health directive with : Karl Arias, MPLS.  Full Code. Aunt (Charlie Tay) will be decision maker     Meningitis, unspecified(322.9) age 8    hospitalized for 9 mo     Need for prophylactic vaccination with tuberculosis (BCG) vaccine     Has pos Mantoux. Gets yearly cxr, all neg.      Other and unspecified hyperlipidemia      Pain in joint, shoulder region     bone spurs on MRI, s/p pool therapy     Tinnitus      Type II or unspecified type diabetes mellitus without mention of complication, not stated as uncontrolled Age 32    Follows [facility] endocrine at [facility]: Shayne Lane MD     Unspecified cataract     RE     Unspecified sinusitis (chronic)     2006     Wheezing     Has seen pulm  & . Told night ashtma, ? vocal chord spasm due to cold air.        PSH:   Past Surgical History:   Procedure Laterality Date     CATARACT IOL, RT/LT  2008    LE     HC DILATION/CURETTAGE DIAG/THER NON OB   1992     HC TYMPANOPLASTY W/O MASTOID, INIT/REV W/O OSS CHAIN RECONST       RELEASE CARPAL TUNNEL Right 2018    Procedure: Right Carpal Tunnel Release;  Surgeon: Ivania Moran MD;  [facility]: [facility]     RELEASE CARPAL TUNNEL Left 2018    Procedure: Left Carpal Tunnel Release;  Surgeon: Ivania Moran MD;  Location: [facility] OR     TYMPANOPLASTY       YAG CAPSULOTOMY OS (LEFT EYE)  2011       FamH:   Family History   Problem Relation Age of Onset     Diabetes Brother      Cancer Brother 59        Lung cancer     Diabetes Father          of dm 70's     C.A.D. Father      Cerebrovascular Disease Father      Diabetes Paternal Grandfather      Hypertension Paternal Grandfather      Cerebrovascular Disease Paternal Grandfather      Diabetes Sister      Cancer Maternal Aunt         ovarian cancer.      Diabetes Brother      Diabetes Brother      Diabetes Sister      Glaucoma Maternal Grandmother      Cancer Brother      Diabetes Brother      Diabetes Sister      Hypertension Sister      Macular Degeneration No family hx of        SocH:   Social History     Tobacco Use     Smoking status: Never Smoker     Smokeless tobacco: Never Used   Substance Use Topics     Alcohol use: No     Frequency: Never     Comment: rarely     Drug use: No       Medications:   Current Outpatient Medications   Medication     Adhesive Tape (KINESIOLOGY TAPE) TAPE     albuterol (PROAIR HFA/PROVENTIL HFA/VENTOLIN HFA) 108 (90 Base) MCG/ACT inhaler     aspirin 81 MG EC tablet     CALCIUM + D OR     Continuous Blood Gluc  (FREESTYLE BUSHRA 14 DAY READER) KIRIT     Continuous Blood Gluc Sensor (FREESTYLE BUSHRA 14 DAY SENSOR) MISC     empagliflozin (JARDIANCE) 25 MG TABS tablet     fexofenadine (ALLEGRA) 180 MG tablet     fish oil-omega-3 fatty acids (FISH OIL) 1000 MG capsule     fluticasone (FLONASE) 50 MCG/ACT nasal spray     Glucosamine-Chondroitin (GLUCOSAMINE CHONDR COMPLEX PO)      "hydrochlorothiazide (HYDRODIURIL) 12.5 MG tablet     insulin aspart (NOVOLOG FLEXPEN) 100 UNIT/ML pen     insulin glargine (LANTUS SOLOSTAR PEN) 100 UNIT/ML pen     insulin pen needle (BD LISA U/F) 32G X 4 MM miscellaneous     losartan (COZAAR) 100 MG tablet     magnesium 250 MG tablet     metFORMIN (GLUCOPHAGE-XR) 500 MG 24 hr tablet     montelukast (SINGULAIR) 10 MG tablet     MULTIVITAMIN OR     NEEDLES, ANY SIZE     pantoprazole (PROTONIX) 40 MG EC tablet     pentoxifylline ER (TRENTAL) 400 MG CR tablet     rosuvastatin (CRESTOR) 10 MG tablet     VITAMIN D 2000 UNIT OR TABS     No current facility-administered medications for this visit.      Facility-Administered Medications Ordered in Other Visits   Medication     fentaNYL (SUBLIMAZE) injection 25-50 mcg     flumazenil (ROMAZICON) injection 0.2 mg     midazolam (VERSED) injection 0.5-1 mg     naloxone (NARCAN) injection 0.1-0.4 mg       Allergies:     Allergies   Allergen Reactions     Latex Swelling     Mold      Tetanus Antitoxin Swelling     Tetracycline Swelling         Physical Exam:   BP (!) 146/76   Pulse 74   Resp 15   Ht 1.62 m (5' 3.78\")   Wt 86.6 kg (191 lb)   LMP 03/19/2009 (Exact Date)   BMI 33.01 kg/m       Constitutional: The patient was unaccompanied, well-groomed, and in no acute distress.    Head: Normocephalic and atraumatic.   Eyes: Pupils were equal and reactive.  Extraocular movement intact.    Ears: Pinnae and tragus non-tender.  EACs and TMs were clear under microscopic exam other than mild tympanosclerosis in the posterior inferior left TM.  No retractions, perforations or effusions bilaterally  Nose: Sinuses were non-tender.  Anterior rhinoscopy revealed midline septum and absence of purulence or polyps.    Mouth: Mucosa pink and moist, tonsils non-erythematous, no exudates, uvula midline  Neck: No lymphadenopathy in the neck.  No palpable thyroid.  Normal range of motion  Skin: Normal:  warm and pink without rash  Neurologic: " Alert and oriented x 3.  CN's III-XII within normal limits.  Voice normal.   Psychiatric: The patient's affect was calm, cooperative, and appropriate.    Communication: Normal; communicates verbally, normal voice quality.          Audiogram September 10, 2019: Pure tone testing revealed normal to moderately-severe SNHL bilaterally with air-bone gap at 250 Hz left ear. % bilaterally. Tymps: Normal mobility bilaterally. Type A curves bilaterally which does not support the presence of an effusion. Reflexes: Ipsi and contra present at normal levels bilaterally.      When compared to post-op audiogram on 1/26/2010, the SNHL is stable bilaterally and the air-bone gap at 250 Hz in the left ear was present at that time.    No significant change      Assessment/Plan:   1. Bilateral SNHL.  Patient with known and stable bilateral sensorineural hearing loss starting at the mid ranges all the way to the high ranges.  No significant change from today's audiogram compared to the postop audiogram in January 2010.  Discussed that the patient may benefit from hearing amplification given the 100% word recognition bilaterally.  At this point, she does not feel the hearing loss is greatly impacting her work or personal life and wants to hold off on this.    In regards to the concern about a possible effusion behind the left TM, there is no evidence of that on physical exam as well as on the to panel entry.  Discussed this with the patient today.    At this point, I do not see any acute findings on her exam and she can continue to follow-up on an as-needed basis.  I did recommend that we get hearing test every 1 to 2 years for monitoring.  Since she is going to be moving back to the [facility], she may need to establish care there, but she is welcome to return to see us at any time.  Patient is comfortable with this plan.    2.  S/p left postauricular tympanoplasty and canalplasty on 12/18/2009 for a chronic perforation. Doing  well from this standpoint.  No new concerns.  Follow up as needed.      Angi Gómez PA-C  Otolaryngology  Head & Neck Surgery  [phone]      CC:  Elva Ortiz MD  [facility]    Naa Ramsey MD  Otolaryngology & Neurotology  [place] [address]

## 2019-09-10 NOTE — NURSING NOTE
"Chief Complaint   Patient presents with     Consult     hearing loss left ear      Blood pressure (!) 146/76, pulse 74, resp. rate 15, height 1.62 m (5' 3.78\"), weight 86.6 kg (191 lb), last menstrual period 03/19/2009, not currently breastfeeding.    Samm Gutiérrez LPN    "

## 2019-09-10 NOTE — PROGRESS NOTES
[facility] New Patient Visit Note        Otolaryngology        September 10, 2019    Referring Provider:  Elva Ortiz MD        HPI:  Maliha Pedro is a 59 year old female who presents for evaluation of possible fluid behind her left TM.  She had her annual exam in early August and her PCP thought she might have fluid behind her left TM.  Given her history and surgery, she was referred back to us for a follow up visit.    Fco was previously seen and cared for by Dr. Ramsey.  Dr. Ramsey performed a left postauricular tympanoplasty and canalplasty on 12/18/2009.  The last follow up visit with Dr. Ramsey was on 6/28/2010.    Fco is going to be retiring shortly and then moving back to the [facility].  She and her PCP thought she should be rechecked prior to moving    Fco reports that she's doing well.  She states that she has not noticed any significant change in her hearing.  Does occasionally notes that she reports is been stable since her surgery.  She knows she has some chronic hearing loss in both ears if people mumble or do not speak clearly, she cannot hear them or understand them well.  She also reports that when she gets a flareup of her allergies, she will have a little bit of decrease in her hearing in her left ear temporarily and it will feel plugged.  This is been an ongoing issue for many years.  As long she manages her allergies well, she can control this.  She manages her allergies with daily Allegra, as needed Nasonex and Singulair daily.    At the time of her visit today, she denies any plugged sensation of her left ear or allergy/sinus issues.  There is no ear pain or drainage.  No dizziness or lightheadedness.  No double swallowing, mouth or throat pain, lymphadenopathy of the neck or facial numbness.  No tinnitus. No loss of balance.        ROS:  10 point review of systems completed and is negative except for those listed above    PMH:   Past Medical History:    Diagnosis Date     Adjustment disorder with mixed anxiety and depressed mood     following death of her adoptive father and then again after adoptive mother      Allergic rhinitis, cause unspecified     uses benadryl prn (sneezing/hoarse voice)     Bartholin gland cyst 2008     Chest pain, unspecified     neg dobutamine stress test  (reacted to dobutamine)     Dry eyes      ERM OD (epiretinal membrane, right eye)     BE mild     Esophageal reflux     EGD: -neg     Glaucoma suspect      Hearing problem      Hypertension      Irregular menstrual cycle     s/p D&C, since then more regular     Irritable bowel syndrome     colonoscopy -neg. Sx's especially prior to menses     MEDICAL HISTORY OF -     had health directive with : GLORIA FordS.  Full Code. Aunt (Charlie Tay) will be decision maker     Meningitis, unspecified(322.9) age 8    hospitalized for 9 mo     Need for prophylactic vaccination with tuberculosis (BCG) vaccine     Has pos Mantoux. Gets yearly cxr, all neg.      Other and unspecified hyperlipidemia      Pain in joint, shoulder region     bone spurs on MRI, s/p pool therapy     Tinnitus      Type II or unspecified type diabetes mellitus without mention of complication, not stated as uncontrolled Age 32    Follows with endocrine at [facility]: Shayne Lane MD     Unspecified cataract     RE     Unspecified sinusitis (chronic)     2006     Wheezing     Has seen pulm  & . Told night ashtma, ? vocal chord spasm due to cold air.        PSH:   Past Surgical History:   Procedure Laterality Date     CATARACT IOL, RT/LT  2008    LE     HC DILATION/CURETTAGE DIAG/THER NON OB       HC TYMPANOPLASTY W/O MASTOID, INIT/REV W/O OSS CHAIN RECONST       RELEASE CARPAL TUNNEL Right 2018    Procedure: Right Carpal Tunnel Release;  Surgeon: Ivania Moran MD;  [facility]: [facility]     RELEASE CARPAL TUNNEL Left 2018    Procedure: Left Carpal Tunnel  Release;  Surgeon: Ivania Moran MD;  [facility]: [facility] OR     TYMPANOPLASTY       YAG CAPSULOTOMY OS (LEFT EYE)  2011       FamH:   Family History   Problem Relation Age of Onset     Diabetes Brother      Cancer Brother 59        Lung cancer     Diabetes Father          of dm 70's     C.A.D. Father      Cerebrovascular Disease Father      Diabetes Paternal Grandfather      Hypertension Paternal Grandfather      Cerebrovascular Disease Paternal Grandfather      Diabetes Sister      Cancer Maternal Aunt         ovarian cancer.      Diabetes Brother      Diabetes Brother      Diabetes Sister      Glaucoma Maternal Grandmother      Cancer Brother      Diabetes Brother      Diabetes Sister      Hypertension Sister      Macular Degeneration No family hx of        SocH:   Social History     Tobacco Use     Smoking status: Never Smoker     Smokeless tobacco: Never Used   Substance Use Topics     Alcohol use: No     Frequency: Never     Comment: rarely     Drug use: No       Medications:   Current Outpatient Medications   Medication     Adhesive Tape (KINESIOLOGY TAPE) TAPE     albuterol (PROAIR HFA/PROVENTIL HFA/VENTOLIN HFA) 108 (90 Base) MCG/ACT inhaler     aspirin 81 MG EC tablet     CALCIUM + D OR     Continuous Blood Gluc  (FREESTYLE BUSHRA 14 DAY READER) KIRIT     Continuous Blood Gluc Sensor (FREESTYLE BUSHRA 14 DAY SENSOR) MISC     empagliflozin (JARDIANCE) 25 MG TABS tablet     fexofenadine (ALLEGRA) 180 MG tablet     fish oil-omega-3 fatty acids (FISH OIL) 1000 MG capsule     fluticasone (FLONASE) 50 MCG/ACT nasal spray     Glucosamine-Chondroitin (GLUCOSAMINE CHONDR COMPLEX PO)     hydrochlorothiazide (HYDRODIURIL) 12.5 MG tablet     insulin aspart (NOVOLOG FLEXPEN) 100 UNIT/ML pen     insulin glargine (LANTUS SOLOSTAR PEN) 100 UNIT/ML pen     insulin pen needle (BD LISA U/F) 32G X 4 MM miscellaneous     losartan (COZAAR) 100 MG tablet     magnesium 250 MG tablet     metFORMIN  "(GLUCOPHAGE-XR) 500 MG 24 hr tablet     montelukast (SINGULAIR) 10 MG tablet     MULTIVITAMIN OR     NEEDLES, ANY SIZE     pantoprazole (PROTONIX) 40 MG EC tablet     pentoxifylline ER (TRENTAL) 400 MG CR tablet     rosuvastatin (CRESTOR) 10 MG tablet     VITAMIN D 2000 UNIT OR TABS     No current facility-administered medications for this visit.      Facility-Administered Medications Ordered in Other Visits   Medication     fentaNYL (SUBLIMAZE) injection 25-50 mcg     flumazenil (ROMAZICON) injection 0.2 mg     midazolam (VERSED) injection 0.5-1 mg     naloxone (NARCAN) injection 0.1-0.4 mg       Allergies:     Allergies   Allergen Reactions     Latex Swelling     Mold      Tetanus Antitoxin Swelling     Tetracycline Swelling         Physical Exam:   BP (!) 146/76   Pulse 74   Resp 15   Ht 1.62 m (5' 3.78\")   Wt 86.6 kg (191 lb)   LMP 03/19/2009 (Exact Date)   BMI 33.01 kg/m      Constitutional: The patient was unaccompanied, well-groomed, and in no acute distress.    Head: Normocephalic and atraumatic.   Eyes: Pupils were equal and reactive.  Extraocular movement intact.    Ears: Pinnae and tragus non-tender.  EACs and TMs were clear under microscopic exam other than mild tympanosclerosis in the posterior inferior left TM.  No retractions, perforations or effusions bilaterally  Nose: Sinuses were non-tender.  Anterior rhinoscopy revealed midline septum and absence of purulence or polyps.    Mouth: Mucosa pink and moist, tonsils non-erythematous, no exudates, uvula midline  Neck: No lymphadenopathy in the neck.  No palpable thyroid.  Normal range of motion  Skin: Normal:  warm and pink without rash  Neurologic: Alert and oriented x 3.  CN's III-XII within normal limits.  Voice normal.   Psychiatric: The patient's affect was calm, cooperative, and appropriate.    Communication: Normal; communicates verbally, normal voice quality.          Audiogram September 10, 2019: Pure tone testing revealed normal to " moderately-severe SNHL bilaterally with air-bone gap at 250 Hz left ear. % bilaterally. Tymps: Normal mobility bilaterally. Type A curves bilaterally which does not support the presence of an effusion. Reflexes: Ipsi and contra present at normal levels bilaterally.      When compared to post-op audiogram on 1/26/2010, the SNHL is stable bilaterally and the air-bone gap at 250 Hz in the left ear was present at that time.    No significant change      Assessment/Plan:   1. Bilateral SNHL.  Patient with known and stable bilateral sensorineural hearing loss starting at the mid ranges all the way to the high ranges.  No significant change from today's audiogram compared to the postop audiogram in January 2010.  Discussed that the patient may benefit from hearing amplification given the 100% word recognition bilaterally.  At this point, she does not feel the hearing loss is greatly impacting her work or personal life and wants to hold off on this.    In regards to the concern about a possible effusion behind the left TM, there is no evidence of that on physical exam as well as on the to panel entry.  Discussed this with the patient today.    At this point, I do not see any acute findings on her exam and she can continue to follow-up on an as-needed basis.  I did recommend that we get hearing test every 1 to 2 years for monitoring.  Since she is going to be moving back to the [facility], she may need to establish care there, but she is welcome to return to see us at any time.  Patient is comfortable with this plan.    2.  S/p left postauricular tympanoplasty and canalplasty on 12/18/2009 for a chronic perforation. Doing well from this standpoint.  No new concerns.  Follow up as needed.      Angi Gómez PA-C  Otolaryngology  Head & Neck Surgery  [phone]      CC:  Elva Ortiz MD  [facility]    Naa Ramsey MD  Otolaryngology & Neurotology  [facility] [address]

## 2019-09-10 NOTE — PATIENT INSTRUCTIONS
Maliha Pedro,    It was a pleasure to see you today.    1. You were seen in the ENT Clinic today by Angi Gómez PA-C    If you have any questions or concerns after your appointment, please call   - Option 1: ENT Clinic: [phone]      2. You have a slight decrease in the hearing of both ears.  You would qualify for hearing aids if you choose to.    3.  Return as needed to see Dr. Ramsey.      Thank you,  Angi Gómez PA-C  Otolaryngology  Head & Neck Surgery  [phone]

## 2019-09-10 NOTE — PROGRESS NOTES
AUDIOLOGY REPORT    SUMMARY: Audiology visit completed. See audiogram for results.      RECOMMENDATIONS: Follow-up with ENT.      Odessa Umanzor  Audiologist  [place] License  #[id number]

## 2019-09-11 ENCOUNTER — OFFICE VISIT (OUTPATIENT)
Dept: OPHTHALMOLOGY | Facility: CLINIC | Age: 59
End: 2019-09-11
Attending: OPHTHALMOLOGY
Payer: COMMERCIAL

## 2019-09-11 DIAGNOSIS — H35.371 EPIRETINAL MEMBRANE (ERM) OF RIGHT EYE: Primary | ICD-10-CM

## 2019-09-11 PROCEDURE — G0463 HOSPITAL OUTPT CLINIC VISIT: HCPCS | Mod: ZF

## 2019-09-11 PROCEDURE — 92134 CPTRZ OPH DX IMG PST SGM RTA: CPT | Mod: ZF | Performed by: OPHTHALMOLOGY

## 2019-09-11 ASSESSMENT — REFRACTION_WEARINGRX
OS_ADD: +2.50
OS_SPHERE: -4.00
OD_CYLINDER: +0.50
OD_ADD: +2.50
SPECS_TYPE: PAL
OD_SPHERE: -3.25
OS_AXIS: 090
OS_CYLINDER: +1.50
OD_AXIS: 135

## 2019-09-11 ASSESSMENT — CONF VISUAL FIELD
OS_NORMAL: 1
OD_NORMAL: 1
METHOD: COUNTING FINGERS

## 2019-09-11 ASSESSMENT — CUP TO DISC RATIO
OD_RATIO: 0.9
OS_RATIO: 0.7

## 2019-09-11 ASSESSMENT — VISUAL ACUITY
OD_CC: 20/25
METHOD: SNELLEN - LINEAR
OD_CC+: +2
OS_CC: 20/20
CORRECTION_TYPE: GLASSES

## 2019-09-11 ASSESSMENT — EXTERNAL EXAM - LEFT EYE: OS_EXAM: DERMATOCHALASIS

## 2019-09-11 ASSESSMENT — SLIT LAMP EXAM - LIDS
COMMENTS: MEIBOMIAN GLAND DYSFUNCTION
COMMENTS: MEIBOMIAN GLAND DYSFUNCTION

## 2019-09-11 ASSESSMENT — TONOMETRY
OS_IOP_MMHG: 16
IOP_METHOD: TONOPEN
OD_IOP_MMHG: 19

## 2019-09-11 ASSESSMENT — EXTERNAL EXAM - RIGHT EYE: OD_EXAM: DERMATOCHALASIS

## 2019-09-11 NOTE — PROGRESS NOTES
Maliha Pedro is a  59 year old year-old patient presenting for follow up Diabetes mellitus, states Diabetes mellitus is under better control. No new new flashes and floaters and VA stable     Optical Coherence Tomography 5-13-15  Right eye:  (was 261) good foveal contour, no cysts or subretinal fluid; trace Epiretinal membrane   Left eye  (was 266) good foveal contour, no cysts or subretinal fluid     Assessment & Plan:     1. Type 2 diabetes mellitus without retinopathy  -hgb a1c 8.8   6-14-17   Blood pressure (<120/80) and blood glucose (HbA1c <7.0) control discussed with patient. Patient advised that failure to adequately control each may lead to vision loss. The patient expressed understanding.    2. Glaucoma suspect, bilateral  - large cup to disc ratio R>L  - favorable pachmetry 583/613  - Octopus visual field   Right eye: Reliable,stable   Left eye: Reliable. Largely normal.   - OCT retinal nerve fiber layer 10/14/15   Stable both eyes   Follow up on nov with dr. Rosario     3. Cataract, right eye  -early visually significant  -observe     4. Pseudophakia, left eye  -stable  -observe    5. Posterior vitreous detachment of right eye  -stable  -observe    6. Old inferior temporal small tear surrounded by pigment, no subretinal fluid right eye   Retina attached, no other tears  Retina detachment precautions were discussed with the patient (presence or increased in flashes, floaters or a curtain in the visual field) and was asked to return if any of the those occur    7. Dry eye syndrome  artificial tears  As needed and warm compresses      8. Mild Epiretinal membrane right eye  Stable   Not visually significant     Follow up 1 yr sooner as needed       ~~~~~~~~~~~~~~~~~~~~~~~~~~~~~~~~~~   Complete documentation of historical and exam elements from today's encounter can be found in the full encounter summary report (not reduplicated in this progress note).  I personally obtained the chief  complaint(s) and history of present illness.  I confirmed and edited as necessary the review of systems, past medical/surgical history, family history, social history, and examination findings as documented by others; and I examined the patient myself.  I personally reviewed the relevant tests, images, and reports as documented above.  I formulated and edited as necessary the assessment and plan and discussed the findings and management plan with the patient and family    Lesly Tracy MD  .  Retina Service   Department of Ophthalmology and Visual Neurosciences   [facility]  Phone: [phone]   Fax: [phone]

## 2019-09-11 NOTE — NURSING NOTE
Chief Complaints and History of Present Illnesses   Patient presents with     Diabetic Eye Exam     Chief Complaint(s) and History of Present Illness(es)     Diabetic Eye Exam               Comments     Annual DM eye exam.  Lab Results       Component                Value               Date                       A1C                      8.2                 11/14/2018                 A1C                      9.8                 12/16/2013                 A1C                      9.5                 04/15/2013                 A1C                      9.6                 01/16/2013                 A1C                      9.4                 09/28/2012            Daphne Li, COA, COA 7:45 AM 09/11/2019

## 2019-09-12 ENCOUNTER — THERAPY VISIT (OUTPATIENT)
Dept: OCCUPATIONAL THERAPY | Facility: CLINIC | Age: 59
End: 2019-09-12
Payer: COMMERCIAL

## 2019-09-12 DIAGNOSIS — M65.4 RADIAL STYLOID TENOSYNOVITIS (DE QUERVAIN): ICD-10-CM

## 2019-09-12 DIAGNOSIS — M25.531 RIGHT WRIST PAIN: ICD-10-CM

## 2019-09-12 PROCEDURE — 97035 APP MDLTY 1+ULTRASOUND EA 15: CPT | Mod: GO | Performed by: OCCUPATIONAL THERAPIST

## 2019-09-12 PROCEDURE — 97110 THERAPEUTIC EXERCISES: CPT | Mod: GO | Performed by: OCCUPATIONAL THERAPIST

## 2019-09-12 PROCEDURE — 97140 MANUAL THERAPY 1/> REGIONS: CPT | Mod: GO | Performed by: OCCUPATIONAL THERAPIST

## 2019-09-12 NOTE — PROGRESS NOTES
SOAP Note - Hand Therapy - Objective Information    Current Date:  9/12/2019  Referring Physician:Elva Ortiz MD    Diagnosis: Bilateral wrist pain  DOI: 8/12/19 (Date of order)  DOS: (R) 11/2018, (L) 12/2018  Procedure: DELISA Pedro is a 59 year old right hand dominant female.    Patient reports symptoms of pain, stiffness/loss of motion, weakness/loss of strength and edema of the bilateral wrists and hands which occurred due to s/p the above stated surgery.     Occupational Profile Information:  Current occupation is RN    S:  Subjective changes as noted by patient: The wrist is much better.  Functional changes noted by patient: less pain with work tasks      O:  Pain Level (Scale 0-10):   8/22/2019 9/12/19   At Rest 1-2/10 1/10   With Use 1-2/10 1-2/10     Pain Description:  Date 8/22/2019   Location wrist, thumb and forearm   Pain Quality Stretching and tightness   Frequency intermittent     Pain is worst  daytime   Exacerbated by  writing, opening bags, weight bearing,    Relieved by rest and Tylenol   Progression Unchanged      ROM  Pain Report: - none  + mild    ++ moderate    +++ severe   Wrist 8/22/2019 8/22/2019   AROM (PROM) Left Right   Extension 55 60   Flexion 55 45   RD 25 20   UD 45 45+   UD with thumb flexed 45 30+      ROM  Thumb 8/22/2019 8/22/2019   AROM  (PROM) Left Right   MP / /55   IP / /70   RABD     PABD     Retropulsion     Kapandji Opposition Scale (0-10/10) 9/10 9/10      Strength   (Measured in pounds)  Pain Report: - none  + mild    ++ moderate    +++ severe    8/22/2019 8/22/2019   Trials Left Right   1  2  3 44  42  41 35  28  34   Average 42 32     Lat Pinch 8/22/2019 8/22/2019   Trials Left Right   1  2  3 16 14   Average       3 Pt Pinch 8/22/2019 8/22/2019   Trials Left Right   1  2  3 13 7+   Average        Functional Exam:  - no pain, + mild, ++ moderate, +++ severe  MMT:  Right 8/22/19   Thumb EPL  +   Thumb EPB  -   Thumb APL +   Radial  Deviation  +   Ulnar Deviation  -   Wrist Ext -   Wrist Flex  -     Palpation:  Right 8/22/19 9/12/19   Radial Styloid  + -   1st dorsal comp.  + +   Intersection syndrome +++ +     Special Tests:    Right 8/22/19   Finkelstein   +   Radial Nerve Tinels +     Please refer to the daily flowsheet for treatment provided today.   Home Program:   Warmth for stiffness  Ice after activity for pain  Self TFM to 1st dorsal compartment  Self MFR to EPB/ABL  AROM of wrist and thumb  PROM with stretch into simultaneous thumb flexion and ulnar deviation  Thumb spica orthosis for sleeping and per symptoms day  Avoid activities that exacerbate pain in the wrist or thumb  K- tape    Next Visit:  US  MFR to extensors and radial wrist  A/PROM of wrist and thumb

## 2019-09-16 ENCOUNTER — THERAPY VISIT (OUTPATIENT)
Dept: OCCUPATIONAL THERAPY | Facility: CLINIC | Age: 59
End: 2019-09-16
Payer: COMMERCIAL

## 2019-09-16 DIAGNOSIS — M25.531 RIGHT WRIST PAIN: ICD-10-CM

## 2019-09-16 DIAGNOSIS — M65.4 RADIAL STYLOID TENOSYNOVITIS (DE QUERVAIN): ICD-10-CM

## 2019-09-16 PROCEDURE — 97110 THERAPEUTIC EXERCISES: CPT | Mod: GO | Performed by: OCCUPATIONAL THERAPIST

## 2019-09-16 PROCEDURE — 97140 MANUAL THERAPY 1/> REGIONS: CPT | Mod: GO | Performed by: OCCUPATIONAL THERAPIST

## 2019-09-16 PROCEDURE — 97035 APP MDLTY 1+ULTRASOUND EA 15: CPT | Mod: GO | Performed by: OCCUPATIONAL THERAPIST

## 2019-09-23 ENCOUNTER — OFFICE VISIT (OUTPATIENT)
Dept: DERMATOLOGY | Facility: CLINIC | Age: 59
End: 2019-09-23
Attending: FAMILY MEDICINE
Payer: COMMERCIAL

## 2019-09-23 VITALS — DIASTOLIC BLOOD PRESSURE: 76 MMHG | HEART RATE: 80 BPM | SYSTOLIC BLOOD PRESSURE: 123 MMHG

## 2019-09-23 DIAGNOSIS — D23.9 DERMATOFIBROMA: ICD-10-CM

## 2019-09-23 DIAGNOSIS — L81.0 POSTINFLAMMATORY HYPERPIGMENTATION: ICD-10-CM

## 2019-09-23 DIAGNOSIS — L91.8 ACROCHORDON: ICD-10-CM

## 2019-09-23 DIAGNOSIS — L82.1 SEBORRHEIC KERATOSIS: ICD-10-CM

## 2019-09-23 DIAGNOSIS — D48.5 NEOPLASM OF UNCERTAIN BEHAVIOR OF SKIN: Primary | ICD-10-CM

## 2019-09-23 RX ORDER — LIDOCAINE HYDROCHLORIDE AND EPINEPHRINE 10; 10 MG/ML; UG/ML
3 INJECTION, SOLUTION INFILTRATION; PERINEURAL ONCE
Status: ACTIVE | OUTPATIENT
Start: 2019-09-23

## 2019-09-23 ASSESSMENT — PAIN SCALES - GENERAL
PAINLEVEL: NO PAIN (0)
PAINLEVEL: NO PAIN (0)

## 2019-09-23 NOTE — PATIENT INSTRUCTIONS

## 2019-09-23 NOTE — NURSING NOTE
Dermatology Rooming Note    Maliha Pedro's goals for this visit include:   Chief Complaint   Patient presents with     Derm Problem     Fco is here today for a rash on bilateral legs. She says they are bug bites. She is also concerned about a mole on her back.     Jewels Amaya, [facility]

## 2019-09-23 NOTE — LETTER
9/23/2019       RE: Maliha Pedro  [address]  [place] [address]     Dear Colleague,    Thank you for referring your patient, Maliha Pedro, to the [facility] DERMATOLOGY at [facility]. Please see a copy of my visit note below.    [facility] Dermatology Note      Dermatology Problem List:  1. Lesion of left mid back s/p shave- DDx SK vs. BCC  2. Postinflammatory hyperpigmentation at sites of insect bites on lower legs  3. Dermatofibroma  4. Acrochordons on neck    Encounter Date: Sep 23, 2019    CC:  Chief Complaint   Patient presents with     Derm Problem     Fco is here today for a rash on bilateral legs. She says they are bug bites. She is also concerned about a mole on her back.       History of Present Illness:  Ms. Maliha Pedro is a 59 year old female who presents as a referral from Dr. Elva Ortiz.     Patient reports that her primary Dr. Elva Ortiz noted a mole on patient's back to get checked during a normal office visit. She also has mosquito bites that have not faded from 1 year ago and some from this past summer. She reports she did not scratch the mosquito bites but they were itchy. She has used hydrocortisone cream that stopped the itching. When the bites healed they had scale over them. Fco feels that the areas that were bitten are getting darker.     Past Medical History:   Patient Active Problem List   Diagnosis     ? 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     Displacement of lumbar intervertebral disc without myelopathy     Nonallopathic lesion of lumbar region     Nonallopathic lesion of thoracic region     Nonallopathic lesion of sacral region     Bartholin gland cyst     Cataract     Irritable bowel syndrome     Esophageal reflux     Seasonal allergic rhinitis     Latex sensitivity     Colon polyp     Adenomatous polyp     HYPERLIPIDEMIA LDL GOAL <100     Ovarian  cyst     Breast pain     Essential hypertension     Advanced directives, counseling/discussion     Obesity     Hypomagnesemia     Type 2 diabetes mellitus with mild nonproliferative retinopathy without macular edema, with long-term current use of insulin, unspecified laterality (H)     Radial styloid tenosynovitis (de quervain)     Right wrist pain     Past Medical History:   Diagnosis Date     Adjustment disorder with mixed anxiety and depressed mood     following death of her adoptive father and then again after adoptive mother      Allergic rhinitis, cause unspecified     uses benadryl prn (sneezing/hoarse voice)     Bartholin gland cyst 2008     Chest pain, unspecified     neg dobutamine stress test  (reacted to dobutamine)     Dry eyes      ERM OD (epiretinal membrane, right eye)     BE mild     Esophageal reflux     EGD: -neg     Glaucoma suspect      Hearing problem      Hypertension      Irregular menstrual cycle     s/p D&C, since then more regular     Irritable bowel syndrome     colonoscopy -neg. Sx's especially prior to menses     MEDICAL HISTORY OF -     had health directive with : DANIEL Ford.  Full Code. Aunt (Charlie Tay) will be decision maker     Meningitis, unspecified(322.9) age 8    hospitalized for 9 mo     Need for prophylactic vaccination with tuberculosis (BCG) vaccine     Has pos Mantoux. Gets yearly cxr, all neg.      Other and unspecified hyperlipidemia      Pain in joint, shoulder region     bone spurs on MRI, s/p pool therapy     Tinnitus      Type II or unspecified type diabetes mellitus without mention of complication, not stated as uncontrolled Age 32    [facility]s [facility]: Shayne Lane MD     Unspecified cataract     RE     Unspecified sinusitis (chronic)     2006     Wheezing     Has seen pulm  & 2007. Told night ashtma, ? vocal chord spasm due to cold air.      Past Surgical History:   Procedure Laterality Date     CATARACT IOL,  RT/LT  2008    LE     HC DILATION/CURETTAGE DIAG/THER NON OB  1992     HC TYMPANOPLASTY W/O MASTOID, INIT/REV W/O OSS CHAIN RECONST       RELEASE CARPAL TUNNEL Right 2018    Procedure: Right Carpal Tunnel Release;  Surgeon: Ivania Moran MD;  Location: [facility] OR     RELEASE CARPAL TUNNEL Left 2018    Procedure: Left Carpal Tunnel Release;  Surgeon: Ivania Moran MD;  Location: [facility] OR     TYMPANOPLASTY       YAG CAPSULOTOMY OS (LEFT EYE)  2011       Social History:  Patient reports that she has never smoked. She has never used smokeless tobacco. She reports that she does not drink alcohol or use drugs.    Family History:  Family History   Problem Relation Age of Onset     Diabetes Brother      Cancer Brother 59        Lung cancer     Diabetes Father          of dm 70's     C.A.D. Father      Cerebrovascular Disease Father      Diabetes Paternal Grandfather      Hypertension Paternal Grandfather      Cerebrovascular Disease Paternal Grandfather      Diabetes Sister      Cancer Maternal Aunt         ovarian cancer.      Diabetes Brother      Diabetes Brother      Diabetes Sister      Glaucoma Maternal Grandmother      Cancer Brother      Diabetes Brother      Diabetes Sister      Hypertension Sister      Macular Degeneration No family hx of        Medications:  Current Outpatient Medications   Medication Sig Dispense Refill     Adhesive Tape (KINESIOLOGY TAPE) TAPE 1 Package daily 2 each 3     albuterol (PROAIR HFA/PROVENTIL HFA/VENTOLIN HFA) 108 (90 Base) MCG/ACT inhaler Inhale 2 puffs into the lungs every 4 hours as needed for shortness of breath / dyspnea 1 Inhaler 1     aspirin 81 MG EC tablet Take 4 tablets (325 mg) by mouth daily Please resume in 5 days.  (2018)       CALCIUM + D OR Take 1 tablet by mouth 2 times daily.       Continuous Blood Gluc  (FREESTYLE BUSHRA 14 DAY READER) KIRIT 1 Device daily 1 Device 1     Continuous Blood Gluc Sensor  (FREESTYLE BUSHRA 14 DAY SENSOR) [facility] 1 Application every 14 days 6 each 3     empagliflozin (JARDIANCE) 25 MG TABS tablet Take 1 tablet (25 mg) by mouth daily 90 tablet 3     fexofenadine (ALLEGRA) 180 MG tablet Take 1 tablet by mouth daily. 1 TABLET DAILY Failed claritin for symptom cotrol. Zyrtec increases heart rate. 90 tablet 3     fish oil-omega-3 fatty acids (FISH OIL) 1000 MG capsule Take 1 capsule by mouth daily.       fluticasone (FLONASE) 50 MCG/ACT nasal spray USE 1-2 SPRAYS IN EACH NOSTRIL ONCE DAILY 16 g 11     Glucosamine-Chondroitin (GLUCOSAMINE CHONDR COMPLEX PO)        hydrochlorothiazide (HYDRODIURIL) 12.5 MG tablet Take 1 tablet (12.5 mg) by mouth daily 90 tablet 3     insulin aspart (NOVOLOG FLEXPEN) 100 UNIT/ML pen Uses  120 units daily subcutaneously 120 mL 3     insulin glargine (LANTUS SOLOSTAR PEN) 100 UNIT/ML pen 80 units daily 75 mL 3     insulin pen needle (BD LISA U/F) 32G X 4 MM miscellaneous Use to inject 5-6 times daily. 500 each 3     losartan (COZAAR) 100 MG tablet Take 1 tablet (100 mg) by mouth daily 90 tablet 3     magnesium 250 MG tablet Take 1 tablet by mouth daily 90 tablet 3     metFORMIN (GLUCOPHAGE-XR) 500 MG 24 hr tablet Take 2 tabs twice a day 360 tablet 3     montelukast (SINGULAIR) 10 MG tablet Take 1 tablet (10 mg) by mouth At Bedtime 30 tablet 11     MULTIVITAMIN OR Take 1 tablet by mouth daily.       NEEDLES, ANY SIZE Pen needles for Novolog insulin pen. Use to inject 5-6  times daily. 4 Box 3     pantoprazole (PROTONIX) 40 MG EC tablet Take 1 tablet (40 mg) by mouth daily 90 tablet 3     pentoxifylline ER (TRENTAL) 400 MG CR tablet Take 1 tablet (400 mg) by mouth 2 times daily 180 tablet 3     rosuvastatin (CRESTOR) 10 MG tablet Take 1 tablet (10 mg) by mouth daily 90 tablet 2     VITAMIN D 2000 UNIT OR TABS Take 1 tablet by mouth daily.       Allergies   Allergen Reactions     Latex Swelling     Mold      Tetanus Antitoxin Swelling     Tetracycline Swelling          Review of Systems:  -As per HPI  -Constitutional: Otherwise feeling well today, in usual state of health.  -HEENT: Patient denies nonhealing oral sores.  -Skin: As above in HPI. No additional skin concerns.    Physical exam:  Vitals: /76 (BP Location: Left arm, Patient Position: Sitting, Cuff Size: Adult Regular)   Pulse 80   LMP 03/19/2009 (Exact Date)   GEN: This is a well developed, well-nourished female in no acute distress, in a pleasant mood.    SKIN: Full skin, which includes the head/face, both arms, chest, back, abdomen,both legs, was examined.  - Multiple small skin colored papules around collar of the neck  -tan/pink papule upper left back, size 5mm x 5mm; dimples with palpation  -left mid back: papule 3mm x 3mm multicolored light brown and dark brown  -circular hyperpigmented macules on left lower leg and right lower leg. Greatest size 7mm diameter. Few present on left hand and forearms.  -No other lesions of concern on areas examined.     Impression/Plan:  1. Lesion of left mid back- DDx seborrheic keratosis vs pigmented BCC    Shave biopsy(performed by faculty): After discussion of benefits and risks including but not limited to bleeding, infection, scar, incomplete removal, recurrence, and non-diagnostic biopsy, written consent and photographs were obtained. Time-out was performed. The area was cleaned with isopropyl alcohol. 1.5 mL of 1% lidocaine with 1:100,000 epinephrine was injected to obtain adequate anesthesia of the lesion on the left mid back. A shave biopsy was performed. Hemostasis was achieved with aluminium chloride. Vaseline and a sterile dressing were applied. The patient tolerated the procedure and no complications were noted. The patient was provided with verbal and written post care instructions.     Will contact patient with pathology results.    2. Acrochordons, non irritated, on the neckline    Benign nature was discussed.No further intervention required at this  time.     Reviewed possible ways to remove. Patient made aware that some interventions may lead to hyperpigmentation. Will not pursue removal at this time.    3. Postinflammatory hyperpigmentation    Benign nature was discussed.No further intervention required at this time.     Reviewed the process by which this occurs and possible interventions with cosmetic dermatology. Patient made aware these may take some time to fade.    4. Dermatofibromata    Benign nature was discussed.No further intervention required at this time.     CC Elva Ortiz MD  [address] [place] [address] on close of this encounter.  Follow-up in 6 months, earlier for new or changing lesions.     Staff Involved:  I, Adam Abraham MS3, saw and examined the patient in the presence of Dr. Marino.    Staff Physician:  I was present with the medical student who participated in the service and in the documentation of the note. I have verified the history and personally performed the physical exam and medical decision making. I edited the assessment and plan of care as documented in the note.     The procedure(s) was(were) performed by myself.    Donell Marino MD   of Dermatology  Department of Dermatology  [facility]

## 2019-09-23 NOTE — NURSING NOTE
Lidocaine-epinephrine 1-1:843230 % injection   1.5mL once for one use, starting 9/23/2019 ending 9/23/2019,  2mL disp, R-0, injection  Injected by Dr. Marino

## 2019-09-23 NOTE — PROGRESS NOTES
[facility] Dermatology Note      Dermatology Problem List:  1. Lesion of left mid back s/p shave- DDx SK vs. BCC  2. Postinflammatory hyperpigmentation at sites of insect bites on lower legs  3. Dermatofibroma  4. Acrochordons on neck    Encounter Date: Sep 23, 2019    CC:  Chief Complaint   Patient presents with     Derm Problem     Fco is here today for a rash on bilateral legs. She says they are bug bites. She is also concerned about a mole on her back.       History of Present Illness:  Ms. Maliha Pedro is a 59 year old female who presents as a referral from Dr. Elva Ortiz.     Patient reports that her primary Dr. Elva Ortiz noted a mole on patient's back to get checked during a normal office visit. She also has mosquito bites that have not faded from 1 year ago and some from this past summer. She reports she did not scratch the mosquito bites but they were itchy. She has used hydrocortisone cream that stopped the itching. When the bites healed they had scale over them. Fco feels that the areas that were bitten are getting darker.     Past Medical History:   Patient Active Problem List   Diagnosis     ? 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     Displacement of lumbar intervertebral disc without myelopathy     Nonallopathic lesion of lumbar region     Nonallopathic lesion of thoracic region     Nonallopathic lesion of sacral region     Bartholin gland cyst     Cataract     Irritable bowel syndrome     Esophageal reflux     Seasonal allergic rhinitis     Latex sensitivity     Colon polyp     Adenomatous polyp     HYPERLIPIDEMIA LDL GOAL <100     Ovarian cyst     Breast pain     Essential hypertension     Advanced directives, counseling/discussion     Obesity     Hypomagnesemia     Type 2 diabetes mellitus with mild nonproliferative retinopathy without macular edema, with long-term current use of insulin, unspecified laterality (H)     Radial  styloid tenosynovitis (de quervain)     Right wrist pain     Past Medical History:   Diagnosis Date     Adjustment disorder with mixed anxiety and depressed mood     following death of her adoptive father and then again after adoptive mother      Allergic rhinitis, cause unspecified     uses benadryl prn (sneezing/hoarse voice)     Bartholin gland cyst 2008     Chest pain, unspecified     neg dobutamine stress test  (reacted to dobutamine)     Dry eyes      ERM OD (epiretinal membrane, right eye)     BE mild     Esophageal reflux     EGD: -neg     Glaucoma suspect      Hearing problem      Hypertension      Irregular menstrual cycle     s/p D&C, since then more regular     Irritable bowel syndrome     colonoscopy -neg. Sx's especially prior to menses     MEDICAL HISTORY OF -     had health directive with : DANIEL Ford.  Full Code. Aunt (Charlie Tay) will be decision maker     Meningitis, unspecified(322.9) age 8    hospitalized for 9 mo     Need for prophylactic vaccination with tuberculosis (BCG) vaccine     Has pos Mantoux. Gets yearly cxr, all neg.      Other and unspecified hyperlipidemia      Pain in joint, shoulder region     bone spurs on MRI, s/p pool therapy     Tinnitus      Type II or unspecified type diabetes mellitus without mention of complication, not stated as uncontrolled Age 32    Follows [facility]: Shayne Lane MD     Unspecified cataract     RE     Unspecified sinusitis (chronic)          Wheezing     Has seen pulm  & . Told night ashtma, ? vocal chord spasm due to cold air.      Past Surgical History:   Procedure Laterality Date     CATARACT IOL, RT/LT  2008    LE     HC DILATION/CURETTAGE DIAG/THER NON OB       HC TYMPANOPLASTY W/O MASTOID, INIT/REV W/O OSS CHAIN RECONST       RELEASE CARPAL TUNNEL Right 2018    Procedure: Right Carpal Tunnel Release;  Surgeon: Ivania Moran MD;  Location:  OR      RELEASE CARPAL TUNNEL Left 2018    Procedure: Left Carpal Tunnel Release;  Surgeon: Ivania Moran MD;  Location: [facility] OR     TYMPANOPLASTY       YAG CAPSULOTOMY OS (LEFT EYE)  2011       Social History:  Patient reports that she has never smoked. She has never used smokeless tobacco. She reports that she does not drink alcohol or use drugs.    Family History:  Family History   Problem Relation Age of Onset     Diabetes Brother      Cancer Brother 59        Lung cancer     Diabetes Father          of dm 70's     C.A.D. Father      Cerebrovascular Disease Father      Diabetes Paternal Grandfather      Hypertension Paternal Grandfather      Cerebrovascular Disease Paternal Grandfather      Diabetes Sister      Cancer Maternal Aunt         ovarian cancer.      Diabetes Brother      Diabetes Brother      Diabetes Sister      Glaucoma Maternal Grandmother      Cancer Brother      Diabetes Brother      Diabetes Sister      Hypertension Sister      Macular Degeneration No family hx of        Medications:  Current Outpatient Medications   Medication Sig Dispense Refill     Adhesive Tape (KINESIOLOGY TAPE) TAPE 1 Package daily 2 each 3     albuterol (PROAIR HFA/PROVENTIL HFA/VENTOLIN HFA) 108 (90 Base) MCG/ACT inhaler Inhale 2 puffs into the lungs every 4 hours as needed for shortness of breath / dyspnea 1 Inhaler 1     aspirin 81 MG EC tablet Take 4 tablets (325 mg) by mouth daily Please resume in 5 days.  (2018)       CALCIUM + D OR Take 1 tablet by mouth 2 times daily.       Continuous Blood Gluc  (FREESTYLE BUSHRA 14 DAY READER) KIRIT 1 Device daily 1 Device 1     Continuous Blood Gluc Sensor (FREESTYLE BUSHRA 14 DAY SENSOR) MISC 1 Application every 14 days 6 each 3     empagliflozin (JARDIANCE) 25 MG TABS tablet Take 1 tablet (25 mg) by mouth daily 90 tablet 3     fexofenadine (ALLEGRA) 180 MG tablet Take 1 tablet by mouth daily. 1 TABLET DAILY Failed claritin for symptom cotrol.  Zyrtec increases heart rate. 90 tablet 3     fish oil-omega-3 fatty acids (FISH OIL) 1000 MG capsule Take 1 capsule by mouth daily.       fluticasone (FLONASE) 50 MCG/ACT nasal spray USE 1-2 SPRAYS IN EACH NOSTRIL ONCE DAILY 16 g 11     Glucosamine-Chondroitin (GLUCOSAMINE CHONDR COMPLEX PO)        hydrochlorothiazide (HYDRODIURIL) 12.5 MG tablet Take 1 tablet (12.5 mg) by mouth daily 90 tablet 3     insulin aspart (NOVOLOG FLEXPEN) 100 UNIT/ML pen Uses  120 units daily subcutaneously 120 mL 3     insulin glargine (LANTUS SOLOSTAR PEN) 100 UNIT/ML pen 80 units daily 75 mL 3     insulin pen needle (BD LISA U/F) 32G X 4 MM miscellaneous Use to inject 5-6 times daily. 500 each 3     losartan (COZAAR) 100 MG tablet Take 1 tablet (100 mg) by mouth daily 90 tablet 3     magnesium 250 MG tablet Take 1 tablet by mouth daily 90 tablet 3     metFORMIN (GLUCOPHAGE-XR) 500 MG 24 hr tablet Take 2 tabs twice a day 360 tablet 3     montelukast (SINGULAIR) 10 MG tablet Take 1 tablet (10 mg) by mouth At Bedtime 30 tablet 11     MULTIVITAMIN OR Take 1 tablet by mouth daily.       NEEDLES, ANY SIZE Pen needles for Novolog insulin pen. Use to inject 5-6  times daily. 4 Box 3     pantoprazole (PROTONIX) 40 MG EC tablet Take 1 tablet (40 mg) by mouth daily 90 tablet 3     pentoxifylline ER (TRENTAL) 400 MG CR tablet Take 1 tablet (400 mg) by mouth 2 times daily 180 tablet 3     rosuvastatin (CRESTOR) 10 MG tablet Take 1 tablet (10 mg) by mouth daily 90 tablet 2     VITAMIN D 2000 UNIT OR TABS Take 1 tablet by mouth daily.       Allergies   Allergen Reactions     Latex Swelling     Mold      Tetanus Antitoxin Swelling     Tetracycline Swelling         Review of Systems:  -As per HPI  -Constitutional: Otherwise feeling well today, in usual state of health.  -HEENT: Patient denies nonhealing oral sores.  -Skin: As above in HPI. No additional skin concerns.    Physical exam:  Vitals: /76 (BP Location: Left arm, Patient Position:  Sitting, Cuff Size: Adult Regular)   Pulse 80   LMP 03/19/2009 (Exact Date)   GEN: This is a well developed, well-nourished female in no acute distress, in a pleasant mood.    SKIN: Full skin, which includes the head/face, both arms, chest, back, abdomen,both legs, was examined.  - Multiple small skin colored papules around collar of the neck  -tan/pink papule upper left back, size 5mm x 5mm; dimples with palpation  -left mid back: papule 3mm x 3mm multicolored light brown and dark brown  -circular hyperpigmented macules on left lower leg and right lower leg. Greatest size 7mm diameter. Few present on left hand and forearms.  -No other lesions of concern on areas examined.     Impression/Plan:  1. Lesion of left mid back- DDx seborrheic keratosis vs pigmented BCC    Shave biopsy(performed by faculty): After discussion of benefits and risks including but not limited to bleeding, infection, scar, incomplete removal, recurrence, and non-diagnostic biopsy, written consent and photographs were obtained. Time-out was performed. The area was cleaned with isopropyl alcohol. 1.5 mL of 1% lidocaine with 1:100,000 epinephrine was injected to obtain adequate anesthesia of the lesion on the left mid back. A shave biopsy was performed. Hemostasis was achieved with aluminium chloride. Vaseline and a sterile dressing were applied. The patient tolerated the procedure and no complications were noted. The patient was provided with verbal and written post care instructions.     Will contact patient with pathology results.    2. Acrochordons, non irritated, on the neckline    Benign nature was discussed.No further intervention required at this time.     Reviewed possible ways to remove. Patient made aware that some interventions may lead to hyperpigmentation. Will not pursue removal at this time.    3. Postinflammatory hyperpigmentation    Benign nature was discussed.No further intervention required at this time.     Reviewed the  process by which this occurs and possible interventions with cosmetic dermatology. Patient made aware these may take some time to fade.    4. Dermatofibromata    Benign nature was discussed.No further intervention required at this time.     CC Elva Ortiz MD  [address] [place] [address] on close of this encounter.  Follow-up in 6 months, earlier for new or changing lesions.     Staff Involved:  I, Adam Abraham MS3, saw and examined the patient in the presence of Dr. Marino.    Staff Physician:  I was present with the medical student who participated in the service and in the documentation of the note. I have verified the history and personally performed the physical exam and medical decision making. I edited the assessment and plan of care as documented in the note.     The procedure(s) was(were) performed by myself.    Donell Marino MD   of Dermatology  Department of Dermatology  [facility]

## 2019-09-24 ENCOUNTER — THERAPY VISIT (OUTPATIENT)
Dept: OCCUPATIONAL THERAPY | Facility: CLINIC | Age: 59
End: 2019-09-24
Payer: COMMERCIAL

## 2019-09-24 DIAGNOSIS — M65.4 RADIAL STYLOID TENOSYNOVITIS (DE QUERVAIN): ICD-10-CM

## 2019-09-24 DIAGNOSIS — M25.531 RIGHT WRIST PAIN: ICD-10-CM

## 2019-09-24 LAB — COPATH REPORT: NORMAL

## 2019-09-24 PROCEDURE — 97140 MANUAL THERAPY 1/> REGIONS: CPT | Mod: GO | Performed by: OCCUPATIONAL THERAPIST

## 2019-09-24 PROCEDURE — 97035 APP MDLTY 1+ULTRASOUND EA 15: CPT | Mod: GO | Performed by: OCCUPATIONAL THERAPIST

## 2019-09-24 PROCEDURE — 97110 THERAPEUTIC EXERCISES: CPT | Mod: GO | Performed by: OCCUPATIONAL THERAPIST

## 2019-09-24 NOTE — PROGRESS NOTES
SOAP Note - Hand Therapy - Objective Information    Current Date:  9/24/2019  Referring Physician:Elva Ortiz MD    Diagnosis: Bilateral wrist pain  DOI: 8/12/19 (Date of order)  DOS: (R) 11/2018, (L) 12/2018  Procedure: DELISA Pedro is a 59 year old right hand dominant female.    Patient reports symptoms of pain, stiffness/loss of motion, weakness/loss of strength and edema of the bilateral wrists and hands which occurred due to s/p the above stated surgery.     Occupational Profile Information:  Current occupation is RN    S:  Subjective changes as noted by patient: The arm is much better.  Functional changes noted by patient: less pain with work tasks      O:  Pain Level (Scale 0-10):   8/22/2019 9/12/19 9/24/19   At Rest 1-2/10 1/10 0/10   With Use 1-2/10 1-2/10 0/10     Pain Description:  Date 8/22/2019   Location wrist, thumb and forearm   Pain Quality Stretching and tightness   Frequency intermittent     Pain is worst  daytime   Exacerbated by  writing, opening bags, weight bearing,    Relieved by rest and Tylenol   Progression Unchanged      ROM  Pain Report: - none  + mild    ++ moderate    +++ severe   Wrist 8/22/2019 8/22/2019   AROM (PROM) Left Right   Extension 55 60   Flexion 55 45   RD 25 20   UD 45 45+   UD with thumb flexed 45 30+      ROM  Thumb 8/22/2019 8/22/2019   AROM  (PROM) Left Right   MP / /55   IP / /70   RABD     PABD     Retropulsion     Kapandji Opposition Scale (0-10/10) 9/10 9/10      Strength   (Measured in pounds)  Pain Report: - none  + mild    ++ moderate    +++ severe    8/22/2019 8/22/2019 9/24/19   Trials Left Right Right   1  2  3 44  42  41 35  28  34 39  40  44   Average 42 32 41     Lat Pinch 8/22/2019 8/22/2019 9/24/19   Trials Left Right Right   1  2  3 16 14 14   Average        3 Pt Pinch 8/22/2019 8/22/2019 9/24/19   Trials Left Right Right   1  2  3 13 7+ 11   Average         Functional Exam:  - no pain, + mild, ++ moderate, +++  severe  MMT:  Right 8/22/19   Thumb EPL  +   Thumb EPB  -   Thumb APL +   Radial Deviation  +   Ulnar Deviation  -   Wrist Ext -   Wrist Flex  -     Palpation:  Right 8/22/19 9/12/19   Radial Styloid  + -   1st dorsal comp.  + +   Intersection syndrome +++ +     Special Tests:    Right 8/22/19   Finkelstein   +   Radial Nerve Tinels +     Please refer to the daily flowsheet for treatment provided today.   Home Program:   Warmth for stiffness  Ice after activity for pain  Self TFM to 1st dorsal compartment  Self MFR to EPB/ABL  AROM of wrist and thumb  PROM with stretch into simultaneous thumb flexion and ulnar deviation  Thumb spica orthosis for sleeping and per symptoms day  Avoid activities that exacerbate pain in the wrist or thumb  K- tape    Next Visit:  US  MFR to extensors and radial wrist  A/PROM of wrist and thumb

## 2019-09-25 ENCOUNTER — OFFICE VISIT (OUTPATIENT)
Dept: ENDOCRINOLOGY | Facility: CLINIC | Age: 59
End: 2019-09-25
Payer: COMMERCIAL

## 2019-09-25 VITALS
HEART RATE: 83 BPM | DIASTOLIC BLOOD PRESSURE: 81 MMHG | WEIGHT: 192.1 LBS | SYSTOLIC BLOOD PRESSURE: 120 MMHG | HEIGHT: 64 IN | BODY MASS INDEX: 32.8 KG/M2

## 2019-09-25 DIAGNOSIS — E11.3299 TYPE 2 DIABETES MELLITUS WITH MILD NONPROLIFERATIVE RETINOPATHY WITHOUT MACULAR EDEMA, WITH LONG-TERM CURRENT USE OF INSULIN, UNSPECIFIED LATERALITY (H): ICD-10-CM

## 2019-09-25 DIAGNOSIS — Z79.4 TYPE 2 DIABETES MELLITUS WITH MILD NONPROLIFERATIVE RETINOPATHY WITHOUT MACULAR EDEMA, WITH LONG-TERM CURRENT USE OF INSULIN, UNSPECIFIED LATERALITY (H): Primary | ICD-10-CM

## 2019-09-25 DIAGNOSIS — E11.3299 TYPE 2 DIABETES MELLITUS WITH MILD NONPROLIFERATIVE RETINOPATHY WITHOUT MACULAR EDEMA, WITH LONG-TERM CURRENT USE OF INSULIN, UNSPECIFIED LATERALITY (H): Primary | ICD-10-CM

## 2019-09-25 DIAGNOSIS — E11.21 TYPE 2 DIABETES MELLITUS WITH DIABETIC NEPHROPATHY, WITH LONG-TERM CURRENT USE OF INSULIN (H): ICD-10-CM

## 2019-09-25 DIAGNOSIS — Z79.4 TYPE 2 DIABETES MELLITUS WITH DIABETIC NEPHROPATHY, WITH LONG-TERM CURRENT USE OF INSULIN (H): ICD-10-CM

## 2019-09-25 DIAGNOSIS — R35.89 POLYURIA: ICD-10-CM

## 2019-09-25 DIAGNOSIS — Z79.4 TYPE 2 DIABETES MELLITUS WITH MILD NONPROLIFERATIVE RETINOPATHY WITHOUT MACULAR EDEMA, WITH LONG-TERM CURRENT USE OF INSULIN, UNSPECIFIED LATERALITY (H): ICD-10-CM

## 2019-09-25 LAB
ALBUMIN UR-MCNC: NEGATIVE MG/DL
APPEARANCE UR: CLEAR
BILIRUB UR QL STRIP: NEGATIVE
COLOR UR AUTO: ABNORMAL
CREAT UR-MCNC: 53 MG/DL
GLUCOSE UR STRIP-MCNC: >499 MG/DL
HBA1C MFR BLD: 8.7 % (ref 4.3–6)
HGB UR QL STRIP: NEGATIVE
KETONES UR STRIP-MCNC: 5 MG/DL
LEUKOCYTE ESTERASE UR QL STRIP: NEGATIVE
MICROALBUMIN UR-MCNC: 5 MG/L
MICROALBUMIN/CREAT UR: 10.09 MG/G CR (ref 0–25)
MUCOUS THREADS #/AREA URNS LPF: PRESENT /LPF
NITRATE UR QL: NEGATIVE
PH UR STRIP: 5 PH (ref 5–7)
RBC #/AREA URNS AUTO: 0 /HPF (ref 0–2)
SOURCE: ABNORMAL
SP GR UR STRIP: 1.03 (ref 1–1.03)
SQUAMOUS #/AREA URNS AUTO: <1 /HPF (ref 0–1)
UROBILINOGEN UR STRIP-MCNC: 0 MG/DL (ref 0–2)
WBC #/AREA URNS AUTO: 0 /HPF (ref 0–5)

## 2019-09-25 RX ORDER — FLASH GLUCOSE SENSOR
1 KIT MISCELLANEOUS
Qty: 6 EACH | Refills: 3 | Status: SHIPPED | OUTPATIENT
Start: 2019-09-25 | End: 2020-03-23

## 2019-09-25 ASSESSMENT — MIFFLIN-ST. JEOR: SCORE: 1422.87

## 2019-09-25 ASSESSMENT — PAIN SCALES - GENERAL: PAINLEVEL: NO PAIN (0)

## 2019-09-25 NOTE — PROGRESS NOTES
Endocrinology Clinic Visit 09/25/19  NAME:  Maliha Pedro  PCP:  Elva Ortiz  MRN:  [id number]    Chief Complaint     Chief Complaint   Patient presents with     RECHECK     Type 2 Diabetes       History of Present Illness     Maliha Pedro is a 59 year old female who is seen in clinic for diabetes management.   She has had diabetes since age 32. Has tried many different meds. Did not tolerate GLP-1 agonists. Stopped Actos due to concerns about adverse effects. Was on Lantus and Novolog with poor control, with recent improvement since addition of Jardiance November 2017. She has history of mild stable retinopathy.     Last urine MA: Aug 2018: 10.3, Cr 8/2019 normal  Neuropathy: none  Eye exam: last done June 2018 - mild retinopathy, stable  Lipids: last checked Aug 2018. She is on statin.   HTN: on ARB, hydrochlorothiazide and CCB    Interval History:   She was recently found to have BCC of the skin. She is depressed about that. Especially since it has put off her [employer] plans. This has made her less focused on her diabetes. Weight and diet.   She is also having more polyuria recently. And vaginal itching.   A1c 8.7 today    Associated Signs/Symptoms  Hypoglycemia: no. Hyperglycemia: none.Neuropathy: none. Vascular Symtpoms: none. Angina/CHF: dyspnea on exertion. Ulcers: No. Amputations: No    Current treatment strategy: Novolog 15-25 units before meals (depending on BG and size of meal). Lantus 60 units daily. Metformin 1000 mg po BID, Jardiance 25 mg po daily    Blood Glucose Monitoring: CGM download: shannon: 14 day data:  Avg 184. SD 54. Hypo: 2%, within range: 41%, above range: 57%.     Diet: 2-3 meals per day. Occasional snacks.   Cut down on rice. Eating more vegetables and fish.     Exercise: yoga.     Weight:   Wt Readings from Last 4 Encounters:   09/25/19 87.1 kg (192 lb 1.6 oz)   09/10/19 86.6 kg (191 lb)   08/12/19 84.8 kg (187 lb)   06/19/19 (P) 88.7 kg (195 lb 8 oz)      Problem List     Patient Active Problem List   Diagnosis     ? 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     Displacement of lumbar intervertebral disc without myelopathy     Nonallopathic lesion of lumbar region     Nonallopathic lesion of thoracic region     Nonallopathic lesion of sacral region     Bartholin gland cyst     Cataract     Irritable bowel syndrome     Esophageal reflux     Seasonal allergic rhinitis     Latex sensitivity     Colon polyp     Adenomatous polyp     HYPERLIPIDEMIA LDL GOAL <100     Ovarian cyst     Breast pain     Essential hypertension     Advanced directives, counseling/discussion     Obesity     Hypomagnesemia     Type 2 diabetes mellitus with mild nonproliferative retinopathy without macular edema, with long-term current use of insulin, unspecified laterality (H)     Radial styloid tenosynovitis (de quervain)     Right wrist pain        Medications     Current Outpatient Medications   Medication     Adhesive Tape (KINESIOLOGY TAPE) TAPE     albuterol (PROAIR HFA/PROVENTIL HFA/VENTOLIN HFA) 108 (90 Base) MCG/ACT inhaler     aspirin 81 MG EC tablet     CALCIUM + D OR     Continuous Blood Gluc  (FREESTYLE BUSHRA 14 DAY READER) KIRIT     Continuous Blood Gluc Sensor (FREESTYLE BUSHRA 14 DAY SENSOR) MISC     empagliflozin (JARDIANCE) 25 MG TABS tablet     fexofenadine (ALLEGRA) 180 MG tablet     fish oil-omega-3 fatty acids (FISH OIL) 1000 MG capsule     fluticasone (FLONASE) 50 MCG/ACT nasal spray     Glucosamine-Chondroitin (GLUCOSAMINE CHONDR COMPLEX PO)     hydrochlorothiazide (HYDRODIURIL) 12.5 MG tablet     insulin aspart (NOVOLOG FLEXPEN) 100 UNIT/ML pen     insulin glargine (LANTUS SOLOSTAR PEN) 100 UNIT/ML pen     insulin pen needle (BD LISA U/F) 32G X 4 MM miscellaneous     losartan (COZAAR) 100 MG tablet     magnesium 250 MG tablet     metFORMIN (GLUCOPHAGE-XR) 500 MG 24 hr tablet     montelukast (SINGULAIR) 10 MG tablet     MULTIVITAMIN OR      NEEDLES, ANY SIZE     pantoprazole (PROTONIX) 40 MG EC tablet     pentoxifylline ER (TRENTAL) 400 MG CR tablet     rosuvastatin (CRESTOR) 10 MG tablet     VITAMIN D 2000 UNIT OR TABS     Current Facility-Administered Medications   Medication     lidocaine 1% with EPINEPHrine 1:100,000 injection 3 mL     Facility-Administered Medications Ordered in Other Visits   Medication     fentaNYL (SUBLIMAZE) injection 25-50 mcg     flumazenil (ROMAZICON) injection 0.2 mg     midazolam (VERSED) injection 0.5-1 mg     naloxone (NARCAN) injection 0.1-0.4 mg        Allergies     Allergies   Allergen Reactions     Latex Swelling     Mold      Tetanus Antitoxin Swelling     Tetracycline Swelling       Medical / Surgical History     Past Medical History:   Diagnosis Date     Adjustment disorder with mixed anxiety and depressed mood     following death of her adoptive father and then again after adoptive mother      Allergic rhinitis, cause unspecified     uses benadryl prn (sneezing/hoarse voice)     Bartholin gland cyst 2008     Chest pain, unspecified     neg dobutamine stress test  (reacted to dobutamine)     Dry eyes      ERM OD (epiretinal membrane, right eye)     BE mild     Esophageal reflux     EGD: -neg     Glaucoma suspect      Hearing problem      Hypertension      Irregular menstrual cycle     s/p D&C, since then more regular     Irritable bowel syndrome     colonoscopy -neg. Sx's especially prior to menses     MEDICAL HISTORY OF -     had health directive with : DANIEL Ford.  Full Code. Aunt (Charlei Tay) will be decision maker     Meningitis, unspecified(322.9) age 8    hospitalized for 9 mo     Need for prophylactic vaccination with tuberculosis (BCG) vaccine     Has pos Mantoux. Gets yearly cxr, all neg.      Other and unspecified hyperlipidemia      Pain in joint, shoulder region     bone spurs on MRI, s/p pool therapy     Tinnitus      Type II or unspecified type diabetes  mellitus without mention of complication, not stated as uncontrolled Age 32    Follows [facility] endocrine [facility]: Shayne Lane MD     Unspecified cataract     RE     Unspecified sinusitis (chronic)     2006     Wheezing     Has seen pulm 2006 & 2007. Told night ashtma, ? vocal chord spasm due to cold air.      Past Surgical History:   Procedure Laterality Date     CATARACT IOL, RT/LT  9/23/2008    LE     HC DILATION/CURETTAGE DIAG/THER NON OB  1992     HC TYMPANOPLASTY W/O MASTOID, INIT/REV W/O OSS CHAIN RECONST  12/09     RELEASE CARPAL TUNNEL Right 11/19/2018    Procedure: Right Carpal Tunnel Release;  Surgeon: Ivania Moran MD;  Location: [facility] OR     RELEASE CARPAL TUNNEL Left 12/17/2018    Procedure: Left Carpal Tunnel Release;  Surgeon: Ivania Moran MD;  Location: [facility] OR     TYMPANOPLASTY       YAG CAPSULOTOMY OS (LEFT EYE)  4/23/2011       Social History     Social History     Socioeconomic History     Marital status: Single     Spouse name: Not on file     Number of children: Not on file     Years of education: Not on file     Highest education level: Not on file   Occupational History     Not on file   Social Needs     Financial resource strain: Not on file     Food insecurity:     Worry: Not on file     Inability: Not on file     Transportation needs:     Medical: Not on file     Non-medical: Not on file   Tobacco Use     Smoking status: Never Smoker     Smokeless tobacco: Never Used   Substance and Sexual Activity     Alcohol use: No     Frequency: Never     Comment: rarely     Drug use: No     Sexual activity: Not Currently     Partners: Male     Birth control/protection: None   Lifestyle     Physical activity:     Days per week: Not on file     Minutes per session: Not on file     Stress: Not on file   Relationships     Social connections:     Talks on phone: Not on file     Gets together: Not on file     Attends Zoroastrianism service: Not on file     Active member of club or  organization: Not on file     Attends meetings of clubs or organizations: Not on file     Relationship status: Not on file     Intimate partner violence:     Fear of current or ex partner: Not on file     Emotionally abused: Not on file     Physically abused: Not on file     Forced sexual activity: Not on file   Other Topics Concern     Parent/sibling w/ CABG, MI or angioplasty before 65F 55M? 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 Moved to  in . Initially lived in [place].    No children        2 cats       Family History     Family History   Problem Relation Age of Onset     Diabetes Brother      Cancer Brother 59        Lung cancer     Diabetes Father          of dm 70's     C.A.D. Father      Cerebrovascular Disease Father      Diabetes Paternal Grandfather      Hypertension Paternal Grandfather      Cerebrovascular Disease Paternal Grandfather      Diabetes Sister      Cancer Maternal Aunt         ovarian cancer.      Diabetes Brother      Diabetes Brother      Diabetes Sister      Glaucoma Maternal Grandmother      Cancer Brother      Diabetes Brother      Diabetes Sister      Hypertension Sister      Macular Degeneration No family hx of        ROS     Constitutional: no fevers, chills, night sweats. No weight loss or fatigue. Good appetite  Eyes: no vision changes, no eye redness, no diplopia  Ears, Nose, mouth, throat: no hearing changes, no tinnitus, no rhinorrhea, no nasal congestion  Cardiovascular: no chest pain, no orthopnea or PND, no edema,  "no palpitations  Respiratory: no dyspnea, no cough, no sputum, no wheezing  Gastrointestinal: no nausea, no vomiting, no abdominal pain, no diarrhea, no constipation  Genitourinary: no dysuria, no frequency, no urgency, no nocturia  Musculoskeletal: no joint pains, no back pain, no cramps, no fractures  Skin: no rash, no itching, no dryness, no ulcers, no hair loss  Neurological: no headache, no weakness, no numbness, no dizziness, no tremors  Psychiatric: no anxiety, no sadness  Hematologic/lymphatic: no easy bruising, no bleeding, no palor    Physical Exam   Ht 1.62 m (5' 3.78\")   Wt 87.1 kg (192 lb 1.6 oz)   LMP 03/19/2009 (Exact Date)   BMI 33.20 kg/m       General: Comfortable, no obvious distress, normal body habitus  Eyes: Sclera anicteric, moist conjunctiva  HENT: Atraumatic, oropharynx clear, moist mucous membranes with no mucosal ulcerations  Neck: Trachea midline, supple. Thyroid: Thyroid is normal in size and texture  CV: Regular rhythm, normal rate. No murmurs auscultated  Resp: Clear to auscultation bilaterally, good effort  Abdomen:  Soft, non tender, non distended. Bowel sounds heard. No organomegaly.  Skin: No rashes, lesions, or subcutaneous nodules.   Psych: Alert and oriented x 3. Appropriate affect, good insight  Extremities: No peripheral edema  Foot exam: normal pulses, skin intact, no lesions, normal sensation to monofilament.   Musculoskeletal: Appropriate muscle bulk and strength  Lymphatic: No cervical lymphadenopathy  Neuro: Moves all four extremities. No focal deficits on limited exam. Gait normal.       Labs/Imaging and Outside Records     Pertinent Labs were reviewed and updated in EPIC.    Summary of recent findings:   Lab Results   Component Value Date    A1C 9.8 12/16/2013    A1C 9.5 04/15/2013    A1C 9.6 01/16/2013    A1C 9.4 09/28/2012    A1C 8.3 06/22/2012       TSH   Date Value Ref Range Status   10/30/2017 1.29 0.40 - 4.00 mU/L Final   02/08/2017 0.76 0.40 - 4.00 mU/L Final "   11/17/2015 0.98 0.40 - 4.00 mU/L Final   05/04/2015 0.76 0.40 - 4.00 mU/L Final   12/16/2013 1.24 0.4 - 5.0 mU/L Final     T4 Free   Date Value Ref Range Status   06/21/2010 1.50 0.70 - 1.85 ng/dL Final       Creatinine   Date Value Ref Range Status   08/12/2019 0.64 0.52 - 1.04 mg/dL Final       Recent Labs   Lab Test  03/06/17   0905  05/04/15   1016  12/16/13   1028   CHOL  165  170  192   HDL  52  46*  42*   LDL  73  90  125   TRIG  199*  171*  124   CHOLHDLRATIO   --   3.7  4.6     Impression / Plan     1. Diabetes Mellitus: Type 2  Current glycemic control can be considered worsened.    Plan:   - increase Lantus to 70 units    2. Diabetes Complications: With retinopathy - stable  Check urine microabumine    3. Blood Pressure Management: Blood pressure is controlled. Currently is on pharmacotherapy for this.   - I refilled losartan and hydrochlorothiazide. She has been off Norvasc and doing great.    4.Lipid Management: Per the new ACC/JILLIAN/NHLBI guidelines, statins are recommended for individuals with diabetes aged 40-75 with LDL  without ASCVD, and for any individual with ASCVD. Currently the patient is on a statin.     5. Smoking Status: Patient Pt is smoke free..     6. Polyuria:   Check UA with reflex to culture. She is on Jardiance.     Follow up: 3 months      Shai Dong MD  Endocrinology, Diabetes and Metabolism  [facility]

## 2019-09-25 NOTE — LETTER
9/25/2019     RE: Maliha Pedro  [address]  [place] [address]     Dear Colleague,    Thank you for referring your patient, Maliha Pedro, to the [facility] ENDOCRINOLOGY at [facility]. Please see a copy of my visit note below.    Chief Complaint   Patient presents with     RECHECK     Type 2 Diabetes     Ashley Roman MA      Endocrinology Clinic Visit 09/25/19  NAME:  Maliha Pedro  PCP:  Elva Ortiz  MRN:  [id number]    Chief Complaint     Chief Complaint   Patient presents with     RECHECK     Type 2 Diabetes       History of Present Illness     Maliha Pedro is a 59 year old female who is seen in clinic for diabetes management.   She has had diabetes since age 32. Has tried many different meds. Did not tolerate GLP-1 agonists. Stopped Actos due to concerns about adverse effects. Was on Lantus and Novolog with poor control, with recent improvement since addition of Jardiance November 2017. She has history of mild stable retinopathy.     Last urine MA: Aug 2018: 10.3, Cr 8/2019 normal  Neuropathy: none  Eye exam: last done June 2018 - mild retinopathy, stable  Lipids: last checked Aug 2018. She is on statin.   HTN: on ARB, hydrochlorothiazide and CCB    Interval History:   She was recently found to have BCC of the skin. She is depressed about that. Especially since it has put off her [facility] plans. This has made her less focused on her diabetes. Weight and diet.   She is also having more polyuria recently. And vaginal itching.   A1c 8.7 today    Associated Signs/Symptoms  Hypoglycemia: no. Hyperglycemia: none.Neuropathy: none. Vascular Symtpoms: none. Angina/CHF: dyspnea on exertion. Ulcers: No. Amputations: No    Current treatment strategy: Novolog 15-25 units before meals (depending on BG and size of meal). Lantus 60 units daily. Metformin 1000 mg po BID, Jardiance 25 mg po daily    Blood Glucose Monitoring: CGM  download: bushra: 14 day data:  Avg 184. SD 54. Hypo: 2%, within range: 41%, above range: 57%.     Diet: 2-3 meals per day. Occasional snacks.   Cut down on rice. Eating more vegetables and fish.     Exercise: yoga.     Weight:   Wt Readings from Last 4 Encounters:   09/25/19 87.1 kg (192 lb 1.6 oz)   09/10/19 86.6 kg (191 lb)   08/12/19 84.8 kg (187 lb)   06/19/19 (P) 88.7 kg (195 lb 8 oz)     Problem List     Patient Active Problem List   Diagnosis     ? 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     Displacement of lumbar intervertebral disc without myelopathy     Nonallopathic lesion of lumbar region     Nonallopathic lesion of thoracic region     Nonallopathic lesion of sacral region     Bartholin gland cyst     Cataract     Irritable bowel syndrome     Esophageal reflux     Seasonal allergic rhinitis     Latex sensitivity     Colon polyp     Adenomatous polyp     HYPERLIPIDEMIA LDL GOAL <100     Ovarian cyst     Breast pain     Essential hypertension     Advanced directives, counseling/discussion     Obesity     Hypomagnesemia     Type 2 diabetes mellitus with mild nonproliferative retinopathy without macular edema, with long-term current use of insulin, unspecified laterality (H)     Radial styloid tenosynovitis (de quervain)     Right wrist pain        Medications     Current Outpatient Medications   Medication     Adhesive Tape (KINESIOLOGY TAPE) TAPE     albuterol (PROAIR HFA/PROVENTIL HFA/VENTOLIN HFA) 108 (90 Base) MCG/ACT inhaler     aspirin 81 MG EC tablet     CALCIUM + D OR     Continuous Blood Gluc  (FREESTYLE BUSHRA 14 DAY READER) KIRIT     Continuous Blood Gluc Sensor (FREESTYLE BUSHRA 14 DAY SENSOR) MISC     empagliflozin (JARDIANCE) 25 MG TABS tablet     fexofenadine (ALLEGRA) 180 MG tablet     fish oil-omega-3 fatty acids (FISH OIL) 1000 MG capsule     fluticasone (FLONASE) 50 MCG/ACT nasal spray     Glucosamine-Chondroitin (GLUCOSAMINE CHONDR COMPLEX PO)      hydrochlorothiazide (HYDRODIURIL) 12.5 MG tablet     insulin aspart (NOVOLOG FLEXPEN) 100 UNIT/ML pen     insulin glargine (LANTUS SOLOSTAR PEN) 100 UNIT/ML pen     insulin pen needle (BD LISA U/F) 32G X 4 MM miscellaneous     losartan (COZAAR) 100 MG tablet     magnesium 250 MG tablet     metFORMIN (GLUCOPHAGE-XR) 500 MG 24 hr tablet     montelukast (SINGULAIR) 10 MG tablet     MULTIVITAMIN OR     NEEDLES, ANY SIZE     pantoprazole (PROTONIX) 40 MG EC tablet     pentoxifylline ER (TRENTAL) 400 MG CR tablet     rosuvastatin (CRESTOR) 10 MG tablet     VITAMIN D 2000 UNIT OR TABS     Current Facility-Administered Medications   Medication     lidocaine 1% with EPINEPHrine 1:100,000 injection 3 mL     Facility-Administered Medications Ordered in Other Visits   Medication     fentaNYL (SUBLIMAZE) injection 25-50 mcg     flumazenil (ROMAZICON) injection 0.2 mg     midazolam (VERSED) injection 0.5-1 mg     naloxone (NARCAN) injection 0.1-0.4 mg        Allergies     Allergies   Allergen Reactions     Latex Swelling     Mold      Tetanus Antitoxin Swelling     Tetracycline Swelling       Medical / Surgical History     Past Medical History:   Diagnosis Date     Adjustment disorder with mixed anxiety and depressed mood     following death of her adoptive father and then again after adoptive mother      Allergic rhinitis, cause unspecified     uses benadryl prn (sneezing/hoarse voice)     Bartholin gland cyst 2008     Chest pain, unspecified     neg dobutamine stress test  (reacted to dobutamine)     Dry eyes      ERM OD (epiretinal membrane, right eye)     BE mild     Esophageal reflux     EGD: -neg     Glaucoma suspect      Hearing problem      Hypertension      Irregular menstrual cycle     s/p D&C, since then more regular     Irritable bowel syndrome     colonoscopy -neg. Sx's especially prior to menses     MEDICAL HISTORY OF -     had health directive with : DANIEL Ford.  Full Code. Aunt  (Charlie Tay) will be decision maker     Meningitis, unspecified(322.9) age 8    hospitalized for 9 mo     Need for prophylactic vaccination with tuberculosis (BCG) vaccine     Has pos Mantoux. Gets yearly cxr, all neg.      Other and unspecified hyperlipidemia      Pain in joint, shoulder region     bone spurs on MRI, s/p pool therapy     Tinnitus      Type II or unspecified type diabetes mellitus without mention of complication, not stated as uncontrolled Age 32    [facility]s [facility]: Shayne Lane MD     Unspecified cataract     RE     Unspecified sinusitis (chronic)     2006     Wheezing     Has seen pulm 2006 & 2007. Told night ashtma, ? vocal chord spasm due to cold air.      Past Surgical History:   Procedure Laterality Date     CATARACT IOL, RT/LT  9/23/2008    LE     HC DILATION/CURETTAGE DIAG/THER NON OB  1992     HC TYMPANOPLASTY W/O MASTOID, INIT/REV W/O OSS CHAIN RECONST  12/09     RELEASE CARPAL TUNNEL Right 11/19/2018    Procedure: Right Carpal Tunnel Release;  Surgeon: Ivania Moran MD;  Location: [facility] OR     RELEASE CARPAL TUNNEL Left 12/17/2018    Procedure: Left Carpal Tunnel Release;  Surgeon: Ivania Moran MD;  Location: [facility] OR     TYMPANOPLASTY       YAG CAPSULOTOMY OS (LEFT EYE)  4/23/2011       Social History     Social History     Socioeconomic History     Marital status: Single     Spouse name: Not on file     Number of children: Not on file     Years of education: Not on file     Highest education level: Not on file   Occupational History     Not on file   Social Needs     Financial resource strain: Not on file     Food insecurity:     Worry: Not on file     Inability: Not on file     Transportation needs:     Medical: Not on file     Non-medical: Not on file   Tobacco Use     Smoking status: Never Smoker     Smokeless tobacco: Never Used   Substance and Sexual Activity     Alcohol use: No     Frequency: Never     Comment: rarely     Drug use: No      Sexual activity: Not Currently     Partners: Male     Birth control/protection: None   Lifestyle     Physical activity:     Days per week: Not on file     Minutes per session: Not on file     Stress: Not on file   Relationships     Social connections:     Talks on phone: Not on file     Gets together: Not on file     Attends Denominational service: Not on file     Active member of club or organization: Not on file     Attends meetings of clubs or organizations: Not on file     Relationship status: Not on file     Intimate partner violence:     Fear of current or ex partner: Not on file     Emotionally abused: Not on file     Physically abused: Not on file     Forced sexual activity: Not on file   Other Topics Concern     Parent/sibling w/ CABG, MI or angioplasty before 65F 55M? 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 Moved to  in . Initially lived in [place].    No children        2 cats       Family History     Family History   Problem Relation Age of Onset     Diabetes Brother      Cancer Brother 59        Lung cancer     Diabetes Father          of dm 70's     C.A.D. Father      Cerebrovascular Disease Father      Diabetes Paternal Grandfather      Hypertension Paternal Grandfather      Cerebrovascular Disease Paternal Grandfather      Diabetes Sister      Cancer Maternal Aunt         ovarian cancer.      Diabetes Brother      Diabetes Brother      Diabetes Sister      Glaucoma Maternal Grandmother      Cancer Brother      Diabetes Brother      Diabetes  "Sister      Hypertension Sister      Macular Degeneration No family hx of        ROS     Constitutional: no fevers, chills, night sweats. No weight loss or fatigue. Good appetite  Eyes: no vision changes, no eye redness, no diplopia  Ears, Nose, mouth, throat: no hearing changes, no tinnitus, no rhinorrhea, no nasal congestion  Cardiovascular: no chest pain, no orthopnea or PND, no edema, no palpitations  Respiratory: no dyspnea, no cough, no sputum, no wheezing  Gastrointestinal: no nausea, no vomiting, no abdominal pain, no diarrhea, no constipation  Genitourinary: no dysuria, no frequency, no urgency, no nocturia  Musculoskeletal: no joint pains, no back pain, no cramps, no fractures  Skin: no rash, no itching, no dryness, no ulcers, no hair loss  Neurological: no headache, no weakness, no numbness, no dizziness, no tremors  Psychiatric: no anxiety, no sadness  Hematologic/lymphatic: no easy bruising, no bleeding, no palor    Physical Exam   Ht 1.62 m (5' 3.78\")   Wt 87.1 kg (192 lb 1.6 oz)   LMP 03/19/2009 (Exact Date)   BMI 33.20 kg/m        General: Comfortable, no obvious distress, normal body habitus  Eyes: Sclera anicteric, moist conjunctiva  HENT: Atraumatic, oropharynx clear, moist mucous membranes with no mucosal ulcerations  Neck: Trachea midline, supple. Thyroid: Thyroid is normal in size and texture  CV: Regular rhythm, normal rate. No murmurs auscultated  Resp: Clear to auscultation bilaterally, good effort  Abdomen:  Soft, non tender, non distended. Bowel sounds heard. No organomegaly.  Skin: No rashes, lesions, or subcutaneous nodules.   Psych: Alert and oriented x 3. Appropriate affect, good insight  Extremities: No peripheral edema  Foot exam: normal pulses, skin intact, no lesions, normal sensation to monofilament.   Musculoskeletal: Appropriate muscle bulk and strength  Lymphatic: No cervical lymphadenopathy  Neuro: Moves all four extremities. No focal deficits on limited exam. Gait normal. "       Labs/Imaging and Outside Records     Pertinent Labs were reviewed and updated in EPIC.    Summary of recent findings:   Lab Results   Component Value Date    A1C 9.8 12/16/2013    A1C 9.5 04/15/2013    A1C 9.6 01/16/2013    A1C 9.4 09/28/2012    A1C 8.3 06/22/2012       TSH   Date Value Ref Range Status   10/30/2017 1.29 0.40 - 4.00 mU/L Final   02/08/2017 0.76 0.40 - 4.00 mU/L Final   11/17/2015 0.98 0.40 - 4.00 mU/L Final   05/04/2015 0.76 0.40 - 4.00 mU/L Final   12/16/2013 1.24 0.4 - 5.0 mU/L Final     T4 Free   Date Value Ref Range Status   06/21/2010 1.50 0.70 - 1.85 ng/dL Final       Creatinine   Date Value Ref Range Status   08/12/2019 0.64 0.52 - 1.04 mg/dL Final       Recent Labs   Lab Test  03/06/17   0905  05/04/15   1016  12/16/13   1028   CHOL  165  170  192   HDL  52  46*  42*   LDL  73  90  125   TRIG  199*  171*  124   CHOLHDLRATIO   --   3.7  4.6     Impression / Plan     1. Diabetes Mellitus: Type 2  Current glycemic control can be considered worsened.    Plan:   - increase Lantus to 70 units    2. Diabetes Complications: With retinopathy - stable  Check urine microabumine    3. Blood Pressure Management: Blood pressure is controlled. Currently is on pharmacotherapy for this.   - I refilled losartan and hydrochlorothiazide. She has been off Norvasc and doing great.    4.Lipid Management: Per the new ACC/JILLIAN/NHLBI guidelines, statins are recommended for individuals with diabetes aged 40-75 with LDL  without ASCVD, and for any individual with ASCVD. Currently the patient is on a statin.     5. Smoking Status: Patient Pt is smoke free..     6. Polyuria:   Check UA with reflex to culture. She is on Jardiance.     Follow up: 3 months      Shai Dong MD  Endocrinology, Diabetes and Metabolism  [facility]

## 2019-09-27 ENCOUNTER — THERAPY VISIT (OUTPATIENT)
Dept: OCCUPATIONAL THERAPY | Facility: CLINIC | Age: 59
End: 2019-09-27
Payer: COMMERCIAL

## 2019-09-27 DIAGNOSIS — M25.531 RIGHT WRIST PAIN: ICD-10-CM

## 2019-09-27 DIAGNOSIS — M65.4 RADIAL STYLOID TENOSYNOVITIS (DE QUERVAIN): ICD-10-CM

## 2019-09-27 PROCEDURE — 97112 NEUROMUSCULAR REEDUCATION: CPT | Mod: GO | Performed by: OCCUPATIONAL THERAPIST

## 2019-09-27 PROCEDURE — 97035 APP MDLTY 1+ULTRASOUND EA 15: CPT | Mod: GO | Performed by: OCCUPATIONAL THERAPIST

## 2019-09-27 PROCEDURE — 97110 THERAPEUTIC EXERCISES: CPT | Mod: GO | Performed by: OCCUPATIONAL THERAPIST

## 2019-09-30 ENCOUNTER — OFFICE VISIT (OUTPATIENT)
Dept: DERMATOLOGY | Facility: CLINIC | Age: 59
End: 2019-09-30
Payer: COMMERCIAL

## 2019-09-30 VITALS — HEART RATE: 86 BPM | SYSTOLIC BLOOD PRESSURE: 145 MMHG | DIASTOLIC BLOOD PRESSURE: 68 MMHG

## 2019-09-30 DIAGNOSIS — C44.519 BASAL CELL CARCINOMA (BCC) OF BACK: Primary | ICD-10-CM

## 2019-09-30 RX ORDER — LIDOCAINE HYDROCHLORIDE AND EPINEPHRINE 10; 10 MG/ML; UG/ML
3 INJECTION, SOLUTION INFILTRATION; PERINEURAL ONCE
Status: ACTIVE | OUTPATIENT
Start: 2019-09-30

## 2019-09-30 ASSESSMENT — PAIN SCALES - GENERAL
PAINLEVEL: NO PAIN (0)
PAINLEVEL: NO PAIN (0)

## 2019-09-30 NOTE — LETTER
9/30/2019       RE: Maliha Pedro  [address]  [place] [address]     Dear Colleague,    Thank you for referring your patient, Maliha Pedro, to the [facility] DERMATOLOGY at [facility]. Please see a copy of my visit note below.    DERMATOLOGIC EXCISION SURGERY PROCEDURE NOTE   Dermatology Problem List:  1.  BCC on the left mid back s/p excision 9/30/19  2. Postinflammatory hyperpigmentation at sites of insect bites on lower legs      NAME OF PROCEDURE: Excision with intermediate linear closure  Staff surgeon: Jamila  Resident: Ashley  Scrub nurse: Jewels Amaya  PRE-OPERATIVE DIAGNOSIS: Superficial and Nodular Basal Cell Carcinoma  POST-OPERATIVE DIAGNOSIS: Same   LOCATION: Left mid back  PRE-OPERATIVE SIZE: 0.5x0.5 cm  MARGIN: 0.4 cm  FINAL DEFECT SIZE: 1.3x1.3 cm  FINAL REPAIR LENGTH: 3.9 cm   ANESTHESIA: 1% lidocaine with 1:100,000 epinephrine    INDICATIONS: This patient presented with a 0.5x0.5 cm Superficial and Nodular Basal Cell Carcinoma. Excision was indicated.   We discussed the principles of treatment and most likely complications including bleeding, infection, scarring, alteration in skin color and sensation, wound dehiscence,muscle weakness in the area, or recurrence of the lesion or disease. We reviewed that on occasion, after healing, a secondary procedure or revision may be recommended in order to obtain the best cosmetic or functional result.   PROCEDURE: The patient was taken to the operative suite. Time-out was performed. The treatment area was anesthetized. The area was washed with chlorhexidine, rinsed with saline and draped with sterile towels. The lesion was delineated and excised down to deep subcutaneous fat in a fusiform manner. Hemostasis was obtained by electrocoagulation.   REPAIR: An complex layered linear closure was selected as the procedure which would maximally preserve both function and cosmesis and for the  following reasons: 1) the defect was widely undermined; 2) multiple deep plication and/or layered sutures placed; 3) wound size, depth, tension, and location.   After the excision of the tumor, the area was extensively and carefully undermined. Hemostasis was obtained with spot electrocautery and ligation of vessels where necessary. Initial deep sutures of 3-0 Vicryl sutures were placed in the deep, subcutaneous and fascial planes to appose the lateral margins. The subcutaneous and dermal layers were then closed with additional 3-0 Vicryl sutures. The epidermis was then carefully approximated along the length of the wound using 4-0 Prolene running sutures.   Estimated blood loss was less than 10 ml for all surgical sites. A sterile pressure dressing was applied and wound care instructions, with a written handout, were given. The patient was discharged from dermatology clinic alert and ambulatory.    FU for suture removal in 10-14 days.      Staff Involved:  Resident/Staff   Keny Ramirez MD  PGY3 Dermatology    Staff Physician Comments:   I saw and evaluated the patient with the resident and I edited the assessment and plan as documented in the note. I was present for the key portions of the above major procedure and examination.    Donell Marino MD   of Dermatology  Department of Dermatology  [facility]                          Again, thank you for allowing me to participate in the care of your patient.      Sincerely,    Donell Marino MD

## 2019-09-30 NOTE — NURSING NOTE
Dermatology Rooming Note    Maliha Pedro's goals for this visit include:   Chief Complaint   Patient presents with     Derm Problem     Fco is here today for an excison of BCC on left mid back     Jewels Amaya, ÁNGEL

## 2019-09-30 NOTE — PROGRESS NOTES
DERMATOLOGIC EXCISION SURGERY PROCEDURE NOTE   Dermatology Problem List:  1. BCC on the left mid back s/p excision 9/30/19  2. Postinflammatory hyperpigmentation at sites of insect bites on lower legs      NAME OF PROCEDURE: Excision with intermediate linear closure  Staff surgeon: Jamila  Resident: Ashley  Scrub nurse: Jewels Amaya  PRE-OPERATIVE DIAGNOSIS: Superficial and Nodular Basal Cell Carcinoma  POST-OPERATIVE DIAGNOSIS: Same   LOCATION: Left mid back  PRE-OPERATIVE SIZE: 0.5x0.5 cm  MARGIN: 0.4 cm  FINAL DEFECT SIZE: 1.3x1.3 cm  FINAL REPAIR LENGTH: 3.9 cm   ANESTHESIA: 1% lidocaine with 1:100,000 epinephrine    INDICATIONS: This patient presented with a 0.5x0.5 cm Superficial and Nodular Basal Cell Carcinoma. Excision was indicated.   We discussed the principles of treatment and most likely complications including bleeding, infection, scarring, alteration in skin color and sensation, wound dehiscence,muscle weakness in the area, or recurrence of the lesion or disease. We reviewed that on occasion, after healing, a secondary procedure or revision may be recommended in order to obtain the best cosmetic or functional result.   PROCEDURE: The patient was taken to the operative suite. Time-out was performed. The treatment area was anesthetized. The area was washed with chlorhexidine, rinsed with saline and draped with sterile towels. The lesion was delineated and excised down to deep subcutaneous fat in a fusiform manner. Hemostasis was obtained by electrocoagulation.   REPAIR: An complex layered linear closure was selected as the procedure which would maximally preserve both function and cosmesis and for the following reasons: 1) the defect was widely undermined; 2) multiple deep plication and/or layered sutures placed; 3) wound size, depth, tension, and location.   After the excision of the tumor, the area was extensively and carefully undermined. Hemostasis was obtained with spot electrocautery and  ligation of vessels where necessary. Initial deep sutures of 3-0 Vicryl sutures were placed in the deep, subcutaneous and fascial planes to appose the lateral margins. The subcutaneous and dermal layers were then closed with additional 3-0 Vicryl sutures. The epidermis was then carefully approximated along the length of the wound using 4-0 Prolene running sutures.   Estimated blood loss was less than 10 ml for all surgical sites. A sterile pressure dressing was applied and wound care instructions, with a written handout, were given. The patient was discharged from dermatology clinic alert and ambulatory.    FU for suture removal in 10-14 days.      Staff Involved:  Resident/Staff   Keny Ramirez MD  PGY3 Dermatology    Staff Physician Comments:   I saw and evaluated the patient with the resident and I edited the assessment and plan as documented in the note. I was present for the key portions of the above major procedure and examination.    Donell Marino MD   of Dermatology  Department of Dermatology  [facility]

## 2019-09-30 NOTE — NURSING NOTE
Lidocaine-epinephrine 1-1:521308 % injection   6mL once for one use, starting 9/30/2019 ending 9/30/2019,  2mL disp, R-0, injection  Injected by Dr. Marino and Jewels Amaya, [facility]

## 2019-09-30 NOTE — PATIENT INSTRUCTIONS

## 2019-09-30 NOTE — LETTER
9/30/2019      RE: Maliha Wagnercarmenbrianne  [address] [facility] [address]       DERMATOLOGIC EXCISION SURGERY PROCEDURE NOTE   Dermatology Problem List:  1.  BCC on the left mid back s/p excision 9/30/19  2. Postinflammatory hyperpigmentation at sites of insect bites on lower legs      NAME OF PROCEDURE: Excision with intermediate linear closure  Staff surgeon: Jamila  Resident: Ashley  Scrub nurse: Jewels Amaya  PRE-OPERATIVE DIAGNOSIS: Superficial and Nodular Basal Cell Carcinoma  POST-OPERATIVE DIAGNOSIS: Same   LOCATION: Left mid back  PRE-OPERATIVE SIZE: 0.5x0.5 cm  MARGIN: 0.4 cm  FINAL DEFECT SIZE: 1.3x1.3 cm  FINAL REPAIR LENGTH: 3.9 cm   ANESTHESIA: 1% lidocaine with 1:100,000 epinephrine    INDICATIONS: This patient presented with a 0.5x0.5 cm Superficial and Nodular Basal Cell Carcinoma. Excision was indicated.   We discussed the principles of treatment and most likely complications including bleeding, infection, scarring, alteration in skin color and sensation, wound dehiscence,muscle weakness in the area, or recurrence of the lesion or disease. We reviewed that on occasion, after healing, a secondary procedure or revision may be recommended in order to obtain the best cosmetic or functional result.   PROCEDURE: The patient was taken to the operative suite. Time-out was performed. The treatment area was anesthetized. The area was washed with chlorhexidine, rinsed with saline and draped with sterile towels. The lesion was delineated and excised down to deep subcutaneous fat in a fusiform manner. Hemostasis was obtained by electrocoagulation.   REPAIR: An complex layered linear closure was selected as the procedure which would maximally preserve both function and cosmesis and for the following reasons: 1) the defect was widely undermined; 2) multiple deep plication and/or layered sutures placed; 3) wound size, depth, tension, and location.   After the excision of the tumor, the area  was extensively and carefully undermined. Hemostasis was obtained with spot electrocautery and ligation of vessels where necessary. Initial deep sutures of 3-0 Vicryl sutures were placed in the deep, subcutaneous and fascial planes to appose the lateral margins. The subcutaneous and dermal layers were then closed with additional 3-0 Vicryl sutures. The epidermis was then carefully approximated along the length of the wound using 4-0 Prolene running sutures.   Estimated blood loss was less than 10 ml for all surgical sites. A sterile pressure dressing was applied and wound care instructions, with a written handout, were given. The patient was discharged from dermatology clinic alert and ambulatory.    FU for suture removal in 10-14 days.      Staff Involved:  Resident/Staff   Keny Ramirez MD  PGY3 Dermatology    Staff Physician Comments:   I saw and evaluated the patient with the resident and I edited the assessment and plan as documented in the note. I was present for the key portions of the above major procedure and examination.    Donell Marino MD   of Dermatology  Department of Dermatology  [facility]

## 2019-10-01 ENCOUNTER — THERAPY VISIT (OUTPATIENT)
Dept: OCCUPATIONAL THERAPY | Facility: CLINIC | Age: 59
End: 2019-10-01
Payer: COMMERCIAL

## 2019-10-01 DIAGNOSIS — M65.4 RADIAL STYLOID TENOSYNOVITIS (DE QUERVAIN): ICD-10-CM

## 2019-10-01 DIAGNOSIS — M25.531 RIGHT WRIST PAIN: ICD-10-CM

## 2019-10-01 LAB — COPATH REPORT: NORMAL

## 2019-10-01 PROCEDURE — 97110 THERAPEUTIC EXERCISES: CPT | Mod: GO | Performed by: OCCUPATIONAL THERAPIST

## 2019-10-01 PROCEDURE — 97140 MANUAL THERAPY 1/> REGIONS: CPT | Mod: GO | Performed by: OCCUPATIONAL THERAPIST

## 2019-10-01 PROCEDURE — 97035 APP MDLTY 1+ULTRASOUND EA 15: CPT | Mod: GO | Performed by: OCCUPATIONAL THERAPIST

## 2019-10-03 ENCOUNTER — HEALTH MAINTENANCE LETTER (OUTPATIENT)
Age: 59
End: 2019-10-03

## 2019-10-11 ENCOUNTER — THERAPY VISIT (OUTPATIENT)
Dept: OCCUPATIONAL THERAPY | Facility: CLINIC | Age: 59
End: 2019-10-11
Payer: COMMERCIAL

## 2019-10-11 DIAGNOSIS — M65.4 RADIAL STYLOID TENOSYNOVITIS (DE QUERVAIN): ICD-10-CM

## 2019-10-11 DIAGNOSIS — M25.531 RIGHT WRIST PAIN: ICD-10-CM

## 2019-10-11 PROCEDURE — 97140 MANUAL THERAPY 1/> REGIONS: CPT | Mod: GO | Performed by: OCCUPATIONAL THERAPIST

## 2019-10-11 PROCEDURE — 97110 THERAPEUTIC EXERCISES: CPT | Mod: GO | Performed by: OCCUPATIONAL THERAPIST

## 2019-10-11 NOTE — PROGRESS NOTES
Discharge Note - Hand Therapy     Current Date:  10/11/19  Initial Evaluation Date:  8/22/19  Reporting period is from 8/22/19 to 10/11/2019  Number of Visits:  8  Referring Physician:Elva Ortiz MD    Diagnosis: Bilateral wrist pain  DOI: 8/12/19 (Date of order)  DOS: (R) 11/2018, (L) 12/2018  Procedure: CTR    Maliha Pedro is a 59 year old right hand dominant female.    S:  Subjective changes as noted by patient: The arm is getting better. Sometimes I get pain with quick movements.  Functional changes noted by patient: able to mow yard.    Upper Extremity Functional Index Score:  SCORE:   Column Totals: /80: 73   (A lower score indicates greater disability.)    O:  Pain Level (Scale 0-10):   8/22/2019 9/12/19 9/24/19 10/11/19   At Rest 1-2/10 1/10 0/10 0/10   With Use 1-2/10 1-2/10 0/10 0/10     Pain Description:  Date 8/22/2019   Location wrist, thumb and forearm   Pain Quality Stretching and tightness   Frequency intermittent     Pain is worst  daytime   Exacerbated by  writing, opening bags, weight bearing,    Relieved by rest and Tylenol   Progression Unchanged      ROM  Pain Report: - none  + mild    ++ moderate    +++ severe   Wrist 8/22/2019 8/22/2019 10/11/19   AROM (PROM) Left Right Right   Extension 55 60 65   Flexion 55 45 50   RD 25 20 20   UD 45 45+ 50   UD with thumb flexed 45 30+ 35      ROM  Thumb 8/22/2019 8/22/2019   AROM  (PROM) Left Right   MP / /55   IP / /70   RABD     PABD     Retropulsion     [facility] Opposition Scale (0-10/10) 9/10 9/10      Strength   (Measured in pounds)  Pain Report: - none  + mild    ++ moderate    +++ severe    8/22/2019 8/22/2019 9/24/19 10/11/19   Trials Left Right Right Right   1  2  3 44  42  41 35  28  34 39  40  44 45  44  46   Average 42 32 41 45     Lat Pinch 8/22/2019 8/22/2019 9/24/19   Trials Left Right Right   1  2  3 16 14 14   Average        3 Pt Pinch 8/22/2019 8/22/2019 9/24/19   Trials Left Right Right   1  2  3 13 7+ 11    Average         Functional Exam:  - no pain, + mild, ++ moderate, +++ severe  MMT:  Right 8/22/19 10/11/19   Thumb EPL  + -   Thumb EPB  - -   Thumb APL + -   Radial Deviation  + -   Ulnar Deviation  - -   Wrist Ext - -   Wrist Flex  - -     Palpation:  Right 8/22/19 9/12/19 10/11/19   Radial Styloid  + - -   1st dorsal comp.  + + -   Intersection syndrome +++ + -   Lateral Epicondylitis   ++     Special Tests:    Right 8/22/19 10/11/19   Finkelstein   + -   Radial Nerve Tinels + -     Please refer to the daily flowsheet for treatment provided today.     Assessment:  Overall Assessment:  Patient's symptoms are resolving.  Patient is progressing well.  Patient is independent in home exercise program.  Patient is ready to be discharged from therapy and continue their home treatment program.  STG/LTG:  See goal sheet for details and updates.    Plan:  Frequency/Duration:  Discharge from Hand Therapy; continue home program.    Recommendations for Home Program -   Home Program:   Warmth for stiffness  Ice after activity for pain  Self TFM to 1st dorsal compartment, LEP  Self MFR to EPB/ABL, extensors  AROM of wrist and thumb  PROM with stretch into simultaneous thumb flexion and ulnar deviation  Thumb spica orthosis for sleeping and per symptoms day  Avoid activities that exacerbate pain in the wrist or thumb  K- tape  Strengthening

## 2019-10-15 ENCOUNTER — ALLIED HEALTH/NURSE VISIT (OUTPATIENT)
Dept: DERMATOLOGY | Facility: CLINIC | Age: 59
End: 2019-10-15
Payer: COMMERCIAL

## 2019-10-15 DIAGNOSIS — Z48.02 VISIT FOR SUTURE REMOVAL: Primary | ICD-10-CM

## 2019-10-15 NOTE — PROGRESS NOTES
Maliha Pedro comes into clinic today at the request of Dr. Marino Ordering Provider for a suture removal.      This service provided today was under the supervising provider of the day Dr. Kruse, who was available if needed.    TERESA Menezes    Removed 3 sutures, from midback without complication.  Patient tolerated procedure well, and understood all followup instructions.

## 2019-10-15 NOTE — PATIENT INSTRUCTIONS
"  Patient Education     Stitches or Staple Removal  You were seen today for removal of your stitches or staples. Your wound is healing as expected. The wound has healed well enough that the stitches or staples can be removed. The wound will continue to heal for the next few months.  At this time there is no sign of infection.   Home care    If you have pain, take pain medicine as advised by your healthcare provider.     Keep your wound clean and protected by covering it with a bandage for the next week or so.     Wash your hands with soap and warm water before and after caring for your wound. This helps prevent infection.    Clean the wound gently with soap and warm water daily or as directed by your healthcare provider. Don't use iodine, alcohol, or other cleansers on the wound.  Gently pat it dry. Put on a new bandage, if needed. Don't reuse bandages.    If the area gets wet, gently pat it dry with a clean cloth. Replace the wet bandage with a dry one.    Check the wound daily for signs of infection. (These are listed under \"When to seek medical advice\" below.)    You may shower and bathe as usual. Swimming may be allowed, but check with your healthcare provider first.    Keep the wound out of prolonged direct sunlight. The new skin is very sensitive and can easily sunburn causing worse scarring.    Ask your provider about using over-the-counter scar removing creams if your wound is highly visible.  Follow-up care  Follow up with your healthcare provider as advised.  When to seek medical advice   Call your healthcare provider if any of the following occur:    Wound reopens or bleeds    Signs of an infection, such as:  ? Increasing redness or swelling around the wound  ? Increased warmth from the wound  ? Worsening pain  ? Red streaking lines away from the wound  ? Fluid draining from the wound    Fever of 100.4 F (38 C) or higher, or as directed by your healthcare provider  Date Last Reviewed: 4/1/2018    " 6534-9826 [employer]. [address] [place] [address], [place] [address]. All rights reserved. This information is not intended as a substitute for professional medical care. Always follow your healthcare professional's instructions.

## 2019-11-25 DIAGNOSIS — E78.5 HYPERLIPIDEMIA LDL GOAL <100: ICD-10-CM

## 2019-11-25 RX ORDER — ROSUVASTATIN CALCIUM 10 MG/1
10 TABLET, COATED ORAL DAILY
Qty: 90 TABLET | Refills: 2 | Status: SHIPPED | OUTPATIENT
Start: 2019-11-25 | End: 2020-09-01

## 2019-11-29 ENCOUNTER — TELEPHONE (OUTPATIENT)
Dept: FAMILY MEDICINE | Facility: CLINIC | Age: 59
End: 2019-11-29

## 2019-11-29 DIAGNOSIS — E11.65 TYPE 2 DIABETES MELLITUS WITH HYPERGLYCEMIA (H): ICD-10-CM

## 2019-11-29 RX ORDER — METFORMIN HCL 500 MG
TABLET, EXTENDED RELEASE 24 HR ORAL
Qty: 360 TABLET | Refills: 3 | Status: CANCELLED | OUTPATIENT
Start: 2019-11-29

## 2019-11-29 NOTE — TELEPHONE ENCOUNTER
Medication: Metformin 500 ER    Last Fill: 10/29//19       Qty: 360   Last Rx Date: 01/17/19     Last MD Visit: 09/25/19

## 2019-12-04 ENCOUNTER — OFFICE VISIT (OUTPATIENT)
Dept: ENDOCRINOLOGY | Facility: CLINIC | Age: 59
End: 2019-12-04
Payer: COMMERCIAL

## 2019-12-04 VITALS
BODY MASS INDEX: 33.84 KG/M2 | HEART RATE: 94 BPM | WEIGHT: 191 LBS | SYSTOLIC BLOOD PRESSURE: 124 MMHG | HEIGHT: 63 IN | DIASTOLIC BLOOD PRESSURE: 80 MMHG

## 2019-12-04 DIAGNOSIS — E11.3299 TYPE 2 DIABETES MELLITUS WITH MILD NONPROLIFERATIVE RETINOPATHY WITHOUT MACULAR EDEMA, WITH LONG-TERM CURRENT USE OF INSULIN, UNSPECIFIED LATERALITY (H): Primary | ICD-10-CM

## 2019-12-04 DIAGNOSIS — Z79.4 TYPE 2 DIABETES MELLITUS WITH MILD NONPROLIFERATIVE RETINOPATHY WITHOUT MACULAR EDEMA, WITH LONG-TERM CURRENT USE OF INSULIN, UNSPECIFIED LATERALITY (H): Primary | ICD-10-CM

## 2019-12-04 ASSESSMENT — PAIN SCALES - GENERAL: PAINLEVEL: NO PAIN (0)

## 2019-12-04 ASSESSMENT — MIFFLIN-ST. JEOR: SCORE: 1405.5

## 2019-12-04 NOTE — LETTER
12/4/2019     RE: Maliha Pedro  [address] [place] [address]     Dear Colleague,    Thank you for referring your patient, Maliha Pedro, to the [facility] ENDOCRINOLOGY at [facility]. Please see a copy of my visit note below.    Endocrinology Clinic Visit 12/04/19  NAME:  Maliha Pedro  PCP:  Elva Ortiz  MRN:  [id number]    Chief Complaint     Follow up. Diabetes     History of Present Illness     Maliha Pedro is a 59 year old female who is seen in clinic for diabetes management.   She has had diabetes since age 32. Has tried many different meds. Did not tolerate GLP-1 agonists. Stopped Actos due to concerns about adverse effects. Was on Lantus and Novolog with poor control, with recent improvement since addition of Jardiance November 2017. She has history of mild stable retinopathy.     Diabetes Care:   Last urine MA: Sept 2019: 10.09, Cr normal  Neuropathy: none  Eye exam: last done 09/2019 - mild retinopathy, stable  Lipids: last checked Aug 2019. LDL 74. She is on statin.   HTN: on ARB, hydrochlorothiazide and CCB    Interval History:   She was recently found to have BCC of the skin. That affected her diabetes indirectly due to stress about the diagnosis, delaying her jail plans, etc... A1c went to 8.7.   I raised her Lantus to 70 units.   Last visit she reported more polyuria and vaginal itching. UA was negative for UTI.  Her polyuria has resolved.    Today her HbA1c is 8.8.  She is experiencing hypoglycemia overnight, which results in her underdosing NovoLog for breakfast.  The patient is retiring next week and she will be moving to the [facility] in 1 month.    Associated Signs/Symptoms  Hypoglycemia: no. Hyperglycemia: none.Neuropathy: none. Vascular Symtpoms: none. Angina/CHF: dyspnea on exertion. Ulcers: No. Amputations: No    Current treatment strategy: Novolog 18-25 units before meals (depending on BG and  size of meal). Lantus 70 units daily. Metformin 1000 mg po BID, Jardiance 25 mg po daily    Blood Glucose Monitoring: CGM download: bushra: 14 day data:  Avg 145. SD 54. Hypo: 19%, within range: 47%, above range: 34%.   The bushra download was reflective mostly of overnight and morning blood glucose, as the graft was interrupted for evening times.  It shows drop in blood glucose overnight, and a sharp rise with breakfast, and tends to be high during the day.    Diet: 2-3 meals per day. Occasional snacks.   Cut down on rice. Eating more vegetables and fish.     Exercise: yoga.     Weight:   Wt Readings from Last 4 Encounters:   12/04/19 86.6 kg (191 lb)   09/25/19 87.1 kg (192 lb 1.6 oz)   09/10/19 86.6 kg (191 lb)   08/12/19 84.8 kg (187 lb)     Problem List     Patient Active Problem List   Diagnosis     ? 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     Displacement of lumbar intervertebral disc without myelopathy     Nonallopathic lesion of lumbar region     Nonallopathic lesion of thoracic region     Nonallopathic lesion of sacral region     Bartholin gland cyst     Cataract     Irritable bowel syndrome     Esophageal reflux     Seasonal allergic rhinitis     Latex sensitivity     Colon polyp     Adenomatous polyp     HYPERLIPIDEMIA LDL GOAL <100     Ovarian cyst     Breast pain     Essential hypertension     Advanced directives, counseling/discussion     Obesity     Hypomagnesemia     Type 2 diabetes mellitus with mild nonproliferative retinopathy without macular edema, with long-term current use of insulin, unspecified laterality (H)        Medications     Current Outpatient Medications   Medication     Adhesive Tape (KINESIOLOGY TAPE) TAPE     albuterol (PROAIR HFA/PROVENTIL HFA/VENTOLIN HFA) 108 (90 Base) MCG/ACT inhaler     aspirin 81 MG EC tablet     CALCIUM + D OR     Continuous Blood Gluc  (FREESTYLE BUSHRA 14 DAY READER) KIRIT     Continuous Blood Gluc Sensor (FREESTYLE BUSHRA 14 DAY  SENSOR) MISC     empagliflozin (JARDIANCE) 25 MG TABS tablet     fexofenadine (ALLEGRA) 180 MG tablet     fish oil-omega-3 fatty acids (FISH OIL) 1000 MG capsule     fluticasone (FLONASE) 50 MCG/ACT nasal spray     Glucosamine-Chondroitin (GLUCOSAMINE CHONDR COMPLEX PO)     hydrochlorothiazide (HYDRODIURIL) 12.5 MG tablet     insulin aspart (NOVOLOG FLEXPEN) 100 UNIT/ML pen     insulin glargine (LANTUS SOLOSTAR PEN) 100 UNIT/ML pen     insulin pen needle (BD LISA U/F) 32G X 4 MM miscellaneous     losartan (COZAAR) 100 MG tablet     magnesium 250 MG tablet     metFORMIN (GLUCOPHAGE-XR) 500 MG 24 hr tablet     montelukast (SINGULAIR) 10 MG tablet     MULTIVITAMIN OR     NEEDLES, ANY SIZE     pantoprazole (PROTONIX) 40 MG EC tablet     pentoxifylline ER (TRENTAL) 400 MG CR tablet     rosuvastatin (CRESTOR) 10 MG tablet     VITAMIN D 2000 UNIT OR TABS     Current Facility-Administered Medications   Medication     lidocaine 1% with EPINEPHrine 1:100,000 injection 3 mL     lidocaine 1% with EPINEPHrine 1:100,000 injection 3 mL     Facility-Administered Medications Ordered in Other Visits   Medication     fentaNYL (SUBLIMAZE) injection 25-50 mcg     flumazenil (ROMAZICON) injection 0.2 mg     midazolam (VERSED) injection 0.5-1 mg     naloxone (NARCAN) injection 0.1-0.4 mg        Allergies     Allergies   Allergen Reactions     Latex Swelling     Mold      Tetanus Antitoxin Swelling     Tetracycline Swelling       Medical / Surgical History     Past Medical History:   Diagnosis Date     Adjustment disorder with mixed anxiety and depressed mood     following death of her adoptive father and then again after adoptive mother      Allergic rhinitis, cause unspecified     uses benadryl prn (sneezing/hoarse voice)     Bartholin gland cyst 2008     Chest pain, unspecified     neg dobutamine stress test  (reacted to dobutamine)     Dry eyes      ERM OD (epiretinal membrane, right eye)     BE mild     Esophageal reflux      EGD: 2000-neg     Glaucoma suspect      Hearing problem      Hypertension      Irregular menstrual cycle     s/p D&C, since then more regular     Irritable bowel syndrome     colonoscopy 2000-neg. Sx's especially prior to menses     MEDICAL HISTORY OF -     had health directive with : DANIEL Ford.  Full Code. Aunt (Charlie Tay) will be decision maker     Meningitis, unspecified(322.9) age 8    hospitalized for 9 mo     Need for prophylactic vaccination with tuberculosis (BCG) vaccine     Has pos Mantoux. Gets yearly cxr, all neg.      Other and unspecified hyperlipidemia      Pain in joint, shoulder region     bone spurs on MRI, s/p pool therapy     Tinnitus      Type II or unspecified type diabetes mellitus without mention of complication, not stated as uncontrolled Age 32    Follows [facility] endocrine at [facility]: Shayne Lane MD     Unspecified cataract     RE     Unspecified sinusitis (chronic)     2006     Wheezing     Has seen pulm 2006 & 2007. Told night ashtma, ? vocal chord spasm due to cold air.      Past Surgical History:   Procedure Laterality Date     CATARACT IOL, RT/LT  9/23/2008    LE     HC DILATION/CURETTAGE DIAG/THER NON OB  1992     HC TYMPANOPLASTY W/O MASTOID, INIT/REV W/O OSS CHAIN RECONST  12/09     RELEASE CARPAL TUNNEL Right 11/19/2018    Procedure: Right Carpal Tunnel Release;  Surgeon: Ivania Moran MD;  Location: [facility] OR     RELEASE CARPAL TUNNEL Left 12/17/2018    Procedure: Left Carpal Tunnel Release;  Surgeon: Ivania Moran MD;  Location: [facility] OR     TYMPANOPLASTY       YAG CAPSULOTOMY OS (LEFT EYE)  4/23/2011       Social History     Social History     Socioeconomic History     Marital status: Single     Spouse name: Not on file     Number of children: Not on file     Years of education: Not on file     Highest education level: Not on file   Occupational History     Not on file   Social Needs     Financial resource strain: Not on file     Food  insecurity:     Worry: Not on file     Inability: Not on file     Transportation needs:     Medical: Not on file     Non-medical: Not on file   Tobacco Use     Smoking status: Never Smoker     Smokeless tobacco: Never Used   Substance and Sexual Activity     Alcohol use: No     Frequency: Never     Comment: rarely     Drug use: No     Sexual activity: Not Currently     Partners: Male     Birth control/protection: None   Lifestyle     Physical activity:     Days per week: Not on file     Minutes per session: Not on file     Stress: Not on file   Relationships     Social connections:     Talks on phone: Not on file     Gets together: Not on file     Attends Buddhism service: Not on file     Active member of club or organization: Not on file     Attends meetings of clubs or organizations: Not on file     Relationship status: Not on file     Intimate partner violence:     Fear of current or ex partner: Not on file     Emotionally abused: Not on file     Physically abused: Not on file     Forced sexual activity: Not on file   Other Topics Concern     Parent/sibling w/ CABG, MI or angioplasty before 65F 55M? No      Service No     Blood Transfusions No     Caffeine Concern No     Occupational Exposure Yes     Comment: exposed to X-ray     Hobby Hazards No     Sleep Concern Yes     Comment: Hot Flashes     Stress Concern Yes     Comment: Work     Weight Concern Yes     Special Diet No     Back Care Yes     Comment: Back pain     Exercise Yes     Bike Helmet No     Seat Belt Yes     Self-Exams Yes   Social History Narrative    Works at  in surgical ICU    Born in the [facility]. Moved to  in . Initially lived in [place].    No children        2 cats       Family History     Family History   Problem Relation Age of Onset     Diabetes Brother      Cancer Brother 59        Lung cancer     Diabetes Father          of dm 70's     C.A.D. Father      Cerebrovascular Disease Father      Diabetes  "Paternal Grandfather      Hypertension Paternal Grandfather      Cerebrovascular Disease Paternal Grandfather      Diabetes Sister      Cancer Maternal Aunt         ovarian cancer.      Diabetes Brother      Diabetes Brother      Diabetes Sister      Glaucoma Maternal Grandmother      Cancer Brother      Diabetes Brother      Diabetes Sister      Hypertension Sister      Macular Degeneration No family hx of        ROS     Constitutional: no fevers, chills, night sweats. No weight loss or fatigue. Good appetite  Eyes: no vision changes, no eye redness, no diplopia  Ears, Nose, mouth, throat: no hearing changes, no tinnitus, no rhinorrhea, no nasal congestion  Cardiovascular: no chest pain, no orthopnea or PND, no edema, no palpitations  Respiratory: no dyspnea, no cough, no sputum, no wheezing  Gastrointestinal: no nausea, no vomiting, no abdominal pain, no diarrhea, no constipation  Genitourinary: no dysuria, no frequency, no urgency, no nocturia  Musculoskeletal: no joint pains, no back pain, no cramps, no fractures  Skin: no rash, no itching, no dryness, no ulcers, no hair loss  Neurological: no headache, no weakness, no numbness, no dizziness, no tremors  Psychiatric: no anxiety, no sadness  Hematologic/lymphatic: no easy bruising, no bleeding, no palor    Physical Exam   /80   Pulse 94   Ht 1.6 m (5' 3\")   Wt 86.6 kg (191 lb)   LMP 03/19/2009 (Exact Date)   BMI 33.83 kg/m        General: Comfortable, no obvious distress, normal body habitus  Eyes: Sclera anicteric, moist conjunctiva  HENT: Atraumatic, oropharynx clear, moist mucous membranes with no mucosal ulcerations  Neck: Trachea midline, supple. Thyroid: Thyroid is normal in size and texture  CV: Regular rhythm, normal rate. No murmurs auscultated  Resp: Clear to auscultation bilaterally, good effort  Abdomen:  Soft, non tender, non distended. Bowel sounds heard. No organomegaly.  Skin: No rashes, lesions, or subcutaneous nodules.   Psych: Alert " and oriented x 3. Appropriate affect, good insight  Extremities: No peripheral edema  Musculoskeletal: Appropriate muscle bulk and strength  Lymphatic: No cervical lymphadenopathy  Neuro: Moves all four extremities. No focal deficits on limited exam. Gait normal.       Labs/Imaging and Outside Records     Pertinent Labs were reviewed and updated in EPIC.    Summary of recent findings:   Lab Results   Component Value Date    A1C 9.8 12/16/2013    A1C 9.5 04/15/2013    A1C 9.6 01/16/2013    A1C 9.4 09/28/2012    A1C 8.3 06/22/2012       TSH   Date Value Ref Range Status   10/30/2017 1.29 0.40 - 4.00 mU/L Final   02/08/2017 0.76 0.40 - 4.00 mU/L Final   11/17/2015 0.98 0.40 - 4.00 mU/L Final   05/04/2015 0.76 0.40 - 4.00 mU/L Final   12/16/2013 1.24 0.4 - 5.0 mU/L Final     T4 Free   Date Value Ref Range Status   06/21/2010 1.50 0.70 - 1.85 ng/dL Final       Creatinine   Date Value Ref Range Status   08/12/2019 0.64 0.52 - 1.04 mg/dL Final       Recent Labs   Lab Test  03/06/17   0905  05/04/15   1016  12/16/13   1028   CHOL  165  170  192   HDL  52  46*  42*   LDL  73  90  125   TRIG  199*  171*  124   CHOLHDLRATIO   --   3.7  4.6     Impression / Plan     1. Diabetes Mellitus: Type 2  Current glycemic control can be considered worsened.    She is currently in a transitional stage as she will be retiring next week.  Following that, her sleep and wake cycle will change to sleeping at night and being awake in the day, from her current nighttime shift pattern.  Moreover, she is moving to the [facility] next month, and will live there, but will still be coming here for her medical care every 3 months.    With all of these changes, it is expected that her insulin regimen is going to change.    Plan:   -First step is to reduce her Lantus down to 60 units, since she is having overnight hypoglycemia.  -Next, plan is to increase NovoLog with meals to at least 25 units.  -Once she moves to the [facility], and with the changes  reported above, especially in her sleep-wake cycle, and diet, and overall climate, her insulin needs are expected to change.  I advised her that if she has hypoglycemia overnight, she is to reduce her Lantus by 10 units at a time and weight at least 4 days before making a judgment.  If she is having hypoglycemia after meals, she is to reduce her NovoLog.  Aim for blood glucose of 100-140 fasting, and less than 180 two hours after a meal.  -I also advised her to start tracking her carbohydrate intake more closely.  She asked me about the keto diet.  I advised against it, since it is very extreme.  I advised her to reduce her carb intake to less than 150 g/day, and track it using a objective tracker such as her Fitbit or another miesha.    2. Diabetes Complications: With retinopathy - stable  She is up-to-date on her annual diabetes testing including urine microalbumin, overall labs, and eye exam.    3. Blood Pressure Management: Blood pressure is controlled. Currently is on pharmacotherapy for this.   - I refilled losartan and hydrochlorothiazide. She has been off Norvasc and doing great.    4.Lipid Management: Per the new ACC/JILLIAN/NHLBI guidelines, statins are recommended for individuals with diabetes aged 40-75 with LDL  without ASCVD, and for any individual with ASCVD. Currently the patient is on a statin.     5. Smoking Status: Patient Pt is smoke free..     Follow up: 3 months      Shai Dong MD  Endocrinology, Diabetes and Metabolism  [facility]

## 2019-12-04 NOTE — PROGRESS NOTES
Endocrinology Clinic Visit 12/04/19  NAME:  Maliha Pedro  PCP:  Elva Ortiz  MRN:  [id number]    Chief Complaint     Follow up. Diabetes     History of Present Illness     Maliha Pedro is a 59 year old female who is seen in clinic for diabetes management.   She has had diabetes since age 32. Has tried many different meds. Did not tolerate GLP-1 agonists. Stopped Actos due to concerns about adverse effects. Was on Lantus and Novolog with poor control, with recent improvement since addition of Jardiance November 2017. She has history of mild stable retinopathy.     Diabetes Care:   Last urine MA: Sept 2019: 10.09, Cr normal  Neuropathy: none  Eye exam: last done 09/2019 - mild retinopathy, stable  Lipids: last checked Aug 2019. LDL 74. She is on statin.   HTN: on ARB, hydrochlorothiazide and CCB    Interval History:   She was recently found to have BCC of the skin. That affected her diabetes indirectly due to stress about the diagnosis, delaying her [employer] plans, etc... A1c went to 8.7.   I raised her Lantus to 70 units.   Last visit she reported more polyuria and vaginal itching. UA was negative for UTI.  Her polyuria has resolved.    Today her HbA1c is 8.8.  She is experiencing hypoglycemia overnight, which results in her underdosing NovoLog for breakfast.  The patient is retiring next week and she will be moving to the [facility] in 1 month.    Associated Signs/Symptoms  Hypoglycemia: no. Hyperglycemia: none.Neuropathy: none. Vascular Symtpoms: none. Angina/CHF: dyspnea on exertion. Ulcers: No. Amputations: No    Current treatment strategy: Novolog 18-25 units before meals (depending on BG and size of meal). Lantus 70 units daily. Metformin 1000 mg po BID, Jardiance 25 mg po daily    Blood Glucose Monitoring: CGM download: shannon: 14 day data:  Avg 145. SD 54. Hypo: 19%, within range: 47%, above range: 34%.   The shannon download was reflective mostly of overnight and morning blood  glucose, as the graft was interrupted for evening times.  It shows drop in blood glucose overnight, and a sharp rise with breakfast, and tends to be high during the day.    Diet: 2-3 meals per day. Occasional snacks.   Cut down on rice. Eating more vegetables and fish.     Exercise: yoga.     Weight:   Wt Readings from Last 4 Encounters:   12/04/19 86.6 kg (191 lb)   09/25/19 87.1 kg (192 lb 1.6 oz)   09/10/19 86.6 kg (191 lb)   08/12/19 84.8 kg (187 lb)     Problem List     Patient Active Problem List   Diagnosis     ? 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     Displacement of lumbar intervertebral disc without myelopathy     Nonallopathic lesion of lumbar region     Nonallopathic lesion of thoracic region     Nonallopathic lesion of sacral region     Bartholin gland cyst     Cataract     Irritable bowel syndrome     Esophageal reflux     Seasonal allergic rhinitis     Latex sensitivity     Colon polyp     Adenomatous polyp     HYPERLIPIDEMIA LDL GOAL <100     Ovarian cyst     Breast pain     Essential hypertension     Advanced directives, counseling/discussion     Obesity     Hypomagnesemia     Type 2 diabetes mellitus with mild nonproliferative retinopathy without macular edema, with long-term current use of insulin, unspecified laterality (H)        Medications     Current Outpatient Medications   Medication     Adhesive Tape (KINESIOLOGY TAPE) TAPE     albuterol (PROAIR HFA/PROVENTIL HFA/VENTOLIN HFA) 108 (90 Base) MCG/ACT inhaler     aspirin 81 MG EC tablet     CALCIUM + D OR     Continuous Blood Gluc  (FREESTYLE BUSHRA 14 DAY READER) KIRIT     Continuous Blood Gluc Sensor (FREESTYLE BUSHRA 14 DAY SENSOR) MISC     empagliflozin (JARDIANCE) 25 MG TABS tablet     fexofenadine (ALLEGRA) 180 MG tablet     fish oil-omega-3 fatty acids (FISH OIL) 1000 MG capsule     fluticasone (FLONASE) 50 MCG/ACT nasal spray     Glucosamine-Chondroitin (GLUCOSAMINE CHONDR COMPLEX PO)      hydrochlorothiazide (HYDRODIURIL) 12.5 MG tablet     insulin aspart (NOVOLOG FLEXPEN) 100 UNIT/ML pen     insulin glargine (LANTUS SOLOSTAR PEN) 100 UNIT/ML pen     insulin pen needle (BD LISA U/F) 32G X 4 MM miscellaneous     losartan (COZAAR) 100 MG tablet     magnesium 250 MG tablet     metFORMIN (GLUCOPHAGE-XR) 500 MG 24 hr tablet     montelukast (SINGULAIR) 10 MG tablet     MULTIVITAMIN OR     NEEDLES, ANY SIZE     pantoprazole (PROTONIX) 40 MG EC tablet     pentoxifylline ER (TRENTAL) 400 MG CR tablet     rosuvastatin (CRESTOR) 10 MG tablet     VITAMIN D 2000 UNIT OR TABS     Current Facility-Administered Medications   Medication     lidocaine 1% with EPINEPHrine 1:100,000 injection 3 mL     lidocaine 1% with EPINEPHrine 1:100,000 injection 3 mL     Facility-Administered Medications Ordered in Other Visits   Medication     fentaNYL (SUBLIMAZE) injection 25-50 mcg     flumazenil (ROMAZICON) injection 0.2 mg     midazolam (VERSED) injection 0.5-1 mg     naloxone (NARCAN) injection 0.1-0.4 mg        Allergies     Allergies   Allergen Reactions     Latex Swelling     Mold      Tetanus Antitoxin Swelling     Tetracycline Swelling       Medical / Surgical History     Past Medical History:   Diagnosis Date     Adjustment disorder with mixed anxiety and depressed mood     following death of her adoptive father and then again after adoptive mother      Allergic rhinitis, cause unspecified     uses benadryl prn (sneezing/hoarse voice)     Bartholin gland cyst 2008     Chest pain, unspecified     neg dobutamine stress test  (reacted to dobutamine)     Dry eyes      ERM OD (epiretinal membrane, right eye)     BE mild     Esophageal reflux     EGD: -neg     Glaucoma suspect      Hearing problem      Hypertension      Irregular menstrual cycle     s/p D&C, since then more regular     Irritable bowel syndrome     colonoscopy -neg. Sx's especially prior to menses     MEDICAL HISTORY OF -     had health  directive with : DANIEL Ford.  Full Code. Aunt (Charlie Tay) will be decision maker     Meningitis, unspecified(322.9) age 8    hospitalized for 9 mo     Need for prophylactic vaccination with tuberculosis (BCG) vaccine     Has pos Mantoux. Gets yearly cxr, all neg.      Other and unspecified hyperlipidemia      Pain in joint, shoulder region     bone spurs on MRI, s/p pool therapy     Tinnitus      Type II or unspecified type diabetes mellitus without mention of complication, not stated as uncontrolled Age 32    Follows [facility] endocrine [facility]: Shayne Lane MD     Unspecified cataract     RE     Unspecified sinusitis (chronic)     2006     Wheezing     Has seen pulm 2006 & 2007. Told night ashtma, ? vocal chord spasm due to cold air.      Past Surgical History:   Procedure Laterality Date     CATARACT IOL, RT/LT  9/23/2008    LE     HC DILATION/CURETTAGE DIAG/THER NON OB  1992     HC TYMPANOPLASTY W/O MASTOID, INIT/REV W/O OSS CHAIN RECONST  12/09     RELEASE CARPAL TUNNEL Right 11/19/2018    Procedure: Right Carpal Tunnel Release;  Surgeon: Ivania Moran MD;  Location: [facility] OR     RELEASE CARPAL TUNNEL Left 12/17/2018    Procedure: Left Carpal Tunnel Release;  Surgeon: Ivania Moran MD;  Location: [facility] OR     TYMPANOPLASTY       YAG CAPSULOTOMY OS (LEFT EYE)  4/23/2011       Social History     Social History     Socioeconomic History     Marital status: Single     Spouse name: Not on file     Number of children: Not on file     Years of education: Not on file     Highest education level: Not on file   Occupational History     Not on file   Social Needs     Financial resource strain: Not on file     Food insecurity:     Worry: Not on file     Inability: Not on file     Transportation needs:     Medical: Not on file     Non-medical: Not on file   Tobacco Use     Smoking status: Never Smoker     Smokeless tobacco: Never Used   Substance and Sexual Activity     Alcohol use: No      Frequency: Never     Comment: rarely     Drug use: No     Sexual activity: Not Currently     Partners: Male     Birth control/protection: None   Lifestyle     Physical activity:     Days per week: Not on file     Minutes per session: Not on file     Stress: Not on file   Relationships     Social connections:     Talks on phone: Not on file     Gets together: Not on file     Attends Advent service: Not on file     Active member of club or organization: Not on file     Attends meetings of clubs or organizations: Not on file     Relationship status: Not on file     Intimate partner violence:     Fear of current or ex partner: Not on file     Emotionally abused: Not on file     Physically abused: Not on file     Forced sexual activity: Not on file   Other Topics Concern     Parent/sibling w/ CABG, MI or angioplasty before 65F 55M? 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 Moved to  in . Initially lived in [place].    No children        2 cats       Family History     Family History   Problem Relation Age of Onset     Diabetes Brother      Cancer Brother 59        Lung cancer     Diabetes Father          of dm 70's     C.A.D. Father      Cerebrovascular Disease Father      Diabetes Paternal Grandfather      Hypertension Paternal Grandfather      Cerebrovascular Disease Paternal Grandfather      Diabetes Sister      Cancer Maternal Aunt         ovarian cancer.      Diabetes Brother      Diabetes Brother      Diabetes Sister      Glaucoma Maternal  "Grandmother      Cancer Brother      Diabetes Brother      Diabetes Sister      Hypertension Sister      Macular Degeneration No family hx of        ROS     Constitutional: no fevers, chills, night sweats. No weight loss or fatigue. Good appetite  Eyes: no vision changes, no eye redness, no diplopia  Ears, Nose, mouth, throat: no hearing changes, no tinnitus, no rhinorrhea, no nasal congestion  Cardiovascular: no chest pain, no orthopnea or PND, no edema, no palpitations  Respiratory: no dyspnea, no cough, no sputum, no wheezing  Gastrointestinal: no nausea, no vomiting, no abdominal pain, no diarrhea, no constipation  Genitourinary: no dysuria, no frequency, no urgency, no nocturia  Musculoskeletal: no joint pains, no back pain, no cramps, no fractures  Skin: no rash, no itching, no dryness, no ulcers, no hair loss  Neurological: no headache, no weakness, no numbness, no dizziness, no tremors  Psychiatric: no anxiety, no sadness  Hematologic/lymphatic: no easy bruising, no bleeding, no palor    Physical Exam   /80   Pulse 94   Ht 1.6 m (5' 3\")   Wt 86.6 kg (191 lb)   LMP 03/19/2009 (Exact Date)   BMI 33.83 kg/m       General: Comfortable, no obvious distress, normal body habitus  Eyes: Sclera anicteric, moist conjunctiva  HENT: Atraumatic, oropharynx clear, moist mucous membranes with no mucosal ulcerations  Neck: Trachea midline, supple. Thyroid: Thyroid is normal in size and texture  CV: Regular rhythm, normal rate. No murmurs auscultated  Resp: Clear to auscultation bilaterally, good effort  Abdomen:  Soft, non tender, non distended. Bowel sounds heard. No organomegaly.  Skin: No rashes, lesions, or subcutaneous nodules.   Psych: Alert and oriented x 3. Appropriate affect, good insight  Extremities: No peripheral edema  Musculoskeletal: Appropriate muscle bulk and strength  Lymphatic: No cervical lymphadenopathy  Neuro: Moves all four extremities. No focal deficits on limited exam. Gait normal. "       Labs/Imaging and Outside Records     Pertinent Labs were reviewed and updated in EPIC.    Summary of recent findings:   Lab Results   Component Value Date    A1C 9.8 12/16/2013    A1C 9.5 04/15/2013    A1C 9.6 01/16/2013    A1C 9.4 09/28/2012    A1C 8.3 06/22/2012       TSH   Date Value Ref Range Status   10/30/2017 1.29 0.40 - 4.00 mU/L Final   02/08/2017 0.76 0.40 - 4.00 mU/L Final   11/17/2015 0.98 0.40 - 4.00 mU/L Final   05/04/2015 0.76 0.40 - 4.00 mU/L Final   12/16/2013 1.24 0.4 - 5.0 mU/L Final     T4 Free   Date Value Ref Range Status   06/21/2010 1.50 0.70 - 1.85 ng/dL Final       Creatinine   Date Value Ref Range Status   08/12/2019 0.64 0.52 - 1.04 mg/dL Final       Recent Labs   Lab Test  03/06/17   0905  05/04/15   1016  12/16/13   1028   CHOL  165  170  192   HDL  52  46*  42*   LDL  73  90  125   TRIG  199*  171*  124   CHOLHDLRATIO   --   3.7  4.6     Impression / Plan     1. Diabetes Mellitus: Type 2  Current glycemic control can be considered worsened.    She is currently in a transitional stage as she will be retiring next week.  Following that, her sleep and wake cycle will change to sleeping at night and being awake in the day, from her current nighttime shift pattern.  Moreover, she is moving to the [facility] next month, and will live there, but will still be coming here for her medical care every 3 months.    With all of these changes, it is expected that her insulin regimen is going to change.    Plan:   -First step is to reduce her Lantus down to 60 units, since she is having overnight hypoglycemia.  -Next, plan is to increase NovoLog with meals to at least 25 units.  -Once she moves to the [facility], and with the changes reported above, especially in her sleep-wake cycle, and diet, and overall climate, her insulin needs are expected to change.  I advised her that if she has hypoglycemia overnight, she is to reduce her Lantus by 10 units at a time and weight at least 4 days before  making a judgment.  If she is having hypoglycemia after meals, she is to reduce her NovoLog.  Aim for blood glucose of 100-140 fasting, and less than 180 two hours after a meal.  -I also advised her to start tracking her carbohydrate intake more closely.  She asked me about the keto diet.  I advised against it, since it is very extreme.  I advised her to reduce her carb intake to less than 150 g/day, and track it using a objective tracker such as her Fitbit or another miesha.    2. Diabetes Complications: With retinopathy - stable  She is up-to-date on her annual diabetes testing including urine microalbumin, overall labs, and eye exam.    3. Blood Pressure Management: Blood pressure is controlled. Currently is on pharmacotherapy for this.   - I refilled losartan and hydrochlorothiazide. She has been off Norvasc and doing great.    4.Lipid Management: Per the new ACC/JILLIAN/NHLBI guidelines, statins are recommended for individuals with diabetes aged 40-75 with LDL  without ASCVD, and for any individual with ASCVD. Currently the patient is on a statin.     5. Smoking Status: Patient Pt is smoke free..     Follow up: 3 months      Shai Dong MD  Endocrinology, Diabetes and Metabolism  [facility]

## 2019-12-11 RX ORDER — METFORMIN HCL 500 MG
TABLET, EXTENDED RELEASE 24 HR ORAL
Qty: 360 TABLET | Refills: 3 | Status: SHIPPED | OUTPATIENT
Start: 2019-12-11 | End: 2020-03-30

## 2019-12-11 NOTE — TELEPHONE ENCOUNTER
FOLLOWING UP ON THIS MEDICATION REQUEST   OUR RECORD'S SHOW WE ARE STILL AWAITING A REPLY ON THIS MEDICATION                                                        THANK YOU  This has been a pending request since 11/22/19, do you still want patient on this.

## 2019-12-30 DIAGNOSIS — E11.9 DIABETES MELLITUS, TYPE 2 (H): ICD-10-CM

## 2019-12-30 DIAGNOSIS — E11.21 TYPE 2 DIABETES MELLITUS WITH DIABETIC NEPHROPATHY, WITH LONG-TERM CURRENT USE OF INSULIN (H): ICD-10-CM

## 2019-12-30 DIAGNOSIS — Z79.4 TYPE 2 DIABETES MELLITUS WITH DIABETIC NEPHROPATHY, WITH LONG-TERM CURRENT USE OF INSULIN (H): ICD-10-CM

## 2019-12-30 NOTE — TELEPHONE ENCOUNTER
Hi,    Pt is leaving out of the country, will be gone for over 3 mo and needs a refill on her Jardiance to take with her.  It is out of refills. She is leaving soon.    Aidan Dunbar Pharm. D.  [facility] Pharmacy Manager  [phone]  12/30/2019

## 2020-01-02 NOTE — TELEPHONE ENCOUNTER
empagliflozin (JARDIANCE) 25 MG TABS tablet   Last Written Prescription Date:  3/5/2019  Last Fill Quantity: 90,   # refills: 3  Last Office Visit : 12/4/2019  Future Office visit:  3/18/2019    Routing refill request to provider for review/approval because:  Pt going on vacation for over 3 months.    Needs extra tabs due to being out of the country.  Sending to provider for review and refills for Pt care.       Elva Le RN  Central Triage Red Flags/Med Refills

## 2020-03-13 DIAGNOSIS — R51.9 ACUTE NONINTRACTABLE HEADACHE, UNSPECIFIED HEADACHE TYPE: Primary | ICD-10-CM

## 2020-03-14 ENCOUNTER — ANCILLARY PROCEDURE (OUTPATIENT)
Dept: CT IMAGING | Facility: CLINIC | Age: 60
End: 2020-03-14
Attending: NEUROLOGICAL SURGERY
Payer: COMMERCIAL

## 2020-03-14 ENCOUNTER — MYC MEDICAL ADVICE (OUTPATIENT)
Dept: ENDOCRINOLOGY | Facility: CLINIC | Age: 60
End: 2020-03-14

## 2020-03-14 DIAGNOSIS — R51.9 ACUTE NONINTRACTABLE HEADACHE, UNSPECIFIED HEADACHE TYPE: ICD-10-CM

## 2020-03-18 ENCOUNTER — MYC MEDICAL ADVICE (OUTPATIENT)
Dept: ENDOCRINOLOGY | Facility: CLINIC | Age: 60
End: 2020-03-18

## 2020-03-18 DIAGNOSIS — Z79.4 TYPE 2 DIABETES MELLITUS WITH DIABETIC NEPHROPATHY, WITH LONG-TERM CURRENT USE OF INSULIN (H): ICD-10-CM

## 2020-03-18 DIAGNOSIS — E11.21 TYPE 2 DIABETES MELLITUS WITH DIABETIC NEPHROPATHY, WITH LONG-TERM CURRENT USE OF INSULIN (H): ICD-10-CM

## 2020-03-18 DIAGNOSIS — Z79.4 TYPE 2 DIABETES MELLITUS WITH DIABETIC NEPHROPATHY, WITH LONG-TERM CURRENT USE OF INSULIN (H): Primary | ICD-10-CM

## 2020-03-18 DIAGNOSIS — E11.21 TYPE 2 DIABETES MELLITUS WITH DIABETIC NEPHROPATHY, WITH LONG-TERM CURRENT USE OF INSULIN (H): Primary | ICD-10-CM

## 2020-03-20 ENCOUNTER — TELEPHONE (OUTPATIENT)
Dept: NURSING | Facility: CLINIC | Age: 60
End: 2020-03-20

## 2020-03-20 NOTE — TELEPHONE ENCOUNTER
Message from [facility] nurse sent to patient via [employer] as follows:    Fco,  The labs are  not considering A1C  necessary  so are not scheduling  or drawing them . This will have to wait. Maegan Miramontes RN on 3/20/2020 at 3:59 PM      Lab notified.       Tori Landeros, RN  Endocrine Care Coordinator  [facility]

## 2020-03-20 NOTE — TELEPHONE ENCOUNTER
Cold call from Fco, she is asking for her lab appointment for A1c to be on 3/30 in the morning at  [place] before her telephone appointment with Dr Dong.  She flew in from the [facility] on 3/13, has been in self quarantine since arrival, would place her after the 14 days for self quarantine, no symptoms of COVID 19  Please contact her at [phone] or [phone]

## 2020-03-20 NOTE — TELEPHONE ENCOUNTER
Patient does not see Endocrine provider at [facility]. Patient sees provider at [facility]. Will review with lab staff to determine protocol for lab scheduling appropriate. Labs will review and let clinic know. Will then contact patient with details on follow-up.       Tori Landeros RN  Endocrine Care Coordinator  [facility]

## 2020-03-23 DIAGNOSIS — E11.21 TYPE 2 DIABETES MELLITUS WITH DIABETIC NEPHROPATHY, WITH LONG-TERM CURRENT USE OF INSULIN (H): Primary | ICD-10-CM

## 2020-03-23 DIAGNOSIS — K21.9 GASTROESOPHAGEAL REFLUX DISEASE, ESOPHAGITIS PRESENCE NOT SPECIFIED: ICD-10-CM

## 2020-03-23 DIAGNOSIS — I10 ESSENTIAL HYPERTENSION: Primary | ICD-10-CM

## 2020-03-23 DIAGNOSIS — Z79.4 TYPE 2 DIABETES MELLITUS WITH DIABETIC NEPHROPATHY, WITH LONG-TERM CURRENT USE OF INSULIN (H): Primary | ICD-10-CM

## 2020-03-23 RX ORDER — FLASH GLUCOSE SENSOR
1 KIT MISCELLANEOUS
Qty: 6 EACH | Refills: 3 | Status: SHIPPED | OUTPATIENT
Start: 2020-03-23 | End: 2021-03-10

## 2020-03-23 RX ORDER — PANTOPRAZOLE SODIUM 40 MG/1
TABLET, DELAYED RELEASE ORAL
Qty: 90 TABLET | Refills: 0 | Status: SHIPPED | OUTPATIENT
Start: 2020-03-23 | End: 2020-06-15

## 2020-03-23 RX ORDER — INSULIN ASPART 100 [IU]/ML
INJECTION, SOLUTION INTRAVENOUS; SUBCUTANEOUS
Qty: 120 ML | Refills: 1 | Status: SHIPPED | OUTPATIENT
Start: 2020-03-23 | End: 2020-03-30

## 2020-03-23 NOTE — TELEPHONE ENCOUNTER
"Requested Prescriptions   Pending Prescriptions Disp Refills     pantoprazole (PROTONIX) 40 MG EC tablet [Pharmacy Med Name: PANTOPRAZOLE SODIUM 40MG TBEC] 90 tablet 3     Sig: TAKE ONE TABLET BY MOUTH ONCE DAILY       PPI Protocol Passed - 3/23/2020  9:06 AM        Passed - Not on Clopidogrel (unless Pantoprazole ordered)        Passed - No diagnosis of osteoporosis on record        Passed - Recent (12 mo) or future (30 days) visit within the authorizing provider's specialty     Patient has had an office visit with the authorizing provider or a provider within the authorizing providers department within the previous 12 mos or has a future within next 30 days. See \"Patient Info\" tab in inbasket, or \"Choose Columns\" in Meds & Orders section of the refill encounter.              Passed - Medication is active on med list        Passed - Patient is age 18 or older        Passed - No active pregnacy on record        Passed - No positive pregnancy test in past 12 months           pantoprazole (PROTONIX) 40 MG EC tablet  Last Written Prescription Date:  2/4/19  Last Fill Quantity: 90,  # refills: 3   Last office visit: 8/12/2019 with prescribing provider:  Dr. Ortiz   Future Office Visit:   Next 5 appointments (look out 90 days)    Mar 30, 2020  1:00 PM CDT  Telephone Visit with Shai Dong MD  [facility] ([facility]) [address] [place] [id number] [phone]           "

## 2020-03-23 NOTE — TELEPHONE ENCOUNTER
insulin aspart (NOVOLOG FLEXPEN) 100 UNIT/ML pen        Last Written Prescription Date:  03/13/19  Last Fill Quantity: 120mL,   # refills: 3  Last Office Visit : 12/04/19  Future Office visit:  03/30/20    Routing refill request to provider for review/approval because:  Insulin - refilled per clinic    insulin glargine (LANTUS SOLOSTAR PEN) 100 UNIT/ML pen        Last Written Prescription Date:  03/13/19  Last Fill Quantity: 75mL,   # refills: 3  Last Office Visit : 12/04/19  Future Office visit:  03/30/20    Routing refill request to provider for review/approval because:  Insulin - refilled per clinic

## 2020-03-24 RX ORDER — HYDROCHLOROTHIAZIDE 12.5 MG/1
12.5 TABLET ORAL DAILY
Qty: 90 TABLET | Refills: 1 | Status: SHIPPED | OUTPATIENT
Start: 2020-03-24 | End: 2020-09-26

## 2020-03-26 ENCOUNTER — TELEPHONE (OUTPATIENT)
Dept: FAMILY MEDICINE | Facility: CLINIC | Age: 60
End: 2020-03-26

## 2020-03-26 NOTE — TELEPHONE ENCOUNTER
PA Initiation    Medication: prior auth shannon  Insurance Company: [employer] - Phone [phone] Fax [phone]  Pharmacy Filling the Rx: [facility] [place], [place] - [address], [id number]  Filling Pharmacy Phone:    Filling Pharmacy Fax:    Start Date: 3/26/2020

## 2020-03-26 NOTE — TELEPHONE ENCOUNTER
Prior Authorization Approval    Authorization Effective Date: 2/25/2020  Authorization Expiration Date: 3/26/2021  Medication: prior auth shannon - APPROVED  Approved Dose/Quantity:   Reference #: [id number]   Insurance Company: [employer] - Phone [phone] Fax [phone]  Expected CoPay:     $88.22  CoPay Card Available: No    Foundation Assistance Needed:    Which Pharmacy is filling the prescription (Not needed for infusion/clinic administered): [facility] [place], [place] - [address], [address]  Pharmacy Notified: Yes - Pharmacy was able to process and will get the ready for the patient.   Patient Notified: Yes - The patient was ok with the cost of the six sensors.

## 2020-03-26 NOTE — TELEPHONE ENCOUNTER
Hi,    Freestyle shannon needs a pa. Please call us asap.      Aidan Dunbar Pharm. D.  [facility] Pharmacy Manager  [phone]  T.d

## 2020-03-30 ENCOUNTER — VIRTUAL VISIT (OUTPATIENT)
Dept: ENDOCRINOLOGY | Facility: CLINIC | Age: 60
End: 2020-03-30
Payer: COMMERCIAL

## 2020-03-30 DIAGNOSIS — E11.65 TYPE 2 DIABETES MELLITUS WITH HYPERGLYCEMIA (H): ICD-10-CM

## 2020-03-30 DIAGNOSIS — Z79.4 TYPE 2 DIABETES MELLITUS WITH DIABETIC NEPHROPATHY, WITH LONG-TERM CURRENT USE OF INSULIN (H): ICD-10-CM

## 2020-03-30 DIAGNOSIS — E11.21 TYPE 2 DIABETES MELLITUS WITH DIABETIC NEPHROPATHY, WITH LONG-TERM CURRENT USE OF INSULIN (H): Primary | ICD-10-CM

## 2020-03-30 DIAGNOSIS — Z79.4 TYPE 2 DIABETES MELLITUS WITH DIABETIC NEPHROPATHY, WITH LONG-TERM CURRENT USE OF INSULIN (H): Primary | ICD-10-CM

## 2020-03-30 DIAGNOSIS — E11.21 TYPE 2 DIABETES MELLITUS WITH DIABETIC NEPHROPATHY, WITH LONG-TERM CURRENT USE OF INSULIN (H): ICD-10-CM

## 2020-03-30 RX ORDER — METFORMIN HCL 500 MG
TABLET, EXTENDED RELEASE 24 HR ORAL
Qty: 360 TABLET | Refills: 3 | Status: SHIPPED | OUTPATIENT
Start: 2020-03-30 | End: 2021-03-10

## 2020-03-30 RX ORDER — INSULIN ASPART 100 [IU]/ML
INJECTION, SOLUTION INTRAVENOUS; SUBCUTANEOUS
Qty: 120 ML | Refills: 3 | Status: SHIPPED | OUTPATIENT
Start: 2020-03-30 | End: 2021-03-10

## 2020-03-30 NOTE — PROGRESS NOTES
Endocrinology Clinic Visit 03/30/20  NAME:  Maliha Pedro  PCP:  Elva Ortiz  MRN:  [id number]    Chief Complaint     Follow up. Diabetes     Due to the COVID 19 pandemic this visit was converted to a telephone visit in order to help prevent spread of infection in this patient and the general population.    Time of start: 1:02 pm  Time of end: 1:25  Total duration of telephone visit: 23 min    History of Present Illness     Maliha Pedro is a 59 year old female who is seen in clinic for diabetes management.   She has had diabetes since age 32. Has tried many different meds. Did not tolerate GLP-1 agonists. Stopped Actos due to concerns about adverse effects. Was on Lantus and Novolog with poor control, with recent improvement since addition of Jardiance November 2017. She has history of mild stable retinopathy.     Diabetes Care:   Last urine MA: Sept 2019: 10.09, Cr normal  Neuropathy: none  Eye exam: last done 09/2019 - mild retinopathy, stable  Lipids: last checked Aug 2019. LDL 74. She is on statin.   HTN: on ARB, hydrochlorothiazide and CCB    Interval History:   The patient went to the [facility] for 3 months and came back 2 weeks ago.  She noticed that in the [facility], her NovoLog requirements went down.  She was not able to use her shannon sensor there as well, mainly due to humidity and it not sticking well to her skin.  Since being back in the , her NovoLog requirements have gone up again.  She is planning to go back to the [facility] in May.  She has been taking her Lantus in the morning which has worked better for her.    Associated Signs/Symptoms  Hypoglycemia: no. Hyperglycemia: none.Neuropathy: none. Vascular Symtpoms: none. Angina/CHF: dyspnea on exertion. Ulcers: No. Amputations: No    Current treatment strategy: Novolog 20-25 units before meals (depending on BG and size of meal). Lantus 60 units daily. Metformin 1000 mg po BID, Jardiance 25 mg po daily    Blood Glucose  Monitoring: CGM download: bushra: 14 day data:  Avg 128, with 75% time in range.  Pattern is for stable to slightly low overnight, slight rise after breakfast, and a larger rise after dinner.    Diet: 2-3 meals per day. Occasional snacks.   Cut down on rice. Eating more vegetables and fish.     Exercise: She is walking 30 minutes a day.    Weight:   Wt Readings from Last 4 Encounters:   12/04/19 86.6 kg (191 lb)   09/25/19 87.1 kg (192 lb 1.6 oz)   09/10/19 86.6 kg (191 lb)   08/12/19 84.8 kg (187 lb)     Problem List     Patient Active Problem List   Diagnosis     ? 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     Displacement of lumbar intervertebral disc without myelopathy     Nonallopathic lesion of lumbar region     Nonallopathic lesion of thoracic region     Nonallopathic lesion of sacral region     Bartholin gland cyst     Cataract     Irritable bowel syndrome     Esophageal reflux     Seasonal allergic rhinitis     Latex sensitivity     Colon polyp     Adenomatous polyp     HYPERLIPIDEMIA LDL GOAL <100     Ovarian cyst     Breast pain     Essential hypertension     Advanced directives, counseling/discussion     Obesity     Hypomagnesemia     Type 2 diabetes mellitus with mild nonproliferative retinopathy without macular edema, with long-term current use of insulin, unspecified laterality (H)        Medications     Current Outpatient Medications   Medication     Adhesive Tape (KINESIOLOGY TAPE) TAPE     albuterol (PROAIR HFA/PROVENTIL HFA/VENTOLIN HFA) 108 (90 Base) MCG/ACT inhaler     aspirin 81 MG EC tablet     CALCIUM + D OR     Continuous Blood Gluc  (FREESTYLE BUSHRA 14 DAY READER) KIRIT     Continuous Blood Gluc Sensor (FREESTYLE BUSHRA 14 DAY SENSOR) MISC     empagliflozin (JARDIANCE) 25 MG TABS tablet     fexofenadine (ALLEGRA) 180 MG tablet     fish oil-omega-3 fatty acids (FISH OIL) 1000 MG capsule     fluticasone (FLONASE) 50 MCG/ACT nasal spray     Glucosamine-Chondroitin  (GLUCOSAMINE CHONDR COMPLEX PO)     hydrochlorothiazide (HYDRODIURIL) 12.5 MG tablet     insulin aspart (NOVOLOG FLEXPEN) 100 UNIT/ML pen     insulin glargine (LANTUS SOLOSTAR) 100 UNIT/ML pen     insulin pen needle (BD LISA U/F) 32G X 4 MM miscellaneous     losartan (COZAAR) 100 MG tablet     magnesium 250 MG tablet     metFORMIN (GLUCOPHAGE-XR) 500 MG 24 hr tablet     montelukast (SINGULAIR) 10 MG tablet     MULTIVITAMIN OR     NEEDLES, ANY SIZE     pantoprazole (PROTONIX) 40 MG EC tablet     pentoxifylline ER (TRENTAL) 400 MG CR tablet     rosuvastatin (CRESTOR) 10 MG tablet     VITAMIN D 2000 UNIT OR TABS     Current Facility-Administered Medications   Medication     lidocaine 1% with EPINEPHrine 1:100,000 injection 3 mL     lidocaine 1% with EPINEPHrine 1:100,000 injection 3 mL     Facility-Administered Medications Ordered in Other Visits   Medication     fentaNYL (SUBLIMAZE) injection 25-50 mcg     flumazenil (ROMAZICON) injection 0.2 mg     midazolam (VERSED) injection 0.5-1 mg     naloxone (NARCAN) injection 0.1-0.4 mg        Allergies     Allergies   Allergen Reactions     Latex Swelling     Mold      Tetanus Antitoxin Swelling     Tetracycline Swelling       Medical / Surgical History     Past Medical History:   Diagnosis Date     Adjustment disorder with mixed anxiety and depressed mood     following death of her adoptive father and then again after adoptive mother      Allergic rhinitis, cause unspecified     uses benadryl prn (sneezing/hoarse voice)     Bartholin gland cyst 2008     Chest pain, unspecified     neg dobutamine stress test  (reacted to dobutamine)     Dry eyes      ERM OD (epiretinal membrane, right eye)     BE mild     Esophageal reflux     EGD: -neg     Glaucoma suspect      Hearing problem      Hypertension      Irregular menstrual cycle     s/p D&C, since then more regular     Irritable bowel syndrome     colonoscopy -neg. Sx's especially prior to menses      MEDICAL HISTORY OF -     had health directive with : DANIEL Ford.  Full Code. Aunt (Charlie Tay) will be decision maker     Meningitis, unspecified(322.9) age 8    hospitalized for 9 mo     Need for prophylactic vaccination with tuberculosis (BCG) vaccine     Has pos Mantoux. Gets yearly cxr, all neg.      Other and unspecified hyperlipidemia      Pain in joint, shoulder region     bone spurs on MRI, s/p pool therapy     Tinnitus      Type II or unspecified type diabetes mellitus without mention of complication, not stated as uncontrolled Age 32    Follows [facility] endocrine [facility]: Shayne Lane MD     Unspecified cataract     RE     Unspecified sinusitis (chronic)     2006     Wheezing     Has seen pulm 2006 & 2007. Told night ashtma, ? vocal chord spasm due to cold air.      Past Surgical History:   Procedure Laterality Date     CATARACT IOL, RT/LT  9/23/2008    LE     HC DILATION/CURETTAGE DIAG/THER NON OB  1992     HC TYMPANOPLASTY W/O MASTOID, INIT/REV W/O OSS CHAIN RECONST  12/09     RELEASE CARPAL TUNNEL Right 11/19/2018    Procedure: Right Carpal Tunnel Release;  Surgeon: Ivania Moran MD;  Location: [facility] OR     RELEASE CARPAL TUNNEL Left 12/17/2018    Procedure: Left Carpal Tunnel Release;  Surgeon: Ivania Moran MD;  Location: [facility] OR     TYMPANOPLASTY       YAG CAPSULOTOMY OS (LEFT EYE)  4/23/2011       Social History     Social History     Socioeconomic History     Marital status: Single     Spouse name: Not on file     Number of children: Not on file     Years of education: Not on file     Highest education level: Not on file   Occupational History     Not on file   Social Needs     Financial resource strain: Not on file     Food insecurity     Worry: Not on file     Inability: Not on file     Transportation needs     Medical: Not on file     Non-medical: Not on file   Tobacco Use     Smoking status: Never Smoker     Smokeless tobacco: Never Used   Substance and  Sexual Activity     Alcohol use: No     Frequency: Never     Comment: rarely     Drug use: No     Sexual activity: Not Currently     Partners: Male     Birth control/protection: None   Lifestyle     Physical activity     Days per week: Not on file     Minutes per session: Not on file     Stress: Not on file   Relationships     Social connections     Talks on phone: Not on file     Gets together: Not on file     Attends [employer] service: Not on file     Active member of club or organization: Not on file     Attends meetings of clubs or organizations: Not on file     Relationship status: Not on file     Intimate partner violence     Fear of current or ex partner: Not on file     Emotionally abused: Not on file     Physically abused: Not on file     Forced sexual activity: Not on file   Other Topics Concern     Parent/sibling w/ CABG, MI or angioplasty before 65F 55M? 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. Moved to  in . Initially lived in [place].    No children        2 cats       Family History     Family History   Problem Relation Age of Onset     Diabetes Brother      Cancer Brother 59        Lung cancer     Diabetes Father          of dm 70's     C.A.D. Father      Cerebrovascular Disease Father      Diabetes Paternal Grandfather      Hypertension Paternal Grandfather      Cerebrovascular Disease Paternal Grandfather      Diabetes Sister      Cancer Maternal Aunt         ovarian cancer.      Diabetes Brother      Diabetes Brother      Diabetes Sister       Glaucoma Maternal Grandmother      Cancer Brother      Diabetes Brother      Diabetes Sister      Hypertension Sister      Macular Degeneration No family hx of        ROS     Constitutional: no fevers, chills, night sweats. No weight loss or fatigue. Good appetite  Eyes: no vision changes, no eye redness, no diplopia  Ears, Nose, mouth, throat: no hearing changes, no tinnitus, no rhinorrhea, no nasal congestion  Cardiovascular: no chest pain, no orthopnea or PND, no edema, no palpitations  Respiratory: no dyspnea, no cough, no sputum, no wheezing  Gastrointestinal: no nausea, no vomiting, no abdominal pain, no diarrhea, no constipation  Genitourinary: no dysuria, no frequency, no urgency, no nocturia  Musculoskeletal: no joint pains, no back pain, no cramps, no fractures  Skin: no rash, no itching, no dryness, no ulcers, no hair loss  Neurological: no headache, no weakness, no numbness, no dizziness, no tremors  Psychiatric: no anxiety, no sadness  Hematologic/lymphatic: no easy bruising, no bleeding, no palor    Physical Exam   LMP 03/19/2009 (Exact Date)    Physical examination could not be performed due to this being a telephone visit.    Labs/Imaging and Outside Records     Pertinent Labs were reviewed and updated in EPIC.    Summary of recent findings:   Lab Results   Component Value Date    A1C 9.8 12/16/2013    A1C 9.5 04/15/2013    A1C 9.6 01/16/2013    A1C 9.4 09/28/2012    A1C 8.3 06/22/2012       TSH   Date Value Ref Range Status   10/30/2017 1.29 0.40 - 4.00 mU/L Final   02/08/2017 0.76 0.40 - 4.00 mU/L Final   11/17/2015 0.98 0.40 - 4.00 mU/L Final   05/04/2015 0.76 0.40 - 4.00 mU/L Final   12/16/2013 1.24 0.4 - 5.0 mU/L Final     T4 Free   Date Value Ref Range Status   06/21/2010 1.50 0.70 - 1.85 ng/dL Final       Creatinine   Date Value Ref Range Status   08/12/2019 0.64 0.52 - 1.04 mg/dL Final       Recent Labs   Lab Test  03/06/17   0905  05/04/15   1016  12/16/13   1028   CHOL  165  170  192    HDL  52  46*  42*   LDL  73  90  125   TRIG  199*  171*  124   CHOLHDLRATIO   --   3.7  4.6     Impression / Plan     1. Diabetes Mellitus: Type 2  Current glycemic control can be considered improved, but this is based only on the last 14-day data on her shannon sensor.  I advised her to get a hemoglobin A1c done.  She has not been able to go to the lab yet because of her recent travel history forcing her to self quarantine.  Now that she has self quarantined for over 14 days, I advised her to go back to the lab and get a hemoglobin A1c done.  For now, continue the same insulin plan while in the [place].  When she goes to the [facility], she requires less NovoLog, but she is well aware of that.    2. Diabetes Complications: With retinopathy - stable  She is up-to-date on her annual diabetes testing including urine microalbumin, overall labs, and eye exam.    3. Blood Pressure Management: Blood pressure is controlled. Currently is on pharmacotherapy for this.   - I refilled losartan and hydrochlorothiazide. She has been off Norvasc and doing great.    4.Lipid Management: Per the new ACC/JILLIAN/NHLBI guidelines, statins are recommended for individuals with diabetes aged 40-75 with LDL  without ASCVD, and for any individual with ASCVD. Currently the patient is on a statin.     5. Smoking Status: Patient Pt is smoke free..     Follow up: 3 months      Shai Dong MD  Endocrinology, Diabetes and Metabolism  [facility]

## 2020-03-31 ENCOUNTER — TELEPHONE (OUTPATIENT)
Dept: ENDOCRINOLOGY | Facility: CLINIC | Age: 60
End: 2020-03-31

## 2020-03-31 NOTE — TELEPHONE ENCOUNTER
"----- Message from Shai Dong MD sent at 3/31/2020  3:32 PM CDT -----  Regarding: RE: diabetes prescriptions - med print  Hi! I emailed them back to you. Thanks  Could we print them out and mail them?  ThanksAwilda Gibbons  ----- Message -----  From: Maegan Miramontes RN  Sent: 3/31/2020   9:09 AM CDT  To: Shai Dong MD  Subject: FW: diabetes prescriptions - med print           DId you get the scripts and were they signed ? Not sure   who would be mailing them to the patient    ----- Message -----  From: Shai Dong MD  Sent: 3/30/2020   1:50 PM CDT  To: [facility] Triage-  Subject: diabetes prescriptions - med print               Hi  This patient would like her diabetes meds (Metformin, Jardiance, Insulin) to be med printed and mailed to her because she needs these hard copies when she goes to the [facility]. FYI she is now \"Retired\" in the [employer] but will continue to get her doctor visits here every 3-6 months.   Her pharmacy in the [facility] told her they can fill them with hard copies from us.   Is it possible to fab them all up, in printed prescriptions (each on a separate paper, like what we used to do for controlled substances), and email them to me to sign?  Thanks  Shai               "

## 2020-03-31 NOTE — TELEPHONE ENCOUNTER
Signed scripts by Dr Dong sent to her home address. Maegan Miramontes RN on 3/31/2020 at 3:52 PM

## 2020-04-02 DIAGNOSIS — Z79.4 TYPE 2 DIABETES MELLITUS WITH HYPERGLYCEMIA, WITH LONG-TERM CURRENT USE OF INSULIN (H): ICD-10-CM

## 2020-04-02 DIAGNOSIS — Z79.4 TYPE 2 DIABETES MELLITUS WITH DIABETIC NEPHROPATHY, WITH LONG-TERM CURRENT USE OF INSULIN (H): ICD-10-CM

## 2020-04-02 DIAGNOSIS — E11.65 TYPE 2 DIABETES MELLITUS WITH HYPERGLYCEMIA, WITH LONG-TERM CURRENT USE OF INSULIN (H): ICD-10-CM

## 2020-04-02 DIAGNOSIS — E11.21 TYPE 2 DIABETES MELLITUS WITH DIABETIC NEPHROPATHY, WITH LONG-TERM CURRENT USE OF INSULIN (H): ICD-10-CM

## 2020-04-02 LAB
CREAT UR-MCNC: 52 MG/DL
HBA1C MFR BLD: 8.1 % (ref 0–5.6)
MICROALBUMIN UR-MCNC: <5 MG/L
MICROALBUMIN/CREAT UR: NORMAL MG/G CR (ref 0–25)

## 2020-04-02 PROCEDURE — 83036 HEMOGLOBIN GLYCOSYLATED A1C: CPT | Performed by: INTERNAL MEDICINE

## 2020-04-02 PROCEDURE — 36415 COLL VENOUS BLD VENIPUNCTURE: CPT | Performed by: INTERNAL MEDICINE

## 2020-04-02 PROCEDURE — 82043 UR ALBUMIN QUANTITATIVE: CPT | Performed by: FAMILY MEDICINE

## 2020-04-02 NOTE — RESULT ENCOUNTER NOTE
Elmer Ortiz is out of the office and I am reviewing your results.   Your urine test was not able to be done because it was not a large enough sample.   We will run this at another time.   Please call or MyChart my office with any questions or concerns.    Tesha Carcamo, PAC

## 2020-06-11 ENCOUNTER — TELEPHONE (OUTPATIENT)
Dept: ENDOCRINOLOGY | Facility: CLINIC | Age: 60
End: 2020-06-11

## 2020-06-11 DIAGNOSIS — J30.2 SEASONAL ALLERGIC RHINITIS, UNSPECIFIED TRIGGER: ICD-10-CM

## 2020-06-11 DIAGNOSIS — K21.9 GASTROESOPHAGEAL REFLUX DISEASE, ESOPHAGITIS PRESENCE NOT SPECIFIED: ICD-10-CM

## 2020-06-11 DIAGNOSIS — Z79.4 TYPE 2 DIABETES MELLITUS WITH DIABETIC NEPHROPATHY, WITH LONG-TERM CURRENT USE OF INSULIN (H): ICD-10-CM

## 2020-06-11 DIAGNOSIS — E11.21 TYPE 2 DIABETES MELLITUS WITH DIABETIC NEPHROPATHY, WITH LONG-TERM CURRENT USE OF INSULIN (H): ICD-10-CM

## 2020-06-11 NOTE — TELEPHONE ENCOUNTER
Pt called pa team and requested a pa for Lantus. She does not want to take basaglar (preferred by insurance).

## 2020-06-11 NOTE — TELEPHONE ENCOUNTER
**Please Do Not Close This Encounter**               ** Until This Has Been Addressed**          PRIOR AUTHORIZATION REQUIED PER INSURANCE       PATIENT:Sera Pedro YOB: 1960          MEDICATION:Lantus Solostar                    SI units daily       NDC:-05      INSURANCE:[facility]       INSURANCE PHONE #:[phone]      ID #:[id number]      INSURANCE REJECT:Product not on formulary      PHARMACY:[facility]      PHARMACY NPI:[id number]      PHARMACY PHONE #:[phone]                                                          Please let us know when Prior Auth approved/denied, prescriber refusal to complete prior auth or if you would like to change medication/discontinue                                                            Thank you

## 2020-06-12 NOTE — TELEPHONE ENCOUNTER
Prior Authorization Approval    Authorization Effective Date: 5/12/2020  Authorization Expiration Date: 6/12/2021  Medication: (LANTUS SOLOSTAR) 100 UNIT/ML pen- APPROVED   Approved Dose/Quantity:   Reference #:     Insurance Company: [employer] - Phone [phone] Fax [phone]  Expected CoPay:       CoPay Card Available:      Foundation Assistance Needed:    Which Pharmacy is filling the prescription (Not needed for infusion/clinic administered): [facility] - [facility] [id number], [address]  Pharmacy Notified: Yes  Patient Notified: Comment:  **Instructed pharmacy to notify patient when script is ready to /ship.**

## 2020-06-12 NOTE — TELEPHONE ENCOUNTER
This is a duplicate request. 2 Encounters were created and sent to PA team within a few minutes of each other. Closing this request

## 2020-06-15 DIAGNOSIS — I10 ESSENTIAL HYPERTENSION: ICD-10-CM

## 2020-06-15 RX ORDER — PANTOPRAZOLE SODIUM 40 MG/1
TABLET, DELAYED RELEASE ORAL
Qty: 90 TABLET | Refills: 0 | Status: SHIPPED | OUTPATIENT
Start: 2020-06-15 | End: 2020-09-01

## 2020-06-15 RX ORDER — LOSARTAN POTASSIUM 100 MG/1
100 TABLET ORAL DAILY
Qty: 90 TABLET | Refills: 3 | Status: SHIPPED | OUTPATIENT
Start: 2020-06-15 | End: 2021-03-10

## 2020-06-15 RX ORDER — MONTELUKAST SODIUM 10 MG/1
TABLET ORAL
Qty: 30 TABLET | Refills: 0 | Status: SHIPPED | OUTPATIENT
Start: 2020-06-15 | End: 2020-09-01

## 2020-06-15 NOTE — TELEPHONE ENCOUNTER
losartan (COZAAR) 100 MG tablet       Last Written Prescription Date:  6/19/2019  Last Fill Quantity: 90,   # refills: 3  Last Office Visit : 3/30/2020  Future Office visit:  None  90 Tabs, 3 Refills sent to pharm 6/15/2020    Elva Le RN  Central Triage Red Flags/Med Refills

## 2020-06-15 NOTE — TELEPHONE ENCOUNTER
Prescription approved per [facility] Refill Protocol.  Patient has appointment scheduled in August with PCP.    Yanira Pace RN  [facility]

## 2020-07-21 ENCOUNTER — MYC MEDICAL ADVICE (OUTPATIENT)
Dept: ENDOCRINOLOGY | Facility: CLINIC | Age: 60
End: 2020-07-21

## 2020-07-21 DIAGNOSIS — E11.3299 TYPE 2 DIABETES MELLITUS WITH MILD NONPROLIFERATIVE RETINOPATHY WITHOUT MACULAR EDEMA, WITH LONG-TERM CURRENT USE OF INSULIN, UNSPECIFIED LATERALITY (H): Primary | ICD-10-CM

## 2020-07-21 DIAGNOSIS — Z79.4 TYPE 2 DIABETES MELLITUS WITH MILD NONPROLIFERATIVE RETINOPATHY WITHOUT MACULAR EDEMA, WITH LONG-TERM CURRENT USE OF INSULIN, UNSPECIFIED LATERALITY (H): Primary | ICD-10-CM

## 2020-07-24 NOTE — TELEPHONE ENCOUNTER
Left detailed vm and MyChart for Pt re upcoming appt.   Crystal Smiley RN on 7/24/2020 at 1:46 PM

## 2020-08-27 DIAGNOSIS — E11.3299 TYPE 2 DIABETES MELLITUS WITH MILD NONPROLIFERATIVE RETINOPATHY WITHOUT MACULAR EDEMA, WITH LONG-TERM CURRENT USE OF INSULIN, UNSPECIFIED LATERALITY (H): ICD-10-CM

## 2020-08-27 DIAGNOSIS — Z79.4 TYPE 2 DIABETES MELLITUS WITH MILD NONPROLIFERATIVE RETINOPATHY WITHOUT MACULAR EDEMA, WITH LONG-TERM CURRENT USE OF INSULIN, UNSPECIFIED LATERALITY (H): ICD-10-CM

## 2020-08-27 LAB
CHOLEST SERPL-MCNC: 153 MG/DL
CREAT SERPL-MCNC: 0.68 MG/DL (ref 0.52–1.04)
CREAT UR-MCNC: 76 MG/DL
GFR SERPL CREATININE-BSD FRML MDRD: >90 ML/MIN/{1.73_M2}
HBA1C MFR BLD: 8.2 % (ref 0–5.6)
HDLC SERPL-MCNC: 48 MG/DL
LDLC SERPL CALC-MCNC: 69 MG/DL
MICROALBUMIN UR-MCNC: 7 MG/L
MICROALBUMIN/CREAT UR: 9.01 MG/G CR (ref 0–25)
NONHDLC SERPL-MCNC: 105 MG/DL
TRIGL SERPL-MCNC: 178 MG/DL

## 2020-08-27 PROCEDURE — 36415 COLL VENOUS BLD VENIPUNCTURE: CPT | Performed by: FAMILY MEDICINE

## 2020-08-27 PROCEDURE — 82043 UR ALBUMIN QUANTITATIVE: CPT | Performed by: FAMILY MEDICINE

## 2020-08-27 PROCEDURE — 83036 HEMOGLOBIN GLYCOSYLATED A1C: CPT | Performed by: FAMILY MEDICINE

## 2020-08-27 PROCEDURE — 80061 LIPID PANEL: CPT | Performed by: FAMILY MEDICINE

## 2020-08-27 PROCEDURE — 82565 ASSAY OF CREATININE: CPT | Performed by: FAMILY MEDICINE

## 2020-09-01 ENCOUNTER — OFFICE VISIT (OUTPATIENT)
Dept: FAMILY MEDICINE | Facility: CLINIC | Age: 60
End: 2020-09-01
Payer: COMMERCIAL

## 2020-09-01 VITALS
BODY MASS INDEX: 32.78 KG/M2 | DIASTOLIC BLOOD PRESSURE: 82 MMHG | TEMPERATURE: 97.7 F | OXYGEN SATURATION: 98 % | RESPIRATION RATE: 20 BRPM | WEIGHT: 185 LBS | SYSTOLIC BLOOD PRESSURE: 130 MMHG | HEIGHT: 63 IN | HEART RATE: 86 BPM

## 2020-09-01 DIAGNOSIS — R39.15 URINARY URGENCY: ICD-10-CM

## 2020-09-01 DIAGNOSIS — K21.9 GASTROESOPHAGEAL REFLUX DISEASE, ESOPHAGITIS PRESENCE NOT SPECIFIED: ICD-10-CM

## 2020-09-01 DIAGNOSIS — Z12.11 SCREEN FOR COLON CANCER: ICD-10-CM

## 2020-09-01 DIAGNOSIS — Z23 NEED FOR PROPHYLACTIC VACCINATION AND INOCULATION AGAINST INFLUENZA: ICD-10-CM

## 2020-09-01 DIAGNOSIS — Z79.4 TYPE 2 DIABETES MELLITUS WITH HYPERGLYCEMIA, WITH LONG-TERM CURRENT USE OF INSULIN (H): ICD-10-CM

## 2020-09-01 DIAGNOSIS — R00.2 PALPITATIONS: ICD-10-CM

## 2020-09-01 DIAGNOSIS — J30.2 SEASONAL ALLERGIC RHINITIS, UNSPECIFIED TRIGGER: ICD-10-CM

## 2020-09-01 DIAGNOSIS — E11.65 TYPE 2 DIABETES MELLITUS WITH HYPERGLYCEMIA, WITH LONG-TERM CURRENT USE OF INSULIN (H): ICD-10-CM

## 2020-09-01 DIAGNOSIS — E78.5 HYPERLIPIDEMIA LDL GOAL <100: ICD-10-CM

## 2020-09-01 DIAGNOSIS — Z01.84 IMMUNITY STATUS TESTING: ICD-10-CM

## 2020-09-01 DIAGNOSIS — Z00.00 ROUTINE GENERAL MEDICAL EXAMINATION AT A HEALTH CARE FACILITY: Primary | ICD-10-CM

## 2020-09-01 LAB
ALBUMIN UR-MCNC: NEGATIVE MG/DL
APPEARANCE UR: CLEAR
BILIRUB UR QL STRIP: NEGATIVE
COLOR UR AUTO: YELLOW
GLUCOSE UR STRIP-MCNC: >=1000 MG/DL
HGB UR QL STRIP: NEGATIVE
KETONES UR STRIP-MCNC: 40 MG/DL
LEUKOCYTE ESTERASE UR QL STRIP: NEGATIVE
NITRATE UR QL: NEGATIVE
PH UR STRIP: 5.5 PH (ref 5–7)
SOURCE: ABNORMAL
SP GR UR STRIP: 1.01 (ref 1–1.03)
UROBILINOGEN UR STRIP-ACNC: 0.2 EU/DL (ref 0.2–1)

## 2020-09-01 PROCEDURE — 86769 SARS-COV-2 COVID-19 ANTIBODY: CPT | Performed by: FAMILY MEDICINE

## 2020-09-01 PROCEDURE — 36415 COLL VENOUS BLD VENIPUNCTURE: CPT | Performed by: FAMILY MEDICINE

## 2020-09-01 PROCEDURE — 90471 IMMUNIZATION ADMIN: CPT | Performed by: FAMILY MEDICINE

## 2020-09-01 PROCEDURE — 93000 ELECTROCARDIOGRAM COMPLETE: CPT | Performed by: FAMILY MEDICINE

## 2020-09-01 PROCEDURE — 90686 IIV4 VACC NO PRSV 0.5 ML IM: CPT | Performed by: FAMILY MEDICINE

## 2020-09-01 PROCEDURE — 81003 URINALYSIS AUTO W/O SCOPE: CPT | Performed by: FAMILY MEDICINE

## 2020-09-01 PROCEDURE — 99213 OFFICE O/P EST LOW 20 MIN: CPT | Mod: 25 | Performed by: FAMILY MEDICINE

## 2020-09-01 PROCEDURE — 99396 PREV VISIT EST AGE 40-64: CPT | Mod: 25 | Performed by: FAMILY MEDICINE

## 2020-09-01 RX ORDER — MONTELUKAST SODIUM 10 MG/1
TABLET ORAL
Qty: 90 TABLET | Refills: 3 | Status: SHIPPED | OUTPATIENT
Start: 2020-09-01 | End: 2021-10-04

## 2020-09-01 RX ORDER — PANTOPRAZOLE SODIUM 40 MG/1
40 TABLET, DELAYED RELEASE ORAL DAILY
Qty: 90 TABLET | Refills: 3 | Status: SHIPPED | OUTPATIENT
Start: 2020-09-01 | End: 2021-10-04

## 2020-09-01 RX ORDER — ROSUVASTATIN CALCIUM 10 MG/1
10 TABLET, COATED ORAL DAILY
Qty: 90 TABLET | Refills: 3 | Status: SHIPPED | OUTPATIENT
Start: 2020-09-01 | End: 2021-03-10

## 2020-09-01 ASSESSMENT — PAIN SCALES - GENERAL: PAINLEVEL: NO PAIN (0)

## 2020-09-01 ASSESSMENT — MIFFLIN-ST. JEOR: SCORE: 1377.24

## 2020-09-01 NOTE — PATIENT INSTRUCTIONS
Schedule yearly dermatology follow-up    Schedule your follow-up with endocrinology. Make sure you are eating regular meals with snacks in between to avoid hypoglycemia  Decreased lantus to 54 units to help prevent hypoglycemia    Minimize coffee.   If palpitations continue please update me and we can get a heart monitor scheduled.     Please call [facility] in [place] at [phone] to schedule colonoscopy.      Let me know when you want to see an orthopedist for your hands.           At [facility], we strive to deliver an exceptional experience to you, every time we see you. If you receive a survey, please complete it as we do value your feedback.  If you have MyChart, you can expect to receive results automatically within 24 hours of their completion.  Your provider will send a note interpreting your results as well.   If you do not have MyChart, you should receive your results in about a week by mail.    Your care team:                            Family Medicine Internal Medicine   MD Bennett Kilgore MD Shantel Branch-Fleming, MD Srinivasa Vaka, MD Katya Georgiev PA-C  Stacia Massey, APRN CNP    Lazaro Sanchez, MD Pediatrics   Nicholas Thomson, PA-PEPPER Barger, MD Abigail Hernandez APRN CNP   MD Maira Hearn MD Deborah Mielke, MD Kim Thein, APRN CNP  Tesha Scott, PA-PEPPER Todd, CNP  MD Marilia Park MD Angela Wermerskirchen, MD      Clinic hours: Monday - Thursday 7 am-7 pm; Fridays 7 am-5 pm.   Urgent care: Monday - Friday 11 am-9 pm; Saturday and Sunday 9 am-5 pm.    Clinic: [phone]       [facility]: Monday - Thursday 8 am - 7 pm; Friday 8 am - 6 pm  [facility]: [phone]     Use www.[email or web address].org for 24/7 diagnosis and treatment of dozens of conditions.      Preventive Health Recommendations  Female Ages 50 - 64    Yearly  exam: See your health care provider every year in order to  o Review health changes.   o Discuss preventive care.    o Review your medicines if your doctor has prescribed any.      Get a Pap test every three years (unless you have an abnormal result and your provider advises testing more often).    If you get Pap tests with HPV test, you only need to test every 5 years, unless you have an abnormal result.     You do not need a Pap test if your uterus was removed (hysterectomy) and you have not had cancer.    You should be tested each year for STDs (sexually transmitted diseases) if you're at risk.     Have a mammogram every 1 to 2 years.    Have a colonoscopy at age 50, or have a yearly FIT test (stool test). These exams screen for colon cancer.      Have a cholesterol test every 5 years, or more often if advised.    Have a diabetes test (fasting glucose) every three years. If you are at risk for diabetes, you should have this test more often.     If you are at risk for osteoporosis (brittle bone disease), think about having a bone density scan (DEXA).    Shots: Get a flu shot each year. Get a tetanus shot every 10 years.    Nutrition:     Eat at least 5 servings of fruits and vegetables each day.    Eat whole-grain bread, whole-wheat pasta and brown rice instead of white grains and rice.    Get adequate Calcium and Vitamin D.     Lifestyle    Exercise at least 150 minutes a week (30 minutes a day, 5 days a week). This will help you control your weight and prevent disease.    Limit alcohol to one drink per day.    No smoking.     Wear sunscreen to prevent skin cancer.     See your dentist every six months for an exam and cleaning.    See your eye doctor every 1 to 2 years.

## 2020-09-01 NOTE — PROGRESS NOTES
"   SUBJECTIVE:   CC: Maliha Pedro is an 60 year old woman who presents for preventive health visit.     Healthy Habits:    Do you get at least three servings of calcium containing foods daily (dairy, green leafy vegetables, etc.)? yes    Amount of exercise or daily activities, outside of work: none    Problems taking medications regularly No    Medication side effects: No    Have you had an eye exam in the past two years? yes    Do you see a dentist twice per year? no    Do you have sleep apnea, excessive snoring or daytime drowsiness?no    Moved to [facility] 50% of the time  Retired in Dec 2019.     Thickening of palmar [id number] finger bilateral hands. Non painful    Skin lesion is resolved with topical steroid. Gets intermittent pashes of rashes on legs.     Requests evelyne screening for covid.   No sx's but her roommate is ICU RN    Dm: sugars are lower recently from moving. Wearing continous monitor. Dropping to 30-50 in afternoon. She is skipping meals due to move     Loosing weight with walking more. Stress overall is down.     BCC removed with derm last year.     Had head CT in March after fall and hitting her head and had headaches. Overall headaches are better now.      Off pentoxyifylline- was taking for leg cramps.  No return of leg cramps    Due for eye appt and mammo- planning to schedule.     Using advair, nasal spray, allegra religiously and overall lungs have been good.     Off amlodipine. Just on losartan and hctz now    Some urinary urgency.   occ feel a \"flipping\" sensation in her chest. Thinks HR goes up to 90's. Maybe lasts 2-3 min and seems to start with stress. Has not been sleeping as well due to stress lately with her move. No associated with dyspnea, dizziness, cp. Not sure if heartburn.   Coffee- one cup in am    Component      Latest Ref Rng & Units 4/2/2020 8/27/2020   Cholesterol      <200 mg/dL  153   Triglycerides      <150 mg/dL  178 (H)   HDL Cholesterol      >49 mg/dL  48 (L) "   LDL Cholesterol Calculated      <100 mg/dL  69   Non HDL Cholesterol      <130 mg/dL  105   Creatinine Urine      mg/dL 52 76   Albumin Urine mg/L      mg/L <5 7   Albumin Urine mg/g Cr      0 - 25 mg/g Cr Unable to calculate due to low value 9.01   Creatinine      0.52 - 1.04 mg/dL  0.68   GFR Estimate      >60 mL/min/1.73:m2  >90   GFR Estimate If Black      >60 mL/min/1.73:m2  >90   Hemoglobin A1C      0 - 5.6 % 8.1 (H) 8.2 (H)         Today's PHQ-2 Score:   PHQ-2 ( 1999 Pfizer) 9/1/2020 8/12/2019   Q1: Little interest or pleasure in doing things 0 0   Q2: Feeling down, depressed or hopeless 0 0   PHQ-2 Score 0 0       Abuse: Current or Past(Physical, Sexual or Emotional)- No  Do you feel safe in your environment? Yes    Have you ever done Advance Care Planning? (For example, a Health Directive, POLST, or a discussion with a medical provider or your loved ones about your wishes): Yes, advance care planning is on file.    Social History     Tobacco Use     Smoking status: Never Smoker     Smokeless tobacco: Never Used   Substance Use Topics     Alcohol use: Not Currently     Frequency: Never     Comment: occasionally     If you drink alcohol do you typically have >3 drinks per day or >7 drinks per week? No                     Reviewed orders with patient.  Reviewed health maintenance and updated orders accordingly - Yes  Patient Active Problem List   Diagnosis     ? 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     Displacement of lumbar intervertebral disc without myelopathy     Nonallopathic lesion of lumbar region     Nonallopathic lesion of thoracic region     Nonallopathic lesion of sacral region     Bartholin gland cyst     Cataract     Irritable bowel syndrome     Esophageal reflux     Seasonal allergic rhinitis     Latex sensitivity     Colon polyp     Adenomatous polyp     HYPERLIPIDEMIA LDL GOAL <100     Ovarian cyst     Breast pain     Essential hypertension     Advanced directives,  counseling/discussion     Obesity     Hypomagnesemia     Type 2 diabetes mellitus with mild nonproliferative retinopathy without macular edema, with long-term current use of insulin, unspecified laterality (H)     Past Surgical History:   Procedure Laterality Date     CATARACT IOL, RT/LT  2008    LE     HC DILATION/CURETTAGE DIAG/THER NON OB       HC TYMPANOPLASTY W/O MASTOID, INIT/REV W/O OSS CHAIN RECONST       RELEASE CARPAL TUNNEL Right 2018    Procedure: Right Carpal Tunnel Release;  Surgeon: Ivania Moran MD;  Location: [facility] OR     RELEASE CARPAL TUNNEL Left 2018    Procedure: Left Carpal Tunnel Release;  Surgeon: Ivania Moran MD;  Location: [facility] OR     TYMPANOPLASTY       YAG CAPSULOTOMY OS (LEFT EYE)  2011       Social History     Tobacco Use     Smoking status: Never Smoker     Smokeless tobacco: Never Used   Substance Use Topics     Alcohol use: Not Currently     Frequency: Never     Comment: occasionally     Family History   Problem Relation Age of Onset     Diabetes Brother      Cancer Brother 59        Lung cancer     Diabetes Father          of dm 70's     C.A.D. Father      Cerebrovascular Disease Father      Diabetes Paternal Grandfather      Hypertension Paternal Grandfather      Cerebrovascular Disease Paternal Grandfather      Diabetes Sister      Cancer Maternal Aunt         ovarian cancer.      Diabetes Brother      Diabetes Brother      Diabetes Sister      Glaucoma Maternal Grandmother      Cancer Brother      Diabetes Brother      Diabetes Sister      Hypertension Sister      Macular Degeneration No family hx of          Current Outpatient Medications   Medication Sig Dispense Refill     Adhesive Tape (KINESIOLOGY TAPE) TAPE 1 Package daily 2 each 3     albuterol (PROAIR HFA/PROVENTIL HFA/VENTOLIN HFA) 108 (90 Base) MCG/ACT inhaler Inhale 2 puffs into the lungs every 4 hours as needed for shortness of breath / dyspnea 1 Inhaler 1      aspirin 81 MG EC tablet Take 4 tablets (325 mg) by mouth daily Please resume in 5 days.  (12/22/2018)       CALCIUM + D OR Take 1 tablet by mouth 2 times daily.       Continuous Blood Gluc  (FREESTYLE BUSHRA 14 DAY READER) KIRIT 1 Device daily 1 Device 1     Continuous Blood Gluc Sensor (FREESTYLE BUSHRA 14 DAY SENSOR) MISC 1 Application every 14 days 6 each 3     empagliflozin (JARDIANCE) 25 MG TABS tablet Take 1 tablet (25 mg) by mouth daily 110 tablet 3     fexofenadine (ALLEGRA) 180 MG tablet Take 1 tablet by mouth daily. 1 TABLET DAILY Failed claritin for symptom cotrol. Zyrtec increases heart rate. 90 tablet 3     fish oil-omega-3 fatty acids (FISH OIL) 1000 MG capsule Take 1 capsule by mouth daily.       fluticasone (FLONASE) 50 MCG/ACT nasal spray USE 1-2 SPRAYS IN EACH NOSTRIL ONCE DAILY 16 g 11     Glucosamine-Chondroitin (GLUCOSAMINE CHONDR COMPLEX PO)        hydrochlorothiazide (HYDRODIURIL) 12.5 MG tablet Take 1 tablet (12.5 mg) by mouth daily 90 tablet 1     insulin aspart (NOVOLOG FLEXPEN) 100 UNIT/ML pen Uses  120 units daily subcutaneously 120 mL 3     insulin glargine (LANTUS SOLOSTAR) 100 UNIT/ML pen 80 units daily 75 mL 3     insulin pen needle (BD LISA U/F) 32G X 4 MM miscellaneous Use to inject 5-6 times daily. 500 each 3     losartan (COZAAR) 100 MG tablet Take 1 tablet (100 mg) by mouth daily 90 tablet 3     magnesium 250 MG tablet Take 1 tablet by mouth daily 90 tablet 3     metFORMIN (GLUCOPHAGE-XR) 500 MG 24 hr tablet Take 2 tabs twice a day 360 tablet 3     montelukast (SINGULAIR) 10 MG tablet TAKE ONE TABLET BY MOUTH EVERY NIGHT AT BEDTIME 90 tablet 3     MULTIVITAMIN OR Take 1 tablet by mouth daily.       NEEDLES, ANY SIZE Pen needles for Novolog insulin pen. Use to inject 5-6  times daily. 4 Box 3     pantoprazole (PROTONIX) 40 MG EC tablet Take 1 tablet (40 mg) by mouth daily 90 tablet 3     pentoxifylline ER (TRENTAL) 400 MG CR tablet Take 1 tablet (400 mg) by mouth 2 times  "daily 180 tablet 3     rosuvastatin (CRESTOR) 10 MG tablet Take 1 tablet (10 mg) by mouth daily 90 tablet 3     VITAMIN D 2000 UNIT OR TABS Take 1 tablet by mouth daily.       Allergies   Allergen Reactions     Latex Swelling     Mold      Tetanus Antitoxin Swelling     Tetracycline Swelling       Mammogram Screening: Patient over age 50, mutual decision to screen reflected in health maintenance.    Pertinent mammograms are reviewed under the imaging tab.  History of abnormal Pap smear: NO - age 30-65 PAP every 5 years with negative HPV co-testing recommended  PAP / HPV Latest Ref Rng & Units 3/6/2017 12/16/2013 8/29/2011   PAP - NIL NIL NIL   HPV 16 DNA NEG Negative - -   HPV 18 DNA NEG Negative - -   OTHER HR HPV NEG Negative - -     Reviewed and updated as needed this visit by clinical staff  Tobacco  Allergies  Meds  Med Hx  Surg Hx  Fam Hx  Soc Hx        Reviewed and updated as needed this visit by Provider            ROS:   ROS: 10 point ROS neg other than the symptoms noted above in the HPI.     OBJECTIVE:   /82 (BP Location: Left arm, Patient Position: Chair, Cuff Size: Adult Large)   Pulse 86   Temp 97.7  F (36.5  C) (Oral)   Resp 20   Ht 1.607 m (5' 3.25\")   Wt 83.9 kg (185 lb)   LMP 03/19/2009 (Exact Date)   SpO2 98%   BMI 32.51 kg/m    EXAM:  GENERAL APPEARANCE: healthy, alert and no distress  EYES: Eyes grossly normal to inspection, PERRL and conjunctivae and sclerae normal  HENT: ear canals and TM's normal, nose and mouth without ulcers or lesions, oropharynx clear and oral mucous membranes moist  NECK: no adenopathy, no asymmetry, masses, or scars and thyroid normal to palpation  RESP: lungs clear to auscultation - no rales, rhonchi or wheezes  BREAST: normal without masses, tenderness or nipple discharge and no palpable axillary masses or adenopathy  CV: regular rate and rhythm, normal S1 S2, no S3 or S4, no murmur, click or rub, no peripheral edema and peripheral pulses " strong  ABDOMEN: soft, nontender, no hepatosplenomegaly, no masses and bowel sounds normal   (female): deferred per patient request as her Pap is up-to-date and she has no pelvic symptoms.  No risk for STD.  MS: Thickening of palmar fourth digit tendon, nontender.  Still normal range of motion of hand/fingers are noted and gait is age appropriate without ataxia  SKIN: no suspicious lesions or rashes  NEURO: Normal strength and tone, sensory exam grossly normal, mentation intact and speech normal  PSYCH: mentation appears normal and affect normal/bright    Diagnostic Test Results:  EKG - Normal Sinus Rhythm, normal axis, normal intervals, no acute ST/T changes c/w ischemia, no LVH by voltage criteria, q waves in AVF, II, unchanged from previous tracings    Nuclear medicine Lexiscan stress test 4/30/2018  IMPRESSION:  1. Normal myocardial SPECT study with a summed stress score of  0 . A  summed stress score of 0 is associated with an annual event rate of  0.8% and 0.9% for myocardial infarction and cardiac death,  respectively (Mario. Circulation 1998;98:535-43).  2. Normal left ventricular systolic function with a left ventricular  ejection fraction of %.   3. .   4. No prior study available for comparison..     I have personally reviewed the examination and initial interpretation  and I agree with the findings.     TAE SCOTT MD  ASSESSMENT/PLAN:   1. Routine general medical examination at a health care facility      2. Type 2 diabetes mellitus with hyperglycemia, with long-term current use of insulin (H)  Some recent intermittent hypoglycemia, usually from skipping meals.  We will have her drop her Lantus.  Also recommended strongly close follow-up with endocrinology and more regular meal intake.  Hypoglycemia prevention and correction discussed.    3. Hyperlipidemia LDL goal <100  On statin, tolerating  - rosuvastatin (CRESTOR) 10 MG tablet; Take 1 tablet (10 mg) by mouth daily  Dispense: 90  "tablet; Refill: 3    4. Gastroesophageal reflux disease, esophagitis presence not specified  Well-controlled  - pantoprazole (PROTONIX) 40 MG EC tablet; Take 1 tablet (40 mg) by mouth daily  Dispense: 90 tablet; Refill: 3    5. Seasonal allergic rhinitis, unspecified trigger  Well-controlled  - montelukast (SINGULAIR) 10 MG tablet; TAKE ONE TABLET BY MOUTH EVERY NIGHT AT BEDTIME  Dispense: 90 tablet; Refill: 3    6. Screen for colon cancer  - GASTROENTEROLOGY ADULT REF PROCEDURE ONLY; Future    7. Immunity status testing  - COVID-19 Virus (Coronavirus) Antibody & Titer Reflex; Future  - COVID-19 Virus (Coronavirus) Antibody & Titer Reflex    8. Urinary urgency  - UA reflex to Microscopic and Culture    9. Palpitations  Consider ZIO Patch Holter if persistent symptoms.  Will start with weaning caffeine and stress should hopefully improve in the near future as she has been moving.  - EKG 12-lead complete w/read - Clinics    10. Need for prophylactic vaccination and inoculation against influenza  - INFLUENZA VACCINE IM > 6 MONTHS VALENT IIV4 [02302]  - Vaccine Administration, Initial [93074]    COUNSELING:   Reviewed preventive health counseling, as reflected in patient instructions       Regular exercise       Healthy diet/nutrition       Vision screening       Osteoporosis Prevention/Bone Health       Colon cancer screening       ([place])menopause management    Estimated body mass index is 32.51 kg/m  as calculated from the following:    Height as of this encounter: 1.607 m (5' 3.25\").    Weight as of this encounter: 83.9 kg (185 lb).    Weight management plan: Discussed healthy diet and exercise guidelines    She reports that she has never smoked. She has never used smokeless tobacco.    Patient Instructions     Schedule yearly dermatology follow-up    Schedule your follow-up with endocrinology. Make sure you are eating regular meals with snacks in between to avoid hypoglycemia  Decreased lantus to 54 units to help " prevent hypoglycemia    Minimize coffee.   If palpitations continue please update me and we can get a heart monitor scheduled.     Please call [facility] in [place] at [phone] to schedule colonoscopy.      Let me know when you want to see an orthopedist for your hands.           At [facility], we strive to deliver an exceptional experience to you, every time we see you. If you receive a survey, please complete it as we do value your feedback.  If you have MyChart, you can expect to receive results automatically within 24 hours of their completion.  Your provider will send a note interpreting your results as well.   If you do not have MyChart, you should receive your results in about a week by mail.    Your care team:                            Family Medicine Internal Medicine   MD Bennett Kilgore MD Shantel Branch-Fleming, MD Betty Farris PA-C  Stacia Massey, APRN CNP    Lazaro Sanchez, MD Pediatrics   Nicholas Thomson, PA-C  Judi Barger, CNP MD Abigail Hidalgo APRN CNP   MD Maira Hearn MD Deborah Mielke, MD Kim Thein, APRN CNP  Tesha Scott, PA-C  Christal Todd, CNP  MD Marilia Park MD Angela Wermerskirchen, MD      Clinic hours: Monday - Thursday 7 am-7 pm; Fridays 7 am-5 pm.   Urgent care: Monday - Friday 11 am-9 pm; Saturday and Sunday 9 am-5 pm.    Clinic: [phone]       [place] [facility]: Monday - Thursday 8 am - 7 pm; Friday 8 am - 6 pm  [facility]: [phone]     Use [email or web address] for 24/7 diagnosis and treatment of dozens of conditions.      Preventive Health Recommendations  Female Ages 50 - 64    Yearly exam: See your health care provider every year in order to  o Review health changes.   o Discuss preventive care.    o Review your medicines if your doctor has prescribed any.      Get a Pap test every three  years (unless you have an abnormal result and your provider advises testing more often).    If you get Pap tests with HPV test, you only need to test every 5 years, unless you have an abnormal result.     You do not need a Pap test if your uterus was removed (hysterectomy) and you have not had cancer.    You should be tested each year for STDs (sexually transmitted diseases) if you're at risk.     Have a mammogram every 1 to 2 years.    Have a colonoscopy at age 50, or have a yearly FIT test (stool test). These exams screen for colon cancer.      Have a cholesterol test every 5 years, or more often if advised.    Have a diabetes test (fasting glucose) every three years. If you are at risk for diabetes, you should have this test more often.     If you are at risk for osteoporosis (brittle bone disease), think about having a bone density scan (DEXA).    Shots: Get a flu shot each year. Get a tetanus shot every 10 years.    Nutrition:     Eat at least 5 servings of fruits and vegetables each day.    Eat whole-grain bread, whole-wheat pasta and brown rice instead of white grains and rice.    Get adequate Calcium and Vitamin D.     Lifestyle    Exercise at least 150 minutes a week (30 minutes a day, 5 days a week). This will help you control your weight and prevent disease.    Limit alcohol to one drink per day.    No smoking.     Wear sunscreen to prevent skin cancer.     See your dentist every six months for an exam and cleaning.    See your eye doctor every 1 to 2 years.      Counseling Resources:  ATP IV Guidelines  Pooled Cohorts Equation Calculator  Breast Cancer Risk Calculator  BRCA-Related Cancer Risk Assessment: FHS-7 Tool  FRAX Risk Assessment  ICSI Preventive Guidelines  Dietary Guidelines for Americans, 2010  USDA's MyPlate  ASA Prophylaxis  Lung CA Screening    Elva Ortiz MD  [facility]

## 2020-09-02 LAB
COVID-19 SPIKE RBD ABY TITER: NORMAL
COVID-19 SPIKE RBD ABY: NEGATIVE

## 2020-09-09 ENCOUNTER — OFFICE VISIT (OUTPATIENT)
Dept: OPHTHALMOLOGY | Facility: CLINIC | Age: 60
End: 2020-09-09
Attending: OPHTHALMOLOGY
Payer: COMMERCIAL

## 2020-09-09 DIAGNOSIS — H35.371 EPIRETINAL MEMBRANE (ERM) OF RIGHT EYE: Primary | ICD-10-CM

## 2020-09-09 DIAGNOSIS — H40.003 BORDERLINE GLAUCOMA, BILATERAL: ICD-10-CM

## 2020-09-09 PROCEDURE — 92134 CPTRZ OPH DX IMG PST SGM RTA: CPT | Mod: ZF | Performed by: OPHTHALMOLOGY

## 2020-09-09 PROCEDURE — 92133 CPTRZD OPH DX IMG PST SGM ON: CPT | Mod: ZF | Performed by: OPHTHALMOLOGY

## 2020-09-09 PROCEDURE — G0463 HOSPITAL OUTPT CLINIC VISIT: HCPCS | Mod: ZF

## 2020-09-09 ASSESSMENT — REFRACTION_WEARINGRX
OS_SPHERE: -3.75
OS_ADD: +2.50
OS_CYLINDER: +1.00
OD_AXIS: 135
OD_CYLINDER: +0.50
OD_ADD: +2.50
OS_AXIS: 090
OD_SPHERE: -3.75

## 2020-09-09 ASSESSMENT — VISUAL ACUITY
OS_CC: 20/20-3
OD_CC: 20/20-2
METHOD: SNELLEN - LINEAR
CORRECTION_TYPE: GLASSES

## 2020-09-09 ASSESSMENT — CUP TO DISC RATIO
OD_RATIO: 0.9
OS_RATIO: 0.7

## 2020-09-09 ASSESSMENT — TONOMETRY
IOP_METHOD: TONOPEN
OD_IOP_MMHG: 20
OS_IOP_MMHG: 16

## 2020-09-09 ASSESSMENT — SLIT LAMP EXAM - LIDS
COMMENTS: MEIBOMIAN GLAND DYSFUNCTION
COMMENTS: MEIBOMIAN GLAND DYSFUNCTION

## 2020-09-09 ASSESSMENT — EXTERNAL EXAM - LEFT EYE: OS_EXAM: DERMATOCHALASIS

## 2020-09-09 ASSESSMENT — CONF VISUAL FIELD
METHOD: COUNTING FINGERS
OD_NORMAL: 1
OS_NORMAL: 1

## 2020-09-09 ASSESSMENT — EXTERNAL EXAM - RIGHT EYE: OD_EXAM: DERMATOCHALASIS

## 2020-09-09 NOTE — PROGRESS NOTES
Maliha Pedro is a  59 year old year-old patient presenting for follow up Diabetes mellitus, states Diabetes mellitus is under better control. No new new flashes and floaters and VA stable     Optical Coherence Tomography 09/09/20   Right eye:  good foveal contour, no cysts or subretinal fluid; trace Epiretinal membrane   Left eye: good foveal contour, no cysts or subretinal fluid     ONFL   Right eye:   Left eye: borderline. stable compared to prior test    Assessment & Plan:     1. Type 2 diabetes mellitus without retinopathy  -hgb a1c 8.8   6-14-17   Blood pressure (<120/80) and blood glucose (HbA1c <7.0) control discussed with patient. Patient advised that failure to adequately control each may lead to vision loss. The patient expressed understanding.    2. Glaucoma suspect, bilateral  - large cup to disc ratio R>L  Intraocular pressure 20 and 16  - favorable pachmetry 583/613  - Octopus visual field   Right eye: Reliable,stable   Left eye: Reliable. Largely normal.   - OCT retinal nerve fiber layer 10/14/15   Stable both eyes     - consider starting latanoprost at bedtime based on OVF results    3. Cataract, right eye  -early visually significant  -observe     4. Pseudophakia, left eye  -stable  -observe    5. Posterior vitreous detachment of right eye  -stable  -observe    6. Old inferior temporal small tear surrounded by pigment, no subretinal fluid right eye   Retina attached, no other tears  Retina detachment precautions were discussed with the patient (presence or increased in flashes, floaters or a curtain in the visual field) and was asked to return if any of the those occur    7. Dry eye syndrome  artificial tears  As needed and warm compresses      8. Mild Epiretinal membrane right eye  Stable   Not visually significant     Follow up with OVF 24-2 Monday - [place] [facility]; and color pic of ON    ~~~~~~~~~~~~~~~~~~~~~~~~~~~~~~~~~~   Complete documentation of historical and exam  elements from today's encounter can be found in the full encounter summary report (not reduplicated in this progress note).  I personally obtained the chief complaint(s) and history of present illness.  I confirmed and edited as necessary the review of systems, past medical/surgical history, family history, social history, and examination findings as documented by others; and I examined the patient myself.  I personally reviewed the relevant tests, images, and reports as documented above.  I formulated and edited as necessary the assessment and plan and discussed the findings and management plan with the patient and family    Lesly Tracy MD   of Ophthalmology.  Retina Service   Department of Ophthalmology and Visual Neurosciences   [facility]  Phone: [phone]   Fax: [phone]

## 2020-09-14 ENCOUNTER — OFFICE VISIT (OUTPATIENT)
Dept: OPHTHALMOLOGY | Facility: CLINIC | Age: 60
End: 2020-09-14
Attending: OPHTHALMOLOGY
Payer: COMMERCIAL

## 2020-09-14 DIAGNOSIS — H40.003 GLAUCOMA SUSPECT OF BOTH EYES: Primary | ICD-10-CM

## 2020-09-14 PROCEDURE — G0463 HOSPITAL OUTPT CLINIC VISIT: HCPCS | Mod: ZF

## 2020-09-14 PROCEDURE — 92250 FUNDUS PHOTOGRAPHY W/I&R: CPT | Mod: ZF | Performed by: OPHTHALMOLOGY

## 2020-09-14 PROCEDURE — 92083 EXTENDED VISUAL FIELD XM: CPT | Mod: ZF | Performed by: OPHTHALMOLOGY

## 2020-09-14 ASSESSMENT — SLIT LAMP EXAM - LIDS
COMMENTS: MEIBOMIAN GLAND DYSFUNCTION
COMMENTS: MEIBOMIAN GLAND DYSFUNCTION

## 2020-09-14 ASSESSMENT — VISUAL ACUITY
METHOD: SNELLEN - LINEAR
OS_CC+: -1
OD_CC: 20/20
OD_CC+: -2
OS_CC: 20/20

## 2020-09-14 ASSESSMENT — REFRACTION_WEARINGRX
OD_CYLINDER: +0.50
OD_SPHERE: -3.75
OD_ADD: +2.50
OS_AXIS: 090
OD_AXIS: 135
OS_SPHERE: -3.75
OS_CYLINDER: +1.00
OS_ADD: +2.50

## 2020-09-14 ASSESSMENT — CONF VISUAL FIELD
METHOD: COUNTING FINGERS
OS_NORMAL: 1
OD_NORMAL: 1

## 2020-09-14 ASSESSMENT — CUP TO DISC RATIO
OD_RATIO: 0.9
OS_RATIO: 0.7

## 2020-09-14 ASSESSMENT — TONOMETRY
OS_IOP_MMHG: 18
IOP_METHOD: APPLANATION
OD_IOP_MMHG: 18

## 2020-09-14 ASSESSMENT — EXTERNAL EXAM - LEFT EYE: OS_EXAM: DERMATOCHALASIS

## 2020-09-14 ASSESSMENT — EXTERNAL EXAM - RIGHT EYE: OD_EXAM: DERMATOCHALASIS

## 2020-09-14 NOTE — NURSING NOTE
Chief Complaints and History of Present Illnesses   Patient presents with     Epiretinal Membrane Follow Up     Chief Complaint(s) and History of Present Illness(es)     Epiretinal Membrane Follow Up     Laterality: both eyes    Associated symptoms: dryness, floaters and itching (sometimes).  Negative for eye pain, flashes and photophobia    Pain scale: 0/10              Comments     Fco is here to continue care for  Borderline glaucoma, bilateral [H40.003] - Both Eyes. She also has a history of  Epiretinal membrane (ERM) of right eye. She feels overall vision is about the same as last visit    James Rodriguez COT 12:25 PM September 14, 2020

## 2020-09-14 NOTE — PROGRESS NOTES
Maliha Pedro is a  60 year old year-old patient presenting for follow up Diabetes mellitus, states Diabetes mellitus is under better control. No new new flashes and floaters and VA stable     Optical Coherence Tomography 09/09/20   Right eye:  good foveal contour, no cysts or subretinal fluid; trace Epiretinal membrane   Left eye: good foveal contour, no cysts or subretinal fluid     ONFL   Right eye:   Left eye: borderline. stable compared to prior test    24-2 OVF  Right eye: few spots of decreased sensitivity   Left eye: 14% poss errors new nasal spot of decreased sensitivity. (full on 05/2018)    Assessment & Plan:     1. Type 2 diabetes mellitus without retinopathy  -hgb a1c 8.8   6-14-17   Blood pressure (<120/80) and blood glucose (HbA1c <7.0) control discussed with patient. Patient advised that failure to adequately control each may lead to vision loss. The patient expressed understanding.    2. Glaucoma suspect, bilateral  - large cup to disc ratio R>L  - Intraocular pressure 18 both eyes   - favorable pachmetry 583/613  - Octopus visual field- overall stable- see above. continue to observe and repeat in 6 months   - OCT retinal nerve fiber layer 10/14/15 Stable both eyes   - consider starting latanoprost at bedtime if worsening     3. Cataract, right eye  -becoming visually significant  - consider cataract evaluation next follow up     4. Pseudophakia, left eye  -stable  -observe    5. Posterior vitreous detachment of right eye  -stable  -observe    6. Old inferior temporal small tear surrounded by pigment, no subretinal fluid right eye   Retina attached, no other tears  Retina detachment precautions were discussed with the patient (presence or increased in flashes, floaters or a curtain in the visual field) and was asked to return if any of the those occur    7. Dry eye syndrome  artificial tears  As needed and warm compresses      8. Mild Epiretinal membrane right eye  Stable   Not visually  significant     Follow up  In 6 months with yves with  OVF 24-2 and ONFL, prescription and dilation    ~~~~~~~~~~~~~~~~~~~~~~~~~~~~~~~~~~   Complete documentation of historical and exam elements from today's encounter can be found in the full encounter summary report (not reduplicated in this progress note).  I personally obtained the chief complaint(s) and history of present illness.  I confirmed and edited as necessary the review of systems, past medical/surgical history, family history, social history, and examination findings as documented by others; and I examined the patient myself.  I personally reviewed the relevant tests, images, and reports as documented above.  I formulated and edited as necessary the assessment and plan and discussed the findings and management plan with the patient and family    Lesly Tracy MD   of Ophthalmology.  Retina Service   Department of Ophthalmology and Visual Neurosciences   [facility]  Phone: [phone]   Fax: [phone]

## 2020-09-25 DIAGNOSIS — I10 ESSENTIAL HYPERTENSION: ICD-10-CM

## 2020-09-26 NOTE — TELEPHONE ENCOUNTER
hydrochlorothiazide (HYDRODIURIL) 12.5 MG tablet     Last Written Prescription Date:  3/24/20  Last Fill Quantity: 90,   # refills: 1  Last Office Visit : 3/30/20  Future Office visit:  None      Routing refill request to provider for review/approval because: K+, NA

## 2020-09-28 RX ORDER — HYDROCHLOROTHIAZIDE 12.5 MG/1
12.5 TABLET ORAL DAILY
Qty: 90 TABLET | Refills: 0 | Status: SHIPPED | OUTPATIENT
Start: 2020-09-28 | End: 2020-12-04

## 2020-10-23 DIAGNOSIS — Z12.31 VISIT FOR SCREENING MAMMOGRAM: ICD-10-CM

## 2020-12-03 DIAGNOSIS — I10 ESSENTIAL HYPERTENSION: ICD-10-CM

## 2020-12-04 DIAGNOSIS — E11.21 TYPE 2 DIABETES MELLITUS WITH DIABETIC NEPHROPATHY, WITH LONG-TERM CURRENT USE OF INSULIN (H): ICD-10-CM

## 2020-12-04 DIAGNOSIS — Z79.4 TYPE 2 DIABETES MELLITUS WITH DIABETIC NEPHROPATHY, WITH LONG-TERM CURRENT USE OF INSULIN (H): ICD-10-CM

## 2020-12-04 RX ORDER — HYDROCHLOROTHIAZIDE 12.5 MG/1
12.5 TABLET ORAL DAILY
Qty: 90 TABLET | Refills: 0 | Status: SHIPPED | OUTPATIENT
Start: 2020-12-04 | End: 2021-03-10

## 2020-12-04 NOTE — TELEPHONE ENCOUNTER
hydrochlorothiazide (HYDRODIURIL) 12.5 MG tablet      Last Written Prescription Date:  9-28-20  Last Fill Quantity: 90,   # refills: 0  Last Office Visit : 3-30-20,  Letitia                                           (RTC 3 M)  Future Office visit:  none    Routing refill request to provider for review/approval because:  Overdue lab    Scheduling has been notified to contact the pt for appointment.

## 2020-12-05 NOTE — TELEPHONE ENCOUNTER
insulin glargine (LANTUS SOLOSTAR) 100 UNIT/ML pen     Last Written Prescription Date:  3/30/20  Last Fill Quantity: 75ml,   # refills: 3  Last Office Visit :3/30/20  Future Office visit:  None      Routing refill request to provider for review/approval because: endo triage to fill

## 2020-12-07 ENCOUNTER — TELEPHONE (OUTPATIENT)
Dept: ENDOCRINOLOGY | Facility: CLINIC | Age: 60
End: 2020-12-07

## 2020-12-07 NOTE — TELEPHONE ENCOUNTER
----- Message from Suyapa Luis, RN sent at 12/4/2020  8:17 AM CST -----  Regarding: scheduling pt of Malaeb  Please call to schedule.    Thanks    I do not need any follow up on the scheduling of this appointment unless the patient will no longer be receiving care in our clinic.      Last appt 3-30-20, RTC 3 M  Hx diabetes

## 2020-12-09 ENCOUNTER — OFFICE VISIT (OUTPATIENT)
Dept: ENDOCRINOLOGY | Facility: CLINIC | Age: 60
End: 2020-12-09
Payer: COMMERCIAL

## 2020-12-09 ENCOUNTER — DOCUMENTATION ONLY (OUTPATIENT)
Dept: ENDOCRINOLOGY | Facility: CLINIC | Age: 60
End: 2020-12-09

## 2020-12-09 VITALS
HEIGHT: 64 IN | SYSTOLIC BLOOD PRESSURE: 133 MMHG | BODY MASS INDEX: 32.1 KG/M2 | HEART RATE: 86 BPM | DIASTOLIC BLOOD PRESSURE: 85 MMHG | WEIGHT: 188 LBS

## 2020-12-09 DIAGNOSIS — Z79.4 TYPE 2 DIABETES MELLITUS WITH DIABETIC NEPHROPATHY, WITH LONG-TERM CURRENT USE OF INSULIN (H): Primary | ICD-10-CM

## 2020-12-09 DIAGNOSIS — E11.21 TYPE 2 DIABETES MELLITUS WITH DIABETIC NEPHROPATHY, WITH LONG-TERM CURRENT USE OF INSULIN (H): Primary | ICD-10-CM

## 2020-12-09 DIAGNOSIS — I10 ESSENTIAL HYPERTENSION: ICD-10-CM

## 2020-12-09 LAB — HBA1C MFR BLD: 8.5 % (ref 4.3–6)

## 2020-12-09 PROCEDURE — 95251 CONT GLUC MNTR ANALYSIS I&R: CPT | Performed by: INTERNAL MEDICINE

## 2020-12-09 PROCEDURE — 99000 SPECIMEN HANDLING OFFICE-LAB: CPT | Performed by: INTERNAL MEDICINE

## 2020-12-09 PROCEDURE — 99214 OFFICE O/P EST MOD 30 MIN: CPT | Mod: 25 | Performed by: INTERNAL MEDICINE

## 2020-12-09 PROCEDURE — 83036 HEMOGLOBIN GLYCOSYLATED A1C: CPT | Performed by: INTERNAL MEDICINE

## 2020-12-09 ASSESSMENT — MIFFLIN-ST. JEOR: SCORE: 1402.76

## 2020-12-09 NOTE — LETTER
12/9/2020       RE: Maliha Pedro  [address]  [facility] [address]     Dear Colleague,    Thank you for referring your patient, Maliha Pedro, to the [facility] [place] at [facility]. Please see a copy of my visit note below.    Endocrinology Clinic Visit 12/09/20  NAME:  Maliha Pedro  PCP:  Elva Ortiz  MRN:  [id number]    Chief Complaint     Follow up. Diabetes     History of Present Illness     Maliha Pedro is a 59 year old female who is seen in clinic for diabetes management.   She has had diabetes since age 32. Has tried many different meds. Did not tolerate GLP-1 agonists. Stopped Actos due to concerns about adverse effects. Was on Lantus and Novolog with poor control, with recent improvement since addition of Jardiance November 2017. She has history of mild stable retinopathy.     Diabetes Care:   Last urine MA: Sept 2019: 10.09, Cr normal  Neuropathy: none  Eye exam: last done 09/2020 - no retinopathy  Lipids: last checked Aug 2019. LDL 74. She is on statin.   HTN: on ARB, hydrochlorothiazide     Interval History:   A1c is 8.2 today. She has had a stressful year with covid 19 quarantine and not being able to return back home to the [facility].     Associated Signs/Symptoms  Hypoglycemia: no. Hyperglycemia: none.Neuropathy: none. Vascular Symtpoms: none. Angina/CHF: dyspnea on exertion. Ulcers: No. Amputations: No    Current treatment strategy: Novolog 25 units before her 2 main meals. Lantus 55 units daily. Metformin 1000 mg po BID, Jardiance 25 mg po daily    Blood Glucose Monitoring: CGM download: shannon: 14 day data:  Avg 159, with 63% time in range.  Pattern is for stable to slightly low overnight, slight rise after breakfast, and a larger rise after dinner.    Diet: 2-3 meals per day. Occasional snacks.   Cut down on rice. Eating more vegetables and fish.     Exercise: She is  walking some days.    Weight:   Wt Readings from Last 4 Encounters:   12/09/20 85.3 kg (188 lb)   09/01/20 83.9 kg (185 lb)   12/04/19 86.6 kg (191 lb)   09/25/19 87.1 kg (192 lb 1.6 oz)     Problem List     Patient Active Problem List   Diagnosis     ? 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     Displacement of lumbar intervertebral disc without myelopathy     Nonallopathic lesion of lumbar region     Nonallopathic lesion of thoracic region     Nonallopathic lesion of sacral region     Bartholin gland cyst     Cataract     Irritable bowel syndrome     Esophageal reflux     Seasonal allergic rhinitis     Latex sensitivity     Colon polyp     Adenomatous polyp     HYPERLIPIDEMIA LDL GOAL <100     Ovarian cyst     Breast pain     Essential hypertension     Advanced directives, counseling/discussion     Obesity     Hypomagnesemia     Type 2 diabetes mellitus with mild nonproliferative retinopathy without macular edema, with long-term current use of insulin, unspecified laterality (H)        Medications     Current Outpatient Medications   Medication     albuterol (PROAIR HFA/PROVENTIL HFA/VENTOLIN HFA) 108 (90 Base) MCG/ACT inhaler     aspirin 81 MG EC tablet     CALCIUM + D OR     Continuous Blood Gluc  (FREESTYLE BUSHRA 14 DAY READER) KIRIT     Continuous Blood Gluc Sensor (OneSeed ExpeditionsSTYLE BUSHRA 14 DAY SENSOR) MISC     empagliflozin (JARDIANCE) 25 MG TABS tablet     fexofenadine (ALLEGRA) 180 MG tablet     fish oil-omega-3 fatty acids (FISH OIL) 1000 MG capsule     fluticasone (FLONASE) 50 MCG/ACT nasal spray     Glucosamine-Chondroitin (GLUCOSAMINE CHONDR COMPLEX PO)     hydrochlorothiazide (HYDRODIURIL) 12.5 MG tablet     insulin aspart (NOVOLOG FLEXPEN) 100 UNIT/ML pen     insulin glargine (LANTUS SOLOSTAR) 100 UNIT/ML pen     losartan (COZAAR) 100 MG tablet     magnesium 250 MG tablet     metFORMIN (GLUCOPHAGE-XR) 500 MG 24 hr tablet     montelukast (SINGULAIR) 10 MG tablet     MULTIVITAMIN OR      pantoprazole (PROTONIX) 40 MG EC tablet     rosuvastatin (CRESTOR) 10 MG tablet     VITAMIN D 2000 UNIT OR TABS     Adhesive Tape (KINESIOLOGY TAPE) TAPE     insulin pen needle (BD LISA U/F) 32G X 4 MM miscellaneous     NEEDLES, ANY SIZE     pentoxifylline ER (TRENTAL) 400 MG CR tablet     Current Facility-Administered Medications   Medication     lidocaine 1% with EPINEPHrine 1:100,000 injection 3 mL     lidocaine 1% with EPINEPHrine 1:100,000 injection 3 mL     Facility-Administered Medications Ordered in Other Visits   Medication     fentaNYL (SUBLIMAZE) injection 25-50 mcg     flumazenil (ROMAZICON) injection 0.2 mg     midazolam (VERSED) injection 0.5-1 mg     naloxone (NARCAN) injection 0.1-0.4 mg        Allergies     Allergies   Allergen Reactions     Latex Swelling     Mold      Tetanus Antitoxin Swelling     Tetracycline Swelling       Medical / Surgical History     Past Medical History:   Diagnosis Date     Adjustment disorder with mixed anxiety and depressed mood     following death of her adoptive father and then again after adoptive mother      Allergic rhinitis, cause unspecified     uses benadryl prn (sneezing/hoarse voice)     Bartholin gland cyst 2008     Chest pain, unspecified     neg dobutamine stress test  (reacted to dobutamine)     Dry eyes      ERM OD (epiretinal membrane, right eye)     BE mild     Esophageal reflux     EGD: -neg     Glaucoma suspect      Hearing problem      Hypertension      Irregular menstrual cycle     s/p D&C, since then more regular     Irritable bowel syndrome     colonoscopy -neg. Sx's especially prior to menses     MEDICAL HISTORY OF -     had health directive with : DANIEL Ford.  Full Code. Aunt (Charlie Tay) will be decision maker     Meningitis, unspecified(322.9) age 8    hospitalized for 9 mo     Need for prophylactic vaccination with tuberculosis (BCG) vaccine     Has pos Mantoux. Gets yearly cxr, all neg.      Other  and unspecified hyperlipidemia      Pain in joint, shoulder region     bone spurs on MRI, s/p pool therapy     Tinnitus      Type II or unspecified type diabetes mellitus without mention of complication, not stated as uncontrolled Age 32    Follows with endocrine at [facility]: Shayne Lane MD     Unspecified cataract     RE     Unspecified sinusitis (chronic)     2006     Wheezing     Has seen pulm 2006 & 2007. Told night ashtma, ? vocal chord spasm due to cold air.      Past Surgical History:   Procedure Laterality Date     CATARACT IOL, RT/LT  9/23/2008    LE     HC DILATION/CURETTAGE DIAG/THER NON OB  1992     HC TYMPANOPLASTY W/O MASTOID, INIT/REV W/O OSS CHAIN RECONST  12/09     RELEASE CARPAL TUNNEL Right 11/19/2018    Procedure: Right Carpal Tunnel Release;  Surgeon: Ivania Moran MD;  Location: [facility] OR     RELEASE CARPAL TUNNEL Left 12/17/2018    Procedure: Left Carpal Tunnel Release;  Surgeon: Ivania Morna MD;  Location: [facility] OR     TYMPANOPLASTY       YAG CAPSULOTOMY OS (LEFT EYE)  4/23/2011       Social History     Social History     Socioeconomic History     Marital status: Single     Spouse name: Not on file     Number of children: Not on file     Years of education: Not on file     Highest education level: Not on file   Occupational History     Not on file   Social Needs     Financial resource strain: Not on file     Food insecurity     Worry: Not on file     Inability: Not on file     Transportation needs     Medical: Not on file     Non-medical: Not on file   Tobacco Use     Smoking status: Never Smoker     Smokeless tobacco: Never Used   Substance and Sexual Activity     Alcohol use: Not Currently     Frequency: Never     Comment: occasionally     Drug use: No     Sexual activity: Not Currently     Partners: Male     Birth control/protection: None   Lifestyle     Physical activity     Days per week: Not on file     Minutes per session: Not on file     Stress: Not on file    Relationships     Social connections     Talks on phone: Not on file     Gets together: Not on file     Attends Anabaptism service: Not on file     Active member of club or organization: Not on file     Attends meetings of clubs or organizations: Not on file     Relationship status: Not on file     Intimate partner violence     Fear of current or ex partner: Not on file     Emotionally abused: Not on file     Physically abused: Not on file     Forced sexual activity: Not on file   Other Topics Concern     Parent/sibling w/ CABG, MI or angioplasty before 65F 55M? 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 Moved to  in . Initially lived in [place].    No children        2 cats       Family History     Family History   Problem Relation Age of Onset     Diabetes Brother      Cancer Brother 59        Lung cancer     Diabetes Father          of dm 70's     C.A.D. Father      Cerebrovascular Disease Father      Diabetes Paternal Grandfather      Hypertension Paternal Grandfather      Cerebrovascular Disease Paternal Grandfather      Diabetes Sister      Cancer Maternal Aunt         ovarian cancer.      Diabetes Brother      Diabetes Brother      Diabetes Sister      Glaucoma Maternal Grandmother      Cancer Brother      Diabetes Brother      Diabetes Sister      Hypertension Sister      Macular Degeneration No family hx of        ROS     Constitutional: no fevers, chills, night sweats. No weight loss or fatigue. Good appetite  Eyes: no vision changes, no eye  "redness, no diplopia  Ears, Nose, mouth, throat: no hearing changes, no tinnitus, no rhinorrhea, no nasal congestion  Cardiovascular: no chest pain, no orthopnea or PND, no edema, no palpitations  Respiratory: no dyspnea, no cough, no sputum, no wheezing  Gastrointestinal: no nausea, no vomiting, no abdominal pain, no diarrhea, no constipation  Genitourinary: no dysuria, no frequency, no urgency, no nocturia  Musculoskeletal: no joint pains, no back pain, no cramps, no fractures  Skin: no rash, no itching, no dryness, no ulcers, no hair loss  Neurological: no headache, no weakness, no numbness, no dizziness, no tremors  Psychiatric: no anxiety, no sadness  Hematologic/lymphatic: no easy bruising, no bleeding, no palor    Physical Exam   /85 (BP Location: Right arm, Patient Position: Sitting, Cuff Size: Adult Regular)   Pulse 86   Ht 1.626 m (5' 4\")   Wt 85.3 kg (188 lb)   LMP 03/19/2009 (Exact Date)   BMI 32.27 kg/m       General: Comfortable, no obvious distress, normal body habitus  Eyes: Sclera anicteric, moist conjunctiva  HENT: Atraumatic, oropharynx clear, moist mucous membranes with no mucosal ulcerations  Neck: Trachea midline, supple. Thyroid: Thyroid is normal in size and texture  CV: Regular rhythm, normal rate. No murmurs auscultated  Resp: Clear to auscultation bilaterally, good effort  Abdomen:  Soft, non tender, non distended. Bowel sounds heard. No organomegaly.  Skin: No rashes, lesions, or subcutaneous nodules.   Psych: Alert and oriented x 3. Appropriate affect, good insight  Extremities: No peripheral edema  Foot exam:  Pulses:  Dorsalis pedis: present bilaterally  Posterior tibial: present bilaterally  Foot exam:  Vibration/Light touch: sensation present bilaterally  Skin of foot: intact bilaterally  Musculoskeletal: Appropriate muscle bulk and strength  Lymphatic: No cervical lymphadenopathy  Neuro: Moves all four extremities. No focal deficits on limited exam. Gait normal. "       Labs/Imaging and Outside Records     Pertinent Labs were reviewed and updated in EPIC.    Summary of recent findings:   Lab Results   Component Value Date    A1C 9.8 12/16/2013    A1C 9.5 04/15/2013    A1C 9.6 01/16/2013    A1C 9.4 09/28/2012    A1C 8.3 06/22/2012       TSH   Date Value Ref Range Status   10/30/2017 1.29 0.40 - 4.00 mU/L Final   02/08/2017 0.76 0.40 - 4.00 mU/L Final   11/17/2015 0.98 0.40 - 4.00 mU/L Final   05/04/2015 0.76 0.40 - 4.00 mU/L Final   12/16/2013 1.24 0.4 - 5.0 mU/L Final     T4 Free   Date Value Ref Range Status   06/21/2010 1.50 0.70 - 1.85 ng/dL Final       Creatinine   Date Value Ref Range Status   08/27/2020 0.68 0.52 - 1.04 mg/dL Final       Recent Labs   Lab Test  03/06/17   0905  05/04/15   1016  12/16/13   1028   CHOL  165  170  192   HDL  52  46*  42*   LDL  73  90  125   TRIG  199*  171*  124   CHOLHDLRATIO   --   3.7  4.6     Impression / Plan     1. Diabetes Mellitus: Type 2  Current glycemic control can be considered sub-optimal  I advised her to take Novolog up to 3 times a day, including a dose at noon, even if it's just a correction or to cover a small snack.   DMV paperwork was signed.     2. Diabetes Complications: With retinopathy - stable  She is up-to-date on her annual diabetes testing including urine microalbumin, overall labs, and eye exam.    3. Blood Pressure Management: Blood pressure is controlled. Currently is on pharmacotherapy for this.   - I refilled losartan and hydrochlorothiazide. She has been off Norvasc and doing great.    4.Lipid Management: Per the new ACC/JILLIAN/NHLBI guidelines, statins are recommended for individuals with diabetes aged 40-75 with LDL  without ASCVD, and for any individual with ASCVD. Currently the patient is on a statin.     5. Smoking Status: Patient Pt is smoke free..     Follow up: 3 months      Shai Dong MD  Endocrinology, Diabetes and Metabolism  [facility]

## 2020-12-09 NOTE — PROGRESS NOTES
Endocrinology Clinic Visit 12/09/20  NAME:  Maliha Pedro  PCP:  Elva Ortiz  MRN:  [id number]    Chief Complaint     Follow up. Diabetes     History of Present Illness     Maliha Pedro is a 59 year old female who is seen in clinic for diabetes management.   She has had diabetes since age 32. Has tried many different meds. Did not tolerate GLP-1 agonists. Stopped Actos due to concerns about adverse effects. Was on Lantus and Novolog with poor control, with recent improvement since addition of Jardiance November 2017. She has history of mild stable retinopathy.     Diabetes Care:   Last urine MA: Sept 2019: 10.09, Cr normal  Neuropathy: none  Eye exam: last done 09/2020 - no retinopathy  Lipids: last checked Aug 2019. LDL 74. She is on statin.   HTN: on ARB, hydrochlorothiazide     Interval History:   A1c is 8.2 today. She has had a stressful year with covid 19 quarantine and not being able to return back home to the [facility].     Associated Signs/Symptoms  Hypoglycemia: no. Hyperglycemia: none.Neuropathy: none. Vascular Symtpoms: none. Angina/CHF: dyspnea on exertion. Ulcers: No. Amputations: No    Current treatment strategy: Novolog 25 units before her 2 main meals. Lantus 55 units daily. Metformin 1000 mg po BID, Jardiance 25 mg po daily    Blood Glucose Monitoring: CGM download: shannon: 14 day data:  Avg 159, with 63% time in range.  Pattern is for stable to slightly low overnight, slight rise after breakfast, and a larger rise after dinner.    Diet: 2-3 meals per day. Occasional snacks.   Cut down on rice. Eating more vegetables and fish.     Exercise: She is walking some days.    Weight:   Wt Readings from Last 4 Encounters:   12/09/20 85.3 kg (188 lb)   09/01/20 83.9 kg (185 lb)   12/04/19 86.6 kg (191 lb)   09/25/19 87.1 kg (192 lb 1.6 oz)     Problem List     Patient Active Problem List   Diagnosis     ? 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     Displacement  of lumbar intervertebral disc without myelopathy     Nonallopathic lesion of lumbar region     Nonallopathic lesion of thoracic region     Nonallopathic lesion of sacral region     Bartholin gland cyst     Cataract     Irritable bowel syndrome     Esophageal reflux     Seasonal allergic rhinitis     Latex sensitivity     Colon polyp     Adenomatous polyp     HYPERLIPIDEMIA LDL GOAL <100     Ovarian cyst     Breast pain     Essential hypertension     Advanced directives, counseling/discussion     Obesity     Hypomagnesemia     Type 2 diabetes mellitus with mild nonproliferative retinopathy without macular edema, with long-term current use of insulin, unspecified laterality (H)        Medications     Current Outpatient Medications   Medication     albuterol (PROAIR HFA/PROVENTIL HFA/VENTOLIN HFA) 108 (90 Base) MCG/ACT inhaler     aspirin 81 MG EC tablet     CALCIUM + D OR     Continuous Blood Gluc  (FREESTYLE BUSHRA 14 DAY READER) KIRIT     Continuous Blood Gluc Sensor (LivingWell HealthSTYLE BUSHRA 14 DAY SENSOR) MISC     empagliflozin (JARDIANCE) 25 MG TABS tablet     fexofenadine (ALLEGRA) 180 MG tablet     fish oil-omega-3 fatty acids (FISH OIL) 1000 MG capsule     fluticasone (FLONASE) 50 MCG/ACT nasal spray     Glucosamine-Chondroitin (GLUCOSAMINE CHONDR COMPLEX PO)     hydrochlorothiazide (HYDRODIURIL) 12.5 MG tablet     insulin aspart (NOVOLOG FLEXPEN) 100 UNIT/ML pen     insulin glargine (LANTUS SOLOSTAR) 100 UNIT/ML pen     losartan (COZAAR) 100 MG tablet     magnesium 250 MG tablet     metFORMIN (GLUCOPHAGE-XR) 500 MG 24 hr tablet     montelukast (SINGULAIR) 10 MG tablet     MULTIVITAMIN OR     pantoprazole (PROTONIX) 40 MG EC tablet     rosuvastatin (CRESTOR) 10 MG tablet     VITAMIN D 2000 UNIT OR TABS     Adhesive Tape (KINESIOLOGY TAPE) TAPE     insulin pen needle (BD LISA U/F) 32G X 4 MM miscellaneous     NEEDLES, ANY SIZE     pentoxifylline ER (TRENTAL) 400 MG CR tablet     Current Facility-Administered  Medications   Medication     lidocaine 1% with EPINEPHrine 1:100,000 injection 3 mL     lidocaine 1% with EPINEPHrine 1:100,000 injection 3 mL     Facility-Administered Medications Ordered in Other Visits   Medication     fentaNYL (SUBLIMAZE) injection 25-50 mcg     flumazenil (ROMAZICON) injection 0.2 mg     midazolam (VERSED) injection 0.5-1 mg     naloxone (NARCAN) injection 0.1-0.4 mg        Allergies     Allergies   Allergen Reactions     Latex Swelling     Mold      Tetanus Antitoxin Swelling     Tetracycline Swelling       Medical / Surgical History     Past Medical History:   Diagnosis Date     Adjustment disorder with mixed anxiety and depressed mood     following death of her adoptive father and then again after adoptive mother      Allergic rhinitis, cause unspecified     uses benadryl prn (sneezing/hoarse voice)     Bartholin gland cyst 2008     Chest pain, unspecified     neg dobutamine stress test  (reacted to dobutamine)     Dry eyes      ERM OD (epiretinal membrane, right eye)     BE mild     Esophageal reflux     EGD: -neg     Glaucoma suspect      Hearing problem      Hypertension      Irregular menstrual cycle     s/p D&C, since then more regular     Irritable bowel syndrome     colonoscopy -neg. Sx's especially prior to menses     MEDICAL HISTORY OF -     had health directive with : DANIEL Ford.  Full Code. Aunt (Charlie Tay) will be decision maker     Meningitis, unspecified(322.9) age 8    hospitalized for 9 mo     Need for prophylactic vaccination with tuberculosis (BCG) vaccine     Has pos Mantoux. Gets yearly cxr, all neg.      Other and unspecified hyperlipidemia      Pain in joint, shoulder region     bone spurs on MRI, s/p pool therapy     Tinnitus      Type II or unspecified type diabetes mellitus without mention of complication, not stated as uncontrolled Age 32    Follows with endocrine at : [facility]ayne [address] MD     Unspecified cataract     RE      Unspecified sinusitis (chronic)     2006     Wheezing     Has seen pulm 2006 & 2007. Told night ashtma, ? vocal chord spasm due to cold air.      Past Surgical History:   Procedure Laterality Date     CATARACT IOL, RT/LT  9/23/2008    LE     HC DILATION/CURETTAGE DIAG/THER NON OB  1992     HC TYMPANOPLASTY W/O MASTOID, INIT/REV W/O OSS CHAIN RECONST  12/09     RELEASE CARPAL TUNNEL Right 11/19/2018    Procedure: Right Carpal Tunnel Release;  Surgeon: Ivania Moran MD;  Location: [facility] OR     RELEASE CARPAL TUNNEL Left 12/17/2018    Procedure: Left Carpal Tunnel Release;  Surgeon: Ivania Moran MD;  Location: [facility] OR     TYMPANOPLASTY       YAG CAPSULOTOMY OS (LEFT EYE)  4/23/2011       Social History     Social History     Socioeconomic History     Marital status: Single     Spouse name: Not on file     Number of children: Not on file     Years of education: Not on file     Highest education level: Not on file   Occupational History     Not on file   Social Needs     Financial resource strain: Not on file     Food insecurity     Worry: Not on file     Inability: Not on file     Transportation needs     Medical: Not on file     Non-medical: Not on file   Tobacco Use     Smoking status: Never Smoker     Smokeless tobacco: Never Used   Substance and Sexual Activity     Alcohol use: Not Currently     Frequency: Never     Comment: occasionally     Drug use: No     Sexual activity: Not Currently     Partners: Male     Birth control/protection: None   Lifestyle     Physical activity     Days per week: Not on file     Minutes per session: Not on file     Stress: Not on file   Relationships     Social connections     Talks on phone: Not on file     Gets together: Not on file     Attends Buddhism service: Not on file     Active member of club or organization: Not on file     Attends meetings of clubs or organizations: Not on file     Relationship status: Not on file     Intimate partner violence      Fear of current or ex partner: Not on file     Emotionally abused: Not on file     Physically abused: Not on file     Forced sexual activity: Not on file   Other Topics Concern     Parent/sibling w/ CABG, MI or angioplasty before 65F 55M? 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 Moved to  in . Initially lived in [place].    No children        2 cats       Family History     Family History   Problem Relation Age of Onset     Diabetes Brother      Cancer Brother 59        Lung cancer     Diabetes Father          of dm 70's     C.A.D. Father      Cerebrovascular Disease Father      Diabetes Paternal Grandfather      Hypertension Paternal Grandfather      Cerebrovascular Disease Paternal Grandfather      Diabetes Sister      Cancer Maternal Aunt         ovarian cancer.      Diabetes Brother      Diabetes Brother      Diabetes Sister      Glaucoma Maternal Grandmother      Cancer Brother      Diabetes Brother      Diabetes Sister      Hypertension Sister      Macular Degeneration No family hx of        ROS     Constitutional: no fevers, chills, night sweats. No weight loss or fatigue. Good appetite  Eyes: no vision changes, no eye redness, no diplopia  Ears, Nose, mouth, throat: no hearing changes, no tinnitus, no rhinorrhea, no nasal congestion  Cardiovascular: no chest pain, no orthopnea or PND, no edema, no palpitations  Respiratory: no dyspnea, no cough, no sputum, no wheezing  Gastrointestinal: no nausea, no vomiting, no abdominal pain, no diarrhea, no  "constipation  Genitourinary: no dysuria, no frequency, no urgency, no nocturia  Musculoskeletal: no joint pains, no back pain, no cramps, no fractures  Skin: no rash, no itching, no dryness, no ulcers, no hair loss  Neurological: no headache, no weakness, no numbness, no dizziness, no tremors  Psychiatric: no anxiety, no sadness  Hematologic/lymphatic: no easy bruising, no bleeding, no palor    Physical Exam   /85 (BP Location: Right arm, Patient Position: Sitting, Cuff Size: Adult Regular)   Pulse 86   Ht 1.626 m (5' 4\")   Wt 85.3 kg (188 lb)   LMP 03/19/2009 (Exact Date)   BMI 32.27 kg/m       General: Comfortable, no obvious distress, normal body habitus  Eyes: Sclera anicteric, moist conjunctiva  HENT: Atraumatic, oropharynx clear, moist mucous membranes with no mucosal ulcerations  Neck: Trachea midline, supple. Thyroid: Thyroid is normal in size and texture  CV: Regular rhythm, normal rate. No murmurs auscultated  Resp: Clear to auscultation bilaterally, good effort  Abdomen:  Soft, non tender, non distended. Bowel sounds heard. No organomegaly.  Skin: No rashes, lesions, or subcutaneous nodules.   Psych: Alert and oriented x 3. Appropriate affect, good insight  Extremities: No peripheral edema  Foot exam:  Pulses:  Dorsalis pedis: present bilaterally  Posterior tibial: present bilaterally  Foot exam:  Vibration/Light touch: sensation present bilaterally  Skin of foot: intact bilaterally  Musculoskeletal: Appropriate muscle bulk and strength  Lymphatic: No cervical lymphadenopathy  Neuro: Moves all four extremities. No focal deficits on limited exam. Gait normal.       Labs/Imaging and Outside Records     Pertinent Labs were reviewed and updated in EPIC.    Summary of recent findings:   Lab Results   Component Value Date    A1C 9.8 12/16/2013    A1C 9.5 04/15/2013    A1C 9.6 01/16/2013    A1C 9.4 09/28/2012    A1C 8.3 06/22/2012       TSH   Date Value Ref Range Status   10/30/2017 1.29 0.40 - 4.00 " mU/L Final   02/08/2017 0.76 0.40 - 4.00 mU/L Final   11/17/2015 0.98 0.40 - 4.00 mU/L Final   05/04/2015 0.76 0.40 - 4.00 mU/L Final   12/16/2013 1.24 0.4 - 5.0 mU/L Final     T4 Free   Date Value Ref Range Status   06/21/2010 1.50 0.70 - 1.85 ng/dL Final       Creatinine   Date Value Ref Range Status   08/27/2020 0.68 0.52 - 1.04 mg/dL Final       Recent Labs   Lab Test  03/06/17   0905  05/04/15   1016  12/16/13   1028   CHOL  165  170  192   HDL  52  46*  42*   LDL  73  90  125   TRIG  199*  171*  124   CHOLHDLRATIO   --   3.7  4.6     Impression / Plan     1. Diabetes Mellitus: Type 2  Current glycemic control can be considered sub-optimal  I advised her to take Novolog up to 3 times a day, including a dose at noon, even if it's just a correction or to cover a small snack.   DMV paperwork was signed.     2. Diabetes Complications: With retinopathy - stable  She is up-to-date on her annual diabetes testing including urine microalbumin, overall labs, and eye exam.    3. Blood Pressure Management: Blood pressure is controlled. Currently is on pharmacotherapy for this.   - I refilled losartan and hydrochlorothiazide. She has been off Norvasc and doing great.    4.Lipid Management: Per the new ACC/JILLIAN/NHLBI guidelines, statins are recommended for individuals with diabetes aged 40-75 with LDL  without ASCVD, and for any individual with ASCVD. Currently the patient is on a statin.     5. Smoking Status: Patient Pt is smoke free..     Follow up: 3 months      Shai Dong MD  Endocrinology, Diabetes and Metabolism  [facility]

## 2020-12-21 DIAGNOSIS — R05.9 COUGH: ICD-10-CM

## 2020-12-21 DIAGNOSIS — J30.2 SEASONAL ALLERGIC RHINITIS, UNSPECIFIED TRIGGER: ICD-10-CM

## 2020-12-22 RX ORDER — ALBUTEROL SULFATE 90 UG/1
2 AEROSOL, METERED RESPIRATORY (INHALATION) EVERY 4 HOURS PRN
Qty: 1 INHALER | Refills: 1 | Status: SHIPPED | OUTPATIENT
Start: 2020-12-22 | End: 2021-10-04

## 2020-12-22 RX ORDER — FLUTICASONE PROPIONATE 50 MCG
SPRAY, SUSPENSION (ML) NASAL
Qty: 16 G | Refills: 11 | Status: SHIPPED | OUTPATIENT
Start: 2020-12-22 | End: 2021-11-15

## 2021-02-01 ENCOUNTER — ANCILLARY PROCEDURE (OUTPATIENT)
Dept: GENERAL RADIOLOGY | Facility: CLINIC | Age: 61
End: 2021-02-01
Attending: INTERNAL MEDICINE

## 2021-02-01 DIAGNOSIS — R76.11 POSITIVE PPD: ICD-10-CM

## 2021-03-02 ENCOUNTER — IMMUNIZATION (OUTPATIENT)
Dept: PEDIATRICS | Facility: CLINIC | Age: 61
End: 2021-03-02
Payer: COMMERCIAL

## 2021-03-02 PROCEDURE — 91300 PR COVID VAC PFIZER DIL RECON 30 MCG/0.3 ML IM: CPT

## 2021-03-02 PROCEDURE — 0001A PR COVID VAC PFIZER DIL RECON 30 MCG/0.3 ML IM: CPT

## 2021-03-10 ENCOUNTER — OFFICE VISIT (OUTPATIENT)
Dept: ENDOCRINOLOGY | Facility: CLINIC | Age: 61
End: 2021-03-10
Payer: COMMERCIAL

## 2021-03-10 VITALS
SYSTOLIC BLOOD PRESSURE: 127 MMHG | HEIGHT: 64 IN | WEIGHT: 190 LBS | BODY MASS INDEX: 32.44 KG/M2 | DIASTOLIC BLOOD PRESSURE: 66 MMHG | HEART RATE: 76 BPM

## 2021-03-10 DIAGNOSIS — I10 ESSENTIAL HYPERTENSION: ICD-10-CM

## 2021-03-10 DIAGNOSIS — Z79.4 TYPE 2 DIABETES MELLITUS WITH DIABETIC NEPHROPATHY, WITH LONG-TERM CURRENT USE OF INSULIN (H): ICD-10-CM

## 2021-03-10 DIAGNOSIS — E11.3299 TYPE 2 DIABETES MELLITUS WITH MILD NONPROLIFERATIVE RETINOPATHY WITHOUT MACULAR EDEMA, WITH LONG-TERM CURRENT USE OF INSULIN, UNSPECIFIED LATERALITY (H): Primary | ICD-10-CM

## 2021-03-10 DIAGNOSIS — E78.5 HYPERLIPIDEMIA LDL GOAL <100: ICD-10-CM

## 2021-03-10 DIAGNOSIS — E11.21 TYPE 2 DIABETES MELLITUS WITH DIABETIC NEPHROPATHY, WITH LONG-TERM CURRENT USE OF INSULIN (H): ICD-10-CM

## 2021-03-10 DIAGNOSIS — Z79.4 TYPE 2 DIABETES MELLITUS WITH MILD NONPROLIFERATIVE RETINOPATHY WITHOUT MACULAR EDEMA, WITH LONG-TERM CURRENT USE OF INSULIN, UNSPECIFIED LATERALITY (H): Primary | ICD-10-CM

## 2021-03-10 LAB — HBA1C MFR BLD: 9 % (ref 4.3–6)

## 2021-03-10 PROCEDURE — 95251 CONT GLUC MNTR ANALYSIS I&R: CPT | Performed by: INTERNAL MEDICINE

## 2021-03-10 PROCEDURE — 83036 HEMOGLOBIN GLYCOSYLATED A1C: CPT | Performed by: INTERNAL MEDICINE

## 2021-03-10 PROCEDURE — 99214 OFFICE O/P EST MOD 30 MIN: CPT | Mod: 25 | Performed by: INTERNAL MEDICINE

## 2021-03-10 RX ORDER — HYDROCHLOROTHIAZIDE 12.5 MG/1
12.5 TABLET ORAL DAILY
Qty: 90 TABLET | Refills: 3 | Status: SHIPPED | OUTPATIENT
Start: 2021-03-10 | End: 2021-11-15

## 2021-03-10 RX ORDER — LOSARTAN POTASSIUM 100 MG/1
100 TABLET ORAL DAILY
Qty: 90 TABLET | Refills: 3 | Status: SHIPPED | OUTPATIENT
Start: 2021-03-10 | End: 2022-01-14

## 2021-03-10 RX ORDER — FLASH GLUCOSE SENSOR
1 KIT MISCELLANEOUS
Qty: 6 EACH | Refills: 3 | Status: SHIPPED | OUTPATIENT
Start: 2021-03-10 | End: 2023-06-09

## 2021-03-10 RX ORDER — PEN NEEDLE, DIABETIC 32GX 5/32"
NEEDLE, DISPOSABLE MISCELLANEOUS
Qty: 500 EACH | Refills: 3 | Status: SHIPPED | OUTPATIENT
Start: 2021-03-10 | End: 2022-07-11

## 2021-03-10 RX ORDER — METFORMIN HCL 500 MG
TABLET, EXTENDED RELEASE 24 HR ORAL
Qty: 360 TABLET | Refills: 3 | Status: SHIPPED | OUTPATIENT
Start: 2021-03-10 | End: 2021-12-06

## 2021-03-10 RX ORDER — ROSUVASTATIN CALCIUM 10 MG/1
10 TABLET, COATED ORAL DAILY
Qty: 90 TABLET | Refills: 3 | Status: SHIPPED | OUTPATIENT
Start: 2021-03-10 | End: 2022-01-14

## 2021-03-10 RX ORDER — INSULIN ASPART 100 [IU]/ML
INJECTION, SOLUTION INTRAVENOUS; SUBCUTANEOUS
Qty: 120 ML | Refills: 3 | Status: SHIPPED | OUTPATIENT
Start: 2021-03-10 | End: 2022-06-28

## 2021-03-10 ASSESSMENT — MIFFLIN-ST. JEOR: SCORE: 1406.83

## 2021-03-10 NOTE — PROGRESS NOTES
"Assessment & Plan     Type 2 diabetes mellitus with mild nonproliferative retinopathy without macular edema, with long-term current use of insulin, unspecified laterality (H)    Current glycemic control can be considered good in the past 2 weeks due to increased physical activity. Prior to that it was poor.   Continue current doses, and continue being physically active.     2. Diabetes Complications: With retinopathy - stable  She is up-to-date on her annual diabetes testing including urine microalbumin, overall labs, and eye exam.    3. Blood Pressure Management: Blood pressure is controlled. Currently is on pharmacotherapy for this.     4.Lipid Management: Per the new ACC/JILLIAN/NHLBI guidelines, statins are recommended for individuals with diabetes aged 40-75 with LDL  without ASCVD, and for any individual with ASCVD. Currently the patient is on a statin.     5. Smoking Status: Patient Pt is smoke free..     Follow up: 6 months      - Hemoglobin A1c POCT    Review of the result(s) of each unique test - HbA1c today     30 minutes spent on the date of the encounter doing chart review, history and exam, documentation and further activities as noted above       BMI:   Estimated body mass index is 32.61 kg/m  as calculated from the following:    Height as of this encounter: 1.626 m (5' 4\").    Weight as of this encounter: 86.2 kg (190 lb).   Weight management plan: Discussed healthy diet and exercise guidelines      Return in about 6 months (around 9/10/2021) for in person.    Shai Dong MD  [facility] ENDOCRINOLOGY CLINIC [facility]     Maliha Pedro is a 61 year old female who presents today for the following health issues:    She has had diabetes since age 32. Has tried many different meds. Did not tolerate GLP-1 agonists. Stopped Actos due to concerns about adverse effects. Was on Lantus and Novolog with poor control, with recent improvement since addition of Jardiance November " "2017. She has history of mild stable retinopathy.     Diabetes Care:   Last urine MA: Sept 2019: 10.09, Cr normal  Neuropathy: none  Eye exam: last done 09/2020 - no retinopathy  Lipids: last checked Aug 2019. LDL 74. She is on statin.   HTN: on ARB, hydrochlorothiazide     Interval History:   A1c is 9.0 today. 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 Her physical activity went up significantly, and her BG improved.       Current treatment strategy: Novolog 15-25 units before her 2 main meals. Lantus 55 units daily. Metformin 1000 mg po BID, Jardiance 25 mg po daily    Blood Glucose Monitoring: CGM download: shannon: 14 day data:  Avg 131, with 75% time in range.  Rise after breakfast, drop in late afternoon/early evening.     Diet: 2-3 meals per day. Occasional snacks.   Cut down on rice. Eating more vegetables and fish.     Exercise: She is walking some days.    Weight: stable       Review of Systems  Constitutional, HEENT, cardiovascular, pulmonary, gi and gu systems are negative, except as otherwise noted.    Objective   /66 (BP Location: Right arm, Patient Position: Sitting, Cuff Size: Adult Regular)   Pulse 76   Ht 1.626 m (5' 4\")   Wt 86.2 kg (190 lb)   LMP 03/19/2009 (Exact Date)   BMI 32.61 kg/m    Body mass index is 32.61 kg/m .    Physical Exam  GENERAL: Healthy, alert and no distress  EYES: Eyes grossly normal to inspection.  No discharge or erythema, or obvious scleral/conjunctival abnormalities.  RESP: No audible wheeze, cough, or visible cyanosis.  No visible retractions or increased work of breathing.    SKIN: Visible skin clear. No significant rash, abnormal pigmentation or lesions.  NEURO: Cranial nerves grossly intact.  Mentation and speech appropriate for age.  PSYCH: Mentation appears normal, affect normal/bright, judgement and insight intact, normal speech and appearance well-groomed.      "

## 2021-03-10 NOTE — LETTER
"3/10/2021       RE: Maliha Pedro  [address]  [facility] [id number]     Dear Colleague,    Thank you for referring your patient, Maliha Pedro, to the [facility] ENDOCRINOLOGY CLINIC [place] at [facility]. Please see a copy of my visit note below.    Assessment & Plan     Type 2 diabetes mellitus with mild nonproliferative retinopathy without macular edema, with long-term current use of insulin, unspecified laterality (H)    Current glycemic control can be considered good in the past 2 weeks due to increased physical activity. Prior to that it was poor.   Continue current doses, and continue being physically active.     2. Diabetes Complications: With retinopathy - stable  She is up-to-date on her annual diabetes testing including urine microalbumin, overall labs, and eye exam.    3. Blood Pressure Management: Blood pressure is controlled. Currently is on pharmacotherapy for this.     4.Lipid Management: Per the new ACC/JILLIAN/NHLBI guidelines, statins are recommended for individuals with diabetes aged 40-75 with LDL  without ASCVD, and for any individual with ASCVD. Currently the patient is on a statin.     5. Smoking Status: Patient Pt is smoke free..     Follow up: 6 months      - Hemoglobin A1c POCT    Review of the result(s) of each unique test - HbA1c today     30 minutes spent on the date of the encounter doing chart review, history and exam, documentation and further activities as noted above       BMI:   Estimated body mass index is 32.61 kg/m  as calculated from the following:    Height as of this encounter: 1.626 m (5' 4\").    Weight as of this encounter: 86.2 kg (190 lb).   Weight management plan: Discussed healthy diet and exercise guidelines      Return in about 6 months (around 9/10/2021) for in person.    Shai Dong MD  [facility] ENDOCRINOLOGY CLINIC [place]    Subjective     Maliha ANNE " "Willis is a 61 year old female who presents today for the following health issues:    She has had diabetes since age 32. Has tried many different meds. Did not tolerate GLP-1 agonists. Stopped Actos due to concerns about adverse effects. Was on Lantus and Novolog with poor control, with recent improvement since addition of Jardiance November 2017. She has history of mild stable retinopathy.     Diabetes Care:   Last urine MA: Sept 2019: 10.09, Cr normal  Neuropathy: none  Eye exam: last done 09/2020 - no retinopathy  Lipids: last checked Aug 2019. LDL 74. She is on statin.   HTN: on ARB, hydrochlorothiazide     Interval History:   A1c is 9.0 today. 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 Her physical activity went up significantly, and her BG improved.       Current treatment strategy: Novolog 15-25 units before her 2 main meals. Lantus 55 units daily. Metformin 1000 mg po BID, Jardiance 25 mg po daily    Blood Glucose Monitoring: CGM download: shannon: 14 day data:  Avg 131, with 75% time in range.  Rise after breakfast, drop in late afternoon/early evening.     Diet: 2-3 meals per day. Occasional snacks.   Cut down on rice. Eating more vegetables and fish.     Exercise: She is walking some days.    Weight: stable       Review of Systems  Constitutional, HEENT, cardiovascular, pulmonary, gi and gu systems are negative, except as otherwise noted.    Objective   /66 (BP Location: Right arm, Patient Position: Sitting, Cuff Size: Adult Regular)   Pulse 76   Ht 1.626 m (5' 4\")   Wt 86.2 kg (190 lb)   LMP 03/19/2009 (Exact Date)   BMI 32.61 kg/m    Body mass index is 32.61 kg/m .    Physical Exam  GENERAL: Healthy, alert and no distress  EYES: Eyes grossly normal to inspection.  No discharge or erythema, or obvious scleral/conjunctival abnormalities.  RESP: No audible wheeze, cough, or visible cyanosis.  No visible retractions or increased work of " breathing.    SKIN: Visible skin clear. No significant rash, abnormal pigmentation or lesions.  NEURO: Cranial nerves grossly intact.  Mentation and speech appropriate for age.  PSYCH: Mentation appears normal, affect normal/bright, judgement and insight intact, normal speech and appearance well-groomed.

## 2021-03-23 ENCOUNTER — IMMUNIZATION (OUTPATIENT)
Dept: PEDIATRICS | Facility: CLINIC | Age: 61
End: 2021-03-23
Attending: INTERNAL MEDICINE
Payer: COMMERCIAL

## 2021-03-23 PROCEDURE — 91300 PR COVID VAC PFIZER DIL RECON 30 MCG/0.3 ML IM: CPT

## 2021-03-23 PROCEDURE — 0002A PR COVID VAC PFIZER DIL RECON 30 MCG/0.3 ML IM: CPT

## 2021-03-25 DIAGNOSIS — H40.003 GLAUCOMA SUSPECT OF BOTH EYES: Primary | ICD-10-CM

## 2021-03-29 ENCOUNTER — OFFICE VISIT (OUTPATIENT)
Dept: OPHTHALMOLOGY | Facility: CLINIC | Age: 61
End: 2021-03-29
Attending: OPHTHALMOLOGY
Payer: COMMERCIAL

## 2021-03-29 DIAGNOSIS — H40.003 GLAUCOMA SUSPECT OF BOTH EYES: ICD-10-CM

## 2021-03-29 PROCEDURE — 92012 INTRM OPH EXAM EST PATIENT: CPT | Performed by: OPHTHALMOLOGY

## 2021-03-29 PROCEDURE — G0463 HOSPITAL OUTPT CLINIC VISIT: HCPCS

## 2021-03-29 PROCEDURE — 92133 CPTRZD OPH DX IMG PST SGM ON: CPT | Performed by: OPHTHALMOLOGY

## 2021-03-29 ASSESSMENT — SLIT LAMP EXAM - LIDS
COMMENTS: MEIBOMIAN GLAND DYSFUNCTION
COMMENTS: MEIBOMIAN GLAND DYSFUNCTION

## 2021-03-29 ASSESSMENT — VISUAL ACUITY
OS_CC: 20/25
CORRECTION_TYPE: GLASSES
OD_CC: 20/40
OS_CC+: +2
METHOD: SNELLEN - LINEAR

## 2021-03-29 ASSESSMENT — REFRACTION_WEARINGRX
OD_CYLINDER: +0.25
OS_SPHERE: -4.00
OD_SPHERE: -3.00
OD_ADD: +2.25
OS_CYLINDER: +1.50
OS_AXIS: 088
OS_ADD: +2.25
OD_AXIS: 155

## 2021-03-29 ASSESSMENT — TONOMETRY
OS_IOP_MMHG: 20
OD_IOP_MMHG: 19
IOP_METHOD: APPLANATION

## 2021-03-29 ASSESSMENT — REFRACTION_MANIFEST
OS_CYLINDER: +1.25
OS_ADD: +2.25
OD_CYLINDER: +0.75
OD_SPHERE: -3.25
OD_ADD: +2.25
OS_AXIS: 080
OD_AXIS: 075
OS_SPHERE: -4.00

## 2021-03-29 ASSESSMENT — CUP TO DISC RATIO
OD_RATIO: 0.9
OS_RATIO: 0.7

## 2021-03-29 ASSESSMENT — EXTERNAL EXAM - LEFT EYE: OS_EXAM: DERMATOCHALASIS

## 2021-03-29 ASSESSMENT — CONF VISUAL FIELD
METHOD: COUNTING FINGERS
OS_NORMAL: 1
OD_NORMAL: 1

## 2021-03-29 ASSESSMENT — EXTERNAL EXAM - RIGHT EYE: OD_EXAM: DERMATOCHALASIS

## 2021-03-29 NOTE — NURSING NOTE
Chief Complaints and History of Present Illnesses   Patient presents with     Follow Up     Glaucoma suspect of both eyes     Chief Complaint(s) and History of Present Illness(es)     Follow Up     Laterality: both eyes    Course: stable    Associated symptoms: floaters.  Negative for flashes, glare and haloes    Treatments tried: no treatments    Pain scale: 0/10    Comments: Glaucoma suspect of both eyes              Comments     6 month follow up.  Pt states some trouble with night driving BE  State states floaters same as last visit BE  LBS: 167 Last A1c 9.0 per Pt.  Lab Results       Component                Value               Date                       A1C                      8.2                 08/27/2020                 A1C                      8.1                 04/02/2020                 A1C                      8.2                 11/14/2018                 A1C                      9.8                 12/16/2013                 A1C                      9.5                 04/15/2013                Chey Wright COT 10:21 AM March 29, 2021

## 2021-03-29 NOTE — PROGRESS NOTES
CC: Maliha Pedro is a  60 year old year-old patient presenting for follow up Diabetes mellitus, states Diabetes mellitus is under better control. No new new flashes and floaters and VA stable     Hemoglobin A1C   Date Value Ref Range Status   08/27/2020 8.2 (H) 0 - 5.6 % Final     Comment:     Normal <5.7% Prediabetes 5.7-6.4%  Diabetes 6.5% or higher - adopted from ADA   consensus guidelines.         Optical Coherence Tomography 09/09/20   Right eye:  good foveal contour, no cysts or subretinal fluid; trace Epiretinal membrane   Left eye: good foveal contour, no cysts or subretinal fluid     ONFL   Right eye: stable thinning  Left eye: borderline. stable compared to prior test    Prior 24-2 OVF   Right eye: few spots of decreased sensitivity   Left eye: 14% poss errors new nasal spot of decreased sensitivity. (full on 05/2018)    Assessment & Plan:     1. Type 2 diabetes mellitus without retinopathy  Blood pressure (<120/80) and blood glucose (HbA1c <7.0) control discussed with patient. Patient advised that failure to adequately control each may lead to vision loss. The patient expressed understanding.    2. Glaucoma suspect, bilateral  - large cup to disc ratio R>L  - Intraocular pressure 19/20 both eyes   - favorable pachmetry 583/613  - OCT retinal nerve fiber layer Stable both eyes   - consider starting latanoprost at bedtime if worsening     3. Cataract, right eye  -becoming visually significant  - consider cataract evaluation next follow up     4. Pseudophakia, left eye  -stable  -observe    5. Posterior vitreous detachment of right eye  -stable  -observe    6. Old inferior temporal small tear surrounded by pigment, no subretinal fluid right eye   Retina attached, no other tears  Retina detachment precautions were discussed with the patient (presence or increased in flashes, floaters or a curtain in the visual field) and was asked to return if any of the those occur    7. Dry eye syndrome  artificial  tears  As needed and warm compresses      8. Mild Epiretinal membrane right eye  Stable   Not visually significant     Follow up  in 6 months with yves with  OVF 24-2 and ONFL and dilation  Recommend dilated exam once a yr    ~~~~~~~~~~~~~~~~~~~~~~~~~~~~~~~~~~   Complete documentation of historical and exam elements from today's encounter can be found in the full encounter summary report (not reduplicated in this progress note).  I personally obtained the chief complaint(s) and history of present illness.  I confirmed and edited as necessary the review of systems, past medical/surgical history, family history, social history, and examination findings as documented by others; and I examined the patient myself.  I personally reviewed the relevant tests, images, and reports as documented above.  I formulated and edited as necessary the assessment and plan and discussed the findings and management plan with the patient and family    Lelsy Tracy MD   of Ophthalmology.  Retina Service   Department of Ophthalmology and Visual Neurosciences   [facility]  Phone: [phone]   Fax: [phone]

## 2021-04-09 ENCOUNTER — MYC MEDICAL ADVICE (OUTPATIENT)
Dept: FAMILY MEDICINE | Facility: CLINIC | Age: 61
End: 2021-04-09

## 2021-04-09 NOTE — TELEPHONE ENCOUNTER
Asked for more information regarding Mychart message    Kala Anderson RN, [facility]

## 2021-04-09 NOTE — TELEPHONE ENCOUNTER
Routing Angiodroid message to  Dr. Ortiz   to please review when able.      Kala Anderson RN, [facility]

## 2021-04-29 DIAGNOSIS — Z11.59 ENCOUNTER FOR SCREENING FOR OTHER VIRAL DISEASES: ICD-10-CM

## 2021-05-04 RX ORDER — SODIUM, POTASSIUM,MAG SULFATES 17.5-3.13G
2 SOLUTION, RECONSTITUTED, ORAL ORAL SEE ADMIN INSTRUCTIONS
Qty: 354 ML | Refills: 0 | Status: SHIPPED | OUTPATIENT
Start: 2021-05-04 | End: 2021-06-23

## 2021-05-05 ENCOUNTER — OFFICE VISIT (OUTPATIENT)
Dept: FAMILY MEDICINE | Facility: CLINIC | Age: 61
End: 2021-05-05
Payer: COMMERCIAL

## 2021-05-05 VITALS
TEMPERATURE: 97.3 F | HEART RATE: 78 BPM | OXYGEN SATURATION: 98 % | RESPIRATION RATE: 16 BRPM | DIASTOLIC BLOOD PRESSURE: 68 MMHG | SYSTOLIC BLOOD PRESSURE: 120 MMHG | WEIGHT: 190 LBS | BODY MASS INDEX: 32.61 KG/M2

## 2021-05-05 DIAGNOSIS — E11.65 TYPE 2 DIABETES MELLITUS WITH HYPERGLYCEMIA, WITH LONG-TERM CURRENT USE OF INSULIN (H): ICD-10-CM

## 2021-05-05 DIAGNOSIS — Z79.4 TYPE 2 DIABETES MELLITUS WITH HYPERGLYCEMIA, WITH LONG-TERM CURRENT USE OF INSULIN (H): ICD-10-CM

## 2021-05-05 DIAGNOSIS — R15.2 RECTAL URGENCY: Primary | ICD-10-CM

## 2021-05-05 LAB
ALBUMIN SERPL-MCNC: 4.1 G/DL (ref 3.4–5)
ALP SERPL-CCNC: 55 U/L (ref 40–150)
ALT SERPL W P-5'-P-CCNC: 31 U/L (ref 0–50)
ANION GAP SERPL CALCULATED.3IONS-SCNC: 9 MMOL/L (ref 3–14)
AST SERPL W P-5'-P-CCNC: 14 U/L (ref 0–45)
BASOPHILS # BLD AUTO: 0.1 10E9/L (ref 0–0.2)
BASOPHILS NFR BLD AUTO: 0.7 %
BILIRUB SERPL-MCNC: 0.4 MG/DL (ref 0.2–1.3)
BUN SERPL-MCNC: 11 MG/DL (ref 7–30)
CALCIUM SERPL-MCNC: 9.3 MG/DL (ref 8.5–10.1)
CHLORIDE SERPL-SCNC: 105 MMOL/L (ref 94–109)
CO2 SERPL-SCNC: 26 MMOL/L (ref 20–32)
CREAT SERPL-MCNC: 0.69 MG/DL (ref 0.52–1.04)
DIFFERENTIAL METHOD BLD: ABNORMAL
EOSINOPHIL # BLD AUTO: 0.3 10E9/L (ref 0–0.7)
EOSINOPHIL NFR BLD AUTO: 3.2 %
ERYTHROCYTE [DISTWIDTH] IN BLOOD BY AUTOMATED COUNT: 15 % (ref 10–15)
GFR SERPL CREATININE-BSD FRML MDRD: >90 ML/MIN/{1.73_M2}
GLUCOSE SERPL-MCNC: 132 MG/DL (ref 70–99)
HBA1C MFR BLD: 8.3 % (ref 0–5.6)
HCT VFR BLD AUTO: 47.3 % (ref 35–47)
HGB BLD-MCNC: 15.7 G/DL (ref 11.7–15.7)
LYMPHOCYTES # BLD AUTO: 3.1 10E9/L (ref 0.8–5.3)
LYMPHOCYTES NFR BLD AUTO: 38.1 %
MCH RBC QN AUTO: 27.8 PG (ref 26.5–33)
MCHC RBC AUTO-ENTMCNC: 33.2 G/DL (ref 31.5–36.5)
MCV RBC AUTO: 84 FL (ref 78–100)
MONOCYTES # BLD AUTO: 0.7 10E9/L (ref 0–1.3)
MONOCYTES NFR BLD AUTO: 9 %
NEUTROPHILS # BLD AUTO: 4 10E9/L (ref 1.6–8.3)
NEUTROPHILS NFR BLD AUTO: 49 %
PLATELET # BLD AUTO: 345 10E9/L (ref 150–450)
POTASSIUM SERPL-SCNC: 3.3 MMOL/L (ref 3.4–5.3)
PROT SERPL-MCNC: 7.8 G/DL (ref 6.8–8.8)
RBC # BLD AUTO: 5.64 10E12/L (ref 3.8–5.2)
SODIUM SERPL-SCNC: 140 MMOL/L (ref 133–144)
TSH SERPL DL<=0.005 MIU/L-ACNC: 0.69 MU/L (ref 0.4–4)
WBC # BLD AUTO: 8.1 10E9/L (ref 4–11)

## 2021-05-05 PROCEDURE — 83036 HEMOGLOBIN GLYCOSYLATED A1C: CPT | Performed by: FAMILY MEDICINE

## 2021-05-05 PROCEDURE — 36415 COLL VENOUS BLD VENIPUNCTURE: CPT | Performed by: FAMILY MEDICINE

## 2021-05-05 PROCEDURE — 80050 GENERAL HEALTH PANEL: CPT | Performed by: FAMILY MEDICINE

## 2021-05-05 PROCEDURE — 83516 IMMUNOASSAY NONANTIBODY: CPT | Performed by: FAMILY MEDICINE

## 2021-05-05 PROCEDURE — 82784 ASSAY IGA/IGD/IGG/IGM EACH: CPT | Performed by: FAMILY MEDICINE

## 2021-05-05 PROCEDURE — 99214 OFFICE O/P EST MOD 30 MIN: CPT | Performed by: FAMILY MEDICINE

## 2021-05-05 PROCEDURE — 83516 IMMUNOASSAY NONANTIBODY: CPT | Mod: 59 | Performed by: FAMILY MEDICINE

## 2021-05-05 RX ORDER — BISACODYL 5 MG/1
15 TABLET, DELAYED RELEASE ORAL SEE ADMIN INSTRUCTIONS
Qty: 3 TABLET | Refills: 0 | Status: SHIPPED | OUTPATIENT
Start: 2021-05-05 | End: 2021-06-23

## 2021-05-05 NOTE — PATIENT INSTRUCTIONS
Take magnesium at bedtime    Start powdered fiber such as Metamucil or Citricel: start 1 tsp per day, and increase by 1 tsp per week to goal total dosing of 1-2 tablespoons per day. Drink plenty of water daily for fiber to be effective.     Consider trial off of lactose for a few weeks.     Make sure you are taking the metformin with food.

## 2021-05-05 NOTE — PROGRESS NOTES
Assessment & Plan     Rectal urgency  stooling when she voids, intermittently loose. Unclear etiology. Will start fiber and trial lactose free diet. ? If meds contributing but these are all long-standing/chronic meds. Thankfully already has colonoscopy set up in near future.   Consider pelvic/abd imaging and referral to colorectal clinic if ongoing and no answers found on initial w/u  - Hemoglobin A1c  - TSH with free T4 reflex  - Comprehensive metabolic panel  - IgA  - Tissue transglutaminase evelyne IgA and IgG  - CBC with platelets differential    Type 2 diabetes mellitus with hyperglycemia, with long-term current use of insulin (H)  Ongoing hyperglycemia/hypoglycemia at hs. Working with endocrine. Would like a1c repeated today (understands not yet fully 90 days from last reading)       Patient Instructions   Take magnesium at bedtime    Start powdered fiber such as Metamucil or Citricel: start 1 tsp per day, and increase by 1 tsp per week to goal total dosing of 1-2 tablespoons per day. Drink plenty of water daily for fiber to be effective.     Consider trial off of lactose for a few weeks.     Make sure you are taking the metformin with food.             Return in about 4 weeks (around 6/2/2021), or if symptoms worsen or fail to improve.    Elva Ortiz MD  [facility] STEPHANI Eagle is a 61 year old who presents for the following health issues   HPI     Diabetes Follow-up      How often are you checking your blood sugar? Has CGM    What concerns do you have today about your diabetes? Low blood sugar, usually at night     Do you have any of these symptoms? (Select all that apply)  No numbness or tingling in feet.  No redness, sores or blisters on feet.  No complaints of excessive thirst.  No reports of blurry vision.  No significant changes to weight.      Hyperlipidemia Follow-Up      Are you regularly taking any medication or supplement to lower your cholesterol?    Yes- rosuvastatin    Are you having muscle aches or other side effects that you think could be caused by your cholesterol lowering medication?  No    Hypertension Follow-up      Do you check your blood pressure regularly outside of the clinic? Yes     Are you following a low salt diet? Yes    Are your blood pressures ever more than 140 on the top number (systolic) OR more   than 90 on the bottom number (diastolic), for example 140/90? No    BP Readings from Last 2 Encounters:   03/10/21 127/66   12/09/20 133/85     Hemoglobin A1C (%)   Date Value   08/27/2020 8.2 (H)   04/02/2020 8.1 (H)     LDL Cholesterol Calculated (mg/dL)   Date Value   08/27/2020 69   08/12/2019 74         How many servings of fruits and vegetables do you eat daily?  2-3    On average, how many sweetened beverages do you drink each day (Examples: soda, juice, sweet tea, etc.  Do NOT count diet or artificially sweetened beverages)?   0    How many days per week do you exercise enough to make your heart beat faster? 7    How many minutes a day do you exercise enough to make your heart beat faster? 30 - 60    How many days per week do you miss taking your medication? 0    For the last few months.   Has been noting when get up to urinate will have to stool at the same time. Can happen up to 3 x's a day  Stool is looser at times. occ watery but not regularly.   No abd pain    Previous baseline is once daily of stooling.   Colonoscopy scheduled next week.     No new meds.   Does take jardiance, magnesium, metformin.   Takes magnesium in the morning.  If doesn't take magnesium will get some muscle cramps.   No new bladder or pelvic symptosm  No weight changes.   Started walking again 2.5 miles per day and restarted yoga.   Feeling more down as hasn't been able to go to the [facility] with the covid outbreak, but Planning to go to [facility] this summer.   No n/v.   Notices more gassy if eat more carbs.   No blood in stools.   occ feeling of rectal  pressure    Requests a1c as endocrinologist wanted this done again in May  Allergies have been well controlled.     Review of Systems   Constitutional, HEENT, cardiovascular, pulmonary, gi and gu systems are negative, except as otherwise noted.      Objective    /68   Pulse 78   Temp 97.3  F (36.3  C) (Tympanic)   Resp 16   Wt 86.2 kg (190 lb)   LMP 03/19/2009 (Exact Date)   SpO2 98%   BMI 32.61 kg/m    Body mass index is 32.61 kg/m .  Physical Exam   GENERAL: healthy, alert and no distress  NECK: no adenopathy, no asymmetry, masses, or scars and thyroid normal to palpation  RESP: lungs clear to auscultation - no rales, rhonchi or wheezes  CV: regular rate and rhythm, normal S1 S2, no S3 or S4, no murmur, click or rub, no peripheral edema and peripheral pulses strong  ABDOMEN: soft, nontender, no hepatosplenomegaly, no masses and bowel sounds normal  RECTAL (female): nl appearing. SIVAN with  normal sphincter tone, no rectal masses and no gross blood seen  MS: no gross musculoskeletal defects noted, no edema

## 2021-05-06 DIAGNOSIS — I10 ESSENTIAL HYPERTENSION: Primary | ICD-10-CM

## 2021-05-06 LAB
IGA SERPL-MCNC: 329 MG/DL (ref 84–499)
TTG IGA SER-ACNC: 1 U/ML
TTG IGG SER-ACNC: 1 U/ML

## 2021-05-06 RX ORDER — POTASSIUM CHLORIDE 750 MG/1
TABLET, EXTENDED RELEASE ORAL
Qty: 97 TABLET | Refills: 0 | Status: SHIPPED | OUTPATIENT
Start: 2021-05-06 | End: 2021-06-23

## 2021-05-08 DIAGNOSIS — Z11.59 ENCOUNTER FOR SCREENING FOR OTHER VIRAL DISEASES: ICD-10-CM

## 2021-05-08 LAB
LABORATORY COMMENT REPORT: NORMAL
SARS-COV-2 RNA RESP QL NAA+PROBE: NEGATIVE
SARS-COV-2 RNA RESP QL NAA+PROBE: NORMAL
SPECIMEN SOURCE: NORMAL
SPECIMEN SOURCE: NORMAL

## 2021-05-08 PROCEDURE — U0005 INFEC AGEN DETEC AMPLI PROBE: HCPCS | Mod: 90 | Performed by: PATHOLOGY

## 2021-05-08 PROCEDURE — U0003 INFECTIOUS AGENT DETECTION BY NUCLEIC ACID (DNA OR RNA); SEVERE ACUTE RESPIRATORY SYNDROME CORONAVIRUS 2 (SARS-COV-2) (CORONAVIRUS DISEASE [COVID-19]), AMPLIFIED PROBE TECHNIQUE, MAKING USE OF HIGH THROUGHPUT TECHNOLOGIES AS DESCRIBED BY CMS-2020-01-R: HCPCS | Mod: 90 | Performed by: PATHOLOGY

## 2021-05-12 ENCOUNTER — HOSPITAL ENCOUNTER (OUTPATIENT)
Facility: AMBULATORY SURGERY CENTER | Age: 61
Discharge: HOME | End: 2021-05-12
Attending: INTERNAL MEDICINE | Admitting: INTERNAL MEDICINE
Payer: COMMERCIAL

## 2021-05-12 VITALS
OXYGEN SATURATION: 96 % | SYSTOLIC BLOOD PRESSURE: 127 MMHG | TEMPERATURE: 97 F | HEART RATE: 66 BPM | DIASTOLIC BLOOD PRESSURE: 61 MMHG | RESPIRATION RATE: 16 BRPM

## 2021-05-12 DIAGNOSIS — Z12.11 SCREENING FOR COLON CANCER: ICD-10-CM

## 2021-05-12 DIAGNOSIS — Z12.11 SCREEN FOR COLON CANCER: Primary | ICD-10-CM

## 2021-05-12 LAB
COLONOSCOPY: NORMAL
GLUCOSE BLDC GLUCOMTR-MCNC: 171 MG/DL (ref 70–99)

## 2021-05-12 PROCEDURE — 45380 COLONOSCOPY AND BIOPSY: CPT | Mod: PT

## 2021-05-12 PROCEDURE — G8918 PT W/O PREOP ORDER IV AB PRO: HCPCS

## 2021-05-12 PROCEDURE — G8907 PT DOC NO EVENTS ON DISCHARG: HCPCS

## 2021-05-12 PROCEDURE — 88305 TISSUE EXAM BY PATHOLOGIST: CPT | Mod: GC | Performed by: PATHOLOGY

## 2021-05-12 RX ORDER — ONDANSETRON 2 MG/ML
4 INJECTION INTRAMUSCULAR; INTRAVENOUS EVERY 6 HOURS PRN
Status: DISCONTINUED | OUTPATIENT
Start: 2021-05-12 | End: 2021-05-13 | Stop reason: HOSPADM

## 2021-05-12 RX ORDER — PROCHLORPERAZINE MALEATE 10 MG
10 TABLET ORAL EVERY 6 HOURS PRN
Status: DISCONTINUED | OUTPATIENT
Start: 2021-05-12 | End: 2021-05-13 | Stop reason: HOSPADM

## 2021-05-12 RX ORDER — LIDOCAINE 40 MG/G
CREAM TOPICAL
Status: DISCONTINUED | OUTPATIENT
Start: 2021-05-12 | End: 2021-05-13 | Stop reason: HOSPADM

## 2021-05-12 RX ORDER — FLUMAZENIL 0.1 MG/ML
0.2 INJECTION, SOLUTION INTRAVENOUS
Status: ACTIVE | OUTPATIENT
Start: 2021-05-12 | End: 2021-05-12

## 2021-05-12 RX ORDER — ONDANSETRON 4 MG/1
4 TABLET, ORALLY DISINTEGRATING ORAL EVERY 6 HOURS PRN
Status: DISCONTINUED | OUTPATIENT
Start: 2021-05-12 | End: 2021-05-13 | Stop reason: HOSPADM

## 2021-05-12 RX ORDER — ONDANSETRON 2 MG/ML
4 INJECTION INTRAMUSCULAR; INTRAVENOUS
Status: DISCONTINUED | OUTPATIENT
Start: 2021-05-12 | End: 2021-05-13 | Stop reason: HOSPADM

## 2021-05-12 RX ORDER — FENTANYL CITRATE 50 UG/ML
INJECTION, SOLUTION INTRAMUSCULAR; INTRAVENOUS PRN
Status: DISCONTINUED | OUTPATIENT
Start: 2021-05-12 | End: 2021-05-12 | Stop reason: HOSPADM

## 2021-05-14 LAB — COPATH REPORT: NORMAL

## 2021-06-03 ENCOUNTER — MYC MEDICAL ADVICE (OUTPATIENT)
Dept: FAMILY MEDICINE | Facility: CLINIC | Age: 61
End: 2021-06-03

## 2021-06-03 NOTE — TELEPHONE ENCOUNTER
Called the patient and left a VM along with sending a message on [employer] stating she will need an appointment in clinic to address her request.  Norberto Garcia

## 2021-06-16 ENCOUNTER — TELEPHONE (OUTPATIENT)
Dept: FAMILY MEDICINE | Facility: CLINIC | Age: 61
End: 2021-06-16

## 2021-06-16 NOTE — TELEPHONE ENCOUNTER
I had sent an Rx to [facility] for clor-nixon on 5/6/21. Was she just looking for a refill with you guys?

## 2021-06-16 NOTE — TELEPHONE ENCOUNTER
Cooper Payne is looking for a potassium rx. Was there supposed to be one?    Aidan Llanes. D.  [facility] Pharmacy Manager  [phone]  6/16/2021

## 2021-06-23 ENCOUNTER — OFFICE VISIT (OUTPATIENT)
Dept: FAMILY MEDICINE | Facility: CLINIC | Age: 61
End: 2021-06-23
Payer: COMMERCIAL

## 2021-06-23 VITALS
BODY MASS INDEX: 32.53 KG/M2 | OXYGEN SATURATION: 98 % | TEMPERATURE: 98 F | RESPIRATION RATE: 16 BRPM | DIASTOLIC BLOOD PRESSURE: 82 MMHG | SYSTOLIC BLOOD PRESSURE: 137 MMHG | WEIGHT: 189.5 LBS | HEART RATE: 92 BPM

## 2021-06-23 DIAGNOSIS — I10 ESSENTIAL HYPERTENSION: ICD-10-CM

## 2021-06-23 DIAGNOSIS — R15.2 RECTAL URGENCY: ICD-10-CM

## 2021-06-23 DIAGNOSIS — Z11.52 ENCOUNTER FOR SCREENING FOR COVID-19: Primary | ICD-10-CM

## 2021-06-23 DIAGNOSIS — E11.65 TYPE 2 DIABETES MELLITUS WITH HYPERGLYCEMIA, WITH LONG-TERM CURRENT USE OF INSULIN (H): ICD-10-CM

## 2021-06-23 DIAGNOSIS — Z79.4 TYPE 2 DIABETES MELLITUS WITH HYPERGLYCEMIA, WITH LONG-TERM CURRENT USE OF INSULIN (H): ICD-10-CM

## 2021-06-23 PROCEDURE — 99213 OFFICE O/P EST LOW 20 MIN: CPT | Performed by: FAMILY MEDICINE

## 2021-06-23 RX ORDER — POTASSIUM CHLORIDE 750 MG/1
TABLET, EXTENDED RELEASE ORAL
Qty: 97 TABLET | Refills: 0 | Status: SHIPPED | OUTPATIENT
Start: 2021-06-23 | End: 2021-10-04

## 2021-06-23 NOTE — PATIENT INSTRUCTIONS
Call [address] [place] [address] ([phone]) to schedule COVID-19 test.     Get potassium rechecked in [facility]   Follow-up with a gastroenterologist in the [facility]

## 2021-06-23 NOTE — PROGRESS NOTES
Assessment & Plan     Encounter for screening for COVID-19  Order placed for screening Covid swab for upcoming travel  - Asymptomatic COVID-19 Virus (Coronavirus) by PCR; Future    Essential hypertension  She has not yet started potassium that was recommended after her last labs.  Encouraged her.  She is hoping to defer this till she gets to the [facility] which would be reasonable  - potassium chloride ER (KLOR-CON M) 10 MEQ CR tablet; Take 2 tablets (20 mEq) by mouth daily for 7 days, THEN 1 tablet (10 mEq) daily.    Type 2 diabetes mellitus with hyperglycemia, with long-term current use of insulin (H)  Has been following with endocrinology and has had fair control of her diabetes.  Her endocrinologist has recently leaving and she will be establishing with another provider connected with their office.      Rectal urgency  This is definitely improved with the addition of fiber and she had a negative  colonoscopy but still some intermittent symptoms.  Recommended follow-up with GI in the [facility] to address further if ongoing           Return in about 5 months (around 11/23/2021) for Routine preventive.    Elva Ortiz MD  [facility] STEPHANI Eagle is a 61 year old who presents for the following health issues   History of Present Illness       Diabetes:   She presents for follow up of diabetes.  She is checking home blood glucose two times daily. She checks blood glucose before meals and at bedtime.  Blood glucose is sometimes over 200 and sometimes over 70. She is aware of hypoglycemia symptoms including shakiness. She has no concerns regarding her diabetes at this time.  She is not experiencing numbness or burning in feet, excessive thirst, blurry vision, weight changes or redness, sores or blisters on feet.         She eats 0-1 servings of fruits and vegetables daily.She consumes 2 sweetened beverage(s) daily.She exercises with enough effort to increase her heart  rate 60 or more minutes per day.    She is taking medications regularly.       Patient here because she will be traveling to the [facility] and will need a covid test prior to travel.Traveling through [place] and testing needed for this,    Leaving July 5th and needs 3 days prior to travel.     Will have to quarantine in hotel for 2 weeks when get to the [facility].     Returning in October for some health care  endocrinologist is leaving but will establish with new provider then.     Noted few itchy papules right forearm/neck  Thinks was from excessive heat. Getting better with the hydrocortisone cream.     Fibers has been helping loose stools. Colonoscopy was normal. Will f/u in home country to address further.           Objective    /82   Pulse 92   Temp 98  F (36.7  C) (Oral)   Resp 16   Wt 86 kg (189 lb 8 oz)   LMP 03/19/2009 (Exact Date)   SpO2 98%   BMI 32.53 kg/m    Body mass index is 32.53 kg/m .  Physical Exam   GENERAL: healthy, alert and no distress  PSYCH: mentation appears normal, affect normal/bright

## 2021-07-02 DIAGNOSIS — Z11.52 ENCOUNTER FOR SCREENING FOR COVID-19: ICD-10-CM

## 2021-07-02 PROCEDURE — U0005 INFEC AGEN DETEC AMPLI PROBE: HCPCS | Performed by: FAMILY MEDICINE

## 2021-07-02 PROCEDURE — U0003 INFECTIOUS AGENT DETECTION BY NUCLEIC ACID (DNA OR RNA); SEVERE ACUTE RESPIRATORY SYNDROME CORONAVIRUS 2 (SARS-COV-2) (CORONAVIRUS DISEASE [COVID-19]), AMPLIFIED PROBE TECHNIQUE, MAKING USE OF HIGH THROUGHPUT TECHNOLOGIES AS DESCRIBED BY CMS-2020-01-R: HCPCS | Performed by: FAMILY MEDICINE

## 2021-08-11 ENCOUNTER — MYC MEDICAL ADVICE (OUTPATIENT)
Dept: FAMILY MEDICINE | Facility: CLINIC | Age: 61
End: 2021-08-11

## 2021-08-15 ENCOUNTER — MYC MEDICAL ADVICE (OUTPATIENT)
Dept: FAMILY MEDICINE | Facility: CLINIC | Age: 61
End: 2021-08-15

## 2021-08-16 NOTE — TELEPHONE ENCOUNTER
Routing Micromusclehart message to provider to review and advise     Kecia Arrieta RN, BSN, CMSRN  [facility]      Previous Micromusclehart Message:  here is  the result of my Potassium level 4.16

## 2021-09-05 ENCOUNTER — HEALTH MAINTENANCE LETTER (OUTPATIENT)
Age: 61
End: 2021-09-05

## 2021-09-12 NOTE — PROGRESS NOTES
IV was started in the right AC with a 22g Jelco catheter and resting images were completed.      Patient's IV site was injected by RN with 0.4mg's of Lexiscan (Regadenoson) over a 15 second period, followed by a 5-mL saline flush.  The Nuclear Tech injected the Myoview tracer through the IV site 20 seconds later.      Patient offered C/O: short of breath      Total dose of Lexiscan was 0.4mg's.   Lexiscan NDC# 6579-5379-49     Dr. Bueno provided supervision of the tests performed today.      
kayli

## 2021-09-20 ENCOUNTER — MYC MEDICAL ADVICE (OUTPATIENT)
Dept: FAMILY MEDICINE | Facility: CLINIC | Age: 61
End: 2021-09-20

## 2021-10-04 DIAGNOSIS — K21.9 GASTROESOPHAGEAL REFLUX DISEASE, UNSPECIFIED WHETHER ESOPHAGITIS PRESENT: ICD-10-CM

## 2021-10-04 DIAGNOSIS — J30.2 SEASONAL ALLERGIC RHINITIS, UNSPECIFIED TRIGGER: ICD-10-CM

## 2021-10-04 DIAGNOSIS — R05.9 COUGH: ICD-10-CM

## 2021-10-04 DIAGNOSIS — I10 ESSENTIAL HYPERTENSION: ICD-10-CM

## 2021-10-04 RX ORDER — PANTOPRAZOLE SODIUM 40 MG/1
TABLET, DELAYED RELEASE ORAL
Qty: 90 TABLET | Refills: 3 | Status: SHIPPED | OUTPATIENT
Start: 2021-10-04 | End: 2022-11-22

## 2021-10-04 RX ORDER — ALBUTEROL SULFATE 90 UG/1
2 AEROSOL, METERED RESPIRATORY (INHALATION) EVERY 4 HOURS PRN
Qty: 18 G | Refills: 1 | Status: SHIPPED | OUTPATIENT
Start: 2021-10-04 | End: 2022-12-08

## 2021-10-04 RX ORDER — MONTELUKAST SODIUM 10 MG/1
TABLET ORAL
Qty: 90 TABLET | Refills: 3 | Status: SHIPPED | OUTPATIENT
Start: 2021-10-04 | End: 2022-11-22

## 2021-10-04 RX ORDER — POTASSIUM CHLORIDE 750 MG/1
TABLET, EXTENDED RELEASE ORAL
Qty: 97 TABLET | Refills: 0 | Status: SHIPPED | OUTPATIENT
Start: 2021-10-04 | End: 2021-11-15

## 2021-10-04 NOTE — TELEPHONE ENCOUNTER
Routing refill request to provider for review/approval because:  Labs not current:    Potassium   Date Value Ref Range Status   05/05/2021 3.3 (L) 3.4 - 5.3 mmol/L Final       Next 5 appointments (look out 90 days)    Nov 15, 2021  1:00 PM  PHYSICAL with Elva Ortiz MD  [facility] ([facility] ) [address] [place] [phone]   Nov 18, 2021  8:30 AM  Screening Mammogram with [facility]  [facility] [address] [address] [place] [id number] [phone]        Yvonne Childress RN

## 2021-10-28 ENCOUNTER — TELEPHONE (OUTPATIENT)
Dept: OPHTHALMOLOGY | Facility: CLINIC | Age: 61
End: 2021-10-28

## 2021-10-31 ENCOUNTER — HEALTH MAINTENANCE LETTER (OUTPATIENT)
Age: 61
End: 2021-10-31

## 2021-11-13 NOTE — PROGRESS NOTES
SUBJECTIVE:   CC: Maliha Pedro is an 61 year old woman who presents for preventive health visit.       Patient has been advised of split billing requirements and indicates understanding: Yes  Healthy Habits:    Getting at least 3 servings of Calcium per day:  Yes    Bi-annual eye exam:  Yes    Dental care twice a year:  Yes    Sleep apnea or symptoms of sleep apnea:  Excessive snoring    Diet:  Regular (no restrictions)    Frequency of exercise:  None    Duration of exercise:  Other    Taking medications regularly:  Yes    Barriers to taking medications:  None    Medication side effects:  None    PHQ-2 Total Score:    Additional concerns today:  No    Snoring. No apnea. Wake refreshed. No snort arousals. Daytime energy is good. No morning headaches.     Back from the [facility].  Will be building a home there. Hoping to get back around [place].     Bladder issues: frequency. Drinking more water in the heat.   Urgency. And then occ incontinence (not stress mediated).   Caffeine = coffee x 1 per day.     Got multiple bites, think misquito.     DM: 130's-140's. Gluc. Has appt with endocrine upcoming. Freestyle shannon not staying on in the warm weather.   Stools are back to normal.   avoing fatty foods.     Scheduled for booster covid on 23rd.     Menopause since mid-50's    gerd controlled on the pantoprazole.     Had a pedicure recently and thinks the probe to deep around the nail on the big toe of the right foot.  It has been slightly red and tender.    Today's PHQ-2 Score:   PHQ-2 ( 1999 Pfizer) 6/23/2021   Q1: Little interest or pleasure in doing things 0   Q2: Feeling down, depressed or hopeless 0   PHQ-2 Score 0   Q1: Little interest or pleasure in doing things Not at all   Q2: Feeling down, depressed or hopeless Not at all   PHQ-2 Score 0       Abuse: Current or Past (Physical, Sexual or Emotional) - No  Do you feel safe in your environment? Yes      Social History     Tobacco Use     Smoking  "status: Never Smoker     Smokeless tobacco: Never Used   Substance Use Topics     Alcohol use: Yes     Comment: one drink a month     If you drink alcohol do you typically have >3 drinks per day or >7 drinks per week? No    No flowsheet data found.    Reviewed orders with patient.  Reviewed health maintenance and updated orders accordingly - Yes      Breast Cancer Screening:  Any new diagnosis of family breast, ovarian, or bowel cancer? No      Mammogram Screening: Recommended annual mammography  Pertinent mammograms are reviewed under the imaging tab.    History of abnormal Pap smear: NO - age 30-65 PAP every 5 years with negative HPV co-testing recommended  PAP / HPV Latest Ref Rng & Units 3/6/2017 12/16/2013 8/29/2011   PAP (Historical) - NIL NIL NIL   HPV16 NEG Negative - -   HPV18 NEG Negative - -   HRHPV NEG Negative - -     Reviewed and updated as needed this visit by clinical staff  Tobacco  Allergies  Meds             Reviewed and updated as needed this visit by Provider                   Review of Systems   ROS: 10 point ROS neg other than the symptoms noted above in the HPI.       OBJECTIVE:   /80 (BP Location: Right arm, Patient Position: Sitting, Cuff Size: Adult Regular)   Pulse 80   Temp 98  F (36.7  C) (Oral)   Resp 18   Ht 1.6 m (5' 3\")   Wt 81.9 kg (180 lb 9.6 oz)   LMP 03/19/2009 (Exact Date)   SpO2 96%   BMI 31.99 kg/m    Physical Exam  GENERAL APPEARANCE: healthy, alert and no distress  EYES: Eyes grossly normal to inspection, PERRL and conjunctivae and sclerae normal  HENT: ear canals clear and left tm with air fluid level and TM's normal, nose and mouth without ulcers or lesions, oropharynx clear and oral mucous membranes moist  NECK: no adenopathy, no asymmetry, masses, or scars and thyroid normal to palpation  RESP: lungs clear to auscultation - no rales, rhonchi or wheezes  BREAST: normal without masses, tenderness or nipple discharge and no palpable axillary masses or " adenopathy  CV: regular rate and rhythm, normal S1 S2, no S3 or S4, no murmur, click or rub, no peripheral edema and peripheral pulses strong  ABDOMEN: soft, nontender, no hepatosplenomegaly, no masses and bowel sounds normal   (female): normal female external genitalia, normal urethral meatus, vaginal mucosal atrophy noted, cervix was difficult to visualize due to patient discomfort on exam and I am only able to see the anterior edge.  Pap is done blinded., adnexae, and uterus without masses.  Small amount of thick white vaginal discharge present  MS: no musculoskeletal defects are noted and gait is age appropriate without ataxia  SKIN: no suspicious lesions or rashes. Skin tags neck.   NEURO: Normal strength and tone, sensory exam grossly normal, mentation intact and speech normal  PSYCH: mentation appears normal and affect normal/bright  Foot: right 1st toe and proximal edge with slight redness and swelling. Nl monifilament exam. No trophic changes. Nl pulses.      Diagnostic Test Results:  none     ASSESSMENT/PLAN:   (Z00.00) Routine general medical examination at a health care facility  (primary encounter diagnosis)  Comment:   Plan: Wet prep - Clinic Collect  For the paronychia of the toe we will have her soak her foot in warm water every evening and gently pull back the skin around the nail.  Monitor for worsening hours could consider oral antibiotics and or drainage if needed.  Reviewed the risks of sleep apnea.  She really has no symptoms of sleep apnea outside of the snoring but with her BMI she is at risk.  Now that she is near family she will have them monitor for apnea and we can consider sleep referral at any time in the future.            (E11.65,  Z79.4) Type 2 diabetes mellitus with hyperglycemia, with long-term current use of insulin (H)  Comment: Actually her reported numbers are much better than she has been in the past.  Plan: Albumin Random Urine Quantitative with Creat         Ratio,  HEMOGLOBIN A1C, Comprehensive metabolic         panel (BMP + Alb, Alk Phos, ALT, AST, Total.         Bili, TP), FOOT EXAM        She has upcoming appointment with endocrine and I will place lab orders so they are available for that visit.  Encourage continued efforts for healthier diet and exercise    (N39.41) Urge incontinence of urine  Comment: She is on a fluids and and at risk for UTIs will screen for that.  Plan: UA Macro with Reflex to Micro and Culture - lab        collect        We discussed reducing caffeine and ruling out UTI as first-line.  Could consider some vaginal estrogen or even an oral anticholinergic if becoming more problematic.    (J30.2) Seasonal allergic rhinitis, unspecified trigger  Comment: Controlled  Plan: fluticasone (FLONASE) 50 MCG/ACT nasal spray        Some fluid in her ears which she attributes to airline travel but otherwise has been under good control.    (I10) Essential hypertension  Comment: At goal  Plan: hydrochlorothiazide (HYDRODIURIL) 12.5 MG         tablet, potassium chloride ER (KLOR-CON M) 10         MEQ CR tablet        Continue current medications    (E28.39) Estrogen deficiency  Comment: Has osteopenia  Plan: DEXA HIP/PELVIS/SPINE - Future        Due for rescreening of bone density    (Z13.220) Screening cholesterol level  Comment:   Plan: Lipid panel reflex to direct LDL Fasting            (Z12.4) Screening for cervical cancer  Comment: Blinded Pap done  Plan: Pap Screen with HPV - recommended age 30 - 65         years        Reviewed that we could miss some pathology with this type of Pap.  She has only had 1 sexual partner her entire life and is very low risk for cervical cancer.  I offered referral to gynecology for further eval or repeat Pap and she declined voicing understanding of the risks.    (Z23) Need for prophylactic vaccination and inoculation against influenza  Comment:   Plan: INFLUENZA QUAD, RECOMBINANT, P-FREE (RIV4)         (FLUBLOK)            (Z23)  "High priority for 2019-nCoV vaccine  Comment:  Plan: COVID-19,PF,PFIZER (12+ Yrs PURPLE LABEL)            (Z11.59) Screening for viral disease  Comment:   Plan: Asymptomatic COVID-19 Virus (Coronavirus) by         PCR Nose            COUNSELING:  Reviewed preventive health counseling, as reflected in patient instructions       Regular exercise       Healthy diet/nutrition       Alcohol Use       Osteoporosis prevention/bone health       Advance Care Planning    Estimated body mass index is 31.99 kg/m  as calculated from the following:    Height as of this encounter: 1.6 m (5' 3\").    Weight as of this encounter: 81.9 kg (180 lb 9.6 oz).    Weight management plan: Encouraged continued efforts that she is already been making for healthier diet and exercise.  Her weight is down approximately 10 pounds since she has retired and moved to the [facility]    She reports that she has never smoked. She has never used smokeless tobacco.      Counseling Resources:  ATP IV Guidelines  Pooled Cohorts Equation Calculator  Breast Cancer Risk Calculator  BRCA-Related Cancer Risk Assessment: FHS-7 Tool  FRAX Risk Assessment  ICSI Preventive Guidelines  Dietary Guidelines for Americans, 2010  USDA's MyPlate  ASA Prophylaxis  Lung CA Screening    Elva Ortiz MD  [facility]  "

## 2021-11-13 NOTE — PATIENT INSTRUCTIONS
Please call [facility] in [place] at [phone] to schedule bone density scan.     Trial taking the pantoprazole every other day or skipping a few days a week (slowly wean down as able but make sure your reflux is controlled)    .     Preventive Health Recommendations  Female Ages 50 - 64    Yearly exam: See your health care provider every year in order to  o Review health changes.   o Discuss preventive care.    o Review your medicines if your doctor has prescribed any.      Get a Pap test every three years (unless you have an abnormal result and your provider advises testing more often).    If you get Pap tests with HPV test, you only need to test every 5 years, unless you have an abnormal result.     You do not need a Pap test if your uterus was removed (hysterectomy) and you have not had cancer.    You should be tested each year for STDs (sexually transmitted diseases) if you're at risk.     Have a mammogram every 1 to 2 years.    Have a colonoscopy at age 50, or have a yearly FIT test (stool test). These exams screen for colon cancer.      Have a cholesterol test every 5 years, or more often if advised.    Have a diabetes test (fasting glucose) every three years. If you are at risk for diabetes, you should have this test more often.     If you are at risk for osteoporosis (brittle bone disease), think about having a bone density scan (DEXA).    Shots: Get a flu shot each year. Get a tetanus shot every 10 years.    Nutrition:     Eat at least 5 servings of fruits and vegetables each day.    Eat whole-grain bread, whole-wheat pasta and brown rice instead of white grains and rice.    Get adequate Calcium and Vitamin D.     Lifestyle    Exercise at least 150 minutes a week (30 minutes a day, 5 days a week). This will help you control your weight and prevent disease.    Limit alcohol to one drink per day.    No smoking.     Wear sunscreen to prevent skin cancer.     See your dentist every six months for an exam  and cleaning.    See your eye doctor every 1 to 2 years.

## 2021-11-15 ENCOUNTER — OFFICE VISIT (OUTPATIENT)
Dept: FAMILY MEDICINE | Facility: CLINIC | Age: 61
End: 2021-11-15
Payer: COMMERCIAL

## 2021-11-15 VITALS
SYSTOLIC BLOOD PRESSURE: 125 MMHG | TEMPERATURE: 98 F | WEIGHT: 180.6 LBS | HEIGHT: 63 IN | DIASTOLIC BLOOD PRESSURE: 80 MMHG | RESPIRATION RATE: 18 BRPM | HEART RATE: 80 BPM | OXYGEN SATURATION: 96 % | BODY MASS INDEX: 32 KG/M2

## 2021-11-15 DIAGNOSIS — I10 ESSENTIAL HYPERTENSION: ICD-10-CM

## 2021-11-15 DIAGNOSIS — E11.65 TYPE 2 DIABETES MELLITUS WITH HYPERGLYCEMIA, WITH LONG-TERM CURRENT USE OF INSULIN (H): ICD-10-CM

## 2021-11-15 DIAGNOSIS — Z23 HIGH PRIORITY FOR 2019-NCOV VACCINE: ICD-10-CM

## 2021-11-15 DIAGNOSIS — Z12.4 SCREENING FOR CERVICAL CANCER: ICD-10-CM

## 2021-11-15 DIAGNOSIS — Z13.220 SCREENING CHOLESTEROL LEVEL: ICD-10-CM

## 2021-11-15 DIAGNOSIS — N39.41 URGE INCONTINENCE OF URINE: ICD-10-CM

## 2021-11-15 DIAGNOSIS — Z00.00 ROUTINE GENERAL MEDICAL EXAMINATION AT A HEALTH CARE FACILITY: Primary | ICD-10-CM

## 2021-11-15 DIAGNOSIS — L03.031 PARONYCHIA OF TOE, RIGHT: ICD-10-CM

## 2021-11-15 DIAGNOSIS — E28.39 ESTROGEN DEFICIENCY: ICD-10-CM

## 2021-11-15 DIAGNOSIS — Z79.4 TYPE 2 DIABETES MELLITUS WITH HYPERGLYCEMIA, WITH LONG-TERM CURRENT USE OF INSULIN (H): ICD-10-CM

## 2021-11-15 DIAGNOSIS — Z23 NEED FOR PROPHYLACTIC VACCINATION AND INOCULATION AGAINST INFLUENZA: ICD-10-CM

## 2021-11-15 DIAGNOSIS — Z11.59 SCREENING FOR VIRAL DISEASE: ICD-10-CM

## 2021-11-15 DIAGNOSIS — J30.2 SEASONAL ALLERGIC RHINITIS, UNSPECIFIED TRIGGER: ICD-10-CM

## 2021-11-15 LAB
CLUE CELLS: ABNORMAL
TRICHOMONAS, WET PREP: ABNORMAL
WBC'S/HIGH POWER FIELD, WET PREP: ABNORMAL
YEAST, WET PREP: ABNORMAL

## 2021-11-15 PROCEDURE — 87210 SMEAR WET MOUNT SALINE/INK: CPT | Performed by: FAMILY MEDICINE

## 2021-11-15 PROCEDURE — 91300 COVID-19,PF,PFIZER (12+ YRS): CPT | Performed by: FAMILY MEDICINE

## 2021-11-15 PROCEDURE — G0145 SCR C/V CYTO,THINLAYER,RESCR: HCPCS | Performed by: FAMILY MEDICINE

## 2021-11-15 PROCEDURE — 90682 RIV4 VACC RECOMBINANT DNA IM: CPT | Performed by: FAMILY MEDICINE

## 2021-11-15 PROCEDURE — 99396 PREV VISIT EST AGE 40-64: CPT | Mod: 25 | Performed by: FAMILY MEDICINE

## 2021-11-15 PROCEDURE — 87624 HPV HI-RISK TYP POOLED RSLT: CPT | Performed by: FAMILY MEDICINE

## 2021-11-15 PROCEDURE — 99207 PR FOOT EXAM NO CHARGE: CPT | Mod: 25 | Performed by: FAMILY MEDICINE

## 2021-11-15 PROCEDURE — 0004A COVID-19,PF,PFIZER (12+ YRS): CPT | Performed by: FAMILY MEDICINE

## 2021-11-15 PROCEDURE — 99213 OFFICE O/P EST LOW 20 MIN: CPT | Mod: 25 | Performed by: FAMILY MEDICINE

## 2021-11-15 PROCEDURE — 90471 IMMUNIZATION ADMIN: CPT | Performed by: FAMILY MEDICINE

## 2021-11-15 RX ORDER — POTASSIUM CHLORIDE 750 MG/1
10 TABLET, EXTENDED RELEASE ORAL DAILY
Qty: 90 TABLET | Refills: 3 | Status: SHIPPED | OUTPATIENT
Start: 2021-11-15 | End: 2022-11-22

## 2021-11-15 RX ORDER — HYDROCHLOROTHIAZIDE 12.5 MG/1
12.5 TABLET ORAL DAILY
Qty: 90 TABLET | Refills: 3 | Status: SHIPPED | OUTPATIENT
Start: 2021-11-15 | End: 2022-11-22

## 2021-11-15 RX ORDER — FLUTICASONE PROPIONATE 50 MCG
SPRAY, SUSPENSION (ML) NASAL
Qty: 48 G | Refills: 3 | Status: SHIPPED | OUTPATIENT
Start: 2021-11-15

## 2021-11-15 ASSESSMENT — PAIN SCALES - GENERAL: PAINLEVEL: NO PAIN (0)

## 2021-11-15 ASSESSMENT — MIFFLIN-ST. JEOR: SCORE: 1348.33

## 2021-11-16 ENCOUNTER — LAB (OUTPATIENT)
Dept: LAB | Facility: CLINIC | Age: 61
End: 2021-11-16
Payer: COMMERCIAL

## 2021-11-16 DIAGNOSIS — E11.65 TYPE 2 DIABETES MELLITUS WITH HYPERGLYCEMIA, WITH LONG-TERM CURRENT USE OF INSULIN (H): ICD-10-CM

## 2021-11-16 DIAGNOSIS — Z13.220 SCREENING CHOLESTEROL LEVEL: ICD-10-CM

## 2021-11-16 DIAGNOSIS — N39.41 URGE INCONTINENCE OF URINE: ICD-10-CM

## 2021-11-16 DIAGNOSIS — Z79.4 TYPE 2 DIABETES MELLITUS WITH HYPERGLYCEMIA, WITH LONG-TERM CURRENT USE OF INSULIN (H): ICD-10-CM

## 2021-11-16 LAB
ALBUMIN SERPL-MCNC: 3.8 G/DL (ref 3.4–5)
ALBUMIN UR-MCNC: NEGATIVE MG/DL
ALP SERPL-CCNC: 65 U/L (ref 40–150)
ALT SERPL W P-5'-P-CCNC: 36 U/L (ref 0–50)
ANION GAP SERPL CALCULATED.3IONS-SCNC: 3 MMOL/L (ref 3–14)
APPEARANCE UR: CLEAR
AST SERPL W P-5'-P-CCNC: 19 U/L (ref 0–45)
BILIRUB SERPL-MCNC: 0.4 MG/DL (ref 0.2–1.3)
BILIRUB UR QL STRIP: NEGATIVE
BUN SERPL-MCNC: 14 MG/DL (ref 7–30)
CALCIUM SERPL-MCNC: 9.2 MG/DL (ref 8.5–10.1)
CHLORIDE BLD-SCNC: 107 MMOL/L (ref 94–109)
CHOLEST SERPL-MCNC: 152 MG/DL
CO2 SERPL-SCNC: 29 MMOL/L (ref 20–32)
COLOR UR AUTO: ABNORMAL
CREAT SERPL-MCNC: 0.63 MG/DL (ref 0.52–1.04)
CREAT UR-MCNC: 75 MG/DL
FASTING STATUS PATIENT QL REPORTED: YES
GFR SERPL CREATININE-BSD FRML MDRD: >90 ML/MIN/1.73M2
GLUCOSE BLD-MCNC: 161 MG/DL (ref 70–99)
GLUCOSE UR STRIP-MCNC: >1000 MG/DL
HBA1C MFR BLD: 8.7 % (ref 0–5.6)
HDLC SERPL-MCNC: 45 MG/DL
HGB UR QL STRIP: NEGATIVE
KETONES UR STRIP-MCNC: NEGATIVE MG/DL
LDLC SERPL CALC-MCNC: 81 MG/DL
LEUKOCYTE ESTERASE UR QL STRIP: NEGATIVE
MICROALBUMIN UR-MCNC: 6 MG/L
MICROALBUMIN/CREAT UR: 8 MG/G CR (ref 0–25)
NITRATE UR QL: NEGATIVE
NONHDLC SERPL-MCNC: 107 MG/DL
PH UR STRIP: 7 [PH] (ref 5–7)
POTASSIUM BLD-SCNC: 3.7 MMOL/L (ref 3.4–5.3)
PROT SERPL-MCNC: 8.3 G/DL (ref 6.8–8.8)
SKIP: ABNORMAL
SODIUM SERPL-SCNC: 139 MMOL/L (ref 133–144)
SP GR UR STRIP: 1.04 (ref 1–1.03)
TRIGL SERPL-MCNC: 132 MG/DL
UROBILINOGEN UR STRIP-MCNC: NORMAL MG/DL

## 2021-11-16 PROCEDURE — 83036 HEMOGLOBIN GLYCOSYLATED A1C: CPT

## 2021-11-16 PROCEDURE — 82043 UR ALBUMIN QUANTITATIVE: CPT

## 2021-11-16 PROCEDURE — 80061 LIPID PANEL: CPT

## 2021-11-16 PROCEDURE — 80053 COMPREHEN METABOLIC PANEL: CPT

## 2021-11-16 PROCEDURE — 36415 COLL VENOUS BLD VENIPUNCTURE: CPT

## 2021-11-16 PROCEDURE — 81003 URINALYSIS AUTO W/O SCOPE: CPT

## 2021-11-16 NOTE — RESULT ENCOUNTER NOTE
Fco,  Your vaginal swab was negative for a yeast infection.  No treatment is needed.  Kind regards,  Elva Ortiz MD

## 2021-11-17 ENCOUNTER — ANCILLARY PROCEDURE (OUTPATIENT)
Dept: BONE DENSITY | Facility: CLINIC | Age: 61
End: 2021-11-17
Attending: FAMILY MEDICINE
Payer: COMMERCIAL

## 2021-11-17 DIAGNOSIS — E28.39 ESTROGEN DEFICIENCY: ICD-10-CM

## 2021-11-17 LAB
BKR LAB AP GYN ADEQUACY: NORMAL
BKR LAB AP GYN INTERPRETATION: NORMAL
BKR LAB AP HPV REFLEX: NORMAL
BKR LAB AP PREVIOUS ABNORMAL: NORMAL
PATH REPORT.COMMENTS IMP SPEC: NORMAL
PATH REPORT.COMMENTS IMP SPEC: NORMAL
PATH REPORT.RELEVANT HX SPEC: NORMAL

## 2021-11-17 PROCEDURE — 77081 DXA BONE DENSITY APPENDICULR: CPT | Mod: 59 | Performed by: RADIOLOGY

## 2021-11-17 PROCEDURE — 77080 DXA BONE DENSITY AXIAL: CPT | Performed by: RADIOLOGY

## 2021-11-17 NOTE — RESULT ENCOUNTER NOTE
Fco,    Your bone density scan continues to show normal bone density in the spine and wrist and osteopenia in the hips.      The density has dropped a bit in the  hips (about 4%) and in the spine (up to a 16% drop in the L2 vertebrae!)    To help prevent further loss of bone, the following is recommended:    Take in adequate amounts of Calcium and Vitamin D. These are the building blocks for bones. Most women will need 1,200 mg of Calcium (through diet and supplements if needed) and 1,000 international units of Vitamin D daily (typically through supplements).    Exercise daily to keep your bones strong. Thirty minutes of moderate walking or other aerobic activity is recommended.    Given the fracture risk is still fairly low with these numbers I would not recommend prescription medication yet at this point.  However it is really important to ensure adequate calcium, vitamin D, and strengthening type exercises.  Please consider restarting yoga or other strengthening exercises in addition to your walking.  We will plan to monitor the bone density again in 2 years.    If you have any questions or concerns, please mychart or call.     Kind regards,  Elva Ortiz MD

## 2021-11-17 NOTE — RESULT ENCOUNTER NOTE
Fco,  It was a pleasure to see you in the office recently.   -Unfortunately your A1c has increased a bit since last check and shows the sugars continue to be too high..  Hopefully your endocrinologist can give you some good tips on getting this down.  -The urine protein level was normal  -The kidney and liver and electrolyte panel looked great.  Your potassium was normal range.  Please continue with your current potassium supplement  -Your cholesterol panel looks better than the past with improvement of the triglycerides.  The HDL cholesterol is still a little low.  Aerobic exercise is the best way to increase this number.  -Your urinalysis was normal except for the presence of glucose.  It also appeared you were slightly dehydrated at the time of the blood draw.  I think getting your diabetes under better control will actually help the urinary symptoms you are experiencing.  Please MyChart or call if you have any concerns or questions.   Sincerely,  Elva Ortiz MD    Ps.  Thank you again for the kind gift of the mangoes!  It was very thoughtful of you.

## 2021-11-18 ENCOUNTER — ANCILLARY PROCEDURE (OUTPATIENT)
Dept: MAMMOGRAPHY | Facility: CLINIC | Age: 61
End: 2021-11-18
Attending: FAMILY MEDICINE
Payer: COMMERCIAL

## 2021-11-18 DIAGNOSIS — Z12.31 VISIT FOR SCREENING MAMMOGRAM: ICD-10-CM

## 2021-11-18 PROCEDURE — 77067 SCR MAMMO BI INCL CAD: CPT | Mod: GC | Performed by: STUDENT IN AN ORGANIZED HEALTH CARE EDUCATION/TRAINING PROGRAM

## 2021-11-18 PROCEDURE — 77063 BREAST TOMOSYNTHESIS BI: CPT | Mod: GC | Performed by: STUDENT IN AN ORGANIZED HEALTH CARE EDUCATION/TRAINING PROGRAM

## 2021-11-19 LAB
HUMAN PAPILLOMA VIRUS 16 DNA: NEGATIVE
HUMAN PAPILLOMA VIRUS 18 DNA: NEGATIVE
HUMAN PAPILLOMA VIRUS FINAL DIAGNOSIS: NORMAL
HUMAN PAPILLOMA VIRUS OTHER HR: NEGATIVE

## 2021-11-24 NOTE — PROGRESS NOTES
"  Assessment & Plan     Skin tag  Uncomplicated removal of 10 skin tags today.  Reviewed skin care and monitoring for signs of infection or bleeding.  Follow-up as needed  - REMOVAL OF SKIN TAGS, FIRST 15                 Return in about 1 week (around 12/8/2021), or if symptoms worsen or fail to improve.    Elva Ortiz MD  [facility] STEPHANI Eagle is a 61 year old who presents for the following health issues     History of Present Illness       She eats 0-1 servings of fruits and vegetables daily.She consumes 1 sweetened beverage(s) daily.She exercises with enough effort to increase her heart rate 20 to 29 minutes per day.  She exercises with enough effort to increase her heart rate 6 days per week. She is missing 1 dose(s) of medications per week.     Fco is here today for Skin tag removal on neck and shoulder about 10. These are irritated by clothing, jewelry and rubbing.              Objective    /52 (BP Location: Right arm, Patient Position: Sitting, Cuff Size: Adult Large)   Pulse 84   Temp 97.6  F (36.4  C) (Oral)   Resp 18   Ht 1.6 m (5' 3\")   Wt 83.1 kg (183 lb 4.8 oz)   LMP 03/19/2009 (Exact Date)   SpO2 94%   BMI 32.47 kg/m    Body mass index is 32.47 kg/m .  Physical Exam   SKIN: Multiple tiny skin tags at the base of the neck bilaterally.    PROCEDURE: Skin tags x 10 are snipped off using alcohol swab for cleansing and sterile iris scissors. Local anesthesia was used x 2 (lidocaine with epinephrine 1%) for the more broad-based skin tags  . These pathognomonic lesions are not sent for pathology.  She tolerated the procedure well with no adverse effects              "

## 2021-11-30 DIAGNOSIS — H40.003 BORDERLINE GLAUCOMA, BILATERAL: Primary | ICD-10-CM

## 2021-12-01 ENCOUNTER — OFFICE VISIT (OUTPATIENT)
Dept: FAMILY MEDICINE | Facility: CLINIC | Age: 61
End: 2021-12-01
Payer: COMMERCIAL

## 2021-12-01 VITALS
DIASTOLIC BLOOD PRESSURE: 52 MMHG | TEMPERATURE: 97.6 F | HEART RATE: 84 BPM | HEIGHT: 63 IN | SYSTOLIC BLOOD PRESSURE: 111 MMHG | BODY MASS INDEX: 32.48 KG/M2 | RESPIRATION RATE: 18 BRPM | WEIGHT: 183.3 LBS | OXYGEN SATURATION: 94 %

## 2021-12-01 DIAGNOSIS — L91.8 SKIN TAG: Primary | ICD-10-CM

## 2021-12-01 PROCEDURE — 99207 PR DROP WITH A PROCEDURE: CPT | Performed by: FAMILY MEDICINE

## 2021-12-01 PROCEDURE — 11200 RMVL SKIN TAGS UP TO&INC 15: CPT | Performed by: FAMILY MEDICINE

## 2021-12-01 ASSESSMENT — PAIN SCALES - GENERAL: PAINLEVEL: NO PAIN (0)

## 2021-12-01 ASSESSMENT — MIFFLIN-ST. JEOR: SCORE: 1360.57

## 2021-12-02 ENCOUNTER — TELEPHONE (OUTPATIENT)
Dept: ENDOCRINOLOGY | Facility: CLINIC | Age: 61
End: 2021-12-02
Payer: COMMERCIAL

## 2021-12-06 ENCOUNTER — OFFICE VISIT (OUTPATIENT)
Dept: ENDOCRINOLOGY | Facility: CLINIC | Age: 61
End: 2021-12-06
Payer: COMMERCIAL

## 2021-12-06 ENCOUNTER — OFFICE VISIT (OUTPATIENT)
Dept: OPHTHALMOLOGY | Facility: CLINIC | Age: 61
End: 2021-12-06
Attending: OPHTHALMOLOGY
Payer: COMMERCIAL

## 2021-12-06 VITALS
WEIGHT: 182.9 LBS | OXYGEN SATURATION: 97 % | BODY MASS INDEX: 32.4 KG/M2 | DIASTOLIC BLOOD PRESSURE: 73 MMHG | SYSTOLIC BLOOD PRESSURE: 122 MMHG | HEART RATE: 87 BPM

## 2021-12-06 DIAGNOSIS — E11.21 TYPE 2 DIABETES MELLITUS WITH DIABETIC NEPHROPATHY, WITH LONG-TERM CURRENT USE OF INSULIN (H): ICD-10-CM

## 2021-12-06 DIAGNOSIS — H40.003 BORDERLINE GLAUCOMA, BILATERAL: ICD-10-CM

## 2021-12-06 DIAGNOSIS — Z79.4 TYPE 2 DIABETES MELLITUS WITH DIABETIC NEPHROPATHY, WITH LONG-TERM CURRENT USE OF INSULIN (H): ICD-10-CM

## 2021-12-06 PROCEDURE — 99215 OFFICE O/P EST HI 40 MIN: CPT | Performed by: INTERNAL MEDICINE

## 2021-12-06 PROCEDURE — 92014 COMPRE OPH EXAM EST PT 1/>: CPT | Performed by: OPHTHALMOLOGY

## 2021-12-06 PROCEDURE — 92083 EXTENDED VISUAL FIELD XM: CPT | Performed by: OPHTHALMOLOGY

## 2021-12-06 PROCEDURE — 92133 CPTRZD OPH DX IMG PST SGM ON: CPT | Performed by: OPHTHALMOLOGY

## 2021-12-06 PROCEDURE — G0463 HOSPITAL OUTPT CLINIC VISIT: HCPCS | Mod: 25

## 2021-12-06 RX ORDER — INSULIN GLARGINE 100 [IU]/ML
INJECTION, SOLUTION SUBCUTANEOUS
Qty: 75 ML | Refills: 3 | Status: CANCELLED | OUTPATIENT
Start: 2021-12-06

## 2021-12-06 RX ORDER — INSULIN GLARGINE 100 [IU]/ML
60 INJECTION, SOLUTION SUBCUTANEOUS DAILY
Qty: 65 ML | Refills: 3
Start: 2021-12-06 | End: 2022-06-03

## 2021-12-06 RX ORDER — LATANOPROST 50 UG/ML
1 SOLUTION/ DROPS OPHTHALMIC DAILY
Qty: 7.5 ML | Refills: 11 | Status: SHIPPED | OUTPATIENT
Start: 2021-12-06 | End: 2022-06-20

## 2021-12-06 RX ORDER — METFORMIN HCL 500 MG
TABLET, EXTENDED RELEASE 24 HR ORAL
Qty: 360 TABLET | Refills: 3 | Status: SHIPPED | OUTPATIENT
Start: 2021-12-06 | End: 2022-12-07

## 2021-12-06 ASSESSMENT — SLIT LAMP EXAM - LIDS
COMMENTS: MEIBOMIAN GLAND DYSFUNCTION
COMMENTS: MEIBOMIAN GLAND DYSFUNCTION

## 2021-12-06 ASSESSMENT — CONF VISUAL FIELD
OD_NORMAL: 1
OS_NORMAL: 1
METHOD: COUNTING FINGERS

## 2021-12-06 ASSESSMENT — VISUAL ACUITY
OD_CC: 20/25
OS_CC+: -1
METHOD: SNELLEN - LINEAR
OS_CC: 20/20
CORRECTION_TYPE: GLASSES

## 2021-12-06 ASSESSMENT — TONOMETRY
IOP_METHOD: APPLANATION
OD_IOP_MMHG: 19
OS_IOP_MMHG: 19

## 2021-12-06 ASSESSMENT — CUP TO DISC RATIO
OS_RATIO: 0.8
OD_RATIO: 0.9

## 2021-12-06 ASSESSMENT — REFRACTION_WEARINGRX
OS_CYLINDER: +1.25
OS_ADD: +2.25
OS_AXIS: 080
OS_SPHERE: -4.00
OD_SPHERE: -3.25
OD_CYLINDER: +0.75
OD_ADD: +2.25
OD_AXIS: 075

## 2021-12-06 ASSESSMENT — EXTERNAL EXAM - LEFT EYE: OS_EXAM: DERMATOCHALASIS

## 2021-12-06 ASSESSMENT — EXTERNAL EXAM - RIGHT EYE: OD_EXAM: DERMATOCHALASIS

## 2021-12-06 NOTE — PROGRESS NOTES
HPI:  Maliha Pedro a 61 year old female is here  for the first time for evaluation of Diabetes mellitus type 2.      Pt is retired and going to the [facility]. Currently coming back to office q 6 months.  Has been back form the [facility] 11/10/21, back to [facility] in 1/2022.      The patient has had diabetes since  Before 2005 and has diabetes complications of  None with comorbidity of obesity, (BMI 32).    Nocturia once daily, no blurred vision.    Current diabetic medications:  Metformin 1000 bid, Lantus 50 units daily, Jardiance 25 mg, Novolog 20 units two times daily    Lost 10 lbs in the [facility].    SMBG: Leslie, has not use in the [facility];    Meal plan: 2-3 times daily  Exercise: farm in [facility]    Complications  1. Retinopathy: just saw ophthalmology, no retinopathy  2. Nephropathy: None  3. Neuropathy: No numbness no tingling no history of ulcerations  4. Hypoglycemia: Yes, however resolved with insulin adjustment  5. Macrovascular: No chest pain, no shortness of breath      HTN: Losartan, hydrochlorothiazide 12.5 daily  Dyslipidemia Crestor 10 mg    Previously used diabetes medications:   Bydureon in [id number]    ASSESSMENT:   T2DM with comorbidity of obesity  Maliha Pedro a 61 year old female is here for an evaluation in regard Diabetes Mellitus type 2, by reviewing the available blood glucose reading and recent HgA1c, the patient's diabetes is considered to be in suboptimal/poor control.  Patient had decreased Lantus and NovoLog, A1c now increased and no blood glucose 2 review.  Hypertension controlled on losartan and hydrochlorothiazide dyslipidemia well controlled  Dyslipidemia as of November 2021 on 10 mg of Crestor    PLAN:  Continue insulin, Metformin and Jardiance  Bring Leslie to office in about 2 weeks to download    40 minutes spent on the date of the encounter doing chart review, review of test results, interpretation of tests, patient visit and documentation      This note was generated using computer recognized voice recognition. This might result in some expected imperfection.      Patient to contact the clinic if blood glucose (sugar) is under 70 two times in one week or above 200 for 3 consecutive days.       Stacia Carbajal MD  Endocrinology and Diabetes    Pager [phone]    PMH:  Patient Active Problem List   Diagnosis     ? 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     Displacement of lumbar intervertebral disc without myelopathy     Nonallopathic lesion of lumbar region     Nonallopathic lesion of thoracic region     Nonallopathic lesion of sacral region     Bartholin gland cyst     Cataract     Irritable bowel syndrome     Esophageal reflux     Seasonal allergic rhinitis     Latex sensitivity     Colon polyp     Adenomatous polyp     HYPERLIPIDEMIA LDL GOAL <100     Ovarian cyst     Breast pain     Essential hypertension     Advanced directives, counseling/discussion     Obesity     Hypomagnesemia     Type 2 diabetes mellitus with mild nonproliferative retinopathy without macular edema, with long-term current use of insulin, unspecified laterality (H)       SOCIAL HISTORY:  Social History     Socioeconomic History     Marital status: Single     Spouse name: Not on file     Number of children: Not on file     Years of education: Not on file     Highest education level: Not on file   Occupational History     Not on file   Tobacco Use     Smoking status: Never Smoker     Smokeless tobacco: Never Used   Substance and Sexual Activity     Alcohol use: Yes     Comment: one drink a month     Drug use: No     Sexual activity: Not Currently     Partners: Male     Birth control/protection: None   Other Topics Concern     Parent/sibling w/ CABG, MI or angioplasty before 65F 55M? 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 Moved to  in . Initially lived in [place].    No children        2 cats     Social Determinants of Health     Financial Resource Strain: Not on file   Food Insecurity: Not on file   Transportation Needs: Not on file   Physical Activity: Not on file   Stress: Not on file   Social Connections: Not on file   Intimate Partner Violence: Not on file   Housing Stability: Not on file       FAMILY HISTORY:  Family History   Problem Relation Age of Onset     Diabetes Father          of dm 70's     C.A.D. Father      Cerebrovascular Disease Father      Diabetes Sister      Diabetes Sister      Diabetes Sister      Hypertension Sister      Diabetes Brother      Cancer Brother 59        Lung cancer     Diabetes Brother      Diabetes Brother      Cancer Brother         lung     Diabetes Brother      Glaucoma Maternal Grandmother      Diabetes Paternal Grandfather      Hypertension Paternal Grandfather      Cerebrovascular Disease Paternal Grandfather      Cancer Maternal Aunt         ovarian cancer.      Leukemia Half-Brother      Macular Degeneration No family hx of        Medications:   Current Outpatient Medications   Medication Sig Dispense Refill     albuterol (PROAIR HFA/PROVENTIL HFA/VENTOLIN HFA) 108 (90 Base) MCG/ACT inhaler INHALE 2 PUFFS INTO THE LUNGS EVERY 4 HOURS AS NEEDED FOR SHORTNESS OF BREATH / DYSPNEA 18 g 1     aspirin 81 MG EC tablet Take 81 mg by mouth daily Please resume in 5 days.  (2018)       CALCIUM + D OR Take 1 tablet by mouth 2 times daily.       Continuous Blood Gluc  (Proximal DataE 14 DAY READER) [facility] 1 Device daily 1 Device 1     Continuous  Blood Gluc Sensor (FREESTYLE BUSHRA 14 DAY SENSOR) [facility] 1 Application every 14 days 6 each 3     empagliflozin (JARDIANCE) 25 MG TABS tablet Take 1 tablet (25 mg) by mouth daily 90 tablet 3     fexofenadine (ALLEGRA) 180 MG tablet Take 1 tablet by mouth daily. 1 TABLET DAILY Failed claritin for symptom cotrol. Zyrtec increases heart rate. 90 tablet 3     fish oil-omega-3 fatty acids (FISH OIL) 1000 MG capsule Take 1 capsule by mouth daily.       fluticasone (FLONASE) 50 MCG/ACT nasal spray USE 1-2 SPRAYS IN EACH NOSTRIL ONCE DAILY 48 g 3     Glucosamine-Chondroitin (GLUCOSAMINE CHONDR COMPLEX PO)        hydrochlorothiazide (HYDRODIURIL) 12.5 MG tablet Take 1 tablet (12.5 mg) by mouth daily 90 tablet 3     insulin aspart (NOVOLOG FLEXPEN) 100 UNIT/ML pen Uses  120 units daily subcutaneously 120 mL 3     insulin glargine (LANTUS SOLOSTAR) 100 UNIT/ML pen 80 units daily 75 mL 3     insulin pen needle (BD LISA U/F) 32G X 4 MM miscellaneous Use to inject 5-6 times daily. 500 each 3     losartan (COZAAR) 100 MG tablet Take 1 tablet (100 mg) by mouth daily 90 tablet 3     magnesium 250 MG tablet Take 1 tablet by mouth daily 90 tablet 3     metFORMIN (GLUCOPHAGE-XR) 500 MG 24 hr tablet Take 2 tabs twice a day 360 tablet 3     montelukast (SINGULAIR) 10 MG tablet TAKE ONE TABLET BY MOUTH ONCE DAILY AT BEDTIME 90 tablet 3     MULTIVITAMIN OR Take 1 tablet by mouth daily.       NEEDLES, ANY SIZE Pen needles for Novolog insulin pen. Use to inject 5-6  times daily. 4 Box 3     pantoprazole (PROTONIX) 40 MG EC tablet TAKE ONE TABLET BY MOUTH ONCE DAILY 90 tablet 3     potassium chloride ER (KLOR-CON M) 10 MEQ CR tablet Take 1 tablet (10 mEq) by mouth daily 90 tablet 3     rosuvastatin (CRESTOR) 10 MG tablet Take 1 tablet (10 mg) by mouth daily 90 tablet 3     VITAMIN D 2000 UNIT OR TABS Take 1 tablet by mouth daily.         /73 (BP Location: Left arm, Patient Position: Chair, Cuff Size: Adult Regular)   Pulse 87   Wt 83 kg  (182 lb 14.4 oz)   LMP 03/19/2009 (Exact Date)   SpO2 97%   BMI 32.40 kg/m    Wt Readings from Last 4 Encounters:   12/06/21 83 kg (182 lb 14.4 oz)   12/01/21 83.1 kg (183 lb 4.8 oz)   11/15/21 81.9 kg (180 lb 9.6 oz)   06/23/21 86 kg (189 lb 8 oz)       General: Well appearing well in no distress.  Psych: good eye contact, no pressured speech    Diabetic foot exam:  Inspection normal appearing  Sensation to monofilament intact bilaterally  Skin: intact  Callus formation is not present present  Gait is normal      LABS:  Lab Results   Component Value Date     11/16/2021    CHLORIDE 107 11/16/2021    CO2 29 11/16/2021     (H) 11/16/2021    CR 0.63 11/16/2021    CR 0.69 05/05/2021    CR 0.68 08/27/2020    CR 0.64 08/12/2019    CR 0.69 12/12/2018    SUSHANT 9.2 11/16/2021    MAG 2.0 11/14/2018    ALBUMIN 3.8 11/16/2021    ALKPHOS 65 11/16/2021    LDL 81 11/16/2021    HDL 45 (L) 11/16/2021    TRIG 132 11/16/2021     Lab Results   Component Value Date    MICROL 6 11/16/2021    MICROL 7 08/27/2020    MICROL <5 04/02/2020    MICROL 5 09/25/2019    MICROL 7 08/06/2018     Lab Results   Component Value Date    A1C 8.7 (H) 11/16/2021    A1C 8.3 (H) 05/05/2021    A1C 8.2 (H) 08/27/2020    A1C 8.1 (H) 04/02/2020    A1C 8.2 (H) 11/14/2018       Lab Results   Component Value Date    HGB 15.7 05/05/2021       Lab Results   Component Value Date    A1C 8.7 (H) 11/16/2021    A1C 8.3 (H) 05/05/2021    A1C 8.2 (H) 08/27/2020    A1C 8.1 (H) 04/02/2020    A1C 8.2 (H) 11/14/2018    A1C 9.8 (H) 12/16/2013    A1C 9.5 (H) 04/15/2013    A1C 9.6 (A) 01/16/2013    A1C 9.4 (H) 09/28/2012    A1C 8.3 (A) 06/22/2012    A1C 10.0 (A) 03/16/2012    A1C 7.3 (A) 12/16/2011    A1C 7.5 (H) 08/29/2011    A1C 6.6 10/01/2010    A1C 6.7@ 04/13/2010    A1C 7.3 (H) 07/13/2007    A1C 8.3 (H) 01/09/2007    A1C 7.7 (H) 09/19/2006    A1C 7.4 (H) 06/23/2006    A1C 7.5 (H) 03/24/2006    A1C 7.5 (H) 12/16/2005    A1C 7.6 (H) 09/16/2005    A1C 8.4 (H)  06/07/2005    A1C 7.8 (H) 03/07/2005

## 2021-12-06 NOTE — PATIENT INSTRUCTIONS
Continue insulin, Metformin and Jardiance  Continue Lantus or Basaglar 50 units  Novolog 20 units with meals    Bring Leslei to office in about 2 weeks

## 2021-12-06 NOTE — NURSING NOTE
Chief Complaints and History of Present Illnesses   Patient presents with     Follow Up     Glaucoma suspect, bilateral     Chief Complaint(s) and History of Present Illness(es)     Follow Up     Laterality: both eyes    Course: stable    Associated symptoms: dryness.  Negative for redness, tearing and eye pain    Treatments tried: artificial tears    Pain scale: 0/10    Comments: Glaucoma suspect, bilateral              Comments     She states that her vision bounces at times, but otherwise seems stable since her last exam.  Occasionally when she wakes, she notices that her right eye does not open as wide as her left eye.  This generally resolves over a couple hours.    Lab Results       Component                Value               Date                       A1C                      8.7                 11/16/2021                 A1C                      8.3                 05/05/2021                 A1C                      8.2                 08/27/2020                 A1C                      8.1                 04/02/2020                 A1C                      8.2                 11/14/2018                 A1C                      9.8                 12/16/2013              AMILCAR Valerio 7:38 AM  December 6, 2021

## 2021-12-06 NOTE — PROGRESS NOTES
CC: Maliha Pedro is a  60 year old year-old patient presenting for follow up Diabetes mellitus, states Diabetes mellitus is under better control. No new new flashes and floaters and VA stable     Optical Coherence Tomography 09/09/20   Right eye:  good foveal contour, no cysts or subretinal fluid; trace Epiretinal membrane   Left eye: good foveal contour, no cysts or subretinal fluid     ONFL   Right eye: stable thinning  Left eye: borderline. stable TS thinning compared to prior test    Prior 24-2 OVF   Right eye: FN 12% few spots of decreased sensitivity   Left eye: 28%FN. central spot of decreased sensitivity. (full on 05/2018)    Assessment & Plan:     # Type 2 diabetes mellitus without retinopathy  Blood pressure (<120/80) and blood glucose (HbA1c <7.0) control discussed with patient. Patient advised that failure to adequately control each may lead to vision loss. The patient expressed understanding.    # Glaucoma suspect, bilateral  - large cup to disc ratio R>L  - Intraocular pressure 19/19 both eyes   - favorable pachmetry 583/613  - OCT retinal nerve fiber layer Stable both eyes (however overall possible mild progression over the course of yrs)  - Start latanoprost at bedtime both eyes     # Cataract, right eye  -becoming visually significant  - consider cataract evaluation next follow up    # Pseudophakia, left eye  -stable  -observe    # Posterior vitreous detachment of right eye  -stable  -observe    # Old inferior temporal small tear surrounded by pigment, no subretinal fluid right eye   Retina attached, no other tears  Retina detachment precautions were discussed with the patient (presence or increased in flashes, floaters or a curtain in the visual field) and was asked to return if any of the those occur    #. Dry eye syndrome  artificial tears  As needed and warm compresses      # Mild Epiretinal membrane right eye  Stable   Not visually significant     Follow up  in 4 months with montezuma  with  Macula and ONFL Optical Coherence Tomography     ~~~~~~~~~~~~~~~~~~~~~~~~~~~~~~~~~~   Complete documentation of historical and exam elements from today's encounter can be found in the full encounter summary report (not reduplicated in this progress note).  I personally obtained the chief complaint(s) and history of present illness.  I confirmed and edited as necessary the review of systems, past medical/surgical history, family history, social history, and examination findings as documented by others; and I examined the patient myself.  I personally reviewed the relevant tests, images, and reports as documented above.  I formulated and edited as necessary the assessment and plan and discussed the findings and management plan with the patient and family    Lesly Tracy MD   of Ophthalmology.  Retina Service   Department of Ophthalmology and Visual Neurosciences   [facility]  Phone: [phone]   Fax: [phone]

## 2021-12-06 NOTE — LETTER
12/6/2021         RE: Maliha Pedro  [address]  [facility] [address]        Dear Colleague,    Thank you for referring your patient, Maliha Pedro, to the [facility]. Please see a copy of my visit note below.    HPI:  Maliha Pedro a 61 year old female is here  for the first time for evaluation of Diabetes mellitus type 2.      Pt is retired and going to the [facility]. Currently coming back to office q 6 months.  Has been back form the [facility] 11/10/21, back to [facility] in 1/2022.      The patient has had diabetes since  Before 2005 and has diabetes complications of  None with comorbidity of obesity, (BMI 32).    Nocturia once daily, no blurred vision.    Current diabetic medications:  Metformin 1000 bid, Lantus 50 units daily, Jardiance 25 mg, Novolog 20 units two times daily    Lost 10 lbs in the [facility].    SMBG: Leslie, has not use in the [facility];    Meal plan: 2-3 times daily  Exercise: farm in [facility]    Complications  1. Retinopathy: just saw ophthalmology, no retinopathy  2. Nephropathy: None  3. Neuropathy: No numbness no tingling no history of ulcerations  4. Hypoglycemia: Yes, however resolved with insulin adjustment  5. Macrovascular: No chest pain, no shortness of breath      HTN: Losartan, hydrochlorothiazide 12.5 daily  Dyslipidemia Crestor 10 mg    Previously used diabetes medications:   Bydureon in 2016, Bytonya    ASSESSMENT:   T2DM with comorbidity of obesity  Maliha Pedro a 61 year old female is here for an evaluation in regard Diabetes Mellitus type 2, by reviewing the available blood glucose reading and recent HgA1c, the patient's diabetes is considered to be in suboptimal/poor control.  Patient had decreased Lantus and NovoLog, A1c now increased and no blood glucose 2 review.  Hypertension controlled on losartan and hydrochlorothiazide dyslipidemia well controlled  Dyslipidemia as of November 2021 on 10  mg of Crestor    PLAN:  Continue insulin, Metformin and Jardiance  Bring Leslie to office in about 2 weeks to download    40 minutes spent on the date of the encounter doing chart review, review of test results, interpretation of tests, patient visit and documentation     This note was generated using computer recognized voice recognition. This might result in some expected imperfection.      Patient to contact the clinic if blood glucose (sugar) is under 70 two times in one week or above 200 for 3 consecutive days.       Stacia Carbajal MD  Endocrinology and Diabetes    Pager [phone]    PMH:  Patient Active Problem List   Diagnosis     ? 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     Displacement of lumbar intervertebral disc without myelopathy     Nonallopathic lesion of lumbar region     Nonallopathic lesion of thoracic region     Nonallopathic lesion of sacral region     Bartholin gland cyst     Cataract     Irritable bowel syndrome     Esophageal reflux     Seasonal allergic rhinitis     Latex sensitivity     Colon polyp     Adenomatous polyp     HYPERLIPIDEMIA LDL GOAL <100     Ovarian cyst     Breast pain     Essential hypertension     Advanced directives, counseling/discussion     Obesity     Hypomagnesemia     Type 2 diabetes mellitus with mild nonproliferative retinopathy without macular edema, with long-term current use of insulin, unspecified laterality (H)       SOCIAL HISTORY:  Social History     Socioeconomic History     Marital status: Single     Spouse name: Not on file     Number of children: Not on file     Years of education: Not on file     Highest education level: Not on file   Occupational History     Not on file   Tobacco Use     Smoking status: Never Smoker     Smokeless tobacco: Never Used   Substance and Sexual Activity     Alcohol use: Yes     Comment: one drink a month     Drug use: No     Sexual activity: Not Currently     Partners: Male     Birth  control/protection: None   Other Topics Concern     Parent/sibling w/ CABG, MI or angioplasty before 65F 55M? No      Service No     Blood Transfusions No     Caffeine Concern No     Occupational Exposure Yes     Comment: exposed to X-ray     Hobby Hazards No     Sleep Concern Yes     Comment: Hot Flashes     Stress Concern Yes     Comment: Work     Weight Concern Yes     Special Diet No     Back Care Yes     Comment: Back pain     Exercise Yes     Bike Helmet No     Seat Belt Yes     Self-Exams Yes   Social History Narrative    Works at  in surgical ICU    Born in the [facility]. Moved to  in . Initially lived in [place].    No children        2 cats     Social Determinants of Health     Financial Resource Strain: Not on file   Food Insecurity: Not on file   Transportation Needs: Not on file   Physical Activity: Not on file   Stress: Not on file   Social Connections: Not on file   Intimate Partner Violence: Not on file   Housing Stability: Not on file       FAMILY HISTORY:  Family History   Problem Relation Age of Onset     Diabetes Father          of dm 70's     C.A.D. Father      Cerebrovascular Disease Father      Diabetes Sister      Diabetes Sister      Diabetes Sister      Hypertension Sister      Diabetes Brother      Cancer Brother 59        Lung cancer     Diabetes Brother      Diabetes Brother      Cancer Brother         lung     Diabetes Brother      Glaucoma Maternal Grandmother      Diabetes Paternal Grandfather      Hypertension Paternal Grandfather      Cerebrovascular Disease Paternal Grandfather      Cancer Maternal Aunt         ovarian cancer.      Leukemia Half-Brother      Macular Degeneration No family hx of        Medications:   Current Outpatient Medications   Medication Sig Dispense Refill     albuterol (PROAIR HFA/PROVENTIL HFA/VENTOLIN HFA) 108 (90 Base) MCG/ACT inhaler INHALE 2 PUFFS INTO THE LUNGS EVERY 4 HOURS AS NEEDED FOR SHORTNESS OF BREATH / DYSPNEA 18  g 1     aspirin 81 MG EC tablet Take 81 mg by mouth daily Please resume in 5 days.  (12/22/2018)       CALCIUM + D OR Take 1 tablet by mouth 2 times daily.       Continuous Blood Gluc  (FREESTYLE BUSHRA 14 DAY READER) KIRIT 1 Device daily 1 Device 1     Continuous Blood Gluc Sensor (FREESTYLE BUSHRA 14 DAY SENSOR) MISC 1 Application every 14 days 6 each 3     empagliflozin (JARDIANCE) 25 MG TABS tablet Take 1 tablet (25 mg) by mouth daily 90 tablet 3     fexofenadine (ALLEGRA) 180 MG tablet Take 1 tablet by mouth daily. 1 TABLET DAILY Failed claritin for symptom cotrol. Zyrtec increases heart rate. 90 tablet 3     fish oil-omega-3 fatty acids (FISH OIL) 1000 MG capsule Take 1 capsule by mouth daily.       fluticasone (FLONASE) 50 MCG/ACT nasal spray USE 1-2 SPRAYS IN EACH NOSTRIL ONCE DAILY 48 g 3     Glucosamine-Chondroitin (GLUCOSAMINE CHONDR COMPLEX PO)        hydrochlorothiazide (HYDRODIURIL) 12.5 MG tablet Take 1 tablet (12.5 mg) by mouth daily 90 tablet 3     insulin aspart (NOVOLOG FLEXPEN) 100 UNIT/ML pen Uses  120 units daily subcutaneously 120 mL 3     insulin glargine (LANTUS SOLOSTAR) 100 UNIT/ML pen 80 units daily 75 mL 3     insulin pen needle (BD LISA U/F) 32G X 4 MM miscellaneous Use to inject 5-6 times daily. 500 each 3     losartan (COZAAR) 100 MG tablet Take 1 tablet (100 mg) by mouth daily 90 tablet 3     magnesium 250 MG tablet Take 1 tablet by mouth daily 90 tablet 3     metFORMIN (GLUCOPHAGE-XR) 500 MG 24 hr tablet Take 2 tabs twice a day 360 tablet 3     montelukast (SINGULAIR) 10 MG tablet TAKE ONE TABLET BY MOUTH ONCE DAILY AT BEDTIME 90 tablet 3     MULTIVITAMIN OR Take 1 tablet by mouth daily.       NEEDLES, ANY SIZE Pen needles for Novolog insulin pen. Use to inject 5-6  times daily. 4 Box 3     pantoprazole (PROTONIX) 40 MG EC tablet TAKE ONE TABLET BY MOUTH ONCE DAILY 90 tablet 3     potassium chloride ER (KLOR-CON M) 10 MEQ CR tablet Take 1 tablet (10 mEq) by mouth daily 90  tablet 3     rosuvastatin (CRESTOR) 10 MG tablet Take 1 tablet (10 mg) by mouth daily 90 tablet 3     VITAMIN D 2000 UNIT OR TABS Take 1 tablet by mouth daily.         /73 (BP Location: Left arm, Patient Position: Chair, Cuff Size: Adult Regular)   Pulse 87   Wt 83 kg (182 lb 14.4 oz)   LMP 03/19/2009 (Exact Date)   SpO2 97%   BMI 32.40 kg/m    Wt Readings from Last 4 Encounters:   12/06/21 83 kg (182 lb 14.4 oz)   12/01/21 83.1 kg (183 lb 4.8 oz)   11/15/21 81.9 kg (180 lb 9.6 oz)   06/23/21 86 kg (189 lb 8 oz)       General: Well appearing well in no distress.  Psych: good eye contact, no pressured speech    Diabetic foot exam:  Inspection normal appearing  Sensation to monofilament intact bilaterally  Skin: intact  Callus formation is not present present  Gait is normal      LABS:  Lab Results   Component Value Date     11/16/2021    CHLORIDE 107 11/16/2021    CO2 29 11/16/2021     (H) 11/16/2021    CR 0.63 11/16/2021    CR 0.69 05/05/2021    CR 0.68 08/27/2020    CR 0.64 08/12/2019    CR 0.69 12/12/2018    SUSHANT 9.2 11/16/2021    MAG 2.0 11/14/2018    ALBUMIN 3.8 11/16/2021    ALKPHOS 65 11/16/2021    LDL 81 11/16/2021    HDL 45 (L) 11/16/2021    TRIG 132 11/16/2021     Lab Results   Component Value Date    MICROL 6 11/16/2021    MICROL 7 08/27/2020    MICROL <5 04/02/2020    MICROL 5 09/25/2019    MICROL 7 08/06/2018     Lab Results   Component Value Date    A1C 8.7 (H) 11/16/2021    A1C 8.3 (H) 05/05/2021    A1C 8.2 (H) 08/27/2020    A1C 8.1 (H) 04/02/2020    A1C 8.2 (H) 11/14/2018       Lab Results   Component Value Date    HGB 15.7 05/05/2021       Lab Results   Component Value Date    A1C 8.7 (H) 11/16/2021    A1C 8.3 (H) 05/05/2021    A1C 8.2 (H) 08/27/2020    A1C 8.1 (H) 04/02/2020    A1C 8.2 (H) 11/14/2018    A1C 9.8 (H) 12/16/2013    A1C 9.5 (H) 04/15/2013    A1C 9.6 (A) 01/16/2013    A1C 9.4 (H) 09/28/2012    A1C 8.3 (A) 06/22/2012    A1C 10.0 (A) 03/16/2012    A1C 7.3 (A)  12/16/2011    A1C 7.5 (H) 08/29/2011    A1C 6.6 10/01/2010    A1C 6.7@ 04/13/2010    A1C 7.3 (H) 07/13/2007    A1C 8.3 (H) 01/09/2007    A1C 7.7 (H) 09/19/2006    A1C 7.4 (H) 06/23/2006    A1C 7.5 (H) 03/24/2006    A1C 7.5 (H) 12/16/2005    A1C 7.6 (H) 09/16/2005    A1C 8.4 (H) 06/07/2005    A1C 7.8 (H) 03/07/2005           Again, thank you for allowing me to participate in the care of your patient.        Sincerely,        Stacia Carbajal MD

## 2021-12-06 NOTE — NURSING NOTE
Maliha Pedro's goals for this visit include:   Chief Complaint   Patient presents with     New Patient     previously seen by Dr. Logan     Diabetes     has not used her Leslie for 4 months  [place]       She requests these members of her care team be copied on today's visit information: yes     PCP: Elva Ortiz    Referring Provider:  Referred Self, MD  No address on file    /73 (BP Location: Left arm, Patient Position: Chair, Cuff Size: Adult Regular)   Pulse 87   Wt 83 kg (182 lb 14.4 oz)   LMP 03/19/2009 (Exact Date)   SpO2 97%   BMI 32.40 kg/m      Do you need any medication refills at today's visit? Yes     Irene DURAND MA   Adult Endocrine   [facility]

## 2022-01-13 ENCOUNTER — E-VISIT (OUTPATIENT)
Dept: FAMILY MEDICINE | Facility: CLINIC | Age: 62
End: 2022-01-13
Payer: COMMERCIAL

## 2022-01-13 DIAGNOSIS — N89.8 VAGINAL DISCHARGE: ICD-10-CM

## 2022-01-13 DIAGNOSIS — E78.5 HYPERLIPIDEMIA LDL GOAL <100: ICD-10-CM

## 2022-01-13 DIAGNOSIS — I10 ESSENTIAL HYPERTENSION: ICD-10-CM

## 2022-01-13 DIAGNOSIS — R35.0 URINARY FREQUENCY: Primary | ICD-10-CM

## 2022-01-13 PROCEDURE — 99421 OL DIG E/M SVC 5-10 MIN: CPT | Performed by: FAMILY MEDICINE

## 2022-01-14 ENCOUNTER — TELEPHONE (OUTPATIENT)
Dept: FAMILY MEDICINE | Facility: CLINIC | Age: 62
End: 2022-01-14
Payer: COMMERCIAL

## 2022-01-14 RX ORDER — LOSARTAN POTASSIUM 100 MG/1
100 TABLET ORAL DAILY
Qty: 90 TABLET | Refills: 3 | Status: SHIPPED | OUTPATIENT
Start: 2022-01-14 | End: 2022-12-08

## 2022-01-14 RX ORDER — FLUCONAZOLE 150 MG/1
150 TABLET ORAL ONCE
Qty: 2 TABLET | Refills: 0 | Status: SHIPPED | OUTPATIENT
Start: 2022-01-14 | End: 2022-01-14

## 2022-01-14 RX ORDER — ROSUVASTATIN CALCIUM 10 MG/1
10 TABLET, COATED ORAL DAILY
Qty: 90 TABLET | Refills: 3 | Status: SHIPPED | OUTPATIENT
Start: 2022-01-14 | End: 2022-12-08

## 2022-01-14 NOTE — TELEPHONE ENCOUNTER
This is eye medicine.  Please call pt and get more information on her sx and what medication she is asking for.  Lily ROCAN, RN

## 2022-01-14 NOTE — TELEPHONE ENCOUNTER
Patient states she is having Vaginal Itching and would like a prescription for this also.    REFILL:     rosuvastatin (CRESTOR) 10 MG tablet   latanoprost (XALATAN) 0.005 % ophthalmic solution    PHARMACY:  [facility]

## 2022-01-14 NOTE — TELEPHONE ENCOUNTER
- vaginal itching was addressed in e-visit. Med already sent in  - the xalatan drops will need to be filled by her eye clinic (however, looks like she should have refills on file)

## 2022-01-14 NOTE — TELEPHONE ENCOUNTER
Called patient, she got the message from Dr. Ortiz regarding the Fluconazole Rx and is going to pick it up now.  She will get her eye drop medication from her eye doctor in the future.  Let her know refills were on file.     No further questions.    Kala Anderson RN, [facility]

## 2022-01-14 NOTE — TELEPHONE ENCOUNTER
No pharmacy was selected.   Patient is leaving the country and want to make sure this gets sent to correct pharmacy. Thanks!

## 2022-01-14 NOTE — PATIENT INSTRUCTIONS
Jason Eagle,    Thank you for choosing us for your care. I have placed an order for a prescription so that you can start treatment for a yeast infection. View your full visit summary for details by clicking on the link below. Your pharmacist will able to address any questions you may have about the medication.    However, given your urinary frequency, it would be good if you could schedule a lab only visit today to screen for a bladder infection also. I'll have the orders in place for this.      If you re not feeling better within 2-3 days, please schedule an appointment.  You can schedule an appointment right here in [facility], or call [phone]  If the visit is for the same symptoms as your eVisit, we ll refund the cost of your eVisit if seen within seven days.      Yeast Infection (Candida Vaginal Infection)    You have a Candida vaginal infection. This is also known as a yeast infection. It's most often caused by a type of yeast (fungus) called Candida. Candida are normally found in the vagina. But if they increase in number, this can lead to infection and cause symptoms.   Symptoms of a yeast infection can include:     Clumpy or thin, white discharge, which may look like cottage cheese    Itching or burning    Burning with urination  Certain factors can make a yeast infection more likely. These can include:     Taking certain medicines, such as antibiotics or birth control pills    Pregnancy    Diabetes    Weak immune system  A yeast infection is most often treated with antifungal medicine. This may be given as a vaginal cream or pills you take by mouth. Treatment may last for about 1 to 7 days. Women with severe or recurrent infections may need longer courses of treatment.   Home care    If you re prescribed medicine, be sure to use it as directed. Finish all of the medicine, even if your symptoms go away. Don t try to treat yourself using over-the-counter products without talking with your provider first. They  will let you know if this is a good option for you.    Ask your provider what steps you can take to help reduce your risk of having a yeast infection in the future.    Follow-up care  Follow up with your healthcare provider, or as directed.   When to seek medical advice  Call your healthcare provider right away if:     You have a fever of 100.4 F (38 C) or higher, or as directed by your provider.    Your symptoms worsen, or they don t go away within a few days of starting treatment.    You have new pain in the lower belly or pelvic region.    You have side effects that bother you or a reaction to the cream or pills you re prescribed.    You or any partners you have sex with have new symptoms, such as a rash, joint pain, or sores.  [employer] last reviewed this educational content on 7/1/2020 2000-2021 [employer]. All rights reserved. This information is not intended as a substitute for professional medical care. Always follow your healthcare professional's instructions.

## 2022-06-02 RX ORDER — FLUCONAZOLE 150 MG/1
TABLET ORAL
Qty: 2 TABLET | Refills: 0 | OUTPATIENT
Start: 2022-06-02

## 2022-06-02 NOTE — TELEPHONE ENCOUNTER
"Requested Prescriptions   Pending Prescriptions Disp Refills    fluconazole (DIFLUCAN) 150 MG tablet [Pharmacy Med Name: FLUCONAZOLE 150MG TABS] 2 tablet 0     Sig: TAKE 1 TABLET (150 MG) BY MOUTH ONCE FOR 1 DOSE MAY REPEAT AFTER 72 HOURS IF PERSISTENT SYMPTOMS.        Antifungal Agents Failed - 6/1/2022 10:45 AM        Failed - Not Fluconazole or Terconazole      If oral Fluconazole or Terconazole, may refill if indicated in progress notes.             Failed - Medication is active on med list        Passed - Recent (12 mo) or future (30 days) visit within the authorizing provider's specialty     Patient has had an office visit with the authorizing provider or a provider within the authorizing providers department within the previous 12 mos or has a future within next 30 days. See \"Patient Info\" tab in inbasket, or \"Choose Columns\" in Meds & Orders section of the refill encounter.                    "

## 2022-06-03 DIAGNOSIS — Z79.4 TYPE 2 DIABETES MELLITUS WITH DIABETIC NEPHROPATHY, WITH LONG-TERM CURRENT USE OF INSULIN (H): ICD-10-CM

## 2022-06-03 DIAGNOSIS — E11.21 TYPE 2 DIABETES MELLITUS WITH DIABETIC NEPHROPATHY, WITH LONG-TERM CURRENT USE OF INSULIN (H): ICD-10-CM

## 2022-06-03 RX ORDER — INSULIN GLARGINE 100 [IU]/ML
INJECTION, SOLUTION SUBCUTANEOUS
Qty: 45 ML | Refills: 2 | Status: SHIPPED | OUTPATIENT
Start: 2022-06-03 | End: 2022-06-30

## 2022-06-03 NOTE — TELEPHONE ENCOUNTER
insulin glargine (BASAGLAR KWIKPEN) 100 UNIT/ML pen      Last Written Prescription Date:  12/06/21  Last Fill Quantity: 65mL,   # refills: 3  Last Office Visit : 12/06/21  Future Office visit:  06/27/22    Routing refill request to provider for review/approval because:  Insulin - refilled per clinic

## 2022-06-12 ENCOUNTER — HEALTH MAINTENANCE LETTER (OUTPATIENT)
Age: 62
End: 2022-06-12

## 2022-06-20 ENCOUNTER — OFFICE VISIT (OUTPATIENT)
Dept: OPHTHALMOLOGY | Facility: CLINIC | Age: 62
End: 2022-06-20
Attending: OPHTHALMOLOGY
Payer: COMMERCIAL

## 2022-06-20 DIAGNOSIS — H40.003 BORDERLINE GLAUCOMA, BILATERAL: ICD-10-CM

## 2022-06-20 PROCEDURE — 92014 COMPRE OPH EXAM EST PT 1/>: CPT | Performed by: OPHTHALMOLOGY

## 2022-06-20 PROCEDURE — G0463 HOSPITAL OUTPT CLINIC VISIT: HCPCS | Mod: 25

## 2022-06-20 RX ORDER — LATANOPROST 50 UG/ML
1 SOLUTION/ DROPS OPHTHALMIC DAILY
Qty: 7.5 ML | Refills: 11 | Status: SHIPPED | OUTPATIENT
Start: 2022-06-20

## 2022-06-20 ASSESSMENT — REFRACTION_WEARINGRX
OD_AXIS: 075
OS_ADD: +2.25
OS_AXIS: 080
OS_CYLINDER: +1.25
OD_SPHERE: -3.25
OD_ADD: +2.25
OD_CYLINDER: +0.75
OS_SPHERE: -4.00
SPECS_TYPE: PAL

## 2022-06-20 ASSESSMENT — TONOMETRY
OS_IOP_MMHG: 12
IOP_METHOD: TONOPEN
IOP_METHOD: TONOPEN
OD_IOP_MMHG: 15
OD_IOP_MMHG: 18
OS_IOP_MMHG: 13

## 2022-06-20 ASSESSMENT — EXTERNAL EXAM - RIGHT EYE: OD_EXAM: DERMATOCHALASIS

## 2022-06-20 ASSESSMENT — VISUAL ACUITY
OS_CC: 20/25
CORRECTION_TYPE: GLASSES
METHOD: SNELLEN - LINEAR
OD_CC: 20/20
OS_CC+: -2

## 2022-06-20 ASSESSMENT — CUP TO DISC RATIO
OD_RATIO: 0.9
OS_RATIO: 0.8

## 2022-06-20 ASSESSMENT — CONF VISUAL FIELD
OS_NORMAL: 1
METHOD: COUNTING FINGERS
OD_NORMAL: 1

## 2022-06-20 ASSESSMENT — EXTERNAL EXAM - LEFT EYE: OS_EXAM: DERMATOCHALASIS

## 2022-06-20 ASSESSMENT — SLIT LAMP EXAM - LIDS
COMMENTS: MEIBOMIAN GLAND DYSFUNCTION
COMMENTS: MEIBOMIAN GLAND DYSFUNCTION

## 2022-06-20 NOTE — PROGRESS NOTES
CC: Maliha Pedro is a  62 year old year-old patient presenting for follow up Diabetes mellitus, states Diabetes mellitus is under better control. No new new flashes and floaters and VA stable.    Prior imaging 2021  ONFL   Right eye: stable thinning  Left eye: borderline. stable TS thinning compared to prior test    Prior 24-2 OVF   Right eye: FN 12% few spots of decreased sensitivity   Left eye: 28%FN. central spot of decreased sensitivity. (full on 05/2018)    Assessment & Plan:     # Type 2 diabetes mellitus without retinopathy  Blood pressure (<120/80) and blood glucose (HbA1c <7.0) control discussed with patient. Patient advised that failure to adequately control each may lead to vision loss. The patient expressed understanding.    # Glaucoma suspect, bilateral  - large cup to disc ratio R>L  - Intraocular pressure 15/13 both eyes   - pachmetry 583/613  - recommend to continue latanoprost at bedtime both eyes     # Cataract, right eye  -becoming visually significant  - patient not interested in cataract evaluation for now    # Pseudophakia, left eye  -stable  -observe    # Posterior vitreous detachment of right eye  -stable  -observe    # Old inferior temporal small tear surrounded by pigment, no subretinal fluid right eye   Retina attached, no other tears  Retina detachment precautions were discussed with the patient (presence or increased in flashes, floaters or a curtain in the visual field) and was asked to return if any of the those occur    #. Dry eye syndrome  artificial tears  As needed and warm compresses      # Mild Epiretinal membrane right eye  Stable   Not visually significant     # myopia  Happy with current prescription     PLAN:  Stable exam   Continue latanoprost at bedtime   Follow up 6 months with OVF 24-2; retinal nerve fiber layer ; no dilation  Patient moving to [facility]; [place] once a [place].  States will be back nov or dec    ~~~~~~~~~~~~~~~~~~~~~~~~~~~~~~~~~~   Complete documentation of historical and exam elements from today's encounter can be found in the full encounter summary report (not reduplicated in this progress note).  I personally obtained the chief complaint(s) and history of present illness.  I confirmed and edited as necessary the review of systems, past medical/surgical history, family history, social history, and examination findings as documented by others; and I examined the patient myself.  I personally reviewed the relevant tests, images, and reports as documented above.  I formulated and edited as necessary the assessment and plan and discussed the findings and management plan with the patient and family    Lesly Tracy MD   of Ophthalmology.  Retina Service   Department of Ophthalmology and Visual Neurosciences   [facility]  Phone: [phone]   Fax: [phone]

## 2022-06-20 NOTE — NURSING NOTE
Chief Complaints and History of Present Illnesses   Patient presents with     Glaucoma Suspect Follow Up     Chief Complaint(s) and History of Present Illness(es)     Glaucoma Suspect Follow Up     Laterality: both eyes    Associated symptoms: Negative for eye pain, redness, flashes, floaters, itching and discharge    Treatment side effects: none    Pain scale: 0/10              Comments     Pt here for Glaucoma follow up.  States eyes and vision about the same as last visit.  No eye pain - itchiness occasionally .    Ocular meds =     Latanoprost 1 gtts at bedtime BE    Lab Results       Component                Value               Date                       A1C                      8.7                 11/16/2021                 A1C                      8.3                 05/05/2021                 A1C                      8.2                 08/27/2020                 A1C                      8.1                 04/02/2020                 A1C                      8.2                 11/14/2018                 A1C                      9.8                 12/16/2013              Radha WILLIAMSON, DEMETRIA 10:09 AM 06/20/2022

## 2022-06-21 NOTE — PROGRESS NOTES
Outcome for 06/21/22 1:15 PM: Mingleversehart message sent - Meter  Mayi Fajardo CMA  Outcome for 06/22/22 4:05 PM: Left Voicemail   Mayi Fajardo CMA  Adult Endocrinology  [facility]    Endocrinology and Diabetes Clinic    Interval history  6 month follow up for T2DM  Pt is retired and going to the [facility]. Currently coming back to office q 6 months.  Has been back from the [facility] 11/10/21, back to [facility] in 1/2022.  Has tried some rest again from [facility], [place]    Nocturia once daily, no blurred vision.    Current diabetic medications:  Metformin 1000 bid, Lantus 50 (60) units daily, Jardiance 25 mg, Novolog 25 units two  Three times daily      SMBG: Leslie, has not used in the [facility]; would like to switch to Dexcom because easier to handle and humidity    Meal plan: 3 meals a day plus snacks, typically high in carbohydrates because of options available to her including rice and sweet yams  Exercise: Very limited due to humidity outside    Complications  1. Retinopathy: just saw ophthalmology, no retinopathy  2. Nephropathy: None  3. Neuropathy: No numbness no tingling no history of ulcerations  4. Hypoglycemia: Yes, however resolved with insulin adjustment  5. Macrovascular: No chest pain, no shortness of breath      HTN: Losartan, hydrochlorothiazide 12.5 daily  Dyslipidemia Crestor 10 mg    Previously used diabetes medications:   [place]ureon in [place]    ASSESSMENT/Plan:   T2DM with comorbidity of obesity  Type 2 diabetes with poor glucose control on metformin Jardiance insulin glargine and NovoLog  Reviewed food choices, and options once she is cooking herself  In the meantime increase insulin glargine to 70 units and NovoLog to 30 units with each meal  Hypertension controlled on losartan and hydrochlorothiazide dyslipidemia well controlled  Dyslipidemia as of November 2021 on 10 mg of Crestor    Fasting lab work prior to the visit    40  minutes spent on the date of the encounter doing chart review, review of test results, interpretation of tests, patient visit and documentation     This note was generated using computer recognized voice recognition. This might result in some expected imperfection.      Patient to contact the clinic if blood glucose (sugar) is under 70 two times in one week or above 200 for 3 consecutive days.       Stacia Carbajal MD  Endocrinology and Diabetes  Telephone contact:  [facility] [place] [phone]  [facility] [phone]        PMH:  Patient Active Problem List   Diagnosis     ? 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     Displacement of lumbar intervertebral disc without myelopathy     Nonallopathic lesion of lumbar region     Nonallopathic lesion of thoracic region     Nonallopathic lesion of sacral region     Bartholin gland cyst     Cataract     Irritable bowel syndrome     Esophageal reflux     Seasonal allergic rhinitis     Latex sensitivity     Colon polyp     Adenomatous polyp     HYPERLIPIDEMIA LDL GOAL <100     Ovarian cyst     Breast pain     Essential hypertension     Advanced directives, counseling/discussion     Obesity     Hypomagnesemia     Type 2 diabetes mellitus with mild nonproliferative retinopathy without macular edema, with long-term current use of insulin, unspecified laterality (H)       SOCIAL HISTORY:  Social History     Socioeconomic History     Marital status: Single     Spouse name: Not on file     Number of children: Not on file     Years of education: Not on file     Highest education level: Not on file   Occupational History     Not on file   Tobacco Use     Smoking status: Never Smoker     Smokeless tobacco: Never Used   Substance and Sexual Activity     Alcohol use: Yes     Comment: one drink a month     Drug use: No     Sexual activity: Not Currently     Partners: Male     Birth control/protection: None   Other  Topics Concern     Parent/sibling w/ CABG, MI or angioplasty before 65F 55M? No      Service No     Blood Transfusions No     Caffeine Concern No     Occupational Exposure Yes     Comment: exposed to X-ray     Hobby Hazards No     Sleep Concern Yes     Comment: Hot Flashes     Stress Concern Yes     Comment: Work     Weight Concern Yes     Special Diet No     Back Care Yes     Comment: Back pain     Exercise Yes     Bike Helmet No     Seat Belt Yes     Self-Exams Yes   Social History Narrative    Works at  in surgical ICU    Born in the [facility]. Moved to  in . Initially lived in [place].    No children        2 cats     Social Determinants of Health     Financial Resource Strain: Not on file   Food Insecurity: Not on file   Transportation Needs: Not on file   Physical Activity: Not on file   Stress: Not on file   Social Connections: Not on file   Intimate Partner Violence: Not on file   Housing Stability: Not on file       FAMILY HISTORY:  Family History   Problem Relation Age of Onset     Diabetes Father          of dm 70's     C.A.D. Father      Cerebrovascular Disease Father      Diabetes Sister      Diabetes Sister      Diabetes Sister      Hypertension Sister      Diabetes Brother      Cancer Brother 59        Lung cancer     Diabetes Brother      Diabetes Brother      Cancer Brother         lung     Diabetes Brother      Glaucoma Maternal Grandmother      Diabetes Paternal Grandfather      Hypertension Paternal Grandfather      Cerebrovascular Disease Paternal Grandfather      Cancer Maternal Aunt         ovarian cancer.      Leukemia Half-Brother      Macular Degeneration No family hx of        Medications:   Current Outpatient Medications   Medication Sig Dispense Refill     albuterol (PROAIR HFA/PROVENTIL HFA/VENTOLIN HFA) 108 (90 Base) MCG/ACT inhaler INHALE 2 PUFFS INTO THE LUNGS EVERY 4 HOURS AS NEEDED FOR SHORTNESS OF BREATH / DYSPNEA 18 g 1     aspirin 81 MG EC tablet  Take 81 mg by mouth daily Please resume in 5 days.  (12/22/2018)       CALCIUM + D OR Take 1 tablet by mouth 2 times daily.       Continuous Blood Gluc  (FREESTYLE BUSHRA 14 DAY READER) KIRIT 1 Device daily 1 Device 1     Continuous Blood Gluc Sensor (FREESTYLE BUSHRA 14 DAY SENSOR) MISC 1 Application every 14 days 6 each 3     empagliflozin (JARDIANCE) 25 MG TABS tablet Take 1 tablet (25 mg) by mouth daily 90 tablet 3     fexofenadine (ALLEGRA) 180 MG tablet Take 1 tablet by mouth daily. 1 TABLET DAILY Failed claritin for symptom cotrol. Zyrtec increases heart rate. 90 tablet 3     fish oil-omega-3 fatty acids (FISH OIL) 1000 MG capsule Take 1 capsule by mouth daily.       fluticasone (FLONASE) 50 MCG/ACT nasal spray USE 1-2 SPRAYS IN EACH NOSTRIL ONCE DAILY 48 g 3     Glucosamine-Chondroitin (GLUCOSAMINE CHONDR COMPLEX PO)        hydrochlorothiazide (HYDRODIURIL) 12.5 MG tablet Take 1 tablet (12.5 mg) by mouth daily 90 tablet 3     insulin aspart (NOVOLOG FLEXPEN) 100 UNIT/ML pen Uses  120 units daily subcutaneously 120 mL 3     insulin glargine (BASAGLAR KWIKPEN) 100 UNIT/ML pen Inject 50 units daily + 2 units for priming of pen with each use. 45 mL 2     insulin glargine (LANTUS SOLOSTAR) 100 UNIT/ML pen 80 units daily 75 mL 3     insulin pen needle (BD LISA U/F) 32G X 4 MM miscellaneous Use to inject 5-6 times daily. 500 each 3     latanoprost (XALATAN) 0.005 % ophthalmic solution Place 1 drop into both eyes daily 7.5 mL 11     losartan (COZAAR) 100 MG tablet Take 1 tablet (100 mg) by mouth daily 90 tablet 3     magnesium 250 MG tablet Take 1 tablet by mouth daily 90 tablet 3     metFORMIN (GLUCOPHAGE-XR) 500 MG 24 hr tablet Take 2 tabs twice a day 360 tablet 3     montelukast (SINGULAIR) 10 MG tablet TAKE ONE TABLET BY MOUTH ONCE DAILY AT BEDTIME 90 tablet 3     MULTIVITAMIN OR Take 1 tablet by mouth daily.       NEEDLES, ANY SIZE Pen needles for Novolog insulin pen. Use to inject 5-6  times daily. 4 Box  3     pantoprazole (PROTONIX) 40 MG EC tablet TAKE ONE TABLET BY MOUTH ONCE DAILY 90 tablet 3     potassium chloride ER (KLOR-CON M) 10 MEQ CR tablet Take 1 tablet (10 mEq) by mouth daily 90 tablet 3     rosuvastatin (CRESTOR) 10 MG tablet Take 1 tablet (10 mg) by mouth daily 90 tablet 3     VITAMIN D 2000 UNIT OR TABS Take 1 tablet by mouth daily.       Physical Exam  LMP 03/19/2009 (Exact Date)   Wt Readings from Last 4 Encounters:   06/27/22 82.6 kg (182 lb)   12/06/21 83 kg (182 lb 14.4 oz)   12/01/21 83.1 kg (183 lb 4.8 oz)   11/15/21 81.9 kg (180 lb 9.6 oz)     General: Well appearing well in no distress.  Psych: good eye contact, no pressured speech     LABS:  A1c today 9.3%  Lab Results   Component Value Date     11/16/2021    CHLORIDE 107 11/16/2021    CO2 29 11/16/2021     (H) 11/16/2021    CR 0.63 11/16/2021    CR 0.69 05/05/2021    CR 0.68 08/27/2020    CR 0.64 08/12/2019    CR 0.69 12/12/2018    SUSHANT 9.2 11/16/2021    MAG 2.0 11/14/2018    ALBUMIN 3.8 11/16/2021    ALKPHOS 65 11/16/2021    LDL 81 11/16/2021    HDL 45 (L) 11/16/2021    TRIG 132 11/16/2021    TSH 0.69 05/05/2021    TSH 1.29 10/30/2017    TSH 0.76 02/08/2017     Lab Results   Component Value Date    MICROL 6 11/16/2021    MICROL 7 08/27/2020    MICROL <5 04/02/2020    MICROL 5 09/25/2019    MICROL 7 08/06/2018     Lab Results   Component Value Date    A1C 8.7 (H) 11/16/2021    A1C 8.3 (H) 05/05/2021    A1C 8.2 (H) 08/27/2020    A1C 8.1 (H) 04/02/2020    A1C 8.2 (H) 11/14/2018       Lab Results   Component Value Date    HGB 15.7 05/05/2021         HPI:  Marialee T Conanan a middle aged female is here  for the first time for evaluation of Diabetes mellitus type 2.

## 2022-06-27 ENCOUNTER — TELEPHONE (OUTPATIENT)
Dept: FAMILY MEDICINE | Facility: CLINIC | Age: 62
End: 2022-06-27

## 2022-06-27 ENCOUNTER — TELEPHONE (OUTPATIENT)
Dept: EDUCATION SERVICES | Facility: CLINIC | Age: 62
End: 2022-06-27

## 2022-06-27 ENCOUNTER — TELEPHONE (OUTPATIENT)
Dept: ENDOCRINOLOGY | Facility: CLINIC | Age: 62
End: 2022-06-27

## 2022-06-27 ENCOUNTER — OFFICE VISIT (OUTPATIENT)
Dept: ENDOCRINOLOGY | Facility: CLINIC | Age: 62
End: 2022-06-27
Payer: COMMERCIAL

## 2022-06-27 VITALS
OXYGEN SATURATION: 97 % | DIASTOLIC BLOOD PRESSURE: 73 MMHG | WEIGHT: 182 LBS | BODY MASS INDEX: 32.24 KG/M2 | HEART RATE: 81 BPM | SYSTOLIC BLOOD PRESSURE: 116 MMHG

## 2022-06-27 DIAGNOSIS — Z79.4 TYPE 2 DIABETES MELLITUS WITH MILD NONPROLIFERATIVE RETINOPATHY WITHOUT MACULAR EDEMA, WITH LONG-TERM CURRENT USE OF INSULIN, UNSPECIFIED LATERALITY (H): Primary | ICD-10-CM

## 2022-06-27 DIAGNOSIS — E11.3299 TYPE 2 DIABETES MELLITUS WITH MILD NONPROLIFERATIVE RETINOPATHY WITHOUT MACULAR EDEMA, WITH LONG-TERM CURRENT USE OF INSULIN, UNSPECIFIED LATERALITY (H): Primary | ICD-10-CM

## 2022-06-27 DIAGNOSIS — E11.21 TYPE 2 DIABETES MELLITUS WITH DIABETIC NEPHROPATHY, WITH LONG-TERM CURRENT USE OF INSULIN (H): ICD-10-CM

## 2022-06-27 DIAGNOSIS — Z79.4 TYPE 2 DIABETES MELLITUS WITH DIABETIC NEPHROPATHY, WITH LONG-TERM CURRENT USE OF INSULIN (H): ICD-10-CM

## 2022-06-27 LAB — HBA1C MFR BLD: 9.3 % (ref 0–5.7)

## 2022-06-27 PROCEDURE — 83036 HEMOGLOBIN GLYCOSYLATED A1C: CPT | Performed by: INTERNAL MEDICINE

## 2022-06-27 PROCEDURE — 99214 OFFICE O/P EST MOD 30 MIN: CPT | Performed by: INTERNAL MEDICINE

## 2022-06-27 RX ORDER — PROCHLORPERAZINE 25 MG/1
1 SUPPOSITORY RECTAL
Qty: 1 EACH | Refills: 1 | Status: SHIPPED | OUTPATIENT
Start: 2022-06-27 | End: 2022-07-11

## 2022-06-27 RX ORDER — PROCHLORPERAZINE 25 MG/1
1 SUPPOSITORY RECTAL
Qty: 3 EACH | Refills: 11 | Status: SHIPPED | OUTPATIENT
Start: 2022-06-27 | End: 2022-07-11

## 2022-06-27 RX ORDER — PROCHLORPERAZINE 25 MG/1
1 SUPPOSITORY RECTAL ONCE
Qty: 1 EACH | Refills: 0 | Status: SHIPPED | OUTPATIENT
Start: 2022-06-27 | End: 2022-06-27

## 2022-06-27 NOTE — PATIENT INSTRUCTIONS
[facility]  Diabetes Educators:   Thi Elise, RN and Georgie Doll RN  Clinic Nurse: ALTON Valle  CMA's: Collin   Scheduling/Clinic phone number : [phone]   Clinic Fax: [phone]  On-Call Endocrine at the [place] (after hours/weekends): [phone] option 4    Please call the number below to schedule your labs.    Increase Basaglar 70 untis daily  Novolog 30 units with meals  Metformin 1000 mg twice daily  Jardiance 25 mg daily          Appointment Reminders:  * Please bring meter with for staff to download  * If you are due ONLY for an A1C, it is scheduled with the nurse and will be done in clinic. You do not need to schedule a lab appointment. Fasting is not required for an A1C.  * Refill request should be submitted to your pharmacy. They will contact clinic for approval.

## 2022-06-27 NOTE — NURSING NOTE
Maliha Pedro's goals for this visit include:   Chief Complaint   Patient presents with     RECHECK     Diabetes follow up     She requests these members of her care team be copied on today's visit information: Yes    PCP: Elva Ortiz    Referring Provider:  No referring provider defined for this encounter.    /73 (BP Location: Left arm, Patient Position: Sitting, Cuff Size: Adult Regular)   Pulse 81   Wt 82.6 kg (182 lb)   LMP 03/19/2009 (Exact Date)   SpO2 97%   BMI 32.24 kg/m      Do you need any medication refills at today's visit? Yes    Mayi Fajardo [facility]  Adult Endocrinology  [facility]

## 2022-06-27 NOTE — LETTER
6/27/2022         RE: Maliha Pedro  [address]  [facility] [address]        Dear Colleague,    Thank you for referring your patient, Maliha Pedro, to the [facility]. Please see a copy of my visit note below.    Outcome for 06/21/22 1:15 PM: Mychart message sent - Meter  Mayi Fajardo CMA  Outcome for 06/22/22 4:05 PM: Left Voicemail   Mayi Tillmang CMA  Adult Endocrinology  [facility]    Endocrinology and Diabetes Clinic    Interval history  6 month follow up for T2DM  Pt is retired and going to the [facility]. Currently coming back to office q 6 months.  Has been back from the [facility] 11/10/21, back to [facility] in 1/2022.  Has tried some rest again from [facility], [place]    Nocturia once daily, no blurred vision.    Current diabetic medications:  Metformin 1000 bid, Lantus 50 (60) units daily, Jardiance 25 mg, Novolog 25 units two  Three times daily      SMBG: Leslie, has not used in the [facility]; would like to switch to Dexcom because easier to handle and humidity    Meal plan: 3 meals a day plus snacks, typically high in carbohydrates because of options available to her including rice and sweet yams  Exercise: Very limited due to humidity outside    Complications  1. Retinopathy: just saw ophthalmology, no retinopathy  2. Nephropathy: None  3. Neuropathy: No numbness no tingling no history of ulcerations  4. Hypoglycemia: Yes, however resolved with insulin adjustment  5. Macrovascular: No chest pain, no shortness of breath      HTN: Losartan, hydrochlorothiazide 12.5 daily  Dyslipidemia Crestor 10 mg    Previously used diabetes medications:   Bydureon in [place]    ASSESSMENT/Plan:   T2DM with comorbidity of obesity  Type 2 diabetes with poor glucose control on metformin Jardiance insulin glargine and NovoLog  Reviewed food choices, and options once she is cooking herself  In the  meantime increase insulin glargine to 70 units and NovoLog to 30 units with each meal  Hypertension controlled on losartan and hydrochlorothiazide dyslipidemia well controlled  Dyslipidemia as of November 2021 on 10 mg of Crestor    Fasting lab work prior to the visit    40 minutes spent on the date of the encounter doing chart review, review of test results, interpretation of tests, patient visit and documentation     This note was generated using computer recognized voice recognition. This might result in some expected imperfection.      Patient to contact the clinic if blood glucose (sugar) is under 70 two times in one week or above 200 for 3 consecutive days.       Stacia Carbajal MD  Endocrinology and Diabetes  Telephone contact:  [facility] [place] [phone]  [facility] [phone]        PMH:  Patient Active Problem List   Diagnosis     ? 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     Displacement of lumbar intervertebral disc without myelopathy     Nonallopathic lesion of lumbar region     Nonallopathic lesion of thoracic region     Nonallopathic lesion of sacral region     Bartholin gland cyst     Cataract     Irritable bowel syndrome     Esophageal reflux     Seasonal allergic rhinitis     Latex sensitivity     Colon polyp     Adenomatous polyp     HYPERLIPIDEMIA LDL GOAL <100     Ovarian cyst     Breast pain     Essential hypertension     Advanced directives, counseling/discussion     Obesity     Hypomagnesemia     Type 2 diabetes mellitus with mild nonproliferative retinopathy without macular edema, with long-term current use of insulin, unspecified laterality (H)       SOCIAL HISTORY:  Social History     Socioeconomic History     Marital status: Single     Spouse name: Not on file     Number of children: Not on file     Years of education: Not on file     Highest education level: Not on file   Occupational History     Not on file    Tobacco Use     Smoking status: Never Smoker     Smokeless tobacco: Never Used   Substance and Sexual Activity     Alcohol use: Yes     Comment: one drink a month     Drug use: No     Sexual activity: Not Currently     Partners: Male     Birth control/protection: None   Other Topics Concern     Parent/sibling w/ CABG, MI or angioplasty before 65F 55M? 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 Moved to  in . Initially lived in [place].    No children        2 cats     Social Determinants of Health     Financial Resource Strain: Not on file   Food Insecurity: Not on file   Transportation Needs: Not on file   Physical Activity: Not on file   Stress: Not on file   Social Connections: Not on file   Intimate Partner Violence: Not on file   Housing Stability: Not on file       FAMILY HISTORY:  Family History   Problem Relation Age of Onset     Diabetes Father          of dm 70's     C.A.D. Father      Cerebrovascular Disease Father      Diabetes Sister      Diabetes Sister      Diabetes Sister      Hypertension Sister      Diabetes Brother      Cancer Brother 59        Lung cancer     Diabetes Brother      Diabetes Brother      Cancer Brother         lung     Diabetes Brother      Glaucoma Maternal Grandmother      Diabetes Paternal Grandfather      Hypertension Paternal Grandfather      Cerebrovascular Disease Paternal Grandfather      Cancer Maternal Aunt         ovarian cancer.      Leukemia Half-Brother      Macular Degeneration No family hx of         Medications:   Current Outpatient Medications   Medication Sig Dispense Refill     albuterol (PROAIR HFA/PROVENTIL HFA/VENTOLIN HFA) 108 (90 Base) MCG/ACT inhaler INHALE 2 PUFFS INTO THE LUNGS EVERY 4 HOURS AS NEEDED FOR SHORTNESS OF BREATH / DYSPNEA 18 g 1     aspirin 81 MG EC tablet Take 81 mg by mouth daily Please resume in 5 days.  (12/22/2018)       CALCIUM + D OR Take 1 tablet by mouth 2 times daily.       Continuous Blood Gluc  (FREESTYLE BUSHRA 14 DAY READER) KIRIT 1 Device daily 1 Device 1     Continuous Blood Gluc Sensor (FREESTYLE BUSHRA 14 DAY SENSOR) MISC 1 Application every 14 days 6 each 3     empagliflozin (JARDIANCE) 25 MG TABS tablet Take 1 tablet (25 mg) by mouth daily 90 tablet 3     fexofenadine (ALLEGRA) 180 MG tablet Take 1 tablet by mouth daily. 1 TABLET DAILY Failed claritin for symptom cotrol. Zyrtec increases heart rate. 90 tablet 3     fish oil-omega-3 fatty acids (FISH OIL) 1000 MG capsule Take 1 capsule by mouth daily.       fluticasone (FLONASE) 50 MCG/ACT nasal spray USE 1-2 SPRAYS IN EACH NOSTRIL ONCE DAILY 48 g 3     Glucosamine-Chondroitin (GLUCOSAMINE CHONDR COMPLEX PO)        hydrochlorothiazide (HYDRODIURIL) 12.5 MG tablet Take 1 tablet (12.5 mg) by mouth daily 90 tablet 3     insulin aspart (NOVOLOG FLEXPEN) 100 UNIT/ML pen Uses  120 units daily subcutaneously 120 mL 3     insulin glargine (BASAGLAR KWIKPEN) 100 UNIT/ML pen Inject 50 units daily + 2 units for priming of pen with each use. 45 mL 2     insulin glargine (LANTUS SOLOSTAR) 100 UNIT/ML pen 80 units daily 75 mL 3     insulin pen needle (BD LISA U/F) 32G X 4 MM miscellaneous Use to inject 5-6 times daily. 500 each 3     latanoprost (XALATAN) 0.005 % ophthalmic solution Place 1 drop into both eyes daily 7.5 mL 11     losartan (COZAAR) 100 MG tablet Take 1 tablet (100 mg) by mouth daily 90 tablet 3     magnesium 250 MG tablet Take 1 tablet by mouth daily 90 tablet 3     metFORMIN (GLUCOPHAGE-XR) 500 MG 24 hr  tablet Take 2 tabs twice a day 360 tablet 3     montelukast (SINGULAIR) 10 MG tablet TAKE ONE TABLET BY MOUTH ONCE DAILY AT BEDTIME 90 tablet 3     MULTIVITAMIN OR Take 1 tablet by mouth daily.       NEEDLES, ANY SIZE Pen needles for Novolog insulin pen. Use to inject 5-6  times daily. 4 Box 3     pantoprazole (PROTONIX) 40 MG EC tablet TAKE ONE TABLET BY MOUTH ONCE DAILY 90 tablet 3     potassium chloride ER (KLOR-CON M) 10 MEQ CR tablet Take 1 tablet (10 mEq) by mouth daily 90 tablet 3     rosuvastatin (CRESTOR) 10 MG tablet Take 1 tablet (10 mg) by mouth daily 90 tablet 3     VITAMIN D 2000 UNIT OR TABS Take 1 tablet by mouth daily.       Physical Exam  LMP 03/19/2009 (Exact Date)   Wt Readings from Last 4 Encounters:   06/27/22 82.6 kg (182 lb)   12/06/21 83 kg (182 lb 14.4 oz)   12/01/21 83.1 kg (183 lb 4.8 oz)   11/15/21 81.9 kg (180 lb 9.6 oz)     General: Well appearing well in no distress.  Psych: good eye contact, no pressured speech     LABS:  A1c today 9.3%  Lab Results   Component Value Date     11/16/2021    CHLORIDE 107 11/16/2021    CO2 29 11/16/2021     (H) 11/16/2021    CR 0.63 11/16/2021    CR 0.69 05/05/2021    CR 0.68 08/27/2020    CR 0.64 08/12/2019    CR 0.69 12/12/2018    SUSHANT 9.2 11/16/2021    MAG 2.0 11/14/2018    ALBUMIN 3.8 11/16/2021    ALKPHOS 65 11/16/2021    LDL 81 11/16/2021    HDL 45 (L) 11/16/2021    TRIG 132 11/16/2021    TSH 0.69 05/05/2021    TSH 1.29 10/30/2017    TSH 0.76 02/08/2017     Lab Results   Component Value Date    MICROL 6 11/16/2021    MICROL 7 08/27/2020    MICROL <5 04/02/2020    MICROL 5 09/25/2019    MICROL 7 08/06/2018     Lab Results   Component Value Date    A1C 8.7 (H) 11/16/2021    A1C 8.3 (H) 05/05/2021    A1C 8.2 (H) 08/27/2020    A1C 8.1 (H) 04/02/2020    A1C 8.2 (H) 11/14/2018       Lab Results   Component Value Date    HGB 15.7 05/05/2021         HPI:  Maliha Pedro a middle aged female is here  for the first time for evaluation of  Diabetes mellitus type 2.            Again, thank you for allowing me to participate in the care of your patient.        Sincerely,        Stacia Carbajal MD

## 2022-06-27 NOTE — TELEPHONE ENCOUNTER
Dr.Susanne Carbajal is seeing Maliha today. She is requesting to switch from Leslie continuous glucose monitor to Dexcom G6. She travels to the [facility] and the Leslie sensors do not stay on due to the humidity.  Prescription for Dexcom G6 and supplies sent to [facility] pharmacy here in [place].  Georgie Doll RN, [facility]

## 2022-06-27 NOTE — TELEPHONE ENCOUNTER
Patient Quality Outreach    Patient is due for the following:   Diabetes -  A1C and Diabetic Follow-Up Visit    NEXT STEPS:   Patient has upcoming appointment, these items will be addressed at that time. Appt today 6/27/22, w/ KIERAN Carbajal.    Type of outreach:    Chart review performed, no outreach needed.    Next Steps:  Reach out within 90 days via [employer].    Max number of attempts reached: No. Will try again in 90 days if patient still on fail list.    Questions for provider review:    None     Valeria Serra  Chart routed to n/a.

## 2022-06-27 NOTE — TELEPHONE ENCOUNTER
PA Initiation    Medication: Dexcom , sensors, and transmitter pa pending  Insurance Company: [employer] - Phone [phone] Fax [phone]  Pharmacy Filling the Rx:    Filling Pharmacy Phone:    Filling Pharmacy Fax:    Start Date: 6/27/2022    Dexcom  key: [id number]  Sensors key: [id number]  Transmitter Key: [id number]

## 2022-06-28 RX ORDER — INSULIN ASPART 100 [IU]/ML
INJECTION, SOLUTION INTRAVENOUS; SUBCUTANEOUS
Qty: 120 ML | Refills: 3
Start: 2022-06-28

## 2022-06-28 NOTE — TELEPHONE ENCOUNTER
PRIOR AUTHORIZATION DENIED    Medication: Dexcom , sensors, and transmitter pa denied    Denial Date: 6/28/2022    Denial Rational:     Appeal Information:

## 2022-06-28 NOTE — TELEPHONE ENCOUNTER
Dexcom supplies denied- Patient is injecting three or more times but it isn't clear enough on the directions of the insulin please update the directions and then we should be able to do an appeal. Please advise if you would like to move forward with appeal.

## 2022-06-30 ENCOUNTER — TELEPHONE (OUTPATIENT)
Dept: ENDOCRINOLOGY | Facility: CLINIC | Age: 62
End: 2022-06-30

## 2022-06-30 DIAGNOSIS — E11.21 TYPE 2 DIABETES MELLITUS WITH DIABETIC NEPHROPATHY, WITH LONG-TERM CURRENT USE OF INSULIN (H): ICD-10-CM

## 2022-06-30 DIAGNOSIS — Z79.4 TYPE 2 DIABETES MELLITUS WITH DIABETIC NEPHROPATHY, WITH LONG-TERM CURRENT USE OF INSULIN (H): ICD-10-CM

## 2022-06-30 RX ORDER — INSULIN ASPART 100 [IU]/ML
INJECTION, SOLUTION INTRAVENOUS; SUBCUTANEOUS
Qty: 90 ML | Refills: 3 | Status: SHIPPED | OUTPATIENT
Start: 2022-06-30 | End: 2022-07-11

## 2022-06-30 RX ORDER — INSULIN GLARGINE 100 [IU]/ML
INJECTION, SOLUTION SUBCUTANEOUS
Qty: 45 ML | Refills: 2 | Status: SHIPPED | OUTPATIENT
Start: 2022-06-30 | End: 2022-07-11

## 2022-06-30 NOTE — TELEPHONE ENCOUNTER
Patient would like the Novolog sent to our pharmacy (it doesn't look like it was sent anywhere) and she also stated the dose on her Basaglar went up to 70 units daily. Can you send both of those Rx's to [facility]? Thanks!  Nargis Pierson, PharmD   Pharmacy manager [facility]

## 2022-06-30 NOTE — TELEPHONE ENCOUNTER
Medication Appeal Initiation    We have initiated an appeal for the requested medication:  Medication: Dexcom , sensors, and transmitter appeal pending   Appeal Start Date:  6/30/2022  Insurance Company: [employer] - Phone [phone] Fax [phone]  Comments:  Faxed appeal for all three dexcom supplies. Going to follow up on Tuesday 7/5 to make sure they got appeals. Faxing to [phone] , going to follow up at [phone]

## 2022-07-01 NOTE — TELEPHONE ENCOUNTER
MEDICATION APPEAL APPROVED    Medication: Dexcom , sensors, and transmitter appeal approved  Authorization Effective Date: 6/1/2022  Authorization Expiration Date: 7/1/2025  Approved Dose/Quantity: sensor 3 per 30 transmitter 1 per 90  Reference #: [id number]   Insurance Company: [employer] - Phone [phone] Fax [phone]  Expected CoPay:       CoPay Card Available:      Foundation Assistance Needed:    Which Pharmacy is filling the prescription (Not needed for infusion/clinic administered): [facility] - [place], [place] - [address], [address]

## 2022-07-11 ENCOUNTER — TELEPHONE (OUTPATIENT)
Dept: ENDOCRINOLOGY | Facility: CLINIC | Age: 62
End: 2022-07-11

## 2022-07-11 DIAGNOSIS — E11.3299 TYPE 2 DIABETES MELLITUS WITH MILD NONPROLIFERATIVE RETINOPATHY WITHOUT MACULAR EDEMA, WITH LONG-TERM CURRENT USE OF INSULIN, UNSPECIFIED LATERALITY (H): ICD-10-CM

## 2022-07-11 DIAGNOSIS — E11.21 TYPE 2 DIABETES MELLITUS WITH DIABETIC NEPHROPATHY, WITH LONG-TERM CURRENT USE OF INSULIN (H): ICD-10-CM

## 2022-07-11 DIAGNOSIS — Z79.4 TYPE 2 DIABETES MELLITUS WITH DIABETIC NEPHROPATHY, WITH LONG-TERM CURRENT USE OF INSULIN (H): ICD-10-CM

## 2022-07-11 DIAGNOSIS — Z79.4 TYPE 2 DIABETES MELLITUS WITH MILD NONPROLIFERATIVE RETINOPATHY WITHOUT MACULAR EDEMA, WITH LONG-TERM CURRENT USE OF INSULIN, UNSPECIFIED LATERALITY (H): ICD-10-CM

## 2022-07-11 RX ORDER — PROCHLORPERAZINE 25 MG/1
1 SUPPOSITORY RECTAL
Qty: 2 EACH | Refills: 1 | Status: SHIPPED | OUTPATIENT
Start: 2022-07-11 | End: 2024-06-10

## 2022-07-11 RX ORDER — PEN NEEDLE, DIABETIC 32GX 5/32"
NEEDLE, DISPOSABLE MISCELLANEOUS
Qty: 600 EACH | Refills: 1 | Status: SHIPPED | OUTPATIENT
Start: 2022-07-11 | End: 2024-06-17

## 2022-07-11 RX ORDER — INSULIN GLARGINE 100 [IU]/ML
INJECTION, SOLUTION SUBCUTANEOUS
Qty: 90 ML | Refills: 1 | Status: SHIPPED | OUTPATIENT
Start: 2022-07-11 | End: 2022-12-07

## 2022-07-11 RX ORDER — PROCHLORPERAZINE 25 MG/1
1 SUPPOSITORY RECTAL
Qty: 18 EACH | Refills: 1 | Status: SHIPPED | OUTPATIENT
Start: 2022-07-11 | End: 2024-06-10

## 2022-07-11 RX ORDER — INSULIN ASPART 100 [IU]/ML
INJECTION, SOLUTION INTRAVENOUS; SUBCUTANEOUS
Qty: 90 ML | Refills: 1 | Status: SHIPPED | OUTPATIENT
Start: 2022-07-11 | End: 2023-07-11

## 2022-07-11 NOTE — TELEPHONE ENCOUNTER
[facility] Call Center    Phone Message    May a detailed message be left on voicemail: yes     Reason for Call: Medication Refill Request    Has the patient contacted the pharmacy for the refill? Yes   Name of medication being requested: insulin glargine (BASAGLAR KWIKPEN) 100 UNIT/ML pen   empagliflozin (JARDIANCE) 25 MG TABS tablet   montelukast (SINGULAIR) 10 MG tablet   pantoprazole (PROTONIX) 40 MG EC tablet      Continuous Blood Gluc Transmit (DEXCOM G6 TRANSMITTER) MISC     Provider who prescribed the medication: Charlotte Carbajal  Pharmacy: [facility] [id number], [address]  Date medication is needed: as soon as possible, per patient she is requesting a 6 month supply due to leaving the country for 6 months         Action Taken: Message routed to:  [facility] endo    Travel Screening: Not Applicable

## 2022-07-27 NOTE — TELEPHONE ENCOUNTER
[facility] Call Center     Phone Message     May a detailed message be left on voicemail: yes      Reason for Call: Medication Question or concern regarding medication   Prescription Clarification  Name of Medication: Per patient she needs another Prior auth request done for her to get enough sensors to last until she gets back in town in order to get approved she states she needs someone to contact [facility] [phone]  Prescribing Provider: Dr. Carbajal               Pharmacy: [facility] [id number], [address]              What on the order needs clarification?     Pt stated she is leaving the country this weekend and she needs these supplies prior to leaving.  Please call ASAP.               Action Taken: Other: ENDO     Travel Screening: Not Applicable

## 2022-07-30 ENCOUNTER — MYC MEDICAL ADVICE (OUTPATIENT)
Dept: FAMILY MEDICINE | Facility: CLINIC | Age: 62
End: 2022-07-30

## 2022-10-23 ENCOUNTER — HEALTH MAINTENANCE LETTER (OUTPATIENT)
Age: 62
End: 2022-10-23

## 2022-11-25 ENCOUNTER — MYC MEDICAL ADVICE (OUTPATIENT)
Dept: FAMILY MEDICINE | Facility: CLINIC | Age: 62
End: 2022-11-25

## 2022-11-28 ENCOUNTER — LAB (OUTPATIENT)
Dept: LAB | Facility: CLINIC | Age: 62
End: 2022-11-28
Payer: COMMERCIAL

## 2022-11-28 DIAGNOSIS — E11.3299 TYPE 2 DIABETES MELLITUS WITH MILD NONPROLIFERATIVE RETINOPATHY WITHOUT MACULAR EDEMA, WITH LONG-TERM CURRENT USE OF INSULIN, UNSPECIFIED LATERALITY (H): ICD-10-CM

## 2022-11-28 DIAGNOSIS — I10 ESSENTIAL HYPERTENSION: Primary | ICD-10-CM

## 2022-11-28 DIAGNOSIS — Z79.4 TYPE 2 DIABETES MELLITUS WITH MILD NONPROLIFERATIVE RETINOPATHY WITHOUT MACULAR EDEMA, WITH LONG-TERM CURRENT USE OF INSULIN, UNSPECIFIED LATERALITY (H): ICD-10-CM

## 2022-11-28 LAB
ANION GAP SERPL CALCULATED.3IONS-SCNC: 7 MMOL/L (ref 3–14)
BUN SERPL-MCNC: 14 MG/DL (ref 7–30)
CALCIUM SERPL-MCNC: 9.3 MG/DL (ref 8.5–10.1)
CHLORIDE BLD-SCNC: 109 MMOL/L (ref 94–109)
CHOLEST SERPL-MCNC: 158 MG/DL
CO2 SERPL-SCNC: 26 MMOL/L (ref 20–32)
CREAT SERPL-MCNC: 0.68 MG/DL (ref 0.52–1.04)
CREAT UR-MCNC: 140 MG/DL
FASTING STATUS PATIENT QL REPORTED: YES
FASTING STATUS PATIENT QL REPORTED: YES
GFR SERPL CREATININE-BSD FRML MDRD: >90 ML/MIN/1.73M2
GLUCOSE BLD-MCNC: 102 MG/DL (ref 70–99)
GLUCOSE BLD-MCNC: 102 MG/DL (ref 70–99)
HDLC SERPL-MCNC: 41 MG/DL
LDLC SERPL CALC-MCNC: 87 MG/DL
MICROALBUMIN UR-MCNC: 11 MG/L
MICROALBUMIN/CREAT UR: 7.86 MG/G CR (ref 0–25)
NONHDLC SERPL-MCNC: 117 MG/DL
POTASSIUM BLD-SCNC: 3.6 MMOL/L (ref 3.4–5.3)
SODIUM SERPL-SCNC: 142 MMOL/L (ref 133–144)
TRIGL SERPL-MCNC: 149 MG/DL
TSH SERPL DL<=0.005 MIU/L-ACNC: 1.24 MU/L (ref 0.4–4)

## 2022-11-28 PROCEDURE — 80061 LIPID PANEL: CPT

## 2022-11-28 PROCEDURE — 80048 BASIC METABOLIC PNL TOTAL CA: CPT

## 2022-11-28 PROCEDURE — 36415 COLL VENOUS BLD VENIPUNCTURE: CPT

## 2022-11-28 PROCEDURE — 84443 ASSAY THYROID STIM HORMONE: CPT

## 2022-11-28 PROCEDURE — 82043 UR ALBUMIN QUANTITATIVE: CPT

## 2022-11-28 NOTE — RESULT ENCOUNTER NOTE
Fco,  The kidney and electrolyte panel was normal.   We can review your results together at your upcoming visit.   See you soon.   Elva Ortiz MD

## 2022-12-01 DIAGNOSIS — H40.003 BORDERLINE GLAUCOMA, BILATERAL: Primary | ICD-10-CM

## 2022-12-02 NOTE — PROGRESS NOTES
Outcome for 12/02/22 1:41 PM: Per patient, will upload device before appointment  Mayi Fajardo [facility]  Adult Endocrinology  [facility]    Endocrinology and Diabetes Clinic    Interval history  6 month follow up for T2DM  Pt is retired and going to the [facility]. Currently coming back to office q 6 months.  Has been back from the [facility] 11/10/21, in may 2022 and back now in 12/2022. Pt has now had her own house build and [place] the [place]. Has good access to fresh fruit, vegetables and fish, some meat. Also eats rice, which she tried to limit    Problems with yeast infection, due to humidity and thinks also Jardiance    Current diabetic medications:  Metformin 1000 bid, Lantus 50 (60) units daily, Jardiance 25 mg, Novolog 25 units three times daily      SMBG:  Dexcom sensor, this is working much better for the patient as it does not fall off in contrast to shannon  Average blood sugar 169 GMI 7.3% in range 61% low 1% very low less than 1% pattern mild postprandial hyperglycemia throughout the day, during the day    Meal plan: 3 meals a day plus snacks, typically high in carbohydrates because of options available to her including rice and sweet yams  Exercise: has increased and walking lots of steps now, much more than before    Complications  1. Retinopathy: just saw ophthalmology, no retinopathy  2. Nephropathy: None  3. Neuropathy: No numbness no tingling no history of ulcerations  4. Hypoglycemia: Yes, however resolved with insulin adjustment  5. Macrovascular: No chest pain, no shortness of breath      HTN: Losartan, hydrochlorothiazide 12.5 daily  Dyslipidemia Crestor 10 mg    Previously used diabetes medications:   Bydureon in [place]    ASSESSMENT/Plan:   T2DM with comorbidity of obesity    Blood glucose control  Very much improved since the last visit due to lifestyle changes, including diet and physical activity.  Appears intolerant to Jardiance, patient has frequent  yeast infections.  We will stop for now.  Patient has fasting hypoglycemia  Do not change glargine to 70 units   If still experiencing fasting hypoglycemia decreased to 63  Adjust NovoLog to carbohydrate intake 15 to 35 units with meal     Hypertension controlled on losartan and hydrochlorothiazide dyslipidemia well controlled  Dyslipidemia as of November 2021 on 10 mg of Crestor      40 minutes spent on the date of the encounter doing chart review, review of test results, interpretation of tests, patient visit and documentation     This note was generated using computer recognized voice recognition. This might result in some expected imperfection.      Patient to contact the clinic if blood glucose (sugar) is under 70 two times in one week or above 200 for 3 consecutive days.       Stacia Carbajal MD  Endocrinology and Diabetes  Telephone contact:  [facility] [place] [phone]  [facility] [phone]            PMH:  Patient Active Problem List   Diagnosis     ? 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     Displacement of lumbar intervertebral disc without myelopathy     Nonallopathic lesion of lumbar region     Nonallopathic lesion of thoracic region     Nonallopathic lesion of sacral region     Bartholin gland cyst     Cataract     Irritable bowel syndrome     Esophageal reflux     Seasonal allergic rhinitis     Latex sensitivity     Colon polyp     Adenomatous polyp     HYPERLIPIDEMIA LDL GOAL <100     Ovarian cyst     Breast pain     Essential hypertension     Advanced directives, counseling/discussion     Obesity     Hypomagnesemia     Type 2 diabetes mellitus with mild nonproliferative retinopathy without macular edema, with long-term current use of insulin, unspecified laterality (H)       SOCIAL HISTORY:  Social History     Socioeconomic History     Marital status: Single     Spouse name: Not on file     Number of children: Not on file      Years of education: Not on file     Highest education level: Not on file   Occupational History     Not on file   Tobacco Use     Smoking status: Never     Smokeless tobacco: Never   Substance and Sexual Activity     Alcohol use: Yes     Comment: one drink a month     Drug use: No     Sexual activity: Not Currently     Partners: Male     Birth control/protection: None   Other Topics Concern     Parent/sibling w/ CABG, MI or angioplasty before 65F 55M? No      Service No     Blood Transfusions No     Caffeine Concern No     Occupational Exposure Yes     Comment: exposed to X-ray     Hobby Hazards No     Sleep Concern Yes     Comment: Hot Flashes     Stress Concern Yes     Comment: Work     Weight Concern Yes     Special Diet No     Back Care Yes     Comment: Back pain     Exercise Yes     Bike Helmet No     Seat Belt Yes     Self-Exams Yes   Social History Narrative    Works at  in surgical ICU    Born in the [facility]. Moved to  in . Initially lived in [place].    No children        2 cats     Social Determinants of Health     Financial Resource Strain: Not on file   Food Insecurity: Not on file   Transportation Needs: Not on file   Physical Activity: Not on file   Stress: Not on file   Social Connections: Not on file   Intimate Partner Violence: Not on file   Housing Stability: Not on file       FAMILY HISTORY:  Family History   Problem Relation Age of Onset     Diabetes Father          of dm 70's     C.A.D. Father      Cerebrovascular Disease Father      Diabetes Sister      Diabetes Sister      Diabetes Sister      Hypertension Sister      Diabetes Brother      Cancer Brother 59        Lung cancer     Diabetes Brother      Diabetes Brother      Cancer Brother         lung     Diabetes Brother      Glaucoma Maternal Grandmother      Diabetes Paternal Grandfather      Hypertension Paternal Grandfather      Cerebrovascular Disease Paternal Grandfather      Cancer Maternal Aunt          ovarian cancer.      Leukemia Half-Brother      Macular Degeneration No family hx of        Medications:   Current Outpatient Medications   Medication Sig Dispense Refill     albuterol (PROAIR HFA/PROVENTIL HFA/VENTOLIN HFA) 108 (90 Base) MCG/ACT inhaler INHALE 2 PUFFS INTO THE LUNGS EVERY 4 HOURS AS NEEDED FOR SHORTNESS OF BREATH / DYSPNEA 18 g 1     aspirin 81 MG EC tablet Take 81 mg by mouth daily Please resume in 5 days.  (12/22/2018)       CALCIUM + D OR Take 1 tablet by mouth 2 times daily.       Continuous Blood Gluc  (FREESTYLE BUSHRA 14 DAY READER) KIRIT 1 Device daily 1 Device 1     Continuous Blood Gluc Sensor (DEXCOM G6 SENSOR) MISC 1 each every 10 days 18 each 1     Continuous Blood Gluc Sensor (FREESTYLE BUSHRA 14 DAY SENSOR) MISC 1 Application every 14 days 6 each 3     Continuous Blood Gluc Transmit (DEXCOM G6 TRANSMITTER) MISC 1 each every 3 months Change every 3 months. 2 each 1     empagliflozin (JARDIANCE) 25 MG TABS tablet Take 1 tablet (25 mg) by mouth daily 180 tablet 1     fexofenadine (ALLEGRA) 180 MG tablet Take 1 tablet by mouth daily. 1 TABLET DAILY Failed claritin for symptom cotrol. Zyrtec increases heart rate. 90 tablet 3     fish oil-omega-3 fatty acids 1000 MG capsule Take 1 capsule by mouth daily.       fluticasone (FLONASE) 50 MCG/ACT nasal spray USE 1-2 SPRAYS IN EACH NOSTRIL ONCE DAILY 48 g 3     Glucosamine-Chondroitin (GLUCOSAMINE CHONDR COMPLEX PO)        hydrochlorothiazide (HYDRODIURIL) 12.5 MG tablet TAKE 1 TABLET (12.5 MG) BY MOUTH DAILY 90 tablet 0     insulin aspart (NOVOLOG FLEXPEN) 100 UNIT/ML pen Use 20-35 units three times daily with meals for up to 120 units daily 120 mL 3     insulin glargine (BASAGLAR KWIKPEN) 100 UNIT/ML pen Inject 70 units daily. 90 mL 1     insulin glargine (LANTUS SOLOSTAR) 100 UNIT/ML pen 80 units daily 75 mL 3     insulin pen needle (BD LISA U/F) 32G X 4 MM miscellaneous Use to inject 4-5 times daily. 600 each 1     latanoprost  (XALATAN) 0.005 % ophthalmic solution Place 1 drop into both eyes daily 7.5 mL 11     losartan (COZAAR) 100 MG tablet Take 1 tablet (100 mg) by mouth daily 90 tablet 3     magnesium 250 MG tablet Take 1 tablet by mouth daily 90 tablet 3     metFORMIN (GLUCOPHAGE-XR) 500 MG 24 hr tablet Take 2 tabs twice a day 360 tablet 3     montelukast (SINGULAIR) 10 MG tablet TAKE ONE TABLET BY MOUTH ONCE DAILY AT BEDTIME 90 tablet 0     MULTIVITAMIN OR Take 1 tablet by mouth daily.       NEEDLES, ANY SIZE Pen needles for Novolog insulin pen. Use to inject 5-6  times daily. 4 Box 3     NOVOLOG FLEXPEN 100 UNIT/ML soln Sig 20-25 units two-three times daily. Max daily dose 75 units 90 mL 1     pantoprazole (PROTONIX) 40 MG EC tablet TAKE ONE TABLET BY MOUTH ONCE DAILY 90 tablet 0     potassium chloride ER (KLOR-CON M) 10 MEQ CR tablet TAKE 1 TABLET (10 MEQ) BY MOUTH DAILY 90 tablet 0     rosuvastatin (CRESTOR) 10 MG tablet Take 1 tablet (10 mg) by mouth daily 90 tablet 3     VITAMIN D 2000 UNIT OR TABS Take 1 tablet by mouth daily.       Physical Exam  LMP 03/19/2009 (Exact Date)   Wt Readings from Last 4 Encounters:   06/27/22 82.6 kg (182 lb)   12/06/21 83 kg (182 lb 14.4 oz)   12/01/21 83.1 kg (183 lb 4.8 oz)   11/15/21 81.9 kg (180 lb 9.6 oz)     General: Well appearing well in no distress.  Psych: good eye contact, no pressured speech     LABS:  A1c today 9.3%  Lab Results   Component Value Date     11/28/2022    CHLORIDE 109 11/28/2022    CO2 26 11/28/2022     (H) 11/28/2022     (H) 11/28/2022    CR 0.68 11/28/2022    CR 0.63 11/16/2021    CR 0.69 05/05/2021    CR 0.68 08/27/2020    CR 0.64 08/12/2019    SUSHANT 9.3 11/28/2022    MAG 2.0 11/14/2018    ALBUMIN 3.8 11/16/2021    ALKPHOS 65 11/16/2021    LDL 87 11/28/2022    HDL 41 (L) 11/28/2022    TRIG 149 11/28/2022    TSH 1.24 11/28/2022    TSH 0.69 05/05/2021    TSH 1.29 10/30/2017     Lab Results   Component Value Date    MICROL 11 11/28/2022    MICROL 6  11/16/2021    MICROL 7 08/27/2020    MICROL <5 04/02/2020    MICROL 5 09/25/2019     Lab Results   Component Value Date    A1C 9.3 (A) 06/27/2022    A1C 8.7 (H) 11/16/2021    A1C 8.3 (H) 05/05/2021    A1C 8.2 (H) 08/27/2020    A1C 8.1 (H) 04/02/2020       Lab Results   Component Value Date    HGB 15.7 05/05/2021         HPI:  Maliha Pedro a middle aged female is here  for the first time for evaluation of Diabetes mellitus type 2.

## 2022-12-05 ENCOUNTER — OFFICE VISIT (OUTPATIENT)
Dept: ENDOCRINOLOGY | Facility: CLINIC | Age: 62
End: 2022-12-05
Payer: COMMERCIAL

## 2022-12-05 ENCOUNTER — OFFICE VISIT (OUTPATIENT)
Dept: OPHTHALMOLOGY | Facility: CLINIC | Age: 62
End: 2022-12-05
Attending: OPHTHALMOLOGY
Payer: COMMERCIAL

## 2022-12-05 VITALS
HEART RATE: 85 BPM | OXYGEN SATURATION: 97 % | SYSTOLIC BLOOD PRESSURE: 159 MMHG | BODY MASS INDEX: 34.01 KG/M2 | DIASTOLIC BLOOD PRESSURE: 87 MMHG | WEIGHT: 192 LBS

## 2022-12-05 DIAGNOSIS — E11.3299 TYPE 2 DIABETES MELLITUS WITH MILD NONPROLIFERATIVE RETINOPATHY WITHOUT MACULAR EDEMA, WITH LONG-TERM CURRENT USE OF INSULIN, UNSPECIFIED LATERALITY (H): Primary | ICD-10-CM

## 2022-12-05 DIAGNOSIS — Z79.4 TYPE 2 DIABETES MELLITUS WITH DIABETIC NEPHROPATHY, WITH LONG-TERM CURRENT USE OF INSULIN (H): ICD-10-CM

## 2022-12-05 DIAGNOSIS — H40.003 BORDERLINE GLAUCOMA, BILATERAL: ICD-10-CM

## 2022-12-05 DIAGNOSIS — E11.21 TYPE 2 DIABETES MELLITUS WITH DIABETIC NEPHROPATHY, WITH LONG-TERM CURRENT USE OF INSULIN (H): ICD-10-CM

## 2022-12-05 DIAGNOSIS — Z79.4 TYPE 2 DIABETES MELLITUS WITH MILD NONPROLIFERATIVE RETINOPATHY WITHOUT MACULAR EDEMA, WITH LONG-TERM CURRENT USE OF INSULIN, UNSPECIFIED LATERALITY (H): Primary | ICD-10-CM

## 2022-12-05 LAB — HBA1C MFR BLD: 7.1 % (ref 4.3–?)

## 2022-12-05 PROCEDURE — 92133 CPTRZD OPH DX IMG PST SGM ON: CPT | Performed by: OPHTHALMOLOGY

## 2022-12-05 PROCEDURE — G0463 HOSPITAL OUTPT CLINIC VISIT: HCPCS | Mod: 25

## 2022-12-05 PROCEDURE — 92015 DETERMINE REFRACTIVE STATE: CPT

## 2022-12-05 PROCEDURE — 83036 HEMOGLOBIN GLYCOSYLATED A1C: CPT | Performed by: INTERNAL MEDICINE

## 2022-12-05 PROCEDURE — 99213 OFFICE O/P EST LOW 20 MIN: CPT | Mod: GC | Performed by: OPHTHALMOLOGY

## 2022-12-05 PROCEDURE — 99215 OFFICE O/P EST HI 40 MIN: CPT | Performed by: INTERNAL MEDICINE

## 2022-12-05 ASSESSMENT — REFRACTION_MANIFEST
OS_CYLINDER: +1.00
OD_AXIS: 120
OD_CYLINDER: +0.75
OS_SPHERE: -4.00
OD_ADD: +2.50
OS_AXIS: 085
OD_SPHERE: -2.75
OS_ADD: +2.50

## 2022-12-05 ASSESSMENT — CONF VISUAL FIELD
METHOD: COUNTING FINGERS
OD_SUPERIOR_TEMPORAL_RESTRICTION: 0
OD_INFERIOR_NASAL_RESTRICTION: 0
OS_INFERIOR_NASAL_RESTRICTION: 0
OS_SUPERIOR_NASAL_RESTRICTION: 0
OS_NORMAL: 1
OD_NORMAL: 1
OS_SUPERIOR_TEMPORAL_RESTRICTION: 0
OD_SUPERIOR_NASAL_RESTRICTION: 0
OD_INFERIOR_TEMPORAL_RESTRICTION: 0
OS_INFERIOR_TEMPORAL_RESTRICTION: 0

## 2022-12-05 ASSESSMENT — VISUAL ACUITY
OD_CC: 20/25
OS_CC+: -2/+2
METHOD: SNELLEN - LINEAR
OD_CC+: -2
OS_CC: 20/25
CORRECTION_TYPE: GLASSES

## 2022-12-05 ASSESSMENT — SLIT LAMP EXAM - LIDS
COMMENTS: MEIBOMIAN GLAND DYSFUNCTION
COMMENTS: MEIBOMIAN GLAND DYSFUNCTION

## 2022-12-05 ASSESSMENT — TONOMETRY
OS_IOP_MMHG: 16
IOP_METHOD: TONOPEN
OD_IOP_MMHG: 15

## 2022-12-05 ASSESSMENT — EXTERNAL EXAM - LEFT EYE: OS_EXAM: DERMATOCHALASIS

## 2022-12-05 ASSESSMENT — EXTERNAL EXAM - RIGHT EYE: OD_EXAM: DERMATOCHALASIS

## 2022-12-05 NOTE — LETTER
12/5/2022         RE: Maliha Pedro  [address]  [facility] [address]        Dear Colleague,    Thank you for referring your patient, Maliha Pedro, to the [facility]. Please see a copy of my visit note below.    Outcome for 12/02/22 1:41 PM: Per patient, will upload device before appointment  Mayi Fajardo CMA  Adult Endocrinology  [facility]    Endocrinology and Diabetes Clinic    Interval history  6 month follow up for T2DM  Pt is retired and going to the [facility] [facility]. Currently coming back to office q 6 months.  Has been back from the [employer] 11/10/21, in may 2022 and back now in 12/2022. Pt has now had her own house build and lives on the family farm. Has good access to fresh fruit, vegetables and fish, some meat. Also eats rice, which she tried to limit    Problems with yeast infection, due to humidity and thinks also Jardiance    Current diabetic medications:  Metformin 1000 bid, Lantus 50 (60) units daily, Jardiance 25 mg, Novolog 25 units three times daily      SMBG:  Dexcom sensor, this is working much better for the patient as it does not fall off in contrast to shannon  Average blood sugar 169 GMI 7.3% in range 61% low 1% very low less than 1% pattern mild postprandial hyperglycemia throughout the day, during the day    Meal plan: 3 meals a day plus snacks, typically high in carbohydrates because of options available to her including rice and sweet yams  Exercise: has increased and walking lots of steps now, much more than before    Complications  1. Retinopathy: just saw ophthalmology, no retinopathy  2. Nephropathy: None  3. Neuropathy: No numbness no tingling no history of ulcerations  4. Hypoglycemia: Yes, however resolved with insulin adjustment  5. Macrovascular: No chest pain, no shortness of breath      HTN: Losartan, hydrochlorothiazide 12.5 daily  Dyslipidemia Crestor 10 mg    Previously used diabetes  medications:   Bydureon in [place]    ASSESSMENT/Plan:   T2DM with comorbidity of obesity    Blood glucose control  Very much improved since the last visit due to lifestyle changes, including diet and physical activity.  Appears intolerant to Jardiance, patient has frequent yeast infections.  We will stop for now.  Patient has fasting hypoglycemia  Do not change glargine to 70 units   If still experiencing fasting hypoglycemia decreased to 63  Adjust NovoLog to carbohydrate intake 15 to 35 units with meal     Hypertension controlled on losartan and hydrochlorothiazide dyslipidemia well controlled  Dyslipidemia as of November 2021 on 10 mg of Crestor      40 minutes spent on the date of the encounter doing chart review, review of test results, interpretation of tests, patient visit and documentation     This note was generated using computer recognized voice recognition. This might result in some expected imperfection.      Patient to contact the clinic if blood glucose (sugar) is under 70 two times in one week or above 200 for 3 consecutive days.       Stacia Carbajal MD  Endocrinology and Diabetes  Telephone contact:  [facility] [place] [phone]  [facility] [phone]            PMH:  Patient Active Problem List   Diagnosis     ? 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     Displacement of lumbar intervertebral disc without myelopathy     Nonallopathic lesion of lumbar region     Nonallopathic lesion of thoracic region     Nonallopathic lesion of sacral region     Bartholin gland cyst     Cataract     Irritable bowel syndrome     Esophageal reflux     Seasonal allergic rhinitis     Latex sensitivity     Colon polyp     Adenomatous polyp     HYPERLIPIDEMIA LDL GOAL <100     Ovarian cyst     Breast pain     Essential hypertension     Advanced directives, counseling/discussion     Obesity     Hypomagnesemia     Type 2 diabetes mellitus with  mild nonproliferative retinopathy without macular edema, with long-term current use of insulin, unspecified laterality (H)       SOCIAL HISTORY:  Social History     Socioeconomic History     Marital status: Single     Spouse name: Not on file     Number of children: Not on file     Years of education: Not on file     Highest education level: Not on file   Occupational History     Not on file   Tobacco Use     Smoking status: Never     Smokeless tobacco: Never   Substance and Sexual Activity     Alcohol use: Yes     Comment: one drink a month     Drug use: No     Sexual activity: Not Currently     Partners: Male     Birth control/protection: None   Other Topics Concern     Parent/sibling w/ CABG, MI or angioplasty before 65F 55M? 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 Moved to  in . Initially lived in [place].    No children        2 cats     Social Determinants of Health     Financial Resource Strain: Not on file   Food Insecurity: Not on file   Transportation Needs: Not on file   Physical Activity: Not on file   Stress: Not on file   Social Connections: Not on file   Intimate Partner Violence: Not on file   Housing Stability: Not on file       FAMILY HISTORY:  Family History   Problem Relation Age of Onset     Diabetes Father          of dm 70's     C.A.D. Father      Cerebrovascular Disease Father      Diabetes Sister      Diabetes Sister      Diabetes Sister      Hypertension Sister      Diabetes Brother      Cancer Brother 59        Lung cancer      Diabetes Brother      Diabetes Brother      Cancer Brother         lung     Diabetes Brother      Glaucoma Maternal Grandmother      Diabetes Paternal Grandfather      Hypertension Paternal Grandfather      Cerebrovascular Disease Paternal Grandfather      Cancer Maternal Aunt         ovarian cancer.      Leukemia Half-Brother      Macular Degeneration No family hx of        Medications:   Current Outpatient Medications   Medication Sig Dispense Refill     albuterol (PROAIR HFA/PROVENTIL HFA/VENTOLIN HFA) 108 (90 Base) MCG/ACT inhaler INHALE 2 PUFFS INTO THE LUNGS EVERY 4 HOURS AS NEEDED FOR SHORTNESS OF BREATH / DYSPNEA 18 g 1     aspirin 81 MG EC tablet Take 81 mg by mouth daily Please resume in 5 days.  (12/22/2018)       CALCIUM + D OR Take 1 tablet by mouth 2 times daily.       Continuous Blood Gluc  (FREESTYLE BUSHRA 14 DAY READER) KIRIT 1 Device daily 1 Device 1     Continuous Blood Gluc Sensor (DEXCOM G6 SENSOR) MISC 1 each every 10 days 18 each 1     Continuous Blood Gluc Sensor (FREESTYLE BUSHRA 14 DAY SENSOR) MISC 1 Application every 14 days 6 each 3     Continuous Blood Gluc Transmit (DEXCOM G6 TRANSMITTER) MISC 1 each every 3 months Change every 3 months. 2 each 1     empagliflozin (JARDIANCE) 25 MG TABS tablet Take 1 tablet (25 mg) by mouth daily 180 tablet 1     fexofenadine (ALLEGRA) 180 MG tablet Take 1 tablet by mouth daily. 1 TABLET DAILY Failed claritin for symptom cotrol. Zyrtec increases heart rate. 90 tablet 3     fish oil-omega-3 fatty acids 1000 MG capsule Take 1 capsule by mouth daily.       fluticasone (FLONASE) 50 MCG/ACT nasal spray USE 1-2 SPRAYS IN EACH NOSTRIL ONCE DAILY 48 g 3     Glucosamine-Chondroitin (GLUCOSAMINE CHONDR COMPLEX PO)        hydrochlorothiazide (HYDRODIURIL) 12.5 MG tablet TAKE 1 TABLET (12.5 MG) BY MOUTH DAILY 90 tablet 0     insulin aspart (NOVOLOG FLEXPEN) 100 UNIT/ML pen Use 20-35 units three times daily with meals for up to 120 units daily 120 mL 3      insulin glargine (BASAGLAR KWIKPEN) 100 UNIT/ML pen Inject 70 units daily. 90 mL 1     insulin glargine (LANTUS SOLOSTAR) 100 UNIT/ML pen 80 units daily 75 mL 3     insulin pen needle (BD LISA U/F) 32G X 4 MM miscellaneous Use to inject 4-5 times daily. 600 each 1     latanoprost (XALATAN) 0.005 % ophthalmic solution Place 1 drop into both eyes daily 7.5 mL 11     losartan (COZAAR) 100 MG tablet Take 1 tablet (100 mg) by mouth daily 90 tablet 3     magnesium 250 MG tablet Take 1 tablet by mouth daily 90 tablet 3     metFORMIN (GLUCOPHAGE-XR) 500 MG 24 hr tablet Take 2 tabs twice a day 360 tablet 3     montelukast (SINGULAIR) 10 MG tablet TAKE ONE TABLET BY MOUTH ONCE DAILY AT BEDTIME 90 tablet 0     MULTIVITAMIN OR Take 1 tablet by mouth daily.       NEEDLES, ANY SIZE Pen needles for Novolog insulin pen. Use to inject 5-6  times daily. 4 Box 3     NOVOLOG FLEXPEN 100 UNIT/ML soln Sig 20-25 units two-three times daily. Max daily dose 75 units 90 mL 1     pantoprazole (PROTONIX) 40 MG EC tablet TAKE ONE TABLET BY MOUTH ONCE DAILY 90 tablet 0     potassium chloride ER (KLOR-CON M) 10 MEQ CR tablet TAKE 1 TABLET (10 MEQ) BY MOUTH DAILY 90 tablet 0     rosuvastatin (CRESTOR) 10 MG tablet Take 1 tablet (10 mg) by mouth daily 90 tablet 3     VITAMIN D 2000 UNIT OR TABS Take 1 tablet by mouth daily.       Physical Exam  LMP 03/19/2009 (Exact Date)   Wt Readings from Last 4 Encounters:   06/27/22 82.6 kg (182 lb)   12/06/21 83 kg (182 lb 14.4 oz)   12/01/21 83.1 kg (183 lb 4.8 oz)   11/15/21 81.9 kg (180 lb 9.6 oz)     General: Well appearing well in no distress.  Psych: good eye contact, no pressured speech     LABS:  A1c today 9.3%  Lab Results   Component Value Date     11/28/2022    CHLORIDE 109 11/28/2022    CO2 26 11/28/2022     (H) 11/28/2022     (H) 11/28/2022    CR 0.68 11/28/2022    CR 0.63 11/16/2021    CR 0.69 05/05/2021    CR 0.68 08/27/2020    CR 0.64 08/12/2019    SUSHANT 9.3 11/28/2022    MAG  2.0 11/14/2018    ALBUMIN 3.8 11/16/2021    ALKPHOS 65 11/16/2021    LDL 87 11/28/2022    HDL 41 (L) 11/28/2022    TRIG 149 11/28/2022    TSH 1.24 11/28/2022    TSH 0.69 05/05/2021    TSH 1.29 10/30/2017     Lab Results   Component Value Date    MICROL 11 11/28/2022    MICROL 6 11/16/2021    MICROL 7 08/27/2020    MICROL <5 04/02/2020    MICROL 5 09/25/2019     Lab Results   Component Value Date    A1C 9.3 (A) 06/27/2022    A1C 8.7 (H) 11/16/2021    A1C 8.3 (H) 05/05/2021    A1C 8.2 (H) 08/27/2020    A1C 8.1 (H) 04/02/2020       Lab Results   Component Value Date    HGB 15.7 05/05/2021         HPI:  Maliha Pedro a middle aged female is here  for the first time for evaluation of Diabetes mellitus type 2.          Again, thank you for allowing me to participate in the care of your patient.        Sincerely,        Stacia Carbajal MD

## 2022-12-05 NOTE — NURSING NOTE
Chief Complaints and History of Present Illnesses   Patient presents with     Glaucoma Suspect Follow Up     Chief Complaint(s) and History of Present Illness(es)     Glaucoma Suspect Follow Up            Laterality: both eyes    Associated symptoms: Negative for eye pain, flashes and floaters    Pain scale: 0/10          Comments    Hasn't used Latanoprost for a month while in [facility]. Pt states she returned last Tuesday. Will be returning next week to [facility].   Pt denies eye discomfort or VA changes.  Pt states has endocrinologist appt this afternoon.  Lab Results       Component                Value               Date                       A1C                      9.3                 06/27/2022                 A1C                      8.7                 11/16/2021                 A1C                      8.3                 05/05/2021                 A1C                      8.2                 08/27/2020                 A1C                      8.1                 04/02/2020                 A1C                      8.2                 11/14/2018              AMILCAR Hand COT 11:02 AM 12/05/2022

## 2022-12-05 NOTE — NURSING NOTE
Maliha Pedro's goals for this visit include:   Chief Complaint   Patient presents with     RECHECK     Follow up diabetes     She requests these members of her care team be copied on today's visit information: yes    PCP: Elva Ortiz    Referring Provider:  No referring provider defined for this encounter.    BP (!) 159/87 (BP Location: Left arm, Patient Position: Sitting, Cuff Size: Adult Large)   Pulse 85   Wt 87.1 kg (192 lb)   LMP 03/19/2009 (Exact Date)   SpO2 97%   BMI 34.01 kg/m      Do you need any medication refills at today's visit? yes    Mayi Fajardo CMA  Adult Endocrinology  [facility]

## 2022-12-05 NOTE — PATIENT INSTRUCTIONS
[facility]-Department of Endocrinology  Diabetes Educators:   Thi Elise, RN and Georgie Doll RN  Clinic Nurse: ALTON Valle  CMA's: Coliln   LPN: Sari  Scheduling/Clinic phone number : [phone]   Clinic Fax: [phone]  On-Call Endocrine at the [place] (after hours/weekends): [phone] option 4    Stop Jardiance    If still having low blood glucose over night, decrase Basaglar from 70 to 63 units daily    Adjust Novolog to food, about 15-35 units    Please call the number below to schedule your labs.    [facility]    General [phone]   [facility] [phone]   [place] [phone]   [facility]  [phone]   [facility] [phone]   [place] [phone]   [facility]) [phone]   [facility] Walk[facility] Only   [place] [phone]   [place] [phone]   [place] [phone]   [place] [phone]     Please reach out to the following centers to schedule your imaging appointment:       Imaging (DEXA, CT, MRI, XRAY)    [facility] ([facility], [place]) [phone]   [facility] ([place], [place]) [phone]   [facility] ([facility]) [phone]   [facility] ([facility]) [phone]     Appointment Reminders:  * Please bring meter with for staff to download  * If you are due ONLY for an A1C, it is scheduled with the nurse and will be done in clinic. You do not need to schedule a lab appointment. Fasting is not required for an A1C.  * Refill request should be submitted to your pharmacy. They will contact clinic for approval.

## 2022-12-05 NOTE — PROGRESS NOTES
CC: Maliha Pedro is a  62 year old year-old patient presenting for glaucoma suspect follow up   Interim: No new new flashes and floaters and VA stable. Reports she has stopped latanoprost.  PMH: Diabetes mellitus, states Diabetes mellitus is under better control.     Imaging:  ONFL 12/05/22  Right eye: stable thinning  Left eye: borderline. stable TS thinning compared to prior test    Prior 24-2 OVF   Right eye: FN 12% few spots of decreased sensitivity   Left eye: 28%FN. central spot of decreased sensitivity. (full on 05/2018)    Assessment & Plan:     # Glaucoma suspect, bilateral  - large cup to disc ratio R>L  - Intraocular pressure 15/13 both eyes   - pachmetry 583/613  -Unable to get OVF today  - intraocular pressure good off of latanoprost; will monitor off of this drop at this point    # Type 2 diabetes mellitus without retinopathy  Blood pressure (<120/80) and blood glucose (HbA1c <7.0) control discussed with patient. Patient advised that failure to adequately control each may lead to vision loss. The patient expressed understanding.  Prefers no dilated exam today     # Cataract, right eye  -becoming visually significant  - patient not interested in cataract evaluation for now    # Pseudophakia, left eye  -stable  -observe    # Posterior vitreous detachment of right eye  -stable  -observe    # Old inferior temporal small tear surrounded by pigment, no subretinal fluid right eye   Retina attached, no other tears  Retina detachment precautions were discussed with the patient (presence or increased in flashes, floaters or a curtain in the visual field) and was asked to return if any of the those occur    #. Dry eye syndrome  artificial tears  As needed and warm compresses      # Mild Epiretinal membrane right eye  Stable   Not visually significant     # myopia  Happy with current prescription     PLAN:  Stable exam   Follow up 6 months with OVF 24-2; retinal nerve fiber layer ; dilation  Patient  moving to [facility]; [place] continue [place] return to [place] at times    Deshawn Nathan MD, MPH  Vitreoretinal Fellow PGY-5  [facility]    ~~~~~~~~~~~~~~~~~~~~~~~~~~~~~~~~~~   Complete documentation of historical and exam elements from today's encounter can be found in the full encounter summary report (not reduplicated in this progress note).  I personally obtained the chief complaint(s) and history of present illness.  I confirmed and edited as necessary the review of systems, past medical/surgical history, family history, social history, and examination findings as documented by others; and I examined the patient myself.  I personally reviewed the relevant tests, images, and reports as documented above.  I personally reviewed the ophthalmic test(s) associated with this encounter, agree with the interpretation(s) as documented by the resident/fellow, and have edited the corresponding report(s) as necessary.   I formulated and edited as necessary the assessment and plan and discussed the findings and management plan with the patient and family    Lesly Tracy MD   of Ophthalmology.  Retina Service   Department of Ophthalmology and Visual Neurosciences   [facility]  Phone: [phone]   Fax: [phone]

## 2022-12-06 NOTE — TELEPHONE ENCOUNTER
Refills are early but patient is going to the [facility] and needs them early.    Payton Ruby, [facility]  Adult Endocrinology  [facility], [place]

## 2022-12-07 RX ORDER — INSULIN GLARGINE 100 [IU]/ML
INJECTION, SOLUTION SUBCUTANEOUS
Qty: 90 ML | Refills: 1 | Status: SHIPPED | OUTPATIENT
Start: 2022-12-07 | End: 2023-06-09

## 2022-12-07 RX ORDER — FLASH GLUCOSE SCANNING READER
1 EACH MISCELLANEOUS DAILY
Qty: 1 EACH | Refills: 0 | Status: SHIPPED | OUTPATIENT
Start: 2022-12-07 | End: 2023-06-09

## 2022-12-07 RX ORDER — METFORMIN HCL 500 MG
TABLET, EXTENDED RELEASE 24 HR ORAL
Qty: 360 TABLET | Refills: 3 | Status: SHIPPED | OUTPATIENT
Start: 2022-12-07 | End: 2023-10-07

## 2022-12-08 ENCOUNTER — OFFICE VISIT (OUTPATIENT)
Dept: FAMILY MEDICINE | Facility: CLINIC | Age: 62
End: 2022-12-08
Payer: COMMERCIAL

## 2022-12-08 ENCOUNTER — ANCILLARY PROCEDURE (OUTPATIENT)
Dept: GENERAL RADIOLOGY | Facility: CLINIC | Age: 62
End: 2022-12-08
Attending: FAMILY MEDICINE
Payer: COMMERCIAL

## 2022-12-08 VITALS
WEIGHT: 193.5 LBS | BODY MASS INDEX: 34.28 KG/M2 | HEART RATE: 76 BPM | DIASTOLIC BLOOD PRESSURE: 81 MMHG | SYSTOLIC BLOOD PRESSURE: 136 MMHG | RESPIRATION RATE: 14 BRPM | OXYGEN SATURATION: 97 % | TEMPERATURE: 97.5 F

## 2022-12-08 DIAGNOSIS — M79.18 LEFT BUTTOCK PAIN: ICD-10-CM

## 2022-12-08 DIAGNOSIS — J30.2 SEASONAL ALLERGIC RHINITIS, UNSPECIFIED TRIGGER: ICD-10-CM

## 2022-12-08 DIAGNOSIS — K21.9 GASTROESOPHAGEAL REFLUX DISEASE, UNSPECIFIED WHETHER ESOPHAGITIS PRESENT: ICD-10-CM

## 2022-12-08 DIAGNOSIS — I10 ESSENTIAL HYPERTENSION: Primary | ICD-10-CM

## 2022-12-08 DIAGNOSIS — L29.2 VULVAR ITCHING: ICD-10-CM

## 2022-12-08 DIAGNOSIS — R05.9 COUGH, UNSPECIFIED TYPE: ICD-10-CM

## 2022-12-08 DIAGNOSIS — E78.5 HYPERLIPIDEMIA LDL GOAL <100: ICD-10-CM

## 2022-12-08 PROCEDURE — 73502 X-RAY EXAM HIP UNI 2-3 VIEWS: CPT | Mod: TC | Performed by: RADIOLOGY

## 2022-12-08 PROCEDURE — 87210 SMEAR WET MOUNT SALINE/INK: CPT | Performed by: FAMILY MEDICINE

## 2022-12-08 PROCEDURE — 72100 X-RAY EXAM L-S SPINE 2/3 VWS: CPT | Mod: TC | Performed by: RADIOLOGY

## 2022-12-08 PROCEDURE — 99214 OFFICE O/P EST MOD 30 MIN: CPT | Performed by: FAMILY MEDICINE

## 2022-12-08 RX ORDER — ALBUTEROL SULFATE 90 UG/1
2 AEROSOL, METERED RESPIRATORY (INHALATION) EVERY 4 HOURS PRN
Qty: 18 G | Refills: 1 | Status: SHIPPED | OUTPATIENT
Start: 2022-12-08 | End: 2023-11-13

## 2022-12-08 RX ORDER — ROSUVASTATIN CALCIUM 10 MG/1
10 TABLET, COATED ORAL DAILY
Qty: 90 TABLET | Refills: 3 | Status: SHIPPED | OUTPATIENT
Start: 2022-12-08 | End: 2023-11-13

## 2022-12-08 RX ORDER — HYDROCHLOROTHIAZIDE 12.5 MG/1
12.5 TABLET ORAL DAILY
Qty: 90 TABLET | Refills: 3 | Status: SHIPPED | OUTPATIENT
Start: 2022-12-08 | End: 2023-10-03

## 2022-12-08 RX ORDER — MONTELUKAST SODIUM 10 MG/1
1 TABLET ORAL AT BEDTIME
Qty: 90 TABLET | Refills: 3 | Status: SHIPPED | OUTPATIENT
Start: 2022-12-08 | End: 2023-10-03

## 2022-12-08 RX ORDER — LOSARTAN POTASSIUM 100 MG/1
100 TABLET ORAL DAILY
Qty: 90 TABLET | Refills: 3 | Status: SHIPPED | OUTPATIENT
Start: 2022-12-08 | End: 2023-10-03

## 2022-12-08 RX ORDER — POTASSIUM CHLORIDE 750 MG/1
10 TABLET, EXTENDED RELEASE ORAL DAILY
Qty: 90 TABLET | Refills: 3 | Status: SHIPPED | OUTPATIENT
Start: 2022-12-08 | End: 2023-10-03

## 2022-12-08 RX ORDER — PANTOPRAZOLE SODIUM 40 MG/1
40 TABLET, DELAYED RELEASE ORAL DAILY
Qty: 90 TABLET | Refills: 3 | Status: SHIPPED | OUTPATIENT
Start: 2022-12-08 | End: 2023-10-03

## 2022-12-08 ASSESSMENT — PAIN SCALES - GENERAL: PAINLEVEL: NO PAIN (0)

## 2022-12-08 NOTE — PATIENT INSTRUCTIONS
Avoid soaps and antibacterial ointments in the vulvar area  Wash with water (avoid soap) and use Aquaphor for barrier ointment.   Ok to use hydrocortisone 1 % cream to vulva once daily as needed for itch.     For buttock pain:  Xrays today  Get established with physical therapy at home.   Let me know if not improving and I would place an orthopedic referral for when you are back in town

## 2022-12-08 NOTE — RESULT ENCOUNTER NOTE
Fco,  Your left hip x-ray was normal.  There is no fracture and no significant arthritis seen.  Kind regards,  Elva Ortiz MD

## 2022-12-08 NOTE — RESULT ENCOUNTER NOTE
Fco,    The spine x-ray did show mild degenerative (arthritic) changes of the lower part of the lumbar spine.  This is consistent with the sciatica symptoms you are having.  Please continue with the plan to seek out physical therapy in the [facility].  Certainly if your symptoms are not improving, next step would be orthopedics and possibly an MRI scan of the low spine.    Please MyChart or call if you have any concerns or questions.     Sincerely,    Elva Ortiz MD

## 2022-12-08 NOTE — PROGRESS NOTES
Answers for HPI/ROS submitted by the patient on 12/8/2022  Your back pain is: chronic  Where is your back pain located? : left lower back, left buttock  How would you describe your back pain? : burning, dull ache  Where does your back pain spread? : left buttocks, left thigh, left knee  Since you noticed your back pain, how has it changed? : always present, but gets better and worse  Does your back pain interfere with your job?: No  What is the reason for your visit today? : follow up  How many servings of fruits and vegetables do you eat daily?: 2-3  On average, how many sweetened beverages do you drink each day (Examples: soda, juice, sweet tea, etc.  Do NOT count diet or artificially sweetened beverages)?: 2  How many minutes a day do you exercise enough to make your heart beat faster?: 30 to 60  How many days a week do you exercise enough to make your heart beat faster?: 4  How many days per week do you miss taking your medication?: 1      Assessment & Plan     Essential hypertension  Under excellent control, continue current medications  - potassium chloride ER (KLOR-CON M) 10 MEQ CR tablet; Take 1 tablet (10 mEq) by mouth daily  - losartan (COZAAR) 100 MG tablet; Take 1 tablet (100 mg) by mouth daily  - hydrochlorothiazide (HYDRODIURIL) 12.5 MG tablet; Take 1 tablet (12.5 mg) by mouth daily    Gastroesophageal reflux disease, unspecified whether esophagitis present  Controlled with daily PPI continue for now.  - pantoprazole (PROTONIX) 40 MG EC tablet; Take 1 tablet (40 mg) by mouth daily    Vulvar itching  Recurrent.  Possibly due to her fluids and medication which was just stopped.  No evidence for yeast infection seen today.  We did discuss management of the itch and protection of skin in the vulvar area.  Encouraged her to avoid antibiotic ointments and soaps in this area also.  - Wet prep - Clinic Collect    Left buttock pain  Suspect this is sciatica type pain but cannot fully rule out hip joint.  I  recommend she trial a course of physical therapy which she will try to get set up in the [facility].  If symptoms or not improving with this I would refer her onto orthopedics.  - XR Lumbar Spine 2/3 Views; Future  - XR Hip Left 2-3 Views; Future    Hyperlipidemia LDL goal <100  On statin, tolerating  - rosuvastatin (CRESTOR) 10 MG tablet; Take 1 tablet (10 mg) by mouth daily    Seasonal allergic rhinitis, unspecified trigger  Cough, unspecified type  This has been under excellent control with more regular use of her Flonase.  Meds refilled  - montelukast (SINGULAIR) 10 MG tablet; Take 1 tablet (10 mg) by mouth At Bedtime  - albuterol (PROAIR HFA/PROVENTIL HFA/VENTOLIN HFA) 108 (90 Base) MCG/ACT inhaler; Inhale 2 puffs into the lungs every 4 hours as needed for shortness of breath / dyspnea         Patient Instructions   Avoid soaps and antibacterial ointments in the vulvar area  Wash with water (avoid soap) and use Aquaphor for barrier ointment.   Ok to use hydrocortisone 1 % cream to vulva once daily as needed for itch.     For buttock pain:  Xrays today  Get established with physical therapy at home.   Let me know if not improving and I would place an orthopedic referral for when you are back in town              Return in about 6 months (around 6/8/2023) for Routine preventive.    Elva Ortiz MD  [facility] STEPHANI Eagle is a 62 year old, presenting for the following health issues:  Back Pain and Refill Request      Pt requests refills on all medications-[place] is leaving out of country Tuesday, pt states will be back in May 2023.    History of Present Illness       Back Pain:  She presents for follow up of back pain. Patient's back pain is a chronic problem.  Location of back pain:  Left lower back and left buttock  Description of back pain: burning and dull ache  Back pain spreads: left buttocks, left thigh and left knee    Since patient first noticed back pain, pain  is: always present, but gets better and worse  Does back pain interfere with her job:  No      Reason for visit:  Follow up    She eats 2-3 servings of fruits and vegetables daily.She consumes 2 sweetened beverage(s) daily.She exercises with enough effort to increase her heart rate 30 to 60 minutes per day.  She exercises with enough effort to increase her heart rate 4 days per week. She is missing 1 dose(s) of medications per week.     lliving in [facility]. Built house   Back in June for another visit. Will get eye exam then.   Left glut area is painful with prolonged sitting. Can radiate down to left lateral lower mid leg  Dull, aching pain and times burning when radiates down the leg    Hard to stand up at times due to pain. No numbness or tingling in legs.   Gets massages done twice weekly to help.   After walking a while gets better. Movement in general helps.   Same pain she's had in the past but getting worse, especially with prolonged sitting.   No night wakening.   Stopped yoga and wonders if that is contributing.   Aleve prn and this does helps. linement oil.     Stopped jardiance since recurrent yeast infections 2 days ago.  Chronic itching.   No vaginal discharge here but in [facility] will have discharge.   Use bacitracin ointment occ.  Scratching so worried about secondary infection.    Diflucan in the past helped.    Very active with settng up her house.   Eating healthy and getting fresh fish and veggies.      gerd controlled. Taking PPI daily (takes every other day at times but tends to go daily with spicy foods)    Increased calcium intake due to dexa last year. 500 mg bid.     Using nasal spray daily and monteleukast and no recent flares.   Not needing albuterol recently.         Review of Systems   Constitutional, HEENT, cardiovascular, pulmonary, gi and gu systems are negative, except as otherwise noted.      Objective    /81 (BP Location: Right arm, Patient Position: Sitting, Cuff Size:  Adult Regular)   Pulse 76   Temp 97.5  F (36.4  C) (Oral)   Resp 14   Wt 87.8 kg (193 lb 8 oz)   LMP 03/19/2009 (Exact Date)   SpO2 97%   BMI 34.28 kg/m    Body mass index is 34.28 kg/m .  Physical Exam   GENERAL: healthy, alert and no distress  NECK: no adenopathy, no asymmetry, masses, or scars and thyroid normal to palpation  RESP: lungs clear to auscultation - no rales, rhonchi or wheezes  CV: regular rate and rhythm, normal S1 S2, no S3 or S4, no murmur, click or rub, no peripheral edema and peripheral pulses strong  MS: extremities normal- no gross deformities noted.  Slight discomfort with left hip internal and external rotation.  No SI joint tenderness  NEURO: Normal strength and tone in the lower extremities.  Normal sensation to light touch., mentation intact and speech normal  Pelvic: Normal external genitalia.  Vaginal mucosa atrophic but pink and moist.    Xray - Reviewed and interpreted by me.  Left hip-no fracture or significant arthritis.  Normal joint space  Lumbar spine-mild lower lumbar degenerative changes, no fracture.    XR Hip Left 2-3 Views    Result Date: 12/8/2022  LEFT HIP TWO TO THREE VIEWS  12/8/2022 10:37 AM INDICATION: Left-sided buttock pain.  COMPARISON: None.     IMPRESSION: 1.  Normal left hip joint spacing and alignment. 2.  No fracture. ESTELLA KULKARNI MD   SYSTEM ID:  [id number]    XR Lumbar Spine 2/3 Views    Result Date: 12/8/2022  LUMBAR SPINE TWO TO THREE VIEWS  12/8/2022 10:37 AM COMPARISON: None. HISTORY: Left buttock pain.     IMPRESSION: Five lumbar type vertebrae. Partial sacralization of L5. Grade 1 degenerative anterolisthesis of L4 upon L5. Alignment otherwise normal. Vertebral body heights normal. No fractures. Facet arthropathy at L4-L5 and L5-S1. RICK SAUCEDO MD   SYSTEM ID:  [id number]

## 2022-12-08 NOTE — RESULT ENCOUNTER NOTE
Fco,  It was nice to see you today in the office.  Your vaginal infection screening test, wet prep (screens for yeast/bacteria/trichomonas infections), was normal.  No yeast infection was seen on the testing.  Please continue with the plan we discussed in the office.   Kind regards,  Elva Ortiz MD

## 2023-01-01 NOTE — TELEPHONE ENCOUNTER
Writer talked to Fco regarding her return to work. Fco was seen in clinic on 03/20/2019 and she stated that Dr. Moran gave her a back to work note. 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  Fco said that she has everything thing that she needs from Dr. Moran.      3

## 2023-04-02 ENCOUNTER — HEALTH MAINTENANCE LETTER (OUTPATIENT)
Age: 63
End: 2023-04-02

## 2023-05-23 DIAGNOSIS — H40.003 BORDERLINE GLAUCOMA, BILATERAL: Primary | ICD-10-CM

## 2023-06-02 NOTE — PROGRESS NOTES
Outcome for 06/02/23 11:58 AM: Data obtained via Dexcom website-printed  Patti Goyal CMA  Adult Endocrinology  [facility]     Endocrinology and Diabetes Clinic    Follow up for T2DM      Interval history  6 month follow up for T2DM  Has been sick with cough, on steroids and ABX in 2/23  Pt is spending most of her time in the [facility], [place] country, where she has her own house and then ate lots of cooks for her   Patient has been sick with pneumonia in February of this year.  She still struggles with dyspnea.  She will have a cardiac echo for additional evaluation.  She had been on prednisone for 1 week which caused hypoglycemia.  Patient has been less physically active         Current diabetic medications:  Metformin 1000 bid, Lantus 70 units daily, Jardiance 25 mg, Novolog 25 units three times daily    SMBG:  Dexcom sensor, Average blood sugar elevated with postprandial BGs especially after brekafst, no hypolgycemia    Meal plan: 3 meals a day plus snacks, typically high in carbohydrates because of options available to her including rice and sweet yams  Exercise: has increased and walking lots of steps now, much more than before    Complications  1. Retinopathy: yearly visit, no DR  2. Nephropathy: None  3. Neuropathy: No numbness no tingling no history of ulcerations  4. Hypoglycemia: Yes, however resolved with insulin adjustment  5. Macrovascular: No chest pain, no shortness of breath      HTN: Losartan, hydrochlorothiazide 12.5 daily  Dyslipidemia Crestor 10 mg    Previously used diabetes medications:   Bydureon in 2016, Ernst Thomas: yeast infection      ASSESSMENT/Plan:   T2DM with comorbidity of obesity    Blood glucose control  Patient now with hypoglycemia particularly postprandial after breakfast.  This related to being less physically active and recovering from pneumonia   Patient asked about and be reviewed to start Ozempic     Reviewed to continue Lantus 70 units and NovoLog  at 35 units with breakfast 25 units with lunch and dinner  Start Ozempic 0.25 mg and increase to 0.5 and 1 mg every 4 weeks  Reviewed if blood sugar should decrease to decrease all injections by 5 units but she might need to do this every Ozempic dose increase      Hypertension controlled on losartan and hydrochlorothiazide   Dyslipidemia on 10 mg of Crestor doing well      30 minutes spent on the date of the encounter doing chart review, review of test results, interpretation of tests, patient visit and documentation     This note was generated using computer recognized voice recognition. This might result in some expected imperfection.      Patient to contact the clinic if blood glucose (sugar) is under 70 two times in one week or above 200 for 3 consecutive days.       Stacia Carbajal MD  Endocrinology and Diabetes  Telephone contact:  [facility] [phone]  [facility] [phone]          PMH:  Patient Active Problem List   Diagnosis     ? 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     Displacement of lumbar intervertebral disc without myelopathy     Nonallopathic lesion of lumbar region     Nonallopathic lesion of thoracic region     Nonallopathic lesion of sacral region     Bartholin gland cyst     Cataract     Irritable bowel syndrome     Esophageal reflux     Seasonal allergic rhinitis     Latex sensitivity     Adenomatous polyp     HYPERLIPIDEMIA LDL GOAL <100     Ovarian cyst     Breast pain     Essential hypertension     Advanced directives, counseling/discussion     Obesity     Hypomagnesemia     Type 2 diabetes mellitus with mild nonproliferative retinopathy without macular edema, with long-term current use of insulin, unspecified laterality (H)       SOCIAL HISTORY:  Social History     Socioeconomic History     Marital status: Single     Spouse name: Not on file     Number of children: Not on file     Years of education: Not  on file     Highest education level: Not on file   Occupational History     Not on file   Tobacco Use     Smoking status: Never     Smokeless tobacco: Never   Vaping Use     Vaping status: Never Used     Passive vaping exposure: Yes   Substance and Sexual Activity     Alcohol use: Yes     Comment: one drink a month     Drug use: No     Sexual activity: Not Currently     Partners: Male     Birth control/protection: None   Other Topics Concern     Parent/sibling w/ CABG, MI or angioplasty before 65F 55M? No      Service No     Blood Transfusions No     Caffeine Concern No     Occupational Exposure Yes     Comment: exposed to X-ray     Hobby Hazards No     Sleep Concern Yes     Comment: Hot Flashes     Stress Concern Yes     Comment: Work     Weight Concern Yes     Special Diet No     Back Care Yes     Comment: Back pain     Exercise Yes     Bike Helmet No     Seat Belt Yes     Self-Exams Yes   Social History Narrative    Works at  in surgical ICU    Born in the [facility]. Moved to  in . Initially lived in [place].    No children        2 cats     Social Determinants of Health     Financial Resource Strain: Not on file   Food Insecurity: Not on file   Transportation Needs: Not on file   Physical Activity: Not on file   Stress: Not on file   Social Connections: Not on file   Intimate Partner Violence: Not on file   Housing Stability: Not on file       FAMILY HISTORY:  Family History   Problem Relation Age of Onset     Diabetes Father          of dm 70's     C.A.D. Father      Cerebrovascular Disease Father      Diabetes Sister      Diabetes Sister      Diabetes Sister      Hypertension Sister      Diabetes Brother      Diabetes Brother      Cancer Half-Brother         lung     Diabetes Half-Brother      Leukemia Half-Brother      Glaucoma Maternal Grandmother      Diabetes Paternal Grandfather      Hypertension Paternal Grandfather      Cerebrovascular Disease Paternal Grandfather       Cancer Maternal Aunt         ovarian cancer.      Leukemia Half-Brother      Lymphoma Paternal Cousin      Macular Degeneration No family hx of        Medications:   Current Outpatient Medications   Medication Sig Dispense Refill     albuterol (PROAIR HFA/PROVENTIL HFA/VENTOLIN HFA) 108 (90 Base) MCG/ACT inhaler Inhale 2 puffs into the lungs every 4 hours as needed for shortness of breath / dyspnea 18 g 1     aspirin 81 MG EC tablet Take 81 mg by mouth daily Please resume in 5 days.  (12/22/2018)       CALCIUM + D OR Take 1 tablet by mouth 2 times daily.       Continuous Blood Gluc  (FREESTYLE BUSHRA 14 DAY READER) KIRIT 1 Device daily 1 each 0     Continuous Blood Gluc Sensor (DEXCOM G6 SENSOR) MISC 1 each every 10 days 18 each 1     Continuous Blood Gluc Sensor (FREESTYLE BUSHRA 14 DAY SENSOR) MISC 1 Application every 14 days 6 each 3     Continuous Blood Gluc Transmit (DEXCOM G6 TRANSMITTER) MISC 1 each every 3 months Change every 3 months. 2 each 1     fexofenadine (ALLEGRA) 180 MG tablet Take 1 tablet by mouth daily. 1 TABLET DAILY Failed claritin for symptom cotrol. Zyrtec increases heart rate. 90 tablet 3     fish oil-omega-3 fatty acids 1000 MG capsule Take 1 capsule by mouth daily.       fluticasone (FLONASE) 50 MCG/ACT nasal spray USE 1-2 SPRAYS IN EACH NOSTRIL ONCE DAILY 48 g 3     Glucosamine-Chondroitin (GLUCOSAMINE CHONDR COMPLEX PO)        hydrochlorothiazide (HYDRODIURIL) 12.5 MG tablet Take 1 tablet (12.5 mg) by mouth daily 90 tablet 3     insulin aspart (NOVOLOG FLEXPEN) 100 UNIT/ML pen Use 20-35 units three times daily with meals for up to 120 units daily 120 mL 3     insulin glargine (BASAGLAR KWIKPEN) 100 UNIT/ML pen INJECT 70 UNITS UNDER THE SKIN DAILY 90 mL 1     insulin pen needle (BD LISA U/F) 32G X 4 MM miscellaneous Use to inject 4-5 times daily. 600 each 1     latanoprost (XALATAN) 0.005 % ophthalmic solution Place 1 drop into both eyes daily 7.5 mL 11     losartan (COZAAR) 100 MG  tablet Take 1 tablet (100 mg) by mouth daily 90 tablet 3     magnesium 250 MG tablet Take 1 tablet by mouth daily 90 tablet 3     metFORMIN (GLUCOPHAGE XR) 500 MG 24 hr tablet TAKE 2 TABLETS TWICE A  tablet 3     montelukast (SINGULAIR) 10 MG tablet Take 1 tablet (10 mg) by mouth At Bedtime 90 tablet 3     MULTIVITAMIN OR Take 1 tablet by mouth daily.       NEEDLES, ANY SIZE Pen needles for Novolog insulin pen. Use to inject 5-6  times daily. 4 Box 3     NOVOLOG FLEXPEN 100 UNIT/ML soln Sig 20-25 units two-three times daily. Max daily dose 75 units 90 mL 1     pantoprazole (PROTONIX) 40 MG EC tablet Take 1 tablet (40 mg) by mouth daily 90 tablet 3     potassium chloride ER (KLOR-CON M) 10 MEQ CR tablet Take 1 tablet (10 mEq) by mouth daily 90 tablet 3     rosuvastatin (CRESTOR) 10 MG tablet Take 1 tablet (10 mg) by mouth daily 90 tablet 3     VITAMIN D 2000 UNIT OR TABS Take 1 tablet by mouth daily.       Physical Exam  /81 (BP Location: Left arm, Patient Position: Sitting, Cuff Size: Adult Regular)   Pulse 73   Wt 87.1 kg (192 lb)   LMP 03/19/2009 (Exact Date)   SpO2 97%   BMI 34.01 kg/m      LMP 03/19/2009 (Exact Date)   Wt Readings from Last 4 Encounters:   06/08/23 88.5 kg (195 lb 3.2 oz)   12/08/22 87.8 kg (193 lb 8 oz)   12/05/22 87.1 kg (192 lb)   06/27/22 82.6 kg (182 lb)     General: Well appearing well in no distress.  Psych: good eye contact, no pressured speech     LABS:  /81 (BP Location: Left arm, Patient Position: Sitting, Cuff Size: Adult Regular)   Pulse 73   Wt 87.1 kg (192 lb)   LMP 03/19/2009 (Exact Date)   SpO2 97%   BMI 34.01 kg/m    Wt Readings from Last 4 Encounters:   06/09/23 87.1 kg (192 lb)   06/08/23 88.5 kg (195 lb 3.2 oz)   12/08/22 87.8 kg (193 lb 8 oz)   12/05/22 87.1 kg (192 lb)       A1c today 9.3%  Lab Results   Component Value Date     06/07/2023    CHLORIDE 103 06/07/2023    CO2 21 (L) 06/07/2023     (H) 06/07/2023     (H)  06/07/2023    CR 0.76 06/07/2023    CR 0.71 06/07/2023    CR 0.68 11/28/2022    CR 0.63 11/16/2021    CR 0.69 05/05/2021    SUSHANT 9.6 06/07/2023    MAG 2.0 11/14/2018    ALBUMIN 4.3 06/07/2023    ALKPHOS 55 06/07/2023    LDL 79 06/07/2023    HDL 37 (L) 06/07/2023    TRIG 166 (H) 06/07/2023    TSH 1.01 06/07/2023    TSH 1.24 11/28/2022    TSH 0.69 05/05/2021     Lab Results   Component Value Date    MICROL 29.7 06/07/2023    MICROL 11 11/28/2022    MICROL 6 11/16/2021    MICROL 7 08/27/2020    MICROL <5 04/02/2020     Lab Results   Component Value Date    A1C 9.3 (A) 06/27/2022    A1C 8.7 (H) 11/16/2021    A1C 8.3 (H) 05/05/2021    A1C 8.2 (H) 08/27/2020    A1C 8.1 (H) 04/02/2020       Lab Results   Component Value Date    HGB 15.1 06/08/2023         Answers for HPI/ROS submitted by the patient on 6/8/2023  General Symptoms: No  Skin Symptoms: Yes  HENT Symptoms: Yes  EYE SYMPTOMS: No  HEART SYMPTOMS: No  LUNG SYMPTOMS: Yes  INTESTINAL SYMPTOMS: No  URINARY SYMPTOMS: No  GYNECOLOGIC SYMPTOMS: Yes  BREAST SYMPTOMS: No  SKELETAL SYMPTOMS: No  BLOOD SYMPTOMS: No  NERVOUS SYSTEM SYMPTOMS: No  MENTAL HEALTH SYMPTOMS: No   Voice hoarseness: Yes  Itching: Yes  Rashes: Yes  Flaking of skin: Yes  Cough: Yes  Sputum or phlegm: Yes  Difficulty breathing on exertion: Yes  Vaginal dryness: Yes

## 2023-06-05 ENCOUNTER — OFFICE VISIT (OUTPATIENT)
Dept: OPHTHALMOLOGY | Facility: CLINIC | Age: 63
End: 2023-06-05
Attending: STUDENT IN AN ORGANIZED HEALTH CARE EDUCATION/TRAINING PROGRAM
Payer: COMMERCIAL

## 2023-06-05 DIAGNOSIS — H40.003 BORDERLINE GLAUCOMA, BILATERAL: ICD-10-CM

## 2023-06-05 PROCEDURE — 99214 OFFICE O/P EST MOD 30 MIN: CPT | Mod: GC | Performed by: OPHTHALMOLOGY

## 2023-06-05 PROCEDURE — G0463 HOSPITAL OUTPT CLINIC VISIT: HCPCS | Performed by: OPHTHALMOLOGY

## 2023-06-05 PROCEDURE — 92083 EXTENDED VISUAL FIELD XM: CPT | Performed by: OPHTHALMOLOGY

## 2023-06-05 PROCEDURE — 92133 CPTRZD OPH DX IMG PST SGM ON: CPT | Performed by: OPHTHALMOLOGY

## 2023-06-05 ASSESSMENT — REFRACTION_WEARINGRX
OD_ADD: +2.50
OD_CYLINDER: +0.75
OS_CYLINDER: +1.00
OS_ADD: +2.50
OD_SPHERE: -2.75
OS_SPHERE: -4.00
OD_AXIS: 120
OS_AXIS: 085

## 2023-06-05 ASSESSMENT — CONF VISUAL FIELD
OD_SUPERIOR_TEMPORAL_RESTRICTION: 0
OS_SUPERIOR_NASAL_RESTRICTION: 0
OS_INFERIOR_NASAL_RESTRICTION: 0
OS_INFERIOR_TEMPORAL_RESTRICTION: 0
OD_INFERIOR_TEMPORAL_RESTRICTION: 0
OD_NORMAL: 1
OS_SUPERIOR_TEMPORAL_RESTRICTION: 0
OS_NORMAL: 1
OD_SUPERIOR_NASAL_RESTRICTION: 0
METHOD: COUNTING FINGERS
OD_INFERIOR_NASAL_RESTRICTION: 0

## 2023-06-05 ASSESSMENT — SLIT LAMP EXAM - LIDS
COMMENTS: MEIBOMIAN GLAND DYSFUNCTION
COMMENTS: MEIBOMIAN GLAND DYSFUNCTION

## 2023-06-05 ASSESSMENT — TONOMETRY
OD_IOP_MMHG: 19
OS_IOP_MMHG: 17
IOP_METHOD: TONOPEN

## 2023-06-05 ASSESSMENT — VISUAL ACUITY
METHOD: SNELLEN - LINEAR
OS_CC+: -3
OS_CC: 20/20
CORRECTION_TYPE: GLASSES
OD_CC: 20/25

## 2023-06-05 ASSESSMENT — CUP TO DISC RATIO
OS_RATIO: 0.8
OD_RATIO: 0.9

## 2023-06-05 ASSESSMENT — EXTERNAL EXAM - RIGHT EYE: OD_EXAM: DERMATOCHALASIS

## 2023-06-05 ASSESSMENT — EXTERNAL EXAM - LEFT EYE: OS_EXAM: DERMATOCHALASIS

## 2023-06-05 NOTE — NURSING NOTE
Chief Complaints and History of Present Illnesses   Patient presents with     Glaucoma Suspect Follow Up     6 month follow up both eyes     Chief Complaint(s) and History of Present Illness(es)     Glaucoma Suspect Follow Up            Comments: 6 month follow up both eyes          Comments    Pt states that vision has been good with new glasses. No new flashes. Occasional floaters in both eyes, no changes.   No new redness or dryness.  DM 2 BS: 178 this morning per pt.  Lab Results       Component                Value               Date                       A1C                      9.3                 06/27/2022                 A1C                      8.7                 11/16/2021                 A1C                      8.3                 05/05/2021                 A1C                      8.2                 08/27/2020                 A1C                      8.1                 04/02/2020                 A1C                      8.2                 11/14/2018              JASPREET Ng June 5, 2023 8:31 AM

## 2023-06-05 NOTE — PROGRESS NOTES
CC: Maliha Pedro is a  63 year old year-old patient presenting for glaucoma suspect follow up     NOTE: Prefers to only see attending    Interim: Pt states that vision has been good with glasses. No new flashes. Occasional floaters in both eyes, no changes.   No new redness or dryness.  DM 2 BS    PMH: Diabetes mellitus, states Diabetes mellitus is under better control.     Imaging:  OCT 06/05/23  OU with no fluid - stable    ONFL 06/05/23  Right eye: thinning superiorly and inferiorly; stable  Left eye: Thinning S, I, borderline N; stable    Prior 24-2 OVF   Right eye: reliable few spots of decreased sensitivity; stable compared to previous. MD 0.7  Left eye: reliable. central spot of decreased sensitivity not redemonstrated today; few spots of decreased sensitivity otherwise - stable. MD 0.2    Assessment & Plan:     # Glaucoma suspect, bilateral  - large cup to disc ratio R>L  - Intraocular pressure 15/13 both eyes   - pachmetry 583/613  - OVF stable without  progression  - intraocular pressure remains good off of latanoprost with stable VF and RNFL; will monitor off of this drop at this point    # Type 2 diabetes mellitus without retinopathy  Blood pressure (<120/80) and blood glucose (HbA1c <7.0) control discussed with patient. Patient advised that failure to adequately control each may lead to vision loss. The patient expressed understanding.  Prefers no dilated exam today     # Cataract, right eye  -becoming visually significant  - patient not interested in cataract evaluation for now    # Pseudophakia, left eye  -stable  -observe    # Posterior vitreous detachment of right eye  -stable  -observe    # Old inferior temporal small tear surrounded by pigment, no subretinal fluid right eye   Retina attached, no other tears  Retina detachment precautions were discussed with the patient (presence or increased in flashes, floaters or a curtain in the visual field) and was asked to return if any of the those  occur    #. Dry eye syndrome  artificial tears  As needed and warm compresses      # Mild Epiretinal membrane right eye  Stable   Not visually significant     # myopia  Happy with current prescription     PLAN:  Stable exam   Follow up 6 months with dilation with retinal nerve fiber layer ; optos and autofluorescence   Patient moving to [facility]; [place] continue to return to [place] at times    Deshawn Nathan MD, MPH  Vitreoretinal Fellow PGY-5  [facility]    ~~~~~~~~~~~~~~~~~~~~~~~~~~~~~~~~~~   Complete documentation of historical and exam elements from today's encounter can be found in the full encounter summary report (not reduplicated in this progress note).  I personally obtained the chief complaint(s) and history of present illness.  I confirmed and edited as necessary the review of systems, past medical/surgical history, family history, social history, and examination findings as documented by others; and I examined the patient myself.  I personally reviewed the relevant tests, images, and reports as documented above.  I personally reviewed the ophthalmic test(s) associated with this encounter, agree with the interpretation(s) as documented by the resident/fellow, and have edited the corresponding report(s) as necessary.   I formulated and edited as necessary the assessment and plan and discussed the findings and management plan with the patient and family    Lesly Tracy MD   of Ophthalmology.  Retina Service   Department of Ophthalmology and Visual Neurosciences   [facility]  Phone: [phone]   Fax: [phone]

## 2023-06-07 ENCOUNTER — LAB (OUTPATIENT)
Dept: LAB | Facility: CLINIC | Age: 63
End: 2023-06-07
Payer: COMMERCIAL

## 2023-06-07 DIAGNOSIS — R05.3 CHRONIC COUGH: ICD-10-CM

## 2023-06-07 DIAGNOSIS — R06.09 DOE (DYSPNEA ON EXERTION): ICD-10-CM

## 2023-06-07 DIAGNOSIS — Z79.4 TYPE 2 DIABETES MELLITUS WITH MILD NONPROLIFERATIVE RETINOPATHY WITHOUT MACULAR EDEMA, WITH LONG-TERM CURRENT USE OF INSULIN, UNSPECIFIED LATERALITY (H): ICD-10-CM

## 2023-06-07 DIAGNOSIS — E11.3299 TYPE 2 DIABETES MELLITUS WITH MILD NONPROLIFERATIVE RETINOPATHY WITHOUT MACULAR EDEMA, WITH LONG-TERM CURRENT USE OF INSULIN, UNSPECIFIED LATERALITY (H): ICD-10-CM

## 2023-06-07 LAB
BUN SERPL-MCNC: 11.6 MG/DL (ref 8–23)
CHOLEST SERPL-MCNC: 149 MG/DL
CREAT SERPL-MCNC: 0.76 MG/DL (ref 0.51–0.95)
CREAT UR-MCNC: 293 MG/DL
FASTING STATUS PATIENT QL REPORTED: YES
GFR SERPL CREATININE-BSD FRML MDRD: 87 ML/MIN/1.73M2
GLUCOSE SERPL-MCNC: 203 MG/DL (ref 70–99)
HDLC SERPL-MCNC: 37 MG/DL
LDLC SERPL CALC-MCNC: 79 MG/DL
MICROALBUMIN UR-MCNC: 29.7 MG/L
MICROALBUMIN/CREAT UR: 10.14 MG/G CR (ref 0–25)
NONHDLC SERPL-MCNC: 112 MG/DL
POTASSIUM SERPL-SCNC: 3.7 MMOL/L (ref 3.4–5.3)
TRIGL SERPL-MCNC: 166 MG/DL
TSH SERPL DL<=0.005 MIU/L-ACNC: 1.01 UIU/ML (ref 0.3–4.2)

## 2023-06-07 PROCEDURE — 84443 ASSAY THYROID STIM HORMONE: CPT

## 2023-06-07 PROCEDURE — 82043 UR ALBUMIN QUANTITATIVE: CPT

## 2023-06-07 PROCEDURE — 80053 COMPREHEN METABOLIC PANEL: CPT

## 2023-06-07 PROCEDURE — 82570 ASSAY OF URINE CREATININE: CPT

## 2023-06-07 PROCEDURE — 80061 LIPID PANEL: CPT

## 2023-06-07 PROCEDURE — 36415 COLL VENOUS BLD VENIPUNCTURE: CPT

## 2023-06-08 ENCOUNTER — OFFICE VISIT (OUTPATIENT)
Dept: FAMILY MEDICINE | Facility: CLINIC | Age: 63
End: 2023-06-08
Payer: COMMERCIAL

## 2023-06-08 ENCOUNTER — ANCILLARY PROCEDURE (OUTPATIENT)
Dept: GENERAL RADIOLOGY | Facility: CLINIC | Age: 63
End: 2023-06-08
Attending: FAMILY MEDICINE
Payer: COMMERCIAL

## 2023-06-08 VITALS
SYSTOLIC BLOOD PRESSURE: 138 MMHG | RESPIRATION RATE: 22 BRPM | DIASTOLIC BLOOD PRESSURE: 84 MMHG | WEIGHT: 195.2 LBS | HEIGHT: 63 IN | TEMPERATURE: 98.6 F | BODY MASS INDEX: 34.59 KG/M2 | OXYGEN SATURATION: 98 % | HEART RATE: 79 BPM

## 2023-06-08 DIAGNOSIS — R49.0 HOARSENESS: ICD-10-CM

## 2023-06-08 DIAGNOSIS — E11.65 TYPE 2 DIABETES MELLITUS WITH HYPERGLYCEMIA, WITH LONG-TERM CURRENT USE OF INSULIN (H): ICD-10-CM

## 2023-06-08 DIAGNOSIS — L29.2 VULVAR ITCHING: ICD-10-CM

## 2023-06-08 DIAGNOSIS — R05.3 CHRONIC COUGH: ICD-10-CM

## 2023-06-08 DIAGNOSIS — Z12.31 ENCOUNTER FOR SCREENING MAMMOGRAM FOR BREAST CANCER: ICD-10-CM

## 2023-06-08 DIAGNOSIS — Z79.4 TYPE 2 DIABETES MELLITUS WITH MILD NONPROLIFERATIVE RETINOPATHY WITHOUT MACULAR EDEMA, WITH LONG-TERM CURRENT USE OF INSULIN, UNSPECIFIED LATERALITY (H): ICD-10-CM

## 2023-06-08 DIAGNOSIS — R06.09 DOE (DYSPNEA ON EXERTION): ICD-10-CM

## 2023-06-08 DIAGNOSIS — Z79.4 TYPE 2 DIABETES MELLITUS WITH HYPERGLYCEMIA, WITH LONG-TERM CURRENT USE OF INSULIN (H): ICD-10-CM

## 2023-06-08 DIAGNOSIS — Z00.00 ROUTINE GENERAL MEDICAL EXAMINATION AT A HEALTH CARE FACILITY: Primary | ICD-10-CM

## 2023-06-08 DIAGNOSIS — Z85.828 HISTORY OF SCC (SQUAMOUS CELL CARCINOMA) OF SKIN: ICD-10-CM

## 2023-06-08 DIAGNOSIS — E11.3299 TYPE 2 DIABETES MELLITUS WITH MILD NONPROLIFERATIVE RETINOPATHY WITHOUT MACULAR EDEMA, WITH LONG-TERM CURRENT USE OF INSULIN, UNSPECIFIED LATERALITY (H): ICD-10-CM

## 2023-06-08 DIAGNOSIS — E28.39 ESTROGEN DEFICIENCY: ICD-10-CM

## 2023-06-08 DIAGNOSIS — L29.9 ITCHING: ICD-10-CM

## 2023-06-08 LAB
ALBUMIN SERPL BCG-MCNC: 4.3 G/DL (ref 3.5–5.2)
ALP SERPL-CCNC: 55 U/L (ref 35–104)
ALT SERPL W P-5'-P-CCNC: 47 U/L (ref 10–35)
ANION GAP SERPL CALCULATED.3IONS-SCNC: 17 MMOL/L (ref 7–15)
AST SERPL W P-5'-P-CCNC: 31 U/L (ref 10–35)
BASOPHILS # BLD AUTO: 0.1 10E3/UL (ref 0–0.2)
BASOPHILS NFR BLD AUTO: 1 %
BILIRUB SERPL-MCNC: 0.6 MG/DL
BUN SERPL-MCNC: 12 MG/DL (ref 8–23)
CALCIUM SERPL-MCNC: 9.6 MG/DL (ref 8.8–10.2)
CHLORIDE SERPL-SCNC: 103 MMOL/L (ref 98–107)
CLUE CELLS: ABNORMAL
CREAT SERPL-MCNC: 0.71 MG/DL (ref 0.51–0.95)
DEPRECATED HCO3 PLAS-SCNC: 21 MMOL/L (ref 22–29)
EOSINOPHIL # BLD AUTO: 0.3 10E3/UL (ref 0–0.7)
EOSINOPHIL NFR BLD AUTO: 3 %
ERYTHROCYTE [DISTWIDTH] IN BLOOD BY AUTOMATED COUNT: 13.5 % (ref 10–15)
GFR SERPL CREATININE-BSD FRML MDRD: >90 ML/MIN/1.73M2
GLUCOSE SERPL-MCNC: 202 MG/DL (ref 70–99)
HCT VFR BLD AUTO: 46.4 % (ref 35–47)
HGB BLD-MCNC: 15.1 G/DL (ref 11.7–15.7)
IMM GRANULOCYTES # BLD: 0 10E3/UL
IMM GRANULOCYTES NFR BLD: 0 %
LYMPHOCYTES # BLD AUTO: 2.9 10E3/UL (ref 0.8–5.3)
LYMPHOCYTES NFR BLD AUTO: 24 %
MCH RBC QN AUTO: 28 PG (ref 26.5–33)
MCHC RBC AUTO-ENTMCNC: 32.5 G/DL (ref 31.5–36.5)
MCV RBC AUTO: 86 FL (ref 78–100)
MONOCYTES # BLD AUTO: 0.9 10E3/UL (ref 0–1.3)
MONOCYTES NFR BLD AUTO: 8 %
NEUTROPHILS # BLD AUTO: 7.6 10E3/UL (ref 1.6–8.3)
NEUTROPHILS NFR BLD AUTO: 64 %
PLATELET # BLD AUTO: 342 10E3/UL (ref 150–450)
POTASSIUM SERPL-SCNC: 3.8 MMOL/L (ref 3.4–5.3)
PROT SERPL-MCNC: 7.3 G/DL (ref 6.4–8.3)
RBC # BLD AUTO: 5.39 10E6/UL (ref 3.8–5.2)
SODIUM SERPL-SCNC: 141 MMOL/L (ref 136–145)
TRICHOMONAS, WET PREP: ABNORMAL
WBC # BLD AUTO: 11.9 10E3/UL (ref 4–11)
WBC'S/HIGH POWER FIELD, WET PREP: ABNORMAL
YEAST, WET PREP: ABNORMAL

## 2023-06-08 PROCEDURE — 93000 ELECTROCARDIOGRAM COMPLETE: CPT | Performed by: FAMILY MEDICINE

## 2023-06-08 PROCEDURE — 99214 OFFICE O/P EST MOD 30 MIN: CPT | Mod: 25 | Performed by: FAMILY MEDICINE

## 2023-06-08 PROCEDURE — 85025 COMPLETE CBC W/AUTO DIFF WBC: CPT | Performed by: FAMILY MEDICINE

## 2023-06-08 PROCEDURE — 87210 SMEAR WET MOUNT SALINE/INK: CPT | Performed by: FAMILY MEDICINE

## 2023-06-08 PROCEDURE — 71046 X-RAY EXAM CHEST 2 VIEWS: CPT | Mod: TC | Performed by: RADIOLOGY

## 2023-06-08 PROCEDURE — 36415 COLL VENOUS BLD VENIPUNCTURE: CPT | Performed by: FAMILY MEDICINE

## 2023-06-08 PROCEDURE — 99396 PREV VISIT EST AGE 40-64: CPT | Performed by: FAMILY MEDICINE

## 2023-06-08 ASSESSMENT — ENCOUNTER SYMPTOMS
ABDOMINAL PAIN: 0
EYE PAIN: 0
SHORTNESS OF BREATH: 0
FEVER: 0
DYSPNEA ON EXERTION: 1
DYSURIA: 0
HEARTBURN: 0
DIZZINESS: 0
MYALGIAS: 0
JOINT SWELLING: 0
NAUSEA: 0
PARESTHESIAS: 0
HOARSE VOICE: 1
ARTHRALGIAS: 1
HEMATOCHEZIA: 0
SPUTUM PRODUCTION: 1
COUGH: 1
DIARRHEA: 0
SORE THROAT: 0
BREAST MASS: 0
HEMATURIA: 0
WEAKNESS: 0
PALPITATIONS: 0
FREQUENCY: 0
NERVOUS/ANXIOUS: 0
HEADACHES: 0
CHILLS: 0
CONSTIPATION: 0
COUGH: 1

## 2023-06-08 ASSESSMENT — PAIN SCALES - GENERAL: PAINLEVEL: NO PAIN (0)

## 2023-06-08 NOTE — RESULT ENCOUNTER NOTE
Maliha,  Your chest x-ray today was normal.  No signs of pneumonia or other abnormalities were seen in the lungs.  Please continue with the plan we developed in the office.   Please MyChart or call if you have any concerns or questions.   Sincerely,  Elva Ortiz MD

## 2023-06-08 NOTE — PROGRESS NOTES
SUBJECTIVE:   CC: Fco is an 63 year old who presents for preventive health visit.        View : No data to display.              Healthy Habits:     Getting at least 3 servings of Calcium per day:  Yes    Bi-annual eye exam:  Yes    Dental care twice a year:  Yes    Sleep apnea or symptoms of sleep apnea:  Daytime drowsiness and Excessive snoring    Diet:  Diabetic    Frequency of exercise:  4-5 days/week    Duration of exercise:  30-45 minutes    Taking medications regularly:  Yes    Medication side effects:  None    PHQ-2 Total Score: 0    Additional concerns today:  Yes      Seen in [facility] with pulmonologist for chronic cough, hemoptysis this winter.  Treated with prednisone x 7 days, abx, inhaler. Treated for pneumonitis  Had prolonged cough but did improve somewhat.  On f/u patient reports pulmonologist felt allergy could be contributing to her lingering symptoms  Gradually got better and sx's resolved.      Now again voice is getting funny for the last few months.   Has this in past with allergies  Cough again is back but feels different from thsi winter.     Feels she is slowing down a bit.  Noting shortness of breath with walking places.  She is still quite active and walks extensively through the day.  Still have to wear a mask.   Eczema is flared.   Ongoing itchiness in perineum, feels due to moisture from weather.   Stopped jardiance last fall to see if this helps.  Symptoms started after beginning the Jardiance but have not improved since stopping the medication    Fell twice with slipping.   Walking a lot, active.   PEREZ with exertion.   No chest pain.    Allegra, flonase, singulair  No nasal congestion. occ post-nasal drainage. This will trigger cough.   Throat feels itchy/dry.   Cough comes and goes. No longer bronchial spasm like was having before.   No gerd since on PPI.     Back to [facility] July 30th.      Sees endocrinologist tomorrow   Glucose has been higher 260's  a1c was 7.1    Wt up  13#    Tends to get hoarseness with climate change, cut grass  + wheeze at times. Albuterol helps.     In past cough responded to allergy tx and PPI    in her mid-50's had chronic cough for 1 + month    Dogs this last year. Understands may have allergy to dogs but not wanting to test this.         Social History     Tobacco Use     Smoking status: Never     Smokeless tobacco: Never   Vaping Use     Vaping status: Never Used   Substance Use Topics     Alcohol use: Yes     Comment: one drink a month            View : No data to display.              Reviewed orders with patient.  Reviewed health maintenance and updated orders accordingly - Yes      Breast Cancer Screening:  Any new diagnosis of family breast, ovarian, or bowel cancer? No    FHS-7:       11/18/2021     8:33 AM   Breast CA Risk Assessment (FHS-7)   Did any of your first-degree relatives have breast or ovarian cancer? No   Did any of your relatives have bilateral breast cancer? No   Did any man in your family have breast cancer? No   Did any woman in your family have breast and ovarian cancer? No   Did any woman in your family have breast cancer before age 50 y? No   Do you have 2 or more relatives with breast and/or ovarian cancer? No   Do you have 2 or more relatives with breast and/or bowel cancer? No       Mammogram Screening: Recommended annual mammography  Pertinent mammograms are reviewed under the imaging tab.    History of abnormal Pap smear: NO - age 30-65 PAP every 5 years with negative HPV co-testing recommended      Latest Ref Rng & Units 11/15/2021     1:26 PM 3/6/2017     9:05 AM 3/6/2017     8:59 AM   PAP / HPV   PAP  Negative for Intraepithelial Lesion or Malignancy (NILM)       PAP (Historical)    NIL     HPV 16 DNA Negative Negative   Negative      HPV 18 DNA Negative Negative   Negative      Other HR HPV Negative Negative   Negative        Reviewed and updated as needed this visit by clinical staff                  Reviewed and  "updated as needed this visit by Provider                     Review of Systems   Constitutional: Negative for chills and fever.   HENT: Negative for congestion, ear pain, hearing loss and sore throat.    Eyes: Negative for pain and visual disturbance.   Respiratory: Positive for cough. Negative for shortness of breath.    Cardiovascular: Negative for chest pain, palpitations and peripheral edema.   Gastrointestinal: Negative for abdominal pain, constipation, diarrhea, heartburn, hematochezia and nausea.   Breasts:  Negative for tenderness, breast mass and discharge.   Genitourinary: Negative for dysuria, frequency, genital sores, hematuria, pelvic pain, urgency, vaginal bleeding and vaginal discharge.   Musculoskeletal: Positive for arthralgias. Negative for joint swelling and myalgias.   Skin: Positive for rash.   Neurological: Negative for dizziness, weakness, headaches and paresthesias.   Psychiatric/Behavioral: Negative for mood changes. The patient is not nervous/anxious.      Didn't complete PHYSICAL THERAPY yet but overall back/hip little better. Comes and goes. Getting massages.   Eye exam utd. Cataract surgery planned next year     OBJECTIVE:   /84 (BP Location: Right arm, Patient Position: Sitting, Cuff Size: Adult Regular)   Pulse 79   Temp 98.6  F (37  C) (Oral)   Resp 22   Ht 1.6 m (5' 3\")   Wt 88.5 kg (195 lb 3.2 oz)   LMP 03/19/2009 (Exact Date)   SpO2 98%   BMI 34.58 kg/m    Physical Exam  GENERAL: healthy, alert and no distress  EYES: Eyes grossly normal to inspection, PERRL and conjunctivae and sclerae normal  HENT: ear canals and TM's normal, nose with mucosal edema and mouth without ulcers or lesions  NECK: no adenopathy, no asymmetry, masses, or scars and thyroid normal to palpation  RESP: lungs clear to auscultation - no rales, rhonchi or wheezes  BREAST: normal without masses, tenderness or nipple discharge and no palpable axillary masses or adenopathy  CV: regular rate and " rhythm, normal S1 S2, no S3 or S4, no murmur, click or rub, no peripheral edema and peripheral pulses strong  ABDOMEN: soft, nontender, no hepatosplenomegaly, no masses and bowel sounds normal   (female): normal female external genitalia, normal urethral meatus, vaginal mucosa pink, moist, no major discharge.   MS: no gross musculoskeletal defects noted, no edema  SKIN: no suspicious lesions or rashes.  Skin is dry  NEURO: Normal strength and tone, mentation intact and speech normal  PSYCH: mentation appears normal, affect normal/bright    CXR: Xray personally reviewed and evaluated by me.  No acute infiltrate, cardiomegaly, fluid present in chest.    EKG - appears normal, NSR, normal axis, normal intervals, no acute ST/T changes c/w ischemia, no LVH by voltage criteria, unchanged from previous tracings      ASSESSMENT/PLAN:   (Z00.00) Routine general medical examination at a health care facility  (primary encounter diagnosis)    (R05.3) Chronic cough  Comment: Possibly allergy induced question underlying asthma.  Also reflux could be contributing.  We will get updated PFTs.  Consider CT chest and pulmonary referral if persisting symptoms (however, she would not likely be able to get an in the timeframe needed before she goes back to the [facility])  Plan: XR Chest 2 Views, General PFT Lab (Please         always keep checked), Pulmonary Function Test,         CBC with platelets and differential,         Comprehensive metabolic panel (BMP + Alb, Alk         Phos, ALT, AST, Total. Bili, TP)           (R49.0) Hoarseness  Comment: Allergen, temperature induced  Plan: Encouraged follow-up with ENT for further evaluation which she declines.  We did discuss better control of allergies and taking antihistamine twice daily, and also adding saline irrigation with distilled water    (R06.09) PEREZ (dyspnea on exertion)  Comment: Likely due to above but will get echo also to rule out cardiac causes.  Plan: EKG 12-lead complete  w/read - Clinics,         Echocardiogram Complete, XR Chest 2 Views,         General PFT Lab (Please always keep checked),         Pulmonary Function Test, CBC with platelets and        differential, Comprehensive metabolic panel         (BMP + Alb, Alk Phos, ALT, AST, Total. Bili,         TP)           (E11.3299,  Z79.4) Type 2 diabetes mellitus with mild nonproliferative retinopathy without macular edema, with long-term current use of insulin, unspecified laterality (H)  (E11.65,  Z79.4) Type 2 diabetes mellitus with hyperglycemia, with long-term current use of insulin (H)  Comment: Worsening control since off the Jardiance.  Plan: EKG 12-lead complete w/read - Clinics        Per endocrinology.  She has follow-up tomorrow    (L29.2) Vulvar itching  Comment: No obvious rash.  She does have fairly extensive itching really throughout her body/skin.  She has noted significant improvement in the vulvar itching with topical cortisone twice daily and I encouraged her to continue that today.  Plan: Wet prep - Clinic Collect, Adult Dermatology         Referral        We will have her talk with dermatology about symptoms and consider GYN referral also discussed, vulvar biopsy may be needed    (L29.9) Itching  Comment: As above.  Suspect this could be related to allergies.  Will increase antihistamine to twice daily the and discussed trialing other antihistamines to see if they work better  Plan: Adult Dermatology Referral           (Z85.828) History of SCC (squamous cell carcinoma) of skin  Comment: Recommend follow-up with dermatology  Plan: Adult Dermatology Referral            (E28.39) Estrogen deficiency  Comment:   Plan: DX Hip/Pelvis/Spine            (Z12.31) Encounter for screening mammogram for breast cancer  Comment:   Plan: *MA Screening Digital Bilateral              COUNSELING:  Reviewed preventive health counseling, as reflected in patient instructions       Regular exercise       Healthy diet/nutrition        Vision screening       Osteoporosis prevention/bone health        She reports that she has never smoked. She has never used smokeless tobacco.    Patient Instructions   Start daily gary pot daily  Increase allegra to twice daily    Call [phone] ([phone]) to schedule echo, pulmonary function tests    Schedule with dermatology    Preventive Health Recommendations  Female Ages 50 - 64    Yearly exam: See your health care provider every year in order to  o Review health changes.   o Discuss preventive care.    o Review your medicines if your doctor has prescribed any.      Get a Pap test every three years (unless you have an abnormal result and your provider advises testing more often).    If you get Pap tests with HPV test, you only need to test every 5 years, unless you have an abnormal result.     You do not need a Pap test if your uterus was removed (hysterectomy) and you have not had cancer.    You should be tested each year for STDs (sexually transmitted diseases) if you're at risk.     Have a mammogram every 1 to 2 years.    Have a colonoscopy at age 50, or have a yearly FIT test (stool test). These exams screen for colon cancer.      Have a cholesterol test every 5 years, or more often if advised.    Have a diabetes test (fasting glucose) every three years. If you are at risk for diabetes, you should have this test more often.     If you are at risk for osteoporosis (brittle bone disease), think about having a bone density scan (DEXA).    Shots: Get a flu shot each year. Get a tetanus shot every 10 years.    Nutrition:     Eat at least 5 servings of fruits and vegetables each day.    Eat whole-grain bread, whole-wheat pasta and brown rice instead of white grains and rice.    Get adequate Calcium and Vitamin D.     Lifestyle    Exercise at least 150 minutes a week (30 minutes a day, 5 days a week). This will help you control your weight and prevent disease.    Limit alcohol to one drink per day.    No  smoking.     Wear sunscreen to prevent skin cancer.     See your dentist every six months for an exam and cleaning.    See your eye doctor every 1 to 2 years.            Elva Ortiz MD  [facility]

## 2023-06-08 NOTE — RESULT ENCOUNTER NOTE
Maliha,  - Your vaginal infection screening test, wet prep (screens for yeast/bacteria/trichomonas infections), was normal.  - EKG also looked good.   Please MyChart or call if you have any concerns or questions.   Sincerely,  Elva Ortiz MD

## 2023-06-08 NOTE — PATIENT INSTRUCTIONS
Start daily gary pot daily  Increase allegra to twice daily    Call [email or web address] ([phone]) to schedule echo, pulmonary function tests    Schedule with dermatology    Preventive Health Recommendations  Female Ages 50 - 64    Yearly exam: See your health care provider every year in order to  o Review health changes.   o Discuss preventive care.    o Review your medicines if your doctor has prescribed any.      Get a Pap test every three years (unless you have an abnormal result and your provider advises testing more often).    If you get Pap tests with HPV test, you only need to test every 5 years, unless you have an abnormal result.     You do not need a Pap test if your uterus was removed (hysterectomy) and you have not had cancer.    You should be tested each year for STDs (sexually transmitted diseases) if you're at risk.     Have a mammogram every 1 to 2 years.    Have a colonoscopy at age 50, or have a yearly FIT test (stool test). These exams screen for colon cancer.      Have a cholesterol test every 5 years, or more often if advised.    Have a diabetes test (fasting glucose) every three years. If you are at risk for diabetes, you should have this test more often.     If you are at risk for osteoporosis (brittle bone disease), think about having a bone density scan (DEXA).    Shots: Get a flu shot each year. Get a tetanus shot every 10 years.    Nutrition:     Eat at least 5 servings of fruits and vegetables each day.    Eat whole-grain bread, whole-wheat pasta and brown rice instead of white grains and rice.    Get adequate Calcium and Vitamin D.     Lifestyle    Exercise at least 150 minutes a week (30 minutes a day, 5 days a week). This will help you control your weight and prevent disease.    Limit alcohol to one drink per day.    No smoking.     Wear sunscreen to prevent skin cancer.     See your dentist every six months for an exam and cleaning.    See your eye doctor every 1 to 2 years.

## 2023-06-09 ENCOUNTER — OFFICE VISIT (OUTPATIENT)
Dept: ENDOCRINOLOGY | Facility: CLINIC | Age: 63
End: 2023-06-09
Payer: COMMERCIAL

## 2023-06-09 VITALS
OXYGEN SATURATION: 97 % | HEART RATE: 73 BPM | SYSTOLIC BLOOD PRESSURE: 136 MMHG | DIASTOLIC BLOOD PRESSURE: 81 MMHG | WEIGHT: 192 LBS | BODY MASS INDEX: 34.01 KG/M2

## 2023-06-09 DIAGNOSIS — E11.3299 TYPE 2 DIABETES MELLITUS WITH MILD NONPROLIFERATIVE RETINOPATHY WITHOUT MACULAR EDEMA, WITH LONG-TERM CURRENT USE OF INSULIN, UNSPECIFIED LATERALITY (H): Primary | ICD-10-CM

## 2023-06-09 DIAGNOSIS — Z79.4 TYPE 2 DIABETES MELLITUS WITH MILD NONPROLIFERATIVE RETINOPATHY WITHOUT MACULAR EDEMA, WITH LONG-TERM CURRENT USE OF INSULIN, UNSPECIFIED LATERALITY (H): Primary | ICD-10-CM

## 2023-06-09 DIAGNOSIS — E11.21 TYPE 2 DIABETES MELLITUS WITH DIABETIC NEPHROPATHY, WITH LONG-TERM CURRENT USE OF INSULIN (H): ICD-10-CM

## 2023-06-09 DIAGNOSIS — R05.3 CHRONIC COUGH: ICD-10-CM

## 2023-06-09 DIAGNOSIS — R49.0 HOARSENESS: Primary | ICD-10-CM

## 2023-06-09 DIAGNOSIS — Z79.4 TYPE 2 DIABETES MELLITUS WITH DIABETIC NEPHROPATHY, WITH LONG-TERM CURRENT USE OF INSULIN (H): ICD-10-CM

## 2023-06-09 LAB — HBA1C MFR BLD: 8.1 % (ref 4.3–?)

## 2023-06-09 PROCEDURE — 83036 HEMOGLOBIN GLYCOSYLATED A1C: CPT | Performed by: INTERNAL MEDICINE

## 2023-06-09 PROCEDURE — 99214 OFFICE O/P EST MOD 30 MIN: CPT | Performed by: INTERNAL MEDICINE

## 2023-06-09 RX ORDER — INSULIN GLARGINE 100 [IU]/ML
INJECTION, SOLUTION SUBCUTANEOUS
Qty: 90 ML | Refills: 2 | Status: SHIPPED | OUTPATIENT
Start: 2023-06-09 | End: 2023-11-17

## 2023-06-09 NOTE — NURSING NOTE
Maliha Pedro's goals for this visit include:   Chief Complaint   Patient presents with     Follow Up     Diabetes     Type ll       She requests these members of her care team be copied on today's visit information: Yes     PCP: Elva Ortiz    Referring Provider:  No referring provider defined for this encounter.    /81 (BP Location: Left arm, Patient Position: Sitting, Cuff Size: Adult Regular)   Pulse 73   Wt 87.1 kg (192 lb)   LMP 03/19/2009 (Exact Date)   SpO2 97%   BMI 34.01 kg/m      Do you need any medication refills at today's visit? Yes     Patti Goyal, [facility]  Adult Endocrinology  [facility]

## 2023-06-09 NOTE — PROGRESS NOTES
Outcome for 06/02/23 11:58 AM: Data obtained via Dexcom website-printed  Patti Goyal CMA  Adult Endocrinology  [facility]     Endocrinology and Diabetes Clinic    Follow up for T2DM      Interval history  6 month follow up for T2DM  Has been sick with cough, on steroids and ABX in 2/23  Pt is spending most of her time in the [facility], [place], where she has her own house and then ate lots of cooks for her   Patient has been sick with pneumonia in February of this year.  She still struggles with dyspnea.  She will have a cardiac echo for additional evaluation.  She had been on prednisone for 1 week which caused hypoglycemia.  Patient has been less physically active         Current diabetic medications:  Metformin 1000 bid, Lantus 70 units daily, Jardiance 25 mg, Novolog 25 units three times daily    SMBG:  Dexcom sensor, Average blood sugar elevated with postprandial BGs especially after brekafst, no hypolgycemia    Meal plan: 3 meals a day plus snacks, typically high in carbohydrates because of options available to her including rice and sweet yams  Exercise: has increased and walking lots of steps now, much more than before    Complications  1. Retinopathy: yearly visit, no DR  2. Nephropathy: None  3. Neuropathy: No numbness no tingling no history of ulcerations  4. Hypoglycemia: Yes, however resolved with insulin adjustment  5. Macrovascular: No chest pain, no shortness of breath      HTN: Losartan, hydrochlorothiazide 12.5 daily  Dyslipidemia Crestor 10 mg    Previously used diabetes medications:   Bydureon in 2016, Ernst Thomas: yeast infection      ASSESSMENT/Plan:   T2DM with comorbidity of obesity    Blood glucose control  Patient now with hypoglycemia particularly postprandial after breakfast.  This related to being less physically active and recovering from pneumonia   Patient asked about and be reviewed to start Ozempic     Reviewed to continue Lantus 70 units and NovoLog  at 35 units with breakfast 25 units with lunch and dinner  Start Ozempic 0.25 mg and increase to 0.5 and 1 mg every 4 weeks  Reviewed if blood sugar should decrease to decrease all injections by 5 units but she might need to do this every Ozempic dose increase      Hypertension controlled on losartan and hydrochlorothiazide   Dyslipidemia on 10 mg of Crestor doing well      30 minutes spent on the date of the encounter doing chart review, review of test results, interpretation of tests, patient visit and documentation     This note was generated using computer recognized voice recognition. This might result in some expected imperfection.      Patient to contact the clinic if blood glucose (sugar) is under 70 two times in one week or above 200 for 3 consecutive days.       Stacia Carbajal MD  Endocrinology and Diabetes  Telephone contact:  [facility] [place] [phone]  [facility] [phone]          PMH:  Patient Active Problem List   Diagnosis     ? 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     Displacement of lumbar intervertebral disc without myelopathy     Nonallopathic lesion of lumbar region     Nonallopathic lesion of thoracic region     Nonallopathic lesion of sacral region     Bartholin gland cyst     Cataract     Irritable bowel syndrome     Esophageal reflux     Seasonal allergic rhinitis     Latex sensitivity     Adenomatous polyp     HYPERLIPIDEMIA LDL GOAL <100     Ovarian cyst     Breast pain     Essential hypertension     Advanced directives, counseling/discussion     Obesity     Hypomagnesemia     Type 2 diabetes mellitus with mild nonproliferative retinopathy without macular edema, with long-term current use of insulin, unspecified laterality (H)       SOCIAL HISTORY:  Social History     Socioeconomic History     Marital status: Single     Spouse name: Not on file     Number of children: Not on file     Years of education: Not  on file     Highest education level: Not on file   Occupational History     Not on file   Tobacco Use     Smoking status: Never     Smokeless tobacco: Never   Vaping Use     Vaping status: Never Used     Passive vaping exposure: Yes   Substance and Sexual Activity     Alcohol use: Yes     Comment: one drink a month     Drug use: No     Sexual activity: Not Currently     Partners: Male     Birth control/protection: None   Other Topics Concern     Parent/sibling w/ CABG, MI or angioplasty before 65F 55M? No      Service No     Blood Transfusions No     Caffeine Concern No     Occupational Exposure Yes     Comment: exposed to X-ray     Hobby Hazards No     Sleep Concern Yes     Comment: Hot Flashes     Stress Concern Yes     Comment: Work     Weight Concern Yes     Special Diet No     Back Care Yes     Comment: Back pain     Exercise Yes     Bike Helmet No     Seat Belt Yes     Self-Exams Yes   Social History Narrative    Works at  in surgical ICU    Born in the [facility]. Moved to  in . Initially lived in [place].    No children        2 cats     Social Determinants of Health     Financial Resource Strain: Not on file   Food Insecurity: Not on file   Transportation Needs: Not on file   Physical Activity: Not on file   Stress: Not on file   Social Connections: Not on file   Intimate Partner Violence: Not on file   Housing Stability: Not on file       FAMILY HISTORY:  Family History   Problem Relation Age of Onset     Diabetes Father          of dm 70's     C.A.D. Father      Cerebrovascular Disease Father      Diabetes Sister      Diabetes Sister      Diabetes Sister      Hypertension Sister      Diabetes Brother      Diabetes Brother      Cancer Half-Brother         lung     Diabetes Half-Brother      Leukemia Half-Brother      Glaucoma Maternal Grandmother      Diabetes Paternal Grandfather      Hypertension Paternal Grandfather      Cerebrovascular Disease Paternal Grandfather       Cancer Maternal Aunt         ovarian cancer.      Leukemia Half-Brother      Lymphoma Paternal Cousin      Macular Degeneration No family hx of        Medications:   Current Outpatient Medications   Medication Sig Dispense Refill     albuterol (PROAIR HFA/PROVENTIL HFA/VENTOLIN HFA) 108 (90 Base) MCG/ACT inhaler Inhale 2 puffs into the lungs every 4 hours as needed for shortness of breath / dyspnea 18 g 1     aspirin 81 MG EC tablet Take 81 mg by mouth daily Please resume in 5 days.  (12/22/2018)       CALCIUM + D OR Take 1 tablet by mouth 2 times daily.       Continuous Blood Gluc Sensor (DEXCOM G6 SENSOR) MISC 1 each every 10 days 18 each 1     Continuous Blood Gluc Transmit (DEXCOM G6 TRANSMITTER) MISC 1 each every 3 months Change every 3 months. 2 each 1     fexofenadine (ALLEGRA) 180 MG tablet Take 1 tablet by mouth daily. 1 TABLET DAILY Failed claritin for symptom cotrol. Zyrtec increases heart rate. 90 tablet 3     fish oil-omega-3 fatty acids 1000 MG capsule Take 1 capsule by mouth daily.       fluticasone (FLONASE) 50 MCG/ACT nasal spray USE 1-2 SPRAYS IN EACH NOSTRIL ONCE DAILY 48 g 3     Glucosamine-Chondroitin (GLUCOSAMINE CHONDR COMPLEX PO)        hydrochlorothiazide (HYDRODIURIL) 12.5 MG tablet Take 1 tablet (12.5 mg) by mouth daily 90 tablet 3     insulin aspart (NOVOLOG FLEXPEN) 100 UNIT/ML pen Use 20-35 units three times daily with meals for up to 120 units daily 120 mL 3     insulin glargine (BASAGLAR KWIKPEN) 100 UNIT/ML pen INJECT 70 UNITS UNDER THE SKIN DAILY 90 mL 2     insulin pen needle (BD LISA U/F) 32G X 4 MM miscellaneous Use to inject 4-5 times daily. 600 each 1     latanoprost (XALATAN) 0.005 % ophthalmic solution Place 1 drop into both eyes daily 7.5 mL 11     losartan (COZAAR) 100 MG tablet Take 1 tablet (100 mg) by mouth daily 90 tablet 3     magnesium 250 MG tablet Take 1 tablet by mouth daily 90 tablet 3     metFORMIN (GLUCOPHAGE XR) 500 MG 24 hr tablet TAKE 2 TABLETS TWICE A DAY  360 tablet 3     montelukast (SINGULAIR) 10 MG tablet Take 1 tablet (10 mg) by mouth At Bedtime 90 tablet 3     MULTIVITAMIN OR Take 1 tablet by mouth daily.       NEEDLES, ANY SIZE Pen needles for Novolog insulin pen. Use to inject 5-6  times daily. 4 Box 3     NOVOLOG FLEXPEN 100 UNIT/ML soln Sig 20-25 units two-three times daily. Max daily dose 75 units 90 mL 1     pantoprazole (PROTONIX) 40 MG EC tablet Take 1 tablet (40 mg) by mouth daily 90 tablet 3     potassium chloride ER (KLOR-CON M) 10 MEQ CR tablet Take 1 tablet (10 mEq) by mouth daily 90 tablet 3     rosuvastatin (CRESTOR) 10 MG tablet Take 1 tablet (10 mg) by mouth daily 90 tablet 3     semaglutide (OZEMPIC) 2 MG/3ML pen Inject 0.25 mg Subcutaneous every 7 days 3 mL 0     [START ON 7/10/2023] semaglutide (OZEMPIC) 2 MG/3ML pen Inject 0.5 mg Subcutaneous every 7 days 3 mL 0     [START ON 8/9/2023] Semaglutide, 1 MG/DOSE, (OZEMPIC) 4 MG/3ML pen Inject 1 mg Subcutaneous every 7 days 3 mL 0     VITAMIN D 2000 UNIT OR TABS Take 1 tablet by mouth daily.       amoxicillin-clavulanate (AUGMENTIN) 875-125 MG tablet Take 1 tablet by mouth 2 times daily for 7 days 14 tablet 0     Physical Exam  /81 (BP Location: Left arm, Patient Position: Sitting, Cuff Size: Adult Regular)   Pulse 73   Wt 87.1 kg (192 lb)   LMP 03/19/2009 (Exact Date)   SpO2 97%   BMI 34.01 kg/m      /81 (BP Location: Left arm, Patient Position: Sitting, Cuff Size: Adult Regular)   Pulse 73   Wt 87.1 kg (192 lb)   LMP 03/19/2009 (Exact Date)   SpO2 97%   BMI 34.01 kg/m    Wt Readings from Last 4 Encounters:   06/09/23 87.1 kg (192 lb)   06/08/23 88.5 kg (195 lb 3.2 oz)   12/08/22 87.8 kg (193 lb 8 oz)   12/05/22 87.1 kg (192 lb)     General: Well appearing well in no distress.  Psych: good eye contact, no pressured speech     LABS:  /81 (BP Location: Left arm, Patient Position: Sitting, Cuff Size: Adult Regular)   Pulse 73   Wt 87.1 kg (192 lb)   LMP 03/19/2009  (Exact Date)   SpO2 97%   BMI 34.01 kg/m    Wt Readings from Last 4 Encounters:   06/09/23 87.1 kg (192 lb)   06/08/23 88.5 kg (195 lb 3.2 oz)   12/08/22 87.8 kg (193 lb 8 oz)   12/05/22 87.1 kg (192 lb)       A1c today 9.3%  Lab Results   Component Value Date     06/07/2023    CHLORIDE 103 06/07/2023    CO2 21 (L) 06/07/2023     (H) 06/07/2023     (H) 06/07/2023    CR 0.76 06/07/2023    CR 0.71 06/07/2023    CR 0.68 11/28/2022    CR 0.63 11/16/2021    CR 0.69 05/05/2021    SUSHANT 9.6 06/07/2023    MAG 2.0 11/14/2018    ALBUMIN 4.3 06/07/2023    ALKPHOS 55 06/07/2023    LDL 79 06/07/2023    HDL 37 (L) 06/07/2023    TRIG 166 (H) 06/07/2023    TSH 1.01 06/07/2023    TSH 1.24 11/28/2022    TSH 0.69 05/05/2021     Lab Results   Component Value Date    MICROL 29.7 06/07/2023    MICROL 11 11/28/2022    MICROL 6 11/16/2021    MICROL 7 08/27/2020    MICROL <5 04/02/2020     Lab Results   Component Value Date    A1C 9.3 (A) 06/27/2022    A1C 8.7 (H) 11/16/2021    A1C 8.3 (H) 05/05/2021    A1C 8.2 (H) 08/27/2020    A1C 8.1 (H) 04/02/2020       Lab Results   Component Value Date    HGB 15.1 06/08/2023         Answers for HPI/ROS submitted by the patient on 6/8/2023  General Symptoms: No  Skin Symptoms: Yes  HENT Symptoms: Yes  EYE SYMPTOMS: No  HEART SYMPTOMS: No  LUNG SYMPTOMS: Yes  INTESTINAL SYMPTOMS: No  URINARY SYMPTOMS: No  GYNECOLOGIC SYMPTOMS: Yes  BREAST SYMPTOMS: No  SKELETAL SYMPTOMS: No  BLOOD SYMPTOMS: No  NERVOUS SYSTEM SYMPTOMS: No  MENTAL HEALTH SYMPTOMS: No   Voice hoarseness: Yes  Itching: Yes  Rashes: Yes  Flaking of skin: Yes  Cough: Yes  Sputum or phlegm: Yes  Difficulty breathing on exertion: Yes  Vaginal dryness: Yes      Answers for HPI/ROS submitted by the patient on 6/8/2023  General Symptoms: No  Skin Symptoms: Yes  HENT Symptoms: Yes  EYE SYMPTOMS: No  HEART SYMPTOMS: No  LUNG SYMPTOMS: Yes  INTESTINAL SYMPTOMS: No  URINARY SYMPTOMS: No  GYNECOLOGIC SYMPTOMS: Yes  BREAST SYMPTOMS:  No  SKELETAL SYMPTOMS: No  BLOOD SYMPTOMS: No  NERVOUS SYSTEM SYMPTOMS: No  MENTAL HEALTH SYMPTOMS: No   Voice hoarseness: Yes  Itching: Yes  Rashes: Yes  Flaking of skin: Yes  Cough: Yes  Sputum or phlegm: Yes  Difficulty breathing on exertion: Yes  Vaginal dryness: Yes

## 2023-06-09 NOTE — LETTER
6/9/2023         RE: Maliha Pedro  [address]  [facility] [address]        Dear Colleague,    Thank you for referring your patient, Maliha Pedro, to the [facility]. Please see a copy of my visit note below.    Outcome for 06/02/23 11:58 AM: Data obtained via Dexcom website-printed  Patti Goyal CMA  Adult Endocrinology  [facility]      Endocrinology and Diabetes Clinic     Follow up for T2DM        Interval history  6 month follow up for T2DM  Has been sick with cough, on steroids and ABX in 2/23  Pt is spending most of her time in the [facility], her home country, where she has her own house and then ate lots of cooks for her   Patient has been sick with pneumonia in February of this year.  She still struggles with dyspnea.  She will have a cardiac echo for additional evaluation.  She had been on prednisone for 1 week which caused hypoglycemia.  Patient has been less physically active            Current diabetic medications:  Metformin 1000 bid, Lantus 70 units daily, Jardiance 25 mg, Novolog 25 units three times daily     SMBG:  Dexcom sensor, Average blood sugar elevated with postprandial BGs especially after brekafst, no hypolgycemia     Meal plan: 3 meals a day plus snacks, typically high in carbohydrates because of options available to her including rice and sweet yams  Exercise: has increased and walking lots of steps now, much more than before     Complications  1. Retinopathy: yearly visit, no DR  2. Nephropathy: None  3. Neuropathy: No numbness no tingling no history of ulcerations  4. Hypoglycemia: Yes, however resolved with insulin adjustment  5. Macrovascular: No chest pain, no shortness of breath        HTN: Losartan, hydrochlorothiazide 12.5 daily  Dyslipidemia Crestor 10 mg     Previously used diabetes medications:   Bydureon in 2016, Bytonya Thomas: yeast infection        ASSESSMENT/Plan:   T2DM with comorbidity of  obesity     Blood glucose control  Patient now with hypoglycemia particularly postprandial after breakfast.  This related to being less physically active and recovering from pneumonia   Patient asked about and be reviewed to start Ozempic      Reviewed to continue Lantus 70 units and NovoLog at 35 units with breakfast 25 units with lunch and dinner  Start Ozempic 0.25 mg and increase to 0.5 and 1 mg every 4 weeks  Reviewed if blood sugar should decrease to decrease all injections by 5 units but she might need to do this every Ozempic dose increase        Hypertension controlled on losartan and hydrochlorothiazide   Dyslipidemia on 10 mg of Crestor doing well        30 minutes spent on the date of the encounter doing chart review, review of test results, interpretation of tests, patient visit and documentation      This note was generated using computer recognized voice recognition. This might result in some expected imperfection.        Patient to contact the clinic if blood glucose (sugar) is under 70 two times in one week or above 200 for 3 consecutive days.        Stacia Carbajal MD  Endocrinology and Diabetes  Telephone contact:  [facility] [place] [phone]  [facility] [phone]              PMH:      Patient Active Problem List   Diagnosis     ? 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     Displacement of lumbar intervertebral disc without myelopathy     Nonallopathic lesion of lumbar region     Nonallopathic lesion of thoracic region     Nonallopathic lesion of sacral region     Bartholin gland cyst     Cataract     Irritable bowel syndrome     Esophageal reflux     Seasonal allergic rhinitis     Latex sensitivity     Adenomatous polyp     HYPERLIPIDEMIA LDL GOAL <100     Ovarian cyst     Breast pain     Essential hypertension     Advanced directives, counseling/discussion     Obesity     Hypomagnesemia     Type 2 diabetes mellitus with  mild nonproliferative retinopathy without macular edema, with long-term current use of insulin, unspecified laterality (H)         SOCIAL HISTORY:  Social History   Social History            Socioeconomic History     Marital status: Single       Spouse name: Not on file     Number of children: Not on file     Years of education: Not on file     Highest education level: Not on file   Occupational History     Not on file   Tobacco Use     Smoking status: Never     Smokeless tobacco: Never   Vaping Use     Vaping status: Never Used       Passive vaping exposure: Yes   Substance and Sexual Activity     Alcohol use: Yes       Comment: one drink a month     Drug use: No     Sexual activity: Not Currently       Partners: Male       Birth control/protection: None   Other Topics Concern     Parent/sibling w/ CABG, MI or angioplasty before 65F 55M? No      Service No     Blood Transfusions No     Caffeine Concern No     Occupational Exposure Yes       Comment: exposed to X-ray     Hobby Hazards No     Sleep Concern Yes       Comment: Hot Flashes     Stress Concern Yes       Comment: Work     Weight Concern Yes     Special Diet No     Back Care Yes       Comment: Back pain     Exercise Yes     Bike Helmet No     Seat Belt Yes     Self-Exams Yes   Social History Narrative     Works at  in surgical ICU     Born in the [place]. Moved to  in . Initially lived in [place].     No children          2 cats      Social Determinants of Health      Financial Resource Strain: Not on file   Food Insecurity: Not on file   Transportation Needs: Not on file   Physical Activity: Not on file   Stress: Not on file   Social Connections: Not on file   Intimate Partner Violence: Not on file   Housing Stability: Not on file            FAMILY HISTORY:  Family History         Family History   Problem Relation Age of Onset     Diabetes Father            of dm 70's     C.A.D. Father       Cerebrovascular Disease Father        Diabetes Sister       Diabetes Sister       Diabetes Sister       Hypertension Sister       Diabetes Brother       Diabetes Brother       Cancer Half-Brother           lung     Diabetes Half-Brother       Leukemia Half-Brother       Glaucoma Maternal Grandmother       Diabetes Paternal Grandfather       Hypertension Paternal Grandfather       Cerebrovascular Disease Paternal Grandfather       Cancer Maternal Aunt           ovarian cancer.      Leukemia Half-Brother       Lymphoma Paternal Cousin       Macular Degeneration No family hx of              Medications:   Current Outpatient Prescriptions          Current Outpatient Medications   Medication Sig Dispense Refill     albuterol (PROAIR HFA/PROVENTIL HFA/VENTOLIN HFA) 108 (90 Base) MCG/ACT inhaler Inhale 2 puffs into the lungs every 4 hours as needed for shortness of breath / dyspnea 18 g 1     aspirin 81 MG EC tablet Take 81 mg by mouth daily Please resume in 5 days.  (12/22/2018)         CALCIUM + D OR Take 1 tablet by mouth 2 times daily.         Continuous Blood Gluc Sensor (DEXCOM G6 SENSOR) MISC 1 each every 10 days 18 each 1     Continuous Blood Gluc Transmit (DEXCOM G6 TRANSMITTER) MISC 1 each every 3 months Change every 3 months. 2 each 1     fexofenadine (ALLEGRA) 180 MG tablet Take 1 tablet by mouth daily. 1 TABLET DAILY Failed claritin for symptom cotrol. Zyrtec increases heart rate. 90 tablet 3     fish oil-omega-3 fatty acids 1000 MG capsule Take 1 capsule by mouth daily.         fluticasone (FLONASE) 50 MCG/ACT nasal spray USE 1-2 SPRAYS IN EACH NOSTRIL ONCE DAILY 48 g 3     Glucosamine-Chondroitin (GLUCOSAMINE CHONDR COMPLEX PO)           hydrochlorothiazide (HYDRODIURIL) 12.5 MG tablet Take 1 tablet (12.5 mg) by mouth daily 90 tablet 3     insulin aspart (NOVOLOG FLEXPEN) 100 UNIT/ML pen Use 20-35 units three times daily with meals for up to 120 units daily 120 mL 3     insulin glargine (BASAGLAR KWIKPEN) 100 UNIT/ML pen INJECT 70 UNITS  UNDER THE SKIN DAILY 90 mL 2     insulin pen needle (BD LISA U/F) 32G X 4 MM miscellaneous Use to inject 4-5 times daily. 600 each 1     latanoprost (XALATAN) 0.005 % ophthalmic solution Place 1 drop into both eyes daily 7.5 mL 11     losartan (COZAAR) 100 MG tablet Take 1 tablet (100 mg) by mouth daily 90 tablet 3     magnesium 250 MG tablet Take 1 tablet by mouth daily 90 tablet 3     metFORMIN (GLUCOPHAGE XR) 500 MG 24 hr tablet TAKE 2 TABLETS TWICE A  tablet 3     montelukast (SINGULAIR) 10 MG tablet Take 1 tablet (10 mg) by mouth At Bedtime 90 tablet 3     MULTIVITAMIN OR Take 1 tablet by mouth daily.         NEEDLES, ANY SIZE Pen needles for Novolog insulin pen. Use to inject 5-6  times daily. 4 Box 3     NOVOLOG FLEXPEN 100 UNIT/ML soln Sig 20-25 units two-three times daily. Max daily dose 75 units 90 mL 1     pantoprazole (PROTONIX) 40 MG EC tablet Take 1 tablet (40 mg) by mouth daily 90 tablet 3     potassium chloride ER (KLOR-CON M) 10 MEQ CR tablet Take 1 tablet (10 mEq) by mouth daily 90 tablet 3     rosuvastatin (CRESTOR) 10 MG tablet Take 1 tablet (10 mg) by mouth daily 90 tablet 3     semaglutide (OZEMPIC) 2 MG/3ML pen Inject 0.25 mg Subcutaneous every 7 days 3 mL 0     [START ON 7/10/2023] semaglutide (OZEMPIC) 2 MG/3ML pen Inject 0.5 mg Subcutaneous every 7 days 3 mL 0     [START ON 8/9/2023] Semaglutide, 1 MG/DOSE, (OZEMPIC) 4 MG/3ML pen Inject 1 mg Subcutaneous every 7 days 3 mL 0     VITAMIN D 2000 UNIT OR TABS Take 1 tablet by mouth daily.         amoxicillin-clavulanate (AUGMENTIN) 875-125 MG tablet Take 1 tablet by mouth 2 times daily for 7 days 14 tablet 0         Physical Exam  /81 (BP Location: Left arm, Patient Position: Sitting, Cuff Size: Adult Regular)   Pulse 73   Wt 87.1 kg (192 lb)   LMP 03/19/2009 (Exact Date)   SpO2 97%   BMI 34.01 kg/m       /81 (BP Location: Left arm, Patient Position: Sitting, Cuff Size: Adult Regular)   Pulse 73   Wt 87.1 kg (192 lb)    LMP 03/19/2009 (Exact Date)   SpO2 97%   BMI 34.01 kg/m        Wt Readings from Last 4 Encounters:   06/09/23 87.1 kg (192 lb)   06/08/23 88.5 kg (195 lb 3.2 oz)   12/08/22 87.8 kg (193 lb 8 oz)   12/05/22 87.1 kg (192 lb)      General: Well appearing well in no distress.  Psych: good eye contact, no pressured speech     LABS:  /81 (BP Location: Left arm, Patient Position: Sitting, Cuff Size: Adult Regular)   Pulse 73   Wt 87.1 kg (192 lb)   LMP 03/19/2009 (Exact Date)   SpO2 97%   BMI 34.01 kg/m        Wt Readings from Last 4 Encounters:   06/09/23 87.1 kg (192 lb)   06/08/23 88.5 kg (195 lb 3.2 oz)   12/08/22 87.8 kg (193 lb 8 oz)   12/05/22 87.1 kg (192 lb)         A1c today 9.3%        Lab Results   Component Value Date      06/07/2023     CHLORIDE 103 06/07/2023     CO2 21 (L) 06/07/2023      (H) 06/07/2023      (H) 06/07/2023     CR 0.76 06/07/2023     CR 0.71 06/07/2023     CR 0.68 11/28/2022     CR 0.63 11/16/2021     CR 0.69 05/05/2021     SUSHANT 9.6 06/07/2023     MAG 2.0 11/14/2018     ALBUMIN 4.3 06/07/2023     ALKPHOS 55 06/07/2023     LDL 79 06/07/2023     HDL 37 (L) 06/07/2023     TRIG 166 (H) 06/07/2023     TSH 1.01 06/07/2023     TSH 1.24 11/28/2022     TSH 0.69 05/05/2021            Lab Results   Component Value Date     MICROL 29.7 06/07/2023     MICROL 11 11/28/2022     MICROL 6 11/16/2021     MICROL 7 08/27/2020     MICROL <5 04/02/2020            Lab Results   Component Value Date     A1C 9.3 (A) 06/27/2022     A1C 8.7 (H) 11/16/2021     A1C 8.3 (H) 05/05/2021     A1C 8.2 (H) 08/27/2020     A1C 8.1 (H) 04/02/2020               Lab Results   Component Value Date     HGB 15.1 06/08/2023            Answers for HPI/ROS submitted by the patient on 6/8/2023  General Symptoms: No  Skin Symptoms: Yes  HENT Symptoms: Yes  EYE SYMPTOMS: No  HEART SYMPTOMS: No  LUNG SYMPTOMS: Yes  INTESTINAL SYMPTOMS: No  URINARY SYMPTOMS: No  GYNECOLOGIC SYMPTOMS: Yes  BREAST SYMPTOMS:  No  SKELETAL SYMPTOMS: No  BLOOD SYMPTOMS: No  NERVOUS SYSTEM SYMPTOMS: No  MENTAL HEALTH SYMPTOMS: No   Voice hoarseness: Yes  Itching: Yes  Rashes: Yes  Flaking of skin: Yes  Cough: Yes  Sputum or phlegm: Yes  Difficulty breathing on exertion: Yes  Vaginal dryness: Yes       Again, thank you for allowing me to participate in the care of your patient.        Sincerely,        Stacia Carbajal MD

## 2023-06-09 NOTE — PROGRESS NOTES
Outcome for 06/02/23 11:58 AM: Data obtained via Dexcom website-printed  Patti Goyal CMA  Adult Endocrinology  [facility]      Endocrinology and Diabetes Clinic     Follow up for T2DM        Interval history  6 month follow up for T2DM  Has been sick with cough, on steroids and ABX in 2/23  Pt is spending most of her time in the [facility], her home country, where she has her own house and then ate lots of cooks for her   Patient has been sick with pneumonia in February of this year.  She still struggles with dyspnea.  She will have a cardiac echo for additional evaluation.  She had been on prednisone for 1 week which caused hypoglycemia.  Patient has been less physically active            Current diabetic medications:  Metformin 1000 bid, Lantus 70 units daily, Jardiance 25 mg, Novolog 25 units three times daily     SMBG:  Dexcom sensor, Average blood sugar elevated with postprandial BGs especially after brekafst, no hypolgycemia     Meal plan: 3 meals a day plus snacks, typically high in carbohydrates because of options available to her including rice and sweet yams  Exercise: has increased and walking lots of steps now, much more than before     Complications  1. Retinopathy: yearly visit, no DR  2. Nephropathy: None  3. Neuropathy: No numbness no tingling no history of ulcerations  4. Hypoglycemia: Yes, however resolved with insulin adjustment  5. Macrovascular: No chest pain, no shortness of breath        HTN: Losartan, hydrochlorothiazide 12.5 daily  Dyslipidemia Crestor 10 mg     Previously used diabetes medications:   Bydureon in 2016, Ernst Thomas: yeast infection        ASSESSMENT/Plan:   T2DM with comorbidity of obesity     Blood glucose control  Patient now with hypoglycemia particularly postprandial after breakfast.  This related to being less physically active and recovering from pneumonia   Patient asked about and be reviewed to start Ozempic      Reviewed to continue Lantus 70 units  and NovoLog at 35 units with breakfast 25 units with lunch and dinner  Start Ozempic 0.25 mg and increase to 0.5 and 1 mg every 4 weeks  Reviewed if blood sugar should decrease to decrease all injections by 5 units but she might need to do this every Ozempic dose increase        Hypertension controlled on losartan and hydrochlorothiazide   Dyslipidemia on 10 mg of Crestor doing well        30 minutes spent on the date of the encounter doing chart review, review of test results, interpretation of tests, patient visit and documentation      This note was generated using computer recognized voice recognition. This might result in some expected imperfection.        Patient to contact the clinic if blood glucose (sugar) is under 70 two times in one week or above 200 for 3 consecutive days.        Stacia Carbajal MD  Endocrinology and Diabetes  Telephone contact:  [facility] [place] [phone]  [facility] [phone]              PMH:      Patient Active Problem List   Diagnosis     ? 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     Displacement of lumbar intervertebral disc without myelopathy     Nonallopathic lesion of lumbar region     Nonallopathic lesion of thoracic region     Nonallopathic lesion of sacral region     Bartholin gland cyst     Cataract     Irritable bowel syndrome     Esophageal reflux     Seasonal allergic rhinitis     Latex sensitivity     Adenomatous polyp     HYPERLIPIDEMIA LDL GOAL <100     Ovarian cyst     Breast pain     Essential hypertension     Advanced directives, counseling/discussion     Obesity     Hypomagnesemia     Type 2 diabetes mellitus with mild nonproliferative retinopathy without macular edema, with long-term current use of insulin, unspecified laterality (H)         SOCIAL HISTORY:  Social History   Social History            Socioeconomic History     Marital status: Single       Spouse name: Not on file      Number of children: Not on file     Years of education: Not on file     Highest education level: Not on file   Occupational History     Not on file   Tobacco Use     Smoking status: Never     Smokeless tobacco: Never   Vaping Use     Vaping status: Never Used       Passive vaping exposure: Yes   Substance and Sexual Activity     Alcohol use: Yes       Comment: one drink a month     Drug use: No     Sexual activity: Not Currently       Partners: Male       Birth control/protection: None   Other Topics Concern     Parent/sibling w/ CABG, MI or angioplasty before 65F 55M? No      Service No     Blood Transfusions No     Caffeine Concern No     Occupational Exposure Yes       Comment: exposed to X-ray     Hobby Hazards No     Sleep Concern Yes       Comment: Hot Flashes     Stress Concern Yes       Comment: Work     Weight Concern Yes     Special Diet No     Back Care Yes       Comment: Back pain     Exercise Yes     Bike Helmet No     Seat Belt Yes     Self-Exams Yes   Social History Narrative     Works at  in surgical ICU     Born in the [place]. Moved to  in . Initially lived in [place].     No children          2 cats      Social Determinants of Health      Financial Resource Strain: Not on file   Food Insecurity: Not on file   Transportation Needs: Not on file   Physical Activity: Not on file   Stress: Not on file   Social Connections: Not on file   Intimate Partner Violence: Not on file   Housing Stability: Not on file            FAMILY HISTORY:  Family History         Family History   Problem Relation Age of Onset     Diabetes Father            of dm 70's     C.A.D. Father       Cerebrovascular Disease Father       Diabetes Sister       Diabetes Sister       Diabetes Sister       Hypertension Sister       Diabetes Brother       Diabetes Brother       Cancer Half-Brother           lung     Diabetes Half-Brother       Leukemia Half-Brother       Glaucoma Maternal Grandmother        Diabetes Paternal Grandfather       Hypertension Paternal Grandfather       Cerebrovascular Disease Paternal Grandfather       Cancer Maternal Aunt           ovarian cancer.      Leukemia Half-Brother       Lymphoma Paternal Cousin       Macular Degeneration No family hx of              Medications:   Current Outpatient Prescriptions          Current Outpatient Medications   Medication Sig Dispense Refill     albuterol (PROAIR HFA/PROVENTIL HFA/VENTOLIN HFA) 108 (90 Base) MCG/ACT inhaler Inhale 2 puffs into the lungs every 4 hours as needed for shortness of breath / dyspnea 18 g 1     aspirin 81 MG EC tablet Take 81 mg by mouth daily Please resume in 5 days.  (12/22/2018)         CALCIUM + D OR Take 1 tablet by mouth 2 times daily.         Continuous Blood Gluc Sensor (DEXCOM G6 SENSOR) MISC 1 each every 10 days 18 each 1     Continuous Blood Gluc Transmit (DEXCOM G6 TRANSMITTER) MISC 1 each every 3 months Change every 3 months. 2 each 1     fexofenadine (ALLEGRA) 180 MG tablet Take 1 tablet by mouth daily. 1 TABLET DAILY Failed claritin for symptom cotrol. Zyrtec increases heart rate. 90 tablet 3     fish oil-omega-3 fatty acids 1000 MG capsule Take 1 capsule by mouth daily.         fluticasone (FLONASE) 50 MCG/ACT nasal spray USE 1-2 SPRAYS IN EACH NOSTRIL ONCE DAILY 48 g 3     Glucosamine-Chondroitin (GLUCOSAMINE CHONDR COMPLEX PO)           hydrochlorothiazide (HYDRODIURIL) 12.5 MG tablet Take 1 tablet (12.5 mg) by mouth daily 90 tablet 3     insulin aspart (NOVOLOG FLEXPEN) 100 UNIT/ML pen Use 20-35 units three times daily with meals for up to 120 units daily 120 mL 3     insulin glargine (BASAGLAR KWIKPEN) 100 UNIT/ML pen INJECT 70 UNITS UNDER THE SKIN DAILY 90 mL 2     insulin pen needle (BD LISA U/F) 32G X 4 MM miscellaneous Use to inject 4-5 times daily. 600 each 1     latanoprost (XALATAN) 0.005 % ophthalmic solution Place 1 drop into both eyes daily 7.5 mL 11     losartan (COZAAR) 100 MG tablet Take 1  tablet (100 mg) by mouth daily 90 tablet 3     magnesium 250 MG tablet Take 1 tablet by mouth daily 90 tablet 3     metFORMIN (GLUCOPHAGE XR) 500 MG 24 hr tablet TAKE 2 TABLETS TWICE A  tablet 3     montelukast (SINGULAIR) 10 MG tablet Take 1 tablet (10 mg) by mouth At Bedtime 90 tablet 3     MULTIVITAMIN OR Take 1 tablet by mouth daily.         NEEDLES, ANY SIZE Pen needles for Novolog insulin pen. Use to inject 5-6  times daily. 4 Box 3     NOVOLOG FLEXPEN 100 UNIT/ML soln Sig 20-25 units two-three times daily. Max daily dose 75 units 90 mL 1     pantoprazole (PROTONIX) 40 MG EC tablet Take 1 tablet (40 mg) by mouth daily 90 tablet 3     potassium chloride ER (KLOR-CON M) 10 MEQ CR tablet Take 1 tablet (10 mEq) by mouth daily 90 tablet 3     rosuvastatin (CRESTOR) 10 MG tablet Take 1 tablet (10 mg) by mouth daily 90 tablet 3     semaglutide (OZEMPIC) 2 MG/3ML pen Inject 0.25 mg Subcutaneous every 7 days 3 mL 0     [START ON 7/10/2023] semaglutide (OZEMPIC) 2 MG/3ML pen Inject 0.5 mg Subcutaneous every 7 days 3 mL 0     [START ON 8/9/2023] Semaglutide, 1 MG/DOSE, (OZEMPIC) 4 MG/3ML pen Inject 1 mg Subcutaneous every 7 days 3 mL 0     VITAMIN D 2000 UNIT OR TABS Take 1 tablet by mouth daily.         amoxicillin-clavulanate (AUGMENTIN) 875-125 MG tablet Take 1 tablet by mouth 2 times daily for 7 days 14 tablet 0         Physical Exam  /81 (BP Location: Left arm, Patient Position: Sitting, Cuff Size: Adult Regular)   Pulse 73   Wt 87.1 kg (192 lb)   LMP 03/19/2009 (Exact Date)   SpO2 97%   BMI 34.01 kg/m       /81 (BP Location: Left arm, Patient Position: Sitting, Cuff Size: Adult Regular)   Pulse 73   Wt 87.1 kg (192 lb)   LMP 03/19/2009 (Exact Date)   SpO2 97%   BMI 34.01 kg/m        Wt Readings from Last 4 Encounters:   06/09/23 87.1 kg (192 lb)   06/08/23 88.5 kg (195 lb 3.2 oz)   12/08/22 87.8 kg (193 lb 8 oz)   12/05/22 87.1 kg (192 lb)      General: Well appearing well in no  distress.  Psych: good eye contact, no pressured speech     LABS:  /81 (BP Location: Left arm, Patient Position: Sitting, Cuff Size: Adult Regular)   Pulse 73   Wt 87.1 kg (192 lb)   LMP 03/19/2009 (Exact Date)   SpO2 97%   BMI 34.01 kg/m        Wt Readings from Last 4 Encounters:   06/09/23 87.1 kg (192 lb)   06/08/23 88.5 kg (195 lb 3.2 oz)   12/08/22 87.8 kg (193 lb 8 oz)   12/05/22 87.1 kg (192 lb)         A1c today 9.3%        Lab Results   Component Value Date      06/07/2023     CHLORIDE 103 06/07/2023     CO2 21 (L) 06/07/2023      (H) 06/07/2023      (H) 06/07/2023     CR 0.76 06/07/2023     CR 0.71 06/07/2023     CR 0.68 11/28/2022     CR 0.63 11/16/2021     CR 0.69 05/05/2021     SUSHANT 9.6 06/07/2023     MAG 2.0 11/14/2018     ALBUMIN 4.3 06/07/2023     ALKPHOS 55 06/07/2023     LDL 79 06/07/2023     HDL 37 (L) 06/07/2023     TRIG 166 (H) 06/07/2023     TSH 1.01 06/07/2023     TSH 1.24 11/28/2022     TSH 0.69 05/05/2021            Lab Results   Component Value Date     MICROL 29.7 06/07/2023     MICROL 11 11/28/2022     MICROL 6 11/16/2021     MICROL 7 08/27/2020     MICROL <5 04/02/2020            Lab Results   Component Value Date     A1C 9.3 (A) 06/27/2022     A1C 8.7 (H) 11/16/2021     A1C 8.3 (H) 05/05/2021     A1C 8.2 (H) 08/27/2020     A1C 8.1 (H) 04/02/2020               Lab Results   Component Value Date     HGB 15.1 06/08/2023            Answers for HPI/ROS submitted by the patient on 6/8/2023  General Symptoms: No  Skin Symptoms: Yes  HENT Symptoms: Yes  EYE SYMPTOMS: No  HEART SYMPTOMS: No  LUNG SYMPTOMS: Yes  INTESTINAL SYMPTOMS: No  URINARY SYMPTOMS: No  GYNECOLOGIC SYMPTOMS: Yes  BREAST SYMPTOMS: No  SKELETAL SYMPTOMS: No  BLOOD SYMPTOMS: No  NERVOUS SYSTEM SYMPTOMS: No  MENTAL HEALTH SYMPTOMS: No   Voice hoarseness: Yes  Itching: Yes  Rashes: Yes  Flaking of skin: Yes  Cough: Yes  Sputum or phlegm: Yes  Difficulty breathing on exertion: Yes  Vaginal dryness:  Yes

## 2023-06-09 NOTE — PATIENT INSTRUCTIONS
[facility]-Department of Endocrinology  Diabetes Educators:   Silva Li, RN and Georgie Doll RN  Clinic Nurse: ALTON Valle  CMA's: Collin GUERRERON: Sari  Scheduling/Clinic phone number : [phone]   Clinic Fax: [phone]  On-Call Endocrine at the [place] (after hours/weekends): [phone] option [phone]      Start Semaglutide 0.25 mg every week x 4 weeks then increase  Start Semaglutide 0.5 mg every week x 4 weeks then increase to   Start Semaglutide 1 mg every week     Decrease long and short acting insulin  by 5 units for all injections every months to probalbly a total of 15 units decrease when on maximum dose of Ozempic    Please call the number below to schedule your labs        Lab    [facility] [phone]   [facility] [phone]   [place] [phone]   [facility]  [phone]   [facility] [phone]   [place] [phone]   [facility]) [phone]   [facility] [phone]   [place] [phone]   [place] [phone]   [place] [phone]     Please reach out to the following centers to schedule your imaging appointment:       Imaging (DEXA, CT, MRI, XRAY)    [facility] ([facility], [facility], [place]) [phone]   [facility] ([place], [place]) [phone]   [facility] ([facility]) [phone]   [facility] ([facility]) [phone]     Appointment Reminders:  * Please bring meter with for staff to download  * If you are due ONLY for an A1C, it is scheduled with the nurse and will be done in clinic. You do not need to schedule a lab appointment. Fasting is not required for an A1C.  * Refill request should be submitted to your pharmacy. They will contact clinic for approval.

## 2023-06-09 NOTE — RESULT ENCOUNTER NOTE
Fco,  It was a pleasure to see you in the office recently.  A few things of note on your labs..  .  -Your white blood cell count was mildly elevated.  This can go up with infection and or stress.  Given your symptoms, I wonder if we should consider some antibiotics again.  It is a little unclear what our infection source is given your chest x-ray was clear.  It is possible, you could have a chronic sinus infection causing some of the symptoms.  We could trial a course of Augmentin for 7 days to see if this improves any of your symptoms.  If you are hesitant to try the antibiotics, we could also just plan to recheck the CBC in a week or so.  I will send in a prescription to your pharmacy if you are wanting to try  the antibiotics.     -Kidney function looks good.  Sodium and potassium are also okay.  Interestingly, the carbon dioxide level was a tiny bit low which can happen in severe diabetic decompensation states along with a lot of other issues that I do not think are going on.  Also, one of your liver tests, ALT, was mildly elevated.    Given these findings, I would suggest repeating labs again in a few weeks to recheck the kidney, liver and electrolyte panel.  I suspect things will return to normal on the follow-up testing.  If labs continue to remain abnormal we may want to pursue further work-up.  If you are feeling ill, dizzy, or confused definitely get seen tonight for recheck in the ER.    Please MyChart or call if you have any concerns or questions.   Sincerely,  Elva Ortiz MD

## 2023-06-12 ENCOUNTER — TELEPHONE (OUTPATIENT)
Dept: ENDOCRINOLOGY | Facility: CLINIC | Age: 63
End: 2023-06-12

## 2023-06-12 ENCOUNTER — MYC MEDICAL ADVICE (OUTPATIENT)
Dept: ENDOCRINOLOGY | Facility: CLINIC | Age: 63
End: 2023-06-12

## 2023-06-12 ENCOUNTER — ANCILLARY PROCEDURE (OUTPATIENT)
Dept: CARDIOLOGY | Facility: CLINIC | Age: 63
End: 2023-06-12
Attending: FAMILY MEDICINE
Payer: COMMERCIAL

## 2023-06-12 DIAGNOSIS — R06.09 DOE (DYSPNEA ON EXERTION): ICD-10-CM

## 2023-06-12 DIAGNOSIS — E11.3299 TYPE 2 DIABETES MELLITUS WITH MILD NONPROLIFERATIVE RETINOPATHY WITHOUT MACULAR EDEMA, WITH LONG-TERM CURRENT USE OF INSULIN, UNSPECIFIED LATERALITY (H): Primary | ICD-10-CM

## 2023-06-12 DIAGNOSIS — Z79.4 TYPE 2 DIABETES MELLITUS WITH MILD NONPROLIFERATIVE RETINOPATHY WITHOUT MACULAR EDEMA, WITH LONG-TERM CURRENT USE OF INSULIN, UNSPECIFIED LATERALITY (H): Primary | ICD-10-CM

## 2023-06-12 LAB — LVEF ECHO: NORMAL

## 2023-06-12 PROCEDURE — 93306 TTE W/DOPPLER COMPLETE: CPT | Performed by: INTERNAL MEDICINE

## 2023-06-12 RX ORDER — ACYCLOVIR 400 MG/1
1 TABLET ORAL
Qty: 9 EACH | Refills: 3 | Status: SHIPPED | OUTPATIENT
Start: 2023-06-12 | End: 2023-11-28

## 2023-06-12 NOTE — PROGRESS NOTES
Outcome for 06/02/23 11:58 AM: Data obtained via Dexcom website-printed  Patti Goyal CMA  Adult Endocrinology  [facility]      Endocrinology and Diabetes Clinic     Follow up for T2DM        Interval history  6 month follow up for T2DM  Has been sick with cough, on steroids and ABX in 2/23  Pt is spending most of her time in the [facility], where she has her own house and then ate lots of cooks for her   Patient has been sick with pneumonia in February of this year.  She still struggles with dyspnea.  She will have a cardiac echo for additional evaluation.  She had been on prednisone for 1 week which caused hypoglycemia.  Patient has been less physically active            Current diabetic medications:  Metformin 1000 bid, Lantus 70 units daily, Jardiance 25 mg, Novolog 25 units three times daily     SMBG:  Dexcom sensor, Average blood sugar elevated with postprandial BGs especially after brekafst, no hypolgycemia     Meal plan: 3 meals a day plus snacks, typically high in carbohydrates because of options available to her including rice and sweet yams  Exercise: has increased and walking lots of steps now, much more than before     Complications  1. Retinopathy: yearly visit, no DR  2. Nephropathy: None  3. Neuropathy: No numbness no tingling no history of ulcerations  4. Hypoglycemia: Yes, however resolved with insulin adjustment  5. Macrovascular: No chest pain, no shortness of breath        HTN: Losartan, hydrochlorothiazide 12.5 daily  Dyslipidemia Crestor 10 mg     Previously used diabetes medications:   Bydureon in 2016, Ernst Thomas: yeast infection        ASSESSMENT/Plan:   T2DM with comorbidity of obesity     Blood glucose control  Patient now with hypoglycemia particularly postprandial after breakfast.  This related to being less physically active and recovering from pneumonia   Patient asked about and be reviewed to start Ozempic      Reviewed to continue Lantus 70 units  and NovoLog at 35 units with breakfast 25 units with lunch and dinner  Start Ozempic 0.25 mg and increase to 0.5 and 1 mg every 4 weeks  Reviewed if blood sugar should decrease to decrease all injections by 5 units but she might need to do this every Ozempic dose increase        Hypertension controlled on losartan and hydrochlorothiazide   Dyslipidemia on 10 mg of Crestor doing well        30 minutes spent on the date of the encounter doing chart review, review of test results, interpretation of tests, patient visit and documentation      This note was generated using computer recognized voice recognition. This might result in some expected imperfection.        Patient to contact the clinic if blood glucose (sugar) is under 70 two times in one week or above 200 for 3 consecutive days.        Stacia Carbajal MD  Endocrinology and Diabetes  Telephone contact:  [facility] [place] [phone]  [facility] [phone]              PMH:      Patient Active Problem List   Diagnosis     ? 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     Displacement of lumbar intervertebral disc without myelopathy     Nonallopathic lesion of lumbar region     Nonallopathic lesion of thoracic region     Nonallopathic lesion of sacral region     Bartholin gland cyst     Cataract     Irritable bowel syndrome     Esophageal reflux     Seasonal allergic rhinitis     Latex sensitivity     Adenomatous polyp     HYPERLIPIDEMIA LDL GOAL <100     Ovarian cyst     Breast pain     Essential hypertension     Advanced directives, counseling/discussion     Obesity     Hypomagnesemia     Type 2 diabetes mellitus with mild nonproliferative retinopathy without macular edema, with long-term current use of insulin, unspecified laterality (H)         SOCIAL HISTORY:  Social History   Social History            Socioeconomic History     Marital status: Single       Spouse name: Not on file      Number of children: Not on file     Years of education: Not on file     Highest education level: Not on file   Occupational History     Not on file   Tobacco Use     Smoking status: Never     Smokeless tobacco: Never   Vaping Use     Vaping status: Never Used       Passive vaping exposure: Yes   Substance and Sexual Activity     Alcohol use: Yes       Comment: one drink a month     Drug use: No     Sexual activity: Not Currently       Partners: Male       Birth control/protection: None   Other Topics Concern     Parent/sibling w/ CABG, MI or angioplasty before 65F 55M? No      Service No     Blood Transfusions No     Caffeine Concern No     Occupational Exposure Yes       Comment: exposed to X-ray     Hobby Hazards No     Sleep Concern Yes       Comment: Hot Flashes     Stress Concern Yes       Comment: Work     Weight Concern Yes     Special Diet No     Back Care Yes       Comment: Back pain     Exercise Yes     Bike Helmet No     Seat Belt Yes     Self-Exams Yes   Social History Narrative     Works at  in surgical ICU     Born in the [place]. Moved to  in . Initially lived in [place].     No children          2 cats      Social Determinants of Health      Financial Resource Strain: Not on file   Food Insecurity: Not on file   Transportation Needs: Not on file   Physical Activity: Not on file   Stress: Not on file   Social Connections: Not on file   Intimate Partner Violence: Not on file   Housing Stability: Not on file            FAMILY HISTORY:  Family History         Family History   Problem Relation Age of Onset     Diabetes Father            of dm 70's     C.A.D. Father       Cerebrovascular Disease Father       Diabetes Sister       Diabetes Sister       Diabetes Sister       Hypertension Sister       Diabetes Brother       Diabetes Brother       Cancer Half-Brother           lung     Diabetes Half-Brother       Leukemia Half-Brother       Glaucoma Maternal Grandmother        Diabetes Paternal Grandfather       Hypertension Paternal Grandfather       Cerebrovascular Disease Paternal Grandfather       Cancer Maternal Aunt           ovarian cancer.      Leukemia Half-Brother       Lymphoma Paternal Cousin       Macular Degeneration No family hx of              Medications:   Current Outpatient Prescriptions          Current Outpatient Medications   Medication Sig Dispense Refill     albuterol (PROAIR HFA/PROVENTIL HFA/VENTOLIN HFA) 108 (90 Base) MCG/ACT inhaler Inhale 2 puffs into the lungs every 4 hours as needed for shortness of breath / dyspnea 18 g 1     aspirin 81 MG EC tablet Take 81 mg by mouth daily Please resume in 5 days.  (12/22/2018)         CALCIUM + D OR Take 1 tablet by mouth 2 times daily.         Continuous Blood Gluc Sensor (DEXCOM G6 SENSOR) MISC 1 each every 10 days 18 each 1     Continuous Blood Gluc Transmit (DEXCOM G6 TRANSMITTER) MISC 1 each every 3 months Change every 3 months. 2 each 1     fexofenadine (ALLEGRA) 180 MG tablet Take 1 tablet by mouth daily. 1 TABLET DAILY Failed claritin for symptom cotrol. Zyrtec increases heart rate. 90 tablet 3     fish oil-omega-3 fatty acids 1000 MG capsule Take 1 capsule by mouth daily.         fluticasone (FLONASE) 50 MCG/ACT nasal spray USE 1-2 SPRAYS IN EACH NOSTRIL ONCE DAILY 48 g 3     Glucosamine-Chondroitin (GLUCOSAMINE CHONDR COMPLEX PO)           hydrochlorothiazide (HYDRODIURIL) 12.5 MG tablet Take 1 tablet (12.5 mg) by mouth daily 90 tablet 3     insulin aspart (NOVOLOG FLEXPEN) 100 UNIT/ML pen Use 20-35 units three times daily with meals for up to 120 units daily 120 mL 3     insulin glargine (BASAGLAR KWIKPEN) 100 UNIT/ML pen INJECT 70 UNITS UNDER THE SKIN DAILY 90 mL 2     insulin pen needle (BD LISA U/F) 32G X 4 MM miscellaneous Use to inject 4-5 times daily. 600 each 1     latanoprost (XALATAN) 0.005 % ophthalmic solution Place 1 drop into both eyes daily 7.5 mL 11     losartan (COZAAR) 100 MG tablet Take 1  tablet (100 mg) by mouth daily 90 tablet 3     magnesium 250 MG tablet Take 1 tablet by mouth daily 90 tablet 3     metFORMIN (GLUCOPHAGE XR) 500 MG 24 hr tablet TAKE 2 TABLETS TWICE A  tablet 3     montelukast (SINGULAIR) 10 MG tablet Take 1 tablet (10 mg) by mouth At Bedtime 90 tablet 3     MULTIVITAMIN OR Take 1 tablet by mouth daily.         NEEDLES, ANY SIZE Pen needles for Novolog insulin pen. Use to inject 5-6  times daily. 4 Box 3     NOVOLOG FLEXPEN 100 UNIT/ML soln Sig 20-25 units two-three times daily. Max daily dose 75 units 90 mL 1     pantoprazole (PROTONIX) 40 MG EC tablet Take 1 tablet (40 mg) by mouth daily 90 tablet 3     potassium chloride ER (KLOR-CON M) 10 MEQ CR tablet Take 1 tablet (10 mEq) by mouth daily 90 tablet 3     rosuvastatin (CRESTOR) 10 MG tablet Take 1 tablet (10 mg) by mouth daily 90 tablet 3     semaglutide (OZEMPIC) 2 MG/3ML pen Inject 0.25 mg Subcutaneous every 7 days 3 mL 0     [START ON 7/10/2023] semaglutide (OZEMPIC) 2 MG/3ML pen Inject 0.5 mg Subcutaneous every 7 days 3 mL 0     [START ON 8/9/2023] Semaglutide, 1 MG/DOSE, (OZEMPIC) 4 MG/3ML pen Inject 1 mg Subcutaneous every 7 days 3 mL 0     VITAMIN D 2000 UNIT OR TABS Take 1 tablet by mouth daily.         amoxicillin-clavulanate (AUGMENTIN) 875-125 MG tablet Take 1 tablet by mouth 2 times daily for 7 days 14 tablet 0         Physical Exam  /81 (BP Location: Left arm, Patient Position: Sitting, Cuff Size: Adult Regular)   Pulse 73   Wt 87.1 kg (192 lb)   LMP 03/19/2009 (Exact Date)   SpO2 97%   BMI 34.01 kg/m       /81 (BP Location: Left arm, Patient Position: Sitting, Cuff Size: Adult Regular)   Pulse 73   Wt 87.1 kg (192 lb)   LMP 03/19/2009 (Exact Date)   SpO2 97%   BMI 34.01 kg/m        Wt Readings from Last 4 Encounters:   06/09/23 87.1 kg (192 lb)   06/08/23 88.5 kg (195 lb 3.2 oz)   12/08/22 87.8 kg (193 lb 8 oz)   12/05/22 87.1 kg (192 lb)      General: Well appearing well in no  distress.  Psych: good eye contact, no pressured speech     LABS:  /81 (BP Location: Left arm, Patient Position: Sitting, Cuff Size: Adult Regular)   Pulse 73   Wt 87.1 kg (192 lb)   LMP 03/19/2009 (Exact Date)   SpO2 97%   BMI 34.01 kg/m        Wt Readings from Last 4 Encounters:   06/09/23 87.1 kg (192 lb)   06/08/23 88.5 kg (195 lb 3.2 oz)   12/08/22 87.8 kg (193 lb 8 oz)   12/05/22 87.1 kg (192 lb)         A1c today 9.3%        Lab Results   Component Value Date      06/07/2023     CHLORIDE 103 06/07/2023     CO2 21 (L) 06/07/2023      (H) 06/07/2023      (H) 06/07/2023     CR 0.76 06/07/2023     CR 0.71 06/07/2023     CR 0.68 11/28/2022     CR 0.63 11/16/2021     CR 0.69 05/05/2021     SUSHANT 9.6 06/07/2023     MAG 2.0 11/14/2018     ALBUMIN 4.3 06/07/2023     ALKPHOS 55 06/07/2023     LDL 79 06/07/2023     HDL 37 (L) 06/07/2023     TRIG 166 (H) 06/07/2023     TSH 1.01 06/07/2023     TSH 1.24 11/28/2022     TSH 0.69 05/05/2021            Lab Results   Component Value Date     MICROL 29.7 06/07/2023     MICROL 11 11/28/2022     MICROL 6 11/16/2021     MICROL 7 08/27/2020     MICROL <5 04/02/2020            Lab Results   Component Value Date     A1C 9.3 (A) 06/27/2022     A1C 8.7 (H) 11/16/2021     A1C 8.3 (H) 05/05/2021     A1C 8.2 (H) 08/27/2020     A1C 8.1 (H) 04/02/2020               Lab Results   Component Value Date     HGB 15.1 06/08/2023            Answers for HPI/ROS submitted by the patient on 6/8/2023  General Symptoms: No  Skin Symptoms: Yes  HENT Symptoms: Yes  EYE SYMPTOMS: No  HEART SYMPTOMS: No  LUNG SYMPTOMS: Yes  INTESTINAL SYMPTOMS: No  URINARY SYMPTOMS: No  GYNECOLOGIC SYMPTOMS: Yes  BREAST SYMPTOMS: No  SKELETAL SYMPTOMS: No  BLOOD SYMPTOMS: No  NERVOUS SYSTEM SYMPTOMS: No  MENTAL HEALTH SYMPTOMS: No   Voice hoarseness: Yes  Itching: Yes  Rashes: Yes  Flaking of skin: Yes  Cough: Yes  Sputum or phlegm: Yes  Difficulty breathing on exertion: Yes  Vaginal dryness:  Yes

## 2023-06-12 NOTE — RESULT ENCOUNTER NOTE
Fco,  It was a pleasure to see you in the office recently.   I'm happy to report your echocardiogram was normal and unchanged from last check. Please continue with the plan we developed in the office to further evaluate your breathing.   Please MyChart or call if you have any concerns or questions.   Sincerely,  Elva Ortiz MD

## 2023-06-12 NOTE — TELEPHONE ENCOUNTER
PA Initiation    Medication: DEXCOM G7 SENSOR MISC  Insurance Company: [employer] ([employer]) - Phone [phone] Fax [phone]  Pharmacy Filling the Rx:    Filling Pharmacy Phone:    Filling Pharmacy Fax:    Start Date: 6/12/2023    Key: [id number]    Waiting for questions to load.

## 2023-06-12 NOTE — PROGRESS NOTES
Outcome for 06/02/23 11:58 AM: Data obtained via Dexcom website-printed  Patti Goyal CMA  Adult Endocrinology  [facility]      Endocrinology and Diabetes Clinic     Follow up for T2DM        Interval history  6 month follow up for T2DM  Has been sick with cough, on steroids and ABX in 2/23  Pt is spending most of her time in the [facility], her [place] country, where she has her own house and then ate lots of cooks for her   Patient has been sick with pneumonia in February of this year.  She still struggles with dyspnea.  She will have a cardiac echo for additional evaluation.  She had been on prednisone for 1 week which caused hypoglycemia.  Patient has been less physically active            Current diabetic medications:  Metformin 1000 bid, Lantus 70 units daily, Jardiance 25 mg, Novolog 25 units three times daily     SMBG:  Dexcom sensor, Average blood sugar elevated with postprandial BGs especially after brekafst, no hypolgycemia     Meal plan: 3 meals a day plus snacks, typically high in carbohydrates because of options available to her including rice and sweet yams  Exercise: has increased and walking lots of steps now, much more than before     Complications  1. Retinopathy: yearly visit, no DR  2. Nephropathy: None  3. Neuropathy: No numbness no tingling no history of ulcerations  4. Hypoglycemia: Yes, however resolved with insulin adjustment  5. Macrovascular: No chest pain, no shortness of breath        HTN: Losartan, hydrochlorothiazide 12.5 daily  Dyslipidemia Crestor 10 mg     Previously used diabetes medications:   Bydureon in 2016, Ernst Thomas: yeast infection        ASSESSMENT/Plan:   T2DM with comorbidity of obesity     Blood glucose control  Patient now with hypoglycemia particularly postprandial after breakfast.  This related to being less physically active and recovering from pneumonia   Patient asked about and be reviewed to start Ozempic      Reviewed to continue Lantus 70 units  and NovoLog at 35 units with breakfast 25 units with lunch and dinner  Start Ozempic 0.25 mg and increase to 0.5 and 1 mg every 4 weeks  Reviewed if blood sugar should decrease to decrease all injections by 5 units but she might need to do this every Ozempic dose increase        Hypertension controlled on losartan and hydrochlorothiazide   Dyslipidemia on 10 mg of Crestor doing well        30 minutes spent on the date of the encounter doing chart review, review of test results, interpretation of tests, patient visit and documentation      This note was generated using computer recognized voice recognition. This might result in some expected imperfection.        Patient to contact the clinic if blood glucose (sugar) is under 70 two times in one week or above 200 for 3 consecutive days.        Stacia Carbajal MD  Endocrinology and Diabetes  Telephone contact:  [facility] [place] [phone]  [facility] [phone]              PMH:      Patient Active Problem List   Diagnosis     ? 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     Displacement of lumbar intervertebral disc without myelopathy     Nonallopathic lesion of lumbar region     Nonallopathic lesion of thoracic region     Nonallopathic lesion of sacral region     Bartholin gland cyst     Cataract     Irritable bowel syndrome     Esophageal reflux     Seasonal allergic rhinitis     Latex sensitivity     Adenomatous polyp     HYPERLIPIDEMIA LDL GOAL <100     Ovarian cyst     Breast pain     Essential hypertension     Advanced directives, counseling/discussion     Obesity     Hypomagnesemia     Type 2 diabetes mellitus with mild nonproliferative retinopathy without macular edema, with long-term current use of insulin, unspecified laterality (H)         SOCIAL HISTORY:  Social History   Social History            Socioeconomic History     Marital status: Single       Spouse name: Not on file      Number of children: Not on file     Years of education: Not on file     Highest education level: Not on file   Occupational History     Not on file   Tobacco Use     Smoking status: Never     Smokeless tobacco: Never   Vaping Use     Vaping status: Never Used       Passive vaping exposure: Yes   Substance and Sexual Activity     Alcohol use: Yes       Comment: one drink a month     Drug use: No     Sexual activity: Not Currently       Partners: Male       Birth control/protection: None   Other Topics Concern     Parent/sibling w/ CABG, MI or angioplasty before 65F 55M? No      Service No     Blood Transfusions No     Caffeine Concern No     Occupational Exposure Yes       Comment: exposed to X-ray     Hobby Hazards No     Sleep Concern Yes       Comment: Hot Flashes     Stress Concern Yes       Comment: Work     Weight Concern Yes     Special Diet No     Back Care Yes       Comment: Back pain     Exercise Yes     Bike Helmet No     Seat Belt Yes     Self-Exams Yes   Social History Narrative     Works at  in surgical ICU     Born in the [place] Moved to  in . Initially lived in [place].     No children          2 cats      Social Determinants of Health      Financial Resource Strain: Not on file   Food Insecurity: Not on file   Transportation Needs: Not on file   Physical Activity: Not on file   Stress: Not on file   Social Connections: Not on file   Intimate Partner Violence: Not on file   Housing Stability: Not on file            FAMILY HISTORY:  Family History         Family History   Problem Relation Age of Onset     Diabetes Father            of dm 70's     C.A.D. Father       Cerebrovascular Disease Father       Diabetes Sister       Diabetes Sister       Diabetes Sister       Hypertension Sister       Diabetes Brother       Diabetes Brother       Cancer Half-Brother           lung     Diabetes Half-Brother       Leukemia Half-Brother       Glaucoma Maternal Grandmother        Diabetes Paternal Grandfather       Hypertension Paternal Grandfather       Cerebrovascular Disease Paternal Grandfather       Cancer Maternal Aunt           ovarian cancer.      Leukemia Half-Brother       Lymphoma Paternal Cousin       Macular Degeneration No family hx of              Medications:   Current Outpatient Prescriptions          Current Outpatient Medications   Medication Sig Dispense Refill     albuterol (PROAIR HFA/PROVENTIL HFA/VENTOLIN HFA) 108 (90 Base) MCG/ACT inhaler Inhale 2 puffs into the lungs every 4 hours as needed for shortness of breath / dyspnea 18 g 1     aspirin 81 MG EC tablet Take 81 mg by mouth daily Please resume in 5 days.  (12/22/2018)         CALCIUM + D OR Take 1 tablet by mouth 2 times daily.         Continuous Blood Gluc Sensor (DEXCOM G6 SENSOR) MISC 1 each every 10 days 18 each 1     Continuous Blood Gluc Transmit (DEXCOM G6 TRANSMITTER) MISC 1 each every 3 months Change every 3 months. 2 each 1     fexofenadine (ALLEGRA) 180 MG tablet Take 1 tablet by mouth daily. 1 TABLET DAILY Failed claritin for symptom cotrol. Zyrtec increases heart rate. 90 tablet 3     fish oil-omega-3 fatty acids 1000 MG capsule Take 1 capsule by mouth daily.         fluticasone (FLONASE) 50 MCG/ACT nasal spray USE 1-2 SPRAYS IN EACH NOSTRIL ONCE DAILY 48 g 3     Glucosamine-Chondroitin (GLUCOSAMINE CHONDR COMPLEX PO)           hydrochlorothiazide (HYDRODIURIL) 12.5 MG tablet Take 1 tablet (12.5 mg) by mouth daily 90 tablet 3     insulin aspart (NOVOLOG FLEXPEN) 100 UNIT/ML pen Use 20-35 units three times daily with meals for up to 120 units daily 120 mL 3     insulin glargine (BASAGLAR KWIKPEN) 100 UNIT/ML pen INJECT 70 UNITS UNDER THE SKIN DAILY 90 mL 2     insulin pen needle (BD LISA U/F) 32G X 4 MM miscellaneous Use to inject 4-5 times daily. 600 each 1     latanoprost (XALATAN) 0.005 % ophthalmic solution Place 1 drop into both eyes daily 7.5 mL 11     losartan (COZAAR) 100 MG tablet Take 1  tablet (100 mg) by mouth daily 90 tablet 3     magnesium 250 MG tablet Take 1 tablet by mouth daily 90 tablet 3     metFORMIN (GLUCOPHAGE XR) 500 MG 24 hr tablet TAKE 2 TABLETS TWICE A  tablet 3     montelukast (SINGULAIR) 10 MG tablet Take 1 tablet (10 mg) by mouth At Bedtime 90 tablet 3     MULTIVITAMIN OR Take 1 tablet by mouth daily.         NEEDLES, ANY SIZE Pen needles for Novolog insulin pen. Use to inject 5-6  times daily. 4 Box 3     NOVOLOG FLEXPEN 100 UNIT/ML soln Sig 20-25 units two-three times daily. Max daily dose 75 units 90 mL 1     pantoprazole (PROTONIX) 40 MG EC tablet Take 1 tablet (40 mg) by mouth daily 90 tablet 3     potassium chloride ER (KLOR-CON M) 10 MEQ CR tablet Take 1 tablet (10 mEq) by mouth daily 90 tablet 3     rosuvastatin (CRESTOR) 10 MG tablet Take 1 tablet (10 mg) by mouth daily 90 tablet 3     semaglutide (OZEMPIC) 2 MG/3ML pen Inject 0.25 mg Subcutaneous every 7 days 3 mL 0     [START ON 7/10/2023] semaglutide (OZEMPIC) 2 MG/3ML pen Inject 0.5 mg Subcutaneous every 7 days 3 mL 0     [START ON 8/9/2023] Semaglutide, 1 MG/DOSE, (OZEMPIC) 4 MG/3ML pen Inject 1 mg Subcutaneous every 7 days 3 mL 0     VITAMIN D 2000 UNIT OR TABS Take 1 tablet by mouth daily.         amoxicillin-clavulanate (AUGMENTIN) 875-125 MG tablet Take 1 tablet by mouth 2 times daily for 7 days 14 tablet 0         Physical Exam  /81 (BP Location: Left arm, Patient Position: Sitting, Cuff Size: Adult Regular)   Pulse 73   Wt 87.1 kg (192 lb)   LMP 03/19/2009 (Exact Date)   SpO2 97%   BMI 34.01 kg/m       /81 (BP Location: Left arm, Patient Position: Sitting, Cuff Size: Adult Regular)   Pulse 73   Wt 87.1 kg (192 lb)   LMP 03/19/2009 (Exact Date)   SpO2 97%   BMI 34.01 kg/m        Wt Readings from Last 4 Encounters:   06/09/23 87.1 kg (192 lb)   06/08/23 88.5 kg (195 lb 3.2 oz)   12/08/22 87.8 kg (193 lb 8 oz)   12/05/22 87.1 kg (192 lb)      General: Well appearing well in no  distress.  Psych: good eye contact, no pressured speech     LABS:  /81 (BP Location: Left arm, Patient Position: Sitting, Cuff Size: Adult Regular)   Pulse 73   Wt 87.1 kg (192 lb)   LMP 03/19/2009 (Exact Date)   SpO2 97%   BMI 34.01 kg/m        Wt Readings from Last 4 Encounters:   06/09/23 87.1 kg (192 lb)   06/08/23 88.5 kg (195 lb 3.2 oz)   12/08/22 87.8 kg (193 lb 8 oz)   12/05/22 87.1 kg (192 lb)         A1c today 9.3%        Lab Results   Component Value Date      06/07/2023     CHLORIDE 103 06/07/2023     CO2 21 (L) 06/07/2023      (H) 06/07/2023      (H) 06/07/2023     CR 0.76 06/07/2023     CR 0.71 06/07/2023     CR 0.68 11/28/2022     CR 0.63 11/16/2021     CR 0.69 05/05/2021     SUSHANT 9.6 06/07/2023     MAG 2.0 11/14/2018     ALBUMIN 4.3 06/07/2023     ALKPHOS 55 06/07/2023     LDL 79 06/07/2023     HDL 37 (L) 06/07/2023     TRIG 166 (H) 06/07/2023     TSH 1.01 06/07/2023     TSH 1.24 11/28/2022     TSH 0.69 05/05/2021            Lab Results   Component Value Date     MICROL 29.7 06/07/2023     MICROL 11 11/28/2022     MICROL 6 11/16/2021     MICROL 7 08/27/2020     MICROL <5 04/02/2020            Lab Results   Component Value Date     A1C 9.3 (A) 06/27/2022     A1C 8.7 (H) 11/16/2021     A1C 8.3 (H) 05/05/2021     A1C 8.2 (H) 08/27/2020     A1C 8.1 (H) 04/02/2020               Lab Results   Component Value Date     HGB 15.1 06/08/2023            Answers for HPI/ROS submitted by the patient on 6/8/2023  General Symptoms: No  Skin Symptoms: Yes  HENT Symptoms: Yes  EYE SYMPTOMS: No  HEART SYMPTOMS: No  LUNG SYMPTOMS: Yes  INTESTINAL SYMPTOMS: No  URINARY SYMPTOMS: No  GYNECOLOGIC SYMPTOMS: Yes  BREAST SYMPTOMS: No  SKELETAL SYMPTOMS: No  BLOOD SYMPTOMS: No  NERVOUS SYSTEM SYMPTOMS: No  MENTAL HEALTH SYMPTOMS: No   Voice hoarseness: Yes  Itching: Yes  Rashes: Yes  Flaking of skin: Yes  Cough: Yes  Sputum or phlegm: Yes  Difficulty breathing on exertion: Yes  Vaginal dryness:  Yes

## 2023-06-13 NOTE — TELEPHONE ENCOUNTER
Prior Authorization Approval    Medication: DEXCOM G7 SENSOR MISC  Authorization Effective Date: 5/14/2023  Authorization Expiration Date: 6/12/2026  Approved Dose/Quantity: 3/30 days   Reference #: [id number]   Insurance Company: [employer] ([employer]) - Phone [phone] Fax [phone]  Expected CoPay: 105.98     CoPay Card Available:      Financial Assistance Needed:   Which Pharmacy is filling the prescription:    Pharmacy Notified:    Patient Notified:

## 2023-06-13 NOTE — TELEPHONE ENCOUNTER
Patient informed of PA approval via [employer] message.    ÁNGEL See  Adult Endocrinology  [facility]

## 2023-06-28 ENCOUNTER — OFFICE VISIT (OUTPATIENT)
Dept: NURSING | Facility: CLINIC | Age: 63
End: 2023-06-28
Attending: FAMILY MEDICINE
Payer: COMMERCIAL

## 2023-06-28 VITALS — BODY MASS INDEX: 34.65 KG/M2 | OXYGEN SATURATION: 98 % | HEART RATE: 96 BPM | WEIGHT: 195.6 LBS

## 2023-06-28 DIAGNOSIS — R06.09 DOE (DYSPNEA ON EXERTION): ICD-10-CM

## 2023-06-28 DIAGNOSIS — R05.3 CHRONIC COUGH: ICD-10-CM

## 2023-06-28 PROCEDURE — 94726 PLETHYSMOGRAPHY LUNG VOLUMES: CPT | Performed by: INTERNAL MEDICINE

## 2023-06-28 PROCEDURE — 94729 DIFFUSING CAPACITY: CPT | Performed by: INTERNAL MEDICINE

## 2023-06-28 PROCEDURE — 94375 RESPIRATORY FLOW VOLUME LOOP: CPT | Performed by: INTERNAL MEDICINE

## 2023-07-05 LAB
DLCOUNC-%PRED-PRE: 82 %
DLCOUNC-PRE: 16.03 ML/MIN/MMHG
DLCOUNC-PRED: 19.44 ML/MIN/MMHG
ERV-%PRED-PRE: 26 %
ERV-PRE: 0.21 L
ERV-PRED: 0.79 L
EXPTIME-PRE: 7.4 SEC
FEF2575-%PRED-PRE: 124 %
FEF2575-PRE: 2.48 L/SEC
FEF2575-PRED: 1.99 L/SEC
FEFMAX-%PRED-PRE: 93 %
FEFMAX-PRE: 5.66 L/SEC
FEFMAX-PRED: 6.06 L/SEC
FEV1-%PRED-PRE: 96 %
FEV1-PRE: 2.15 L
FEV1FEV6-PRE: 83 %
FEV1FEV6-PRED: 80 %
FEV1FVC-PRE: 83 %
FEV1FVC-PRED: 80 %
FEV1SVC-PRE: 81 %
FEV1SVC-PRED: 68 %
FIFMAX-PRE: 4.81 L/SEC
FRCPLETH-%PRED-PRE: 87 %
FRCPLETH-PRE: 2.39 L
FRCPLETH-PRED: 2.74 L
FVC-%PRED-PRE: 92 %
FVC-PRE: 2.59 L
FVC-PRED: 2.8 L
IC-%PRED-PRE: 102 %
IC-PRE: 2.44 L
IC-PRED: 2.39 L
RVPLETH-%PRED-PRE: 120 %
RVPLETH-PRE: 2.18 L
RVPLETH-PRED: 1.81 L
TLCPLETH-%PRED-PRE: 95 %
TLCPLETH-PRE: 4.83 L
TLCPLETH-PRED: 5.06 L
VA-%PRED-PRE: 88 %
VA-PRE: 4.09 L
VC-%PRED-PRE: 81 %
VC-PRE: 2.65 L
VC-PRED: 3.25 L

## 2023-07-06 ENCOUNTER — TELEPHONE (OUTPATIENT)
Dept: ALLERGY | Facility: CLINIC | Age: 63
End: 2023-07-06

## 2023-07-06 ENCOUNTER — OFFICE VISIT (OUTPATIENT)
Dept: FAMILY MEDICINE | Facility: CLINIC | Age: 63
End: 2023-07-06
Payer: COMMERCIAL

## 2023-07-06 VITALS
BODY MASS INDEX: 34.61 KG/M2 | WEIGHT: 195.3 LBS | TEMPERATURE: 98.1 F | DIASTOLIC BLOOD PRESSURE: 83 MMHG | HEIGHT: 63 IN | OXYGEN SATURATION: 98 % | SYSTOLIC BLOOD PRESSURE: 126 MMHG | RESPIRATION RATE: 14 BRPM | HEART RATE: 73 BPM

## 2023-07-06 DIAGNOSIS — J30.2 SEASONAL ALLERGIC RHINITIS, UNSPECIFIED TRIGGER: ICD-10-CM

## 2023-07-06 DIAGNOSIS — R49.0 HOARSENESS: ICD-10-CM

## 2023-07-06 DIAGNOSIS — R05.3 CHRONIC COUGH: Primary | ICD-10-CM

## 2023-07-06 PROCEDURE — 99214 OFFICE O/P EST MOD 30 MIN: CPT | Performed by: FAMILY MEDICINE

## 2023-07-06 RX ORDER — AZELASTINE 1 MG/ML
1 SPRAY, METERED NASAL 2 TIMES DAILY
Qty: 30 ML | Refills: 1 | Status: SHIPPED | OUTPATIENT
Start: 2023-07-06 | End: 2023-09-28

## 2023-07-06 ASSESSMENT — PAIN SCALES - GENERAL: PAINLEVEL: NO PAIN (0)

## 2023-07-06 NOTE — PATIENT INSTRUCTIONS
Consider mucinex 600-1200 mg twice daily.     Add astelin nasal spray    Start dulera inhaler twice daily scheduled. Rinse mouth and throat after use each time. (If not benefit after one month, stop medication).  Ok to still use albuterol as needed     Update me if symptoms are persisting in a few weeks and we'll get a CT chest completed.     Schedule with allergist prior to leaving   Consider appointment with pulmonary and ENT if persistent symptoms (message me if referrals needed).     VaniCream is a good option for dry, itchy sensitive skin. Use twice daily.

## 2023-07-06 NOTE — TELEPHONE ENCOUNTER
[facility] Call Center    Phone Message    May a detailed message be left on voicemail: yes     Reason for Call: Other: Pt is worried about stopping her allergy medications and antihisamines for 7 days prior to Appt - said she is on a lot of stuff and will have big problems if stops - please call back to discuss - Thank you     Action Taken: Message routed to:  Other: [facility] ALLERGY    Travel Screening: Not Applicable

## 2023-07-06 NOTE — PROGRESS NOTES
"  Assessment & Plan     Chronic cough  Patient describes cough that seems to originate from postnasal drainage, heat and humidity, allergen/smell exposure.  Despite negative PFTs I still feel there could be an airway hyperreactivity.  We will trial Dulera.  Reviewed how to use medication and rinsing mouth and throat after use.  Certainly, if no improvement with this inhaler in the next several weeks would not continue it long-term.  Also, if cough persists I would like her to get a CT of her chest given her history of pneumonitis and age.  - azelastine (ASTELIN) 0.1 % nasal spray; Spray 1 spray into both nostrils 2 times daily  - Adult Allergy/Asthma Referral; Future  - mometasone-formoterol (DULERA) 100-5 MCG/ACT inhaler; Inhale 2 puffs into the lungs 2 times daily    Seasonal allergic rhinitis, unspecified trigger  Certainly atopic presentation.  I would like her to see an allergist before she heads back to the [facility] if possible.  She will work on getting this scheduled and let me know if new referral is needed for other site.  We will add Astelin to her current regimen  - azelastine (ASTELIN) 0.1 % nasal spray; Spray 1 spray into both nostrils 2 times daily  - Adult Allergy/Asthma Referral; Future    Hoarseness  Likely due to above.  Referral back to ENT was recommended last visit but patient declined.  She did agree to see ENT when she returns to [place] this fall if symptoms are persisting.  - azelastine (ASTELIN) 0.1 % nasal spray; Spray 1 spray into both nostrils 2 times daily    BMI:   Estimated body mass index is 34.6 kg/m  as calculated from the following:    Height as of this encounter: 1.6 m (5' 3\").    Weight as of this encounter: 88.6 kg (195 lb 4.8 oz).       Patient Instructions   Consider mucinex 600-1200 mg twice daily.     Add astelin nasal spray    Start dulera inhaler twice daily scheduled. Rinse mouth and throat after use each time. (If not benefit after one month, stop medication).  Ok " to still use albuterol as needed     Update me if symptoms are persisting in a few weeks and we'll get a CT chest completed.     Schedule with allergist prior to leaving   Consider appointment with pulmonary and ENT if persistent symptoms (message me if referrals needed).     VaniCream is a good option for dry, itchy sensitive skin. Use twice daily.           Elva Ortiz MD  [facility] STEPHANI Eagle is a 63 year old, presenting for the following health issues:  Asthma        6/8/2023    10:00 AM   Additional Questions   Roomed by Vanessa HERNANDES   Accompanied by Self     History of Present Illness     Asthma:  She presents for follow up of asthma.  She has some cough, no wheezing, and some shortness of breath. She is using a relief medication a few times a month. She typically misses taking her controller medication 2 time(s) per week.Patient is aware of the following triggers: cold air, dust mites, humidity, mold, pollens and strong odors and fumes. The patient has not had a visit to the Emergency Room, Urgent Care or Hospital due to asthma since the last clinic visit.     Reason for visit:  Follow up    She eats 2-3 servings of fruits and vegetables daily.She consumes 2 sweetened beverage(s) daily.She exercises with enough effort to increase her heart rate 10 to 19 minutes per day.  She exercises with enough effort to increase her heart rate 4 days per week.      Here to follow-up her previous visit in which chronic cough, wheezing, dyspnea pruritus and allergies were discussed    At that time she has added Cetirizine once daily along with her fexofenadine once daily.  She also completed a course of Augmentin given mildly elevated white blood cell count and her symptoms.    Ongoing cough and hoarseness  Humidity/heat makes sx's worse.   Nose drips and nose is itchy   started saline rinse prior to nasonex 2 sprays each side  Ears are itchy  Skin continues to be itchy  Breathing  "is feeling much better. occ wake at night with nasal drip and then cough.  However, heat and humidity really set her symptoms off still.  Seldom use albuterol. Does give her relief if she is noting wheezing.   Perfumes really sets her off.   Sneezing starts early in the day    Mom started with asthma later in life      July 30th goes back to [place] bronsonic started by endocrinology.             Objective    /83 (BP Location: Left arm, Patient Position: Sitting, Cuff Size: Adult Large)   Pulse 73   Temp 98.1  F (36.7  C) (Oral)   Resp 14   Ht 1.6 m (5' 3\")   Wt 88.6 kg (195 lb 4.8 oz)   LMP 03/19/2009 (Exact Date)   SpO2 98%   BMI 34.60 kg/m    Body mass index is 34.6 kg/m .  Physical Exam   GENERAL: healthy, alert and no distress  HENT: oropharynx clear, oral mucous membranes moist and posterior pharynx with mild erythema  NECK: no adenopathy, no asymmetry, masses, or scars and thyroid normal to palpation  RESP: lungs clear to auscultation - no rales, rhonchi or wheezes  CV: regular rate and rhythm, normal S1 S2, no S3 or S4, no murmur, click or rub, no peripheral edema and peripheral pulses strong  PSYCH: mentation appears normal, affect normal/bright    Reviewed results through [facility] for lab and imaging  Recent Results (from the past 744 hour(s))   XR Chest 2 Views    Narrative    CHEST TWO VIEWS 6/8/2023 11:49 AM     HISTORY: Chronic cough; PEREZ (dyspnea on exertion)    COMPARISON: Chest x-ray to 121       Impression    IMPRESSION: The cardiac silhouette and pulmonary vasculature are  within normal limits. No focal pulmonary consolidations. No pleural  effusion or pneumothorax.    ESTELLA MOHAMUD MD         SYSTEM ID:  [id number]   Echocardiogram Complete   Result Value    LVEF  65-70%    Narrative    [id number]  [id number]  [id number]  [id number]^MARCK^DARRICK^JEFF     [facility]  Diagnostic and Treatment-3rd Floor  [address] [place], [place] " [address]     Name: DEVEN BECKER  MRN: [id number]  : 1960  Study Date: 2023 07:43 AM  Age: 63 yrs  Gender: Female  Patient Location: [facility]  Reason For Study: PEREZ (dyspnea on exertion)  Ordering Physician: DARRICK ACHARYA  Referring Physician: DARRICK ACHARYA  Performed By: Nighat Wei     BSA: 1.9 m2  Height: 63 in  Weight: 192 lb  HR: 71  BP: 155/76 mmHg  ______________________________________________________________________________  Procedure  Echocardiogram with two-dimensional, color and spectral Doppler performed.  ______________________________________________________________________________  Interpretation Summary  Global and regional left ventricular function is hyperkinetic with an EF of  65-70%. Dynamic outflow tract obstruction is not present.  Left ventricular wall thickness is normal.  Left ventricular diastolic function is indeterminate.  Global right ventricular function is normal.  No significant valvular abnormalities present.  The inferior vena cava is not well seen, probably normal.  No pericardial effusion is present.  No significant changes noted.  ______________________________________________________________________________  Left Ventricle  Global and regional left ventricular function is hyperkinetic with an EF of  65-70%. Left ventricular wall thickness is normal. Left ventricular size is  normal. Dynamic outflow tract obstruction is not present. Left ventricular  diastolic function is indeterminate.     Right Ventricle  The right ventricle is normal size. Global right ventricular function is  normal.     Atria  Both atria appear normal.     Mitral Valve  The mitral valve is normal.     Aortic Valve  Aortic valve is normal in structure and function.     Tricuspid Valve  The tricuspid valve is normal. The peak velocity of the tricuspid regurgitant  jet is not obtainable.     Pulmonic Valve  The pulmonic valve is normal.     Vessels  The aorta root  is normal. The inferior vena cava is normal.     Pericardium  No pericardial effusion is present.     Miscellaneous  No significant valvular abnormalities present.     Compared to Previous Study  No significant changes noted.  ______________________________________________________________________________  MMode/2D Measurements & Calculations  IVSd: 1.1 cm  LVIDd: 4.4 cm  LVIDs: 2.1 cm  LVPWd: 1.1 cm  FS: 53.2 %  LV mass(C)d: 164.8 grams  LV mass(C)dI: 86.7 grams/m2  Ao root diam: 2.5 cm  asc Aorta Diam: 3.1 cm  LVOT diam: 2.0 cm  LVOT area: 3.1 cm2  LA Volume (BP): 35.2 ml     LA Volume Index (BP): 18.5 ml/m2  RWT: 0.49  TAPSE: 2.2 cm     Doppler Measurements & Calculations  MV E max fede: 60.1 cm/sec  MV A max fede: 95.6 cm/sec  MV E/A: 0.63  MV dec slope: 208.3 cm/sec2  MV dec time: 0.29 sec  LV V1 max PG: 3.6 mmHg  LV V1 max: 94.6 cm/sec  LV V1 VTI: 20.0 cm  SV(LVOT): 61.8 ml  SI(LVOT): 32.5 ml/m2  PA acc time: 0.07 sec  E/E' av.5  Lateral E/e': 8.8  Medial E/e': 8.2  RV S Fede: 14.8 cm/sec     ______________________________________________________________________________  Report approved by: Leroy Perez 2023 09:09 AM           PFT 23:  IMPRESSION:     Normal Pulmonary Function     Compared with testing from 2005 there has been a decrease in the FEV1.     Farzana Del Rosario MD

## 2023-07-07 NOTE — TELEPHONE ENCOUNTER
Called patient to discuss stopping antihistamines prior to her November appointment. Patient does not feel that she can safely be off of her allergy medication, advised patient that it is okay to continue these medications up until her appointment. RN advised that no skin testing can be performed at her initial allergy appointment and if blood testing is appropriate that is another option. Patient would prefer blood testing to determine if she has allergies.     Kayce Fuentes, CHANELLEN, RN

## 2023-07-11 DIAGNOSIS — E11.3299 TYPE 2 DIABETES MELLITUS WITH MILD NONPROLIFERATIVE RETINOPATHY WITHOUT MACULAR EDEMA, WITH LONG-TERM CURRENT USE OF INSULIN, UNSPECIFIED LATERALITY (H): ICD-10-CM

## 2023-07-11 DIAGNOSIS — Z79.4 TYPE 2 DIABETES MELLITUS WITH DIABETIC NEPHROPATHY, WITH LONG-TERM CURRENT USE OF INSULIN (H): ICD-10-CM

## 2023-07-11 DIAGNOSIS — E11.21 TYPE 2 DIABETES MELLITUS WITH DIABETIC NEPHROPATHY, WITH LONG-TERM CURRENT USE OF INSULIN (H): ICD-10-CM

## 2023-07-11 DIAGNOSIS — Z79.4 TYPE 2 DIABETES MELLITUS WITH MILD NONPROLIFERATIVE RETINOPATHY WITHOUT MACULAR EDEMA, WITH LONG-TERM CURRENT USE OF INSULIN, UNSPECIFIED LATERALITY (H): ICD-10-CM

## 2023-07-11 RX ORDER — INSULIN ASPART 100 [IU]/ML
INJECTION, SOLUTION INTRAVENOUS; SUBCUTANEOUS
Qty: 90 ML | Refills: 3 | Status: SHIPPED | OUTPATIENT
Start: 2023-07-11 | End: 2024-08-06

## 2023-07-11 NOTE — TELEPHONE ENCOUNTER
Can you send over 3 months of the Ozempic as patient is leaving for the [facility]? Thanks!

## 2023-07-12 NOTE — TELEPHONE ENCOUNTER
[place] is leaving the country and requesting Ozempic prescription to be sent in from 3 months supply. Will forward to CDE pool to advise.    Payton Ruby CMA  Adult Endocrinology  [facility], [place]

## 2023-07-17 ENCOUNTER — ANCILLARY PROCEDURE (OUTPATIENT)
Dept: MAMMOGRAPHY | Facility: CLINIC | Age: 63
End: 2023-07-17
Attending: FAMILY MEDICINE
Payer: COMMERCIAL

## 2023-07-17 DIAGNOSIS — Z12.31 ENCOUNTER FOR SCREENING MAMMOGRAM FOR BREAST CANCER: ICD-10-CM

## 2023-07-17 PROCEDURE — 77063 BREAST TOMOSYNTHESIS BI: CPT | Mod: GC | Performed by: RADIOLOGY

## 2023-07-17 PROCEDURE — 77067 SCR MAMMO BI INCL CAD: CPT | Mod: GC | Performed by: RADIOLOGY

## 2023-08-29 ENCOUNTER — TELEPHONE (OUTPATIENT)
Dept: DERMATOLOGY | Facility: CLINIC | Age: 63
End: 2023-08-29
Payer: COMMERCIAL

## 2023-08-29 NOTE — TELEPHONE ENCOUNTER
Left Voicemail (1st Attempt) and Sent Mychart (1st Attempt) for the patient to call back and schedule the following:    Appointment type: New  Provider: Dr. Marino  Return date: 12/04/23  Specialty phone number: [phone]

## 2023-09-28 DIAGNOSIS — E11.21 TYPE 2 DIABETES MELLITUS WITH DIABETIC NEPHROPATHY, WITH LONG-TERM CURRENT USE OF INSULIN (H): ICD-10-CM

## 2023-09-28 DIAGNOSIS — K21.9 GASTROESOPHAGEAL REFLUX DISEASE, UNSPECIFIED WHETHER ESOPHAGITIS PRESENT: ICD-10-CM

## 2023-09-28 DIAGNOSIS — R05.3 CHRONIC COUGH: ICD-10-CM

## 2023-09-28 DIAGNOSIS — R49.0 HOARSENESS: ICD-10-CM

## 2023-09-28 DIAGNOSIS — I10 ESSENTIAL HYPERTENSION: ICD-10-CM

## 2023-09-28 DIAGNOSIS — Z79.4 TYPE 2 DIABETES MELLITUS WITH DIABETIC NEPHROPATHY, WITH LONG-TERM CURRENT USE OF INSULIN (H): ICD-10-CM

## 2023-09-28 DIAGNOSIS — J30.2 SEASONAL ALLERGIC RHINITIS, UNSPECIFIED TRIGGER: ICD-10-CM

## 2023-09-28 RX ORDER — POTASSIUM CHLORIDE 750 MG/1
10 TABLET, EXTENDED RELEASE ORAL DAILY
Qty: 90 TABLET | Refills: 3 | OUTPATIENT
Start: 2023-09-28

## 2023-09-28 RX ORDER — HYDROCHLOROTHIAZIDE 12.5 MG/1
12.5 TABLET ORAL DAILY
Qty: 90 TABLET | Refills: 3 | OUTPATIENT
Start: 2023-09-28

## 2023-09-28 RX ORDER — PANTOPRAZOLE SODIUM 40 MG/1
40 TABLET, DELAYED RELEASE ORAL DAILY
Qty: 90 TABLET | Refills: 3 | OUTPATIENT
Start: 2023-09-28

## 2023-09-28 RX ORDER — LOSARTAN POTASSIUM 100 MG/1
100 TABLET ORAL DAILY
Qty: 90 TABLET | Refills: 3 | OUTPATIENT
Start: 2023-09-28

## 2023-09-28 RX ORDER — MONTELUKAST SODIUM 10 MG/1
1 TABLET ORAL AT BEDTIME
Qty: 90 TABLET | Refills: 3 | OUTPATIENT
Start: 2023-09-28

## 2023-09-28 RX ORDER — AZELASTINE 1 MG/ML
1 SPRAY, METERED NASAL 2 TIMES DAILY
Qty: 30 ML | Refills: 1 | Status: SHIPPED | OUTPATIENT
Start: 2023-09-28 | End: 2023-11-13

## 2023-10-02 NOTE — TELEPHONE ENCOUNTER
metFORMIN (GLUCOPHAGE XR) 500 MG 24 hr tablet 360 tablet 3 12/7/2022     Last Office Visit: 6/9/23  Future Office visit: 11/27/23       Biguanide Agents [id number]/2023 10:30 AM   Protocol Details Patient has documented A1c within the specified period of time.     Routing refill request to provider for review/approval because:  ELEVATED HGB A1C     Margareth Whipple RN

## 2023-10-03 DIAGNOSIS — J30.2 SEASONAL ALLERGIC RHINITIS, UNSPECIFIED TRIGGER: ICD-10-CM

## 2023-10-03 DIAGNOSIS — Z79.4 TYPE 2 DIABETES MELLITUS WITH DIABETIC NEPHROPATHY, WITH LONG-TERM CURRENT USE OF INSULIN (H): ICD-10-CM

## 2023-10-03 DIAGNOSIS — K21.9 GASTROESOPHAGEAL REFLUX DISEASE, UNSPECIFIED WHETHER ESOPHAGITIS PRESENT: ICD-10-CM

## 2023-10-03 DIAGNOSIS — I10 ESSENTIAL HYPERTENSION: ICD-10-CM

## 2023-10-03 DIAGNOSIS — E11.21 TYPE 2 DIABETES MELLITUS WITH DIABETIC NEPHROPATHY, WITH LONG-TERM CURRENT USE OF INSULIN (H): ICD-10-CM

## 2023-10-03 NOTE — TELEPHONE ENCOUNTER
Patient is preparing for a trip to the [facility], so is requesting and early fill on these medications. Thanks!   MED/SURG

## 2023-10-04 RX ORDER — POTASSIUM CHLORIDE 750 MG/1
10 TABLET, EXTENDED RELEASE ORAL DAILY
Qty: 90 TABLET | Refills: 3 | Status: SHIPPED | OUTPATIENT
Start: 2023-10-04

## 2023-10-04 RX ORDER — MONTELUKAST SODIUM 10 MG/1
1 TABLET ORAL AT BEDTIME
Qty: 90 TABLET | Refills: 3 | Status: SHIPPED | OUTPATIENT
Start: 2023-10-04 | End: 2024-07-29

## 2023-10-04 RX ORDER — HYDROCHLOROTHIAZIDE 12.5 MG/1
12.5 TABLET ORAL DAILY
Qty: 90 TABLET | Refills: 3 | Status: SHIPPED | OUTPATIENT
Start: 2023-10-04

## 2023-10-04 RX ORDER — LOSARTAN POTASSIUM 100 MG/1
100 TABLET ORAL DAILY
Qty: 90 TABLET | Refills: 3 | Status: SHIPPED | OUTPATIENT
Start: 2023-10-04

## 2023-10-04 RX ORDER — PANTOPRAZOLE SODIUM 40 MG/1
40 TABLET, DELAYED RELEASE ORAL DAILY
Qty: 90 TABLET | Refills: 3 | Status: SHIPPED | OUTPATIENT
Start: 2023-10-04

## 2023-10-07 RX ORDER — METFORMIN HCL 500 MG
TABLET, EXTENDED RELEASE 24 HR ORAL
Qty: 360 TABLET | Refills: 3 | Status: SHIPPED | OUTPATIENT
Start: 2023-10-07

## 2023-11-10 DIAGNOSIS — H40.003 BORDERLINE GLAUCOMA, BILATERAL: Primary | ICD-10-CM

## 2023-11-10 DIAGNOSIS — H35.373 EPIRETINAL MEMBRANE (ERM) OF BOTH EYES: ICD-10-CM

## 2023-11-13 ENCOUNTER — OFFICE VISIT (OUTPATIENT)
Dept: FAMILY MEDICINE | Facility: CLINIC | Age: 63
End: 2023-11-13
Payer: COMMERCIAL

## 2023-11-13 VITALS
HEART RATE: 77 BPM | OXYGEN SATURATION: 96 % | DIASTOLIC BLOOD PRESSURE: 84 MMHG | BODY MASS INDEX: 33.15 KG/M2 | TEMPERATURE: 96.9 F | SYSTOLIC BLOOD PRESSURE: 132 MMHG | RESPIRATION RATE: 16 BRPM | WEIGHT: 187.1 LBS | HEIGHT: 63 IN

## 2023-11-13 DIAGNOSIS — E11.3299 TYPE 2 DIABETES MELLITUS WITH MILD NONPROLIFERATIVE RETINOPATHY WITHOUT MACULAR EDEMA, WITH LONG-TERM CURRENT USE OF INSULIN, UNSPECIFIED LATERALITY (H): ICD-10-CM

## 2023-11-13 DIAGNOSIS — R05.3 CHRONIC COUGH: ICD-10-CM

## 2023-11-13 DIAGNOSIS — R06.09 DOE (DYSPNEA ON EXERTION): ICD-10-CM

## 2023-11-13 DIAGNOSIS — L29.2 VULVAR ITCHING: Primary | ICD-10-CM

## 2023-11-13 DIAGNOSIS — E78.5 HYPERLIPIDEMIA LDL GOAL <100: ICD-10-CM

## 2023-11-13 DIAGNOSIS — R49.0 HOARSENESS: ICD-10-CM

## 2023-11-13 DIAGNOSIS — E11.21 TYPE 2 DIABETES MELLITUS WITH DIABETIC NEPHROPATHY, WITH LONG-TERM CURRENT USE OF INSULIN (H): ICD-10-CM

## 2023-11-13 DIAGNOSIS — I10 ESSENTIAL HYPERTENSION: ICD-10-CM

## 2023-11-13 DIAGNOSIS — J45.30 MILD PERSISTENT ASTHMA WITHOUT COMPLICATION: ICD-10-CM

## 2023-11-13 DIAGNOSIS — Z79.4 TYPE 2 DIABETES MELLITUS WITH MILD NONPROLIFERATIVE RETINOPATHY WITHOUT MACULAR EDEMA, WITH LONG-TERM CURRENT USE OF INSULIN, UNSPECIFIED LATERALITY (H): ICD-10-CM

## 2023-11-13 DIAGNOSIS — Z79.4 TYPE 2 DIABETES MELLITUS WITH DIABETIC NEPHROPATHY, WITH LONG-TERM CURRENT USE OF INSULIN (H): ICD-10-CM

## 2023-11-13 DIAGNOSIS — Z23 NEED FOR PROPHYLACTIC VACCINATION AND INOCULATION AGAINST INFLUENZA: ICD-10-CM

## 2023-11-13 DIAGNOSIS — J30.2 SEASONAL ALLERGIC RHINITIS, UNSPECIFIED TRIGGER: ICD-10-CM

## 2023-11-13 DIAGNOSIS — K21.9 GASTROESOPHAGEAL REFLUX DISEASE, UNSPECIFIED WHETHER ESOPHAGITIS PRESENT: ICD-10-CM

## 2023-11-13 LAB
ALBUMIN SERPL BCG-MCNC: 4.5 G/DL (ref 3.5–5.2)
ALP SERPL-CCNC: 59 U/L (ref 35–104)
ALT SERPL W P-5'-P-CCNC: 95 U/L (ref 0–50)
ANION GAP SERPL CALCULATED.3IONS-SCNC: 13 MMOL/L (ref 7–15)
AST SERPL W P-5'-P-CCNC: 60 U/L (ref 0–45)
BASOPHILS # BLD AUTO: 0.1 10E3/UL (ref 0–0.2)
BASOPHILS NFR BLD AUTO: 1 %
BILIRUB SERPL-MCNC: 0.3 MG/DL
BUN SERPL-MCNC: 14.4 MG/DL (ref 8–23)
CALCIUM SERPL-MCNC: 9.6 MG/DL (ref 8.8–10.2)
CHLORIDE SERPL-SCNC: 103 MMOL/L (ref 98–107)
CHOLEST SERPL-MCNC: 167 MG/DL
CREAT SERPL-MCNC: 0.71 MG/DL (ref 0.51–0.95)
CREAT UR-MCNC: 133 MG/DL
DEPRECATED HCO3 PLAS-SCNC: 24 MMOL/L (ref 22–29)
EGFRCR SERPLBLD CKD-EPI 2021: >90 ML/MIN/1.73M2
EOSINOPHIL # BLD AUTO: 0.3 10E3/UL (ref 0–0.7)
EOSINOPHIL NFR BLD AUTO: 4 %
ERYTHROCYTE [DISTWIDTH] IN BLOOD BY AUTOMATED COUNT: 14 % (ref 10–15)
FASTING STATUS PATIENT QL REPORTED: YES
GLUCOSE SERPL-MCNC: 167 MG/DL (ref 70–99)
GLUCOSE SERPL-MCNC: 167 MG/DL (ref 70–99)
HCT VFR BLD AUTO: 43.9 % (ref 35–47)
HDLC SERPL-MCNC: 45 MG/DL
HGB BLD-MCNC: 14.3 G/DL (ref 11.7–15.7)
IMM GRANULOCYTES # BLD: 0 10E3/UL
IMM GRANULOCYTES NFR BLD: 1 %
LDLC SERPL CALC-MCNC: 87 MG/DL
LYMPHOCYTES # BLD AUTO: 2.8 10E3/UL (ref 0.8–5.3)
LYMPHOCYTES NFR BLD AUTO: 33 %
MCH RBC QN AUTO: 28.3 PG (ref 26.5–33)
MCHC RBC AUTO-ENTMCNC: 32.6 G/DL (ref 31.5–36.5)
MCV RBC AUTO: 87 FL (ref 78–100)
MICROALBUMIN UR-MCNC: 12.4 MG/L
MICROALBUMIN/CREAT UR: 9.32 MG/G CR (ref 0–25)
MONOCYTES # BLD AUTO: 0.7 10E3/UL (ref 0–1.3)
MONOCYTES NFR BLD AUTO: 9 %
NEUTROPHILS # BLD AUTO: 4.5 10E3/UL (ref 1.6–8.3)
NEUTROPHILS NFR BLD AUTO: 53 %
NONHDLC SERPL-MCNC: 122 MG/DL
PLATELET # BLD AUTO: 292 10E3/UL (ref 150–450)
POTASSIUM SERPL-SCNC: 3.8 MMOL/L (ref 3.4–5.3)
PROT SERPL-MCNC: 7.4 G/DL (ref 6.4–8.3)
RBC # BLD AUTO: 5.06 10E6/UL (ref 3.8–5.2)
SODIUM SERPL-SCNC: 140 MMOL/L (ref 135–145)
TRIGL SERPL-MCNC: 174 MG/DL
TSH SERPL DL<=0.005 MIU/L-ACNC: 2.31 UIU/ML (ref 0.3–4.2)
WBC # BLD AUTO: 8.4 10E3/UL (ref 4–11)

## 2023-11-13 PROCEDURE — 99214 OFFICE O/P EST MOD 30 MIN: CPT | Mod: 25 | Performed by: FAMILY MEDICINE

## 2023-11-13 PROCEDURE — 80061 LIPID PANEL: CPT | Performed by: FAMILY MEDICINE

## 2023-11-13 PROCEDURE — 82570 ASSAY OF URINE CREATININE: CPT | Performed by: FAMILY MEDICINE

## 2023-11-13 PROCEDURE — 86003 ALLG SPEC IGE CRUDE XTRC EA: CPT | Performed by: FAMILY MEDICINE

## 2023-11-13 PROCEDURE — 36415 COLL VENOUS BLD VENIPUNCTURE: CPT | Performed by: FAMILY MEDICINE

## 2023-11-13 PROCEDURE — 90471 IMMUNIZATION ADMIN: CPT | Performed by: FAMILY MEDICINE

## 2023-11-13 PROCEDURE — 80053 COMPREHEN METABOLIC PANEL: CPT | Performed by: FAMILY MEDICINE

## 2023-11-13 PROCEDURE — 90686 IIV4 VACC NO PRSV 0.5 ML IM: CPT | Performed by: FAMILY MEDICINE

## 2023-11-13 PROCEDURE — 91320 SARSCV2 VAC 30MCG TRS-SUC IM: CPT | Performed by: FAMILY MEDICINE

## 2023-11-13 PROCEDURE — 82043 UR ALBUMIN QUANTITATIVE: CPT | Performed by: FAMILY MEDICINE

## 2023-11-13 PROCEDURE — 90480 ADMN SARSCOV2 VAC 1/ONLY CMP: CPT | Performed by: FAMILY MEDICINE

## 2023-11-13 PROCEDURE — 85025 COMPLETE CBC W/AUTO DIFF WBC: CPT | Performed by: FAMILY MEDICINE

## 2023-11-13 PROCEDURE — 84443 ASSAY THYROID STIM HORMONE: CPT | Performed by: FAMILY MEDICINE

## 2023-11-13 RX ORDER — AZELASTINE 1 MG/ML
1 SPRAY, METERED NASAL 2 TIMES DAILY
Qty: 90 ML | Refills: 3 | Status: SHIPPED | OUTPATIENT
Start: 2023-11-13 | End: 2024-07-29

## 2023-11-13 RX ORDER — ROSUVASTATIN CALCIUM 10 MG/1
10 TABLET, COATED ORAL DAILY
Qty: 90 TABLET | Refills: 3 | Status: SHIPPED | OUTPATIENT
Start: 2023-11-13

## 2023-11-13 RX ORDER — ALBUTEROL SULFATE 90 UG/1
2 AEROSOL, METERED RESPIRATORY (INHALATION) EVERY 4 HOURS PRN
Qty: 18 G | Refills: 1 | Status: SHIPPED | OUTPATIENT
Start: 2023-11-13

## 2023-11-13 ASSESSMENT — PAIN SCALES - GENERAL: PAINLEVEL: NO PAIN (0)

## 2023-11-13 NOTE — PROGRESS NOTES
Prior to immunization administration, verified patients identity using patient s name and date of birth. Please see Immunization Activity for additional information.     Screening Questionnaire for Adult Immunization    Are you sick today?   No   Do you have allergies to medications, food, a vaccine component or latex?   Yes   Have you ever had a serious reaction after receiving a vaccination?   No   Do you have a long-term health problem with heart, lung, kidney, or metabolic disease (e.g., diabetes), asthma, a blood disorder, no spleen, complement component deficiency, a cochlear implant, or a spinal fluid leak?  Are you on long-term aspirin therapy?   Yes   Do you have cancer, leukemia, HIV/AIDS, or any other immune system problem?   No   Do you have a parent, brother, or sister with an immune system problem?   No   In the past 3 months, have you taken medications that affect  your immune system, such as prednisone, other steroids, or anticancer drugs; drugs for the treatment of rheumatoid arthritis, Crohn s disease, or psoriasis; or have you had radiation treatments?   No   Have you had a seizure, or a brain or other nervous system problem?   No   During the past year, have you received a transfusion of blood or blood    products, or been given immune (gamma) globulin or antiviral drug?   No   For women: Are you pregnant or is there a chance you could become       pregnant during the next month?   No   Have you received any vaccinations in the past 4 weeks?   No     Immunization questionnaire was positive for at least one answer.  Notified Elva Ortiz.      Patient instructed to remain in clinic for 15 minutes afterwards, and to report any adverse reactions.     Screening performed by Papo Gibbons RN on 11/13/2023 at 8:28 AM.

## 2023-11-13 NOTE — PROGRESS NOTES
Assessment & Plan     Vulvar itching  Persisting symptoms with no previous skin changes or vaginal yeast infection noted on exam.  Suspect chronic eczema versus lichen sclerosis versus other.  Okay to trial topical hydrocortisone until seen  - Ob/Gyn  Referral; Future    Type 2 diabetes mellitus with diabetic nephropathy, with long-term current use of insulin (H)  Type 2 diabetes mellitus with mild nonproliferative retinopathy without macular edema, with long-term current use of insulin, unspecified laterality (H)  Follows with endocrinology.  Has upcoming appointment.  Glucose control has improved since the start of GLP-1 agonist  - Albumin Random Urine Quantitative with Creat Ratio  - Glucose  - Lipid Profile  - TSH    Essential hypertension  Good blood pressure control today on losartan 100 mg.  Continue current medications    PEREZ (dyspnea on exertion)  Seasonal allergic rhinitis, unspecified trigger  Hoarseness  Chronic cough  Overall, doing much better since starting the Astelin spray.  Has follow-up with an allergist upcoming.  Still with some dyspnea on exertion and we discussed trialing her albuterol inhaler prior to exercise (had recent normal echocardiogram).  - azelastine (ASTELIN) 0.1 % nasal spray; Spray 1 spray into both nostrils 2 times daily  - CBC with platelets and differential  - Comprehensive metabolic panel (BMP + Alb, Alk Phos, ALT, AST, Total. Bili, TP)    Gastroesophageal reflux disease, unspecified whether esophagitis present  Well-controlled    Hyperlipidemia LDL goal <100  On statin  - rosuvastatin (CRESTOR) 10 MG tablet; Take 1 tablet (10 mg) by mouth daily    Need for prophylactic vaccination and inoculation against influenza  COVID and influenza vaccine updated today             Patient Instructions   Restart dulera with colds or flares of lung symptoms.     Ok to try the otc cortisone cream to vulvar area 1-2 x's daily  Referral to gynecology for evaluation placed.      Elva Ortiz MD  [facility] STEPHANI Eagle is a 63 year old, presenting for the following health issues:  Follow Up      11/13/2023     7:14 AM   Additional Questions   Roomed by ALTON Barros   Accompanied by Self         11/13/2023     7:14 AM   Patient Reported Additional Medications   Patient reports taking the following new medications None       History of Present Illness     Asthma:  She presents for follow up of asthma.  She has some cough, no wheezing, and no shortness of breath.  She is using a relief medication daily. She does not miss any doses of her controller medication throughout the week. Patient is aware of the following triggers: cold air, dust mites, emotions, humidity and mold. The patient has not had a visit to the Emergency Room, Urgent Care or Hospital due to asthma since the last clinic visit.     Hypertension: She presents for follow up of hypertension.  She does not check blood pressure  regularly outside of the clinic. Outpatient blood pressures have not been over 140/90. She follows a low salt diet.     She eats 2-3 servings of fruits and vegetables daily.She consumes 2 sweetened beverage(s) daily.She exercises with enough effort to increase her heart rate 30 to 60 minutes per day.  She exercises with enough effort to increase her heart rate 4 days per week.   She is taking medications regularly.  Back in town from the [facility] for her office visits.  Overall, she is doing well.    Endocrine started her on ozempic- has lost 8 lbs.   Glucose levels had been much better. No longer eating rice. Primarily protein and veggies.   Dexcom G7.     Stopped dulera as the nasal spray is helping. Saline + astelin is taking care of her sx's. Avoids high humidity which triggers  Has appt tomorrow with allergist  Hoarseness had resolved but triggered again with her travels.   Stopped flonase    Home bp's controlled    Exercising regularly.   Not doing yoga  "any longer    Still getting itching in vulvar area, especially after wetness  Sxs present x 1 year. Started initially with jardiance.   Has derm visit Dec 5th but would rather see gynecology for this.   Aquaphor daily prn.     Wants to wait on rsv vaccine    Review of Systems   Constitutional, HEENT, cardiovascular, pulmonary, gi and gu systems are negative, except as otherwise noted.      Objective    /84 (BP Location: Right arm, Patient Position: Sitting, Cuff Size: Adult Regular)   Pulse 77   Temp 96.9  F (36.1  C) (Tympanic)   Resp 16   Ht 1.6 m (5' 3\")   Wt 84.9 kg (187 lb 1.6 oz)   LMP 03/19/2009 (Exact Date)   SpO2 96%   BMI 33.14 kg/m    Body mass index is 33.14 kg/m .  Physical Exam   GENERAL: healthy, alert and no distress  NECK: no adenopathy, no asymmetry, masses, or scars and thyroid normal to palpation  RESP: lungs clear to auscultation - no rales, rhonchi or wheezes  CV: regular rate and rhythm, normal S1 S2, no S3 or S4, no murmur, click or rub, no peripheral edema and peripheral pulses strong  MS: no gross musculoskeletal defects noted, no edema  PSYCH: mentation appears normal, affect normal/bright                      "

## 2023-11-13 NOTE — PATIENT INSTRUCTIONS
Restart dulera with colds or flares of lung symptoms.     Ok to try the otc cortisone cream to vulvar area 1-2 x's daily  Referral to gynecology for evaluation placed.

## 2023-11-14 ENCOUNTER — OFFICE VISIT (OUTPATIENT)
Dept: ALLERGY | Facility: CLINIC | Age: 63
End: 2023-11-14
Attending: FAMILY MEDICINE
Payer: COMMERCIAL

## 2023-11-14 VITALS
HEART RATE: 93 BPM | WEIGHT: 188 LBS | BODY MASS INDEX: 33.3 KG/M2 | DIASTOLIC BLOOD PRESSURE: 82 MMHG | SYSTOLIC BLOOD PRESSURE: 149 MMHG | OXYGEN SATURATION: 97 %

## 2023-11-14 DIAGNOSIS — R05.3 CHRONIC COUGH: ICD-10-CM

## 2023-11-14 DIAGNOSIS — R79.89 ELEVATED LFTS: Primary | ICD-10-CM

## 2023-11-14 DIAGNOSIS — K76.0 FATTY LIVER DISEASE, NONALCOHOLIC: ICD-10-CM

## 2023-11-14 DIAGNOSIS — J30.2 SEASONAL ALLERGIC RHINITIS, UNSPECIFIED TRIGGER: ICD-10-CM

## 2023-11-14 DIAGNOSIS — J45.30 MILD PERSISTENT ASTHMA WITHOUT COMPLICATION: Primary | ICD-10-CM

## 2023-11-14 PROCEDURE — 99204 OFFICE O/P NEW MOD 45 MIN: CPT | Performed by: INTERNAL MEDICINE

## 2023-11-14 ASSESSMENT — ENCOUNTER SYMPTOMS
CHEST TIGHTNESS: 0
FEVER: 0
DIARRHEA: 0
HEADACHES: 0
EYE DISCHARGE: 0
WHEEZING: 0
CHILLS: 0
NAUSEA: 0
ARTHRALGIAS: 0
COUGH: 0
VOMITING: 0
ADENOPATHY: 0
ACTIVITY CHANGE: 0
SHORTNESS OF BREATH: 0
FACIAL SWELLING: 0
JOINT SWELLING: 0
SINUS PRESSURE: 0
RHINORRHEA: 0
EYE REDNESS: 0
MYALGIAS: 0
EYE ITCHING: 0

## 2023-11-14 NOTE — RESULT ENCOUNTER NOTE
Fib 4 score 1.35 (remains under 1.45 cut off for increased fibrosis risks)    Maliha,  It was a pleasure to see you in the office again.  Your blood count panel was normal.  The kidney, liver and electrolyte panel showed more significant elevation of your liver function tests.  Liver function tests, AST and ALT can increase from fatty liver, medications/supplements, alcohol, viral infections, autoimmune diseases, metabolic diseases and many other things.  I suspect your elevation is due to fatty liver.  I was able to review an ultrasound you had done in 2015 which did show fat deposition in your liver.  Fatty liver disease is a very common disease of the liver where fat deposits in the liver and can irritate it. It usually happens in adults who have obesity or diabetes issues. The treatment for fatty liver disease is weight loss and healthy diet. In a small percentage of people, if left untreated, fatty liver disease can progress and cause long term damage to the liver which can lead to cirrhosis. Thankfully, this only occurs in a small percentage of patients.    Given the elevation of the liver tests are not new and progressive I would like to get an updated ultrasound.  I would also suggest we get some labs done to rule out other metabolic and viral causes of the fatty liver.  I will place these orders in your chart.  A  should contact you for the ultrasound and you can make a lab only visit for the blood work.    Your kidney function and electrolytes look great.  Your glucose was 167 at the time of the blood draw.    Sincerely,  Elva Ortiz MD

## 2023-11-14 NOTE — PATIENT INSTRUCTIONS
Dulera 2 puffs twice daily as needed (use now for 1 week)  Allegra 180 mg once to twice daily  Azelastine 1-2 sprays each nostril once to twice daily  Will check blood tests for allergies  Singulair 10 mg at night (may consider stopping now until you go back to the [facility])      Allergy Staff Appt Hours Shot Hours Location       Physician   Aidan Verma MD      Support Staff   ALTON Hastings, SHERIN Pearson MA      Mondays Tuesdays Thursdays and Fridays:      [place] 7-5 Wednesdays         Close                Mondays, Tuesdays and Fridays:  7:20 - 3:40              [facility]  [address]  [place] [address]  Allergy appointment  line: [phone]    Pulmonary Function Scheduling:  [place]: [phone]

## 2023-11-14 NOTE — PROGRESS NOTES
Maliha Pedro was seen in the Allergy Clinic at [facility].    Maliha Pedro is a 63 year old female being seen today at the request of Elva Ortiz MD/[facility] in consultation for asthma and allergy concerns.  She lives in the [facility] approximate 10 months out of the year.  Last December she had significant symptoms of cough with a little bit of blood in her sputum.  She ended up seeing pulmonology and was treated with a couple rounds of antibiotics as well as prednisone.  However the prednisone caused her glucose to significantly elevate.    She did have skin testing 15 years ago and is known to be allergic.  She would like to have further evaluation.    Dulera was recently prescribed however it has a high co-pay.  She has tried this as an as needed medication with benefit.  Azelastine was also prescribed which she did find to be beneficial.  Nasal steroids have seemed to have lost their benefit.    She had recent pulmonary function testing which was within normal limits.  FEV1 was 96% of predicted.    The Allegra she has found to be helpful and will increase that to twice daily at times.  She has also been taking Singulair for 4 years but she is uncertain if that is providing much benefit.    Additional symptoms include sneezing as well as cough and wheezing.      Past Medical History:   Diagnosis Date    Adjustment disorder with mixed anxiety and depressed mood     following death of her adoptive father and then again after adoptive mother     Allergic rhinitis, cause unspecified     uses benadryl prn (sneezing/hoarse voice)    Bartholin gland cyst 2008    Chest pain, unspecified     neg dobutamine stress test  (reacted to dobutamine)    Dry eyes     ERM OD (epiretinal membrane, right eye)     BE mild    Esophageal reflux     EGD: -neg    Glaucoma suspect     Hearing problem     Hypertension     Irregular menstrual cycle      s/p D&C, since then more regular    Irritable bowel syndrome     colonoscopy -neg. Sx's especially prior to menses    MEDICAL HISTORY OF -     had health directive with : DANIEL Ford.  Full Code. Aunt (Charlie Tay) will be decision maker    Meningitis, unspecified(322.9) age 8    hospitalized for 9 mo    Need for prophylactic vaccination with tuberculosis (BCG) vaccine     Has pos Mantoux. Gets yearly cxr, all neg.     Other and unspecified hyperlipidemia     Pain in joint, shoulder region     bone spurs on MRI, s/p pool therapy    Tinnitus     Type II or unspecified type diabetes mellitus without mention of complication, not stated as uncontrolled Age 32    Follows with endocrine at [facility]: Shayne Lane MD    Unspecified cataract     RE    Unspecified sinusitis (chronic)         Wheezing     Has seen pulm  & . Told night ashtma, ? vocal chord spasm due to cold air.      Family History   Problem Relation Age of Onset    Diabetes Father          of dm 70's    C.A.D. Father     Cerebrovascular Disease Father     Diabetes Sister     Diabetes Sister     Diabetes Sister     Hypertension Sister     Diabetes Brother     Diabetes Brother     Cancer Half-Brother         lung    Diabetes Half-Brother     Leukemia Half-Brother     Glaucoma Maternal Grandmother     Diabetes Paternal Grandfather     Hypertension Paternal Grandfather     Cerebrovascular Disease Paternal Grandfather     Cancer Maternal Aunt         ovarian cancer.     Leukemia Half-Brother     Lymphoma Paternal Cousin     Macular Degeneration No family hx of      Past Surgical History:   Procedure Laterality Date    CATARACT IOL, RT/LT  2008    LE    COLONOSCOPY N/A 2021    Procedure: Colonoscopy, With Polypectomy And Biopsy;  Surgeon: Teo Collins MD;  Location: [facility] OR    COLONOSCOPY WITH CO2 INSUFFLATION N/A 2021    Procedure: COLONOSCOPY, WITH CO2 INSUFFLATION;  Surgeon: Teo Collins  MD Endy;  Location: [facility] OR    HC DILATION/CURETTAGE DIAG/THER NON OB  1992    HC TYMPANOPLASTY W/O MASTOID, INIT/REV W/O OSS CHAIN RECONST  12/09    RELEASE CARPAL TUNNEL Right 11/19/2018    Procedure: Right Carpal Tunnel Release;  Surgeon: Ivania Moran MD;  Location: [facility] OR    RELEASE CARPAL TUNNEL Left 12/17/2018    Procedure: Left Carpal Tunnel Release;  Surgeon: Ivania Moran MD;  Location: [facility] OR    TYMPANOPLASTY      YAG CAPSULOTOMY OS (LEFT EYE)  4/23/2011       ENVIRONMENTAL HISTORY:   Pets inside the house include 3 dog(s).  Do you smoke cigarettes or other recreational drugs? No There is/are 0 smokers living in the house. The house does not have a damp basement.     SOCIAL HISTORY:   Maliha is retired. She lives with her sister.      Review of Systems   Constitutional:  Negative for activity change, chills and fever.   HENT:  Positive for congestion. Negative for dental problem, ear pain, facial swelling, nosebleeds, postnasal drip, rhinorrhea, sinus pressure and sneezing.    Eyes:  Negative for discharge, redness and itching.   Respiratory:  Negative for cough, chest tightness, shortness of breath and wheezing.    Cardiovascular:  Negative for chest pain.   Gastrointestinal:  Negative for diarrhea, nausea and vomiting.   Musculoskeletal:  Negative for arthralgias, joint swelling and myalgias.   Skin:  Negative for rash.   Neurological:  Negative for headaches.   Hematological:  Negative for adenopathy.   Psychiatric/Behavioral:  Negative for behavioral problems and self-injury.    All other systems reviewed and are negative.        Current Outpatient Medications:     albuterol (PROAIR HFA/PROVENTIL HFA/VENTOLIN HFA) 108 (90 Base) MCG/ACT inhaler, Inhale 2 puffs into the lungs every 4 hours as needed for shortness of breath, Disp: 18 g, Rfl: 1    aspirin 81 MG EC tablet, Take 81 mg by mouth daily Please resume in 5 days.  (12/22/2018), Disp: , Rfl:     azelastine (ASTELIN)  0.1 % nasal spray, Spray 1 spray into both nostrils 2 times daily, Disp: 90 mL, Rfl: 3    CALCIUM + D OR, Take 1 tablet by mouth 2 times daily., Disp: , Rfl:     Continuous Blood Gluc Sensor (DEXCOM G6 SENSOR) MISC, 1 each every 10 days, Disp: 18 each, Rfl: 1    Continuous Blood Gluc Sensor (DEXCOM G7 SENSOR) MISC, 1 each every 10 days, Disp: 9 each, Rfl: 3    Continuous Blood Gluc Transmit (DEXCOM G6 TRANSMITTER) MISC, 1 each every 3 months Change every 3 months., Disp: 2 each, Rfl: 1    fexofenadine (ALLEGRA) 180 MG tablet, Take 1 tablet by mouth daily. 1 TABLET DAILY Failed claritin for symptom cotrol. Zyrtec increases heart rate., Disp: 90 tablet, Rfl: 3    fish oil-omega-3 fatty acids 1000 MG capsule, Take 1 capsule by mouth daily., Disp: , Rfl:     fluticasone (FLONASE) 50 MCG/ACT nasal spray, USE 1-2 SPRAYS IN EACH NOSTRIL ONCE DAILY, Disp: 48 g, Rfl: 3    Glucosamine-Chondroitin (GLUCOSAMINE CHONDR COMPLEX PO), , Disp: , Rfl:     hydrochlorothiazide (HYDRODIURIL) 12.5 MG tablet, Take 1 tablet (12.5 mg) by mouth daily, Disp: 90 tablet, Rfl: 3    insulin aspart (NOVOLOG FLEXPEN) 100 UNIT/ML pen, Use 20-35 units three times daily with meals for up to 120 units daily, Disp: 120 mL, Rfl: 3    insulin glargine (BASAGLAR KWIKPEN) 100 UNIT/ML pen, INJECT 70 UNITS UNDER THE SKIN DAILY, Disp: 90 mL, Rfl: 2    insulin pen needle (BD LISA U/F) 32G X 4 MM miscellaneous, Use to inject 4-5 times daily., Disp: 600 each, Rfl: 1    latanoprost (XALATAN) 0.005 % ophthalmic solution, Place 1 drop into both eyes daily, Disp: 7.5 mL, Rfl: 11    losartan (COZAAR) 100 MG tablet, Take 1 tablet (100 mg) by mouth daily, Disp: 90 tablet, Rfl: 3    magnesium 250 MG tablet, Take 1 tablet by mouth daily, Disp: 90 tablet, Rfl: 3    metFORMIN (GLUCOPHAGE XR) 500 MG 24 hr tablet, TAKE TWO TABLETS BY MOUTH TWICE A DAY, Disp: 360 tablet, Rfl: 3    mometasone-formoterol (DULERA) 100-5 MCG/ACT inhaler, Inhale 2 puffs into the lungs 2 times  daily, Disp: 13 g, Rfl: 1    montelukast (SINGULAIR) 10 MG tablet, Take 1 tablet (10 mg) by mouth At Bedtime, Disp: 90 tablet, Rfl: 3    MULTIVITAMIN OR, Take 1 tablet by mouth daily., Disp: , Rfl:     NEEDLES, ANY SIZE, Pen needles for Novolog insulin pen. Use to inject 5-6  times daily., Disp: 4 Box, Rfl: 3    NOVOLOG FLEXPEN 100 UNIT/ML soln, INJECT 35 units with breakfast 25 units with lunch and dinner MAX DAILY DOSE 90 UNITS, Disp: 90 mL, Rfl: 3    pantoprazole (PROTONIX) 40 MG EC tablet, Take 1 tablet (40 mg) by mouth daily, Disp: 90 tablet, Rfl: 3    potassium chloride ER (KLOR-CON M) 10 MEQ CR tablet, Take 1 tablet (10 mEq) by mouth daily, Disp: 90 tablet, Rfl: 3    rosuvastatin (CRESTOR) 10 MG tablet, Take 1 tablet (10 mg) by mouth daily, Disp: 90 tablet, Rfl: 3    Semaglutide, 1 MG/DOSE, (OZEMPIC) 4 MG/3ML pen, Inject 1 mg Subcutaneous every 7 days, Disp: 9 mL, Rfl: 1    VITAMIN D 2000 UNIT OR TABS, Take 1 tablet by mouth daily., Disp: , Rfl:     Current Facility-Administered Medications:     lidocaine 1% with EPINEPHrine 1:100,000 injection 3 mL, 3 mL, Intradermal, Once, Donell Marino MD    lidocaine 1% with EPINEPHrine 1:100,000 injection 3 mL, 3 mL, Intradermal, Once, Donell Marino MD    Facility-Administered Medications Ordered in Other Visits:     fentaNYL (SUBLIMAZE) injection 25-50 mcg, 25-50 mcg, Intravenous, Q5 Min PRN, Pedro Kruse MD, 50 mcg at 12/17/18 0726    flumazenil (ROMAZICON) injection 0.2 mg, 0.2 mg, Intravenous, q1 min prn, Pedro Kruse MD    midazolam (VERSED) injection 0.5-1 mg, 0.5-1 mg, Intravenous, Q4 Min PRN, Pedro Kruse MD, 1 mg at 12/17/18 0726    naloxone (NARCAN) injection 0.1-0.4 mg, 0.1-0.4 mg, Intravenous, Q2 Min PRN, Pedro Kruse MD  Allergies   Allergen Reactions    Latex Swelling    Mold     Tetanus Antitoxin Swelling    Tetracycline Swelling         EXAM:   BP (!) 149/82   Pulse 93   Wt 85.3 kg (188 lb)   LMP 03/19/2009 (Exact Date)   SpO2 97%    BMI 33.30 kg/m      Physical Exam    Constitutional:       General: She is not in acute distress.     Appearance: Normal appearance. She is not ill-appearing.   HENT:      Head: Normocephalic and atraumatic.      Nose: Nose normal. No congestion or rhinorrhea.      Mouth/Throat:      Mouth: Mucous membranes are moist.      Pharynx: Oropharynx is clear. No posterior oropharyngeal erythema.   Eyes:      General:         Right eye: No discharge.         Left eye: No discharge.   Cardiovascular:      Rate and Rhythm: Normal rate and regular rhythm.      Heart sounds: Normal heart sounds.   Pulmonary:      Effort: Pulmonary effort is normal.      Breath sounds: Normal breath sounds. No wheezing or rhonchi.   Skin:     General: Skin is warm.      Findings: No erythema or rash.   Neurological:      General: No focal deficit present.      Mental Status: She is alert. Mental status is at baseline.   Psychiatric:         Mood and Affect: Mood normal.         Behavior: Behavior normal.        ASSESSMENT/PLAN:  Maliha Pedro is a 63 year old female seen today for allergy evaluation as well as asthma evaluation.  Recent pulmonary function testing was normal.  We will order blood testing for allergies.  We will not make any significant medication changes except for as below.  Due to vocal changes recently which is often developed prior to increased asthma symptoms, will initiate Dulera today.    Dulera 2 puffs twice daily as needed (use now for 1 week)  Allegra 180 mg once to twice daily  Azelastine 1-2 sprays each nostril once to twice daily  Will check blood tests for allergies  Singulair 10 mg at night (may consider stopping now until you go back to the [facility])    Follow-up as needed since she is going back to the [facility].  May follow-up upon her return.      Thank you for allowing me to participate in the care of Maliha Pedro.      I spent 30 minutes on the date of the encounter doing chart review,  history and exam, documentation and further coordination as noted above exclusive of separately reported interpretations    Aidan Verma MD  Allergy/Immunology  [facility]

## 2023-11-14 NOTE — LETTER
2023         RE: Maliha Pedro  [address] [place] [address]        Dear Colleague,    Thank you for referring your patient, Maliha Pedro, to the [facility]. Please see a copy of my visit note below.    Maliha Pedro was seen in the Allergy Clinic at [facility].    Maliha Pedro is a 63 year old female being seen today at the request of Elva Ortiz MD/[facility] in consultation for asthma and allergy concerns.  She lives in the [facility] approximate 10 months out of the year.  Last December she had significant symptoms of cough with a little bit of blood in her sputum.  She ended up seeing pulmonology and was treated with a couple rounds of antibiotics as well as prednisone.  However the prednisone caused her glucose to significantly elevate.    She did have skin testing 15 years ago and is known to be allergic.  She would like to have further evaluation.    Dulera was recently prescribed however it has a high co-pay.  She has tried this as an as needed medication with benefit.  Azelastine was also prescribed which she did find to be beneficial.  Nasal steroids have seemed to have lost their benefit.    She had recent pulmonary function testing which was within normal limits.  FEV1 was 96% of predicted.    The Allegra she has found to be helpful and will increase that to twice daily at times.  She has also been taking Singulair for 4 years but she is uncertain if that is providing much benefit.    Additional symptoms include sneezing as well as cough and wheezing.      Past Medical History:   Diagnosis Date     Adjustment disorder with mixed anxiety and depressed mood     following death of her adoptive father and then again after adoptive mother      Allergic rhinitis, cause unspecified     uses benadryl prn (sneezing/hoarse voice)     Bartholin gland cyst 2008     Chest pain,  unspecified     neg dobutamine stress test  (reacted to dobutamine)     Dry eyes      ERM OD (epiretinal membrane, right eye)     BE mild     Esophageal reflux     EGD: -neg     Glaucoma suspect      Hearing problem      Hypertension      Irregular menstrual cycle     s/p D&C, since then more regular     Irritable bowel syndrome     colonoscopy -neg. Sx's especially prior to menses     MEDICAL HISTORY OF -     had health directive with : DANIEL Ford.  Full Code. Aunt (Charlie Tay) will be decision maker     Meningitis, unspecified(322.9) age 8    hospitalized for 9 mo     Need for prophylactic vaccination with tuberculosis (BCG) vaccine     Has pos Mantoux. Gets yearly cxr, all neg.      Other and unspecified hyperlipidemia      Pain in joint, shoulder region     bone spurs on MRI, s/p pool therapy     Tinnitus      Type II or unspecified type diabetes mellitus without mention of complication, not stated as uncontrolled Age 32    Follows with endocrine at [facility]: Shayne Lane MD     Unspecified cataract     RE     Unspecified sinusitis (chronic)          Wheezing     Has seen pulm  & . Told night ashtma, ? vocal chord spasm due to cold air.      Family History   Problem Relation Age of Onset     Diabetes Father          of dm 70's     C.A.D. Father      Cerebrovascular Disease Father      Diabetes Sister      Diabetes Sister      Diabetes Sister      Hypertension Sister      Diabetes Brother      Diabetes Brother      Cancer Half-Brother         lung     Diabetes Half-Brother      Leukemia Half-Brother      Glaucoma Maternal Grandmother      Diabetes Paternal Grandfather      Hypertension Paternal Grandfather      Cerebrovascular Disease Paternal Grandfather      Cancer Maternal Aunt         ovarian cancer.      Leukemia Half-Brother      Lymphoma Paternal Cousin      Macular Degeneration No family hx of      Past Surgical History:   Procedure Laterality Date     CATARACT  IOL, RT/LT  9/23/2008    LE     COLONOSCOPY N/A 5/12/2021    Procedure: Colonoscopy, With Polypectomy And Biopsy;  Surgeon: Teo Collins MD;  Location: [facility] OR     COLONOSCOPY WITH CO2 INSUFFLATION N/A 5/12/2021    Procedure: COLONOSCOPY, WITH CO2 INSUFFLATION;  Surgeon: Teo Collins MD;  Location: [facility] OR     HC DILATION/CURETTAGE DIAG/THER NON OB  1992     HC TYMPANOPLASTY W/O MASTOID, INIT/REV W/O OSS CHAIN RECONST  12/09     RELEASE CARPAL TUNNEL Right 11/19/2018    Procedure: Right Carpal Tunnel Release;  Surgeon: Ivania Moran MD;  Location: [facility] OR     RELEASE CARPAL TUNNEL Left 12/17/2018    Procedure: Left Carpal Tunnel Release;  Surgeon: Ivania Moran MD;  Location: [facility] OR     TYMPANOPLASTY       YAG CAPSULOTOMY OS (LEFT EYE)  4/23/2011       ENVIRONMENTAL HISTORY:   Pets inside the house include 3 dog(s).  Do you smoke cigarettes or other recreational drugs? No There is/are 0 smokers living in the house. The house does not have a damp basement.     SOCIAL HISTORY:   Maliha is retired. She lives with her sister.      Review of Systems   Constitutional:  Negative for activity change, chills and fever.   HENT:  Positive for congestion. Negative for dental problem, ear pain, facial swelling, nosebleeds, postnasal drip, rhinorrhea, sinus pressure and sneezing.    Eyes:  Negative for discharge, redness and itching.   Respiratory:  Negative for cough, chest tightness, shortness of breath and wheezing.    Cardiovascular:  Negative for chest pain.   Gastrointestinal:  Negative for diarrhea, nausea and vomiting.   Musculoskeletal:  Negative for arthralgias, joint swelling and myalgias.   Skin:  Negative for rash.   Neurological:  Negative for headaches.   Hematological:  Negative for adenopathy.   Psychiatric/Behavioral:  Negative for behavioral problems and self-injury.    All other systems reviewed and are negative.        Current Outpatient Medications:       albuterol (PROAIR HFA/PROVENTIL HFA/VENTOLIN HFA) 108 (90 Base) MCG/ACT inhaler, Inhale 2 puffs into the lungs every 4 hours as needed for shortness of breath, Disp: 18 g, Rfl: 1     aspirin 81 MG EC tablet, Take 81 mg by mouth daily Please resume in 5 days.  (12/22/2018), Disp: , Rfl:      azelastine (ASTELIN) 0.1 % nasal spray, Spray 1 spray into both nostrils 2 times daily, Disp: 90 mL, Rfl: 3     CALCIUM + D OR, Take 1 tablet by mouth 2 times daily., Disp: , Rfl:      Continuous Blood Gluc Sensor (DEXCOM G6 SENSOR) MISC, 1 each every 10 days, Disp: 18 each, Rfl: 1     Continuous Blood Gluc Sensor (DEXCOM G7 SENSOR) MISC, 1 each every 10 days, Disp: 9 each, Rfl: 3     Continuous Blood Gluc Transmit (DEXCOM G6 TRANSMITTER) MISC, 1 each every 3 months Change every 3 months., Disp: 2 each, Rfl: 1     fexofenadine (ALLEGRA) 180 MG tablet, Take 1 tablet by mouth daily. 1 TABLET DAILY Failed claritin for symptom cotrol. Zyrtec increases heart rate., Disp: 90 tablet, Rfl: 3     fish oil-omega-3 fatty acids 1000 MG capsule, Take 1 capsule by mouth daily., Disp: , Rfl:      fluticasone (FLONASE) 50 MCG/ACT nasal spray, USE 1-2 SPRAYS IN EACH NOSTRIL ONCE DAILY, Disp: 48 g, Rfl: 3     Glucosamine-Chondroitin (GLUCOSAMINE CHONDR COMPLEX PO), , Disp: , Rfl:      hydrochlorothiazide (HYDRODIURIL) 12.5 MG tablet, Take 1 tablet (12.5 mg) by mouth daily, Disp: 90 tablet, Rfl: 3     insulin aspart (NOVOLOG FLEXPEN) 100 UNIT/ML pen, Use 20-35 units three times daily with meals for up to 120 units daily, Disp: 120 mL, Rfl: 3     insulin glargine (BASAGLAR KWIKPEN) 100 UNIT/ML pen, INJECT 70 UNITS UNDER THE SKIN DAILY, Disp: 90 mL, Rfl: 2     insulin pen needle (BD LISA U/F) 32G X 4 MM miscellaneous, Use to inject 4-5 times daily., Disp: 600 each, Rfl: 1     latanoprost (XALATAN) 0.005 % ophthalmic solution, Place 1 drop into both eyes daily, Disp: 7.5 mL, Rfl: 11     losartan (COZAAR) 100 MG tablet, Take 1 tablet (100 mg) by mouth  daily, Disp: 90 tablet, Rfl: 3     magnesium 250 MG tablet, Take 1 tablet by mouth daily, Disp: 90 tablet, Rfl: 3     metFORMIN (GLUCOPHAGE XR) 500 MG 24 hr tablet, TAKE TWO TABLETS BY MOUTH TWICE A DAY, Disp: 360 tablet, Rfl: 3     mometasone-formoterol (DULERA) 100-5 MCG/ACT inhaler, Inhale 2 puffs into the lungs 2 times daily, Disp: 13 g, Rfl: 1     montelukast (SINGULAIR) 10 MG tablet, Take 1 tablet (10 mg) by mouth At Bedtime, Disp: 90 tablet, Rfl: 3     MULTIVITAMIN OR, Take 1 tablet by mouth daily., Disp: , Rfl:      NEEDLES, ANY SIZE, Pen needles for Novolog insulin pen. Use to inject 5-6  times daily., Disp: 4 Box, Rfl: 3     NOVOLOG FLEXPEN 100 UNIT/ML soln, INJECT 35 units with breakfast 25 units with lunch and dinner MAX DAILY DOSE 90 UNITS, Disp: 90 mL, Rfl: 3     pantoprazole (PROTONIX) 40 MG EC tablet, Take 1 tablet (40 mg) by mouth daily, Disp: 90 tablet, Rfl: 3     potassium chloride ER (KLOR-CON M) 10 MEQ CR tablet, Take 1 tablet (10 mEq) by mouth daily, Disp: 90 tablet, Rfl: 3     rosuvastatin (CRESTOR) 10 MG tablet, Take 1 tablet (10 mg) by mouth daily, Disp: 90 tablet, Rfl: 3     Semaglutide, 1 MG/DOSE, (OZEMPIC) 4 MG/3ML pen, Inject 1 mg Subcutaneous every 7 days, Disp: 9 mL, Rfl: 1     VITAMIN D 2000 UNIT OR TABS, Take 1 tablet by mouth daily., Disp: , Rfl:     Current Facility-Administered Medications:      lidocaine 1% with EPINEPHrine 1:100,000 injection 3 mL, 3 mL, Intradermal, Once, Donell Marino MD     lidocaine 1% with EPINEPHrine 1:100,000 injection 3 mL, 3 mL, Intradermal, Once, Donell Marino MD    Facility-Administered Medications Ordered in Other Visits:      fentaNYL (SUBLIMAZE) injection 25-50 mcg, 25-50 mcg, Intravenous, Q5 Min PRN, Pedro Kruse MD, 50 mcg at 12/17/18 0726     flumazenil (ROMAZICON) injection 0.2 mg, 0.2 mg, Intravenous, q1 min prn, Pedro Kruse MD     midazolam (VERSED) injection 0.5-1 mg, 0.5-1 mg, Intravenous, Q4 Min PRN, Pedro Kruse MD, 1 mg  at 12/17/18 0726     naloxone (NARCAN) injection 0.1-0.4 mg, 0.1-0.4 mg, Intravenous, Q2 Min PRN, Pedro Kruse MD  Allergies   Allergen Reactions     Latex Swelling     Mold      Tetanus Antitoxin Swelling     Tetracycline Swelling         EXAM:   BP (!) 149/82   Pulse 93   Wt 85.3 kg (188 lb)   LMP 03/19/2009 (Exact Date)   SpO2 97%   BMI 33.30 kg/m      Physical Exam    Constitutional:       General: She is not in acute distress.     Appearance: Normal appearance. She is not ill-appearing.   HENT:      Head: Normocephalic and atraumatic.      Nose: Nose normal. No congestion or rhinorrhea.      Mouth/Throat:      Mouth: Mucous membranes are moist.      Pharynx: Oropharynx is clear. No posterior oropharyngeal erythema.   Eyes:      General:         Right eye: No discharge.         Left eye: No discharge.   Cardiovascular:      Rate and Rhythm: Normal rate and regular rhythm.      Heart sounds: Normal heart sounds.   Pulmonary:      Effort: Pulmonary effort is normal.      Breath sounds: Normal breath sounds. No wheezing or rhonchi.   Skin:     General: Skin is warm.      Findings: No erythema or rash.   Neurological:      General: No focal deficit present.      Mental Status: She is alert. Mental status is at baseline.   Psychiatric:         Mood and Affect: Mood normal.         Behavior: Behavior normal.        ASSESSMENT/PLAN:  Maliha Pedro is a 63 year old female seen today for allergy evaluation as well as asthma evaluation.  Recent pulmonary function testing was normal.  We will order blood testing for allergies.  We will not make any significant medication changes except for as below.  Due to vocal changes recently which is often developed prior to increased asthma symptoms, will initiate Dulera today.    Dulera 2 puffs twice daily as needed (use now for 1 week)  Allegra 180 mg once to twice daily  Azelastine 1-2 sprays each nostril once to twice daily  Will check blood tests for  allergies  Singulair 10 mg at night (may consider stopping now until you go back to the [facility])    Follow-up as needed since she is going back to the [facility].  May follow-up upon her return.      Thank you for allowing me to participate in the care of Maliha Pedro.      I spent 30 minutes on the date of the encounter doing chart review, history and exam, documentation and further coordination as noted above exclusive of separately reported interpretations    Aidan Verma MD  Allergy/Immunology  [facility]      Again, thank you for allowing me to participate in the care of your patient.        Sincerely,        Aidan Verma MD

## 2023-11-15 NOTE — ADDENDUM NOTE
Addended by: CHIKA GAMBOA on: 3/14/2018 08:34 AM     Modules accepted: Orders    
How Severe Is Your Condition?: mild
Additional History: Removal of milia

## 2023-11-17 ENCOUNTER — LAB (OUTPATIENT)
Dept: LAB | Facility: CLINIC | Age: 63
End: 2023-11-17
Payer: COMMERCIAL

## 2023-11-17 DIAGNOSIS — Z79.4 TYPE 2 DIABETES MELLITUS WITH DIABETIC NEPHROPATHY, WITH LONG-TERM CURRENT USE OF INSULIN (H): ICD-10-CM

## 2023-11-17 DIAGNOSIS — R79.89 ELEVATED LFTS: ICD-10-CM

## 2023-11-17 DIAGNOSIS — E11.21 TYPE 2 DIABETES MELLITUS WITH DIABETIC NEPHROPATHY, WITH LONG-TERM CURRENT USE OF INSULIN (H): ICD-10-CM

## 2023-11-17 DIAGNOSIS — Z79.4 TYPE 2 DIABETES MELLITUS WITH MILD NONPROLIFERATIVE RETINOPATHY WITHOUT MACULAR EDEMA, WITH LONG-TERM CURRENT USE OF INSULIN, UNSPECIFIED LATERALITY (H): ICD-10-CM

## 2023-11-17 DIAGNOSIS — K76.0 FATTY LIVER DISEASE, NONALCOHOLIC: ICD-10-CM

## 2023-11-17 DIAGNOSIS — E11.3299 TYPE 2 DIABETES MELLITUS WITH MILD NONPROLIFERATIVE RETINOPATHY WITHOUT MACULAR EDEMA, WITH LONG-TERM CURRENT USE OF INSULIN, UNSPECIFIED LATERALITY (H): ICD-10-CM

## 2023-11-17 LAB
A ALTERNATA IGE QN: <0.1 KU(A)/L
A FUMIGATUS IGE QN: 1.81 KU(A)/L
CEDAR IGE QN: <0.1 KU(A)/L
D FARINAE IGE QN: 0.92 KU(A)/L
D PTERONYSS IGE QN: 0.93 KU(A)/L
DOG DANDER+EPITH IGE QN: <0.1 KU(A)/L
EAST WHITE PINE IGE QN: <0.1 KU(A)/L
ENGL PLANTAIN IGE QN: <0.1 KU(A)/L
FERRITIN SERPL-MCNC: 148 NG/ML (ref 11–328)
GIANT RAGWEED IGE QN: <0.1 KU(A)/L
HBV SURFACE AG SERPL QL IA: NONREACTIVE
HCV AB SERPL QL IA: NONREACTIVE
IRON BINDING CAPACITY (ROCHE): 369 UG/DL (ref 240–430)
IRON SATN MFR SERPL: 16 % (ref 15–46)
IRON SERPL-MCNC: 60 UG/DL (ref 37–145)
MUGWORT IGE QN: <0.1 KU(A)/L
SILVER BIRCH IGE QN: <0.1 KU(A)/L
TIMOTHY IGE QN: <0.1 KU(A)/L
TOTAL PROTEIN SERUM FOR ELP: 7.4 G/DL (ref 6.4–8.3)
WHITE ASH IGE QN: <0.1 KU(A)/L
WHITE ELM IGE QN: <0.1 KU(A)/L
WHITE OAK IGE QN: <0.1 KU(A)/L

## 2023-11-17 PROCEDURE — 84155 ASSAY OF PROTEIN SERUM: CPT

## 2023-11-17 PROCEDURE — 86803 HEPATITIS C AB TEST: CPT

## 2023-11-17 PROCEDURE — 87340 HEPATITIS B SURFACE AG IA: CPT

## 2023-11-17 PROCEDURE — 36415 COLL VENOUS BLD VENIPUNCTURE: CPT

## 2023-11-17 PROCEDURE — 84165 PROTEIN E-PHORESIS SERUM: CPT | Performed by: PATHOLOGY

## 2023-11-17 PROCEDURE — 83540 ASSAY OF IRON: CPT

## 2023-11-17 PROCEDURE — 83550 IRON BINDING TEST: CPT

## 2023-11-17 PROCEDURE — 82728 ASSAY OF FERRITIN: CPT

## 2023-11-17 RX ORDER — INSULIN GLARGINE 100 [IU]/ML
INJECTION, SOLUTION SUBCUTANEOUS
Qty: 90 ML | Refills: 0 | Status: SHIPPED | OUTPATIENT
Start: 2023-11-17 | End: 2023-11-27

## 2023-11-17 RX ORDER — SEMAGLUTIDE 1.34 MG/ML
1 INJECTION, SOLUTION SUBCUTANEOUS
Qty: 9 ML | Refills: 0 | Status: SHIPPED | OUTPATIENT
Start: 2023-11-17 | End: 2024-05-15

## 2023-11-17 NOTE — TELEPHONE ENCOUNTER
BASAGLAR KWIKPEN 100UNIT/ML SOPN   Last Written Prescription Date:   6/9/2023  Last Fill Quantity: 90,   # refills: 2  Last Office Visit : 6/9/2023  Future Office visit:  11/27/2023  Routing refill request to provider for review/approval because:  Drug not on the [facility], [facility] or [facility] refill protocol or controlled substance    OZEMPIC (1 MG/DOSE) 4MG/3ML SOPN   Last Written Prescription Date:   8/9/2023  Last Fill Quantity: 9   # refills: 1  Last Office Visit : 6/9/2023  Future Office visit:  11/27/2023  Routing refill request to provider for review/approval because:  Refer to clinic for review and refills per Providers orders for Pt care.     Elva Le RN  Central Triage Red Flags/Med Refills

## 2023-11-19 LAB
COTTONWOOD IGE QN: <0.1 KU(A)/L
MAPLE IGE QN: <0.1 KU(A)/L

## 2023-11-20 ENCOUNTER — OFFICE VISIT (OUTPATIENT)
Dept: OPHTHALMOLOGY | Facility: CLINIC | Age: 63
End: 2023-11-20
Attending: OPHTHALMOLOGY
Payer: COMMERCIAL

## 2023-11-20 DIAGNOSIS — H35.373 EPIRETINAL MEMBRANE (ERM) OF BOTH EYES: ICD-10-CM

## 2023-11-20 DIAGNOSIS — H40.003 BORDERLINE GLAUCOMA, BILATERAL: ICD-10-CM

## 2023-11-20 LAB
ALBUMIN SERPL ELPH-MCNC: 4.4 G/DL (ref 3.7–5.1)
ALPHA1 GLOB SERPL ELPH-MCNC: 0.2 G/DL (ref 0.2–0.4)
ALPHA2 GLOB SERPL ELPH-MCNC: 0.7 G/DL (ref 0.5–0.9)
B-GLOBULIN SERPL ELPH-MCNC: 1 G/DL (ref 0.6–1)
GAMMA GLOB SERPL ELPH-MCNC: 1.1 G/DL (ref 0.7–1.6)
M PROTEIN SERPL ELPH-MCNC: 0 G/DL
PROT PATTERN SERPL ELPH-IMP: NORMAL

## 2023-11-20 PROCEDURE — G0463 HOSPITAL OUTPT CLINIC VISIT: HCPCS | Performed by: OPHTHALMOLOGY

## 2023-11-20 PROCEDURE — 92250 FUNDUS PHOTOGRAPHY W/I&R: CPT | Performed by: OPHTHALMOLOGY

## 2023-11-20 PROCEDURE — 92134 CPTRZ OPH DX IMG PST SGM RTA: CPT | Performed by: OPHTHALMOLOGY

## 2023-11-20 PROCEDURE — 92015 DETERMINE REFRACTIVE STATE: CPT

## 2023-11-20 PROCEDURE — 99207 OCT OPTIC NERVE RNFL SPECTRALIS OU (BOTH EYES): CPT | Mod: 26 | Performed by: OPHTHALMOLOGY

## 2023-11-20 PROCEDURE — 99214 OFFICE O/P EST MOD 30 MIN: CPT | Performed by: OPHTHALMOLOGY

## 2023-11-20 PROCEDURE — 92133 CPTRZD OPH DX IMG PST SGM ON: CPT | Performed by: OPHTHALMOLOGY

## 2023-11-20 PROCEDURE — 99207 FUNDUS AUTOFLUORESCENCE IMAGE (FAF) OU (BOTH EYES): CPT | Mod: 26 | Performed by: OPHTHALMOLOGY

## 2023-11-20 RX ORDER — LATANOPROST 50 UG/ML
1 SOLUTION/ DROPS OPHTHALMIC DAILY
Qty: 7.5 ML | Refills: 11 | Status: SHIPPED | OUTPATIENT
Start: 2023-11-20

## 2023-11-20 ASSESSMENT — EXTERNAL EXAM - LEFT EYE: OS_EXAM: DERMATOCHALASIS

## 2023-11-20 ASSESSMENT — CONF VISUAL FIELD
OS_INFERIOR_NASAL_RESTRICTION: 0
OS_NORMAL: 1
METHOD: COUNTING FINGERS
OD_INFERIOR_NASAL_RESTRICTION: 0
OS_SUPERIOR_TEMPORAL_RESTRICTION: 0
OD_INFERIOR_TEMPORAL_RESTRICTION: 0
OD_SUPERIOR_TEMPORAL_RESTRICTION: 0
OD_NORMAL: 1
OS_SUPERIOR_NASAL_RESTRICTION: 0
OS_INFERIOR_TEMPORAL_RESTRICTION: 0
OD_SUPERIOR_NASAL_RESTRICTION: 0

## 2023-11-20 ASSESSMENT — REFRACTION_WEARINGRX
OS_AXIS: 085
OS_CYLINDER: +1.00
OD_SPHERE: -2.75
OD_AXIS: 120
OS_SPHERE: -4.00
OD_ADD: +2.50
OS_ADD: +2.50
OD_CYLINDER: +0.75

## 2023-11-20 ASSESSMENT — TONOMETRY
OD_IOP_MMHG: 15
IOP_METHOD: APPLANATION
OS_IOP_MMHG: 15

## 2023-11-20 ASSESSMENT — VISUAL ACUITY
OD_PH_CC: 20/30
OS_PH_CC+: -2
OD_PH_CC+: +1
OD_CC: 20/40
OS_PH_CC: 20/20
OS_CC+: +1
OS_CC: 20/40
OD_CC+: +1
METHOD: SNELLEN - LINEAR

## 2023-11-20 ASSESSMENT — REFRACTION_MANIFEST
OD_SPHERE: -2.50
OS_CYLINDER: +1.00
OS_ADD: +2.50
OD_ADD: +2.50
OD_AXIS: 114
OD_CYLINDER: +0.50
OS_SPHERE: -4.00
OS_AXIS: 086

## 2023-11-20 ASSESSMENT — SLIT LAMP EXAM - LIDS
COMMENTS: MEIBOMIAN GLAND DYSFUNCTION
COMMENTS: MEIBOMIAN GLAND DYSFUNCTION

## 2023-11-20 ASSESSMENT — EXTERNAL EXAM - RIGHT EYE: OD_EXAM: DERMATOCHALASIS

## 2023-11-20 ASSESSMENT — CUP TO DISC RATIO
OD_RATIO: 0.9
OS_RATIO: 0.8

## 2023-11-20 NOTE — PROGRESS NOTES
CC: Maliha Pedro is a  63 year old year-old patient presenting for glaucoma suspect follow up     NOTE: Prefers to only see attending    Interim: Pt states that vision has been good with glasses. No new flashes. Occasional floaters in both eyes, no changes.   No new redness or dryness.  DM 2 BS    PMH: Diabetes mellitus, states Diabetes mellitus is under better control.    Hemoglobin A1C   Date Value Ref Range Status   06/27/2022 9.3 (A) 0.0 - 5.7 % Final     Imaging:  OCT 11/20/23   OU with no fluid - stable    ONFL 11/20/23   Right eye: thinning superiorly and inferiorly; stable  Left eye: Thinning S, I, borderline N; stable  stable compared to prior ; mild progression compared to 7035-2521  Prior 24-2 OVF   Right eye: reliable few spots of decreased sensitivity; stable compared to previous. MD 0.7  Left eye: reliable. central spot of decreased sensitivity not redemonstrated today; few spots of decreased sensitivity otherwise - stable. MD 0.2    Assessment & Plan:     # Glaucoma suspect, bilateral  Probably OAG  - large cup to disc ratio R>L  - Intraocular pressure 15 both eyes   - pachmetry 583/613  - OVF stable without  progression  -11/20/23 retinal nerve fiber layer stable   Right eye: thinning superiorly and inferiorly TS/TI;   Left eye: Thinning S, I, borderline N; stable  stable compared to prior exams; mild progression compared to 9768-8926    - intraocular pressure remains good off of latanoprost (voluntarily)   with stable VF and retinal nerve fiber layer  - given mild progression over the course of several yrs and large cups; would recommend to restart latanoprost    # Type 2 diabetes mellitus without retinopathy  Blood pressure (<120/80) and blood glucose (HbA1c <7.0) control discussed with patient. Patient advised that failure to adequately control each may lead to vision loss. The patient expressed understanding.    # Cataract, right eye  -becoming visually significant  - patient not  interested in cataract evaluation for now    # Pseudophakia, left eye  -stable  -observe    # Posterior vitreous detachment of right eye  -stable  -observe    # Old inferior temporal small tear surrounded by pigment, no subretinal fluid right eye   Peripheral Retinal pigment epithelium/small lattice both eyes   Retina attached, no other tears  Retina detachment precautions were discussed with the patient (presence or increased in flashes, floaters or a curtain in the visual field) and was asked to return if any of the those occur    #. Dry eye syndrome  artificial tears  As needed and warm compresses      # Mild Epiretinal membrane right eye  Stable   Not visually significant     # myopia  New prescription given 11/20/23     PLAN:  Stable exam   Follow up 6 months with OVF 24-2 with retinal nerve fiber layer ; optos and autofluorescence     Patient interested in cataract evaluation next yr with yves; with intraocular lens calcs ; carlos  Will consider glaucoma evaluation in the future  Patient moving to [facility]; [place] to return to [place] at times  ~~~~~~~~~~~~~~~~~~~~~~~~~~~~~~~~~~   Complete documentation of historical and exam elements from today's encounter can be found in the full encounter summary report (not reduplicated in this progress note).  I personally obtained the chief complaint(s) and history of present illness.  I confirmed and edited as necessary the review of systems, past medical/surgical history, family history, social history, and examination findings as documented by others; and I examined the patient myself.  I personally reviewed the relevant tests, images, and reports as documented above.  I formulated and edited as necessary the assessment and plan and discussed the findings and management plan with the patient and family    Lesly Tracy MD   of Ophthalmology.  Retina Service   Department of Ophthalmology and Visual Neurosciences   [facility]  [place]  Phone: [phone]   Fax: [phone]

## 2023-11-20 NOTE — NURSING NOTE
Chief Complaints and History of Present Illnesses   Patient presents with    Glaucoma Follow-Up     Chief Complaint(s) and History of Present Illness(es)       Glaucoma Follow-Up              Laterality: both eyes    Associated symptoms: dryness, floaters and itching.  Negative for eye pain, tearing, flashes and burning    Pain scale: 0/10              Comments    Fco is here to continue care for borderline glaucoma. Today she states  vision seems a little more blurry than last visit. Driving is becoming more difficult as well.    James Rodriguez COT 8:04 AM November 20, 2023

## 2023-11-20 NOTE — RESULT ENCOUNTER NOTE
Fco,  The additional blood work to look for causes of elevated liver test was negative.  Your iron levels were normal.  The protein levels were normal.  Your viral hepatitis tests were negative.  Overall, these are good results but continue with the plan to get the ultrasound as the next step.  Please MyChart or call if you have any concerns or questions.   Sincerely,  Elva Ortiz MD

## 2023-11-21 ENCOUNTER — ANCILLARY PROCEDURE (OUTPATIENT)
Dept: ULTRASOUND IMAGING | Facility: CLINIC | Age: 63
End: 2023-11-21
Attending: FAMILY MEDICINE
Payer: COMMERCIAL

## 2023-11-21 DIAGNOSIS — R79.89 ELEVATED LFTS: ICD-10-CM

## 2023-11-21 DIAGNOSIS — K76.0 FATTY LIVER DISEASE, NONALCOHOLIC: ICD-10-CM

## 2023-11-21 PROCEDURE — 76705 ECHO EXAM OF ABDOMEN: CPT | Performed by: RADIOLOGY

## 2023-11-22 DIAGNOSIS — K76.0 FATTY LIVER DISEASE, NONALCOHOLIC: Primary | ICD-10-CM

## 2023-11-22 DIAGNOSIS — R79.89 ELEVATED LFTS: ICD-10-CM

## 2023-11-22 DIAGNOSIS — Z79.4 TYPE 2 DIABETES MELLITUS WITH MILD NONPROLIFERATIVE RETINOPATHY WITHOUT MACULAR EDEMA, WITH LONG-TERM CURRENT USE OF INSULIN, UNSPECIFIED LATERALITY (H): ICD-10-CM

## 2023-11-22 DIAGNOSIS — E11.3299 TYPE 2 DIABETES MELLITUS WITH MILD NONPROLIFERATIVE RETINOPATHY WITHOUT MACULAR EDEMA, WITH LONG-TERM CURRENT USE OF INSULIN, UNSPECIFIED LATERALITY (H): ICD-10-CM

## 2023-11-22 NOTE — RESULT ENCOUNTER NOTE
Jason Eagle,  The ultrasound confirms you have fatty liver. This is the likely cause of your elevated liver tests. This is a very common disease of the liver where fat deposits in the liver and can irritate it. It usually happens in adults who have obesity or diabetes issues. The treatment for fatty liver disease is weight loss and healthy diet. In a small percentage of people, if left untreated, fatty liver disease can progress and cause long term damage to the liver which can lead to cirrhosis. Thankfully, this only occurs in a small percentage of patients.    I would suggest close monitoring of your liver tests. If liver tests continue to climb, I would refer you onto a hepatologist.     Plan for the next liver panel when you are back in town.     Also, incidentally, we can see you have gallstones. If you are having no gallstone symptoms nothing needs to be done. If you think you've had some symptoms (right upper quadrant abdominal pain/nausea/diarrhea after eating fatty foods, I would refer you to a surgeon for consideration of gallbladder removal.     Kind regards,  Elva Ortiz MD

## 2023-11-22 NOTE — PROGRESS NOTES
Endocrinology and Diabetes Clinic        Follow up for T2DM        Interval history  5 month follow up for T2DM  Pt has been doing well, started on Ozempic and has been on 1 mg weekly  Tolerates this well  Has needed to decrease glargine insulin due to low Bgs  Has Dexcom sensor, and has been modifying her diet accordingly, avoiding carbs nabeel rice, eating vegetables and meat  Pt is spending most of her time in the [facility], her home country, where she has her own house and has a person who cooks for her, all her siblings are there        Current diabetic medications:  Metformin 1000 bid, garlgine 70-> 50 units daily, Jardiance 25 mg, Novolog 10-25 units three times daily, Ozempic 1 mg weekly     SMBG:  Dexcom sensor, Average blood sugar elevated with postprandial BGs especially after breakfast, no hypolgycemia     Meal plan: 3 meals a day plus snacks,   Exercise: has increased and walking lots of steps now, much more than before     Complications  1. Retinopathy: yearly visit, no DR, cataract surgery next summer  2. Nephropathy: None  3. Neuropathy: No numbness no tingling no history of ulcerations  4. Hypoglycemia: Yes, however resolved with insulin adjustment  5. Macrovascular: No chest pain, no shortness of breath      DEXCOM 7 is helpful      HTN: Losartan 100 mg daily, hydrochlorothiazide 12.5 daily  Dyslipidemia Crestor 10 mg     Previously used diabetes medications:   Bydureon in 2016, Ernst Thomas: yeast infection        ASSESSMENT/Plan:   T2DM with comorbidity of obesity     Blood glucose control  Pt with excellent Bgs control nabeel time in range, A1c is good however current BG readings are even better than a 7.8%. Reviewed that she might need to decrease glargine insulin furhter if continuing to loose weight       Hypertension controlled on losartan and hydrochlorothiazide, reviewed to decrease Losartan if BP would decrease     Dyslipidemia on 10 mg of Crestor doing well        30  minutes spent on the date of the encounter doing chart review, review of test results, interpretation of tests, patient visit and documentation      This note was generated using computer recognized voice recognition. This might result in some expected imperfection.        Patient to contact the clinic if blood glucose (sugar) is under 70 two times in one week or above 200 for 3 consecutive days.        Stacia Carbajal MD  Endocrinology and Diabetes  Telephone contact:  [facility] [phone]  [facility] [phone]              PMH:      Patient Active Problem List   Diagnosis    ? 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    Displacement of lumbar intervertebral disc without myelopathy    Nonallopathic lesion of lumbar region    Nonallopathic lesion of thoracic region    Nonallopathic lesion of sacral region    Bartholin gland cyst    Cataract    Irritable bowel syndrome    Esophageal reflux    Seasonal allergic rhinitis    Latex sensitivity    Adenomatous polyp    HYPERLIPIDEMIA LDL GOAL <100    Ovarian cyst    Breast pain    Essential hypertension    Advanced directives, counseling/discussion    Obesity    Hypomagnesemia    Type 2 diabetes mellitus with mild nonproliferative retinopathy without macular edema, with long-term current use of insulin, unspecified laterality (H)         SOCIAL HISTORY:  Social History   Social History            Socioeconomic History    Marital status: Single       Spouse name: Not on file    Number of children: Not on file    Years of education: Not on file    Highest education level: Not on file   Occupational History    Not on file   Tobacco Use    Smoking status: Never    Smokeless tobacco: Never   Vaping Use    Vaping status: Never Used       Passive vaping exposure: Yes   Substance and Sexual Activity    Alcohol use: Yes       Comment: one drink a month    Drug use: No    Sexual activity: Not Currently        Partners: Male       Birth control/protection: None   Other Topics Concern    Parent/sibling w/ CABG, MI or angioplasty before 65F 55M? No     Service No    Blood Transfusions No    Caffeine Concern No    Occupational Exposure Yes       Comment: exposed to X-ray    Hobby Hazards No    Sleep Concern Yes       Comment: Hot Flashes    Stress Concern Yes       Comment: Work    Weight Concern Yes    Special Diet No    Back Care Yes       Comment: Back pain    Exercise Yes    Bike Helmet No    Seat Belt Yes    Self-Exams Yes   Social History Narrative     Works at  in surgical ICU     Born in the [facility]. Moved to  in . Initially lived in [place].     No children          2 cats      Social Determinants of Health      Financial Resource Strain: Not on file   Food Insecurity: Not on file   Transportation Needs: Not on file   Physical Activity: Not on file   Stress: Not on file   Social Connections: Not on file   Intimate Partner Violence: Not on file   Housing Stability: Not on file            FAMILY HISTORY:  Family History         Family History   Problem Relation Age of Onset    Diabetes Father            of dm 70's    C.A.D. Father      Cerebrovascular Disease Father      Diabetes Sister      Diabetes Sister      Diabetes Sister      Hypertension Sister      Diabetes Brother      Diabetes Brother      Cancer Half-Brother           lung    Diabetes Half-Brother      Leukemia Half-Brother      Glaucoma Maternal Grandmother      Diabetes Paternal Grandfather      Hypertension Paternal Grandfather      Cerebrovascular Disease Paternal Grandfather      Cancer Maternal Aunt           ovarian cancer.     Leukemia Half-Brother      Lymphoma Paternal Cousin      Macular Degeneration No family hx of              Medications:   Current Outpatient Prescriptions          Current Outpatient Medications   Medication Sig Dispense Refill    albuterol (PROAIR HFA/PROVENTIL HFA/VENTOLIN HFA) 108  (90 Base) MCG/ACT inhaler Inhale 2 puffs into the lungs every 4 hours as needed for shortness of breath / dyspnea 18 g 1    aspirin 81 MG EC tablet Take 81 mg by mouth daily Please resume in 5 days.  (12/22/2018)        CALCIUM + D OR Take 1 tablet by mouth 2 times daily.        Continuous Blood Gluc Sensor (DEXCOM G6 SENSOR) MISC 1 each every 10 days 18 each 1    Continuous Blood Gluc Transmit (DEXCOM G6 TRANSMITTER) MISC 1 each every 3 months Change every 3 months. 2 each 1    fexofenadine (ALLEGRA) 180 MG tablet Take 1 tablet by mouth daily. 1 TABLET DAILY Failed claritin for symptom cotrol. Zyrtec increases heart rate. 90 tablet 3    fish oil-omega-3 fatty acids 1000 MG capsule Take 1 capsule by mouth daily.        fluticasone (FLONASE) 50 MCG/ACT nasal spray USE 1-2 SPRAYS IN EACH NOSTRIL ONCE DAILY 48 g 3    Glucosamine-Chondroitin (GLUCOSAMINE CHONDR COMPLEX PO)          hydrochlorothiazide (HYDRODIURIL) 12.5 MG tablet Take 1 tablet (12.5 mg) by mouth daily 90 tablet 3    insulin aspart (NOVOLOG FLEXPEN) 100 UNIT/ML pen Use 20-35 units three times daily with meals for up to 120 units daily 120 mL 3    insulin glargine (BASAGLAR KWIKPEN) 100 UNIT/ML pen INJECT 70 UNITS UNDER THE SKIN DAILY 90 mL 2    insulin pen needle (BD LISA U/F) 32G X 4 MM miscellaneous Use to inject 4-5 times daily. 600 each 1    latanoprost (XALATAN) 0.005 % ophthalmic solution Place 1 drop into both eyes daily 7.5 mL 11    losartan (COZAAR) 100 MG tablet Take 1 tablet (100 mg) by mouth daily 90 tablet 3    magnesium 250 MG tablet Take 1 tablet by mouth daily 90 tablet 3    metFORMIN (GLUCOPHAGE XR) 500 MG 24 hr tablet TAKE 2 TABLETS TWICE A  tablet 3    montelukast (SINGULAIR) 10 MG tablet Take 1 tablet (10 mg) by mouth At Bedtime 90 tablet 3    MULTIVITAMIN OR Take 1 tablet by mouth daily.        NEEDLES, ANY SIZE Pen needles for Novolog insulin pen. Use to inject 5-6  times daily. 4 Box 3    NOVOLOG FLEXPEN 100 UNIT/ML soln Sig  20-25 units two-three times daily. Max daily dose 75 units 90 mL 1    pantoprazole (PROTONIX) 40 MG EC tablet Take 1 tablet (40 mg) by mouth daily 90 tablet 3    potassium chloride ER (KLOR-CON M) 10 MEQ CR tablet Take 1 tablet (10 mEq) by mouth daily 90 tablet 3    rosuvastatin (CRESTOR) 10 MG tablet Take 1 tablet (10 mg) by mouth daily 90 tablet 3    semaglutide (OZEMPIC) 2 MG/3ML pen Inject 0.25 mg Subcutaneous every 7 days 3 mL 0    [START ON 7/10/2023] semaglutide (OZEMPIC) 2 MG/3ML pen Inject 0.5 mg Subcutaneous every 7 days 3 mL 0    [START ON 8/9/2023] Semaglutide, 1 MG/DOSE, (OZEMPIC) 4 MG/3ML pen Inject 1 mg Subcutaneous every 7 days 3 mL 0    VITAMIN D 2000 UNIT OR TABS Take 1 tablet by mouth daily.        amoxicillin-clavulanate (AUGMENTIN) 875-125 MG tablet Take 1 tablet by mouth 2 times daily for 7 days 14 tablet 0         Physical Exam  /82 (BP Location: Left arm, Patient Position: Sitting, Cuff Size: Adult Large)   Pulse 63   Resp 16   Wt 85.4 kg (188 lb 3.2 oz)   LMP 03/19/2009 (Exact Date)   SpO2 98%   BMI 33.34 kg/m      LMP 03/19/2009 (Exact Date)   Wt Readings from Last 4 Encounters:   11/14/23 85.3 kg (188 lb)   11/13/23 84.9 kg (187 lb 1.6 oz)   07/06/23 88.6 kg (195 lb 4.8 oz)   06/28/23 88.7 kg (195 lb 9.6 oz)         Wt Readings from Last 4 Encounters:   06/09/23 87.1 kg (192 lb)   06/08/23 88.5 kg (195 lb 3.2 oz)   12/08/22 87.8 kg (193 lb 8 oz)   12/05/22 87.1 kg (192 lb)      General: Well appearing well in no distress.  Psych: good eye contact, no pressured speech           Lab Results   Component Value Date      06/07/2023     CHLORIDE 103 06/07/2023     CO2 21 (L) 06/07/2023      (H) 06/07/2023      (H) 06/07/2023     CR 0.76 06/07/2023     CR 0.71 06/07/2023     CR 0.68 11/28/2022     CR 0.63 11/16/2021     CR 0.69 05/05/2021     SUSHANT 9.6 06/07/2023     MAG 2.0 11/14/2018     ALBUMIN 4.3 06/07/2023     ALKPHOS 55 06/07/2023     LDL 79 06/07/2023     HDL  37 (L) 06/07/2023     TRIG 166 (H) 06/07/2023     TSH 1.01 06/07/2023     TSH 1.24 11/28/2022     TSH 0.69 05/05/2021            Lab Results   Component Value Date     MICROL 29.7 06/07/2023     MICROL 11 11/28/2022     MICROL 6 11/16/2021     MICROL 7 08/27/2020     MICROL <5 04/02/2020            Lab Results   Component Value Date     A1C 9.3 (A) 06/27/2022     A1C 8.7 (H) 11/16/2021     A1C 8.3 (H) 05/05/2021     A1C 8.2 (H) 08/27/2020     A1C 8.1 (H) 04/02/2020               Lab Results   Component Value Date     HGB 15.1 06/08/2023

## 2023-11-27 ENCOUNTER — OFFICE VISIT (OUTPATIENT)
Dept: ENDOCRINOLOGY | Facility: CLINIC | Age: 63
End: 2023-11-27
Payer: COMMERCIAL

## 2023-11-27 VITALS
SYSTOLIC BLOOD PRESSURE: 126 MMHG | DIASTOLIC BLOOD PRESSURE: 82 MMHG | BODY MASS INDEX: 33.34 KG/M2 | RESPIRATION RATE: 16 BRPM | HEART RATE: 63 BPM | WEIGHT: 188.2 LBS | OXYGEN SATURATION: 98 %

## 2023-11-27 DIAGNOSIS — E11.3299 TYPE 2 DIABETES MELLITUS WITH MILD NONPROLIFERATIVE RETINOPATHY WITHOUT MACULAR EDEMA, WITH LONG-TERM CURRENT USE OF INSULIN, UNSPECIFIED LATERALITY (H): ICD-10-CM

## 2023-11-27 DIAGNOSIS — Z79.4 TYPE 2 DIABETES MELLITUS WITH MILD NONPROLIFERATIVE RETINOPATHY WITHOUT MACULAR EDEMA, WITH LONG-TERM CURRENT USE OF INSULIN, UNSPECIFIED LATERALITY (H): ICD-10-CM

## 2023-11-27 LAB — HBA1C MFR BLD: 7.8 % (ref 4.3–?)

## 2023-11-27 PROCEDURE — 83036 HEMOGLOBIN GLYCOSYLATED A1C: CPT | Performed by: INTERNAL MEDICINE

## 2023-11-27 PROCEDURE — 99214 OFFICE O/P EST MOD 30 MIN: CPT | Performed by: INTERNAL MEDICINE

## 2023-11-27 NOTE — LETTER
11/27/2023         RE: Maliha Pedro  [address] [place] [address]        Dear Colleague,    Thank you for referring your patient, Maliha Pedro, to the [facility]. Please see a copy of my visit note below.                  Endocrinology and Diabetes Clinic        Follow up for T2DM        Interval history  5 month follow up for T2DM  Pt has been doing well, started on Ozempic and has been on 1 mg weekly  Tolerates this well  Has needed to decrease glargine insulin due to low Bgs  Has Dexcom sensor, and has been modifying her diet accordingly, avoiding carbs nabeel rice, eating vegetables and meat  Pt is spending most of her time in the [facility], her home country, where she has her own house and has a person who cooks for her, all her siblings are there        Current diabetic medications:  Metformin 1000 bid, garlgine 70-> 50 units daily, Jardiance 25 mg, Novolog 10-25 units three times daily, Ozempic 1 mg weekly     SMBG:  Dexcom sensor, Average blood sugar elevated with postprandial BGs especially after breakfast, no hypolgycemia     Meal plan: 3 meals a day plus snacks,   Exercise: has increased and walking lots of steps now, much more than before     Complications  1. Retinopathy: yearly visit, no DR, cataract surgery next summer  2. Nephropathy: None  3. Neuropathy: No numbness no tingling no history of ulcerations  4. Hypoglycemia: Yes, however resolved with insulin adjustment  5. Macrovascular: No chest pain, no shortness of breath      DEXCOM 7 is helpful      HTN: Losartan 100 mg daily, hydrochlorothiazide 12.5 daily  Dyslipidemia Crestor 10 mg     Previously used diabetes medications:   Bydureon in 2016, Ernst Thomas: yeast infection        ASSESSMENT/Plan:   T2DM with comorbidity of obesity     Blood glucose control  Pt with excellent Bgs control nabeel time in range, A1c is good however current BG readings are even better than a 7.8%. Reviewed that she  might need to decrease glargine insulin furhter if continuing to loose weight       Hypertension controlled on losartan and hydrochlorothiazide, reviewed to decrease Losartan if BP would decrease     Dyslipidemia on 10 mg of Crestor doing well        30 minutes spent on the date of the encounter doing chart review, review of test results, interpretation of tests, patient visit and documentation      This note was generated using computer recognized voice recognition. This might result in some expected imperfection.        Patient to contact the clinic if blood glucose (sugar) is under 70 two times in one week or above 200 for 3 consecutive days.        Stacia Carbajal MD  Endocrinology and Diabetes  Telephone contact:  [facility] [place] [phone]  [facility] [phone]              PMH:      Patient Active Problem List   Diagnosis     ? 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     Displacement of lumbar intervertebral disc without myelopathy     Nonallopathic lesion of lumbar region     Nonallopathic lesion of thoracic region     Nonallopathic lesion of sacral region     Bartholin gland cyst     Cataract     Irritable bowel syndrome     Esophageal reflux     Seasonal allergic rhinitis     Latex sensitivity     Adenomatous polyp     HYPERLIPIDEMIA LDL GOAL <100     Ovarian cyst     Breast pain     Essential hypertension     Advanced directives, counseling/discussion     Obesity     Hypomagnesemia     Type 2 diabetes mellitus with mild nonproliferative retinopathy without macular edema, with long-term current use of insulin, unspecified laterality (H)         SOCIAL HISTORY:  Social History   Social History            Socioeconomic History     Marital status: Single       Spouse name: Not on file     Number of children: Not on file     Years of education: Not on file     Highest education level: Not on file   Occupational History     Not on file    Tobacco Use     Smoking status: Never     Smokeless tobacco: Never   Vaping Use     Vaping status: Never Used       Passive vaping exposure: Yes   Substance and Sexual Activity     Alcohol use: Yes       Comment: one drink a month     Drug use: No     Sexual activity: Not Currently       Partners: Male       Birth control/protection: None   Other Topics Concern     Parent/sibling w/ CABG, MI or angioplasty before 65F 55M? No      Service No     Blood Transfusions No     Caffeine Concern No     Occupational Exposure Yes       Comment: exposed to X-ray     Hobby Hazards No     Sleep Concern Yes       Comment: Hot Flashes     Stress Concern Yes       Comment: Work     Weight Concern Yes     Special Diet No     Back Care Yes       Comment: Back pain     Exercise Yes     Bike Helmet No     Seat Belt Yes     Self-Exams Yes   Social History Narrative     Works at  in surgical ICU     Born in the [facility]. Moved to  in . Initially lived in [place].     No children          2 cats      Social Determinants of Health      Financial Resource Strain: Not on file   Food Insecurity: Not on file   Transportation Needs: Not on file   Physical Activity: Not on file   Stress: Not on file   Social Connections: Not on file   Intimate Partner Violence: Not on file   Housing Stability: Not on file            FAMILY HISTORY:  Family History         Family History   Problem Relation Age of Onset     Diabetes Father            of dm 70's     C.A.D. Father       Cerebrovascular Disease Father       Diabetes Sister       Diabetes Sister       Diabetes Sister       Hypertension Sister       Diabetes Brother       Diabetes Brother       Cancer Half-Brother           lung     Diabetes Half-Brother       Leukemia Half-Brother       Glaucoma Maternal Grandmother       Diabetes Paternal Grandfather       Hypertension Paternal Grandfather       Cerebrovascular Disease Paternal Grandfather       Cancer Maternal Aunt            ovarian cancer.      Leukemia Half-Brother       Lymphoma Paternal Cousin       Macular Degeneration No family hx of              Medications:   Current Outpatient Prescriptions          Current Outpatient Medications   Medication Sig Dispense Refill     albuterol (PROAIR HFA/PROVENTIL HFA/VENTOLIN HFA) 108 (90 Base) MCG/ACT inhaler Inhale 2 puffs into the lungs every 4 hours as needed for shortness of breath / dyspnea 18 g 1     aspirin 81 MG EC tablet Take 81 mg by mouth daily Please resume in 5 days.  (12/22/2018)         CALCIUM + D OR Take 1 tablet by mouth 2 times daily.         Continuous Blood Gluc Sensor (DEXCOM G6 SENSOR) MISC 1 each every 10 days 18 each 1     Continuous Blood Gluc Transmit (DEXCOM G6 TRANSMITTER) MISC 1 each every 3 months Change every 3 months. 2 each 1     fexofenadine (ALLEGRA) 180 MG tablet Take 1 tablet by mouth daily. 1 TABLET DAILY Failed claritin for symptom cotrol. Zyrtec increases heart rate. 90 tablet 3     fish oil-omega-3 fatty acids 1000 MG capsule Take 1 capsule by mouth daily.         fluticasone (FLONASE) 50 MCG/ACT nasal spray USE 1-2 SPRAYS IN EACH NOSTRIL ONCE DAILY 48 g 3     Glucosamine-Chondroitin (GLUCOSAMINE CHONDR COMPLEX PO)           hydrochlorothiazide (HYDRODIURIL) 12.5 MG tablet Take 1 tablet (12.5 mg) by mouth daily 90 tablet 3     insulin aspart (NOVOLOG FLEXPEN) 100 UNIT/ML pen Use 20-35 units three times daily with meals for up to 120 units daily 120 mL 3     insulin glargine (BASAGLAR KWIKPEN) 100 UNIT/ML pen INJECT 70 UNITS UNDER THE SKIN DAILY 90 mL 2     insulin pen needle (BD LISA U/F) 32G X 4 MM miscellaneous Use to inject 4-5 times daily. 600 each 1     latanoprost (XALATAN) 0.005 % ophthalmic solution Place 1 drop into both eyes daily 7.5 mL 11     losartan (COZAAR) 100 MG tablet Take 1 tablet (100 mg) by mouth daily 90 tablet 3     magnesium 250 MG tablet Take 1 tablet by mouth daily 90 tablet 3     metFORMIN (GLUCOPHAGE XR) 500 MG 24  hr tablet TAKE 2 TABLETS TWICE A  tablet 3     montelukast (SINGULAIR) 10 MG tablet Take 1 tablet (10 mg) by mouth At Bedtime 90 tablet 3     MULTIVITAMIN OR Take 1 tablet by mouth daily.         NEEDLES, ANY SIZE Pen needles for Novolog insulin pen. Use to inject 5-6  times daily. 4 Box 3     NOVOLOG FLEXPEN 100 UNIT/ML soln Sig 20-25 units two-three times daily. Max daily dose 75 units 90 mL 1     pantoprazole (PROTONIX) 40 MG EC tablet Take 1 tablet (40 mg) by mouth daily 90 tablet 3     potassium chloride ER (KLOR-CON M) 10 MEQ CR tablet Take 1 tablet (10 mEq) by mouth daily 90 tablet 3     rosuvastatin (CRESTOR) 10 MG tablet Take 1 tablet (10 mg) by mouth daily 90 tablet 3     semaglutide (OZEMPIC) 2 MG/3ML pen Inject 0.25 mg Subcutaneous every 7 days 3 mL 0     [START ON 7/10/2023] semaglutide (OZEMPIC) 2 MG/3ML pen Inject 0.5 mg Subcutaneous every 7 days 3 mL 0     [START ON 8/9/2023] Semaglutide, 1 MG/DOSE, (OZEMPIC) 4 MG/3ML pen Inject 1 mg Subcutaneous every 7 days 3 mL 0     VITAMIN D 2000 UNIT OR TABS Take 1 tablet by mouth daily.         amoxicillin-clavulanate (AUGMENTIN) 875-125 MG tablet Take 1 tablet by mouth 2 times daily for 7 days 14 tablet 0         Physical Exam  /82 (BP Location: Left arm, Patient Position: Sitting, Cuff Size: Adult Large)   Pulse 63   Resp 16   Wt 85.4 kg (188 lb 3.2 oz)   LMP 03/19/2009 (Exact Date)   SpO2 98%   BMI 33.34 kg/m      LMP 03/19/2009 (Exact Date)   Wt Readings from Last 4 Encounters:   11/14/23 85.3 kg (188 lb)   11/13/23 84.9 kg (187 lb 1.6 oz)   07/06/23 88.6 kg (195 lb 4.8 oz)   06/28/23 88.7 kg (195 lb 9.6 oz)         Wt Readings from Last 4 Encounters:   06/09/23 87.1 kg (192 lb)   06/08/23 88.5 kg (195 lb 3.2 oz)   12/08/22 87.8 kg (193 lb 8 oz)   12/05/22 87.1 kg (192 lb)      General: Well appearing well in no distress.  Psych: good eye contact, no pressured speech           Lab Results   Component Value Date      06/07/2023      CHLORIDE 103 06/07/2023     CO2 21 (L) 06/07/2023      (H) 06/07/2023      (H) 06/07/2023     CR 0.76 06/07/2023     CR 0.71 06/07/2023     CR 0.68 11/28/2022     CR 0.63 11/16/2021     CR 0.69 05/05/2021     SUSHANT 9.6 06/07/2023     MAG 2.0 11/14/2018     ALBUMIN 4.3 06/07/2023     ALKPHOS 55 06/07/2023     LDL 79 06/07/2023     HDL 37 (L) 06/07/2023     TRIG 166 (H) 06/07/2023     TSH 1.01 06/07/2023     TSH 1.24 11/28/2022     TSH 0.69 05/05/2021            Lab Results   Component Value Date     MICROL 29.7 06/07/2023     MICROL 11 11/28/2022     MICROL 6 11/16/2021     MICROL 7 08/27/2020     MICROL <5 04/02/2020            Lab Results   Component Value Date     A1C 9.3 (A) 06/27/2022     A1C 8.7 (H) 11/16/2021     A1C 8.3 (H) 05/05/2021     A1C 8.2 (H) 08/27/2020     A1C 8.1 (H) 04/02/2020               Lab Results   Component Value Date     HGB 15.1 06/08/2023                 Again, thank you for allowing me to participate in the care of your patient.        Sincerely,        Stacia Carbajal MD

## 2023-11-27 NOTE — PATIENT INSTRUCTIONS
[facility]-Department of Endocrinology  Diabetes Educators:   Silva Li, RN and Georgie Doll RN  Clinic Nurse: ALTON Valle  CMA's: Patti Cain and Cirilo   EMT: Tim  Scheduling/Clinic phone number : [phone]   Clinic Fax: [phone]  On-Call Endocrine at the [place] (after hours/weekends): [phone] option 4    Please call the number below to schedule your labs.  [facility] [phone]   [facility] [phone]   [place] [phone]   [facility]  [phone]   [facility] [phone]   [place] [phone]   [facility]) [phone]   [facility]   [place] [phone]   [place] [phone]   [place] [phone]   [place] [phone]     Please reach out to the following centers to schedule your imaging appointment:  Imaging (DEXA, CT, MRI, XRAY)    [facility] ([facility] [place] [phone]   [facility] ([place], [place]) [phone]   [facility] ([facility]) [phone]   [facility] ([facility]) [phone]     Appointment Reminders:  * Please bring meter with for staff to download  * If you are due ONLY for an A1C, it is scheduled with the nurse and will be done in clinic. You do not need to schedule a lab appointment. Fasting is not required for an A1C.  * Refill request should be submitted to your pharmacy. They will contact clinic for approval.

## 2023-11-28 RX ORDER — ACYCLOVIR 400 MG/1
TABLET ORAL
Qty: 9 EACH | Refills: 4 | Status: SHIPPED | OUTPATIENT
Start: 2023-11-28

## 2023-11-28 NOTE — TELEPHONE ENCOUNTER
DEXCOM G7 SENSOR  MISC   Last Written Prescription Date:  6/12/2023  Last Fill Quantity: 9,   # refills: 3  Last Office Visit : 11/27/2023  Future Office visit:  6/10/2024  Refer to Provider for review and refills as in the order itself it mentions does not apply.    Please review and fill per Providers orders for Pt care.      Elva Le RN  Central Triage Red Flags/Med Refills    Continuous Blood Gluc Sensor (DEXCOM G7 SENSOR) MISC 9 each 3 6/12/2023  --   Sig - Route: 1 each every 10 days - Does not apply   Sent to pharmacy as: Dexcom G7 Sensor   Class: E-Prescribe   Order: [id number]   E-Prescribing Status: Receipt confirmed by pharmacy (6/13/2023  8:58 AM CDT)

## 2023-11-29 ENCOUNTER — MYC MEDICAL ADVICE (OUTPATIENT)
Dept: ENDOCRINOLOGY | Facility: CLINIC | Age: 63
End: 2023-11-29
Payer: COMMERCIAL

## 2023-11-29 DIAGNOSIS — Z79.4 TYPE 2 DIABETES MELLITUS WITH MILD NONPROLIFERATIVE RETINOPATHY WITHOUT MACULAR EDEMA, WITH LONG-TERM CURRENT USE OF INSULIN, UNSPECIFIED LATERALITY (H): ICD-10-CM

## 2023-11-29 DIAGNOSIS — E11.3299 TYPE 2 DIABETES MELLITUS WITH MILD NONPROLIFERATIVE RETINOPATHY WITHOUT MACULAR EDEMA, WITH LONG-TERM CURRENT USE OF INSULIN, UNSPECIFIED LATERALITY (H): Primary | ICD-10-CM

## 2023-11-29 DIAGNOSIS — E11.3299 TYPE 2 DIABETES MELLITUS WITH MILD NONPROLIFERATIVE RETINOPATHY WITHOUT MACULAR EDEMA, WITH LONG-TERM CURRENT USE OF INSULIN, UNSPECIFIED LATERALITY (H): ICD-10-CM

## 2023-11-29 DIAGNOSIS — Z79.4 TYPE 2 DIABETES MELLITUS WITH MILD NONPROLIFERATIVE RETINOPATHY WITHOUT MACULAR EDEMA, WITH LONG-TERM CURRENT USE OF INSULIN, UNSPECIFIED LATERALITY (H): Primary | ICD-10-CM

## 2023-11-29 NOTE — TELEPHONE ENCOUNTER
"Can you please send this Rx, wasn't e-sent. \"No print out\" so the order didn't got anywhere. Thanks!  "

## 2023-11-30 NOTE — CONFIDENTIAL NOTE
Patient dropped off [employer] Patient assistance Application for 2024 renewal and refill request.     Writer labeled and placed  in provider's file to address 12/01/2023 when provider return's to clinic.         Patti Goyal CMA  Adult Endocrinology   [facility]

## 2023-12-01 RX ORDER — INSULIN GLARGINE 100 [IU]/ML
50 INJECTION, SOLUTION SUBCUTANEOUS DAILY
Qty: 48 ML | Refills: 3 | Status: SHIPPED | OUTPATIENT
Start: 2023-12-01

## 2023-12-01 NOTE — CONFIDENTIAL NOTE
Provider reviewed and signed forms. Patient would like to pick them up once complete. Call made to patient to let her know they are ready.     Waiting on Provider to respond regarding refill of Basaglar or to send Lantus script to  pharmacy.     Copies were also made to retain in this office in Jack PAP file in case we need to resend Provider's portion again.       Patti Goyal, [facility]  Adult Endocrinology   [facility]

## 2023-12-04 ENCOUNTER — OFFICE VISIT (OUTPATIENT)
Dept: DERMATOLOGY | Facility: CLINIC | Age: 63
End: 2023-12-04
Attending: FAMILY MEDICINE
Payer: COMMERCIAL

## 2023-12-04 DIAGNOSIS — D22.9 MULTIPLE MELANOCYTIC NEVI: ICD-10-CM

## 2023-12-04 DIAGNOSIS — L81.4 SOLAR LENTIGO: ICD-10-CM

## 2023-12-04 DIAGNOSIS — Z85.828 HX OF NONMELANOMA SKIN CANCER: Primary | ICD-10-CM

## 2023-12-04 DIAGNOSIS — L82.1 SEBORRHEIC KERATOSIS: ICD-10-CM

## 2023-12-04 PROCEDURE — 99203 OFFICE O/P NEW LOW 30 MIN: CPT | Performed by: DERMATOLOGY

## 2023-12-04 NOTE — PROGRESS NOTES
[facility] Dermatology Note    Encounter Date: Dec 4, 2023    Dermatology Problem List:  1. BCC on the left mid back s/p excision 9/30/19  2. Postinflammatory hyperpigmentation at sites of insect bites on lower legs    ______________________________________    Impression/Plan:  1. Hx of NMSC  - no evidence of recurrence today  2. Reassurance provided for benign lesions not treated today including solar lentigines, seborrheic keratoses, and banal-appearing melanocytic nevi.     Follow-up in 1 year.       Staff Involved:  Staff and Scribe    I, Fiordaliza Macario, am serving as a scribe; to document services personally performed by Donell Marino MD -based on data collection and the provider's statements to me.    Provider Disclosure:   The documentation recorded by the scribe accurately reflects the services I personally performed and the decisions made by me.    Donell Marino MD   of Dermatology  Department of Dermatology  [facility]      CC:   Chief Complaint   Patient presents with    Skin Check     FBSE, Area of concern on left lower arm and right side of nose.  HX of BCC.         History of Present Illness:  Ms. Maliha Pedro is a 63 year old female who presents as a return patient. She is here today for a FBSE, has area of concern of left lower arm and right side of her nose. She has a history of BCC.     Labs:  N/A    Physical exam:  Vitals: LMP 03/19/2009 (Exact Date)   GEN: This is a well developed, well-nourished female in no acute distress, in a pleasant mood.    SKIN: Nayak phototype III  - Full skin, which includes the head/face, both arms, chest, back, abdomen,both legs, genitalia and/or groin buttocks, digits and/or nails, was examined.  - Multiple regular brown pigmented macules and papules are identified on the head/neck, trunk, extremities.   - Scattered brown macules on sun exposed areas.  - There are waxy stuck on  tan to brown papules on the head/neck, trunk, extremities.   - No other lesions of concern on areas examined.     Past Medical History:   Past Medical History:   Diagnosis Date    Adjustment disorder with mixed anxiety and depressed mood     following death of her adoptive father and then again after adoptive mother     Allergic rhinitis, cause unspecified     uses benadryl prn (sneezing/hoarse voice)    Bartholin gland cyst 2008    Chest pain, unspecified     neg dobutamine stress test  (reacted to dobutamine)    Dry eyes     ERM OD (epiretinal membrane, right eye)     BE mild    Esophageal reflux     EGD: -neg    Glaucoma suspect     Hearing problem     Hypertension     Irregular menstrual cycle     s/p D&C, since then more regular    Irritable bowel syndrome     colonoscopy -neg. Sx's especially prior to menses    MEDICAL HISTORY OF -     had health directive with : DANIEL Ford.  Full Code. Aunt (Charlie Tay) will be decision maker    Meningitis, unspecified(322.9) age 8    hospitalized for 9 mo    Need for prophylactic vaccination with tuberculosis (BCG) vaccine     Has pos Mantoux. Gets yearly cxr, all neg.     Other and unspecified hyperlipidemia     Pain in joint, shoulder region     bone spurs on MRI, s/p pool therapy    Tinnitus     Type II or unspecified type diabetes mellitus without mention of complication, not stated as uncontrolled Age 32    [facility]: Shayne Lane MD    Unspecified cataract     RE    Unspecified sinusitis (chronic)     2006    Wheezing     Has seen pulm  & . Told night ashtma, ? vocal chord spasm due to cold air.      Past Surgical History:   Procedure Laterality Date    CATARACT IOL, RT/LT  2008    LE    COLONOSCOPY N/A 2021    Procedure: Colonoscopy, With Polypectomy And Biopsy;  Surgeon: Teo Collins MD;  [facility]    COLONOSCOPY WITH CO2 INSUFFLATION N/A 2021    Procedure:  COLONOSCOPY, WITH CO2 INSUFFLATION;  Surgeon: Teo Collins MD;  Location: [facility] OR    HC DILATION/CURETTAGE DIAG/THER NON OB      HC TYMPANOPLASTY W/O MASTOID, INIT/REV W/O OSS CHAIN RECONST      RELEASE CARPAL TUNNEL Right 2018    Procedure: Right Carpal Tunnel Release;  Surgeon: Ivania Moran MD;  [facility]    RELEASE CARPAL TUNNEL Left 2018    Procedure: Left Carpal Tunnel Release;  Surgeon: Ivania Moran MD;  [facility]    TYMPANOPLASTY      YAG CAPSULOTOMY OS (LEFT EYE)  2011       Social History:   reports that she has never smoked. She has never used smokeless tobacco. She reports current alcohol use. She reports that she does not use drugs.    Family History:  Family History   Problem Relation Age of Onset    Diabetes Father          of dm 70's    C.A.D. Father     Cerebrovascular Disease Father     Diabetes Sister     Diabetes Sister     Diabetes Sister     Hypertension Sister     Diabetes Brother     Diabetes Brother     Cancer Half-Brother         lung    Diabetes Half-Brother     Leukemia Half-Brother     Glaucoma Maternal Grandmother     Diabetes Paternal Grandfather     Hypertension Paternal Grandfather     Cerebrovascular Disease Paternal Grandfather     Cancer Maternal Aunt         ovarian cancer.     Leukemia Half-Brother     Lymphoma Paternal Cousin     Macular Degeneration No family hx of        Medications:  Current Outpatient Medications   Medication Sig Dispense Refill    albuterol (PROAIR HFA/PROVENTIL HFA/VENTOLIN HFA) 108 (90 Base) MCG/ACT inhaler Inhale 2 puffs into the lungs every 4 hours as needed for shortness of breath 18 g 1    aspirin 81 MG EC tablet Take 81 mg by mouth daily Please resume in 5 days.  (2018)      azelastine (ASTELIN) 0.1 % nasal spray Spray 1 spray into both nostrils 2 times daily 90 mL 3    CALCIUM + D OR Take 1 tablet by mouth 2 times daily.      Continuous Blood Gluc Sensor  (DEXCOM G6 SENSOR) MISC 1 each every 10 days 18 each 1    Continuous Blood Gluc Sensor (DEXCOM G7 SENSOR) MISC CHANGE SENSOR EVERY 10 DAYS AS DIRECTED 9 each 4    Continuous Blood Gluc Transmit (DEXCOM G6 TRANSMITTER) MISC 1 each every 3 months Change every 3 months. 2 each 1    fexofenadine (ALLEGRA) 180 MG tablet Take 1 tablet by mouth daily. 1 TABLET DAILY Failed claritin for symptom cotrol. Zyrtec increases heart rate. 90 tablet 3    fish oil-omega-3 fatty acids 1000 MG capsule Take 1 capsule by mouth daily.      fluticasone (FLONASE) 50 MCG/ACT nasal spray USE 1-2 SPRAYS IN EACH NOSTRIL ONCE DAILY 48 g 3    Glucosamine-Chondroitin (GLUCOSAMINE CHONDR COMPLEX PO)       hydrochlorothiazide (HYDRODIURIL) 12.5 MG tablet Take 1 tablet (12.5 mg) by mouth daily 90 tablet 3    insulin aspart (NOVOLOG FLEXPEN) 100 UNIT/ML pen Use 20-35 units three times daily with meals for up to 120 units daily 120 mL 3    insulin glargine (BASAGLAR KWIKPEN) 100 UNIT/ML pen Inject 50 Units Subcutaneous daily 48 mL 3    insulin pen needle (BD LISA U/F) 32G X 4 MM miscellaneous Use to inject 4-5 times daily. 600 each 1    latanoprost (XALATAN) 0.005 % ophthalmic solution Place 1 drop into both eyes daily 7.5 mL 11    latanoprost (XALATAN) 0.005 % ophthalmic solution Place 1 drop into both eyes daily 7.5 mL 11    losartan (COZAAR) 100 MG tablet Take 1 tablet (100 mg) by mouth daily 90 tablet 3    magnesium 250 MG tablet Take 1 tablet by mouth daily 90 tablet 3    metFORMIN (GLUCOPHAGE XR) 500 MG 24 hr tablet TAKE TWO TABLETS BY MOUTH TWICE A  tablet 3    mometasone-formoterol (DULERA) 100-5 MCG/ACT inhaler Inhale 2 puffs into the lungs 2 times daily 13 g 1    montelukast (SINGULAIR) 10 MG tablet Take 1 tablet (10 mg) by mouth At Bedtime 90 tablet 3    MULTIVITAMIN OR Take 1 tablet by mouth daily.      NEEDLES, ANY SIZE Pen needles for Novolog insulin pen. Use to inject 5-6  times daily. 4 Box 3    NOVOLOG FLEXPEN 100 UNIT/ML soln  INJECT 35 units with breakfast 25 units with lunch and dinner MAX DAILY DOSE 90 UNITS 90 mL 3    pantoprazole (PROTONIX) 40 MG EC tablet Take 1 tablet (40 mg) by mouth daily 90 tablet 3    potassium chloride ER (KLOR-CON M) 10 MEQ CR tablet Take 1 tablet (10 mEq) by mouth daily 90 tablet 3    rosuvastatin (CRESTOR) 10 MG tablet Take 1 tablet (10 mg) by mouth daily 90 tablet 3    Semaglutide, 1 MG/DOSE, (OZEMPIC, 1 MG/DOSE,) 4 MG/3ML pen Inject 1 mg Subcutaneous every 7 days 9 mL 0    VITAMIN D 2000 UNIT OR TABS Take 1 tablet by mouth daily.       Allergies   Allergen Reactions    Latex Swelling    Mold     Tetanus Antitoxin Swelling    Tetracycline Swelling

## 2023-12-04 NOTE — NURSING NOTE
Maliha Pedro's goals for this visit include:   Chief Complaint   Patient presents with    Skin Check     FBSE, Area of concern on left lower arm and right side of nose.  HX of BCC.        She requests these members of her care team be copied on today's visit information:     PCP: Elva Ortiz    Referring Provider:  Elva Ortiz MD  [address] [place] [address]    [facility] 03/19/2009 (Exact Date)     Do you need any medication refills at today's visit?     Mgean Coelho on 12/4/2023 at 9:53 AM

## 2023-12-04 NOTE — TELEPHONE ENCOUNTER
Patient dropped off the completed application for 2024 including social security benefit letter and MSRS letter. All pages have been email to Annabel Palm to review and make sure everything is completed. Will forward to Annabel to review.    Payton Ruby CMA  Adult Endocrinology  [facility] [place]

## 2023-12-04 NOTE — LETTER
12/4/2023         RE: Maliha Pedro  [address] [place] [address]        Dear Colleague,    Thank you for referring your patient, Maliha Pedro, to the [facility]. Please see a copy of my visit note below.    [facility] Dermatology Note    Encounter Date: Dec 4, 2023    Dermatology Problem List:  1. BCC on the left mid back s/p excision 9/30/19  2. Postinflammatory hyperpigmentation at sites of insect bites on lower legs    ______________________________________    Impression/Plan:  1. Hx of NMSC  - no evidence of recurrence today  2. Reassurance provided for benign lesions not treated today including solar lentigines, seborrheic keratoses, and banal-appearing melanocytic nevi.     Follow-up in 1 year.       Staff Involved:  Staff and Scribe    I, Fiordaliza Macario, am serving as a scribe; to document services personally performed by Donell Marino MD -based on data collection and the provider's statements to me.    Provider Disclosure:   The documentation recorded by the scribe accurately reflects the services I personally performed and the decisions made by me.    Donell Marino MD   of Dermatology  Department of Dermatology  [facility]      CC:   Chief Complaint   Patient presents with     Skin Check     FBSE, Area of concern on left lower arm and right side of nose.  HX of BCC.         History of Present Illness:  Ms. Maliha Pedro is a 63 year old female who presents as a return patient. She is here today for a FBSE, has area of concern of left lower arm and right side of her nose. She has a history of BCC.     Labs:  N/A    Physical exam:  Vitals: LMP 03/19/2009 (Exact Date)   GEN: This is a well developed, well-nourished female in no acute distress, in a pleasant mood.    SKIN: Nayak phototype III  - Full skin, which includes the head/face, both arms, chest, back, abdomen,both  legs, genitalia and/or groin buttocks, digits and/or nails, was examined.  - Multiple regular brown pigmented macules and papules are identified on the head/neck, trunk, extremities.   - Scattered brown macules on sun exposed areas.  - There are waxy stuck on tan to brown papules on the head/neck, trunk, extremities.   - No other lesions of concern on areas examined.     Past Medical History:   Past Medical History:   Diagnosis Date     Adjustment disorder with mixed anxiety and depressed mood     following death of her adoptive father and then again after adoptive mother      Allergic rhinitis, cause unspecified     uses benadryl prn (sneezing/hoarse voice)     Bartholin gland cyst 2008     Chest pain, unspecified     neg dobutamine stress test  (reacted to dobutamine)     Dry eyes      ERM OD (epiretinal membrane, right eye)     BE mild     Esophageal reflux     EGD: -neg     Glaucoma suspect      Hearing problem      Hypertension      Irregular menstrual cycle     s/p D&C, since then more regular     Irritable bowel syndrome     colonoscopy -neg. Sx's especially prior to menses     MEDICAL HISTORY OF -     had health directive with : DANIEL Ford.  Full Code. Aunt (Charlie Tay) will be decision maker     Meningitis, unspecified(322.9) age 8    hospitalized for 9 mo     Need for prophylactic vaccination with tuberculosis (BCG) vaccine     Has pos Mantoux. Gets yearly cxr, all neg.      Other and unspecified hyperlipidemia      Pain in joint, shoulder region     bone spurs on MRI, s/p pool therapy     Tinnitus      Type II or unspecified type diabetes mellitus without mention of complication, not stated as uncontrolled Age 32    Follows with endocrine [facility]: Shayne Lane MD     Unspecified cataract     RE     Unspecified sinusitis (chronic)     2006     Wheezing     Has seen pulm  & 2007. Told night ashtma, ? vocal chord spasm due to cold air.      Past Surgical History:    Procedure Laterality Date     CATARACT IOL, RT/LT  2008    LE     COLONOSCOPY N/A 2021    Procedure: Colonoscopy, With Polypectomy And Biopsy;  Surgeon: Teo Collins MD;  [facility]     COLONOSCOPY WITH CO2 INSUFFLATION N/A 2021    Procedure: COLONOSCOPY, WITH CO2 INSUFFLATION;  Surgeon: Teo Collins MD;  [facility]: [facility] OR     HC DILATION/CURETTAGE DIAG/THER NON OB       HC TYMPANOPLASTY W/O MASTOID, INIT/REV W/O OSS CHAIN RECONST       RELEASE CARPAL TUNNEL Right 2018    Procedure: Right Carpal Tunnel Release;  Surgeon: Ivania Moran MD;  [facility]     RELEASE CARPAL TUNNEL Left 2018    Procedure: Left Carpal Tunnel Release;  Surgeon: Ivania Moran MD;  [facility]     TYMPANOPLASTY       YAG CAPSULOTOMY OS (LEFT EYE)  2011       Social History:   reports that she has never smoked. She has never used smokeless tobacco. She reports current alcohol use. She reports that she does not use drugs.    Family History:  Family History   Problem Relation Age of Onset     Diabetes Father          of dm 70's     C.A.D. Father      Cerebrovascular Disease Father      Diabetes Sister      Diabetes Sister      Diabetes Sister      Hypertension Sister      Diabetes Brother      Diabetes Brother      Cancer Half-Brother         lung     Diabetes Half-Brother      Leukemia Half-Brother      Glaucoma Maternal Grandmother      Diabetes Paternal Grandfather      Hypertension Paternal Grandfather      Cerebrovascular Disease Paternal Grandfather      Cancer Maternal Aunt         ovarian cancer.      Leukemia Half-Brother      Lymphoma Paternal Cousin      Macular Degeneration No family hx of        Medications:  Current Outpatient Medications   Medication Sig Dispense Refill     albuterol (PROAIR HFA/PROVENTIL HFA/VENTOLIN HFA) 108 (90 Base) MCG/ACT inhaler Inhale 2 puffs into the lungs every 4 hours as needed for  shortness of breath 18 g 1     aspirin 81 MG EC tablet Take 81 mg by mouth daily Please resume in 5 days.  (12/22/2018)       azelastine (ASTELIN) 0.1 % nasal spray Spray 1 spray into both nostrils 2 times daily 90 mL 3     CALCIUM + D OR Take 1 tablet by mouth 2 times daily.       Continuous Blood Gluc Sensor (DEXCOM G6 SENSOR) MISC 1 each every 10 days 18 each 1     Continuous Blood Gluc Sensor (DEXCOM G7 SENSOR) MISC CHANGE SENSOR EVERY 10 DAYS AS DIRECTED 9 each 4     Continuous Blood Gluc Transmit (DEXCOM G6 TRANSMITTER) MISC 1 each every 3 months Change every 3 months. 2 each 1     fexofenadine (ALLEGRA) 180 MG tablet Take 1 tablet by mouth daily. 1 TABLET DAILY Failed claritin for symptom cotrol. Zyrtec increases heart rate. 90 tablet 3     fish oil-omega-3 fatty acids 1000 MG capsule Take 1 capsule by mouth daily.       fluticasone (FLONASE) 50 MCG/ACT nasal spray USE 1-2 SPRAYS IN EACH NOSTRIL ONCE DAILY 48 g 3     Glucosamine-Chondroitin (GLUCOSAMINE CHONDR COMPLEX PO)        hydrochlorothiazide (HYDRODIURIL) 12.5 MG tablet Take 1 tablet (12.5 mg) by mouth daily 90 tablet 3     insulin aspart (NOVOLOG FLEXPEN) 100 UNIT/ML pen Use 20-35 units three times daily with meals for up to 120 units daily 120 mL 3     insulin glargine (BASAGLAR KWIKPEN) 100 UNIT/ML pen Inject 50 Units Subcutaneous daily 48 mL 3     insulin pen needle (BD LISA U/F) 32G X 4 MM miscellaneous Use to inject 4-5 times daily. 600 each 1     latanoprost (XALATAN) 0.005 % ophthalmic solution Place 1 drop into both eyes daily 7.5 mL 11     latanoprost (XALATAN) 0.005 % ophthalmic solution Place 1 drop into both eyes daily 7.5 mL 11     losartan (COZAAR) 100 MG tablet Take 1 tablet (100 mg) by mouth daily 90 tablet 3     magnesium 250 MG tablet Take 1 tablet by mouth daily 90 tablet 3     metFORMIN (GLUCOPHAGE XR) 500 MG 24 hr tablet TAKE TWO TABLETS BY MOUTH TWICE A  tablet 3     mometasone-formoterol (DULERA) 100-5 MCG/ACT inhaler  Inhale 2 puffs into the lungs 2 times daily 13 g 1     montelukast (SINGULAIR) 10 MG tablet Take 1 tablet (10 mg) by mouth At Bedtime 90 tablet 3     MULTIVITAMIN OR Take 1 tablet by mouth daily.       NEEDLES, ANY SIZE Pen needles for Novolog insulin pen. Use to inject 5-6  times daily. 4 Box 3     NOVOLOG FLEXPEN 100 UNIT/ML soln INJECT 35 units with breakfast 25 units with lunch and dinner MAX DAILY DOSE 90 UNITS 90 mL 3     pantoprazole (PROTONIX) 40 MG EC tablet Take 1 tablet (40 mg) by mouth daily 90 tablet 3     potassium chloride ER (KLOR-CON M) 10 MEQ CR tablet Take 1 tablet (10 mEq) by mouth daily 90 tablet 3     rosuvastatin (CRESTOR) 10 MG tablet Take 1 tablet (10 mg) by mouth daily 90 tablet 3     Semaglutide, 1 MG/DOSE, (OZEMPIC, 1 MG/DOSE,) 4 MG/3ML pen Inject 1 mg Subcutaneous every 7 days 9 mL 0     VITAMIN D 2000 UNIT OR TABS Take 1 tablet by mouth daily.       Allergies   Allergen Reactions     Latex Swelling     Mold      Tetanus Antitoxin Swelling     Tetracycline Swelling                 Again, thank you for allowing me to participate in the care of your patient.        Sincerely,        Donell Marino MD

## 2023-12-04 NOTE — TELEPHONE ENCOUNTER
[employer] 2024 application and all documentation have been faxed to [employer].        Payton Ruby CMA  Adult Endocrinology  [facility] [place]

## 2023-12-05 ENCOUNTER — OFFICE VISIT (OUTPATIENT)
Dept: OBGYN | Facility: CLINIC | Age: 63
End: 2023-12-05
Payer: COMMERCIAL

## 2023-12-05 VITALS
HEIGHT: 63 IN | BODY MASS INDEX: 33.49 KG/M2 | OXYGEN SATURATION: 92 % | WEIGHT: 189 LBS | SYSTOLIC BLOOD PRESSURE: 153 MMHG | HEART RATE: 73 BPM | DIASTOLIC BLOOD PRESSURE: 82 MMHG

## 2023-12-05 DIAGNOSIS — L29.2 VULVAR ITCHING: ICD-10-CM

## 2023-12-05 DIAGNOSIS — N90.4 LICHEN SCLEROSUS ET ATROPHICUS OF THE VULVA: Primary | ICD-10-CM

## 2023-12-05 PROCEDURE — 99243 OFF/OP CNSLTJ NEW/EST LOW 30: CPT | Performed by: OBSTETRICS & GYNECOLOGY

## 2023-12-05 PROCEDURE — 87529 HSV DNA AMP PROBE: CPT | Performed by: OBSTETRICS & GYNECOLOGY

## 2023-12-05 PROCEDURE — 87529 HSV DNA AMP PROBE: CPT | Mod: 59 | Performed by: OBSTETRICS & GYNECOLOGY

## 2023-12-05 RX ORDER — CLOBETASOL PROPIONATE 0.5 MG/G
OINTMENT TOPICAL AT BEDTIME
Qty: 60 G | Refills: 0 | Status: SHIPPED | OUTPATIENT
Start: 2023-12-05 | End: 2024-02-03

## 2023-12-05 NOTE — PATIENT INSTRUCTIONS
If you have any questions regarding your visit, Please contact your care team.    [facility]: [phone]      Women s Health CLINIC HOURS TELEPHONE NUMBER   Zulay Garcia DO.    SHERIN Lugo-Surgery Scheduler  Maira - Surgery Scheduler    ALTON Avina, ALTON Irvin RN     Monday, Thursday  [place]  7am-3pm    Tuesday, Wednesday  [place]  7am-3pm    E/O Friday &   [place]    Typical Surgery Days: Thursday or Friday   [facility]  [address]  [place], [place] [address] [phone] Phone  [phone] Fax    [facility]  [address] [place] [address] [phone] Phone    Imaging [phone] Phone    [facility]:  [phone] Phone     **Surgeries** Our Surgery Schedulers will contact you to schedule. If you do not receive a call within 3 business days, please call [phone].    Urgent Care locations:  [facility] Saturday and    9 am - 5 pm    Monday-Friday   12 pm - 8 pm  Saturday and    9 am - 5 pm   [phone]       If you need a medication refill, please contact your pharmacy. Please allow 3 business days for your refill to be completed.  As always, Thank you for trusting us with your healthcare needs!       Healthy vulvar-vaginal hygiene practices    Avoid  Substitute    Pantyhose  Stockings with a garter belt    Thigh-high or knee-high stockings   Synthetic underwear Cotton underwear or no underwear   Jeans and other tight pants Loose pants, skirts, dresses   Swimsuits, leotards, thongs, lycra garments Loose-fitting cotton garments   Panty liners Tampons or cotton pads   Scented soaps or shampoos Fragrance-free pH neutral soap (eg, Neutrogena fragrance free, pure olive oil soap, Cetaphil gentle skin cleanser or equivalent ingredients)    Bubble bath Tub baths in the morning and at night without additives and at a comfortable temperature   Scented detergents Unscented detergents   Washcloths Use fingertips for washing; pat dry, don't rub dry   Baby wipes or flushable wipes Rinse with water using sports water bottle or perineal irrigation bottle    Feminine sprays, douches, powders These are not necessary products and can be omitted from personal practices   Dyed toilet articles Toilet articles without dyes   Hair dryers to dry vulva skin without contact Dry vulva by gentle patting        If no improvement in symptoms in 4-8 weeks, recommend vulvar biopsy.

## 2023-12-06 LAB
HSV1 DNA SPEC QL NAA+PROBE: NOT DETECTED
HSV2 DNA SPEC QL NAA+PROBE: NOT DETECTED

## 2023-12-11 ENCOUNTER — TELEPHONE (OUTPATIENT)
Dept: FAMILY MEDICINE | Facility: CLINIC | Age: 63
End: 2023-12-11
Payer: COMMERCIAL

## 2023-12-11 NOTE — TELEPHONE ENCOUNTER
Forms/Letter Request    Type of form/letter:  CHELE echavarria    Have you been seen for this request: No    Do we have the form/letter: No    Who is the form from? Patient    Where did/will the form come from? Patient or family brought in       When is form/letter needed by: ASAP    How would you like the form/letter returned: Mailed to [facility] in the enclosed addressed and stamped envelope [facility] provided please  ATTN: [employer] [facility]  [address]  [facility] [place] [address]  Is this the correct address?: Yes      Patient Notified form requests are processed in 3-5 business days:Yes    Could we send this information to you in [employer] or would you prefer to receive a phone call?:   No preference   Okay to leave a detailed message?: Yes at Cell number on file:    Telephone Information:   Mobile [phone]

## 2023-12-12 ENCOUNTER — TELEPHONE (OUTPATIENT)
Dept: FAMILY MEDICINE | Facility: CLINIC | Age: 63
End: 2023-12-12
Payer: COMMERCIAL

## 2023-12-12 NOTE — TELEPHONE ENCOUNTER
Forms/Letter Request    Type of form/letter: [facility] $ 25.00 reward for BP check    Have you been seen for this request: N/A    Do we have the form/letter: Yes: placed in providers forms basket for review    When is form/letter needed by: ASAP    How would you like the form/letter returned: Fax mailed back to [facility] in envelope pt provided. Pt notified via anh Pierson CMA

## 2024-05-06 DIAGNOSIS — Z79.4 TYPE 2 DIABETES MELLITUS WITH MILD NONPROLIFERATIVE RETINOPATHY WITHOUT MACULAR EDEMA, WITH LONG-TERM CURRENT USE OF INSULIN, UNSPECIFIED LATERALITY (H): ICD-10-CM

## 2024-05-06 DIAGNOSIS — E11.3299 TYPE 2 DIABETES MELLITUS WITH MILD NONPROLIFERATIVE RETINOPATHY WITHOUT MACULAR EDEMA, WITH LONG-TERM CURRENT USE OF INSULIN, UNSPECIFIED LATERALITY (H): ICD-10-CM

## 2024-05-15 RX ORDER — SEMAGLUTIDE 1.34 MG/ML
1 INJECTION, SOLUTION SUBCUTANEOUS
Qty: 9 ML | Refills: 0 | Status: SHIPPED | OUTPATIENT
Start: 2024-05-15 | End: 2024-06-10

## 2024-05-15 NOTE — TELEPHONE ENCOUNTER
LVD:  11/27/2023  [facility] Stacia Wadsworth MD  Endocrinology, Diabetes, and Metabolism   Next 6/10/24  Refilled per protocol.

## 2024-06-03 DIAGNOSIS — H40.003 BORDERLINE GLAUCOMA, BILATERAL: ICD-10-CM

## 2024-06-03 DIAGNOSIS — H35.373 EPIRETINAL MEMBRANE (ERM) OF BOTH EYES: Primary | ICD-10-CM

## 2024-06-05 ENCOUNTER — TELEPHONE (OUTPATIENT)
Dept: ENDOCRINOLOGY | Facility: CLINIC | Age: 64
End: 2024-06-05
Payer: COMMERCIAL

## 2024-06-05 DIAGNOSIS — Z79.4 TYPE 2 DIABETES MELLITUS WITH MILD NONPROLIFERATIVE RETINOPATHY WITHOUT MACULAR EDEMA, WITH LONG-TERM CURRENT USE OF INSULIN, UNSPECIFIED LATERALITY (H): ICD-10-CM

## 2024-06-05 DIAGNOSIS — E11.3299 TYPE 2 DIABETES MELLITUS WITH MILD NONPROLIFERATIVE RETINOPATHY WITHOUT MACULAR EDEMA, WITH LONG-TERM CURRENT USE OF INSULIN, UNSPECIFIED LATERALITY (H): ICD-10-CM

## 2024-06-05 DIAGNOSIS — R05.3 CHRONIC COUGH: ICD-10-CM

## 2024-06-05 DIAGNOSIS — E11.21 TYPE 2 DIABETES MELLITUS WITH DIABETIC NEPHROPATHY, WITH LONG-TERM CURRENT USE OF INSULIN (H): ICD-10-CM

## 2024-06-05 DIAGNOSIS — Z79.4 TYPE 2 DIABETES MELLITUS WITH DIABETIC NEPHROPATHY, WITH LONG-TERM CURRENT USE OF INSULIN (H): ICD-10-CM

## 2024-06-05 RX ORDER — INSULIN ASPART 100 [IU]/ML
INJECTION, SOLUTION INTRAVENOUS; SUBCUTANEOUS
Qty: 120 ML | Refills: 3 | Status: CANCELLED | OUTPATIENT
Start: 2024-06-05

## 2024-06-05 RX ORDER — MOMETASONE FUROATE AND FORMOTEROL FUMARATE DIHYDRATE 100; 5 UG/1; UG/1
2 AEROSOL RESPIRATORY (INHALATION) 2 TIMES DAILY
Qty: 13 G | Refills: 1 | Status: SHIPPED | OUTPATIENT
Start: 2024-06-05 | End: 2024-07-29

## 2024-06-05 RX ORDER — INSULIN GLARGINE 100 [IU]/ML
50 INJECTION, SOLUTION SUBCUTANEOUS DAILY
Qty: 48 ML | Refills: 3 | Status: CANCELLED | OUTPATIENT
Start: 2024-06-05

## 2024-06-05 NOTE — TELEPHONE ENCOUNTER
Prior Authorization Retail Medication Request    Medication/Dose: Basaglar  novolog  Diagnosis and ICD code (if different than what is on RX):    New/renewal/insurance change PA/secondary ins. PA:  Previously Tried and Failed:    Rationale:      Insurance   Primary: [facility]  Insurance ID:  [id number]    Secondary (if applicable):  Insurance ID:      Pharmacy Information (if different than what is on RX)  Name:  [facility]  Phone:  [phone]  Fax:[phone]

## 2024-06-06 ENCOUNTER — LAB (OUTPATIENT)
Dept: LAB | Facility: CLINIC | Age: 64
End: 2024-06-06
Payer: COMMERCIAL

## 2024-06-06 ENCOUNTER — TELEPHONE (OUTPATIENT)
Dept: ENDOCRINOLOGY | Facility: CLINIC | Age: 64
End: 2024-06-06

## 2024-06-06 ENCOUNTER — TELEPHONE (OUTPATIENT)
Dept: FAMILY MEDICINE | Facility: CLINIC | Age: 64
End: 2024-06-06

## 2024-06-06 DIAGNOSIS — E11.3299 TYPE 2 DIABETES MELLITUS WITH MILD NONPROLIFERATIVE RETINOPATHY WITHOUT MACULAR EDEMA, WITH LONG-TERM CURRENT USE OF INSULIN, UNSPECIFIED LATERALITY (H): ICD-10-CM

## 2024-06-06 DIAGNOSIS — Z79.4 TYPE 2 DIABETES MELLITUS WITH MILD NONPROLIFERATIVE RETINOPATHY WITHOUT MACULAR EDEMA, WITH LONG-TERM CURRENT USE OF INSULIN, UNSPECIFIED LATERALITY (H): ICD-10-CM

## 2024-06-06 LAB
ALBUMIN SERPL BCG-MCNC: 4.4 G/DL (ref 3.5–5.2)
ALP SERPL-CCNC: 57 U/L (ref 40–150)
ALT SERPL W P-5'-P-CCNC: 45 U/L (ref 0–50)
AST SERPL W P-5'-P-CCNC: 31 U/L (ref 0–45)
BILIRUB DIRECT SERPL-MCNC: <0.2 MG/DL (ref 0–0.3)
BILIRUB SERPL-MCNC: 0.3 MG/DL
BUN SERPL-MCNC: 15.8 MG/DL (ref 8–23)
CHOLEST SERPL-MCNC: 123 MG/DL
CREAT SERPL-MCNC: 0.8 MG/DL (ref 0.51–0.95)
CREAT UR-MCNC: 158 MG/DL
EGFRCR SERPLBLD CKD-EPI 2021: 81 ML/MIN/1.73M2
FASTING STATUS PATIENT QL REPORTED: YES
FASTING STATUS PATIENT QL REPORTED: YES
GLUCOSE SERPL-MCNC: 121 MG/DL (ref 70–99)
HDLC SERPL-MCNC: 40 MG/DL
LDLC SERPL CALC-MCNC: 59 MG/DL
MICROALBUMIN UR-MCNC: <12 MG/L
MICROALBUMIN/CREAT UR: NORMAL MG/G{CREAT}
NONHDLC SERPL-MCNC: 83 MG/DL
POTASSIUM SERPL-SCNC: 3.5 MMOL/L (ref 3.4–5.3)
PROT SERPL-MCNC: 7.2 G/DL (ref 6.4–8.3)
TRIGL SERPL-MCNC: 121 MG/DL
TSH SERPL DL<=0.005 MIU/L-ACNC: 1.95 UIU/ML (ref 0.3–4.2)

## 2024-06-06 PROCEDURE — 82565 ASSAY OF CREATININE: CPT

## 2024-06-06 PROCEDURE — 82570 ASSAY OF URINE CREATININE: CPT

## 2024-06-06 PROCEDURE — 84520 ASSAY OF UREA NITROGEN: CPT

## 2024-06-06 PROCEDURE — 80076 HEPATIC FUNCTION PANEL: CPT

## 2024-06-06 PROCEDURE — 80061 LIPID PANEL: CPT

## 2024-06-06 PROCEDURE — 84132 ASSAY OF SERUM POTASSIUM: CPT

## 2024-06-06 PROCEDURE — 82043 UR ALBUMIN QUANTITATIVE: CPT

## 2024-06-06 PROCEDURE — 84443 ASSAY THYROID STIM HORMONE: CPT

## 2024-06-06 PROCEDURE — 82947 ASSAY GLUCOSE BLOOD QUANT: CPT

## 2024-06-06 PROCEDURE — 36415 COLL VENOUS BLD VENIPUNCTURE: CPT

## 2024-06-06 NOTE — TELEPHONE ENCOUNTER
Patient stated that she wants to know if the provider wanted to add a liver function test. Please advise. Thank you.

## 2024-06-06 NOTE — TELEPHONE ENCOUNTER
LAB:  There is a future order in place  dated 11/22/23 by Dr. Ortiz.  Please add this to the blood in lab.      PSK

## 2024-06-06 NOTE — TELEPHONE ENCOUNTER
"Prior Authorization Retail Medication Request    Medication/Dose: Dexcom G7  Diagnosis and ICD code (if different than what is on RX):  na  New/renewal/insurance change PA/secondary ins. PA:  Previously Tried and Failed:  na  Rationale:  na    Insurance   Primary: [facility] Part D  Insurance ID:  [id number]    \"  Submit ePA at [place] \"    Secondary (if applicable):shane  Insurance ID:  na    Pharmacy Information (if different than what is on RX)  Name:  [facility] [place]  Phone:  [phone]  Fax:[phone]    "

## 2024-06-06 NOTE — PROGRESS NOTES
Endocrinology and Diabetes Clinic        Follow up for T2DM      ASSESSMENT/Plan:   T2DM with comorbidity of obesity     Blood glucose control  A1c  7.2% is good and the best in many years  Cont Dexcom G7     Hypertension controlled on losartan and hydrochlorothiazide, cont    Dyslipidemia on 10 mg of Crestor doing well     Continue Lantus 50 unit once daily and Novolog 25 units 3-4 times daily with meals  Continue Ozemipc 1 mg daily  Continue in Metformin 1000 mg bid        Patient to contact the clinic if blood glucose (sugar) is under 70 two times in one week or above 200 for 3 consecutive days.         The Longitudinal plan of care for diabetes condition was addressed during this visit. Due to added complexity of care, we will continue to support Maliha , and the subsequent management of this condition(s) and with the ongoing continuity of care of this condition.    Stacia Carbajal MD  Endocrinology and Diabetes  Telephone contact:  [facility] [place] [phone]  [facility] [phone]      Interval history  6 month follow up for T2DM  Pt has been doing well, has been on Ozempic 1 mg weekly, no weight loss sofar  Tolerates this well  Has Dexcom sensor G7, issues with losing sensor due to humidity in the [facility]  Diet avoiding carbs nabeel rice and bread, eating vegetables and meat, still has     Pt is spending most of her time in the [facility], [place], where she has her own house [place] her [place] with lots of family members around her, all siblings with diabetes    Physical activity walking in the morning      Current diabetic medications:  Metformin 1000 bid, glargine insulin 50 units daily, Novolog 10-25 units three-four times daily, Ozempic 1 mg weekly     Intolerant to Jardiance with yeast infections    SMBG:  Dexcom sensor, Average blood sugar 203, variable Bgs, no hypoglycemia     Meal plan: 3 meals a  day plus snacks,   Exercise: has increased and walking lots of steps now, much more than before     Complications  1. Retinopathy: yearly visit, no DR, cataract surgery this summer  2. Nephropathy: None  3. Neuropathy: No numbness no tingling no history of ulcerations  4. Hypoglycemia: Yes, however resolved with insulin adjustment  5. Macrovascular: No chest pain, no shortness of breath      DEXCOM 6 is helpful    HTN: Losartan 100 mg daily, hydrochlorothiazide 12.5 daily  Dyslipidemia Crestor 10 mg     Previously used diabetes medications:   Bydureon in 2016, Ernst Martinezance: yeast infection           PMH:      Patient Active Problem List   Diagnosis    ? 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    Displacement of lumbar intervertebral disc without myelopathy    Nonallopathic lesion of lumbar region    Nonallopathic lesion of thoracic region    Nonallopathic lesion of sacral region    Bartholin gland cyst    Cataract    Irritable bowel syndrome    Esophageal reflux    Seasonal allergic rhinitis    Latex sensitivity    Adenomatous polyp    HYPERLIPIDEMIA LDL GOAL <100    Ovarian cyst    Breast pain    Essential hypertension    Advanced directives, counseling/discussion    Obesity    Hypomagnesemia    Type 2 diabetes mellitus with mild nonproliferative retinopathy without macular edema, with long-term current use of insulin, unspecified laterality (H)             Medications:   Current Outpatient Prescriptions          Current Outpatient Medications   Medication Sig Dispense Refill    albuterol (PROAIR HFA/PROVENTIL HFA/VENTOLIN HFA) 108 (90 Base) MCG/ACT inhaler Inhale 2 puffs into the lungs every 4 hours as needed for shortness of breath / dyspnea 18 g 1    aspirin 81 MG EC tablet Take 81 mg by mouth daily Please resume in 5 days.  (12/22/2018)        CALCIUM + D OR Take 1 tablet by mouth 2 times daily.        Continuous Blood Gluc Sensor (DEXCOM G6 SENSOR) MISC 1 each every 10 days 18 each 1     Continuous Blood Gluc Transmit (DEXCOM G6 TRANSMITTER) MISC 1 each every 3 months Change every 3 months. 2 each 1    fexofenadine (ALLEGRA) 180 MG tablet Take 1 tablet by mouth daily. 1 TABLET DAILY Failed claritin for symptom cotrol. Zyrtec increases heart rate. 90 tablet 3    fish oil-omega-3 fatty acids 1000 MG capsule Take 1 capsule by mouth daily.        fluticasone (FLONASE) 50 MCG/ACT nasal spray USE 1-2 SPRAYS IN EACH NOSTRIL ONCE DAILY 48 g 3    Glucosamine-Chondroitin (GLUCOSAMINE CHONDR COMPLEX PO)          hydrochlorothiazide (HYDRODIURIL) 12.5 MG tablet Take 1 tablet (12.5 mg) by mouth daily 90 tablet 3    insulin aspart (NOVOLOG FLEXPEN) 100 UNIT/ML pen Use 20-35 units three times daily with meals for up to 120 units daily 120 mL 3    insulin glargine (BASAGLAR KWIKPEN) 100 UNIT/ML pen INJECT 70 UNITS UNDER THE SKIN DAILY 90 mL 2    insulin pen needle (BD LISA U/F) 32G X 4 MM miscellaneous Use to inject 4-5 times daily. 600 each 1    latanoprost (XALATAN) 0.005 % ophthalmic solution Place 1 drop into both eyes daily 7.5 mL 11    losartan (COZAAR) 100 MG tablet Take 1 tablet (100 mg) by mouth daily 90 tablet 3    magnesium 250 MG tablet Take 1 tablet by mouth daily 90 tablet 3    metFORMIN (GLUCOPHAGE XR) 500 MG 24 hr tablet TAKE 2 TABLETS TWICE A  tablet 3    montelukast (SINGULAIR) 10 MG tablet Take 1 tablet (10 mg) by mouth At Bedtime 90 tablet 3    MULTIVITAMIN OR Take 1 tablet by mouth daily.        NEEDLES, ANY SIZE Pen needles for Novolog insulin pen. Use to inject 5-6  times daily. 4 Box 3    NOVOLOG FLEXPEN 100 UNIT/ML soln Sig 20-25 units two-three times daily. Max daily dose 75 units 90 mL 1    pantoprazole (PROTONIX) 40 MG EC tablet Take 1 tablet (40 mg) by mouth daily 90 tablet 3    potassium chloride ER (KLOR-CON M) 10 MEQ CR tablet Take 1 tablet (10 mEq) by mouth daily 90 tablet 3    rosuvastatin (CRESTOR) 10 MG tablet Take 1 tablet (10 mg) by mouth daily 90 tablet 3    semaglutide  (OZEMPIC) 2 MG/3ML pen Inject 0.25 mg Subcutaneous every 7 days 3 mL 0    [START ON 7/10/2023] semaglutide (OZEMPIC) 2 MG/3ML pen Inject 0.5 mg Subcutaneous every 7 days 3 mL 0    [START ON 8/9/2023] Semaglutide, 1 MG/DOSE, (OZEMPIC) 4 MG/3ML pen Inject 1 mg Subcutaneous every 7 days 3 mL 0    VITAMIN D 2000 UNIT OR TABS Take 1 tablet by mouth daily.        amoxicillin-clavulanate (AUGMENTIN) 875-125 MG tablet Take 1 tablet by mouth 2 times daily for 7 days 14 tablet 0         Physical Exam  /79 (BP Location: Left arm, Patient Position: Sitting, Cuff Size: Adult Large)   Pulse 78   Wt 86.2 kg (190 lb)   LMP 03/19/2009 (Exact Date)   SpO2 97%   BMI 33.66 kg/m      Wt Readings from Last 4 Encounters:   06/10/24 86.2 kg (190 lb)   12/05/23 85.7 kg (189 lb)   11/27/23 85.4 kg (188 lb 3.2 oz)   11/14/23 85.3 kg (188 lb)     General: Well appearing woman in no distress, abdominal obesity.  Psych: good eye contact, no pressured speech     A1c today 6/10/2024 7.2%          Lab Results   Component Value Date      06/07/2023     CHLORIDE 103 06/07/2023     CO2 21 (L) 06/07/2023      (H) 06/07/2023      (H) 06/07/2023     CR 0.76 06/07/2023     CR 0.71 06/07/2023     CR 0.68 11/28/2022     CR 0.63 11/16/2021     CR 0.69 05/05/2021     SUSHANT 9.6 06/07/2023     MAG 2.0 11/14/2018     ALBUMIN 4.3 06/07/2023     ALKPHOS 55 06/07/2023     LDL 79 06/07/2023     HDL 37 (L) 06/07/2023     TRIG 166 (H) 06/07/2023     TSH 1.01 06/07/2023     TSH 1.24 11/28/2022     TSH 0.69 05/05/2021            Lab Results   Component Value Date     MICROL 29.7 06/07/2023     MICROL 11 11/28/2022     MICROL 6 11/16/2021     MICROL 7 08/27/2020     MICROL <5 04/02/2020            Lab Results   Component Value Date     A1C 9.3 (A) 06/27/2022     A1C 8.7 (H) 11/16/2021     A1C 8.3 (H) 05/05/2021     A1C 8.2 (H) 08/27/2020     A1C 8.1 (H) 04/02/2020               Lab Results   Component Value Date     HGB 15.1 06/08/2023

## 2024-06-07 DIAGNOSIS — E11.21 TYPE 2 DIABETES MELLITUS WITH DIABETIC NEPHROPATHY, WITH LONG-TERM CURRENT USE OF INSULIN (H): ICD-10-CM

## 2024-06-07 DIAGNOSIS — Z79.4 TYPE 2 DIABETES MELLITUS WITH DIABETIC NEPHROPATHY, WITH LONG-TERM CURRENT USE OF INSULIN (H): ICD-10-CM

## 2024-06-07 RX ORDER — PEN NEEDLE, DIABETIC 32GX 5/32"
NEEDLE, DISPOSABLE MISCELLANEOUS
Qty: 600 EACH | Refills: 1 | Status: CANCELLED | OUTPATIENT
Start: 2024-06-07

## 2024-06-08 ENCOUNTER — HEALTH MAINTENANCE LETTER (OUTPATIENT)
Age: 64
End: 2024-06-08

## 2024-06-10 ENCOUNTER — OFFICE VISIT (OUTPATIENT)
Dept: ENDOCRINOLOGY | Facility: CLINIC | Age: 64
End: 2024-06-10
Payer: COMMERCIAL

## 2024-06-10 VITALS
DIASTOLIC BLOOD PRESSURE: 79 MMHG | SYSTOLIC BLOOD PRESSURE: 138 MMHG | BODY MASS INDEX: 33.66 KG/M2 | OXYGEN SATURATION: 97 % | WEIGHT: 190 LBS | HEART RATE: 78 BPM

## 2024-06-10 DIAGNOSIS — E11.3299 TYPE 2 DIABETES MELLITUS WITH MILD NONPROLIFERATIVE RETINOPATHY WITHOUT MACULAR EDEMA, WITH LONG-TERM CURRENT USE OF INSULIN, UNSPECIFIED LATERALITY (H): ICD-10-CM

## 2024-06-10 DIAGNOSIS — Z79.4 TYPE 2 DIABETES MELLITUS WITH MILD NONPROLIFERATIVE RETINOPATHY WITHOUT MACULAR EDEMA, WITH LONG-TERM CURRENT USE OF INSULIN, UNSPECIFIED LATERALITY (H): ICD-10-CM

## 2024-06-10 LAB — HBA1C MFR BLD: 7.2 %

## 2024-06-10 PROCEDURE — 99214 OFFICE O/P EST MOD 30 MIN: CPT | Performed by: INTERNAL MEDICINE

## 2024-06-10 PROCEDURE — G2211 COMPLEX E/M VISIT ADD ON: HCPCS | Performed by: INTERNAL MEDICINE

## 2024-06-10 PROCEDURE — 83036 HEMOGLOBIN GLYCOSYLATED A1C: CPT | Performed by: INTERNAL MEDICINE

## 2024-06-10 RX ORDER — SEMAGLUTIDE 1.34 MG/ML
1 INJECTION, SOLUTION SUBCUTANEOUS
Qty: 18 ML | Refills: 1 | Status: SHIPPED | OUTPATIENT
Start: 2024-06-10

## 2024-06-10 NOTE — PATIENT INSTRUCTIONS
Welcome to the [facility] Endocrinology and Diabetes Clinics     Our Endocrinology Clinics are here to provide you with a team-based, collaborative approach in the diagnosis and treatment of patients with diabetes and endocrine disorders. The team is made up of Physicians, Physician Assistants, Certified Diabetes Educators, Registered Nurses, Medical Assistants, Emergency Medical Technicians, and many others, all of whom have the unified goal of providing our patients with high quality care.       Continue Lantus and Novolog  Continue Ozemipc 1 mg daily  Continue in Metformin      Please see below for some helpful tips to best navigate and use the [facility] Endocrinology clinic:     [place] Respect: At [facility], we are committed to a respectful and safe space for all patients, visitors, and staff.  We believe that mutual respect between patients and their care team is the foundation of quality care.  It is our expectation that you will be treated with respect by your care team.  In turn, we ask that all communication with the care team (written and verbal) be respectful and free from profanity, threatening, or abusive language.  Disrespectful communication undermines our therapeutic relationship with you and may result in us being unable to continue to provide your care.    Refills: A provider must see you at least annually to prescribe and refill medications. This is to ensure your safety as well as meet insurance and compliance regulations.    Scheduling: Many of our Providers offer both in-person or video visits. Please call to schedule any needed follow ups as soon as possible because our provider schedules fill up very quickly. Our care team has the right to require an in-person visit when they believe that it is medically necessary. Please remember that for any virtual visits, you must be in the [facility] at the time of the visit, otherwise we are unable to see you and you  will need to be rescheduled.    Missed Appointments: If you need to cancel or miss your scheduled appointment, please call the clinic at [phone] to reschedule.  Please note if you repeatedly miss appointments or repeatedly miss appointments without calling to inform us ahead of time (no-show), the clinic may elect to not allow you to reschedule without speaking to a manager, may require a Partnership In Care Agreement prior to rescheduling, or could result in you no longer being able to receive care from the clinic. Providing the clinic with timely notification if you have to miss an appointment, allows us to better serve the needs of all of our patients.    Primary Care Provider: Our Endocrinologists are Specialists in their field. We expect you to have a Primary Care Provider established to handle any needs outside of your diabetes and endocrine care.  We would be happy to assist you find a Primary Care Provider, if you do not have one.    [employer]: [employer] is a wonderful resource that allows you access to your Care Team via online or the miesha. Please ask a member of the team if you would like help creating an account. Please note that it may take up to 2 business days for a response. [employer] messages are not reviewed on weekends or after business hours.  Emergent or urgent care needs should never be communicated via [employer].  If you experience a medical emergency call 911 or go to the nearest emergency room.    Labs: It is recommended that you stay within the [facility] for labs but you are welcome to obtain ordered labs (with some exceptions) from any location of your choice as long as they are able to complete and process the needed labs. If you need us to fax orders to your preferred lab, please provide us the name and fax number of the lab you would like to go to so we can fax the orders. If your labs are drawn outside of the [facility], please have them fax the results to  [phone] ([place]) or [phone] ([place]) or via [facility]. It is your responsibility to ensure that outside lab results are sent to us.    We look forward to working with you. Please do not hesitate to reach out with any questions.    Thank you,    The Endocrine Team    [facility] Address:   [place] Address:     [address] [place] [address]    Phone: [phone]  Fax: [phone]   [address] [place] [address]    Phone: [phone]  Fax: [phone]     [facility] Cost Estimate Phone Number: [phone]    General Lab and Imaging Scheduling Phone Number: [phone]

## 2024-06-10 NOTE — NURSING NOTE
Maliha Pedro's goals for this visit include:   Chief Complaint   Patient presents with    Follow Up    Diabetes     She requests these members of her care team be copied on today's visit information: Yes     PCP: Elva Ortiz    Referring Provider:  Referred Self, MD  No address on file    /79 (BP Location: Left arm, Patient Position: Sitting, Cuff Size: Adult Large)   Pulse 78   Wt 86.2 kg (190 lb)   LMP 03/19/2009 (Exact Date)   SpO2 97%   BMI 33.66 kg/m      Do you need any medication refills at today's visit? NO    Patti Goyal CMA  Adult Endocrinology   [facility]

## 2024-06-10 NOTE — LETTER
6/10/2024      Maliha Pedro  [address] [place] [address]      Dear Colleague,    Thank you for referring your patient, Maliha Pedro, to the [facility] [place]. Please see a copy of my visit note below.                        Endocrinology and Diabetes Clinic        Follow up for T2DM      ASSESSMENT/Plan:   T2DM with comorbidity of obesity     Blood glucose control  A1c  7.2% is good and the best in many years  Cont Dexcom G7     Hypertension controlled on losartan and hydrochlorothiazide, cont    Dyslipidemia on 10 mg of Crestor doing well     Continue Lantus 50 unit once daily and Novolog 25 units 3-4 times daily with meals  Continue Ozemipc 1 mg daily  Continue in Metformin 1000 mg bid        Patient to contact the clinic if blood glucose (sugar) is under 70 two times in one week or above 200 for 3 consecutive days.         The Longitudinal plan of care for diabetes condition was addressed during this visit. Due to added complexity of care, we will continue to support Maliha , and the subsequent management of this condition(s) and with the ongoing continuity of care of this condition.    Stacia Carbajal MD  Endocrinology and Diabetes  Telephone contact:  [facility] [place] [phone]  [facility] [phone]      Interval history  6 month follow up for T2DM  Pt has been doing well, has been on Ozempic 1 mg weekly, no weight loss sofar  Tolerates this well  Has Dexcom sensor G7, issues with losing sensor due to humidity in the [facility]  Diet avoiding carbs nabeel rice and bread, eating vegetables and meat, still has     Pt is spending most of her time in the [facility], her home country, where she has her own house [place] her [place] with lots of family members around her, all siblings with diabetes    Physical activity walking in the morning      Current diabetic medications:  Metformin 1000 bid, glargine  insulin 50 units daily, Novolog 10-25 units three-four times daily, Ozempic 1 mg weekly     Intolerant to Jardiance with yeast infections    SMBG:  Dexcom sensor, Average blood sugar 203, variable Bgs, no hypoglycemia     Meal plan: 3 meals a day plus snacks,   Exercise: has increased and walking lots of steps now, much more than before     Complications  1. Retinopathy: yearly visit, no DR, cataract surgery this summer  2. Nephropathy: None  3. Neuropathy: No numbness no tingling no history of ulcerations  4. Hypoglycemia: Yes, however resolved with insulin adjustment  5. Macrovascular: No chest pain, no shortness of breath      DEXCOM 6 is helpful    HTN: Losartan 100 mg daily, hydrochlorothiazide 12.5 daily  Dyslipidemia Crestor 10 mg     Previously used diabetes medications:   Bydureon in 2016, Ernst  Jardiance: yeast infection           PMH:      Patient Active Problem List   Diagnosis     ? 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     Displacement of lumbar intervertebral disc without myelopathy     Nonallopathic lesion of lumbar region     Nonallopathic lesion of thoracic region     Nonallopathic lesion of sacral region     Bartholin gland cyst     Cataract     Irritable bowel syndrome     Esophageal reflux     Seasonal allergic rhinitis     Latex sensitivity     Adenomatous polyp     HYPERLIPIDEMIA LDL GOAL <100     Ovarian cyst     Breast pain     Essential hypertension     Advanced directives, counseling/discussion     Obesity     Hypomagnesemia     Type 2 diabetes mellitus with mild nonproliferative retinopathy without macular edema, with long-term current use of insulin, unspecified laterality (H)             Medications:   Current Outpatient Prescriptions          Current Outpatient Medications   Medication Sig Dispense Refill     albuterol (PROAIR HFA/PROVENTIL HFA/VENTOLIN HFA) 108 (90 Base) MCG/ACT inhaler Inhale 2 puffs into the lungs every 4 hours as needed for shortness of  breath / dyspnea 18 g 1     aspirin 81 MG EC tablet Take 81 mg by mouth daily Please resume in 5 days.  (12/22/2018)         CALCIUM + D OR Take 1 tablet by mouth 2 times daily.         Continuous Blood Gluc Sensor (DEXCOM G6 SENSOR) MISC 1 each every 10 days 18 each 1     Continuous Blood Gluc Transmit (DEXCOM G6 TRANSMITTER) MISC 1 each every 3 months Change every 3 months. 2 each 1     fexofenadine (ALLEGRA) 180 MG tablet Take 1 tablet by mouth daily. 1 TABLET DAILY Failed claritin for symptom cotrol. Zyrtec increases heart rate. 90 tablet 3     fish oil-omega-3 fatty acids 1000 MG capsule Take 1 capsule by mouth daily.         fluticasone (FLONASE) 50 MCG/ACT nasal spray USE 1-2 SPRAYS IN EACH NOSTRIL ONCE DAILY 48 g 3     Glucosamine-Chondroitin (GLUCOSAMINE CHONDR COMPLEX PO)           hydrochlorothiazide (HYDRODIURIL) 12.5 MG tablet Take 1 tablet (12.5 mg) by mouth daily 90 tablet 3     insulin aspart (NOVOLOG FLEXPEN) 100 UNIT/ML pen Use 20-35 units three times daily with meals for up to 120 units daily 120 mL 3     insulin glargine (BASAGLAR KWIKPEN) 100 UNIT/ML pen INJECT 70 UNITS UNDER THE SKIN DAILY 90 mL 2     insulin pen needle (BD LISA U/F) 32G X 4 MM miscellaneous Use to inject 4-5 times daily. 600 each 1     latanoprost (XALATAN) 0.005 % ophthalmic solution Place 1 drop into both eyes daily 7.5 mL 11     losartan (COZAAR) 100 MG tablet Take 1 tablet (100 mg) by mouth daily 90 tablet 3     magnesium 250 MG tablet Take 1 tablet by mouth daily 90 tablet 3     metFORMIN (GLUCOPHAGE XR) 500 MG 24 hr tablet TAKE 2 TABLETS TWICE A  tablet 3     montelukast (SINGULAIR) 10 MG tablet Take 1 tablet (10 mg) by mouth At Bedtime 90 tablet 3     MULTIVITAMIN OR Take 1 tablet by mouth daily.         NEEDLES, ANY SIZE Pen needles for Novolog insulin pen. Use to inject 5-6  times daily. 4 Box 3     NOVOLOG FLEXPEN 100 UNIT/ML soln Sig 20-25 units two-three times daily. Max daily dose 75 units 90 mL 1      pantoprazole (PROTONIX) 40 MG EC tablet Take 1 tablet (40 mg) by mouth daily 90 tablet 3     potassium chloride ER (KLOR-CON M) 10 MEQ CR tablet Take 1 tablet (10 mEq) by mouth daily 90 tablet 3     rosuvastatin (CRESTOR) 10 MG tablet Take 1 tablet (10 mg) by mouth daily 90 tablet 3     semaglutide (OZEMPIC) 2 MG/3ML pen Inject 0.25 mg Subcutaneous every 7 days 3 mL 0     [START ON 7/10/2023] semaglutide (OZEMPIC) 2 MG/3ML pen Inject 0.5 mg Subcutaneous every 7 days 3 mL 0     [START ON 8/9/2023] Semaglutide, 1 MG/DOSE, (OZEMPIC) 4 MG/3ML pen Inject 1 mg Subcutaneous every 7 days 3 mL 0     VITAMIN D 2000 UNIT OR TABS Take 1 tablet by mouth daily.         amoxicillin-clavulanate (AUGMENTIN) 875-125 MG tablet Take 1 tablet by mouth 2 times daily for 7 days 14 tablet 0         Physical Exam  /79 (BP Location: Left arm, Patient Position: Sitting, Cuff Size: Adult Large)   Pulse 78   Wt 86.2 kg (190 lb)   LMP 03/19/2009 (Exact Date)   SpO2 97%   BMI 33.66 kg/m      Wt Readings from Last 4 Encounters:   06/10/24 86.2 kg (190 lb)   12/05/23 85.7 kg (189 lb)   11/27/23 85.4 kg (188 lb 3.2 oz)   11/14/23 85.3 kg (188 lb)     General: Well appearing woman in no distress, abdominal obesity.  Psych: good eye contact, no pressured speech     A1c today 6/10/2024 7.2%          Lab Results   Component Value Date      06/07/2023     CHLORIDE 103 06/07/2023     CO2 21 (L) 06/07/2023      (H) 06/07/2023      (H) 06/07/2023     CR 0.76 06/07/2023     CR 0.71 06/07/2023     CR 0.68 11/28/2022     CR 0.63 11/16/2021     CR 0.69 05/05/2021     SUSHANT 9.6 06/07/2023     MAG 2.0 11/14/2018     ALBUMIN 4.3 06/07/2023     ALKPHOS 55 06/07/2023     LDL 79 06/07/2023     HDL 37 (L) 06/07/2023     TRIG 166 (H) 06/07/2023     TSH 1.01 06/07/2023     TSH 1.24 11/28/2022     TSH 0.69 05/05/2021            Lab Results   Component Value Date     MICROL 29.7 06/07/2023     MICROL 11 11/28/2022     MICROL 6 11/16/2021      MICROL 7 08/27/2020     MICROL <5 04/02/2020            Lab Results   Component Value Date     A1C 9.3 (A) 06/27/2022     A1C 8.7 (H) 11/16/2021     A1C 8.3 (H) 05/05/2021     A1C 8.2 (H) 08/27/2020     A1C 8.1 (H) 04/02/2020               Lab Results   Component Value Date     HGB 15.1 06/08/2023                 Again, thank you for allowing me to participate in the care of your patient.        Sincerely,        Stacia Carbajal MD

## 2024-06-14 NOTE — TELEPHONE ENCOUNTER
PA Initiation    Medication: NOVOLOG FLEXPEN 100 UNIT/ML SC SOPN  Insurance Company: [employer] Part D - Phone [phone] Fax [phone]  Pharmacy Filling the Rx: [facility] [place], [place] - [address], [address]  Filling Pharmacy Phone: [phone]  Filling Pharmacy Fax: [phone]  Start Date: 6/13/2024

## 2024-06-14 NOTE — TELEPHONE ENCOUNTER
PA not needed.  Insurance states dexcom does not need a PA.  Pharmacy processed through insurance today and patient notified.

## 2024-06-14 NOTE — TELEPHONE ENCOUNTER
Retail Pharmacy Prior Authorization Team   Phone: [phone]    PA Initiation    Medication: Dexcom G7  Insurance Company: [employer] ([facility]) - Phone [phone] Fax [phone]  Pharmacy Filling the Rx: [place], [place] - [address] [place] [address] [place], [address]  Filling Pharmacy Phone: [phone]  Filling Pharmacy Fax: [phone]  Start Date: 6/14/2024

## 2024-06-14 NOTE — TELEPHONE ENCOUNTER
Prior Authorization Approval    Medication: NOVOLOG FLEXPEN 100 UNIT/ML SC SOPN  Authorization Effective Date: 6/13/2024  Authorization Expiration Date: 12/31/2024  Approved Dose/Quantity:   Reference #:     Insurance Company: [employer] Part D - Phone [phone] Fax [phone]  Expected CoPay: $    CoPay Card Available:      Financial Assistance Needed:   Which Pharmacy is filling the prescription: [facility] [place], [place] - [address], [address]  Pharmacy Notified: YES  Patient Notified: **Instructed pharmacy to notify patient when script is ready to /ship.**

## 2024-06-17 ENCOUNTER — OFFICE VISIT (OUTPATIENT)
Dept: OPHTHALMOLOGY | Facility: CLINIC | Age: 64
End: 2024-06-17
Attending: OPHTHALMOLOGY
Payer: COMMERCIAL

## 2024-06-17 DIAGNOSIS — H25.011 CORTICAL SENILE CATARACT OF RIGHT EYE: Primary | ICD-10-CM

## 2024-06-17 DIAGNOSIS — E11.21 TYPE 2 DIABETES MELLITUS WITH DIABETIC NEPHROPATHY, WITH LONG-TERM CURRENT USE OF INSULIN (H): ICD-10-CM

## 2024-06-17 DIAGNOSIS — H35.373 EPIRETINAL MEMBRANE (ERM) OF BOTH EYES: ICD-10-CM

## 2024-06-17 DIAGNOSIS — H40.003 BORDERLINE GLAUCOMA, BILATERAL: ICD-10-CM

## 2024-06-17 DIAGNOSIS — H52.223 REGULAR ASTIGMATISM OF BOTH EYES: ICD-10-CM

## 2024-06-17 DIAGNOSIS — Z79.4 TYPE 2 DIABETES MELLITUS WITH DIABETIC NEPHROPATHY, WITH LONG-TERM CURRENT USE OF INSULIN (H): ICD-10-CM

## 2024-06-17 PROCEDURE — G0463 HOSPITAL OUTPT CLINIC VISIT: HCPCS | Mod: 25 | Performed by: OPHTHALMOLOGY

## 2024-06-17 PROCEDURE — 99214 OFFICE O/P EST MOD 30 MIN: CPT | Performed by: OPHTHALMOLOGY

## 2024-06-17 PROCEDURE — 92083 EXTENDED VISUAL FIELD XM: CPT | Performed by: OPHTHALMOLOGY

## 2024-06-17 PROCEDURE — 76519 ECHO EXAM OF EYE: CPT | Performed by: OPHTHALMOLOGY

## 2024-06-17 PROCEDURE — 92025 CPTRIZED CORNEAL TOPOGRAPHY: CPT | Performed by: OPHTHALMOLOGY

## 2024-06-17 PROCEDURE — 92250 FUNDUS PHOTOGRAPHY W/I&R: CPT | Mod: XU | Performed by: OPHTHALMOLOGY

## 2024-06-17 PROCEDURE — 92133 CPTRZD OPH DX IMG PST SGM ON: CPT | Performed by: OPHTHALMOLOGY

## 2024-06-17 PROCEDURE — 99207 FUNDUS AUTOFLUORESCENCE IMAGE (FAF) OU (BOTH EYES): CPT | Mod: 26 | Performed by: OPHTHALMOLOGY

## 2024-06-17 PROCEDURE — 92235 FLUORESCEIN ANGRPH MLTIFRAME: CPT | Performed by: OPHTHALMOLOGY

## 2024-06-17 PROCEDURE — 92250 FUNDUS PHOTOGRAPHY W/I&R: CPT | Performed by: OPHTHALMOLOGY

## 2024-06-17 ASSESSMENT — TONOMETRY
OS_IOP_MMHG: 14
OD_IOP_MMHG: 15
IOP_METHOD: ICARE

## 2024-06-17 ASSESSMENT — REFRACTION_WEARINGRX
OS_ADD: +2.50
OS_CYLINDER: +1.00
OD_SPHERE: -2.75
OS_SPHERE: -4.00
OD_CYLINDER: +0.75
OD_AXIS: 120
OD_ADD: +2.50
OS_AXIS: 085

## 2024-06-17 ASSESSMENT — CONF VISUAL FIELD
OS_INFERIOR_TEMPORAL_RESTRICTION: 0
OS_INFERIOR_NASAL_RESTRICTION: 0
OD_NORMAL: 1
OS_SUPERIOR_TEMPORAL_RESTRICTION: 0
OD_INFERIOR_TEMPORAL_RESTRICTION: 0
OS_NORMAL: 1
METHOD: COUNTING FINGERS
OD_INFERIOR_NASAL_RESTRICTION: 0
OD_SUPERIOR_TEMPORAL_RESTRICTION: 0
OS_SUPERIOR_NASAL_RESTRICTION: 0
OD_SUPERIOR_NASAL_RESTRICTION: 0

## 2024-06-17 ASSESSMENT — EXTERNAL EXAM - RIGHT EYE: OD_EXAM: DERMATOCHALASIS

## 2024-06-17 ASSESSMENT — PACHYMETRY
OS_CT(UM): 613
OD_CT(UM): 583

## 2024-06-17 ASSESSMENT — VISUAL ACUITY
METHOD: SNELLEN - LINEAR
OS_CC+: -3
OD_BAT_MED: 20/70
OD_BAT_LOW: 20/60
OD_CC: 20/30
OS_CC: 20/25
CORRECTION_TYPE: GLASSES
OD_BAT_HIGH: 20/80

## 2024-06-17 ASSESSMENT — SLIT LAMP EXAM - LIDS
COMMENTS: MEIBOMIAN GLAND DYSFUNCTION
COMMENTS: MEIBOMIAN GLAND DYSFUNCTION

## 2024-06-17 ASSESSMENT — CUP TO DISC RATIO
OS_RATIO: 0.8
OD_RATIO: 0.9

## 2024-06-17 ASSESSMENT — EXTERNAL EXAM - LEFT EYE: OS_EXAM: DERMATOCHALASIS

## 2024-06-17 NOTE — NURSING NOTE
Chief Complaints and History of Present Illnesses   Patient presents with    Follow Up     Cataract right eye and glaucoma suspect each eye      Chief Complaint(s) and History of Present Illness(es)       Follow Up              Laterality: both eyes    Context: night driving    Course: gradually worsening    Associated symptoms: glare, dryness, floaters and itching.  Negative for eye pain    Treatments tried: eye drops and artificial tears    Pain scale: 0/10    Comments: Cataract right eye and glaucoma suspect each eye               Comments    She states that her vision is blurred in the right eye for the past year.  She describes her vision as fogged.  She tells me that  she gets glare from lights and no longer drives at night.    She uses Systane drops as needed for the dryness and itching.    Charlotte Loving, COT 9:07 AM  June 17, 2024

## 2024-06-17 NOTE — NURSING NOTE
Chief Complaints and History of Present Illnesses   Patient presents with    Follow Up     Chief Complaint(s) and History of Present Illness(es)       Follow Up              Laterality: both eyes    Context: night driving    Course: gradually worsening    Associated symptoms: glare, dryness, floaters and itching.  Negative for eye pain    Treatments tried: eye drops and artificial tears    Pain scale: 0/10              Comments    She states that her vision is blurred in the right eye for the past year.  She describes her vision as fogged.  She tells me that  she gets glare from lights and no longer drives at night.    She uses Systane drops as needed for the dryness and itching.    Charlotte Loving, COT 9:07 AM  June 17, 2024

## 2024-06-17 NOTE — PROGRESS NOTES
CC: Maliha Pedro is a  64 year old year-old patient presenting for glaucoma suspect follow up   Reports glare at night  Difficulty opening the R eyelid in am  NOTE: Prefers to only see attending    Interim: Six month follow up. Vision seems to be worsening with worse glare/halos. No new flashes. Occasional floaters in both eyes, no changes.   No new redness or dryness.  DM 2 BS    PMH: Diabetes mellitus, states Diabetes mellitus is under better control.    Lab Results   Component Value Date    A1C 7.2 06/10/2024    A1C 9.3 06/27/2022    A1C 8.7 11/16/2021    A1C 8.3 05/05/2021    A1C 8.2 08/27/2020    A1C 8.1 04/02/2020    A1C 8.2 11/14/2018       Imaging  OCT 06/17/24 OU with no fluid - stable    ONFL 6/17/24  Right eye: thinning superiorly and inferiorly; stable  Left eye: Thinning S, I, nasal; slightly worse nasal today  stable compared to prior ; mild progression compared to 5388-8717    24-2 OVF 6/17/24  Right eye: superior early arcuate; stable compared to previous. MD -0.9  Left eye: reliable. Superior spots of decreased sensitivity - stable. MD -0.8    Intraocular lens calculations reliable  Topography- regular astigmatism  Optos Autofluorescence: normal except for small hypoautofluorescent spots in areas of heme    Fluorescein angiography 06/17/24 : normal AV and choroidal filling ; multiple microaneurysms and peripheral vascular leakage; no neovascularization elsewhere; no neovascularization of the disc     Assessment & Plan:     # Probably open angle glaucoma both eyes   - large cup to disc ratio R>L  - Intraocular pressure 15/14    - pachmetry 583/613  -06/17/24  retinal nerve fiber layer stable   Right eye: thinning superiorly and inferiorly TS/TI;   Left eye: Thinning S, I, borderline N; stable  stable compared to prior exams; some progression compared to 8628-7241    - intraocular pressure remains good off of latanoprost (voluntarily)   with stable VF and retinal nerve fiber layer  - given  mild progression over the course of several yrs and large cups; would recommend to restart latanoprost  Uses latanoprost on and off; occasional forgets  Recommend to increased compliance with latanoprost    # Type 2 diabetes mellitus with mod Nonproliferative diabetic retinopathy both eyes   Fluorescein angiography: normal AV and choroidal filling ; multiple microaneurysms and peripheral vascular leakage; no neovascularization elsewhere; no neovascularization of the disc   Blood pressure (<120/80) and blood glucose (HbA1c <7.0) control discussed with patient. Patient advised that failure to adequately control each may lead to vision loss. The patient expressed understanding.    # Cataract, right eye  Plan for Cataract extraction intraocular lens   Wearing Rx     Sphere Cylinder Axis Add   Right -2.75 +0.75 120 +2.50   Left -4.00 +1.00 085 +2.50     Glare Testing (BAT)     Off Low Medium High   Right 20/30 20/60 20/70 20/80     The cataract is visually significant, Reports impacts ADL and has glare     Surgeon procedure time:  45 min  Urgency of Surgery: Routine  Post-op apps needed: 1day; 1 week; 3 weeks  Multi surgeon case: No   H&P completed by primary care physician/PAC   needed: NO  Anesthesia: Peribulbar block    Aim for: - 2.50   Patient will try to find the card for the left eye- left eye was left myopic  Patient wants surgery in July    Dilation:Good  Iris expansion: Not needed  Epinephrine: No  Pseudoexfoliation: NO  Trypan Blue: No   Phacodonesis: No  Cornea guttae: rare  Anticoagulants: NO  Flomax: NO  Candidate for multifocal or toric IOL: NO  Visually significant astigmatism: Discussed elective surgical refractive corrective options  Prior refractive surgery: No    I reviewed the indications, risks, benefits, and alternatives of the proposed surgical procedure. We discussed at length cataracts and the effect of the cataracts on vision.   We discussed the fact that modern cataract surgery is  usually successful at alleviating symptoms of glare when the cataract is the causative factor. Other risks were discussed with patient including, but not limited to, failure to improve vision or further loss of vision, bleeding, infection, loss of vision and the remote possibility of complications of anesthesia or need for additional surgery. 1:1000 risk of infection/bleed/loss of eye; 1:100 risk of RD and need for further surgery.  Patient agreed to proceed with surgery.  I provided multiple opportunities for the questions, answered all questions to the best of my ability, and confirmed that my answers and my discussion were understood.   Discussed with patient will need prescription for distance  She prefers to have a correction similar to her current prescription      # Pseudophakia, left eye- Dr Rosario  -stable  -observe    # Posterior vitreous detachment of right eye  -stable  -observe    # Old inferior temporal small tear surrounded by pigment, no subretinal fluid right eye   Peripheral Retinal pigment epithelium/small lattice both eyes   Retina attached, no other tears  Retina detachment precautions were discussed with the patient (presence or increased in flashes, floaters or a curtain in the visual field) and was asked to return if any of the those occur    #. Dry eye syndrome  artificial tears  As needed and warm compresses      # Mild Epiretinal membrane right eye  Stable   Not visually significant     # myopia. prescription given 11/20/23     PLAN:  Recommend Cataract extraction iol right eye   recommend to increase compliance with latanoprost  Follow up POD1  ~~~~~~~~~~~~~~~~~~~~~~~~~~~~~~~~~~   Complete documentation of historical and exam elements from today's encounter can be found in the full encounter summary report (not reduplicated in this progress note).  I personally obtained the chief complaint(s) and history of present illness.  I confirmed and edited as necessary the review of systems, past  medical/surgical history, family history, social history, and examination findings as documented by others; and I examined the patient myself.  I personally reviewed the relevant tests, images, and reports as documented above.  I formulated and edited as necessary the assessment and plan and discussed the findings and management plan with the patient and family    Lesly Tracy MD   of Ophthalmology.  Retina Service   Department of Ophthalmology and Visual Neurosciences   [facility]  Phone: [phone]   Fax: [phone]

## 2024-06-19 ENCOUNTER — MYC MEDICAL ADVICE (OUTPATIENT)
Dept: FAMILY MEDICINE | Facility: CLINIC | Age: 64
End: 2024-06-19
Payer: COMMERCIAL

## 2024-06-19 DIAGNOSIS — R05.9 COUGH, UNSPECIFIED TYPE: ICD-10-CM

## 2024-06-19 DIAGNOSIS — J45.30 MILD PERSISTENT ASTHMA WITHOUT COMPLICATION: Primary | ICD-10-CM

## 2024-06-19 DIAGNOSIS — Z12.31 ENCOUNTER FOR SCREENING MAMMOGRAM FOR BREAST CANCER: ICD-10-CM

## 2024-06-19 SDOH — HEALTH STABILITY: PHYSICAL HEALTH: ON AVERAGE, HOW MANY DAYS PER WEEK DO YOU ENGAGE IN MODERATE TO STRENUOUS EXERCISE (LIKE A BRISK WALK)?: 3 DAYS

## 2024-06-19 SDOH — HEALTH STABILITY: PHYSICAL HEALTH: ON AVERAGE, HOW MANY MINUTES DO YOU ENGAGE IN EXERCISE AT THIS LEVEL?: 30 MIN

## 2024-06-19 ASSESSMENT — ASTHMA QUESTIONNAIRES
ACT_TOTALSCORE: 18
ACT_TOTALSCORE: 18
QUESTION_3 LAST FOUR WEEKS HOW OFTEN DID YOUR ASTHMA SYMPTOMS (WHEEZING, COUGHING, SHORTNESS OF BREATH, CHEST TIGHTNESS OR PAIN) WAKE YOU UP AT NIGHT OR EARLIER THAN USUAL IN THE MORNING: TWO OR THREE NIGHTS A WEEK
QUESTION_2 LAST FOUR WEEKS HOW OFTEN HAVE YOU HAD SHORTNESS OF BREATH: NOT AT ALL
QUESTION_5 LAST FOUR WEEKS HOW WOULD YOU RATE YOUR ASTHMA CONTROL: SOMEWHAT CONTROLLED
QUESTION_1 LAST FOUR WEEKS HOW MUCH OF THE TIME DID YOUR ASTHMA KEEP YOU FROM GETTING AS MUCH DONE AT WORK, SCHOOL OR AT HOME: A LITTLE OF THE TIME
QUESTION_4 LAST FOUR WEEKS HOW OFTEN HAVE YOU USED YOUR RESCUE INHALER OR NEBULIZER MEDICATION (SUCH AS ALBUTEROL): ONCE A WEEK OR LESS

## 2024-06-19 ASSESSMENT — SOCIAL DETERMINANTS OF HEALTH (SDOH): HOW OFTEN DO YOU GET TOGETHER WITH FRIENDS OR RELATIVES?: ONCE A WEEK

## 2024-06-19 NOTE — TELEPHONE ENCOUNTER
Per chart review, chronic cough was discussed at last visit on 11/13/23. Has an upcoming appointment on 6/24/24. Routing to provider to review and advise.     CHANELLE HinojosaN, RN   [facility]

## 2024-06-20 ENCOUNTER — TELEPHONE (OUTPATIENT)
Dept: OPHTHALMOLOGY | Facility: CLINIC | Age: 64
End: 2024-06-20
Payer: COMMERCIAL

## 2024-06-20 DIAGNOSIS — J45.909 ASTHMA: Primary | ICD-10-CM

## 2024-06-20 PROBLEM — H25.011 CORTICAL SENILE CATARACT OF RIGHT EYE: Status: ACTIVE | Noted: 2024-06-17

## 2024-06-20 NOTE — TELEPHONE ENCOUNTER
Called patient to schedule surgery with Dr Tracy    Spoke with: patient    Date of Surgery: 7/9 (Right)     Patient aware of approximate arrival time: No      Location of surgery: [facility] (Right)      Pre-Op H&P: [facility]     Informed patient that they need to call to schedule pre-op H&P with [facility]  within 30 days of surgery date: Yes - scheduled on 6/24    Post-Op Appt Dates: 7/10 @ 10:00, 7/15 @ 10:00,  8/8 @ 10:00     Discussed with patient pre-op RN will call 2-3 days prior to surgery with arrival time and instructions:  Yes    Discussed with patient PAC RN will provide arrival time and instructions for surgery at the time of the appointment: [[place] locations only]: Not Applicable      Standard Surgery Packet Sent: Yes 06/20/24  via [employer] Message      Surgical Soap Discussed with Patient: No      Additional Information Sent in Packet: Post-op Appointment Itinerary      Informed patient that they will need an adult  to bring patient home from surgery: Yes  : Patient is aware of the need for an adult          Additional Comments:        All patients questions were answered and was instructed to review surgical packet and call back [phone] with any questions or concerns.       Florencia Torres on 6/20/2024 at 11:18 AM

## 2024-06-23 RX ORDER — PEN NEEDLE, DIABETIC 32GX 5/32"
NEEDLE, DISPOSABLE MISCELLANEOUS
Qty: 600 EACH | Refills: 1 | Status: SHIPPED | OUTPATIENT
Start: 2024-06-23

## 2024-06-24 ENCOUNTER — ANCILLARY PROCEDURE (OUTPATIENT)
Dept: GENERAL RADIOLOGY | Facility: CLINIC | Age: 64
End: 2024-06-24
Attending: FAMILY MEDICINE
Payer: COMMERCIAL

## 2024-06-24 ENCOUNTER — OFFICE VISIT (OUTPATIENT)
Dept: FAMILY MEDICINE | Facility: CLINIC | Age: 64
End: 2024-06-24
Payer: COMMERCIAL

## 2024-06-24 VITALS
HEART RATE: 81 BPM | BODY MASS INDEX: 33.84 KG/M2 | RESPIRATION RATE: 13 BRPM | TEMPERATURE: 98.1 F | HEIGHT: 63 IN | WEIGHT: 191 LBS | DIASTOLIC BLOOD PRESSURE: 65 MMHG | OXYGEN SATURATION: 96 % | SYSTOLIC BLOOD PRESSURE: 120 MMHG

## 2024-06-24 DIAGNOSIS — E11.3299 TYPE 2 DIABETES MELLITUS WITH MILD NONPROLIFERATIVE RETINOPATHY WITHOUT MACULAR EDEMA, WITH LONG-TERM CURRENT USE OF INSULIN, UNSPECIFIED LATERALITY (H): ICD-10-CM

## 2024-06-24 DIAGNOSIS — M25.552 HIP PAIN, LEFT: ICD-10-CM

## 2024-06-24 DIAGNOSIS — Z79.4 TYPE 2 DIABETES MELLITUS WITH MILD NONPROLIFERATIVE RETINOPATHY WITHOUT MACULAR EDEMA, WITH LONG-TERM CURRENT USE OF INSULIN, UNSPECIFIED LATERALITY (H): ICD-10-CM

## 2024-06-24 DIAGNOSIS — E28.39 ESTROGEN DEFICIENCY: ICD-10-CM

## 2024-06-24 DIAGNOSIS — R05.3 CHRONIC COUGH: ICD-10-CM

## 2024-06-24 DIAGNOSIS — J45.30 MILD PERSISTENT ASTHMA WITHOUT COMPLICATION: ICD-10-CM

## 2024-06-24 DIAGNOSIS — J30.2 SEASONAL ALLERGIC RHINITIS, UNSPECIFIED TRIGGER: ICD-10-CM

## 2024-06-24 DIAGNOSIS — Z00.00 ENCOUNTER FOR MEDICARE ANNUAL WELLNESS EXAM: Primary | ICD-10-CM

## 2024-06-24 DIAGNOSIS — K76.0 FATTY LIVER DISEASE, NONALCOHOLIC: ICD-10-CM

## 2024-06-24 DIAGNOSIS — K21.9 GASTROESOPHAGEAL REFLUX DISEASE, UNSPECIFIED WHETHER ESOPHAGITIS PRESENT: ICD-10-CM

## 2024-06-24 DIAGNOSIS — H91.90 DECREASED HEARING, UNSPECIFIED LATERALITY: ICD-10-CM

## 2024-06-24 DIAGNOSIS — I10 ESSENTIAL HYPERTENSION: ICD-10-CM

## 2024-06-24 PROCEDURE — 71046 X-RAY EXAM CHEST 2 VIEWS: CPT | Performed by: RADIOLOGY

## 2024-06-24 PROCEDURE — 99214 OFFICE O/P EST MOD 30 MIN: CPT | Mod: 25 | Performed by: FAMILY MEDICINE

## 2024-06-24 PROCEDURE — 90480 ADMN SARSCOV2 VAC 1/ONLY CMP: CPT | Performed by: FAMILY MEDICINE

## 2024-06-24 PROCEDURE — 91320 SARSCV2 VAC 30MCG TRS-SUC IM: CPT | Performed by: FAMILY MEDICINE

## 2024-06-24 PROCEDURE — 73502 X-RAY EXAM HIP UNI 2-3 VIEWS: CPT | Mod: LT | Performed by: RADIOLOGY

## 2024-06-24 PROCEDURE — G0402 INITIAL PREVENTIVE EXAM: HCPCS | Performed by: FAMILY MEDICINE

## 2024-06-24 PROCEDURE — 90677 PCV20 VACCINE IM: CPT | Performed by: FAMILY MEDICINE

## 2024-06-24 PROCEDURE — G0009 ADMIN PNEUMOCOCCAL VACCINE: HCPCS | Performed by: FAMILY MEDICINE

## 2024-06-24 RX ORDER — RESPIRATORY SYNCYTIAL VIRUS VACCINE 120MCG/0.5
0.5 KIT INTRAMUSCULAR ONCE
Qty: 1 EACH | Refills: 0 | Status: CANCELLED | OUTPATIENT
Start: 2024-06-24 | End: 2024-06-24

## 2024-06-24 ASSESSMENT — PAIN SCALES - GENERAL: PAINLEVEL: NO PAIN (1)

## 2024-06-24 NOTE — PATIENT INSTRUCTIONS
"You should restart dulera (check with your insurance to see if there is a cheaper formulary alternative)    For insulin prior to surgery:  Decrease lantus day of surgery to 50%  Hold morning rapid acting insulin  Hold metformin morning of surgery  Hold ozempic 1 week prior to surgery  Hold supplements morning of surgery.   Use inhalers morning of procedure    Schedule mammogram and bone scan together (7/18/24 or later)        Patient Education   Preventive Care Advice   This is general advice we often give to help people stay healthy. Your care team may have specific advice just for you. Please talk to your care team about your own preventive care needs.  Lifestyle  Exercise at least 150 minutes each week (30 minutes a day, 5 days a week).  Do muscle strengthening activities 2 days a week. These help control your weight and prevent disease.  No smoking.  Wear sunscreen to prevent skin cancer.  Have your home tested for radon every 2 to 5 years. Radon is a colorless, odorless gas that can harm your lungs. To learn more, go to www.health.[facility].mn. and search for \"Radon in Homes.\"  Keep guns unloaded and locked up in a safe place like a safe or gun vault, or, use a gun lock and hide the keys. Always lock away bullets separately. To learn more, visit [place].gov and search for \"safe gun storage.\"  Nutrition  Eat 5 or more servings of fruits and vegetables each day.  Try wheat bread, brown rice and whole grain pasta (instead of white bread, rice, and pasta).  Get enough calcium and vitamin D. Check the label on foods and aim for 100% of the RDA (recommended daily allowance).  Regular exams  Have a dental exam and cleaning every 6 months.  See your health care team every year to talk about:  Any changes in your health.  Any medicines your care team has prescribed.  Preventive care, family planning, and ways to prevent chronic diseases.  Shots (vaccines)   HPV shots (up to age 26), if you've never had them " before.  Hepatitis B shots (up to age 59), if you've never had them before.  COVID-19 shot: Get this shot when it's due.  Flu shot: Get a flu shot every year.  Tetanus shot: Get a tetanus shot every 10 years.  Pneumococcal, hepatitis A, and RSV shots: Ask your care team if you need these based on your risk.  Shingles shot (for age 50 and up).  General health tests  Diabetes screening:  Starting at age 35, Get screened for diabetes at least every 3 years.  If you are younger than age 35, ask your care team if you should be screened for diabetes.  Cholesterol test: At age 39, start having a cholesterol test every 5 years, or more often if advised.  Bone density scan (DEXA): At age 50, ask your care team if you should have this scan for osteoporosis (brittle bones).  Hepatitis C: Get tested at least once in your life.  Abdominal aortic aneurysm screening: Talk to your doctor about having this screening if you:  Have ever smoked; and  Are biologically male; and  Are between the ages of 65 and 75.  STIs (sexually transmitted infections)  Before age 24: Ask your care team if you should be screened for STIs.  After age 24: Get screened for STIs if you're at risk. You are at risk for STIs (including HIV) if:  You are sexually active with more than one person.  You don't use condoms every time.  You or a partner was diagnosed with a sexually transmitted infection.  If you are at risk for HIV, ask about PrEP medicine to prevent HIV.  Get tested for HIV at least once in your life, whether you are at risk for HIV or not.  Cancer screening tests  Cervical cancer screening: If you have a cervix, begin getting regular cervical cancer screening tests at age 21. Most people who have regular screenings with normal results can stop after age 65. Talk about this with your provider.  Breast cancer scan (mammogram): If you've ever had breasts, begin having regular mammograms starting at age 40. This is a scan to check for breast  cancer.  Colon cancer screening: It is important to start screening for colon cancer at age 45.  Have a colonoscopy test every 10 years (or more often if you're at risk) Or, ask your provider about stool tests like a FIT test every year or Cologuard test every 3 years.  To learn more about your testing options, visit: www.[facility]/933743.pdf.  For help making a decision, visit: judy/cf64925.  Prostate cancer screening test: If you have a prostate and are age 55 to 69, ask your provider if you would benefit from a yearly prostate cancer screening test.  Lung cancer screening: If you are a current or former smoker age 50 to 80, ask your care team if ongoing lung cancer screenings are right for you.  For informational purposes only. Not to replace the advice of your health care provider. Copyright   2023 [employer]. All rights reserved. Clinically reviewed by the [facility] Transitions Program. [employer] 447913 - REV 04/24.  Hearing Loss: Care Instructions  Overview     Hearing loss is a sudden or slow decrease in how well you hear. It can range from slight to profound. Permanent hearing loss can occur with aging. It also can happen when you are exposed long-term to loud noise. Examples include listening to loud music, riding motorcycles, or being around other loud machines.  Hearing loss can affect your work and home life. It can make you feel lonely or depressed. You may feel that you have lost your independence. But hearing aids and other devices can help you hear better and feel connected to others.  Follow-up care is a key part of your treatment and safety. Be sure to make and go to all appointments, and call your doctor if you are having problems. It's also a good idea to know your test results and keep a list of the medicines you take.  How can you care for yourself at home?  Avoid loud noises whenever possible. This helps keep your hearing from getting worse.  Always wear hearing  "protection around loud noises.  Wear a hearing aid as directed.  A professional can help you pick a hearing aid that will work best for you.  You can also get hearing aids over the counter for mild to moderate hearing loss.  Have hearing tests as your doctor suggests. They can show whether your hearing has changed. Your hearing aid may need to be adjusted.  Use other devices as needed. These may include:  Telephone amplifiers and hearing aids that can connect to a television, stereo, radio, or microphone.  Devices that use lights or vibrations. These alert you to the doorbell, a ringing telephone, or a baby monitor.  Television closed-captioning. This shows the words at the bottom of the screen. Most new TVs can do this.  TTY (text telephone). This lets you type messages back and forth on the telephone instead of talking or listening. These devices are also called TDD. When messages are typed on the keyboard, they are sent over the phone line to a receiving TTY. The message is shown on a monitor.  Use text messaging, social media, and email if it is hard for you to communicate by telephone.  Try to learn a listening technique called speechreading. It is not lipreading. You pay attention to people's gestures, expressions, posture, and tone of voice. These clues can help you understand what a person is saying. Face the person you are talking to, and have them face you. Make sure the lighting is good. You need to see the other person's face clearly.  Think about counseling if you need help to adjust to your hearing loss.  When should you call for help?  Watch closely for changes in your health, and be sure to contact your doctor if:    You think your hearing is getting worse.     You have new symptoms, such as dizziness or nausea.   Where can you learn more?  Go to https://www.healthwise.net/patiented  Enter R798 in the search box to learn more about \"Hearing Loss: Care Instructions.\"  Current as of: September 27, " 2023               Content Version: 14.0    1501-3705 [employer].   Care instructions adapted under license by your healthcare professional. If you have questions about a medical condition or this instruction, always ask your healthcare professional. [employer] disclaims any warranty or liability for your use of this information.

## 2024-06-24 NOTE — RESULT ENCOUNTER NOTE
Jason Eagle,  Your x-ray was read by radiology.  They felt the hip joint itself looked okay but there was a slight irregularity on the outside of the femur bone and the greater trochanter area.  They were uncertain what this was but recommended a CT scan to ensure it was not a fracture.  I will place an order for the CT of your hip and a  will contact you to get this set up.  Kind regards,  Elva Ortiz MD

## 2024-06-24 NOTE — PROGRESS NOTES
Preventive Care Visit  [facility] STEPHANI Stevenson Peggy Ortiz MD, Family Medicine  Jun 24, 2024      Assessment & Plan     Encounter for Medicare annual wellness exam    Has upcoming cataract surgery planned.  Okay to proceed with anesthesia for cataract surgery.  Reviewed plan for insulin prior to procedure      Chronic cough  Likely allergen and humidity triggered, possibly due to asthma/allergies.  - XR Chest 2 Views; Future    Mild persistent asthma without complication  Encouraged her to restart her Dulera.  Chest x-ray today was reassuring.  She has follow-up with pulmonary next month.    Hip pain, left  Chronic intermittent pain that is worsened with prolonged sitting and squatting.  X-ray in the past is unremarkable.  X-ray today is read by radiology as possible irregularity in the trochanteric area, CT recommended to rule out fracture.  I think this is somewhat unlikely given her intermittent pain that is improved with exercise but will certainly get the CT given the finding is new on x-ray  - XR Hip Left 2-3 Views; Future  - Physical Therapy  Referral; Future  - CT Hip Left w/o Contrast; Future    Estrogen deficiency  Discussed weightbearing exercises, calcium  - DEXA HIP/PELVIS/SPINE - Future; Future    Decreased hearing, unspecified laterality  Noticing hearing is decreasing over time  - Adult Audiology  Referral; Future    Seasonal allergic rhinitis, unspecified trigger  Continue Astelin, saline spray, antihistamine    Fatty liver disease, nonalcoholic  LFTs have improved wth wt loss.  Will repeat LFTs with CBC next visit.  - Comprehensive metabolic panel (BMP + Alb, Alk Phos, ALT, AST, Total. Bili, TP); Future  - CBC with platelets and differential; Future    Essential hypertension  At goal today, continue losartan and hydrochlorothiazide    Gastroesophageal reflux disease, unspecified whether esophagitis present  On long-term metformin and PPI.  She has been  "unable to wean PPI.  May need further evaluation of her reflux if cough persist despite asthma treatment.  Will get vitamin levels with next lab draw  - Vitamin B12; Future  - Magnesium; Future    Type 2 diabetes mellitus with mild nonproliferative retinopathy without macular edema, with long-term current use of insulin, unspecified laterality (H)  Has been under excellent control.  Continues on Lantus insulin daily and NovoLog with meals, metformin and Ozempic            BMI  Estimated body mass index is 33.83 kg/m  as calculated from the following:    Height as of this encounter: 1.6 m (5' 3\").    Weight as of this encounter: 86.6 kg (191 lb).   Weight management plan: Actively losing weight on Ozempic.  Is been working on eating much healthier and getting more exercise    Counseling  Appropriate preventive services were discussed with this patient, including applicable screening as appropriate for fall prevention, nutrition, physical activity, Tobacco-use cessation, weight loss and cognition.  Checklist reviewing preventive services available has been given to the patient.  Reviewed patient's diet, addressing concerns and/or questions.   She is at risk for lack of exercise and has been provided with information to increase physical activity for the benefit of her well-being.   She is at risk for psychosocial distress and has been provided with information to reduce risk.   The patient was provided with written information regarding signs of hearing loss.       See Patient Instructions    Ok for cataract surgery anesthesia.       Yolanda Eagle is a 64 year old, presenting for the following:  Medicare Visit        6/24/2024     8:09 AM   Additional Questions   Roomed by Katarina   Accompanied by self         6/24/2024     8:09 AM   Patient Reported Additional Medications   Patient reports taking the following new medications no        Health Care Directive  Patient has a Health Care Directive on file  Advance care " planning document is on file and is current.    HPI    Fco is back from the [facility] again for her routine 6-month check-in.    Her chronic cough has returned.  Very humid in [facility]  Throat has been dry and triggering more cough again.   Coughing a lot.   Taking allergy medication otc.   Not using dulera  Astelin and albuterol neb  Nasal drip does trigger cough  MucinexDM was helpful  Scheduled with pulmonary in July  Saline spray.   No dyspnea or wheezing.   No fever, chills  Had seen allergist last year who felt she had persistent asthma and recommended the Dulera which I had also prescribed for her.    July 9th, cataract surgery left eye   Will be here in [facility] for few months for surgery and recovery.     Purititis vulvar is resolved after using steroid ointment with her gynecologist. Humidity tends to trigger. cereVe also helps.    A1C 7.2, no longer eating rice.    Liver function test's back to normal  25 units of regular insulin    After squatting/prolonged sitting get buttock pain on left side. Thinks hip  Interested in PHYSICAL THERAPY  Exercises do help it usually feel better.       Doing okay with mental health  Corgis and gardening are her stress relief    Gerd daily. Tried to drop to every other day but no success.   Metamucil keeps bowels regular       6/19/2024   General Health   How would you rate your overall physical health? (!) FAIR   Feel stress (tense, anxious, or unable to sleep) Only a little      (!) STRESS CONCERN      6/19/2024   Nutrition   Diet: Regular (no restrictions)            6/19/2024   Exercise   Days per week of moderate/strenous exercise 3 days   Average minutes spent exercising at this level 30 min            6/19/2024   Social Factors   Frequency of gathering with friends or relatives Once a week   Worry food won't last until get money to buy more No   Food not last or not have enough money for food? No   Do you have housing? (Housing is defined as stable  permanent housing and does not include staying ouside in a car, in a tent, in an abandoned building, in an overnight shelter, or couch-surfing.) Yes   Are you worried about losing your housing? No   Lack of transportation? No   Unable to get utilities (heat,electricity)? No            6/19/2024   Fall Risk   Fallen 2 or more times in the past year? No    No   Trouble with walking or balance? No    No       Multiple values from one day are sorted in reverse-chronological order          6/19/2024   Activities of Daily Living- Home Safety   Needs help with the following daily activites None of the above   Safety concerns in the home None of the above            6/19/2024   Dental   Dentist two times every year? Yes            6/19/2024   Hearing Screening   Hearing concerns? (!) I NEED TO ASK PEOPLE TO SPEAK UP OR REPEAT THEMSELVES.    (!) TROUBLE UNDERSTANDING SOFT OR WHISPERED SPEECH.       Multiple values from one day are sorted in reverse-chronological order   Has had hearing evaluated with ent in the past.         6/19/2024   Driving Risk Screening   Patient/family members have concerns about driving No            6/19/2024   General Alertness/Fatigue Screening   Have you been more tired than usual lately? No            6/19/2024   Urinary Incontinence Screening   Bothered by leaking urine in past 6 months No            6/19/2024   TB Screening   Were you born outside of the [place]? Yes            Today's PHQ-2 Score:       6/24/2024     8:03 AM   PHQ-2 ( 1999 [employer])   Q1: Little interest or pleasure in doing things 0   Q2: Feeling down, depressed or hopeless 0   PHQ-2 Score 0   Q1: Little interest or pleasure in doing things Not at all   Q2: Feeling down, depressed or hopeless Not at all   PHQ-2 Score 0           6/19/2024   Substance Use   Alcohol more than 3/day or more than 7/wk No   Do you have a current opioid prescription? No   How severe/bad is pain from 1 to 10? 2/10   Do you use any other substances  recreationally? No        Social History     Tobacco Use    Smoking status: Never     Passive exposure: Never    Smokeless tobacco: Never   Vaping Use    Vaping status: Never Used   Substance Use Topics    Alcohol use: Yes     Comment: one drink a month    Drug use: No           7/17/2023   LAST FHS-7 RESULTS   1st degree relative breast or ovarian cancer No   Any relative bilateral breast cancer No   Any male have breast cancer No   Any ONE woman have BOTH breast AND ovarian cancer No   Any woman with breast cancer before 50yrs No   2 or more relatives with breast AND/OR ovarian cancer No   2 or more relatives with breast AND/OR bowel cancer No           Mammogram Screening - Mammogram every 1-2 years updated in Health Maintenance based on mutual decision making      History of abnormal Pap smear: no        Latest Ref Rng & Units 11/15/2021     1:26 PM 3/6/2017     9:05 AM 3/6/2017     8:59 AM   PAP / HPV   PAP  Negative for Intraepithelial Lesion or Malignancy (NILM)      PAP (Historical)    NIL    HPV 16 DNA Negative Negative  Negative     HPV 18 DNA Negative Negative  Negative     Other HR HPV Negative Negative  Negative       ASCVD Risk   The ASCVD Risk score (Sisi BOATENG, et al., 2019) failed to calculate for the following reasons:    The valid total cholesterol range is 130 to 320 mg/dL            Reviewed and updated as needed this visit by Provider                      Current providers sharing in care for this patient include:  Patient Care Team:  Elva Ortiz MD as PCP - General  Umu Rosario MD as MD (Ophthalmology)  Shaheen Mccarthy MD as MD (Internal Medicine)  Donell Marino MD as MD (Dermatology)  Lesly Tracy MD as MD (Ophthalmology)  Lesly Tracy MD as Assigned Surgical Provider  Elva Ortiz MD as Assigned PCP  Stacia Carbajal MD as Assigned Endocrinology Provider  Aidan Verma MD as MD (Allergy & Immunology)  Radha  "DO Zulay as Assigned OBGYN Provider  Aidan Verma MD as Assigned Allergy Provider    The following health maintenance items are reviewed in Epic and correct as of today:  Health Maintenance   Topic Date Due    ASTHMA ACTION PLAN  Never done    ANNUAL REVIEW OF HM ORDERS  12/01/2022    DEXA  11/17/2023    COVID-19 Vaccine (7 - 2023-24 season) 03/13/2024    Pneumococcal Vaccine: 65+ Years (3 of 3 - PPSV23 or PCV20) 01/28/2024    MEDICARE ANNUAL WELLNESS VISIT  06/08/2024    BMP  11/13/2024    HEPATIC PANEL  12/06/2024    A1C  12/10/2024    ASTHMA CONTROL TEST  12/24/2024    LIPID  06/06/2025    MICROALBUMIN  06/06/2025    DIABETIC FOOT EXAM  06/10/2025    EYE EXAM  06/17/2025    FALL RISK ASSESSMENT  06/24/2025    MAMMO SCREENING  07/17/2025    ADVANCE CARE PLANNING  09/04/2025    COLORECTAL CANCER SCREENING  05/12/2026    HEPATITIS C SCREENING  Completed    HIV SCREENING  Completed    PHQ-2 (once per calendar year)  Completed    INFLUENZA VACCINE  Completed    ZOSTER IMMUNIZATION  Completed    RSV VACCINE (Pregnancy & 60+)  Completed    IPV IMMUNIZATION  Aged Out    HPV IMMUNIZATION  Aged Out    MENINGITIS IMMUNIZATION  Aged Out    RSV MONOCLONAL ANTIBODY  Aged Out    PAP  Discontinued    DTAP/TDAP/TD IMMUNIZATION  Discontinued         Review of Systems  Constitutional, neuro, ENT, endocrine, pulmonary, cardiac, gastrointestinal, genitourinary, musculoskeletal, integument and psychiatric systems are negative, except as otherwise noted.     Objective    Exam  /65 (BP Location: Right arm, Patient Position: Sitting, Cuff Size: Adult Large)   Pulse 81   Temp 98.1  F (36.7  C) (Temporal)   Resp 13   Ht 1.6 m (5' 3\")   Wt 86.6 kg (191 lb)   LMP 03/19/2009 (Exact Date)   SpO2 96%   BMI 33.83 kg/m     Estimated body mass index is 33.83 kg/m  as calculated from the following:    Height as of this encounter: 1.6 m (5' 3\").    Weight as of this encounter: 86.6 kg (191 lb).    Physical Exam  GENERAL: alert and " no distress  EYES: Eyes grossly normal to inspection, PERRL and conjunctivae and sclerae normal  HENT: ear canals and TM's normal, nose and mouth without ulcers or lesions  NECK: no adenopathy, no asymmetry, masses, or scars  RESP: lungs clear to auscultation - no rales, rhonchi or wheezes  BREAST: normal without masses, tenderness or nipple discharge and no palpable axillary masses or adenopathy  CV: regular rate and rhythm, normal S1 S2, no S3 or S4, no murmur, click or rub, no peripheral edema  ABDOMEN: soft, nontender, no hepatosplenomegaly, no masses and bowel sounds normal  MS: mild Pain with IR/ER rotation of left hip  SKIN: no suspicious lesions or rashes  NEURO: Normal strength and tone, mentation intact and speech normal  PSYCH: mentation appears normal, affect normal/bright    Chest x-ray: No acute finding.  Xray personally reviewed and evaluated by me  Component      Latest Ref Rng 6/6/2024  10:08 AM   Protein Total      6.4 - 8.3 g/dL 7.2    Albumin      3.5 - 5.2 g/dL 4.4    Bilirubin Total      <=1.2 mg/dL 0.3    Alkaline Phosphatase      40 - 150 U/L 57    AST      0 - 45 U/L 31    ALT      0 - 50 U/L 45    Bilirubin Direct      0.00 - 0.30 mg/dL <0.20    Cholesterol      <200 mg/dL 123    Triglycerides      <150 mg/dL 121    HDL Cholesterol      >=50 mg/dL 40 (L)    LDL Cholesterol Calculated      <=100 mg/dL 59    Non HDL Cholesterol      <130 mg/dL 83    Patient Fasting? Yes    Patient Fasting? Yes    Creatinine Urine      mg/dL 158.0    Albumin Urine mg/L      mg/L <12.0    Albumin Urine mg/g Cr --    Creatinine      0.51 - 0.95 mg/dL 0.80    GFR Estimate      >60 mL/min/1.73m2 81    Glucose      70 - 99 mg/dL 121 (H)    TSH      0.30 - 4.20 uIU/mL 1.95    Potassium      3.4 - 5.3 mmol/L 3.5    Urea Nitrogen      8.0 - 23.0 mg/dL 15.8       Legend:  (L) Low  (H) High        6/24/2024   Mini Cog   Clock Draw Score 2 Normal   3 Item Recall 3 objects recalled   Mini Cog Total Score 5             Vision Screen  Patient wears corrective lenses (select all that apply): Worn during vision screen  Vision Screen Results: Pass      Signed Electronically by: Elva Ortiz MD

## 2024-06-24 NOTE — PROGRESS NOTES
Prior to immunization administration, verified patients identity using patient s name and date of birth. Please see Immunization Activity for additional information.     Screening Questionnaire for Adult Immunization    Are you sick today?   No   Do you have allergies to medications, food, a vaccine component or latex?   Yes   Have you ever had a serious reaction after receiving a vaccination?   No   Do you have a long-term health problem with heart, lung, kidney, or metabolic disease (e.g., diabetes), asthma, a blood disorder, no spleen, complement component deficiency, a cochlear implant, or a spinal fluid leak?  Are you on long-term aspirin therapy?   Yes   Do you have cancer, leukemia, HIV/AIDS, or any other immune system problem?   No   Do you have a parent, brother, or sister with an immune system problem?   Yes   In the past 3 months, have you taken medications that affect  your immune system, such as prednisone, other steroids, or anticancer drugs; drugs for the treatment of rheumatoid arthritis, Crohn s disease, or psoriasis; or have you had radiation treatments?   No   Have you had a seizure, or a brain or other nervous system problem?   No   During the past year, have you received a transfusion of blood or blood    products, or been given immune (gamma) globulin or antiviral drug?   No   For women: Are you pregnant or is there a chance you could become       pregnant during the next month?   No   Have you received any vaccinations in the past 4 weeks?   No     Immunization questionnaire was positive for at least one answer.  Notified Dr. Ortiz.      Patient instructed to remain in clinic for 15 minutes afterwards, and to report any adverse reactions.     Screening performed by Katarina Meza MA on 6/24/2024 at 9:28 AM.

## 2024-06-24 NOTE — RESULT ENCOUNTER NOTE
Fco,  Your chest x-ray was normal without any signs of pneumonia or infection.  Kind regards,  Elva Ortiz MD

## 2024-06-25 ENCOUNTER — ANCILLARY PROCEDURE (OUTPATIENT)
Dept: BONE DENSITY | Facility: CLINIC | Age: 64
End: 2024-06-25
Attending: FAMILY MEDICINE
Payer: COMMERCIAL

## 2024-06-25 DIAGNOSIS — E28.39 ESTROGEN DEFICIENCY: ICD-10-CM

## 2024-06-25 PROCEDURE — 77081 DXA BONE DENSITY APPENDICULR: CPT | Mod: XU | Performed by: RADIOLOGY

## 2024-06-25 PROCEDURE — 77080 DXA BONE DENSITY AXIAL: CPT | Performed by: RADIOLOGY

## 2024-06-25 NOTE — RESULT ENCOUNTER NOTE
Fco,    Happy to report there has been no further bone density loss compared to your last scan.  We continue to see osteopenia in the hips and normal bone density in the spine and wrist.    To help prevent further loss of bone, the following is recommended:    Take in adequate amounts of Calcium and Vitamin D. These are the building blocks for bones. Most women will need 1,200 mg of Calcium (through diet and supplements if needed) and 1,000 international units of Vitamin D daily (typically through supplements).    Regular exercise especially strength and balance training is recommended.    Please plan for your next bone Scan in 3 to 5 years for monitoring.  If you have any questions or concerns, please mychart or call.     Sincerely,      Elva Ortiz M.D.

## 2024-06-26 ENCOUNTER — ANCILLARY PROCEDURE (OUTPATIENT)
Dept: CT IMAGING | Facility: CLINIC | Age: 64
End: 2024-06-26
Attending: FAMILY MEDICINE
Payer: COMMERCIAL

## 2024-06-26 DIAGNOSIS — M25.552 HIP PAIN, LEFT: ICD-10-CM

## 2024-06-26 PROCEDURE — 73700 CT LOWER EXTREMITY W/O DYE: CPT | Mod: LT | Performed by: RADIOLOGY

## 2024-06-26 NOTE — RESULT ENCOUNTER NOTE
Jason Eagle,  I am happy to report no fracture was seen on your hip CT scan.  The scan could see some mild arthritic changes in the hip and calcification present in the area of the greater tuberosity (essentially calcification indicates age-related change).   So, please continue with our original plan to start the physical therapy for your hip pain.   Certainly, if things are not improving with therapy we could refer you onto orthopedics, But I am hopeful therapy will make a big difference  Kind regards,  Elva Ortiz MD

## 2024-06-28 ENCOUNTER — THERAPY VISIT (OUTPATIENT)
Dept: PHYSICAL THERAPY | Facility: CLINIC | Age: 64
End: 2024-06-28
Attending: FAMILY MEDICINE
Payer: COMMERCIAL

## 2024-06-28 DIAGNOSIS — G89.29 CHRONIC LEFT HIP PAIN: Primary | ICD-10-CM

## 2024-06-28 DIAGNOSIS — M25.552 HIP PAIN, LEFT: ICD-10-CM

## 2024-06-28 DIAGNOSIS — M25.552 CHRONIC LEFT HIP PAIN: Primary | ICD-10-CM

## 2024-06-28 PROCEDURE — 97161 PT EVAL LOW COMPLEX 20 MIN: CPT | Mod: GP | Performed by: PHYSICAL THERAPIST

## 2024-06-28 PROCEDURE — 97110 THERAPEUTIC EXERCISES: CPT | Mod: GP | Performed by: PHYSICAL THERAPIST

## 2024-06-28 ASSESSMENT — ACTIVITIES OF DAILY LIVING (ADL)
ADL_SCORE(%): 0
WALKING_15_MINUTES_OR_GREATER: MODERATE DIFFICULTY
HOW_WOULD_YOU_RATE_YOUR_CURRENT_LEVEL_OF_FUNCTION_DURING_YOUR_USUAL_ACTIVITIES_OF_DAILY_LIVING_FROM_0_TO_100_WITH_100_BEING_YOUR_LEVEL_OF_FUNCTION_PRIOR_TO_YOUR_HIP_PROBLEM_AND_0_BEING_THE_INABILITY_TO_PERFORM_ANY_OF_YOUR_USUAL_DAILY_ACTIVITIES?: 70
SITTING FOR 15 MINUTES: SLIGHT DIFFICULTY
SPORTS_COUNT: 9
CUTTING/LATERAL_MOVEMENTS: SLIGHT DIFFICULTY
PUTTING ON SOCKS AND SHOES: SLIGHT DIFFICULTY
PLEASE_INDICATE_YOR_PRIMARY_REASON_FOR_REFERRAL_TO_THERAPY:: HIP
HEAVY_WORK: MODERATE DIFFICULTY
STANDING FOR 15 MINUTES: SLIGHT DIFFICULTY
WALKING_INITIALLY: SLIGHT DIFFICULTY
GETTING_INTO_AND_OUT_OF_A_BATHTUB: MODERATE DIFFICULTY
RUNNING_ONE_MILE: MODERATE DIFFICULTY
GOING_UP_1_FLIGHT_OF_STAIRS: SLIGHT DIFFICULTY
TWISTING/PIVOTING ON INVOLVED LEG: SLIGHT DIFFICULTY
HEAVY_WORK: MODERATE DIFFICULTY
GETTING_INTO_AND_OUT_OF_AN_AVERAGE_CAR: SLIGHT DIFFICULTY
STEPPING_UP_AND_DOWN_CURBS: SLIGHT DIFFICULTY
LOW_IMPACT_ACTIVITIES_LIKE_FAST_WALKING: MODERATE DIFFICULTY
JUMPING: MODERATE DIFFICULTY
STANDING_FOR_15_MINUTES: SLIGHT DIFFICULTY
DEEP SQUATTING: MODERATE DIFFICULTY
SPORTS_SCORE(%): 0
GOING UP 1 FLIGHT OF STAIRS: SLIGHT DIFFICULTY
WALKING_UP_STEEP_HILLS: MODERATE DIFFICULTY
ROLLING_OVER_IN_BED: SLIGHT DIFFICULTY
GETTING INTO AND OUT OF AN AVERAGE CAR: SLIGHT DIFFICULTY
HOW_WOULD_YOU_RATE_YOUR_CURRENT_LEVEL_OF_FUNCTION_DURING_YOUR_USUAL_ACTIVITIES_OF_DAILY_LIVING_FROM_0_TO_100_WITH_100_BEING_YOUR_LEVEL_OF_FUNCTION_PRIOR_TO_YOUR_HIP_PROBLEM_AND_0_BEING_THE_INABILITY_TO_PERFORM_ANY_OF_YOUR_USUAL_DAILY_ACTIVITIES?: 70
WALKING_APPROXIMATELY_10_MINUTES: SLIGHT DIFFICULTY
HOS_ADL_HIGHEST_POTENTIAL_SCORE: 68
ABILITY_TO_PARTICIPATE_IN_YOUR_DESIRED_SPORT_AS_LONG_AS_YOU_WOULD_LIKE: MODERATE DIFFICULTY
HOS_ADL_ITEM_SCORE_TOTAL: 45
ADL_TOTAL_ITEM_SCORE: 0
DEEP_SQUATTING: MODERATE DIFFICULTY
ABILITY_TO_PERFORM_ACTIVITY_WITH_YOUR_NORMAL_TECHNIQUE: SLIGHT DIFFICULTY
WALKING_INITIALLY: SLIGHT DIFFICULTY
LANDING: SLIGHT DIFFICULTY
ADL_COUNT: 17
HOW_WOULD_YOU_RATE_YOUR_CURRENT_LEVEL_OF_FUNCTION_DURING_YOUR_SPORTS_RELATED_ACTIVITIES_FROM_0_TO_100_WITH_100_BEING_YOUR_LEVEL_OF_FUNCTION_PRIOR_TO_YOUR_HIP_PROBLEM_AND_0_BEING_THE_INABILITY_TO_PERFORM_ANY_OF_YOUR_USUAL_DAILY_ACTIVITIES?: 50
GETTING_INTO_AND_OUT_OF_A_BATHTUB: MODERATE DIFFICULTY
WALKING_15_MINUTES_OR_GREATER: MODERATE DIFFICULTY
SWINGING_OBJECTS_LIKE_A_GOLF_CLUB: MODERATE DIFFICULTY
SITTING_FOR_15_MINUTES: SLIGHT DIFFICULTY
GOING_DOWN_1_FLIGHT_OF_STAIRS: SLIGHT DIFFICULTY
WALKING_UP_STEEP_HILLS: MODERATE DIFFICULTY
TWISTING/PIVOTING_ON_INVOLVED_LEG: SLIGHT DIFFICULTY
LIGHT_TO_MODERATE_WORK: SLIGHT DIFFICULTY
WALKING_FOR_APPROXIMATELY_10_MINUTES: SLIGHT DIFFICULTY
RECREATIONAL_ACTIVITIES: SLIGHT DIFFICULTY
HOW_WOULD_YOU_RATE_YOUR_CURRENT_LEVEL_OF_FUNCTION?: ABNORMAL
PUTTING_ON_SOCKS_AND_SHOES: SLIGHT DIFFICULTY
SPORTS_HIGHEST_POTENTIAL_SCORE: 36
RECREATIONAL ACTIVITIES: SLIGHT DIFFICULTY
STEPPING UP AND DOWN CURBS: SLIGHT DIFFICULTY
WALKING_DOWN_STEEP_HILLS: MODERATE DIFFICULTY
GOING DOWN 1 FLIGHT OF STAIRS: SLIGHT DIFFICULTY
SPORTS_TOTAL_ITEM_SCORE: 0
LIGHT_TO_MODERATE_WORK: SLIGHT DIFFICULTY
ADL_HIGHEST_POTENTIAL_SCORE: 68
ROLLING OVER IN BED: SLIGHT DIFFICULTY
HOS_ADL_SCORE(%): 66.18
STARTING_AND_STOPPING_QUICKLY: SLIGHT DIFFICULTY
WALKING_DOWN_STEEP_HILLS: MODERATE DIFFICULTY

## 2024-06-28 NOTE — PROGRESS NOTES
PHYSICAL THERAPY EVALUATION  Type of Visit: Evaluation       Fall Risk Screen:  Fall screen completed by: PT  Have you fallen 2 or more times in the past year?: No  Have you fallen and had an injury in the past year?: No  Is patient a fall risk?: No    Subjective       Presenting condition or subjective complaint: left hip pain that started a few years ago but has worsened the last few months. This pain shows up especially after squatting or prolonged sitting. Prolonged standing makes her hip hurt as well.  Date of onset: 06/24/24 (date of order)    Relevant medical history: Diabetes; Hearing problems; High blood pressure; Overweight; Vision problems   Dates & types of surgery: cataract left eye left ear    Prior diagnostic imaging/testing results: CT scan; X-ray     Prior therapy history for the same diagnosis, illness or injury:        Living Environment  Social support: With family members   Type of home: House; 1 level   Stairs to enter the home:         Ramp: Yes   Stairs inside the home: No       Help at home: Home management tasks (cooking, cleaning); Assist for driving and community activities  Equipment owned:       Employment: No    Hobbies/Interests: gardening/    Patient goals for therapy: moving without hearing a clicking sound and sitting/standing for a period of time    Pain assessment: See objective evaluation for additional pain details     Objective   HIP EVALUATION  PAIN: Pain Level at Rest: 0/10  Pain Level with Use: 8/10  Pain Location: posterior hip, medial gluteal area, lateral hip, IT band area, some ischial tuberosity area  Pain Quality: Burning and Sharp, can be tingling  Pain Frequency: intermittent  Pain is Exacerbated By: prolonged sitting, squatting, prolonged standing, mild pain with all hip motions at time per patient; back is bothered by bending  Pain is Relieved By: massage, comfort oil  Pain Progression: Unchanged    INTEGUMENTARY (edema, incisions):   POSTURE:   GAIT:    Weightbearing Status:   Assistive Device(s):   Gait Deviations: Antalgic  BALANCE/PROPRIOCEPTION:   WEIGHTBEARING ALIGNMENT:   NON-WEIGHTBEARING ALIGNMENT:    ROM:   (Degrees) Left AROM Left PROM  Right AROM Right PROM   Hip Flexion WNL  WNL    Hip Extension       Hip Abduction WNL  WNL    Hip Adduction       Hip Internal Rotation WNL      Hip External Rotation End range pain in posterior hip but WNL  WNL    Knee Flexion       Knee Extension       Lumbar Side glide     Lumbar Flexion To ankles, tight hamstrings   Lumbar Extension Mod loss, no effect with repeated motions   Pain:   End feel:     PELVIC/SI SCREEN:   STRENGTH:   Pain: - none + mild ++ moderate +++ severe  Strength Scale: 0-5/5 Left Right   Hip Flexion 5 5   Hip Extension     Hip Abduction 3+ 4   Hip Adduction     Hip Internal Rotation     Hip External Rotation 4 5   Knee Flexion 5 5   Knee Extension 5 5     LE FLEXIBILITY:   SPECIAL TESTS: + CHRISTOPHER, - FADDIR, - SCOUR  FUNCTIONAL TESTS:   PALPATION: broad tenderness including glute med, max, IT band, TFL, QL/ES paraspinals on left side, ischial tuberosity  JOINT MOBILITY: WNL    Assessment & Plan   CLINICAL IMPRESSIONS  Medical Diagnosis: Hip pain, left    Treatment Diagnosis: Hip pain, left   Impression/Assessment: Patient is a 64 year old female with left hip complaints.  The following significant findings have been identified: Pain, Decreased ROM/flexibility, Decreased joint mobility, Decreased strength, Impaired muscle performance, and Decreased activity tolerance. These impairments interfere with their ability to perform self care tasks, work tasks, recreational activities, household chores, household mobility, and community mobility as compared to previous level of function.     Clinical Decision Making (Complexity):  Clinical Presentation: Stable/Uncomplicated  Clinical Presentation Rationale: based on medical and personal factors listed in PT evaluation  Clinical Decision Making (Complexity):  Low complexity    PLAN OF CARE  Treatment Interventions:  Interventions: Manual Therapy, Neuromuscular Re-education, Therapeutic Activity, Therapeutic Exercise    Long Term Goals     PT Goal 1  Goal Identifier: LTG 1  Goal Description: Patient will have 2/10 pain in the left hip at worst after driving 1 hour or sitting for 1 hour  Rationale: to maximize safety and independence within the community;to maximize safety and independence with transportation;to maximize safety and independence within the home  Target Date: 09/20/24      Frequency of Treatment: 1x/week  Duration of Treatment: 12 weeks    Recommended Referrals to Other Professionals:  none  Education Assessment:   Learner/Method: No Barriers to Learning;Pictures/Video;Demonstration;Reading;Listening;Patient    Risks and benefits of evaluation/treatment have been explained.   Patient/Family/caregiver agrees with Plan of Care.     Evaluation Time:     PT Eval, Low Complexity Minutes (99914): 23     Signing Clinician: Abhishek Lam PT        [facility]                                                                                   OUTPATIENT PHYSICAL THERAPY      PLAN OF TREATMENT FOR OUTPATIENT REHABILITATION   Patient's Last Name, First Name, Maliha Archer YOB: 1960   Provider's Name   [facility]   Medical Record No.  [id number]     Onset Date: 06/24/24 (date of order)  Start of Care Date: 06/28/24     Medical Diagnosis:  Hip pain, left      PT Treatment Diagnosis:  Hip pain, left Plan of Treatment  Frequency/Duration: 1x/week/ 12 weeks    Certification date from 06/28/24 to 09/20/24         See note for plan of treatment details and functional goals     Abhishek Lam, PT                         I CERTIFY THE NEED FOR THESE SERVICES FURNISHED UNDER        THIS PLAN OF TREATMENT AND WHILE UNDER MY CARE     (Physician attestation of this document indicates review  and certification of the therapy plan).              Referring Provider:  Elva Ortiz    Initial Assessment  See Epic Evaluation- Start of Care Date: 06/28/24

## 2024-07-08 ENCOUNTER — ANESTHESIA EVENT (OUTPATIENT)
Dept: SURGERY | Facility: AMBULATORY SURGERY CENTER | Age: 64
End: 2024-07-08
Payer: COMMERCIAL

## 2024-07-09 ENCOUNTER — HOSPITAL ENCOUNTER (OUTPATIENT)
Facility: AMBULATORY SURGERY CENTER | Age: 64
Discharge: HOME | End: 2024-07-09
Attending: OPHTHALMOLOGY
Payer: COMMERCIAL

## 2024-07-09 ENCOUNTER — ANESTHESIA (OUTPATIENT)
Dept: SURGERY | Facility: AMBULATORY SURGERY CENTER | Age: 64
End: 2024-07-09
Payer: COMMERCIAL

## 2024-07-09 VITALS
WEIGHT: 191 LBS | TEMPERATURE: 97.2 F | BODY MASS INDEX: 33.84 KG/M2 | HEART RATE: 71 BPM | DIASTOLIC BLOOD PRESSURE: 69 MMHG | RESPIRATION RATE: 13 BRPM | HEIGHT: 63 IN | OXYGEN SATURATION: 95 % | SYSTOLIC BLOOD PRESSURE: 143 MMHG

## 2024-07-09 DIAGNOSIS — H25.011 CORTICAL SENILE CATARACT OF RIGHT EYE: Primary | ICD-10-CM

## 2024-07-09 LAB — GLUCOSE BLDC GLUCOMTR-MCNC: 146 MG/DL (ref 70–99)

## 2024-07-09 PROCEDURE — 66984 XCAPSL CTRC RMVL W/O ECP: CPT | Performed by: ANESTHESIOLOGY

## 2024-07-09 PROCEDURE — 66984 XCAPSL CTRC RMVL W/O ECP: CPT | Mod: LT

## 2024-07-09 PROCEDURE — 66984 XCAPSL CTRC RMVL W/O ECP: CPT | Mod: LT | Performed by: OPHTHALMOLOGY

## 2024-07-09 PROCEDURE — 82962 GLUCOSE BLOOD TEST: CPT | Performed by: PATHOLOGY

## 2024-07-09 PROCEDURE — 66984 XCAPSL CTRC RMVL W/O ECP: CPT | Performed by: NURSE ANESTHETIST, CERTIFIED REGISTERED

## 2024-07-09 DEVICE — LENS CC60WF 18.0 CLAREON UV ASPHERIC BICONVEX IOL: Type: IMPLANTABLE DEVICE | Site: EYE | Status: FUNCTIONAL

## 2024-07-09 RX ORDER — DEXAMETHASONE SODIUM PHOSPHATE 10 MG/ML
4 INJECTION, SOLUTION INTRAMUSCULAR; INTRAVENOUS
Status: DISCONTINUED | OUTPATIENT
Start: 2024-07-09 | End: 2024-07-10 | Stop reason: HOSPADM

## 2024-07-09 RX ORDER — LIDOCAINE 40 MG/G
CREAM TOPICAL
Status: DISCONTINUED | OUTPATIENT
Start: 2024-07-09 | End: 2024-07-10 | Stop reason: HOSPADM

## 2024-07-09 RX ORDER — PREDNISOLONE ACETATE 10 MG/ML
1 SUSPENSION/ DROPS OPHTHALMIC 4 TIMES DAILY
Qty: 5 ML | Refills: 1 | Status: SHIPPED | OUTPATIENT
Start: 2024-07-09

## 2024-07-09 RX ORDER — OXYCODONE HYDROCHLORIDE 5 MG/1
10 TABLET ORAL
Status: DISCONTINUED | OUTPATIENT
Start: 2024-07-09 | End: 2024-07-10 | Stop reason: HOSPADM

## 2024-07-09 RX ORDER — OFLOXACIN 3 MG/ML
1 SOLUTION/ DROPS OPHTHALMIC 4 TIMES DAILY
Qty: 5 ML | Refills: 0 | Status: SHIPPED | OUTPATIENT
Start: 2024-07-09

## 2024-07-09 RX ORDER — DEXAMETHASONE SODIUM PHOSPHATE 4 MG/ML
INJECTION, SOLUTION INTRA-ARTICULAR; INTRALESIONAL; INTRAMUSCULAR; INTRAVENOUS; SOFT TISSUE PRN
Status: DISCONTINUED | OUTPATIENT
Start: 2024-07-09 | End: 2024-07-09 | Stop reason: HOSPADM

## 2024-07-09 RX ORDER — ONDANSETRON 4 MG/1
4 TABLET, ORALLY DISINTEGRATING ORAL EVERY 30 MIN PRN
Status: DISCONTINUED | OUTPATIENT
Start: 2024-07-09 | End: 2024-07-10 | Stop reason: HOSPADM

## 2024-07-09 RX ORDER — LIDOCAINE HYDROCHLORIDE 20 MG/ML
INJECTION, SOLUTION INFILTRATION; PERINEURAL PRN
Status: DISCONTINUED | OUTPATIENT
Start: 2024-07-09 | End: 2024-07-09

## 2024-07-09 RX ORDER — PROPARACAINE HYDROCHLORIDE 5 MG/ML
1 SOLUTION/ DROPS OPHTHALMIC ONCE
Status: COMPLETED | OUTPATIENT
Start: 2024-07-09 | End: 2024-07-09

## 2024-07-09 RX ORDER — ONDANSETRON 2 MG/ML
4 INJECTION INTRAMUSCULAR; INTRAVENOUS EVERY 30 MIN PRN
Status: DISCONTINUED | OUTPATIENT
Start: 2024-07-09 | End: 2024-07-10 | Stop reason: HOSPADM

## 2024-07-09 RX ORDER — KETOROLAC TROMETHAMINE 5 MG/ML
1 SOLUTION OPHTHALMIC 4 TIMES DAILY
Qty: 5 ML | Refills: 1 | Status: SHIPPED | OUTPATIENT
Start: 2024-07-09

## 2024-07-09 RX ORDER — MOXIFLOXACIN 5 MG/ML
1 SOLUTION/ DROPS OPHTHALMIC
Status: COMPLETED | OUTPATIENT
Start: 2024-07-09 | End: 2024-07-09

## 2024-07-09 RX ORDER — NALOXONE HYDROCHLORIDE 0.4 MG/ML
0.1 INJECTION, SOLUTION INTRAMUSCULAR; INTRAVENOUS; SUBCUTANEOUS
Status: DISCONTINUED | OUTPATIENT
Start: 2024-07-09 | End: 2024-07-10 | Stop reason: HOSPADM

## 2024-07-09 RX ORDER — SODIUM CHLORIDE, SODIUM LACTATE, POTASSIUM CHLORIDE, CALCIUM CHLORIDE 600; 310; 30; 20 MG/100ML; MG/100ML; MG/100ML; MG/100ML
INJECTION, SOLUTION INTRAVENOUS CONTINUOUS
Status: DISCONTINUED | OUTPATIENT
Start: 2024-07-09 | End: 2024-07-10 | Stop reason: HOSPADM

## 2024-07-09 RX ORDER — FENTANYL CITRATE 50 UG/ML
INJECTION, SOLUTION INTRAMUSCULAR; INTRAVENOUS PRN
Status: DISCONTINUED | OUTPATIENT
Start: 2024-07-09 | End: 2024-07-09

## 2024-07-09 RX ORDER — ACETAMINOPHEN 325 MG/1
975 TABLET ORAL ONCE
Status: COMPLETED | OUTPATIENT
Start: 2024-07-09 | End: 2024-07-09

## 2024-07-09 RX ORDER — CYCLOPENTOLAT/TROPIC/PHENYLEPH 1%-1%-2.5%
1 DROPS (EA) OPHTHALMIC (EYE)
Status: COMPLETED | OUTPATIENT
Start: 2024-07-09 | End: 2024-07-09

## 2024-07-09 RX ORDER — DICLOFENAC SODIUM 1 MG/ML
1 SOLUTION/ DROPS OPHTHALMIC
Status: COMPLETED | OUTPATIENT
Start: 2024-07-09 | End: 2024-07-09

## 2024-07-09 RX ORDER — FENTANYL CITRATE 50 UG/ML
25 INJECTION, SOLUTION INTRAMUSCULAR; INTRAVENOUS
Status: DISCONTINUED | OUTPATIENT
Start: 2024-07-09 | End: 2024-07-10 | Stop reason: HOSPADM

## 2024-07-09 RX ORDER — PROPOFOL 10 MG/ML
INJECTION, EMULSION INTRAVENOUS PRN
Status: DISCONTINUED | OUTPATIENT
Start: 2024-07-09 | End: 2024-07-09

## 2024-07-09 RX ORDER — BALANCED SALT SOLUTION 6.4; .75; .48; .3; 3.9; 1.7 MG/ML; MG/ML; MG/ML; MG/ML; MG/ML; MG/ML
SOLUTION OPHTHALMIC PRN
Status: DISCONTINUED | OUTPATIENT
Start: 2024-07-09 | End: 2024-07-09 | Stop reason: HOSPADM

## 2024-07-09 RX ORDER — OXYCODONE HYDROCHLORIDE 5 MG/1
5 TABLET ORAL
Status: DISCONTINUED | OUTPATIENT
Start: 2024-07-09 | End: 2024-07-10 | Stop reason: HOSPADM

## 2024-07-09 RX ORDER — SODIUM CHLORIDE, SODIUM LACTATE, POTASSIUM CHLORIDE, CALCIUM CHLORIDE 600; 310; 30; 20 MG/100ML; MG/100ML; MG/100ML; MG/100ML
INJECTION, SOLUTION INTRAVENOUS CONTINUOUS PRN
Status: DISCONTINUED | OUTPATIENT
Start: 2024-07-09 | End: 2024-07-09

## 2024-07-09 RX ORDER — TETRACAINE HYDROCHLORIDE 5 MG/ML
SOLUTION OPHTHALMIC PRN
Status: DISCONTINUED | OUTPATIENT
Start: 2024-07-09 | End: 2024-07-09 | Stop reason: HOSPADM

## 2024-07-09 RX ADMIN — ACETAMINOPHEN 975 MG: 325 TABLET ORAL at 12:28

## 2024-07-09 RX ADMIN — MOXIFLOXACIN 1 DROP: 5 SOLUTION/ DROPS OPHTHALMIC at 12:12

## 2024-07-09 RX ADMIN — DICLOFENAC SODIUM 1 DROP: 1 SOLUTION/ DROPS OPHTHALMIC at 12:22

## 2024-07-09 RX ADMIN — PROPOFOL 40 MG: 10 INJECTION, EMULSION INTRAVENOUS at 13:28

## 2024-07-09 RX ADMIN — DICLOFENAC SODIUM 1 DROP: 1 SOLUTION/ DROPS OPHTHALMIC at 12:28

## 2024-07-09 RX ADMIN — Medication 1 DROP: at 12:12

## 2024-07-09 RX ADMIN — Medication 1 DROP: at 12:22

## 2024-07-09 RX ADMIN — LIDOCAINE HYDROCHLORIDE 100 MG: 20 INJECTION, SOLUTION INFILTRATION; PERINEURAL at 13:28

## 2024-07-09 RX ADMIN — DICLOFENAC SODIUM 1 DROP: 1 SOLUTION/ DROPS OPHTHALMIC at 12:12

## 2024-07-09 RX ADMIN — SODIUM CHLORIDE, SODIUM LACTATE, POTASSIUM CHLORIDE, CALCIUM CHLORIDE: 600; 310; 30; 20 INJECTION, SOLUTION INTRAVENOUS at 13:23

## 2024-07-09 RX ADMIN — FENTANYL CITRATE 25 MCG: 50 INJECTION, SOLUTION INTRAMUSCULAR; INTRAVENOUS at 13:54

## 2024-07-09 RX ADMIN — MOXIFLOXACIN 1 DROP: 5 SOLUTION/ DROPS OPHTHALMIC at 12:22

## 2024-07-09 RX ADMIN — Medication 1 DROP: at 12:28

## 2024-07-09 RX ADMIN — PROPARACAINE HYDROCHLORIDE 1 DROP: 5 SOLUTION/ DROPS OPHTHALMIC at 12:12

## 2024-07-09 RX ADMIN — MOXIFLOXACIN 1 DROP: 5 SOLUTION/ DROPS OPHTHALMIC at 12:28

## 2024-07-09 RX ADMIN — FENTANYL CITRATE 25 MCG: 50 INJECTION, SOLUTION INTRAMUSCULAR; INTRAVENOUS at 13:37

## 2024-07-09 NOTE — ANESTHESIA PREPROCEDURE EVALUATION
Anesthesia Pre-Procedure Evaluation    Patient: Maliha Pedro   MRN: [id number] : 1960        Procedure : Procedure(s):  RIGHT EYE PHACOEMULSIFICATION, CATARACT, WITH INTRAOCULAR LENS IMPLANT          Past Medical History:   Diagnosis Date     Adjustment disorder with mixed anxiety and depressed mood     following death of her adoptive father and then again after adoptive mother      Allergic rhinitis, cause unspecified     uses benadryl prn (sneezing/hoarse voice)     Bartholin gland cyst 2008     Chest pain, unspecified     neg dobutamine stress test  (reacted to dobutamine)     Dry eyes      ERM OD (epiretinal membrane, right eye)     BE mild     Esophageal reflux     EGD: -neg     Glaucoma suspect      Hearing problem      Hypertension      Irregular menstrual cycle     s/p D&C, since then more regular     Irritable bowel syndrome     colonoscopy -neg. Sx's especially prior to menses     MEDICAL HISTORY OF -     had health directive with : DANIEL Ford.  Full Code. Aunt (Charlie Tay) will be decision maker     Meningitis, unspecified(322.9) age 8    hospitalized for 9 mo     Mild persistent asthma without complication 2024     Need for prophylactic vaccination with tuberculosis (BCG) vaccine     Has pos Mantoux. Gets yearly cxr, all neg.      Other and unspecified hyperlipidemia      Pain in joint, shoulder region     bone spurs on MRI, s/p pool therapy     Tinnitus      Type II or unspecified type diabetes mellitus without mention of complication, not stated as uncontrolled Age 32    Follows with endocrine [facility]: Shayne Lane MD     Unspecified cataract     RE     Unspecified sinusitis (chronic)          Wheezing     Has seen pulm  & . Told night ashtma, ? vocal chord spasm due to cold air.       Past Surgical History:   Procedure Laterality Date     CATARACT IOL, RT/LT  2008    LE     COLONOSCOPY N/A 2021    Procedure: Colonoscopy,  With Polypectomy And Biopsy;  Surgeon: Teo Collins MD;  Location: [facility] OR     COLONOSCOPY WITH CO2 INSUFFLATION N/A 5/12/2021    Procedure: COLONOSCOPY, WITH CO2 INSUFFLATION;  Surgeon: Teo Collins MD;  Location: [facility] OR     HC DILATION/CURETTAGE DIAG/THER NON OB  1992     HC TYMPANOPLASTY W/O MASTOID, INIT/REV W/O OSS CHAIN RECONST  12/09     RELEASE CARPAL TUNNEL Right 11/19/2018    Procedure: Right Carpal Tunnel Release;  Surgeon: Ivania Moran MD;  Location: [facility]     RELEASE CARPAL TUNNEL Left 12/17/2018    Procedure: Left Carpal Tunnel Release;  Surgeon: Ivania Moran MD;  Location: [facility]     TYMPANOPLASTY       YAG CAPSULOTOMY OS (LEFT EYE)  4/23/2011      Allergies   Allergen Reactions     Latex Swelling     Mold      Tetanus Antitoxin Swelling     Tetracycline Swelling      Social History     Tobacco Use     Smoking status: Never     Passive exposure: Never     Smokeless tobacco: Never   Substance Use Topics     Alcohol use: Yes     Comment: one drink a month      Wt Readings from Last 1 Encounters:   07/09/24 86.6 kg (191 lb)        Anesthesia Evaluation   Pt has had prior anesthetic. Type: General and MAC.        ROS/MED HX  ENT/Pulmonary:     (+) sleep apnea, doesn't use CPAP,                   Mild Persistent, asthma  Treatment: Nebulizer prn,                 Neurologic:  - neg neurologic ROS     Cardiovascular:     (+)  hypertension- -   -  - -                                      METS/Exercise Tolerance: >4 METS    Hematologic:  - neg hematologic  ROS     Musculoskeletal:   (+)  arthritis,             GI/Hepatic:     (+) GERD, Asymptomatic on medication,           liver disease,       Renal/Genitourinary:       Endo:     (+) type I DM,  Last HgA1c: 7.2, date: 06/2024, Using insulin, - not using insulin pump.  not previously admitted for DM/DKA.       Obesity,       Psychiatric/Substance Use:  - neg psychiatric ROS     Infectious Disease:  - neg  "infectious disease ROS     Malignancy:  - neg malignancy ROS     Other:  - neg other ROS        Physical Exam    Airway        Mallampati: I   TM distance: > 3 FB   Neck ROM: full   Mouth opening: > 3 cm    Respiratory Devices and Support         Dental       (+) Modest Abnormalities - crowns, retainers, 1 or 2 missing teeth      Cardiovascular          Rhythm and rate: regular and normal     Pulmonary           breath sounds clear to auscultation       OUTSIDE LABS:  CBC:   Lab Results   Component Value Date    WBC 8.4 11/13/2023    WBC 11.9 (H) 06/08/2023    HGB 14.3 11/13/2023    HGB 15.1 06/08/2023    HCT 43.9 11/13/2023    HCT 46.4 06/08/2023     11/13/2023     06/08/2023     BMP:   Lab Results   Component Value Date     11/13/2023     06/07/2023    POTASSIUM 3.5 06/06/2024    POTASSIUM 3.8 11/13/2023    CHLORIDE 103 11/13/2023    CHLORIDE 103 06/07/2023    CO2 24 11/13/2023    CO2 21 (L) 06/07/2023    BUN 15.8 06/06/2024    BUN 14.4 11/13/2023    CR 0.80 06/06/2024    CR 0.71 11/13/2023     (H) 06/06/2024     (H) 11/13/2023     (H) 11/13/2023     COAGS: No results found for: \"PTT\", \"INR\", \"FIBR\"  POC:   Lab Results   Component Value Date     (H) 05/12/2021     HEPATIC:   Lab Results   Component Value Date    ALBUMIN 4.4 06/06/2024    PROTTOTAL 7.2 06/06/2024    ALT 45 06/06/2024    AST 31 06/06/2024    ALKPHOS 57 06/06/2024    BILITOTAL 0.3 06/06/2024     OTHER:   Lab Results   Component Value Date    A1C 7.2 (H) 06/10/2024    SUSHANT 9.6 11/13/2023    MAG 2.0 11/14/2018    LIPASE 207 03/06/2017    TSH 1.95 06/06/2024    T4 1.50 06/21/2010    SED 11 10/30/2017       Anesthesia Plan    ASA Status:  3    NPO Status:  NPO Appropriate    Anesthesia Type: MAC.     - Reason for MAC: immobility needed              Consents    Anesthesia Plan(s) and associated risks, benefits, and realistic alternatives discussed. Questions answered and patient/representative(s) " "expressed understanding.     - Discussed:     - Discussed with:  Patient            Postoperative Care    Pain management: Multi-modal analgesia.   PONV prophylaxis: Ondansetron (or other 5HT-3)     Comments:             Emilee Ceja MD    I have reviewed the pertinent notes and labs in the chart from the past 30 days and (re)examined the patient.  Any updates or changes from those notes are reflected in this note.             # Drug Induced Platelet Defect: home medication list includes an antiplatelet medication  # DMII: A1C = 7.2 % (Ref range: <=5.7 %) within past 6 months  # Obesity: Estimated body mass index is 33.83 kg/m  as calculated from the following:    Height as of this encounter: 1.6 m (5' 3\").    Weight as of this encounter: 86.6 kg (191 lb).      "

## 2024-07-09 NOTE — ANESTHESIA POSTPROCEDURE EVALUATION
Patient: Maliha Pedro    Procedure: Procedure(s):  RIGHT EYE PHACOEMULSIFICATION, CATARACT, WITH INTRAOCULAR LENS IMPLANT       Anesthesia Type:  MAC    Note:  Disposition: Outpatient   Postop Pain Control: Uneventful            Sign Out: Well controlled pain   PONV: No   Neuro/Psych: Uneventful            Sign Out: Acceptable/Baseline neuro status   Airway/Respiratory: Uneventful            Sign Out: Acceptable/Baseline resp. status   CV/Hemodynamics: Uneventful            Sign Out: Acceptable CV status; No obvious hypovolemia; No obvious fluid overload   Other NRE: NONE   DID A NON-ROUTINE EVENT OCCUR? No       Last vitals:  Vitals Value Taken Time   BP     Temp     Pulse     Resp     SpO2         Electronically Signed By: Emilee Ceja MD  July 9, 2024  2:04 PM

## 2024-07-09 NOTE — DISCHARGE INSTRUCTIONS
[facility]  Home Care Following Anesthesia  For 24 hours after surgery:  Get plenty of rest.  A responsible adult must stay with you for at least 24 hours after you leave the surgery center.  Do not drive or use heavy equipment.  If you have weakness or tingling, don't drive or use heavy equipment until this feeling goes away.   Do not drink alcohol.   Avoid strenuous or risky activities.  Ask for help when climbing stairs.  You may feel lightheaded.  IF so, sit for a few minutes before standing.  Have someone help you get up.   If you have nausea (feel sick to your stomach): Drink only clear liquids such as apple juice, ginger ale, broth or 7-Up.  Rest may also help.  Be sure to drink enough fluids.  Move to a regular diet as you feel able.   You may have a slight fever.  Call the doctor if your fever is over 100 F (37.7 C) (taken under the tongue) or lasts longer than 24 hours.  You may have a dry mouth, a sore throat, muscle aches or trouble sleeping. These should go away after 24 hours.  Do not make important or legal decisions.   It is recommended to avoid smoking.               Tips for taking pain medications  To get the best pain relief possible, remember these points:  Take pain medications as directed, before pain becomes severe.  Pain medication can upset your stomach: taking it with food may help.  Constipation is a common side effect of pain medication. Drink plenty of  fluids.  Eat foods high in fiber. Take a stool softener if recommended by your doctor or pharmacist.  Do not drink alcohol, drive or operate machinery while taking pain medications.  Ask about other ways to control pain, such as with heat, ice or relaxation.    Tylenol/Acetaminophen Consumption    If you feel your pain relief is insufficient, you may take Tylenol/Acetaminophen in addition to your narcotic pain medication.   Be careful not to exceed 4,000 mg of Tylenol/Acetaminophen in a 24 hour  period from all sources.  If you are taking extra strength Tylenol/acetaminophen (500 mg), the maximum dose is 8 tablets in 24 hours.  If you are taking regular strength acetaminophen (325 mg), the maximum dose is 12 tablets in 24 hours.    Call a doctor for any of the following:  Signs of infection (fever, growing tenderness at the surgery site, a large amount of drainage or bleeding, severe pain, foul-smelling drainage, redness, swelling).  It has been over 8 to 10 hours since surgery and you are still not able to urinate (pass water).  Headache for over 24 hours.  Numbness, tingling or weakness the day after surgery (if you had spinal anesthesia).  Signs of Covid-19 infection (temperature over 100 degrees, shortness of breath, cough, loss of taste/smell, generalized body aches, persistent headache, chills, sore throat, nausea/vomiting/diarrhea)  Your doctor is:       Dr. Lesly Tracy, Ophthalmology: [phone]               Or dial [phone] and ask for the resident on call for:  Ophthalmology  For emergency care, call the:  [facility]:  [phone] (TTY for hearing impaired: [phone])

## 2024-07-09 NOTE — OP NOTE
SURGEON:  RAOUL OSBORN   PREOPERATIVE DIAGNOSIS:  visually significant nucleosclerotic cataract right eye   POSTOPERATIVE DIAGNOSIS: same  NAME OF THE PROCEDURE: Phacoemulsification with intraocular lens implantation  ANESTHESIA: Monitored anesthesia care and peribulbar block   COMPLICATIONS: none  INDICATIONS: Maliha Pedro is a 64 year old with diagnosis of visually significant cataract, here for cataract surgery    DESCRIPTION OF THE PROCEDURE:  The patient was taken to the operative room where intravenous sedation was administered and a peribulbar block consisting of a 1:1 mixture of 2%lidocaine and 0.75% marcaine with epinephrine and wydase, was administered to the operative eye with adequate anesthesia and akinesia.    The operative eye was prepped and draped in the usual sterile surgical fashion for ophthalmic surgery, including the installation of one drop of 5% Povidone Iodine.  A sterile drape was placed over the face and body and a lid speculum was inserted.      With the use of a Supersharp blade and 0.12 forceps, a paracentesis was created at the 2 o'clock position, and viscoelastic was injected into the anterior chamber using a canula.  A 2.5 mm keratome was then used to construct a clear corneal incision at the 10 o'clock position.  Using Utrata forceps and cystotome needle, a continuous curvilinear capsulorrhexis was created and hydrodissection was undertaken with the use of BSS.  The nucleus was found to be freely mobile and then removed by phacoemulsification using a divide and conquer technique.  The remaining elements of cortex were then removed with irrigation/aspiration.  An IOL,was injected into the capsular bag and was rotated into a good position with a Sinskey hook. The remaining elements of viscoelastic were then removed with irrigation/aspiration. The wounds were hydrodissect and were watertight.  No sutures were required.  Subconjunctival injection of Dexamethasone and Ancef  were administered.   The lid speculum was removed. The eye was cleaned with wet and dry gauze. Maxitrol ointment was placed on the eye.  A patch and Chavez shield were placed over the eye.  The patient was discharge in stable condition having tolerated the procedure well      Implant Name Type Inv. Item Serial No.  Lot No. LRB No. Used Action   LENS [id number] 18.0 [employer] UV ASPHERIC BICONVEX IOL - [id number] Lens/Eye Implant LENS [id number] 18.0 [employer] UV [employer]HERIC BICONVEX IOL [id number] [employer]  Right 1 Implanted

## 2024-07-09 NOTE — ANESTHESIA CARE TRANSFER NOTE
Patient: Maliha Pedro    Procedure: Procedure(s):  RIGHT EYE PHACOEMULSIFICATION, CATARACT, WITH INTRAOCULAR LENS IMPLANT       Diagnosis: Cortical senile cataract of right eye [H25.011]  Diagnosis Additional Information: No value filed.    Anesthesia Type:   MAC     Note:    Oropharynx: spontaneously breathing and oropharynx clear of all foreign objects  Level of Consciousness: awake  Oxygen Supplementation: room air    Independent Airway: airway patency satisfactory and stable  Dentition: dentition unchanged  Vital Signs Stable: post-procedure vital signs reviewed and stable  Report to RN Given: handoff report given  Patient transferred to: Phase II  Comments: Uneventful transport   Report to RN  Exchanging well; color natl  Pt responds appropriately to command  IV patent  Lips/teeth/dentition as preop status  Questions answered      Handoff Report: Identifed the Patient, Identified the Reponsible Provider, Reviewed the pertinent medical history, Discussed the surgical course, Reviewed Intra-OP anesthesia mangement and issues during anesthesia, Set expectations for post-procedure period and Allowed opportunity for questions and acknowledgement of understanding      Vitals:  Vitals Value Taken Time   /77 1405   Temp 97.1    Pulse 67    Resp 16    SpO2 97% ROOM AIR        Electronically Signed By: JEREL HILLMAN CRNA  July 9, 2024  2:07 PM

## 2024-07-10 ENCOUNTER — OFFICE VISIT (OUTPATIENT)
Dept: OPHTHALMOLOGY | Facility: CLINIC | Age: 64
End: 2024-07-10
Attending: OPHTHALMOLOGY
Payer: COMMERCIAL

## 2024-07-10 DIAGNOSIS — Z48.810 AFTERCARE FOLLOWING SURGERY OF A SENSORY ORGAN: Primary | ICD-10-CM

## 2024-07-10 PROCEDURE — 99024 POSTOP FOLLOW-UP VISIT: CPT | Performed by: OPHTHALMOLOGY

## 2024-07-10 PROCEDURE — G0463 HOSPITAL OUTPT CLINIC VISIT: HCPCS | Performed by: OPHTHALMOLOGY

## 2024-07-10 ASSESSMENT — EXTERNAL EXAM - LEFT EYE: OS_EXAM: DERMATOCHALASIS

## 2024-07-10 ASSESSMENT — TONOMETRY
OS_IOP_MMHG: 14
IOP_METHOD: TONOPEN
OD_IOP_MMHG: 21
OS_IOP_MMHG: 17
OD_IOP_MMHG: 22
IOP_METHOD: TONOPEN

## 2024-07-10 ASSESSMENT — VISUAL ACUITY
OD_PH_SC+: +2
OS_CC: 20/25
OD_PH_SC: 20/30
CORRECTION_TYPE: GLASSES
OS_CC+: -1
OD_SC: 20/80
METHOD: SNELLEN - LINEAR

## 2024-07-10 ASSESSMENT — EXTERNAL EXAM - RIGHT EYE: OD_EXAM: DERMATOCHALASIS

## 2024-07-10 ASSESSMENT — CUP TO DISC RATIO
OD_RATIO: 0.9
OS_RATIO: 0.8

## 2024-07-10 ASSESSMENT — SLIT LAMP EXAM - LIDS
COMMENTS: MEIBOMIAN GLAND DYSFUNCTION
COMMENTS: MEIBOMIAN GLAND DYSFUNCTION

## 2024-07-10 NOTE — NURSING NOTE
1 day post op Cataract sx right eye, pt slept okay, on and off. Patient states eye feels good, no pain. Does feel scratchy  Pt forgot some insulin yesterday because of surgery. BS was up last night in the 300's. This morning it is 247  Ofloxacin Qid OD  Prednisolone Qid OD  Ketorolac Qid right eye  Latanoprost Once in morning each eye (took drop today in left eye)    Sheri Thomson 10:06 AM

## 2024-07-10 NOTE — PROGRESS NOTES
"   CC: POD1 Cataract extraction intraocular lens right eye     Interim: slept ok; no flashes and floaters; no eye pain    DM 2 BS    PMH: Diabetes mellitus, states Diabetes mellitus is under better control.      Imaging  OCT 06/17/24 OU with no fluid - stable    ONFL 6/17/24  Right eye: thinning superiorly and inferiorly; stable  Left eye: Thinning S, I, nasal; slightly worse nasal today  stable compared to prior ; mild progression compared to 8811-5748    24-2 OVF 6/17/24  Right eye: superior early arcuate; stable compared to previous. MD -0.9  Left eye: reliable. Superior spots of decreased sensitivity - stable. MD -0.8    Intraocular lens calculations reliable  Topography- regular astigmatism  Optos Autofluorescence: normal except for small hypoautofluorescent spots in areas of heme    Fluorescein angiography 06/17/24 : normal AV and choroidal filling ; multiple microaneurysms and peripheral vascular leakage; no neovascularization elsewhere; no neovascularization of the disc     Assessment & Plan:    # POD1 Cataract extraction intraocular lens right eye 07/09/24     Ketorolac (gray top) four times a day    Predforte  (pink top) four times a day  (shake the bottle before)  Ofloxacin (tan top) four times a day    Put the eyedrops 5 minutes a part  Eye shield or glasses at all times x 3 weeks  Sleep with the shield  No heavy lifting   Follow-up in one week  Retina detachment and endophthalmitis precautions were discussed with the patient (increased blurry vision, drainage, new flashes, floaters or a curtain in the visual field) and was asked to return if any of the those occur    What to watch out for:  If you experience any of the following \"RSVP Symptoms\", you should call immediately:  Worsening Redness  Worsening Sensitivity to light  Worsening Vision, including new flashing lights or floaters  Worsening Pain, including nausea/vomiting    #  open angle glaucoma both eyes   - large cup to disc ratio R>L  - " Intraocular pressure 15/14    - pachmetry 583/613  -06/17/24  retinal nerve fiber layer stable   Right eye: thinning superiorly and inferiorly TS/TI;   Left eye: Thinning S, I, borderline N; stable  stable compared to prior exams; some progression compared to 2258-8471    - intraocular pressure remains good off of latanoprost (voluntarily)   with stable VF and retinal nerve fiber layer  - given mild progression over the course of several yrs and large cups; would recommend to restart latanoprost  Uses latanoprost on and off; occasional forgets    Recommend to increased compliance with latanoprost    # Type 2 diabetes mellitus with mod Nonproliferative diabetic retinopathy both eyes   Fluorescein angiography: normal AV and choroidal filling ; multiple microaneurysms and peripheral vascular leakage; no neovascularization elsewhere; no neovascularization of the disc   Blood pressure (<120/80) and blood glucose (HbA1c <7.0) control discussed with patient. Patient advised that failure to adequately control each may lead to vision loss. The patient expressed understanding.  # Pseudophakia, left eye- Dr oRsario  -stable  -observe    # Posterior vitreous detachment of right eye  -stable  -observe    # Old inferior temporal small tear surrounded by pigment, no subretinal fluid right eye   Peripheral Retinal pigment epithelium/small lattice both eyes   Retina attached, no other tears  Retina detachment precautions were discussed with the patient (presence or increased in flashes, floaters or a curtain in the visual field) and was asked to return if any of the those occur    #. Dry eye syndrome  artificial tears  As needed and warm compresses      # Mild Epiretinal membrane right eye  Stable   Not visually significant     # myopia. prescription given 11/20/23     PLAN: see above post-op recs  recommend to increase compliance with latanoprost  Follow up POW1  ~~~~~~~~~~~~~~~~~~~~~~~~~~~~~~~~~~   Complete documentation of  historical and exam elements from today's encounter can be found in the full encounter summary report (not reduplicated in this progress note).  I personally obtained the chief complaint(s) and history of present illness.  I confirmed and edited as necessary the review of systems, past medical/surgical history, family history, social history, and examination findings as documented by others; and I examined the patient myself.  I personally reviewed the relevant tests, images, and reports as documented above.  I formulated and edited as necessary the assessment and plan and discussed the findings and management plan with the patient and family    Lesly Tracy MD   of Ophthalmology.  Retina Service   Department of Ophthalmology and Visual Neurosciences   [facility]  Phone: [phone]   Fax: [phone]

## 2024-07-12 ENCOUNTER — THERAPY VISIT (OUTPATIENT)
Dept: PHYSICAL THERAPY | Facility: CLINIC | Age: 64
End: 2024-07-12
Payer: COMMERCIAL

## 2024-07-12 DIAGNOSIS — G89.29 CHRONIC LEFT HIP PAIN: Primary | ICD-10-CM

## 2024-07-12 DIAGNOSIS — M25.552 CHRONIC LEFT HIP PAIN: Primary | ICD-10-CM

## 2024-07-12 PROCEDURE — 97110 THERAPEUTIC EXERCISES: CPT | Mod: GP | Performed by: PHYSICAL THERAPIST

## 2024-07-12 PROCEDURE — 97140 MANUAL THERAPY 1/> REGIONS: CPT | Mod: GP | Performed by: PHYSICAL THERAPIST

## 2024-07-15 ENCOUNTER — OFFICE VISIT (OUTPATIENT)
Dept: OPHTHALMOLOGY | Facility: CLINIC | Age: 64
End: 2024-07-15
Attending: OPHTHALMOLOGY
Payer: COMMERCIAL

## 2024-07-15 DIAGNOSIS — Z48.810 AFTERCARE FOLLOWING SURGERY OF A SENSORY ORGAN: Primary | ICD-10-CM

## 2024-07-15 PROCEDURE — G0463 HOSPITAL OUTPT CLINIC VISIT: HCPCS | Performed by: OPHTHALMOLOGY

## 2024-07-15 PROCEDURE — 99024 POSTOP FOLLOW-UP VISIT: CPT | Performed by: OPHTHALMOLOGY

## 2024-07-15 ASSESSMENT — VISUAL ACUITY
OS_CC: 20/25
OD_SC: 20/150
OD_CC+: -1
CORRECTION_TYPE: GLASSES
METHOD: SNELLEN - LINEAR
OS_CC+: -2
OD_CC: 20/40

## 2024-07-15 ASSESSMENT — CUP TO DISC RATIO
OD_RATIO: 0.9
OS_RATIO: 0.8

## 2024-07-15 ASSESSMENT — TONOMETRY
OS_IOP_MMHG: 13
OD_IOP_MMHG: 15
IOP_METHOD: TONOPEN

## 2024-07-15 ASSESSMENT — EXTERNAL EXAM - RIGHT EYE: OD_EXAM: DERMATOCHALASIS

## 2024-07-15 ASSESSMENT — EXTERNAL EXAM - LEFT EYE: OS_EXAM: DERMATOCHALASIS

## 2024-07-15 ASSESSMENT — SLIT LAMP EXAM - LIDS
COMMENTS: MEIBOMIAN GLAND DYSFUNCTION
COMMENTS: MEIBOMIAN GLAND DYSFUNCTION

## 2024-07-15 NOTE — PROGRESS NOTES
"   CC: POW1 Cataract extraction intraocular lens right eye   Interim: VA mild blurry; no flashes and floaters; no eye pain    PMH: Diabetes mellitus II    Imaging  OCT 06/17/24 OU with no fluid - stable    ONFL 6/17/24  Right eye: thinning superiorly and inferiorly; stable  Left eye: Thinning S, I, nasal; slightly worse nasal today  stable compared to prior ; mild progression compared to [id number]    24-2 OVF 6/17/24  Right eye: superior early arcuate; stable compared to previous. MD -0.9  Left eye: reliable. Superior spots of decreased sensitivity - stable. MD -0.8    Intraocular lens calculations reliable  Topography- regular astigmatism  Optos Autofluorescence: normal except for small hypoautofluorescent spots in areas of heme    Fluorescein angiography 06/17/24 : normal AV and choroidal filling ; multiple microaneurysms and peripheral vascular leakage; no neovascularization elsewhere; no neovascularization of the disc     Assessment & Plan:    # POW1 Cataract extraction intraocular lens right eye 07/09/24     Ketorolac (gray top)Three times a day x 1 week, then twice a day x 1 week and then once a day till finish    Predforte  (pink top) Three times a day x 1 week, then twice a day x 1 week and then once a day till finish   (shake the bottle before)  Ofloxacin (tan top) ok to stop   Put the eyedrops 5 minutes a part  Eye shield or glasses at all times x 3 weeks  Sleep with the shield  Retina detachment and endophthalmitis precautions were discussed with the patient (increased blurry vision, drainage, new flashes, floaters or a curtain in the visual field) and was asked to return if any of the those occur    What to watch out for:  If you experience any of the following \"RSVP Symptoms\", you should call immediately:  Worsening Redness  Worsening Sensitivity to light  Worsening Vision, including new flashing lights or floaters  Worsening Pain, including nausea/vomiting    #  open angle glaucoma both eyes   - large " cup to disc ratio R>L  - Intraocular pressure 15/14    - pachmetry 583/613  -06/17/24  retinal nerve fiber layer stable   Right eye: thinning superiorly and inferiorly TS/TI;   Left eye: Thinning S, I, borderline N; stable  stable compared to prior exams; some progression compared to [id number]    - intraocular pressure remains good off of latanoprost (voluntarily)   with stable VF and retinal nerve fiber layer  - given mild progression over the course of several yrs and large cups; would recommend to restart latanoprost  Uses latanoprost on and off; occasional forgets  Recommend to increased compliance with latanoprost    # Type 2 diabetes mellitus with mod Nonproliferative diabetic retinopathy both eyes   Fluorescein angiography: normal AV and choroidal filling ; multiple microaneurysms and peripheral vascular leakage; no neovascularization elsewhere; no neovascularization of the disc   Blood pressure (<120/80) and blood glucose (HbA1c <7.0) control discussed with patient. Patient advised that failure to adequately control each may lead to vision loss. The patient expressed understanding.  # Pseudophakia, left eye- Dr Rosario  -stable  -observe    # Posterior vitreous detachment of right eye  -stable  -observe    # Old inferior temporal small tear surrounded by pigment, no subretinal fluid right eye   Peripheral Retinal pigment epithelium/small lattice both eyes   Retina attached, no other tears  Retina detachment precautions were discussed with the patient (presence or increased in flashes, floaters or a curtain in the visual field) and was asked to return if any of the those occur    #. Dry eye syndrome  artificial tears  As needed and warm compresses      # Mild Epiretinal membrane right eye  Stable   Not visually significant     # myopia. prescription given 11/20/23     PLAN: see above post-op recs  recommend to increase compliance with latanoprost- uses it in AM  Follow up POW3 with [facility]  Tomography; optos and prescription   and dilation  ~~~~~~~~~~~~~~~~~~~~~~~~~~~~~~~~~~   Complete documentation of historical and exam elements from today's encounter can be found in the full encounter summary report (not reduplicated in this progress note).  I personally obtained the chief complaint(s) and history of present illness.  I confirmed and edited as necessary the review of systems, past medical/surgical history, family history, social history, and examination findings as documented by others; and I examined the patient myself.  I personally reviewed the relevant tests, images, and reports as documented above.  I formulated and edited as necessary the assessment and plan and discussed the findings and management plan with the patient and family    Lesly Tracy MD   of Ophthalmology.  Retina Service   Department of Ophthalmology and Visual Neurosciences   [facility]  Phone: [phone]   Fax: [phone]

## 2024-07-15 NOTE — NURSING NOTE
Chief Complaints and History of Present Illnesses   Patient presents with    Post Op (Ophthalmology) Right Eye     1 Week post Cataract Surgery      Chief Complaint(s) and History of Present Illness(es)       Post Op (Ophthalmology) Right Eye              Comments: 1 Week post Cataract Surgery               Comments    Patient states right eye is doing okay. Patient reports having eye pressure,Light sensitivity. Stable - comes and goes . Patient denies flashes of light or increase in floaters.  Patient is currently taking, Ketorolac (gray top) four times a day, Predforte  (pink top) four times a day,Ofloxacin (tan top) four times a day and Latanoprost in both eyes     Kristi Cramer V 9:50 AM July 15, 2024

## 2024-07-15 NOTE — PATIENT INSTRUCTIONS
"  Ketorolac (gray top)Three times a day x 1 week, then twice a day x 1 week and then once a day till finish    Predforte  (pink top) Three times a day x 1 week, then twice a day x 1 week and then once a day till finish   (shake the bottle before)  Ofloxacin (tan top) ok to stop   Put the eyedrops 5 minutes a part  Eye shield or glasses at all times x 3 weeks  Sleep with the shield  Retina detachment and endophthalmitis precautions were discussed with the patient (increased blurry vision, drainage, new flashes, floaters or a curtain in the visual field) and was asked to return if any of the those occur    What to watch out for:  If you experience any of the following \"RSVP Symptoms\", you should call immediately:  Worsening Redness  Worsening Sensitivity to light  Worsening Vision, including new flashing lights or floaters  Worsening Pain, including nausea/vomiting  "

## 2024-07-16 ENCOUNTER — MYC MEDICAL ADVICE (OUTPATIENT)
Dept: FAMILY MEDICINE | Facility: CLINIC | Age: 64
End: 2024-07-16

## 2024-07-16 ENCOUNTER — THERAPY VISIT (OUTPATIENT)
Dept: PHYSICAL THERAPY | Facility: CLINIC | Age: 64
End: 2024-07-16
Payer: COMMERCIAL

## 2024-07-16 DIAGNOSIS — G89.29 CHRONIC LEFT HIP PAIN: Primary | ICD-10-CM

## 2024-07-16 DIAGNOSIS — M25.552 CHRONIC LEFT HIP PAIN: Primary | ICD-10-CM

## 2024-07-16 PROCEDURE — 97140 MANUAL THERAPY 1/> REGIONS: CPT | Mod: GP | Performed by: PHYSICAL THERAPIST

## 2024-07-16 PROCEDURE — 97110 THERAPEUTIC EXERCISES: CPT | Mod: GP | Performed by: PHYSICAL THERAPIST

## 2024-07-17 DIAGNOSIS — H35.373 EPIRETINAL MEMBRANE (ERM) OF BOTH EYES: Primary | ICD-10-CM

## 2024-07-19 ENCOUNTER — ANCILLARY PROCEDURE (OUTPATIENT)
Dept: MAMMOGRAPHY | Facility: CLINIC | Age: 64
End: 2024-07-19
Attending: FAMILY MEDICINE
Payer: COMMERCIAL

## 2024-07-19 ENCOUNTER — OFFICE VISIT (OUTPATIENT)
Dept: NURSING | Facility: CLINIC | Age: 64
End: 2024-07-19
Payer: COMMERCIAL

## 2024-07-19 DIAGNOSIS — J45.909 ASTHMA: ICD-10-CM

## 2024-07-19 DIAGNOSIS — Z12.31 ENCOUNTER FOR SCREENING MAMMOGRAM FOR BREAST CANCER: ICD-10-CM

## 2024-07-19 PROCEDURE — 77063 BREAST TOMOSYNTHESIS BI: CPT | Mod: GC | Performed by: STUDENT IN AN ORGANIZED HEALTH CARE EDUCATION/TRAINING PROGRAM

## 2024-07-19 PROCEDURE — 94726 PLETHYSMOGRAPHY LUNG VOLUMES: CPT | Performed by: INTERNAL MEDICINE

## 2024-07-19 PROCEDURE — 94375 RESPIRATORY FLOW VOLUME LOOP: CPT | Performed by: INTERNAL MEDICINE

## 2024-07-19 PROCEDURE — 77067 SCR MAMMO BI INCL CAD: CPT | Mod: GC | Performed by: STUDENT IN AN ORGANIZED HEALTH CARE EDUCATION/TRAINING PROGRAM

## 2024-07-19 PROCEDURE — 94729 DIFFUSING CAPACITY: CPT | Performed by: INTERNAL MEDICINE

## 2024-07-22 LAB
DLCOUNC-%PRED-PRE: 87 %
DLCOUNC-PRE: 16.9 ML/MIN/MMHG
DLCOUNC-PRED: 19.32 ML/MIN/MMHG
ERV-%PRED-PRE: 60 %
ERV-PRE: 0.62 L
ERV-PRED: 1.04 L
EXPTIME-PRE: 4.71 SEC
FEF2575-%PRED-PRE: 104 %
FEF2575-PRE: 2.03 L/SEC
FEF2575-PRED: 1.94 L/SEC
FEFMAX-%PRED-PRE: 116 %
FEFMAX-PRE: 6.93 L/SEC
FEFMAX-PRED: 5.97 L/SEC
FEV1-%PRED-PRE: 98 %
FEV1-PRE: 2.16 L
FEV1FEV6-PRE: 80 %
FEV1FEV6-PRED: 80 %
FEV1FVC-PRE: 80 %
FEV1FVC-PRED: 80 %
FEV1SVC-PRE: 76 %
FEV1SVC-PRED: 69 %
FIFMAX-PRE: 5.65 L/SEC
FRCPLETH-%PRED-PRE: 94 %
FRCPLETH-PRE: 2.55 L
FRCPLETH-PRED: 2.71 L
FVC-%PRED-PRE: 97 %
FVC-PRE: 2.69 L
FVC-PRED: 2.76 L
IC-%PRED-PRE: 106 %
IC-PRE: 2.21 L
IC-PRED: 2.08 L
RVPLETH-%PRED-PRE: 96 %
RVPLETH-PRE: 1.92 L
RVPLETH-PRED: 1.99 L
TLCPLETH-%PRED-PRE: 96 %
TLCPLETH-PRE: 4.75 L
TLCPLETH-PRED: 4.94 L
VA-%PRED-PRE: 92 %
VA-PRE: 4.27 L
VC-%PRED-PRE: 89 %
VC-PRE: 2.83 L
VC-PRED: 3.16 L

## 2024-07-26 ENCOUNTER — THERAPY VISIT (OUTPATIENT)
Dept: PHYSICAL THERAPY | Facility: CLINIC | Age: 64
End: 2024-07-26
Payer: COMMERCIAL

## 2024-07-26 DIAGNOSIS — M25.552 CHRONIC LEFT HIP PAIN: Primary | ICD-10-CM

## 2024-07-26 DIAGNOSIS — G89.29 CHRONIC LEFT HIP PAIN: Primary | ICD-10-CM

## 2024-07-26 PROCEDURE — 97110 THERAPEUTIC EXERCISES: CPT | Mod: GP | Performed by: PHYSICAL THERAPIST

## 2024-07-26 PROCEDURE — 97140 MANUAL THERAPY 1/> REGIONS: CPT | Mod: GP | Performed by: PHYSICAL THERAPIST

## 2024-07-26 ASSESSMENT — ACTIVITIES OF DAILY LIVING (ADL)
GOING_DOWN_1_FLIGHT_OF_STAIRS: NO DIFFICULTY AT ALL
HEAVY_WORK: MODERATE DIFFICULTY
PUTTING_ON_SOCKS_AND_SHOES: MODERATE DIFFICULTY
SITTING_FOR_15_MINUTES: SLIGHT DIFFICULTY
DEEP_SQUATTING: MODERATE DIFFICULTY
HOW_WOULD_YOU_RATE_YOUR_CURRENT_LEVEL_OF_FUNCTION_DURING_YOUR_USUAL_ACTIVITIES_OF_DAILY_LIVING_FROM_0_TO_100_WITH_100_BEING_YOUR_LEVEL_OF_FUNCTION_PRIOR_TO_YOUR_HIP_PROBLEM_AND_0_BEING_THE_INABILITY_TO_PERFORM_ANY_OF_YOUR_USUAL_DAILY_ACTIVITIES?: 85
RECREATIONAL_ACTIVITIES: SLIGHT DIFFICULTY
GOING_UP_1_FLIGHT_OF_STAIRS: NO DIFFICULTY AT ALL
WALKING_APPROXIMATELY_10_MINUTES: SLIGHT DIFFICULTY
TWISTING/PIVOTING_ON_INVOLVED_LEG: SLIGHT DIFFICULTY
ROLLING_OVER_IN_BED: SLIGHT DIFFICULTY
HOS_ADL_ITEM_SCORE_TOTAL: 49
WALKING_DOWN_STEEP_HILLS: SLIGHT DIFFICULTY
STEPPING_UP_AND_DOWN_CURBS: SLIGHT DIFFICULTY
WALKING_15_MINUTES_OR_GREATER: MODERATE DIFFICULTY
GETTING_INTO_AND_OUT_OF_AN_AVERAGE_CAR: SLIGHT DIFFICULTY
HOS_ADL_COUNT: 17
HOS_ADL_SCORE(%): 72.06
WALKING_UP_STEEP_HILLS: MODERATE DIFFICULTY
LIGHT_TO_MODERATE_WORK: SLIGHT DIFFICULTY
WALKING_INITIALLY: SLIGHT DIFFICULTY
HOS_ADL_HIGHEST_POTENTIAL_SCORE: 68
GETTING_INTO_AND_OUT_OF_A_BATHTUB: SLIGHT DIFFICULTY
STANDING_FOR_15_MINUTES: SLIGHT DIFFICULTY

## 2024-07-26 NOTE — PROGRESS NOTES
Physical Therapy Progress Note       07/26/24 0500   Appointment Info   Signing clinician's name / credentials Abhishek Lam, PT, DPT   Total/Authorized Visits 12 (PT POC)   Visits Used 4   Medical Diagnosis Hip pain, left   PT Tx Diagnosis Hip pain, left   Quick Adds Certification   Progress Note/Certification   Start of Care Date 06/28/24   Onset of illness/injury or Date of Surgery 06/24/24  (date of order)   Therapy Frequency 1x/week   Predicted Duration 12 weeks   Certification date from 06/28/24   Certification date to 09/20/24   Progress Note Completed Date 06/28/24   PT Goal 1   Goal Identifier LTG 1   Goal Description Patient will have 2/10 pain in the left hip at worst after driving 1 hour or sitting for 1 hour   Rationale to maximize safety and independence within the community;to maximize safety and independence with transportation;to maximize safety and independence within the home   Target Date 09/20/24   Subjective Report   Subjective Report Hip is still doing much better. Prolonged sitting, after sleeping and standing up after sitting in chair remain difficult.   Objective Measures   Objective Measures Objective Measure 2   Objective Measure 1   Objective Measure Strength   Details L hip: 3+/5 abd, 4-/5 ER   Objective Measure 2   Objective Measure Pain level at rest / during therapy (out of 10)   Details 3 / 4   Treatment Interventions (PT)   Interventions Manual Therapy   Therapeutic Procedure/Exercise   Ther Proc 1 Stationary bike SH 2   Ther Proc 1 - Details 5 minutes, resistance 4   PTRx Ther Proc 1 Hip Abduction Straight Leg Raise   PTRx Ther Proc 1 - Details 2x15   PTRx Ther Proc 2 Clamshell Feet together   PTRx Ther Proc 2 - Details 2x15 with less trunk rotation today   PTRx Ther Proc 3 Bridges with Theraband   PTRx Ther Proc 3 - Details 2x15 red band (shaky)   PTRx Ther Proc 4 Pretzel Stretch   PTRx Ther Proc 4 - Details 3x30 seconds with towel   PTRx Ther Proc 5 Seated Hamstring Stretch    PTRx Ther Proc 5 - Details 3x30 seconds    PTRx Ther Proc 6 Supine Hamstring Stretch   PTRx Ther Proc 6 - Details 2x30 seconds   Skilled Intervention HEP instruction, modifications based on pain behavior   Patient Response/Progress Quite challenged with leg press   Ther Proc 2 Leg press   Ther Proc 2 - Details 3x10 normal incline, no weight, lots of cues for techniques for squat including set up and not locking out knees   Therapeutic Procedures Ther Proc 2   Manual Therapy   Manual Therapy 1 STM   Manual Therapy 1 - Details manual and tool assisted to glute med, max, IT band, TFL and hip rotator   Skilled Intervention pain control and promotion of hip movement   Patient Response/Progress Much less pain upon completion and walked without antalgia   Education   Learner/Method No Barriers to Learning;Pictures/Video;Demonstration;Reading;Listening;Patient   Plan   Home program PTRX link   Plan for next session Bike, leg press         ASSESSMENT  Maliha Pedro will be able to continue home exercises independently as he returns home to the [facility]. She has not met her goals yet for her hip but is expected to progress independently.    PLAN  Continue home program but discharge therapy as patient will be leaving the country for extended period.    Beginning/End Dates of Progress Note Reporting Period:  06/28/24 to 07/26/2024    Referring Provider:  Elva Ackerman*

## 2024-07-29 ENCOUNTER — OFFICE VISIT (OUTPATIENT)
Dept: PULMONOLOGY | Facility: CLINIC | Age: 64
End: 2024-07-29
Payer: COMMERCIAL

## 2024-07-29 ENCOUNTER — MYC MEDICAL ADVICE (OUTPATIENT)
Dept: PULMONOLOGY | Facility: CLINIC | Age: 64
End: 2024-07-29

## 2024-07-29 VITALS
BODY MASS INDEX: 34.49 KG/M2 | SYSTOLIC BLOOD PRESSURE: 137 MMHG | DIASTOLIC BLOOD PRESSURE: 78 MMHG | HEART RATE: 87 BPM | WEIGHT: 194.7 LBS | OXYGEN SATURATION: 96 %

## 2024-07-29 DIAGNOSIS — R05.9 COUGH, UNSPECIFIED TYPE: ICD-10-CM

## 2024-07-29 DIAGNOSIS — R49.0 HOARSENESS: ICD-10-CM

## 2024-07-29 DIAGNOSIS — J30.2 SEASONAL ALLERGIC RHINITIS, UNSPECIFIED TRIGGER: ICD-10-CM

## 2024-07-29 DIAGNOSIS — J45.30 MILD PERSISTENT ASTHMA WITHOUT COMPLICATION: ICD-10-CM

## 2024-07-29 DIAGNOSIS — R05.3 CHRONIC COUGH: ICD-10-CM

## 2024-07-29 PROCEDURE — 99215 OFFICE O/P EST HI 40 MIN: CPT | Performed by: PHYSICIAN ASSISTANT

## 2024-07-29 RX ORDER — MONTELUKAST SODIUM 10 MG/1
1 TABLET ORAL AT BEDTIME
Qty: 90 TABLET | Refills: 3 | Status: SHIPPED | OUTPATIENT
Start: 2024-07-29

## 2024-07-29 RX ORDER — FLUTICASONE PROPIONATE AND SALMETEROL 113; 14 UG/1; UG/1
1 POWDER, METERED RESPIRATORY (INHALATION) 2 TIMES DAILY
Qty: 3 EACH | Refills: 3 | Status: SHIPPED | OUTPATIENT
Start: 2024-07-29

## 2024-07-29 RX ORDER — AZELASTINE 1 MG/ML
1 SPRAY, METERED NASAL 2 TIMES DAILY
Qty: 90 ML | Refills: 3 | Status: SHIPPED | OUTPATIENT
Start: 2024-07-29

## 2024-07-29 ASSESSMENT — ASTHMA QUESTIONNAIRES
ACUTE_EXACERBATION_TODAY: NO
QUESTION_3 LAST FOUR WEEKS HOW OFTEN DID YOUR ASTHMA SYMPTOMS (WHEEZING, COUGHING, SHORTNESS OF BREATH, CHEST TIGHTNESS OR PAIN) WAKE YOU UP AT NIGHT OR EARLIER THAN USUAL IN THE MORNING: TWO OR THREE NIGHTS A WEEK
QUESTION_5 LAST FOUR WEEKS HOW WOULD YOU RATE YOUR ASTHMA CONTROL: SOMEWHAT CONTROLLED
QUESTION_2 LAST FOUR WEEKS HOW OFTEN HAVE YOU HAD SHORTNESS OF BREATH: THREE TO SIX TIMES A WEEK
ACT_TOTALSCORE: 17
QUESTION_1 LAST FOUR WEEKS HOW MUCH OF THE TIME DID YOUR ASTHMA KEEP YOU FROM GETTING AS MUCH DONE AT WORK, SCHOOL OR AT HOME: A LITTLE OF THE TIME
QUESTION_4 LAST FOUR WEEKS HOW OFTEN HAVE YOU USED YOUR RESCUE INHALER OR NEBULIZER MEDICATION (SUCH AS ALBUTEROL): NOT AT ALL
ACT_TOTALSCORE: 17

## 2024-07-29 ASSESSMENT — PAIN SCALES - GENERAL: PAINLEVEL: NO PAIN (0)

## 2024-07-29 NOTE — NURSING NOTE
Chief Complaint   Patient presents with    Asthma     New consult for asthma     She requests these members of her care team be copied on today's visit information: pcp    PCP: Elva Ortiz    Referring Provider:  Referred Self, MD  No address on file    Vitals:    07/29/24 0927   BP: 137/78   BP Location: Left arm   Patient Position: Sitting   Cuff Size: Adult Large   Pulse: 87   SpO2: 96%   Weight: 88.3 kg (194 lb 11.2 oz)       Body mass index is 34.49 kg/m .    Medications were reconciled.    Does patient need any medication refills at today's visit? none      Edelmira Eagle CMA

## 2024-07-29 NOTE — PROGRESS NOTES
Patient educated on Airduo Respiclick inhaler and provided demonstration with hands-on practice by patient. All questions answered.  Further instructions are printed on the AVS.       [employer] Inhaler [email or web address]

## 2024-07-29 NOTE — PATIENT INSTRUCTIONS
Continue allergy medicine  Start AirDuo 1 puff twice daily, rinse mouth out after usage  Consider speech therapy      Follow up Nov 2024

## 2024-07-29 NOTE — PROGRESS NOTES
[facility]- Pulmonary Clinic  New Pulmonary Consult    Name: Maliha Pedro MRN: [id number]     Age: 64 year old   YOB: 1960       Reason for Visit:   Chief Complaint   Patient presents with    Asthma     New consult for asthma             Assessment and Plan:       # Moderate persistent asthma  Presenting with history of asthma diagnosed in her 40s. Intermittent and fairly controlled with Prn albuterol. Last asthma exacerbation requiring prednisone/antibiotics was while she was in the [facility] ~ 3/2024, otherwise no prior use of prednisone/antibiotics for asthma but does experience an asthma flare every change of season or more. ACT 17. She had recent allergy testing 11/2023; Aspergillus IgE 1.81. Eosinophils 300. Using montelukast, flonase and asteline, and OTC antihistamines. PFTs normal, CXR negative. Symptoms are consistent with asthma. Recommend ICS-LABA, previously too expensive, will send AirDuo with Goodrx coupon. We discussed asthma triggers such as GERD which is well controlled with PPI, and DEMETRIA which is currently untreated as she declined to use CPAP. Continue allergy regimen.   --Inhaler therapy: AirDuo moderate dose. OK to substitute with formulary equivalent per insurance.   --Montelukast, OTC antihistamines, nasal sprays    # Possible Vocal cord dysfunction  Endorses intermittent voice hoarseness and is triggered by perfumes. This is consistent with possible vocal cord dysfunction which is very common in female patients with asthma. Recommend speech therapy but since she primarily lives in the [facility] she deferred this referral for now.     # DEMETRIA  Pt declined pap therapy. Discussed alternative treatment options and recommended she reconsider. She declined referral to sleep medicine today but will let us know if she changes her mind.     # Healthcare maintenance  Immunizations: UTD flu, COVID booster, pneumonia and RSV      Return to clinic in 4 months or sooner if  needed      45 minutes spent reviewing chart, reviewing test results, talking with and examining patient, formulating plan, and documentation on the day of the encounter.     MELANIE Phelan [facility], General Pulmonology            HPI:   Maliha Pedro is a 64 year old female who presents for evaluation of asthma.     She has had asthma since her 40s. Was working with Dr. Holden who helped manage her asthma. Last year around March/April 2023 she had an acute cough for 3 months and had blood tinged sputum at that time. Had a CXR through a Pulmonologist in the [facility], was treated with antibiotics and steroids which did help and blood tinged sputum resolved. Saw an allergist, Dr. Verma 11/2023. Allergic to dust and mold.     Asthma is triggered by humidity, heat, and change in seasons. Asthma flares every change of season or more. When her asthma flares up she doubles up her allergy medication and nasal sprays. Typically doesn't take prednisone for asthma besides last year as she tries to avoid it with her diabetes. Has postnasal drip, worse in the morning, which then triggers cough. Sometimes productive in the morning, clear to yellow but not blood tinged. Triggered by perfumes/strong scents and sometimes loses her voice. Gets short of breath walking briskly. Sometimes has wheezing. No chest tightness.     Uses albuterol on avg daily when her asthma feels like it is triggered, otherwise on avg once weekly. Tried Dulera 100-5 for 1 month which is too expensive ($175/month). Currently just using PRN albuterol inhaler, singulair. Singulair seems to help so she has continued it, flares up if she misses it. Taking daily flonase, astelin BID, allegra/zyrtec.   Protonix effectively controls her GERD.   Catches herself snoring. Diagnosed with sleep apnea and recommended CPAP which the pt declined. She is on Ozempic and changed her diet.   No LE edema unless she travels for 16 hours.   Going back to  [facility] 2024.     10 point ROS performed and negative except for as noted in HPI.    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   Pets: Ileana   Travel: lives in [facility] most of the year  Family history of lung cancer, or lung disease: brother  from lung cancer and  in his 50s, nonsmoker  Vaccines: UTD flu, PCV, COVID booster, RSV           Past Medical History:     Past Medical History:   Diagnosis Date    Adjustment disorder with mixed anxiety and depressed mood     following death of her adoptive father and then again after adoptive mother     Allergic rhinitis, cause unspecified     uses benadryl prn (sneezing/hoarse voice)    Bartholin gland cyst 2008    Chest pain, unspecified     neg dobutamine stress test  (reacted to dobutamine)    Dry eyes     ERM OD (epiretinal membrane, right eye)     BE mild    Esophageal reflux     EGD: -neg    Glaucoma suspect     Hearing problem     Hypertension     Irregular menstrual cycle     s/p D&C, since then more regular    Irritable bowel syndrome     colonoscopy -neg. Sx's especially prior to menses    MEDICAL HISTORY OF -     [facility]  Full Code. Aunt (Charlie Tay) will be decision maker    Meningitis, unspecified(322.9) age 8    hospitalized for 9 mo    Mild persistent asthma without complication 2024    Need for prophylactic vaccination with tuberculosis (BCG) vaccine     Has pos Mantoux. Gets yearly cxr, all neg.     Other and unspecified hyperlipidemia     Pain in joint, shoulder region     bone spurs on MRI, s/p pool therapy    Tinnitus     Type II or unspecified type diabetes mellitus without mention of complication, not stated as uncontrolled Age 32    Follows with endocrine at : Shayne [facility] MD    Unspecified cataract     RE    Unspecified sinusitis (chronic)      2006    Wheezing     Has seen pulm 2006 & 2007. Told night ashtma, ? vocal chord spasm due to cold air.              Past Surgical History:      Past Surgical History:   Procedure Laterality Date    CATARACT IOL, RT/LT  9/23/2008    LE    COLONOSCOPY N/A 5/12/2021    Procedure: Colonoscopy, With Polypectomy And Biopsy;  Surgeon: Teo Collins MD;  Location: [facility] OR    COLONOSCOPY WITH CO2 INSUFFLATION N/A 5/12/2021    Procedure: COLONOSCOPY, WITH CO2 INSUFFLATION;  Surgeon: Teo Collins MD;  Location: [facility] OR    HC DILATION/CURETTAGE DIAG/THER NON OB  1992    HC TYMPANOPLASTY W/O MASTOID, INIT/REV W/O OSS CHAIN RECONST  12/09    PHACOEMULSIFICATION CLEAR CORNEA WITH STANDARD INTRAOCULAR LENS IMPLANT Right 7/9/2024    Procedure: RIGHT EYE PHACOEMULSIFICATION, CATARACT, WITH INTRAOCULAR LENS IMPLANT;  Surgeon: Lesly Tracy MD;  Location: [facility] OR    RELEASE CARPAL TUNNEL Right 11/19/2018    Procedure: Right Carpal Tunnel Release;  Surgeon: Ivania Moran MD;  Location: [facility] OR    RELEASE CARPAL TUNNEL Left 12/17/2018    Procedure: Left Carpal Tunnel Release;  Surgeon: Ivania Moran MD;  Location: [facility] OR    TYMPANOPLASTY      YAG CAPSULOTOMY OS (LEFT EYE)  4/23/2011             Social History:     Social History     Socioeconomic History    Marital status: Single     Spouse name: Not on file    Number of children: Not on file    Years of education: Not on file    Highest education level: Not on file   Occupational History    Not on file   Tobacco Use    Smoking status: Never     Passive exposure: Never    Smokeless tobacco: Never   Vaping Use    Vaping status: Never Used   Substance and Sexual Activity    Alcohol use: Yes     Comment: one drink a month    Drug use: No    Sexual activity: Not Currently     Partners: Male     Birth control/protection: Post-menopausal   Other Topics Concern    Parent/sibling w/ CABG, MI or angioplasty before 65F 55M? No      Service No    Blood Transfusions No    Caffeine Concern No    Occupational Exposure Yes     Comment: exposed to X-ray    Hobby Hazards No    Sleep Concern Yes     Comment: Hot Flashes    Stress Concern Yes     Comment: Work    Weight Concern Yes    Special Diet No    Back Care Yes     Comment: Back pain    Exercise Yes    Bike Helmet No    Seat Belt Yes    Self-Exams Yes   Social History Narrative    Works at [employer] in surgical ICU    Born in the [place]. Moved to  in 1982. Initially lived in [place].    No children        2 cats     Social Determinants of Health     Financial Resource Strain: Low Risk  (6/19/2024)    Financial Resource Strain     Within the past 12 months, have you or your family members you live with been unable to get utilities (heat, electricity) when it was really needed?: No   Food Insecurity: Low Risk  (6/19/2024)    Food Insecurity     Within the past 12 months, did you worry that your food would run out before you got money to buy more?: No     Within the past 12 months, did the food you bought just not last and you didn t have money to get more?: No   Transportation Needs: Low Risk  (6/19/2024)    Transportation Needs     Within the past 12 months, has lack of transportation kept you from medical appointments, getting your medicines, non-medical meetings or appointments, work, or from getting things that you need?: No   Physical Activity: Insufficiently Active (6/19/2024)    Exercise Vital Sign     Days of Exercise per Week: 3 days     Minutes of Exercise per Session: 30 min   Stress: No Stress Concern Present (6/19/2024)    [employer] [employer] - Occupational Stress Questionnaire     Feeling of Stress : Only a little   Social Connections: Unknown (6/19/2024)    Social Connection and Isolation Panel [NHANES]     Frequency of Communication with Friends and Family: Not on file     Frequency of Social Gatherings with Friends and Family: Once a week      Attends Confucianist Services: Not on file     Active Member of Clubs or Organizations: Not on file     Attends Club or Organization Meetings: Not on file     Marital Status: Not on file   Interpersonal Safety: Low Risk  (2024)    Interpersonal Safety     Do you feel physically and emotionally safe where you currently live?: Yes     Within the past 12 months, have you been hit, slapped, kicked or otherwise physically hurt by someone?: No     Within the past 12 months, have you been humiliated or emotionally abused in other ways by your partner or ex-partner?: No   Housing Stability: Low Risk  (2024)    Housing Stability     Do you have housing? : Yes     Are you worried about losing your housing?: No            Family History:     Family History   Problem Relation Age of Onset    Diabetes Father          of dm 70's    C.A.D. Father     Cerebrovascular Disease Father     Diabetes Sister     Diabetes Sister     Diabetes Sister     Hypertension Sister     Diabetes Brother     Diabetes Brother     Cancer Half-Brother         lung    Diabetes Half-Brother     Leukemia Half-Brother     Glaucoma Maternal Grandmother     Diabetes Paternal Grandfather     Hypertension Paternal Grandfather     Cerebrovascular Disease Paternal Grandfather     Cancer Maternal Aunt         ovarian cancer.     Leukemia Half-Brother     Lymphoma Paternal Cousin     Macular Degeneration No family hx of              Allergies:     Allergies   Allergen Reactions    Latex Swelling    Mold     Tetanus Antitoxin Swelling    Tetracycline Swelling             Medications:     Current Outpatient Medications   Medication Sig Dispense Refill    albuterol (PROAIR HFA/PROVENTIL HFA/VENTOLIN HFA) 108 (90 Base) MCG/ACT inhaler Inhale 2 puffs into the lungs every 4 hours as needed for shortness of breath 18 g 1    aspirin 81 MG EC tablet Take 81 mg by mouth daily Please resume in 5 days.  (2018)      azelastine (ASTELIN) 0.1 % nasal spray Spray 1  spray into both nostrils 2 times daily 90 mL 3    CALCIUM + D OR Take 1 tablet by mouth 2 times daily.      Continuous Blood Gluc Sensor (DEXCOM G7 SENSOR) MISC CHANGE SENSOR EVERY 10 DAYS AS DIRECTED 9 each 4    DULERA 100-5 MCG/ACT inhaler INHALE 2 PUFFS INTO THE LUNGS 2 TIMES DAILY 13 g 1    fexofenadine (ALLEGRA) 180 MG tablet Take 1 tablet by mouth daily. 1 TABLET DAILY Failed claritin for symptom cotrol. Zyrtec increases heart rate. 90 tablet 3    fish oil-omega-3 fatty acids 1000 MG capsule Take 1 capsule by mouth daily.      fluticasone (FLONASE) 50 MCG/ACT nasal spray USE 1-2 SPRAYS IN EACH NOSTRIL ONCE DAILY 48 g 3    Glucosamine-Chondroitin (GLUCOSAMINE CHONDR COMPLEX PO)       hydrochlorothiazide (HYDRODIURIL) 12.5 MG tablet Take 1 tablet (12.5 mg) by mouth daily 90 tablet 3    insulin aspart (NOVOLOG FLEXPEN) 100 UNIT/ML pen Use 20-35 units three times daily with meals for up to 120 units daily 120 mL 3    insulin glargine (BASAGLAR KWIKPEN) 100 UNIT/ML pen Inject 50 Units Subcutaneous daily 48 mL 3    insulin pen needle (BD LISA U/F) 32G X 4 MM miscellaneous Use to inject 4-5 times daily. 600 each 1    ketorolac (ACULAR) 0.5 % ophthalmic solution Place 1 drop into the right eye 4 times daily 5 mL 1    latanoprost (XALATAN) 0.005 % ophthalmic solution Place 1 drop into both eyes daily 7.5 mL 11    latanoprost (XALATAN) 0.005 % ophthalmic solution Place 1 drop into both eyes daily 7.5 mL 11    losartan (COZAAR) 100 MG tablet Take 1 tablet (100 mg) by mouth daily 90 tablet 3    magnesium 250 MG tablet Take 1 tablet by mouth daily 90 tablet 3    metFORMIN (GLUCOPHAGE XR) 500 MG 24 hr tablet TAKE TWO TABLETS BY MOUTH TWICE A  tablet 3    montelukast (SINGULAIR) 10 MG tablet Take 1 tablet (10 mg) by mouth At Bedtime 90 tablet 3    MULTIVITAMIN OR Take 1 tablet by mouth daily.      NEEDLES, ANY SIZE Pen needles for Novolog insulin pen. Use to inject 5-6  times daily. 4 Box 3    NOVOLOG FLEXPEN 100  UNIT/ML soln INJECT 35 units with breakfast 25 units with lunch and dinner MAX DAILY DOSE 90 UNITS 90 mL 3    ofloxacin (OCUFLOX) 0.3 % ophthalmic solution Place 1 drop into the right eye 4 times daily 5 mL 0    pantoprazole (PROTONIX) 40 MG EC tablet Take 1 tablet (40 mg) by mouth daily 90 tablet 3    potassium chloride ER (KLOR-CON M) 10 MEQ CR tablet Take 1 tablet (10 mEq) by mouth daily 90 tablet 3    prednisoLONE acetate (PRED FORTE) 1 % ophthalmic suspension Place 1 drop into the right eye 4 times daily 5 mL 1    rosuvastatin (CRESTOR) 10 MG tablet Take 1 tablet (10 mg) by mouth daily 90 tablet 3    Semaglutide, 1 MG/DOSE, (OZEMPIC, 1 MG/DOSE,) 4 MG/3ML pen Inject 1 mg Subcutaneous every 7 days 18 mL 1    VITAMIN D 2000 UNIT OR TABS Take 1 tablet by mouth daily.       Current Facility-Administered Medications   Medication Dose Route Frequency Provider Last Rate Last Admin    lidocaine 1% with EPINEPHrine 1:100,000 injection 3 mL  3 mL Intradermal Once Donell Marino MD        lidocaine 1% with EPINEPHrine 1:100,000 injection 3 mL  3 mL Intradermal Once Donell Marino MD         Facility-Administered Medications Ordered in Other Visits   Medication Dose Route Frequency Provider Last Rate Last Admin    fentaNYL (SUBLIMAZE) injection 25-50 mcg  25-50 mcg Intravenous Q5 Min PRN Pedro Kruse MD   50 mcg at 12/17/18 0726    flumazenil (ROMAZICON) injection 0.2 mg  0.2 mg Intravenous q1 min prn Pedro Kruse MD        midazolam (VERSED) injection 0.5-1 mg  0.5-1 mg Intravenous Q4 Min PRN Pedro Kruse MD   1 mg at 12/17/18 0726    naloxone (NARCAN) injection 0.1-0.4 mg  0.1-0.4 mg Intravenous Q2 Min PRN Pedro Kruse MD                  Exam:   /78 (BP Location: Left arm, Patient Position: Sitting, Cuff Size: Adult Large)   Pulse 87   Wt 88.3 kg (194 lb 11.2 oz)   LMP 03/19/2009 (Exact Date)   SpO2 96%   BMI 34.49 kg/m      Gen: well-appearing, NAD  CV: RRR, no murmurs  Resp: Normal  respiratory effort on room air. Clear to auscultation bilaterally without wheezes, rales or ronchi.   Skin: no obvious rashes on gross evaluation  Extremities: No edema  Neuro: alert and appropriate, no focal abnormalities         Data:     I have personally reviewed all pertinent labs, imaging studies and PFT results unless otherwise noted.    Labs:  Eosinophils 300 (11/2023)    Imaging:  CXR 6/24/2024- no acute chest    PFT: normal PFTs

## 2024-07-31 ENCOUNTER — TELEPHONE (OUTPATIENT)
Dept: PULMONOLOGY | Facility: CLINIC | Age: 64
End: 2024-07-31
Payer: COMMERCIAL

## 2024-07-31 NOTE — TELEPHONE ENCOUNTER
"Patient confirmed scheduled appointment:  Date: 12/3/24  Time: 10:00AM  Visit type: RPULM (using RBNC to schedule into Dr. Holden template)  Provider: VIK  Location: [facility]  Testing/imaging: N/A  Additional notes: Per IB message 6/20/24 - Savannah Gusman, \"I talked to Dr. Holden and I called Fco. Dr. Holden is willing to see Fco for follow up in the future after she has a new appointment with general pulmonary for her mild asthma and cough. Dr. Holden last saw Fco back in 2005.\"   "

## 2024-08-01 ENCOUNTER — OFFICE VISIT (OUTPATIENT)
Dept: OPHTHALMOLOGY | Facility: CLINIC | Age: 64
End: 2024-08-01
Attending: OPHTHALMOLOGY
Payer: COMMERCIAL

## 2024-08-01 DIAGNOSIS — H35.373 EPIRETINAL MEMBRANE (ERM) OF BOTH EYES: ICD-10-CM

## 2024-08-01 PROCEDURE — 92134 CPTRZ OPH DX IMG PST SGM RTA: CPT | Performed by: OPHTHALMOLOGY

## 2024-08-01 PROCEDURE — 99207 FUNDUS PHOTOS OU (BOTH EYES): CPT | Mod: 26 | Performed by: OPHTHALMOLOGY

## 2024-08-01 PROCEDURE — 92015 DETERMINE REFRACTIVE STATE: CPT

## 2024-08-01 PROCEDURE — 99024 POSTOP FOLLOW-UP VISIT: CPT | Performed by: OPHTHALMOLOGY

## 2024-08-01 PROCEDURE — 92250 FUNDUS PHOTOGRAPHY W/I&R: CPT | Mod: 59 | Performed by: OPHTHALMOLOGY

## 2024-08-01 PROCEDURE — G0463 HOSPITAL OUTPT CLINIC VISIT: HCPCS | Performed by: OPHTHALMOLOGY

## 2024-08-01 ASSESSMENT — REFRACTION_MANIFEST
OS_SPHERE: -3.50
OS_ADD: +2.50
OD_SPHERE: -1.75
OS_AXIS: 075
OS_CYLINDER: +0.25
OD_ADD: +2.50
OD_CYLINDER: SPHERE

## 2024-08-01 ASSESSMENT — VISUAL ACUITY
OD_CC+: -2
OD_PH_CC+: +2
OD_PH_CC: 20/30
OS_CC: 20/25
OD_CC: 20/40
METHOD: SNELLEN - LINEAR
OS_CC+: +2
METHOD_MR: MRX BY PJO

## 2024-08-01 ASSESSMENT — CUP TO DISC RATIO
OS_RATIO: 0.8
OD_RATIO: 0.9

## 2024-08-01 ASSESSMENT — EXTERNAL EXAM - RIGHT EYE: OD_EXAM: DERMATOCHALASIS

## 2024-08-01 ASSESSMENT — REFRACTION_WEARINGRX
OS_SPHERE: -3.50
OD_AXIS: 128
OD_SPHERE: -2.25
OD_CYLINDER: +1.00
OD_ADD: +2.00
OS_ADD: +2.00
SPECS_TYPE: PAL
OS_AXIS: 061
OS_CYLINDER: +1.25

## 2024-08-01 ASSESSMENT — TONOMETRY
OS_IOP_MMHG: 12
IOP_METHOD: ICARE
OD_IOP_MMHG: 14

## 2024-08-01 ASSESSMENT — EXTERNAL EXAM - LEFT EYE: OS_EXAM: DERMATOCHALASIS

## 2024-08-01 ASSESSMENT — SLIT LAMP EXAM - LIDS
COMMENTS: MEIBOMIAN GLAND DYSFUNCTION
COMMENTS: MEIBOMIAN GLAND DYSFUNCTION

## 2024-08-01 NOTE — NURSING NOTE
Chief Complaints and History of Present Illnesses   Patient presents with    Post Op (Ophthalmology) Right Eye     Chief Complaint(s) and History of Present Illness(es)       Post Op (Ophthalmology) Right Eye              Associated symptoms: itching.  Negative for dryness, eye pain, redness and tearing    Pain scale: 0/10              Comments    POW#3 S/P Phacoemulsification cataract, with intraocular lens insertion, right eye (7/9/2024). Taking Prednisolone qday right eye. Stopped Ofloxacin and Ketorolac. Not taking any AT. Taking Latanoprost qam now both eyes. No new blurriness right eye. No floaters or flashes right eye. Some light sensitivity with oncoming traffic.     Ashley Hernandez on 8/1/2024 at 8:10 AM

## 2024-08-01 NOTE — PROGRESS NOTES
CC: Cataract extraction intraocular lens right eye 07/09/24   Interim: VA improving ; no flashes and floaters; no eye pain    PMH: Diabetes mellitus II    Imaging  OCT 08/01/24 both eyes with good foveal contour; no fluid - stable    ONFL 6/17/24  Right eye: thinning superiorly and inferiorly; stable  Left eye: Thinning S, I, nasal; slightly worse nasal today  stable compared to prior ; mild progression compared to 5609-2255    24-2 OVF 6/17/24  Right eye: superior early arcuate; stable compared to previous. MD -0.9  Left eye: reliable. Superior spots of decreased sensitivity - stable. MD -0.8    Intraocular lens calculations reliable  Topography- regular astigmatism  Optos Autofluorescence: normal except for small hypoautofluorescent spots in areas of heme    Fluorescein angiography 06/17/24 : normal AV and choroidal filling ; multiple microaneurysms and peripheral vascular leakage; no neovascularization elsewhere; no neovascularization of the disc     Assessment & Plan:    # POW1 Cataract extraction intraocular lens right eye 07/09/24   Predforte  (pink top) once a day till finish   (shake the bottle before)  Retina detachment and endophthalmitis precautions were discussed with the patient (increased blurry vision, drainage, new flashes, floaters or a curtain in the visual field) and was asked to return if any of the those occur    #  open angle glaucoma both eyes   - large cup to disc ratio R>L  - Intraocular pressure 15/14    - pachmetry 583/613  -06/17/24  retinal nerve fiber layer stable   Right eye: thinning superiorly and inferiorly TS/TI;   Left eye: Thinning S, I, borderline N; stable  stable compared to prior exams; some progression compared to 5744-3304    - intraocular pressure remains good off of latanoprost (voluntarily)   with stable VF and retinal nerve fiber layer  - given mild progression over the course of several yrs and large cups; would recommend to restart latanoprost  Uses latanoprost on  and off; occasional forgets  Recommend to increased compliance with latanoprost    # Type 2 diabetes mellitus with mod Nonproliferative diabetic retinopathy both eyes   Fluorescein angiography: normal AV and choroidal filling ; multiple microaneurysms and peripheral vascular leakage; no neovascularization elsewhere; no neovascularization of the disc   Blood pressure (<120/80) and blood glucose (HbA1c <7.0) control discussed with patient. Patient advised that failure to adequately control each may lead to vision loss. The patient expressed understanding.  # Pseudophakia, left eye- Dr Rosario  -stable  -observe    # Posterior vitreous detachment of right eye  -stable  -observe    # Old inferior temporal small tear surrounded by pigment, no subretinal fluid right eye   Peripheral Retinal pigment epithelium/small lattice both eyes   Retina attached, no other tears  Retina detachment precautions were discussed with the patient (presence or increased in flashes, floaters or a curtain in the visual field) and was asked to return if any of the those occur    #. Dry eye syndrome  artificial tears  As needed and warm compresses      # Mild Epiretinal membrane right eye  Stable   Not visually significant     PLAN: see above post-op recs  Prescription given 08/01/24   recommend to increase compliance with latanoprost- uses it in AM  Follow up in 4 months after returning from [facility]   Follow up POW3 with Optical Coherence Tomography; optos and prescription   and dilation  ~~~~~~~~~~~~~~~~~~~~~~~~~~~~~~~~~~   Complete documentation of historical and exam elements from today's encounter can be found in the full encounter summary report (not reduplicated in this progress note).  I personally obtained the chief complaint(s) and history of present illness.  I confirmed and edited as necessary the review of systems, past medical/surgical history, family history, social history, and examination findings as documented by others; and I  examined the patient myself.  I personally reviewed the relevant tests, images, and reports as documented above.  I formulated and edited as necessary the assessment and plan and discussed the findings and management plan with the patient and family    Lesly Tracy MD   of Ophthalmology.  Retina Service   Department of Ophthalmology and Visual Neurosciences   [facility]  Phone: [phone]   Fax: [phone]

## 2024-08-06 DIAGNOSIS — Z79.4 TYPE 2 DIABETES MELLITUS WITH DIABETIC NEPHROPATHY, WITH LONG-TERM CURRENT USE OF INSULIN (H): ICD-10-CM

## 2024-08-06 DIAGNOSIS — E11.21 TYPE 2 DIABETES MELLITUS WITH DIABETIC NEPHROPATHY, WITH LONG-TERM CURRENT USE OF INSULIN (H): ICD-10-CM

## 2024-08-06 RX ORDER — INSULIN ASPART 100 [IU]/ML
INJECTION, SOLUTION INTRAVENOUS; SUBCUTANEOUS
Qty: 90 ML | Refills: 2 | Status: SHIPPED | OUTPATIENT
Start: 2024-08-06

## 2024-08-06 NOTE — TELEPHONE ENCOUNTER
NOVOLOG FLEXPEN 100UNIT/ML SOPN       Last Written Prescription Date:  7/11/23  Last Fill Quantity: 90 ml ,   # refills: 3  Last Office Visit : 6/10/24 [facility]  Future Office visit:  12/2/24    Routing refill request to provider for review/approval because:  Insulin and insulin pump supplies - refilled per Endocrine clinic.

## 2024-10-28 DIAGNOSIS — I10 ESSENTIAL HYPERTENSION: ICD-10-CM

## 2024-10-28 DIAGNOSIS — E11.21 TYPE 2 DIABETES MELLITUS WITH DIABETIC NEPHROPATHY, WITH LONG-TERM CURRENT USE OF INSULIN (H): ICD-10-CM

## 2024-10-28 DIAGNOSIS — Z79.4 TYPE 2 DIABETES MELLITUS WITH DIABETIC NEPHROPATHY, WITH LONG-TERM CURRENT USE OF INSULIN (H): ICD-10-CM

## 2024-10-28 RX ORDER — LOSARTAN POTASSIUM 100 MG/1
100 TABLET ORAL DAILY
Qty: 90 TABLET | Refills: 1 | Status: SHIPPED | OUTPATIENT
Start: 2024-10-28

## 2024-11-01 RX ORDER — METFORMIN HYDROCHLORIDE 500 MG/1
TABLET, EXTENDED RELEASE ORAL
Qty: 360 TABLET | Refills: 0 | Status: SHIPPED | OUTPATIENT
Start: 2024-11-01

## 2024-11-01 NOTE — TELEPHONE ENCOUNTER
metFORMIN (GLUCOPHAGE XR) 500 MG 24 hr tablet 360 tablet 3 10/7/2023     Last Office Visit: 6/10/24  Future Office visit:  12/2/24         Biguanide Agents Passed        Margareth Whipple RN  [facility]/Red Flag Triage & Med Refill Team

## 2024-11-19 DIAGNOSIS — E78.5 HYPERLIPIDEMIA LDL GOAL <100: ICD-10-CM

## 2024-11-19 DIAGNOSIS — I10 ESSENTIAL HYPERTENSION: ICD-10-CM

## 2024-11-19 DIAGNOSIS — K21.9 GASTROESOPHAGEAL REFLUX DISEASE, UNSPECIFIED WHETHER ESOPHAGITIS PRESENT: ICD-10-CM

## 2024-11-20 RX ORDER — HYDROCHLOROTHIAZIDE 12.5 MG/1
12.5 TABLET ORAL DAILY
Qty: 90 TABLET | Refills: 1 | Status: SHIPPED | OUTPATIENT
Start: 2024-11-20

## 2024-11-20 RX ORDER — POTASSIUM CHLORIDE 750 MG/1
10 TABLET, EXTENDED RELEASE ORAL DAILY
Qty: 90 TABLET | Refills: 1 | Status: SHIPPED | OUTPATIENT
Start: 2024-11-20

## 2024-11-20 RX ORDER — PANTOPRAZOLE SODIUM 40 MG/1
40 TABLET, DELAYED RELEASE ORAL DAILY
Qty: 90 TABLET | Refills: 0 | Status: SHIPPED | OUTPATIENT
Start: 2024-11-20

## 2024-11-20 RX ORDER — ROSUVASTATIN CALCIUM 10 MG/1
10 TABLET, COATED ORAL DAILY
Qty: 90 TABLET | Refills: 0 | Status: SHIPPED | OUTPATIENT
Start: 2024-11-20

## 2024-11-27 ENCOUNTER — OFFICE VISIT (OUTPATIENT)
Dept: AUDIOLOGY | Facility: CLINIC | Age: 64
End: 2024-11-27
Payer: COMMERCIAL

## 2024-11-27 DIAGNOSIS — H91.90 DECREASED HEARING, UNSPECIFIED LATERALITY: ICD-10-CM

## 2024-11-27 DIAGNOSIS — H90.3 SENSORINEURAL HEARING LOSS (SNHL) OF BOTH EARS: Primary | ICD-10-CM

## 2024-11-27 DIAGNOSIS — H40.003 BORDERLINE GLAUCOMA, BILATERAL: ICD-10-CM

## 2024-11-27 PROCEDURE — 92550 TYMPANOMETRY & REFLEX THRESH: CPT | Performed by: AUDIOLOGIST

## 2024-11-27 PROCEDURE — 92557 COMPREHENSIVE HEARING TEST: CPT | Performed by: AUDIOLOGIST

## 2024-11-27 RX ORDER — LATANOPROST 50 UG/ML
1 SOLUTION/ DROPS OPHTHALMIC DAILY
Qty: 2.5 ML | Refills: 1 | Status: SHIPPED | OUTPATIENT
Start: 2024-11-27

## 2024-11-27 NOTE — CONFIDENTIAL NOTE
AUDIOLOGY REPORT    SUBJECTIVE:  Maliha Pedro is a 64 year old female who was seen in the Audiology Clinic at the [facility] for audiologic evaluation, referred by Elva Ortiz M.D. The patient has been seen previously on 9/10/2019 for assessment and results indicated normal sloping to moderately-severe sensorineural hearing loss bilaterally. The patient's history includes tympanoplasty in the left ear in 2009. The patient reports longstanding tinnitus bilaterally. The patient denies otalgia, aural fullness, otorrhea, and dizziness.      OBJECTIVE:    Fall Risk Screen:  1. Have you fallen two or more times in the past year? No  2. Have you fallen and had an injury in the past year? No    Otoscopic exam indicates ears are clear of cerumen bilaterally     Pure Tone Thresholds assessed using conventional audiometry with good  reliability from 250-8000 Hz bilaterally using insert earphones and circumaural headphones     RIGHT:  normal sloping to moderate-severe sensorineural hearing loss    LEFT:    normal sloping to severe sensorineural hearing loss    Tympanogram:    RIGHT: normal eardrum mobility    LEFT:   normal eardrum mobility    Reflexes (reported by stimulus ear):  RIGHT: Ipsilateral is present at normal levels  RIGHT: Contralateral is present at frequencies tested  LEFT:   Ipsilateral is present at normal levels  LEFT:   Contralateral is absent      Speech Reception Threshold:    RIGHT: 25 dB HL    LEFT:   30 dB HL  Word Recognition Score:     RIGHT: 100% at 65 dB HL using NU-6 recorded word list.    LEFT:   100% at 70 dB HL using NU-6 recorded word list.      ASSESSMENT:     ICD-10-CM    1. Sensorineural hearing loss (SNHL) of both ears  H90.3 Cmpn Audiometry Thrshld Eval & Speech Recog (14124)     Tymps / Reflex   (81009)      2. Decreased hearing, unspecified laterality  H91.90 Adult Audiology  Referral          Compared to patient's previous  audiogram dated 9/10/2019, hearing has declined slightly at several frequencies bilaterally. Today s results were discussed with the patient in detail.     PLAN:  Patient was counseled regarding hearing loss and impact on communication. It is recommended that the patient be retested in approximately 1 year or sooner if new concerns arise.  Please call this clinic with questions regarding these results or recommendations.        Natalia Noriega.  Doctor of Audiology  [place] License # [id number]

## 2024-11-27 NOTE — PROGRESS NOTES
Endocrinology and Diabetes Clinic        Follow up for T2DM      ASSESSMENT/Plan:   64 y old woman with T2DM with comorbidity of obesity     Blood glucose control  A1c  7.8% in a good-moderate control, slightly less good than at the last visit but overall still better then she used to be, postprandial hyperglycemia in the morning    Cont Dexcom G7     Hypertension controlled on losartan and hydrochlorothiazide, cont    Dyslipidemia on 10 mg of Crestor doing well     Continue Lantus 50 units once daily and Novolog 25 units 3-4 times daily with meals    Continue in Metformin 1000 mg bid    Increase Ozempic to 2 mg weekly      Patient to contact the clinic if blood glucose (sugar) is under 70 two times in one week or above 200 for 3 consecutive days.         The Longitudinal plan of care for diabetes condition was addressed during this visit. Due to added complexity of care, we will continue to support Reenamaria isabel , and the subsequent management of this condition(s) and with the ongoing continuity of care of this condition.    Stacia Carbajal MD  Endocrinology and Diabetes  Telephone contact:  [facility] [phone]  [facility] [phone]      Interval history  6 month follow up for T2DM  Pt has been doing well, has been on Ozempic 1 mg weekly, no weight loss sofar, no SE  Tolerates this well  Has Dexcom sensor G7, issues with losing sensor due to humidity in the [facility]  Diet avoiding carbs nabeel rice and bread, eating vegetables and meat, fish, still has     Pt is spending most of her time in the [place] [place], [place], where she has her own house on her families land with lots of family members around her, all siblings with diabetes    Physical activity walking in the morning      Current diabetic medications:  Metformin 1000 bid, glargine insulin 50 units daily, Novolog 10-25 units three-four times daily, Ozempic 1 mg  weekly     Intolerant to Jardiance with yeast infections    SMBG:  Dexcom sensor, Average blood sugar 178, time in range 53%, low 3%, very low 0  variable Bgs 197 GMI 8%,   Pattern better during the night, postprandial high spikes     Meal plan: 3 meals a day plus snacks,   Exercise: has increased and walking lots of steps now, much more than before     Complications  1. Retinopathy: yearly visit, no DR, cataract surgery this summer  2. Nephropathy: None  3. Neuropathy: No numbness no tingling no history of ulcerations  4. Hypoglycemia: Yes, however resolved with insulin adjustment  5. Macrovascular: No chest pain, no shortness of breath      DEThe longitudinal plan of care for the diagnosis(es)/condition(s) as documented were addressed during this visit. Due to the added complexity in care, I will continue to support Fco in the subsequent management and with ongoing continuity of care.XCOM G7 is helpful    HTN: Losartan 100 mg daily, hydrochlorothiazide 12.5 daily  Dyslipidemia Crestor 10 mg     Previously used diabetes medications:   Bydureon in 2016, Ernst  Jardiance: yeast infection         PMH:      Patient Active Problem List   Diagnosis    ? 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    Displacement of lumbar intervertebral disc without myelopathy    Nonallopathic lesion of lumbar region    Nonallopathic lesion of thoracic region    Nonallopathic lesion of sacral region    Bartholin gland cyst    Cataract    Irritable bowel syndrome    Esophageal reflux    Seasonal allergic rhinitis    Latex sensitivity    Adenomatous polyp    HYPERLIPIDEMIA LDL GOAL <100    Ovarian cyst    Breast pain    Essential hypertension    Advanced directives, counseling/discussion    Obesity    Hypomagnesemia    Type 2 diabetes mellitus with mild nonproliferative retinopathy without macular edema, with long-term current use of insulin, unspecified laterality (H)             Medications:   Current Outpatient  Prescriptions          Current Outpatient Medications   Medication Sig Dispense Refill    albuterol (PROAIR HFA/PROVENTIL HFA/VENTOLIN HFA) 108 (90 Base) MCG/ACT inhaler Inhale 2 puffs into the lungs every 4 hours as needed for shortness of breath / dyspnea 18 g 1    aspirin 81 MG EC tablet Take 81 mg by mouth daily Please resume in 5 days.  (12/22/2018)        CALCIUM + D OR Take 1 tablet by mouth 2 times daily.        Continuous Blood Gluc Sensor (DEXCOM G6 SENSOR) MISC 1 each every 10 days 18 each 1    Continuous Blood Gluc Transmit (DEXCOM G6 TRANSMITTER) MISC 1 each every 3 months Change every 3 months. 2 each 1    fexofenadine (ALLEGRA) 180 MG tablet Take 1 tablet by mouth daily. 1 TABLET DAILY Failed claritin for symptom cotrol. Zyrtec increases heart rate. 90 tablet 3    fish oil-omega-3 fatty acids 1000 MG capsule Take 1 capsule by mouth daily.        fluticasone (FLONASE) 50 MCG/ACT nasal spray USE 1-2 SPRAYS IN EACH NOSTRIL ONCE DAILY 48 g 3    Glucosamine-Chondroitin (GLUCOSAMINE CHONDR COMPLEX PO)          hydrochlorothiazide (HYDRODIURIL) 12.5 MG tablet Take 1 tablet (12.5 mg) by mouth daily 90 tablet 3    insulin aspart (NOVOLOG FLEXPEN) 100 UNIT/ML pen Use 20-35 units three times daily with meals for up to 120 units daily 120 mL 3    insulin glargine (BASAGLAR KWIKPEN) 100 UNIT/ML pen INJECT 70 UNITS UNDER THE SKIN DAILY 90 mL 2    insulin pen needle (BD LISA U/F) 32G X 4 MM miscellaneous Use to inject 4-5 times daily. 600 each 1    latanoprost (XALATAN) 0.005 % ophthalmic solution Place 1 drop into both eyes daily 7.5 mL 11    losartan (COZAAR) 100 MG tablet Take 1 tablet (100 mg) by mouth daily 90 tablet 3    magnesium 250 MG tablet Take 1 tablet by mouth daily 90 tablet 3    metFORMIN (GLUCOPHAGE XR) 500 MG 24 hr tablet TAKE 2 TABLETS TWICE A  tablet 3    montelukast (SINGULAIR) 10 MG tablet Take 1 tablet (10 mg) by mouth At Bedtime 90 tablet 3    MULTIVITAMIN OR Take 1 tablet by mouth  daily.        NEEDLES, ANY SIZE Pen needles for Novolog insulin pen. Use to inject 5-6  times daily. 4 Box 3    NOVOLOG FLEXPEN 100 UNIT/ML soln Sig 20-25 units two-three times daily. Max daily dose 75 units 90 mL 1    pantoprazole (PROTONIX) 40 MG EC tablet Take 1 tablet (40 mg) by mouth daily 90 tablet 3    potassium chloride ER (KLOR-CON M) 10 MEQ CR tablet Take 1 tablet (10 mEq) by mouth daily 90 tablet 3    rosuvastatin (CRESTOR) 10 MG tablet Take 1 tablet (10 mg) by mouth daily 90 tablet 3    semaglutide (OZEMPIC) 2 MG/3ML pen Inject 0.25 mg Subcutaneous every 7 days 3 mL 0    [START ON 7/10/2023] semaglutide (OZEMPIC) 2 MG/3ML pen Inject 0.5 mg Subcutaneous every 7 days 3 mL 0    [START ON 8/9/2023] Semaglutide, 1 MG/DOSE, (OZEMPIC) 4 MG/3ML pen Inject 1 mg Subcutaneous every 7 days 3 mL 0    VITAMIN D 2000 UNIT OR TABS Take 1 tablet by mouth daily.        amoxicillin-clavulanate (AUGMENTIN) 875-125 MG tablet Take 1 tablet by mouth 2 times daily for 7 days 14 tablet 0         Physical Exam  /50   Pulse 86   Resp 16   Wt 87.1 kg (192 lb)   LMP 03/19/2009 (Exact Date)   SpO2 98%   BMI 34.28 kg/m        Wt Readings from Last 4 Encounters:   12/02/24 87.1 kg (192 lb)   12/02/24 87.1 kg (192 lb)   07/29/24 88.3 kg (194 lb 11.2 oz)   07/09/24 86.6 kg (191 lb)     Wt Readings from Last 4 Encounters:   07/29/24 88.3 kg (194 lb 11.2 oz)   07/09/24 86.6 kg (191 lb)   06/24/24 86.6 kg (191 lb)   06/10/24 86.2 kg (190 lb)     General: Well appearing woman in no distress, abdominal obesity.  Psych: good eye contact, no pressured speech    A1c 11/30/24 7.8%   A1c 6/10/2024 7.2%  Lab Results   Component Value Date     11/29/2024    CHLORIDE 102 11/29/2024    CO2 27 11/29/2024     (H) 11/29/2024    CR 0.84 11/29/2024    CR 0.80 06/06/2024    CR 0.71 11/13/2023    CR 0.76 06/07/2023    CR 0.71 06/07/2023    SUSHANT 9.7 11/29/2024    MAG 1.9 11/29/2024    ALBUMIN 4.2 11/29/2024    ALKPHOS 64 11/29/2024     LDL 59 06/06/2024    HDL 40 (L) 06/06/2024    TRIG 121 06/06/2024    TSH 1.95 06/06/2024    TSH 2.31 11/13/2023    TSH 1.01 06/07/2023     Lab Results   Component Value Date    MICROL <12.0 06/06/2024    MICROL 12.4 11/13/2023    MICROL 29.7 06/07/2023    MICROL 11 11/28/2022    MICROL 6 11/16/2021     Lab Results   Component Value Date    A1C 7.8 (H) 11/29/2024    A1C 7.2 (H) 06/10/2024    A1C 9.3 (A) 06/27/2022    A1C 8.7 (H) 11/16/2021    A1C 8.3 (H) 05/05/2021       Lab Results   Component Value Date    HGB 14.1 11/29/2024                Lab Results   Component Value Date     HGB 15.1 06/08/2023

## 2024-11-27 NOTE — TELEPHONE ENCOUNTER
LCV: 8/1/2024  [facility]  NCV: 12-2-24  last clinic note:recommend to increase compliance with latanoprost- uses it in AM

## 2024-12-01 NOTE — PROGRESS NOTES
[facility]  [facility]  December 3, 2024         Assessment and Plan:   Maliha Pedro is a 64 year old female with mild persistent asthma without complications.      Mild persistent asthma:  Fco describes stable asthma symptoms as long as she takes her antihistamine and nasal spray each morning.  Her allergies remain a trigger for worsening symptoms.  She is currently using fluticasone / salmeterol that does help with her stability, but it is causing painful mouth sores despite being careful with oral cares.  She was previously on Dulera and did not have difficulty with mouth sores.  She rarely has to use her albuterol to rescue her asthma.  We reviewed her PFTs that were obtained in July 2024.   --will run some test claims on ICS/LABA that do not include fluticasone   --continue albuterol prn   --continue allegra daily   --continue daily nasal sprays    2. GERD:  Fco describes good control of her symptoms on pantoprazole    3. Psychosocial:  Fco now lives most the the year in the [facility].  She has a house that she shares with her sister and 3 dogs.      4. Sleep hygiene:  I have referred Fco to sleep clinic for a sleep evaluation.  She does wake herself up from snoring.    Immunizations: tdap  Colonoscopy: 5/2026  Last DEXA: 6/2024  Last sputum culture:    Sara Holden MD MPH  Associate Professor of Medicine  Pulmonary, Allergy, Critical Care and Sleep Medicine        Interval History:     Fco reports a consistent cough and she occasionally produces clear sputum.  She has occasional chest congestion but no chest tightness.  She does not need to use a rescue inhaler.          Review of Systems:     CONSTITUTIONAL: no fever, no chills, no sweats, no change in weight, no change in energy, no change in appetite    INTEGUMENTARY/SKIN: no rash, no obvious new lesions    ENT/MOUTH: no sore throat, no new sinus pain, no new nasal drainage, no new nasal  congestion,  itchy ears      RESPIRATORY: see interval history    CV: no chest pain, no palpitations, no peripheral edema    GI: no nausea, no vomiting, no change in stools, controlled GERD    : negative urinary, manages gyne issues    MUSCULOSKELETAL: chronic myalgias and arthralgias    ENDOCRINE: blood sugars with adequate control    NEURO:  No headache    SLEEP: snoring    PSYCHIATRIC: mood stable          Past Medical and Surgical History:     Past Medical History:   Diagnosis Date    Adjustment disorder with mixed anxiety and depressed mood     following death of her adoptive father and then again after adoptive mother     Allergic rhinitis, cause unspecified     uses benadryl prn (sneezing/hoarse voice)    Bartholin gland cyst 2008    Chest pain, unspecified     neg dobutamine stress test  (reacted to dobutamine)    Dry eyes     ERM OD (epiretinal membrane, right eye)     BE mild    Esophageal reflux     EGD: -neg    Glaucoma suspect     Hearing problem     Hypertension     Irregular menstrual cycle     s/p D&C, since then more regular    Irritable bowel syndrome     colonoscopy -neg. Sx's especially prior to menses    MEDICAL HISTORY OF -     had health directive with : DANIEL Ford.  Full Code. Aunt (Charlie Tay) will be decision maker    Meningitis, unspecified(322.9) age 8    hospitalized for 9 mo    Mild persistent asthma without complication 2024    Need for prophylactic vaccination with tuberculosis (BCG) vaccine     Has pos Mantoux. Gets yearly cxr, all neg.     Other and unspecified hyperlipidemia     Pain in joint, shoulder region     bone spurs on MRI, s/p pool therapy    Tinnitus     Type II or unspecified type diabetes mellitus without mention of complication, not stated as uncontrolled Age 32    Follows with endocrine at [facility]: Shayne Lane MD    Unspecified cataract     RE    Unspecified sinusitis (chronic)     2006    Wheezing     Has seen pulm 2006 &  . Told night ashtma, ? vocal chord spasm due to cold air.      Past Surgical History:   Procedure Laterality Date    CATARACT IOL, RT/LT  2008    LE    COLONOSCOPY N/A 2021    Procedure: Colonoscopy, With Polypectomy And Biopsy;  Surgeon: Teo Collins MD;  Location: [facility] OR    COLONOSCOPY WITH CO2 INSUFFLATION N/A 2021    Procedure: COLONOSCOPY, WITH CO2 INSUFFLATION;  Surgeon: Teo Collins MD;  Location: [facility] OR    HC DILATION/CURETTAGE DIAG/THER NON OB      HC TYMPANOPLASTY W/O MASTOID, INIT/REV W/O OSS CHAIN RECONST      PHACOEMULSIFICATION CLEAR CORNEA WITH STANDARD INTRAOCULAR LENS IMPLANT Right 2024    Procedure: RIGHT EYE PHACOEMULSIFICATION, CATARACT, WITH INTRAOCULAR LENS IMPLANT;  Surgeon: Lesly Tracy MD;  Location: [facility] OR    RELEASE CARPAL TUNNEL Right 2018    Procedure: Right Carpal Tunnel Release;  Surgeon: Ivania Moran MD;  [facility]    RELEASE CARPAL TUNNEL Left 2018    Procedure: Left Carpal Tunnel Release;  Surgeon: Ivania Moran MD;  [facility]    TYMPANOPLASTY      YAG CAPSULOTOMY OS (LEFT EYE)  2011           Family History:     Family History   Problem Relation Age of Onset    Diabetes Father          of dm 70's    C.A.D. Father     Cerebrovascular Disease Father     Diabetes Sister     Diabetes Sister     Diabetes Sister     Hypertension Sister     Diabetes Brother     Diabetes Brother     Cancer Half-Brother         lung    Diabetes Half-Brother     Leukemia Half-Brother     Glaucoma Maternal Grandmother     Diabetes Paternal Grandfather     Hypertension Paternal Grandfather     Cerebrovascular Disease Paternal Grandfather     Cancer Maternal Aunt         ovarian cancer.     Leukemia Half-Brother     Lymphoma Paternal Cousin     Macular Degeneration No family hx of             Social History:     Social History     Socioeconomic History    Marital status:  Single     Spouse name: Not on file    Number of children: Not on file    Years of education: Not on file    Highest education level: Not on file   Occupational History    Not on file   Tobacco Use    Smoking status: Never     Passive exposure: Never    Smokeless tobacco: Never   Vaping Use    Vaping status: Never Used   Substance and Sexual Activity    Alcohol use: Yes     Comment: one drink a month    Drug use: No    Sexual activity: Not Currently     Partners: Male     Birth control/protection: Post-menopausal   Other Topics Concern    Parent/sibling w/ CABG, MI or angioplasty before 65F 55M? 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 Moved to  in [place]. Initially lived in [place].    No children        2 cats     Social Drivers of Health     Financial Resource Strain: Low Risk  (6/19/2024)    Financial Resource Strain     Within the past 12 months, have you or your family members you live with been unable to get utilities (heat, electricity) when it was really needed?: No   Food Insecurity: Low Risk  (6/19/2024)    Food Insecurity     Within the past 12 months, did you worry that your food would run out before you got money to buy more?: No     Within the past 12 months, did the food you bought just not last and you didn t have money to get more?: No   Transportation Needs: Low Risk  (6/19/2024)    Transportation Needs     Within the past 12 months, has lack of transportation kept you from medical appointments, getting your medicines, non-medical meetings or appointments, work,  or from getting things that you need?: No   Physical Activity: Insufficiently Active (6/19/2024)    Exercise Vital Sign     Days of Exercise per Week: 3 days     Minutes of Exercise per Session: 30 min   Stress: No Stress Concern Present (6/19/2024)    [employer] - Occupational Stress Questionnaire     Feeling of Stress : Only a little   Social Connections: Unknown (6/19/2024)    Social Connection and Isolation Panel [NHANES]     Frequency of Communication with Friends and Family: Not on file     Frequency of Social Gatherings with Friends and Family: Once a week     Attends Caodaism Services: Not on file     Active Member of Clubs or Organizations: Not on file     Attends Club or Organization Meetings: Not on file     Marital Status: Not on file   Interpersonal Safety: Low Risk  (6/24/2024)    Interpersonal Safety     Do you feel physically and emotionally safe where you currently live?: Yes     Within the past 12 months, have you been hit, slapped, kicked or otherwise physically hurt by someone?: No     Within the past 12 months, have you been humiliated or emotionally abused in other ways by your partner or ex-partner?: No   Housing Stability: Low Risk  (6/19/2024)    Housing Stability     Do you have housing? : Yes     Are you worried about losing your housing?: No            Medications:     Current Outpatient Medications   Medication Sig Dispense Refill    albuterol (PROAIR HFA/PROVENTIL HFA/VENTOLIN HFA) 108 (90 Base) MCG/ACT inhaler Inhale 2 puffs into the lungs every 4 hours as needed for shortness of breath. 18 g 11    aspirin 81 MG EC tablet Take 81 mg by mouth daily Please resume in 5 days.  (12/22/2018)      azelastine (ASTELIN) 0.1 % nasal spray Spray 1 spray into both nostrils 2 times daily 90 mL 3    CALCIUM + D OR Take 1 tablet by mouth 2 times daily.      Continuous Blood Gluc Sensor (DEXCOM G7 SENSOR) MISC CHANGE SENSOR EVERY 10 DAYS AS DIRECTED 9 each 4     fexofenadine (ALLEGRA) 180 MG tablet Take 1 tablet by mouth daily. 1 TABLET DAILY Failed claritin for symptom cotrol. Zyrtec increases heart rate. 90 tablet 3    fish oil-omega-3 fatty acids 1000 MG capsule Take 1 capsule by mouth daily.      fluticasone (FLONASE) 50 MCG/ACT nasal spray USE 1-2 SPRAYS IN EACH NOSTRIL ONCE DAILY 48 g 3    Glucosamine-Chondroitin (GLUCOSAMINE CHONDR COMPLEX PO)       hydroCHLOROthiazide 12.5 MG tablet TAKE 1 TABLET (12.5 MG) BY MOUTH DAILY 90 tablet 1    insulin aspart (NOVOLOG FLEXPEN) 100 UNIT/ML pen Use 20-35 units three times daily with meals for up to 120 units daily 120 mL 3    insulin glargine (BASAGLAR KWIKPEN) 100 UNIT/ML pen Inject 50 Units Subcutaneous daily 48 mL 3    insulin pen needle (BD LISA U/F) 32G X 4 MM miscellaneous Use to inject 4-5 times daily. 600 each 1    ketorolac (ACULAR) 0.5 % ophthalmic solution Place 1 drop into the right eye 4 times daily 5 mL 1    latanoprost (XALATAN) 0.005 % ophthalmic solution Place 1 drop into both eyes daily. 7.5 mL 11    latanoprost (XALATAN) 0.005 % ophthalmic solution Place 1 drop into both eyes daily. 2.5 mL 1    losartan (COZAAR) 100 MG tablet TAKE 1 TABLET (100 MG) BY MOUTH DAILY 90 tablet 1    magnesium 250 MG tablet Take 1 tablet by mouth daily 90 tablet 3    metFORMIN (GLUCOPHAGE XR) 500 MG 24 hr tablet TAKE TWO TABLETS BY MOUTH TWICE A  tablet 0    mometasone-formoterol (DULERA) 100-5 MCG/ACT inhaler Inhale 2 puffs into the lungs 2 times daily. 39 g 3    montelukast (SINGULAIR) 10 MG tablet Take 1 tablet (10 mg) by mouth at bedtime 90 tablet 3    MULTIVITAMIN OR Take 1 tablet by mouth daily.      NEEDLES, ANY SIZE Pen needles for Novolog insulin pen. Use to inject 5-6  times daily. 4 Box 3    NOVOLOG FLEXPEN 100 UNIT/ML soln INJECT 35 UNITS WITH BREAKFAST AND 25 UNITS WITH LUNCH AND DINNER. MAX DAILY DOSE 90 UNITS 90 mL 2    nystatin (MYCOSTATIN) 014810 UNIT/ML suspension Take 5 mLs (500,000 Units) by mouth 4  times daily. 473 mL 1    ofloxacin (OCUFLOX) 0.3 % ophthalmic solution Place 1 drop into the right eye 4 times daily 5 mL 0    pantoprazole (PROTONIX) 40 MG EC tablet TAKE 1 TABLET (40 MG) BY MOUTH DAILY 90 tablet 0    potassium chloride brinda ER (KLOR-CON M10) 10 MEQ CR tablet TAKE ONE TABLET BY MOUTH ONCE DAILY 90 tablet 1    prednisoLONE acetate (PRED FORTE) 1 % ophthalmic suspension Place 1 drop into the right eye 4 times daily 5 mL 1    rosuvastatin (CRESTOR) 10 MG tablet TAKE 1 TABLET (10 MG) BY MOUTH DAILY 90 tablet 0    Semaglutide, 2 MG/DOSE, (OZEMPIC) 8 MG/3ML pen Inject 2 mg subcutaneously every 7 days. 9 mL 3    VITAMIN D 2000 UNIT OR TABS Take 1 tablet by mouth daily.       Current Facility-Administered Medications   Medication Dose Route Frequency Provider Last Rate Last Admin    apraclonidine (IOPIDINE) 1 % ophthalmic solution 1 drop  1 drop Right Eye Once Lesly Tracy MD                Physical Exam:   /78 (BP Location: Right arm, Patient Position: Chair, Cuff Size: Adult Regular)   Pulse 83   Wt 87.1 kg (192 lb)   LMP 03/19/2009 (Exact Date)   SpO2 96%   BMI 34.28 kg/m      Constitutional:   Awake, alert and in no apparent distress     Eyes:   nonicteric     ENT:   oral mucosa moist without lesions, normal tm bilaterally, bilateral mucosa normal     Neck:   Supple without supraclavicular or cervical lymphadenopathy     Lungs:   Good air flow.  No crackles. No rhonchi.  No wheezes.     Cardiovascular:   Normal S1 and S2.  RRR.  No murmur, gallop or rub.     Abdomen:   NABS, soft, nontender, nondistended.      Musculoskeletal:   No edema, no digital clubbing present     Neurologic:   Alert and conversant.     Skin:   Warm, dry.  No rash on limited exam.             Data:   All laboratory and imaging data reviewed.      Results from the last week:  Recent Results (from the past week)   Vitamin B12    Collection Time: 11/29/24  7:47 AM   Result Value Ref Range    Vitamin B12 661  232 - 1,245 pg/mL   Comprehensive metabolic panel (BMP + Alb, Alk Phos, ALT, AST, Total. Bili, TP)    Collection Time: 11/29/24  7:47 AM   Result Value Ref Range    Sodium 140 135 - 145 mmol/L    Potassium 3.8 3.4 - 5.3 mmol/L    Carbon Dioxide (CO2) 27 22 - 29 mmol/L    Anion Gap 11 7 - 15 mmol/L    Urea Nitrogen 15.2 8.0 - 23.0 mg/dL    Creatinine 0.84 0.51 - 0.95 mg/dL    GFR Estimate 77 >60 mL/min/1.73m2    Calcium 9.7 8.8 - 10.4 mg/dL    Chloride 102 98 - 107 mmol/L    Glucose 116 (H) 70 - 99 mg/dL    Alkaline Phosphatase 64 40 - 150 U/L    AST 34 0 - 45 U/L    ALT 48 0 - 50 U/L    Protein Total 7.2 6.4 - 8.3 g/dL    Albumin 4.2 3.5 - 5.2 g/dL    Bilirubin Total 0.3 <=1.2 mg/dL   Magnesium    Collection Time: 11/29/24  7:47 AM   Result Value Ref Range    Magnesium 1.9 1.7 - 2.3 mg/dL   HEMOGLOBIN A1C    Collection Time: 11/29/24  7:47 AM   Result Value Ref Range    Estimated Average Glucose 177 (H) <117 mg/dL    Hemoglobin A1C 7.8 (H) 0.0 - 5.6 %   CBC with platelets and differential    Collection Time: 11/29/24  7:47 AM   Result Value Ref Range    WBC Count 8.9 4.0 - 11.0 10e3/uL    RBC Count 5.17 3.80 - 5.20 10e6/uL    Hemoglobin 14.1 11.7 - 15.7 g/dL    Hematocrit 43.9 35.0 - 47.0 %    MCV 85 78 - 100 fL    MCH 27.3 26.5 - 33.0 pg    MCHC 32.1 31.5 - 36.5 g/dL    RDW 14.2 10.0 - 15.0 %    Platelet Count 319 150 - 450 10e3/uL    % Neutrophils 47 %    % Lymphocytes 36 %    % Monocytes 9 %    % Eosinophils 8 %    % Basophils 1 %    % Immature Granulocytes 0 %    Absolute Neutrophils 4.2 1.6 - 8.3 10e3/uL    Absolute Lymphocytes 3.2 0.8 - 5.3 10e3/uL    Absolute Monocytes 0.8 0.0 - 1.3 10e3/uL    Absolute Eosinophils 0.7 0.0 - 0.7 10e3/uL    Absolute Basophils 0.1 0.0 - 0.2 10e3/uL    Absolute Immature Granulocytes 0.0 <=0.4 10e3/uL       PFT interpretation: None performed today.      50 minutes spent by me on the date of the encounter doing chart review, history and exam, documentation and further activities  per the note  Time documented is excluding time spent for PFT interpretation.

## 2024-12-01 NOTE — PATIENT INSTRUCTIONS
Self-Care Plan    RECOMMENDATIONS:   Fco, It was great to see you today.   The plan from today:  --find the right inhaler for you that does not cause mouth sores  --nystatin as needed for oral thrush  --continue albuterol inhaler as needed  Please take care of yourself.    YOUR GOAL:  Stay safe and well.  Enjoy the holidays!      Cystic Fibrosis :    Savannah Gusman   [phone]  [place] [facility] Nurse line:  Pako Restrepo  [phone]     [facility] [place] Fax Number:      [phone]         Cystic Fibrosis Respiratory Therapists:   Marietta Brito                          [phone]          Alina Lamar   [phone]  Cystic Fibrosis Dietitians:              Lousie Rawls              [phone]                            Pepper Rothman                        [phone]   Cystic Fibrosis Diabetes Nurse:    Larisa Alvarez               [phone]    Cystic Fibrosis Social Workers:     Katherin Cartwright              [phone]                     Bel Gutierrez               [phone]  Cystic Fibrosis Pharmacists:           Araseli Coker                              [phone]         Tsering Mathew      [phone]   Cystic Fibrosis Genetic Counselor:   Rhonda Monsalve    [phone]    [place] [facility] website:  [email or web address]    We have recently learned about an albuterol neb solution shortage due to a manufacturing delay. There is still a small supply coming in but not enough to meet the current demand. We do not yet have an estimate for when this will become widely available again.    We our asking our CF community to do the following:    Please take time to check your supply of albuterol neb solution at home. We recommend keeping at least a 2-week supply of albuterol nebs at home in case of illness.    2.  If you have an albuterol inhaler at home, you can use 4 puffs of the inhaler with a spacer in place of the nebulized albuterol  "at the start of your treatments. It is important to use a spacer for the best technique. If you do not have a spacer at home or have questions on technique, we will be happy to review and send one to your home address. Please also take a moment to check that your albuterol HFA inhaler is available and not .  inhalers may be less effective as the medication loses its potency or power. In some instances your team may suggest another alternative instead of an albuterol inhaler.    3.  Please take care in requesting refills. Albuterol neb solution is a life-saving medication for patients having severe asthma attacks and other emergency respiratory conditions. Let s work together to make sure that albuterol neb solution is available to those who need it urgently.    Reach out to your care team with questions and to confirm your planned alternative for albuterol nebs. MyChart will be the fastest way to connect. If possible, please reserve the nursing line for more urgent concerns as we are short on nursing staff.    Here are some reputable sites with more information:    https://www.cidrap.[facility].edu/resilient-drug-supply/us-liquid-albuterol-vrynojtj-aqehafye-awtumt-after-major-supplier-shuts-down    https://www.[employer]//health/albuterol-shortage    https://www.ashp.org/drug-shortages/current-shortages/drug-shortage-detail.aspx?qy=033&loginreturnUrl=SSOCheckOnly       MRN: [id number]   Clinic Date: 2024   Patient: Maliha Pedro     Annual Studies:   No results found for: \"IGG\"  No results found for: \"INS\"  There are no preventive care reminders to display for this patient.    Pulmonary Function Tests  FEV1: amount of air you can blow out in 1 second  FVC: total amount of air you can take in and blow out    Your Goals:             Latest Ref Rng & Units 2024     7:43 AM   PFT   FVC L 2.69    FEV1 L 2.16    FVC% % 97    FEV1% % 98      Good Nutrition Can Improve Lung Function and " Overall Health    Take ALL of your vitamins with food    Take 1/2 of your enzymes before EVERY meal/snack and the other 1/2 mid-meal/snack    Wt Readings from Last 3 Encounters:   07/29/24 88.3 kg (194 lb 11.2 oz)   07/09/24 86.6 kg (191 lb)   06/24/24 86.6 kg (191 lb)       There is no height or weight on file to calculate BMI.         National CF Foundation Recommendations for BMI in CF Adults: Women: at least 22 Men: at least 23        Controlling Blood Sugars Helps Prevent Lung Infections & Improves Nutrition  Test blood sugar:    In the morning before eating (goal is )    2 hours after a meal (goal is less than 150)    When pre-meal glucose is greater than 150 add correction    At bedtime (if less than 100 eat a snack with 15 grams of carbohydrates  Last A1C Results:   Hemoglobin A1C   Date Value Ref Range Status   11/29/2024 7.8 (H) 0.0 - 5.6 % Final     Comment:     Normal <5.7%   Prediabetes 5.7-6.4%    Diabetes 6.5% or higher     Note: Adopted from ADA consensus guidelines.   06/27/2022 9.3 (A) 0.0 - 5.7 % Final         If diabetic, measure A1C every 6 months. Goal: Under 7%    Staying Healthy  Research:  If you are interested in learning about research opportunities or have questions, please contact the CF Research Team at [phone] or [email or web address].    CF Foundation:  Compass is a personalized resource service to help you with the insurance, financial, legal and other issues you are facing.  It's free, confidential and available to anyone with CF.  Ask your  for more information or contact Compass directly at 270-BOZRJUS ([phone]) or compass@cff.org, or learn more at cff.org/compass.

## 2024-12-02 ENCOUNTER — OFFICE VISIT (OUTPATIENT)
Dept: ENDOCRINOLOGY | Facility: CLINIC | Age: 64
End: 2024-12-02
Payer: COMMERCIAL

## 2024-12-02 ENCOUNTER — OFFICE VISIT (OUTPATIENT)
Dept: OPHTHALMOLOGY | Facility: CLINIC | Age: 64
End: 2024-12-02
Attending: OPHTHALMOLOGY
Payer: COMMERCIAL

## 2024-12-02 ENCOUNTER — OFFICE VISIT (OUTPATIENT)
Dept: FAMILY MEDICINE | Facility: CLINIC | Age: 64
End: 2024-12-02
Payer: COMMERCIAL

## 2024-12-02 VITALS
RESPIRATION RATE: 14 BRPM | TEMPERATURE: 98.1 F | HEART RATE: 86 BPM | SYSTOLIC BLOOD PRESSURE: 133 MMHG | BODY MASS INDEX: 34.02 KG/M2 | WEIGHT: 192 LBS | HEIGHT: 63 IN | OXYGEN SATURATION: 97 % | DIASTOLIC BLOOD PRESSURE: 81 MMHG

## 2024-12-02 VITALS
HEART RATE: 86 BPM | BODY MASS INDEX: 34.28 KG/M2 | RESPIRATION RATE: 16 BRPM | WEIGHT: 192 LBS | OXYGEN SATURATION: 98 % | SYSTOLIC BLOOD PRESSURE: 138 MMHG | DIASTOLIC BLOOD PRESSURE: 50 MMHG

## 2024-12-02 DIAGNOSIS — E11.21 TYPE 2 DIABETES MELLITUS WITH DIABETIC NEPHROPATHY, WITH LONG-TERM CURRENT USE OF INSULIN (H): Primary | ICD-10-CM

## 2024-12-02 DIAGNOSIS — E11.3299 TYPE 2 DIABETES MELLITUS WITH MILD NONPROLIFERATIVE RETINOPATHY WITHOUT MACULAR EDEMA, WITH LONG-TERM CURRENT USE OF INSULIN, UNSPECIFIED LATERALITY (H): ICD-10-CM

## 2024-12-02 DIAGNOSIS — H40.003 BORDERLINE GLAUCOMA, BILATERAL: ICD-10-CM

## 2024-12-02 DIAGNOSIS — Z79.899 MEDICATION MANAGEMENT: ICD-10-CM

## 2024-12-02 DIAGNOSIS — H26.491 PCO (POSTERIOR CAPSULAR OPACIFICATION), RIGHT: Primary | ICD-10-CM

## 2024-12-02 DIAGNOSIS — R06.83 SNORING: ICD-10-CM

## 2024-12-02 DIAGNOSIS — Z79.4 TYPE 2 DIABETES MELLITUS WITH MILD NONPROLIFERATIVE RETINOPATHY WITHOUT MACULAR EDEMA, WITH LONG-TERM CURRENT USE OF INSULIN, UNSPECIFIED LATERALITY (H): ICD-10-CM

## 2024-12-02 DIAGNOSIS — Z79.4 TYPE 2 DIABETES MELLITUS WITH DIABETIC NEPHROPATHY, WITH LONG-TERM CURRENT USE OF INSULIN (H): Primary | ICD-10-CM

## 2024-12-02 DIAGNOSIS — J45.30 MILD PERSISTENT ASTHMA WITHOUT COMPLICATION: ICD-10-CM

## 2024-12-02 DIAGNOSIS — I10 ESSENTIAL HYPERTENSION: Primary | ICD-10-CM

## 2024-12-02 PROCEDURE — G2211 COMPLEX E/M VISIT ADD ON: HCPCS | Performed by: FAMILY MEDICINE

## 2024-12-02 PROCEDURE — 92133 CPTRZD OPH DX IMG PST SGM ON: CPT | Performed by: OPHTHALMOLOGY

## 2024-12-02 PROCEDURE — 99213 OFFICE O/P EST LOW 20 MIN: CPT | Mod: 25 | Performed by: OPHTHALMOLOGY

## 2024-12-02 PROCEDURE — 99214 OFFICE O/P EST MOD 30 MIN: CPT | Performed by: INTERNAL MEDICINE

## 2024-12-02 PROCEDURE — G2211 COMPLEX E/M VISIT ADD ON: HCPCS | Performed by: INTERNAL MEDICINE

## 2024-12-02 PROCEDURE — 99214 OFFICE O/P EST MOD 30 MIN: CPT | Performed by: FAMILY MEDICINE

## 2024-12-02 PROCEDURE — 66821 AFTER CATARACT LASER SURGERY: CPT | Mod: RT | Performed by: OPHTHALMOLOGY

## 2024-12-02 RX ORDER — LATANOPROST 50 UG/ML
1 SOLUTION/ DROPS OPHTHALMIC DAILY
Qty: 7.5 ML | Refills: 11 | Status: SHIPPED | OUTPATIENT
Start: 2024-12-02

## 2024-12-02 ASSESSMENT — ASTHMA QUESTIONNAIRES
QUESTION_2 LAST FOUR WEEKS HOW OFTEN HAVE YOU HAD SHORTNESS OF BREATH: NOT AT ALL
QUESTION_1 LAST FOUR WEEKS HOW MUCH OF THE TIME DID YOUR ASTHMA KEEP YOU FROM GETTING AS MUCH DONE AT WORK, SCHOOL OR AT HOME: NONE OF THE TIME
ACT_TOTALSCORE: 24
QUESTION_3 LAST FOUR WEEKS HOW OFTEN DID YOUR ASTHMA SYMPTOMS (WHEEZING, COUGHING, SHORTNESS OF BREATH, CHEST TIGHTNESS OR PAIN) WAKE YOU UP AT NIGHT OR EARLIER THAN USUAL IN THE MORNING: NOT AT ALL
ACT_TOTALSCORE: 24
QUESTION_5 LAST FOUR WEEKS HOW WOULD YOU RATE YOUR ASTHMA CONTROL: WELL CONTROLLED
QUESTION_4 LAST FOUR WEEKS HOW OFTEN HAVE YOU USED YOUR RESCUE INHALER OR NEBULIZER MEDICATION (SUCH AS ALBUTEROL): NOT AT ALL

## 2024-12-02 ASSESSMENT — EXTERNAL EXAM - LEFT EYE: OS_EXAM: DERMATOCHALASIS

## 2024-12-02 ASSESSMENT — REFRACTION_WEARINGRX
OS_AXIS: 075
OS_ADD: +2.50
OD_ADD: +2.50
OD_SPHERE: -1.75
OS_SPHERE: -3.50
OS_CYLINDER: +0.25
OD_CYLINDER: SPHERE

## 2024-12-02 ASSESSMENT — TONOMETRY
OS_IOP_MMHG: 17
IOP_METHOD: TONOPEN
OD_IOP_MMHG: 16

## 2024-12-02 ASSESSMENT — CUP TO DISC RATIO
OD_RATIO: 0.9
OS_RATIO: 0.8

## 2024-12-02 ASSESSMENT — SLIT LAMP EXAM - LIDS
COMMENTS: MEIBOMIAN GLAND DYSFUNCTION
COMMENTS: MEIBOMIAN GLAND DYSFUNCTION

## 2024-12-02 ASSESSMENT — VISUAL ACUITY
OS_CC: 20/20
OS_CC+: -2
OD_CC: 20/30
CORRECTION_TYPE: GLASSES
OD_CC+: +1
METHOD: SNELLEN - LINEAR

## 2024-12-02 ASSESSMENT — EXTERNAL EXAM - RIGHT EYE: OD_EXAM: DERMATOCHALASIS

## 2024-12-02 ASSESSMENT — PAIN SCALES - GENERAL
PAINLEVEL_OUTOF10: NO PAIN (0)
PAINLEVEL_OUTOF10: NO PAIN (0)

## 2024-12-02 NOTE — PROGRESS NOTES
Assessment & Plan     Essential hypertension  Fair control of blood pressure.  We reviewed her ideal range would be less than 130 systolic/80 diastolic.  I suspect if she can start moving more again and get her weight a little further down her pressure will improve.  For now, we will continue with hydrochlorothiazide 12.5 and losartan 100 mg.    Medication management    Snoring  Certainly at risk for sleep apnea.  Highly recommended sleep clinic evaluation.  She will probably complete next time she is in the [place]  - Adult Sleep Eval & Management  Referral; Future    Mild persistent asthma without complication  This has been well-controlled but unfortunately getting oral sores from her ICS/LABA.  She has a follow-up visit with her pulmonologist upcoming and I encouraged her to talk about medication change    Type 2 diabetes mellitus with mild nonproliferative retinopathy without macular edema, with long-term current use of insulin, unspecified laterality (H)  Under fair control.  Continues on Basaglar insulin/NovoLog with meals and Ozempic.  May want to discuss dose/med change of the Ozempic to see if this can further help with weight loss and months for diabetes      Due to timing, she will return to clinic for MA only visit for flu and COVID vaccination          Yolanda Eagle is a 64 year old, presenting for the following health issues:  Hypertension and Imm/Inj (Flu and Covid vaccine )        12/2/2024     7:46 AM   Additional Questions   Roomed by Trinity DE LA CRUZ   Accompanied by self     History of Present Illness       Hyperlipidemia:  She presents for follow up of hyperlipidemia.   She is taking medication to lower cholesterol. She is not having myalgia or other side effects to statin medications.    Hypertension: She presents for follow up of hypertension.  She does check blood pressure  regularly outside of the clinic. Outpatient blood pressures have not been over 140/90. She follows a low  "salt diet.     She eats 2-3 servings of fruits and vegetables daily.She consumes 1 sweetened beverage(s) daily.She exercises with enough effort to increase her heart rate 9 or less minutes per day.  She exercises with enough effort to increase her heart rate 3 or less days per week. She is missing 1 dose(s) of medications per week.  She is not taking prescribed medications regularly due to remembering to take.       Eye appt [facility] after this (3 mo follow up since cataract surgery) and also endocrinology.   Heading back to [facility] in 3 weeks.   Appt with pulmonary tomorrow. AirDuo was causing sores in mouth.  Held for a while until sores healed and now back on.  Breathing is good since back in [facility].       Home bp 130's/70-80's    Stopped eating rice.   Sedentary for the last 3 months.     Snoring. Wakes her up.    Did PHYSICAL THERAPY for hip pain. Using stationary bike at advise of therapist.         Review of Systems  Constitutional, HEENT, cardiovascular, pulmonary, gi and gu systems are negative, except as otherwise noted.      Objective    /81 (BP Location: Right arm, Patient Position: Sitting, Cuff Size: Adult Large)   Pulse 86   Temp 98.1  F (36.7  C) (Oral)   Resp 14   Ht 1.594 m (5' 2.75\")   Wt 87.1 kg (192 lb)   LMP 03/19/2009 (Exact Date)   SpO2 97%   BMI 34.28 kg/m    Body mass index is 34.28 kg/m .  Physical Exam   GENERAL: alert and no distress  NECK: no adenopathy, no asymmetry, masses, or scars  RESP: lungs clear to auscultation - no rales, rhonchi or wheezes  CV: regular rate and rhythm, normal S1 S2, no S3 or S4, no murmur, click or rub, no peripheral edema  ABDOMEN: soft, nontender, no hepatosplenomegaly, no masses and bowel sounds normal  MS: no gross musculoskeletal defects noted, no edema  PSYCH: mentation appears normal, affect normal/bright    Recent Results (from the past 720 hours)   Vitamin B12    Collection Time: 11/29/24  7:47 AM   Result Value Ref Range    Vitamin B12 " 661 232 - 1,245 pg/mL   Comprehensive metabolic panel (BMP + Alb, Alk Phos, ALT, AST, Total. Bili, TP)    Collection Time: 11/29/24  7:47 AM   Result Value Ref Range    Sodium 140 135 - 145 mmol/L    Potassium 3.8 3.4 - 5.3 mmol/L    Carbon Dioxide (CO2) 27 22 - 29 mmol/L    Anion Gap 11 7 - 15 mmol/L    Urea Nitrogen 15.2 8.0 - 23.0 mg/dL    Creatinine 0.84 0.51 - 0.95 mg/dL    GFR Estimate 77 >60 mL/min/1.73m2    Calcium 9.7 8.8 - 10.4 mg/dL    Chloride 102 98 - 107 mmol/L    Glucose 116 (H) 70 - 99 mg/dL    Alkaline Phosphatase 64 40 - 150 U/L    AST 34 0 - 45 U/L    ALT 48 0 - 50 U/L    Protein Total 7.2 6.4 - 8.3 g/dL    Albumin 4.2 3.5 - 5.2 g/dL    Bilirubin Total 0.3 <=1.2 mg/dL   Magnesium    Collection Time: 11/29/24  7:47 AM   Result Value Ref Range    Magnesium 1.9 1.7 - 2.3 mg/dL   HEMOGLOBIN A1C    Collection Time: 11/29/24  7:47 AM   Result Value Ref Range    Estimated Average Glucose 177 (H) <117 mg/dL    Hemoglobin A1C 7.8 (H) 0.0 - 5.6 %   CBC with platelets and differential    Collection Time: 11/29/24  7:47 AM   Result Value Ref Range    WBC Count 8.9 4.0 - 11.0 10e3/uL    RBC Count 5.17 3.80 - 5.20 10e6/uL    Hemoglobin 14.1 11.7 - 15.7 g/dL    Hematocrit 43.9 35.0 - 47.0 %    MCV 85 78 - 100 fL    MCH 27.3 26.5 - 33.0 pg    MCHC 32.1 31.5 - 36.5 g/dL    RDW 14.2 10.0 - 15.0 %    Platelet Count 319 150 - 450 10e3/uL    % Neutrophils 47 %    % Lymphocytes 36 %    % Monocytes 9 %    % Eosinophils 8 %    % Basophils 1 %    % Immature Granulocytes 0 %    Absolute Neutrophils 4.2 1.6 - 8.3 10e3/uL    Absolute Lymphocytes 3.2 0.8 - 5.3 10e3/uL    Absolute Monocytes 0.8 0.0 - 1.3 10e3/uL    Absolute Eosinophils 0.7 0.0 - 0.7 10e3/uL    Absolute Basophils 0.1 0.0 - 0.2 10e3/uL    Absolute Immature Granulocytes 0.0 <=0.4 10e3/uL             Signed Electronically by: Elva Ortiz MD

## 2024-12-02 NOTE — PROGRESS NOTES
CC: Cataract extraction intraocular lens right eye 07/09/24   Interim: VA blurry right eye  ; no flashes and floaters; no eye pain. Reports occasional droopy eyelids in am.    PMH: Diabetes mellitus II    Imaging    OCT 12/02/24   both eyes with good foveal contour; no fluid - stable  Right eye with trace sup Epiretinal membrane - stable     ONFL 12/02/24   Right eye: thinning TS TI  stable. G 69 stable  Left eye: Thinning TS stable. G 68 stable  stable compared to prior two ; mild progression compared to 4175-3994    24-2 OVF 6/17/24  Right eye: superior early arcuate; stable compared to previous. MD -0.9  Left eye: reliable. Superior spots of decreased sensitivity - stable. MD -0.8    Intraocular lens calculations reliable  Topography- regular astigmatism  Optos Autofluorescence: normal except for small hypoautofluorescent spots in areas of heme    Fluorescein angiography 06/17/24 : normal AV and choroidal filling ; multiple microaneurysms and peripheral vascular leakage; no neovascularization elsewhere; no neovascularization of the disc     Assessment & Plan:    # status post Cataract extraction intraocular lens right eye 07/09/24   With posterior capsular opacity (PCO) 12/02/24   Recommend yag laser 12/02/24   Risks, benefits and alternatives discussed with patient and agreed to proceed  No complications     # pseudophakia left eye   With posterior capsular opacity (PCO)  Consider yag in the future    #  open angle glaucoma both eyes   - large cup to disc ratio R>L  - Intraocular pressure 15/14    - pachmetry 583/613  -12/02/24  retinal nerve fiber layer stable   Right eye: thinning  TS/TI; stable  Left eye: Thinning TS,stable  stable compared to prior exams; some progression compared to 1435-2438   with stable VF and retinal nerve fiber layer  - last eye examination given mild progression over the course of several yrs and large cups; would recommend to restart latanoprost  12/02/24 better compliance with  latanoprost    # Type 2 diabetes mellitus with mod Nonproliferative diabetic retinopathy both eyes   Fluorescein angiography: normal AV and choroidal filling ; multiple microaneurysms and peripheral vascular leakage; no neovascularization elsewhere; no neovascularization of the disc   Blood pressure (<120/80) and blood glucose (HbA1c <7.0) control discussed with patient. Patient advised that failure to adequately control each may lead to vision loss. The patient expressed understanding.  # Pseudophakia, left eye- Dr Rosario  -stable  -observe    # Posterior vitreous detachment of right eye  -stable  -observe    # Old inferior temporal small tear surrounded by pigment, no subretinal fluid right eye   Peripheral Retinal pigment epithelium/small lattice both eyes   Retina attached, no other tears  Retina detachment precautions were discussed with the patient (presence or increased in flashes, floaters or a curtain in the visual field) and was asked to return if any of the those occur    # Dry eye syndrome  artificial tears  As needed and warm compresses      # Mild Epiretinal membrane right eye  Stable   Not visually significant     PLAN: follow up in 1-2 weeks post- yag right eye; 24-2 OVF both eyes.  possibly yag left eye   Continue latanoprost- at bedtime both eyes   Follow up in 4 months after returning from [facility] with Optical Coherence Tomography; optos and prescription   and dilation  ~~~~~~~~~~~~~~~~~~~~~~~~~~~~~~~~~~   Complete documentation of historical and exam elements from today's encounter can be found in the full encounter summary report (not reduplicated in this progress note).  I personally obtained the chief complaint(s) and history of present illness.  I confirmed and edited as necessary the review of systems, past medical/surgical history, family history, social history, and examination findings as documented by others; and I examined the patient myself.  I personally reviewed the relevant tests,  images, and reports as documented above.  I formulated and edited as necessary the assessment and plan and discussed the findings and management plan with the patient and family    Lesly Tracy MD   of Ophthalmology.  Retina Service   Department of Ophthalmology and Visual Neurosciences   [facility]  Phone: [phone]   Fax: [phone]

## 2024-12-02 NOTE — LETTER
12/2/2024      Maliha Pedro  [address] [place] [address]      Dear Colleague,    Thank you for referring your patient, Maliha Pedro, to the [facility]. Please see a copy of my visit note below.                        Endocrinology and Diabetes Clinic        Follow up for T2DM      ASSESSMENT/Plan:   64 y old woman with T2DM with comorbidity of obesity     Blood glucose control  A1c  7.8% in a good-moderate control, slightly less good than at the last visit but overall still better then she used to be, postprandial hyperglycemia in the morning    Cont Dexcom G7     Hypertension controlled on losartan and hydrochlorothiazide, cont    Dyslipidemia on 10 mg of Crestor doing well     Continue Lantus 50 units once daily and Novolog 25 units 3-4 times daily with meals    Continue in Metformin 1000 mg bid    Increase Ozempic to 2 mg weekly      Patient to contact the clinic if blood glucose (sugar) is under 70 two times in one week or above 200 for 3 consecutive days.         The Longitudinal plan of care for diabetes condition was addressed during this visit. Due to added complexity of care, we will continue to support Maliha , and the subsequent management of this condition(s) and with the ongoing continuity of care of this condition.    Stacia Carbajal MD  Endocrinology and Diabetes  Telephone contact:  [facility] [phone]  [facility] [phone]      Interval history  6 month follow up for T2DM  Pt has been doing well, has been on Ozempic 1 mg weekly, no weight loss sofar, no SE  Tolerates this well  Has Dexcom sensor G7, issues with losing sensor due to humidity in the [facility]  Diet avoiding carbs nabeel rice and bread, eating vegetables and meat, fish, still has     Pt is spending most of her time in the [facility], where she has her own house on her families land with lots of  family members around her, all siblings with diabetes    Physical activity walking in the morning      Current diabetic medications:  Metformin 1000 bid, glargine insulin 50 units daily, Novolog 10-25 units three-four times daily, Ozempic 1 mg weekly     Intolerant to Jardiance with yeast infections    SMBG:  Dexcom sensor, Average blood sugar 178, time in range 53%, low 3%, very low 0  variable Bgs 197 GMI 8%,   Pattern better during the night, postprandial high spikes     Meal plan: 3 meals a day plus snacks,   Exercise: has increased and walking lots of steps now, much more than before     Complications  1. Retinopathy: yearly visit, no DR, cataract surgery this summer  2. Nephropathy: None  3. Neuropathy: No numbness no tingling no history of ulcerations  4. Hypoglycemia: Yes, however resolved with insulin adjustment  5. Macrovascular: No chest pain, no shortness of breath      DEThe longitudinal plan of care for the diagnosis(es)/condition(s) as documented were addressed during this visit. Due to the added complexity in care, I will continue to support Fco in the subsequent management and with ongoing continuity of care.XCOM G7 is helpful    HTN: Losartan 100 mg daily, hydrochlorothiazide 12.5 daily  Dyslipidemia Crestor 10 mg     Previously used diabetes medications:   Bydureon in [place]  Jardiance: yeast infection         PMH:      Patient Active Problem List   Diagnosis     ? 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     Displacement of lumbar intervertebral disc without myelopathy     Nonallopathic lesion of lumbar region     Nonallopathic lesion of thoracic region     Nonallopathic lesion of sacral region     Bartholin gland cyst     Cataract     Irritable bowel syndrome     Esophageal reflux     Seasonal allergic rhinitis     Latex sensitivity     Adenomatous polyp     HYPERLIPIDEMIA LDL GOAL <100     Ovarian cyst     Breast pain     Essential hypertension     Advanced directives,  counseling/discussion     Obesity     Hypomagnesemia     Type 2 diabetes mellitus with mild nonproliferative retinopathy without macular edema, with long-term current use of insulin, unspecified laterality (H)             Medications:   Current Outpatient Prescriptions          Current Outpatient Medications   Medication Sig Dispense Refill     albuterol (PROAIR HFA/PROVENTIL HFA/VENTOLIN HFA) 108 (90 Base) MCG/ACT inhaler Inhale 2 puffs into the lungs every 4 hours as needed for shortness of breath / dyspnea 18 g 1     aspirin 81 MG EC tablet Take 81 mg by mouth daily Please resume in 5 days.  (12/22/2018)         CALCIUM + D OR Take 1 tablet by mouth 2 times daily.         Continuous Blood Gluc Sensor (DEXCOM G6 SENSOR) MISC 1 each every 10 days 18 each 1     Continuous Blood Gluc Transmit (DEXCOM G6 TRANSMITTER) MISC 1 each every 3 months Change every 3 months. 2 each 1     fexofenadine (ALLEGRA) 180 MG tablet Take 1 tablet by mouth daily. 1 TABLET DAILY Failed claritin for symptom cotrol. Zyrtec increases heart rate. 90 tablet 3     fish oil-omega-3 fatty acids 1000 MG capsule Take 1 capsule by mouth daily.         fluticasone (FLONASE) 50 MCG/ACT nasal spray USE 1-2 SPRAYS IN EACH NOSTRIL ONCE DAILY 48 g 3     Glucosamine-Chondroitin (GLUCOSAMINE CHONDR COMPLEX PO)           hydrochlorothiazide (HYDRODIURIL) 12.5 MG tablet Take 1 tablet (12.5 mg) by mouth daily 90 tablet 3     insulin aspart (NOVOLOG FLEXPEN) 100 UNIT/ML pen Use 20-35 units three times daily with meals for up to 120 units daily 120 mL 3     insulin glargine (BASAGLAR KWIKPEN) 100 UNIT/ML pen INJECT 70 UNITS UNDER THE SKIN DAILY 90 mL 2     insulin pen needle (BD LISA U/F) 32G X 4 MM miscellaneous Use to inject 4-5 times daily. 600 each 1     latanoprost (XALATAN) 0.005 % ophthalmic solution Place 1 drop into both eyes daily 7.5 mL 11     losartan (COZAAR) 100 MG tablet Take 1 tablet (100 mg) by mouth daily 90 tablet 3     magnesium 250 MG  tablet Take 1 tablet by mouth daily 90 tablet 3     metFORMIN (GLUCOPHAGE XR) 500 MG 24 hr tablet TAKE 2 TABLETS TWICE A  tablet 3     montelukast (SINGULAIR) 10 MG tablet Take 1 tablet (10 mg) by mouth At Bedtime 90 tablet 3     MULTIVITAMIN OR Take 1 tablet by mouth daily.         NEEDLES, ANY SIZE Pen needles for Novolog insulin pen. Use to inject 5-6  times daily. 4 Box 3     NOVOLOG FLEXPEN 100 UNIT/ML soln Sig 20-25 units two-three times daily. Max daily dose 75 units 90 mL 1     pantoprazole (PROTONIX) 40 MG EC tablet Take 1 tablet (40 mg) by mouth daily 90 tablet 3     potassium chloride ER (KLOR-CON M) 10 MEQ CR tablet Take 1 tablet (10 mEq) by mouth daily 90 tablet 3     rosuvastatin (CRESTOR) 10 MG tablet Take 1 tablet (10 mg) by mouth daily 90 tablet 3     semaglutide (OZEMPIC) 2 MG/3ML pen Inject 0.25 mg Subcutaneous every 7 days 3 mL 0     [START ON 7/10/2023] semaglutide (OZEMPIC) 2 MG/3ML pen Inject 0.5 mg Subcutaneous every 7 days 3 mL 0     [START ON 8/9/2023] Semaglutide, 1 MG/DOSE, (OZEMPIC) 4 MG/3ML pen Inject 1 mg Subcutaneous every 7 days 3 mL 0     VITAMIN D 2000 UNIT OR TABS Take 1 tablet by mouth daily.         amoxicillin-clavulanate (AUGMENTIN) 875-125 MG tablet Take 1 tablet by mouth 2 times daily for 7 days 14 tablet 0         Physical Exam  /50   Pulse 86   Resp 16   Wt 87.1 kg (192 lb)   LMP 03/19/2009 (Exact Date)   SpO2 98%   BMI 34.28 kg/m        Wt Readings from Last 4 Encounters:   12/02/24 87.1 kg (192 lb)   12/02/24 87.1 kg (192 lb)   07/29/24 88.3 kg (194 lb 11.2 oz)   07/09/24 86.6 kg (191 lb)     Wt Readings from Last 4 Encounters:   07/29/24 88.3 kg (194 lb 11.2 oz)   07/09/24 86.6 kg (191 lb)   06/24/24 86.6 kg (191 lb)   06/10/24 86.2 kg (190 lb)     General: Well appearing woman in no distress, abdominal obesity.  Psych: good eye contact, no pressured speech    A1c 11/30/24 7.8%   A1c 6/10/2024 7.2%  Lab Results   Component Value Date      11/29/2024    CHLORIDE 102 11/29/2024    CO2 27 11/29/2024     (H) 11/29/2024    CR 0.84 11/29/2024    CR 0.80 06/06/2024    CR 0.71 11/13/2023    CR 0.76 06/07/2023    CR 0.71 06/07/2023    SUSHANT 9.7 11/29/2024    MAG 1.9 11/29/2024    ALBUMIN 4.2 11/29/2024    ALKPHOS 64 11/29/2024    LDL 59 06/06/2024    HDL 40 (L) 06/06/2024    TRIG 121 06/06/2024    TSH 1.95 06/06/2024    TSH 2.31 11/13/2023    TSH 1.01 06/07/2023     Lab Results   Component Value Date    MICROL <12.0 06/06/2024    MICROL 12.4 11/13/2023    MICROL 29.7 06/07/2023    MICROL 11 11/28/2022    MICROL 6 11/16/2021     Lab Results   Component Value Date    A1C 7.8 (H) 11/29/2024    A1C 7.2 (H) 06/10/2024    A1C 9.3 (A) 06/27/2022    A1C 8.7 (H) 11/16/2021    A1C 8.3 (H) 05/05/2021       Lab Results   Component Value Date    HGB 14.1 11/29/2024                Lab Results   Component Value Date     HGB 15.1 06/08/2023                 Again, thank you for allowing me to participate in the care of your patient.        Sincerely,      Stacia Carbajal MD

## 2024-12-02 NOTE — NURSING NOTE
Chief Complaints and History of Present Illnesses   Patient presents with    Follow Up     Epiretinal membrane (ERM) of both eyes  Diabetes mellitus II     Chief Complaint(s) and History of Present Illness(es)       Follow Up              Comments: Epiretinal membrane (ERM) of both eyes  Diabetes mellitus II              Comments    Pt states no change in VA since last visit  Floaters same as before  No flashes, eye pain or redness  LBS: 197      Last A1C: 7.8  Lab Results       Component                Value               Date                       A1C                      7.8                 11/29/2024                 A1C                      7.2                 06/10/2024                 A1C                      9.3                 06/27/2022                 A1C                      8.7                 11/16/2021                 A1C                      8.3                 05/05/2021                 A1C                      8.2                 08/27/2020                 A1C                      8.1                 04/02/2020                 A1C                      8.2                 11/14/2018              Chey Wright COT 10:03 AM December 2, 2024         .

## 2024-12-02 NOTE — PATIENT INSTRUCTIONS
Welcome to the [facility]     Our Endocrinology Clinics are here to provide you with a team-based, collaborative approach in the diagnosis and treatment of patients with diabetes and endocrine disorders. The team is made up of Physicians, Physician Assistants, Certified Diabetes Educators, Registered Nurses, Medical Assistants, Emergency Medical Technicians, and many others, all of whom have the unified goal of providing our patients with high quality care.     Please see below for some helpful tips to best navigate and use the [facility]:     [place] Respect: At [facility], we are committed to a respectful and safe space for all patients, visitors, and staff.  We believe that mutual respect between patients and their care team is the foundation of quality care.  It is our expectation that you will be treated with respect by your care team.  In turn, we ask that all communication with the care team (written and verbal) be respectful and free from profanity, threatening, or abusive language.  Disrespectful communication undermines our therapeutic relationship with you and may result in us being unable to continue to provide your care.    Refills: A provider must see you at least annually to prescribe and refill medications. This is to ensure your safety as well as meet insurance and compliance regulations.    Scheduling: Many of our Providers offer both in-person or video visits. Please call to schedule any needed follow ups as soon as possible because our provider schedules fill up very quickly. Our care team has the right to require an in-person visit when they believe that it is medically necessary. Please remember that for any virtual visits, you must be in the [facility] at the time of the visit, otherwise we are unable to see you and you will need to be rescheduled.    Missed Appointments: If you need to cancel or miss your  scheduled appointment, please call the clinic at [phone] to reschedule.  Please note if you repeatedly miss appointments or repeatedly miss appointments without calling to inform us ahead of time (no-show), the clinic may elect to not allow you to reschedule without speaking to a manager, may require a Partnership In Care Agreement prior to rescheduling, or could result in you no longer being able to receive care from the clinic. Providing the clinic with timely notification if you have to miss an appointment, allows us to better serve the needs of all of our patients.    Primary Care Provider: Our Endocrinologists are Specialists in their field. We expect you to have a Primary Care Provider established to handle any needs outside of your diabetes and endocrine care.  We would be happy to assist you find a Primary Care Provider, if you do not have one.    Banksnob: [employer] is a wonderful resource that allows you access to your Care Team via online or the miesha. Please ask a member of the team if you would like help creating an account. Please note that it may take up to 2 business days for a response. [employer] messages are not reviewed on weekends or after business hours.  Emergent or urgent care needs should never be communicated via [employer].  If you experience a medical emergency call 911 or go to the nearest emergency room.    Labs: It is recommended that you stay within the [facility] for labs but you are welcome to obtain ordered labs (with some exceptions) from any location of your choice as long as they are able to complete and process the needed labs. If you need us to fax orders to your preferred lab, please provide us the name and fax number of the lab you would like to go to so we can fax the orders. If your labs are drawn outside of the [facility], please have them fax the results to [phone] ([place]) or [phone] ([place]) or via [employer]. It is your  responsibility to ensure that outside lab results are sent to us.    We look forward to working with you. Please do not hesitate to reach out with any questions.    Thank you,    The Endocrine Team    Increase Ozemipc 2 mg weekly    Lab prior to next visit            [facility] Address:   [place] Address:     [address] [place] [address]    Phone: [phone]  Fax: [phone] [address] [place] [address]    Phone: [phone]  Fax: [phone]     [facility] Cost Estimate Phone Number: [phone]    General Lab and Imaging Scheduling Phone Number: [phone]

## 2024-12-02 NOTE — NURSING NOTE
Maliha Pedro's goals for this visit include:   Chief Complaint   Patient presents with    Follow Up       She requests these members of her care team be copied on today's visit information: yes    PCP: Elva Ortiz    Referring Provider:  Referred Self, MD  No address on file    /50   Pulse 86   Resp 16   Wt 87.1 kg (192 lb)   LMP 03/19/2009 (Exact Date)   SpO2 98%   BMI 34.28 kg/m      Do you need any medication refills at today's visit? Unsure    Tim Lebron, EMT

## 2024-12-03 ENCOUNTER — OFFICE VISIT (OUTPATIENT)
Dept: PULMONOLOGY | Facility: CLINIC | Age: 64
End: 2024-12-03
Attending: INTERNAL MEDICINE
Payer: COMMERCIAL

## 2024-12-03 VITALS
BODY MASS INDEX: 34.28 KG/M2 | OXYGEN SATURATION: 96 % | SYSTOLIC BLOOD PRESSURE: 130 MMHG | WEIGHT: 192 LBS | DIASTOLIC BLOOD PRESSURE: 78 MMHG | HEART RATE: 83 BPM

## 2024-12-03 DIAGNOSIS — J45.30 MILD PERSISTENT ASTHMA WITHOUT COMPLICATION: Primary | ICD-10-CM

## 2024-12-03 DIAGNOSIS — B37.0 ORAL THRUSH: ICD-10-CM

## 2024-12-03 PROCEDURE — 99214 OFFICE O/P EST MOD 30 MIN: CPT | Performed by: INTERNAL MEDICINE

## 2024-12-03 PROCEDURE — G0463 HOSPITAL OUTPT CLINIC VISIT: HCPCS | Performed by: INTERNAL MEDICINE

## 2024-12-03 RX ORDER — ALBUTEROL SULFATE 90 UG/1
2 INHALANT RESPIRATORY (INHALATION) EVERY 4 HOURS PRN
Qty: 18 G | Refills: 11 | Status: SHIPPED | OUTPATIENT
Start: 2024-12-03

## 2024-12-03 RX ORDER — NYSTATIN 100000 [USP'U]/ML
500000 SUSPENSION ORAL 4 TIMES DAILY
Qty: 473 ML | Refills: 1 | Status: SHIPPED | OUTPATIENT
Start: 2024-12-03

## 2024-12-03 ASSESSMENT — PAIN SCALES - GENERAL: PAINLEVEL_OUTOF10: NO PAIN (0)

## 2024-12-03 NOTE — LETTER
12/3/2024      Maliha Pedro  [address] [place] [address]      Dear Colleague,    Thank you for referring your patient, Maliha Pedro, to the [facility] [place]. Please see a copy of my visit note below.    [facility]  December 3, 2024         Assessment and Plan:   Maliha Pedro is a 64 year old female with mild persistent asthma without complications.      Mild persistent asthma:  Fco describes stable asthma symptoms as long as she takes her antihistamine and nasal spray each morning.  Her allergies remain a trigger for worsening symptoms.  She is currently using fluticasone / salmeterol that does help with her stability, but it is causing painful mouth sores despite being careful with oral cares.  She was previously on Dulera and did not have difficulty with mouth sores.  She rarely has to use her albuterol to rescue her asthma.  We reviewed her PFTs that were obtained in July 2024.   --will run some test claims on ICS/LABA that do not include fluticasone   --continue albuterol prn   --continue allegra daily   --continue daily nasal sprays    2. GERD:  Fco describes good control of her symptoms on pantoprazole    3. Psychosocial:  Fco now lives most the the year in the [facility].  She has a house that she shares with her sister and 3 dogs.      4. Sleep hygiene:  I have referred Fco to sleep clinic for a sleep evaluation.  She does wake herself up from snoring.    Immunizations: tdap  Colonoscopy: 5/2026  Last DEXA: 6/2024  Last sputum culture:    Sara Holden MD MPH  Associate Professor of Medicine  Pulmonary, Allergy, Critical Care and Sleep Medicine        Interval History:     Fco reports a consistent cough and she occasionally produces clear sputum.  She has occasional chest congestion but no chest tightness.  She does not need to use a rescue inhaler.           Review of Systems:     CONSTITUTIONAL: no fever, no chills, no sweats, no change in weight, no change in energy, no change in appetite    INTEGUMENTARY/SKIN: no rash, no obvious new lesions    ENT/MOUTH: no sore throat, no new sinus pain, no new nasal drainage, no new nasal congestion,  itchy ears      RESPIRATORY: see interval history    CV: no chest pain, no palpitations, no peripheral edema    GI: no nausea, no vomiting, no change in stools, controlled GERD    : negative urinary, manages gyne issues    MUSCULOSKELETAL: chronic myalgias and arthralgias    ENDOCRINE: blood sugars with adequate control    NEURO:  No headache    SLEEP: snoring    PSYCHIATRIC: mood stable          Past Medical and Surgical History:     Past Medical History:   Diagnosis Date     Adjustment disorder with mixed anxiety and depressed mood     following death of her adoptive father and then again after adoptive mother      Allergic rhinitis, cause unspecified     uses benadryl prn (sneezing/hoarse voice)     Bartholin gland cyst 2008     Chest pain, unspecified     neg dobutamine stress test  (reacted to dobutamine)     Dry eyes      ERM OD (epiretinal membrane, right eye)     BE mild     Esophageal reflux     EGD: -neg     Glaucoma suspect      Hearing problem      Hypertension      Irregular menstrual cycle     s/p D&C, since then more regular     Irritable bowel syndrome     colonoscopy -neg. Sx's especially prior to menses     MEDICAL HISTORY OF -     had health directive with : DANIEL Ford.  Full Code. Aunt (Charlie Tay) will be decision maker     Meningitis, unspecified(322.9) age 8    hospitalized for 9 mo     Mild persistent asthma without complication 2024     Need for prophylactic vaccination with tuberculosis (BCG) vaccine     Has pos Mantoux. Gets yearly cxr, all neg.      Other and unspecified hyperlipidemia      Pain in joint, shoulder region     bone spurs on MRI, s/p pool therapy      Tinnitus      Type II or unspecified type diabetes mellitus without mention of complication, not stated as uncontrolled Age 32    Follows with endocrine at [facility]: Shayne Lane MD     Unspecified cataract     RE     Unspecified sinusitis (chronic)          Wheezing     Has seen pulm  & . Told night ashtma, ? vocal chord spasm due to cold air.      Past Surgical History:   Procedure Laterality Date     CATARACT IOL, RT/LT  2008    LE     COLONOSCOPY N/A 2021    Procedure: Colonoscopy, With Polypectomy And Biopsy;  Surgeon: Teo Collins MD;  Location: [facility] OR     COLONOSCOPY WITH CO2 INSUFFLATION N/A 2021    Procedure: COLONOSCOPY, WITH CO2 INSUFFLATION;  Surgeon: Teo Collins MD;  Location: [facility] OR     HC DILATION/CURETTAGE DIAG/THER NON OB       HC TYMPANOPLASTY W/O MASTOID, INIT/REV W/O OSS CHAIN RECONST       PHACOEMULSIFICATION CLEAR CORNEA WITH STANDARD INTRAOCULAR LENS IMPLANT Right 2024    Procedure: RIGHT EYE PHACOEMULSIFICATION, CATARACT, WITH INTRAOCULAR LENS IMPLANT;  Surgeon: Lesly Tracy MD;  Location: [facility] OR     RELEASE CARPAL TUNNEL Right 2018    Procedure: Right Carpal Tunnel Release;  Surgeon: Ivania Moran MD;  Location: [facility] OR     RELEASE CARPAL TUNNEL Left 2018    Procedure: Left Carpal Tunnel Release;  Surgeon: Ivania Moran MD;  Location: [facility] OR     TYMPANOPLASTY       YAG CAPSULOTOMY OS (LEFT EYE)  2011           Family History:     Family History   Problem Relation Age of Onset     Diabetes Father          of dm 70's     C.A.D. Father      Cerebrovascular Disease Father      Diabetes Sister      Diabetes Sister      Diabetes Sister      Hypertension Sister      Diabetes Brother      Diabetes Brother      Cancer Half-Brother         lung     Diabetes Half-Brother      Leukemia Half-Brother      Glaucoma Maternal Grandmother      Diabetes Paternal Grandfather       Hypertension Paternal Grandfather      Cerebrovascular Disease Paternal Grandfather      Cancer Maternal Aunt         ovarian cancer.      Leukemia Half-Brother      Lymphoma Paternal Cousin      Macular Degeneration No family hx of             Social History:     Social History     Socioeconomic History     Marital status: Single     Spouse name: Not on file     Number of children: Not on file     Years of education: Not on file     Highest education level: Not on file   Occupational History     Not on file   Tobacco Use     Smoking status: Never     Passive exposure: Never     Smokeless tobacco: Never   Vaping Use     Vaping status: Never Used   Substance and Sexual Activity     Alcohol use: Yes     Comment: one drink a month     Drug use: No     Sexual activity: Not Currently     Partners: Male     Birth control/protection: Post-menopausal   Other Topics Concern     Parent/sibling w/ CABG, MI or angioplasty before 65F 55M? No      Service No     Blood Transfusions No     Caffeine Concern No     Occupational Exposure Yes     Comment: exposed to X-ray     Hobby Hazards No     Sleep Concern Yes     Comment: Hot Flashes     Stress Concern Yes     Comment: Work     Weight Concern Yes     Special Diet No     Back Care Yes     Comment: Back pain     Exercise Yes     Bike Helmet No     Seat Belt Yes     Self-Exams Yes   Social History Narrative    Works at  in surgical ICU    Born in the [place]. Moved to  in 1982. Initially lived in [place].    No children        2 cats     Social Drivers of Health     Financial Resource Strain: Low Risk  (6/19/2024)    Financial Resource Strain      Within the past 12 months, have you or your family members you live with been unable to get utilities (heat, electricity) when it was really needed?: No   Food Insecurity: Low Risk  (6/19/2024)    Food Insecurity      Within the past 12 months, did you worry that your food would run out before you got money to buy  more?: No      Within the past 12 months, did the food you bought just not last and you didn t have money to get more?: No   Transportation Needs: Low Risk  (6/19/2024)    Transportation Needs      Within the past 12 months, has lack of transportation kept you from medical appointments, getting your medicines, non-medical meetings or appointments, work, or from getting things that you need?: No   Physical Activity: Insufficiently Active (6/19/2024)    Exercise Vital Sign      Days of Exercise per Week: 3 days      Minutes of Exercise per Session: 30 min   Stress: No Stress Concern Present (6/19/2024)    [employer] - Occupational Stress Questionnaire      Feeling of Stress : Only a little   Social Connections: Unknown (6/19/2024)    Social Connection and Isolation Panel [NHANES]      Frequency of Communication with Friends and Family: Not on file      Frequency of Social Gatherings with Friends and Family: Once a week      Attends Religion Services: Not on file      Active Member of Clubs or Organizations: Not on file      Attends Club or Organization Meetings: Not on file      Marital Status: Not on file   Interpersonal Safety: Low Risk  (6/24/2024)    Interpersonal Safety      Do you feel physically and emotionally safe where you currently live?: Yes      Within the past 12 months, have you been hit, slapped, kicked or otherwise physically hurt by someone?: No      Within the past 12 months, have you been humiliated or emotionally abused in other ways by your partner or ex-partner?: No   Housing Stability: Low Risk  (6/19/2024)    Housing Stability      Do you have housing? : Yes      Are you worried about losing your housing?: No            Medications:     Current Outpatient Medications   Medication Sig Dispense Refill     albuterol (PROAIR HFA/PROVENTIL HFA/VENTOLIN HFA) 108 (90 Base) MCG/ACT inhaler Inhale 2 puffs into the lungs every 4 hours as needed for shortness of breath. 18  g 11     aspirin 81 MG EC tablet Take 81 mg by mouth daily Please resume in 5 days.  (12/22/2018)       azelastine (ASTELIN) 0.1 % nasal spray Spray 1 spray into both nostrils 2 times daily 90 mL 3     CALCIUM + D OR Take 1 tablet by mouth 2 times daily.       Continuous Blood Gluc Sensor (DEXCOM G7 SENSOR) MISC CHANGE SENSOR EVERY 10 DAYS AS DIRECTED 9 each 4     fexofenadine (ALLEGRA) 180 MG tablet Take 1 tablet by mouth daily. 1 TABLET DAILY Failed claritin for symptom cotrol. Zyrtec increases heart rate. 90 tablet 3     fish oil-omega-3 fatty acids 1000 MG capsule Take 1 capsule by mouth daily.       fluticasone (FLONASE) 50 MCG/ACT nasal spray USE 1-2 SPRAYS IN EACH NOSTRIL ONCE DAILY 48 g 3     Glucosamine-Chondroitin (GLUCOSAMINE CHONDR COMPLEX PO)        hydroCHLOROthiazide 12.5 MG tablet TAKE 1 TABLET (12.5 MG) BY MOUTH DAILY 90 tablet 1     insulin aspart (NOVOLOG FLEXPEN) 100 UNIT/ML pen Use 20-35 units three times daily with meals for up to 120 units daily 120 mL 3     insulin glargine (BASAGLAR KWIKPEN) 100 UNIT/ML pen Inject 50 Units Subcutaneous daily 48 mL 3     insulin pen needle (BD LISA U/F) 32G X 4 MM miscellaneous Use to inject 4-5 times daily. 600 each 1     ketorolac (ACULAR) 0.5 % ophthalmic solution Place 1 drop into the right eye 4 times daily 5 mL 1     latanoprost (XALATAN) 0.005 % ophthalmic solution Place 1 drop into both eyes daily. 7.5 mL 11     latanoprost (XALATAN) 0.005 % ophthalmic solution Place 1 drop into both eyes daily. 2.5 mL 1     losartan (COZAAR) 100 MG tablet TAKE 1 TABLET (100 MG) BY MOUTH DAILY 90 tablet 1     magnesium 250 MG tablet Take 1 tablet by mouth daily 90 tablet 3     metFORMIN (GLUCOPHAGE XR) 500 MG 24 hr tablet TAKE TWO TABLETS BY MOUTH TWICE A  tablet 0     mometasone-formoterol (DULERA) 100-5 MCG/ACT inhaler Inhale 2 puffs into the lungs 2 times daily. 39 g 3     montelukast (SINGULAIR) 10 MG tablet Take 1 tablet (10 mg) by mouth at bedtime 90  tablet 3     MULTIVITAMIN OR Take 1 tablet by mouth daily.       NEEDLES, ANY SIZE Pen needles for Novolog insulin pen. Use to inject 5-6  times daily. 4 Box 3     NOVOLOG FLEXPEN 100 UNIT/ML soln INJECT 35 UNITS WITH BREAKFAST AND 25 UNITS WITH LUNCH AND DINNER. MAX DAILY DOSE 90 UNITS 90 mL 2     nystatin (MYCOSTATIN) 952730 UNIT/ML suspension Take 5 mLs (500,000 Units) by mouth 4 times daily. 473 mL 1     ofloxacin (OCUFLOX) 0.3 % ophthalmic solution Place 1 drop into the right eye 4 times daily 5 mL 0     pantoprazole (PROTONIX) 40 MG EC tablet TAKE 1 TABLET (40 MG) BY MOUTH DAILY 90 tablet 0     potassium chloride brinda ER (KLOR-CON M10) 10 MEQ CR tablet TAKE ONE TABLET BY MOUTH ONCE DAILY 90 tablet 1     prednisoLONE acetate (PRED FORTE) 1 % ophthalmic suspension Place 1 drop into the right eye 4 times daily 5 mL 1     rosuvastatin (CRESTOR) 10 MG tablet TAKE 1 TABLET (10 MG) BY MOUTH DAILY 90 tablet 0     Semaglutide, 2 MG/DOSE, (OZEMPIC) 8 MG/3ML pen Inject 2 mg subcutaneously every 7 days. 9 mL 3     VITAMIN D 2000 UNIT OR TABS Take 1 tablet by mouth daily.       Current Facility-Administered Medications   Medication Dose Route Frequency Provider Last Rate Last Admin     apraclonidine (IOPIDINE) 1 % ophthalmic solution 1 drop  1 drop Right Eye Once Lesly Tracy MD                Physical Exam:   /78 (BP Location: Right arm, Patient Position: Chair, Cuff Size: Adult Regular)   Pulse 83   Wt 87.1 kg (192 lb)   LMP 03/19/2009 (Exact Date)   SpO2 96%   BMI 34.28 kg/m      Constitutional:   Awake, alert and in no apparent distress     Eyes:   nonicteric     ENT:   oral mucosa moist without lesions, normal tm bilaterally, bilateral mucosa normal     Neck:   Supple without supraclavicular or cervical lymphadenopathy     Lungs:   Good air flow.  No crackles. No rhonchi.  No wheezes.     Cardiovascular:   Normal S1 and S2.  RRR.  No murmur, gallop or rub.     Abdomen:   NABS, soft, nontender,  nondistended.      Musculoskeletal:   No edema, no digital clubbing present     Neurologic:   Alert and conversant.     Skin:   Warm, dry.  No rash on limited exam.             Data:   All laboratory and imaging data reviewed.      Results from the last week:  Recent Results (from the past week)   Vitamin B12    Collection Time: 11/29/24  7:47 AM   Result Value Ref Range    Vitamin B12 661 232 - 1,245 pg/mL   Comprehensive metabolic panel (BMP + Alb, Alk Phos, ALT, AST, Total. Bili, TP)    Collection Time: 11/29/24  7:47 AM   Result Value Ref Range    Sodium 140 135 - 145 mmol/L    Potassium 3.8 3.4 - 5.3 mmol/L    Carbon Dioxide (CO2) 27 22 - 29 mmol/L    Anion Gap 11 7 - 15 mmol/L    Urea Nitrogen 15.2 8.0 - 23.0 mg/dL    Creatinine 0.84 0.51 - 0.95 mg/dL    GFR Estimate 77 >60 mL/min/1.73m2    Calcium 9.7 8.8 - 10.4 mg/dL    Chloride 102 98 - 107 mmol/L    Glucose 116 (H) 70 - 99 mg/dL    Alkaline Phosphatase 64 40 - 150 U/L    AST 34 0 - 45 U/L    ALT 48 0 - 50 U/L    Protein Total 7.2 6.4 - 8.3 g/dL    Albumin 4.2 3.5 - 5.2 g/dL    Bilirubin Total 0.3 <=1.2 mg/dL   Magnesium    Collection Time: 11/29/24  7:47 AM   Result Value Ref Range    Magnesium 1.9 1.7 - 2.3 mg/dL   HEMOGLOBIN A1C    Collection Time: 11/29/24  7:47 AM   Result Value Ref Range    Estimated Average Glucose 177 (H) <117 mg/dL    Hemoglobin A1C 7.8 (H) 0.0 - 5.6 %   CBC with platelets and differential    Collection Time: 11/29/24  7:47 AM   Result Value Ref Range    WBC Count 8.9 4.0 - 11.0 10e3/uL    RBC Count 5.17 3.80 - 5.20 10e6/uL    Hemoglobin 14.1 11.7 - 15.7 g/dL    Hematocrit 43.9 35.0 - 47.0 %    MCV 85 78 - 100 fL    MCH 27.3 26.5 - 33.0 pg    MCHC 32.1 31.5 - 36.5 g/dL    RDW 14.2 10.0 - 15.0 %    Platelet Count 319 150 - 450 10e3/uL    % Neutrophils 47 %    % Lymphocytes 36 %    % Monocytes 9 %    % Eosinophils 8 %    % Basophils 1 %    % Immature Granulocytes 0 %    Absolute Neutrophils 4.2 1.6 - 8.3 10e3/uL    Absolute  Lymphocytes 3.2 0.8 - 5.3 10e3/uL    Absolute Monocytes 0.8 0.0 - 1.3 10e3/uL    Absolute Eosinophils 0.7 0.0 - 0.7 10e3/uL    Absolute Basophils 0.1 0.0 - 0.2 10e3/uL    Absolute Immature Granulocytes 0.0 <=0.4 10e3/uL       PFT interpretation: None performed today.      50 minutes spent by me on the date of the encounter doing chart review, history and exam, documentation and further activities per the note  Time documented is excluding time spent for PFT interpretation.      Again, thank you for allowing me to participate in the care of your patient.        Sincerely,        Sara Holden MD

## 2024-12-03 NOTE — NURSING NOTE
Chief Complaint   Patient presents with    Follow Up     Return Bronchiectas-Non CF        Vitals were taken, medications reconciled.    Rene Alexis, Clinic Assistant   9:33 AM

## 2024-12-04 ENCOUNTER — ALLIED HEALTH/NURSE VISIT (OUTPATIENT)
Dept: FAMILY MEDICINE | Facility: CLINIC | Age: 64
End: 2024-12-04
Payer: COMMERCIAL

## 2024-12-04 DIAGNOSIS — Z79.4 TYPE 2 DIABETES MELLITUS WITH MILD NONPROLIFERATIVE RETINOPATHY WITHOUT MACULAR EDEMA, WITH LONG-TERM CURRENT USE OF INSULIN, UNSPECIFIED LATERALITY (H): ICD-10-CM

## 2024-12-04 DIAGNOSIS — Z23 ENCOUNTER FOR IMMUNIZATION: Primary | ICD-10-CM

## 2024-12-04 DIAGNOSIS — E11.3299 TYPE 2 DIABETES MELLITUS WITH MILD NONPROLIFERATIVE RETINOPATHY WITHOUT MACULAR EDEMA, WITH LONG-TERM CURRENT USE OF INSULIN, UNSPECIFIED LATERALITY (H): ICD-10-CM

## 2024-12-04 DIAGNOSIS — Z79.4 TYPE 2 DIABETES MELLITUS WITH DIABETIC NEPHROPATHY, WITH LONG-TERM CURRENT USE OF INSULIN (H): ICD-10-CM

## 2024-12-04 DIAGNOSIS — E11.21 TYPE 2 DIABETES MELLITUS WITH DIABETIC NEPHROPATHY, WITH LONG-TERM CURRENT USE OF INSULIN (H): ICD-10-CM

## 2024-12-04 PROCEDURE — 90662 IIV NO PRSV INCREASED AG IM: CPT

## 2024-12-04 PROCEDURE — 90480 ADMN SARSCOV2 VAC 1/ONLY CMP: CPT

## 2024-12-04 PROCEDURE — 91320 SARSCV2 VAC 30MCG TRS-SUC IM: CPT

## 2024-12-04 PROCEDURE — G0008 ADMIN INFLUENZA VIRUS VAC: HCPCS

## 2024-12-04 PROCEDURE — 99207 PR NO CHARGE NURSE ONLY: CPT

## 2024-12-04 NOTE — PROGRESS NOTES
Prior to immunization administration, verified patients identity using patient s name and date of birth. Please see Immunization Activity for additional information.     Is the patient's temperature normal (100.5 or less)? Yes 97.3     Patient MEETS CRITERIA. PROCEED with vaccine administration.          12/4/2024   COVID   Have you had myocarditis or pericarditis (inflammation of or around the heart muscle) after getting a COVID-19 vaccine? No   Have you had a serious reaction to a COVID vaccine or something in a COVID vaccine, like polyethylene glycol (PEG) or polysorbate? No   Have you had multisystem inflammatory syndrome from COVID-19 in the past 90 days? No   Have you received a bone marrow transplant within the previous 3 months? No            Patient MEETS CRITERIA. PROCEED with vaccine administration.            12/4/2024   INFLUENZA   Would you like to receive the flu shot or the nasal flu vaccine today? Flu Shot   Have you had a serious reaction to a flu vaccine or something in a flu vaccine? No   Have you had Guillain-Byfield syndrome within 6 weeks of getting a vaccine? No   Have you received a bone marrow transplant within the previous 6 months? No            Patient MEETS CRITERIA. PROCEED with vaccine administration.        Patient instructed to remain in clinic for 15 minutes afterwards, and to report any adverse reactions.      Link to Ancillary Visit Immunization Standing Orders SmartSet     Screening performed by Trinity Rey MA on 12/4/2024 at 12:22 PM.

## 2024-12-05 RX ORDER — METFORMIN HYDROCHLORIDE 500 MG/1
TABLET, EXTENDED RELEASE ORAL
Qty: 360 TABLET | Refills: 0 | OUTPATIENT
Start: 2024-12-05

## 2024-12-05 RX ORDER — INSULIN GLARGINE 100 [IU]/ML
INJECTION, SOLUTION SUBCUTANEOUS
Qty: 45 ML | Refills: 3 | Status: SHIPPED | OUTPATIENT
Start: 2024-12-05

## 2024-12-06 ENCOUNTER — MYC MEDICAL ADVICE (OUTPATIENT)
Dept: ENDOCRINOLOGY | Facility: CLINIC | Age: 64
End: 2024-12-06
Payer: COMMERCIAL

## 2024-12-06 DIAGNOSIS — Z79.4 TYPE 2 DIABETES MELLITUS WITH DIABETIC NEPHROPATHY, WITH LONG-TERM CURRENT USE OF INSULIN (H): ICD-10-CM

## 2024-12-06 DIAGNOSIS — E11.21 TYPE 2 DIABETES MELLITUS WITH DIABETIC NEPHROPATHY, WITH LONG-TERM CURRENT USE OF INSULIN (H): ICD-10-CM

## 2024-12-06 NOTE — TELEPHONE ENCOUNTER
"Medication Requested:   Disp Refills Start End GIRISH   metFORMIN (GLUCOPHAGE XR) 500 MG 24 hr tablet 360 tablet 0 11/1/2024 -- No   Sig: TAKE TWO TABLETS BY MOUTH TWICE A DAY     [place], [place] - [address], [address]     Last Office Visit : 12/2/2024  [facility]      Future Office visit:     7/28/2025 9:30 AM (30 min)  [facility]   RETURN DIABETES   [facility] ([place])   Stacia Carbajal MD     ----------------------      Refill decision: Medication unable to be refilled by RN due to criteria not met as indicated below:        Advanced refill request - foreign travel, has < 60 days left of current script     per Patty msg: \"Good morning Dr Carbajal I need a refill for my Metformin [facility] pharmacy needs a new prescription Kindly reorder it for me Im leaving for the [facility] at the end of next week Again thank you\"         Last Comprehensive Metabolic Panel:  Lab Results   Component Value Date     11/29/2024    POTASSIUM 3.8 11/29/2024    CHLORIDE 102 11/29/2024    CO2 27 11/29/2024    ANIONGAP 11 11/29/2024     (H) 11/29/2024    BUN 15.2 11/29/2024    CR 0.84 11/29/2024    GFRESTIMATED 77 11/29/2024    SUSHANT 9.7 11/29/2024     Hemoglobin A1C   Date Value Ref Range Status   11/29/2024 7.8 (H) 0.0 - 5.6 % Final     Comment:     Normal <5.7%   Prediabetes 5.7-6.4%    Diabetes 6.5% or higher     Note: Adopted from ADA consensus guidelines.   06/27/2022 9.3 (A) 0.0 - 5.7 % Final             "

## 2024-12-09 DIAGNOSIS — K21.9 GASTROESOPHAGEAL REFLUX DISEASE, UNSPECIFIED WHETHER ESOPHAGITIS PRESENT: ICD-10-CM

## 2024-12-09 DIAGNOSIS — E78.5 HYPERLIPIDEMIA LDL GOAL <100: ICD-10-CM

## 2024-12-09 DIAGNOSIS — H40.003 BORDERLINE GLAUCOMA, BILATERAL: Primary | ICD-10-CM

## 2024-12-09 RX ORDER — ROSUVASTATIN CALCIUM 10 MG/1
10 TABLET, COATED ORAL DAILY
Qty: 90 TABLET | Refills: 3 | Status: SHIPPED | OUTPATIENT
Start: 2024-12-09

## 2024-12-09 RX ORDER — METFORMIN HYDROCHLORIDE 500 MG/1
1000 TABLET, EXTENDED RELEASE ORAL 2 TIMES DAILY
Qty: 360 TABLET | Refills: 2 | Status: SHIPPED | OUTPATIENT
Start: 2024-12-09

## 2024-12-09 RX ORDER — PANTOPRAZOLE SODIUM 40 MG/1
40 TABLET, DELAYED RELEASE ORAL DAILY
Qty: 90 TABLET | Refills: 3 | Status: SHIPPED | OUTPATIENT
Start: 2024-12-09

## 2024-12-10 DIAGNOSIS — Z79.4 TYPE 2 DIABETES MELLITUS WITH MILD NONPROLIFERATIVE RETINOPATHY WITHOUT MACULAR EDEMA, WITH LONG-TERM CURRENT USE OF INSULIN, UNSPECIFIED LATERALITY (H): ICD-10-CM

## 2024-12-10 DIAGNOSIS — E11.3299 TYPE 2 DIABETES MELLITUS WITH MILD NONPROLIFERATIVE RETINOPATHY WITHOUT MACULAR EDEMA, WITH LONG-TERM CURRENT USE OF INSULIN, UNSPECIFIED LATERALITY (H): ICD-10-CM

## 2024-12-10 RX ORDER — ACYCLOVIR 400 MG/1
TABLET ORAL
Qty: 9 EACH | Refills: 4 | Status: SHIPPED | OUTPATIENT
Start: 2024-12-10

## 2024-12-10 NOTE — TELEPHONE ENCOUNTER
Patient would like these sent to [facility] due to her needing early fill for vacation

## 2024-12-11 ENCOUNTER — OFFICE VISIT (OUTPATIENT)
Dept: OPHTHALMOLOGY | Facility: CLINIC | Age: 64
End: 2024-12-11
Attending: OPHTHALMOLOGY
Payer: COMMERCIAL

## 2024-12-11 DIAGNOSIS — H40.003 BORDERLINE GLAUCOMA, BILATERAL: Primary | ICD-10-CM

## 2024-12-11 PROCEDURE — G0463 HOSPITAL OUTPT CLINIC VISIT: HCPCS | Performed by: OPHTHALMOLOGY

## 2024-12-11 PROCEDURE — 92083 EXTENDED VISUAL FIELD XM: CPT | Performed by: OPHTHALMOLOGY

## 2024-12-11 ASSESSMENT — VISUAL ACUITY
OS_CC+: -1
METHOD: SNELLEN - LINEAR
OS_CC: 20/25
OD_CC+: -1
OD_CC: 20/25
CORRECTION_TYPE: GLASSES

## 2024-12-11 ASSESSMENT — REFRACTION_WEARINGRX
OS_CYLINDER: +0.25
OS_AXIS: 075
OS_ADD: +2.50
OS_CYLINDER: +0.25
OS_ADD: +2.50
OD_CYLINDER: SPHERE
OD_SPHERE: -1.75
OS_SPHERE: -3.50
OD_ADD: +2.50
OD_SPHERE: -1.75
OD_ADD: +2.50
OS_AXIS: 075
OS_SPHERE: -3.50
OD_CYLINDER: SPHERE

## 2024-12-11 ASSESSMENT — SLIT LAMP EXAM - LIDS
COMMENTS: MEIBOMIAN GLAND DYSFUNCTION
COMMENTS: MEIBOMIAN GLAND DYSFUNCTION

## 2024-12-11 ASSESSMENT — CUP TO DISC RATIO
OD_RATIO: 0.9
OS_RATIO: 0.8

## 2024-12-11 ASSESSMENT — REFRACTION_MANIFEST
OD_CYLINDER: +0.50
OD_SPHERE: -2.50
OD_AXIS: 090

## 2024-12-11 ASSESSMENT — TONOMETRY
OS_IOP_MMHG: 14
OD_IOP_MMHG: 13
IOP_METHOD: TONOPEN

## 2024-12-11 ASSESSMENT — EXTERNAL EXAM - LEFT EYE: OS_EXAM: DERMATOCHALASIS

## 2024-12-11 ASSESSMENT — EXTERNAL EXAM - RIGHT EYE: OD_EXAM: DERMATOCHALASIS

## 2024-12-11 NOTE — NURSING NOTE
Chief Complaints and History of Present Illnesses   Patient presents with    Follow Up     Follow up YAG CAPS on 12/02/24     Chief Complaint(s) and History of Present Illness(es)       Follow Up              Laterality: both eyes    Associated symptoms: Negative for dryness, flashes and itching    Treatments tried: eye drops    Pain scale: 0/10    Comments: Follow up YAG CAPS on 12/02/24              Comments    The patient states she doesn't notice a difference in vision in the right eye.  She has floaters once in awhile.  She uses Latanoprost at bedtime in both eyes.  Daphne Li, COA, COA 8:43 AM 12/11/2024

## 2024-12-11 NOTE — PROGRESS NOTES
CC: status post yag laser right eye ; here for post-yag and glaucoma follow up     Interim: VA improved right eye  ; no flashes and floaters; no eye pain.     PMH: Diabetes mellitus II    Imaging    OCT 12/02/24   both eyes with good foveal contour; no fluid - stable  Right eye with trace sup Epiretinal membrane - stable     ONFL 12/02/24   Right eye: thinning TS TI  stable. G 69 stable  Left eye: Thinning TS stable. G 68 stable  stable compared to prior two ; mild progression compared to 9697-0433    24-2 OVF 12/11/24   Right eye: one spot of decreased sensitivity superiorly; stable compared to previous. Except for increased MD -1.8   Left eye: Superior spots of decreased sensitivity - stable. MD -1.0    Intraocular lens calculations reliable  Topography- regular astigmatism  Optos Autofluorescence: normal except for small hypoautofluorescent spots in areas of heme    Fluorescein angiography 06/17/24 : normal AV and choroidal filling ; multiple microaneurysms and peripheral vascular leakage; no neovascularization elsewhere; no neovascularization of the disc     Assessment & Plan:    # status post Cataract extraction intraocular lens right eye 07/09/24   With posterior capsular opacity (PCO) 12/02/24   Status post yag laser 12/02/24   Clear axis  No complications   Retina attached    # pseudophakia left eye   With posterior capsular opacity (PCO)  Consider yag in the future    #  open angle glaucoma both eyes   - large cup to disc ratio R>L  - Intraocular pressure 15/14    - pachmetry 583/613  -12/02/24  retinal nerve fiber layer stable   Right eye: thinning  TS/TI; stable  Left eye: Thinning TS,stable  stable compared to prior exams; some progression compared to 5253-1542   with stable VF and retinal nerve fiber layer  - last eye examination given mild progression over the course of several yrs and large cups; would recommend to restart latanoprost  12/02/24 better compliance with latanoprost  12/11/24  intraocular pressure 13/14    # Type 2 diabetes mellitus with mod Nonproliferative diabetic retinopathy both eyes   Fluorescein angiography: normal AV and choroidal filling ; multiple microaneurysms and peripheral vascular leakage; no neovascularization elsewhere; no neovascularization of the disc   Blood pressure (<120/80) and blood glucose (HbA1c <7.0) control discussed with patient. Patient advised that failure to adequately control each may lead to vision loss. The patient expressed understanding.  # Pseudophakia, left eye- Dr Rosario  -stable  -observe    # Posterior vitreous detachment of right eye  -stable  -observe    # Old inferior temporal small tear surrounded by pigment, no subretinal fluid right eye   Peripheral Retinal pigment epithelium/small lattice both eyes   Retina attached, no other tears  Retina detachment precautions were discussed with the patient (presence or increased in flashes, floaters or a curtain in the visual field) and was asked to return if any of the those occur    # Dry eye syndrome  artificial tears  As needed and warm compresses      # Mild Epiretinal membrane right eye  Stable ; Not visually significant     PLAN: follow up in  July 2025;with Optical Coherence Tomography; retinal nerve fiber layer; optos ; possible yag left eye   Retina detachment precautions were discussed with the patient (presence or increased in flashes, floaters or a curtain in the visual field) and was asked to return if any of the those occur   Continue latanoprost- at bedtime both eyes   ~~~~~~~~~~~~~~~~~~~~~~~~~~~~~~~~~~   Complete documentation of historical and exam elements from today's encounter can be found in the full encounter summary report (not reduplicated in this progress note).  I personally obtained the chief complaint(s) and history of present illness.  I confirmed and edited as necessary the review of systems, past medical/surgical history, family history, social history, and examination  findings as documented by others; and I examined the patient myself.  I personally reviewed the relevant tests, images, and reports as documented above.  I formulated and edited as necessary the assessment and plan and discussed the findings and management plan with the patient and family    Lesly Tracy MD   of Ophthalmology.  Retina Service   Department of Ophthalmology and Visual Neurosciences   [facility]  Phone: [phone]   Fax: [phone]

## 2024-12-21 ENCOUNTER — MYC MEDICAL ADVICE (OUTPATIENT)
Dept: FAMILY MEDICINE | Facility: CLINIC | Age: 64
End: 2024-12-21
Payer: COMMERCIAL

## 2025-03-10 NOTE — PROGRESS NOTES
SUBJECTIVE:       HPI: Maliha Pedro is a 63 year old  who presents today for vulvar irritation, referred from Elva Ortzi.    Vulvar itching for over a year. Thought due to Jiardiance however did not improve after she was taken off of the medication.Has tried Aquaphor, hydrocortisone low potency with occasional relief in symptoms.  Notices worsening symptoms when in the heat and area is moist.    Vulvar and vaginal hygeine practices:  -Washes with cetaphil  -Does not use feminine sprays/cleaning products  -No douching  -Rare urinary incontinence  -Not sexually active    Medical history significant for type II diabetes         Gyn Hx: Patient's last menstrual period was 2009 (exact date).     Last pap was 2021 nil neg hpv   STI history - none       reports that she has never smoked. She has never used smokeless tobacco.      Today's PHQ-2 Score:       2023     9:53 AM   PHQ-2 (  [employer])   Q1: Little interest or pleasure in doing things 0   Q2: Feeling down, depressed or hopeless 0   PHQ-2 Score 0   Q1: Little interest or pleasure in doing things Not at all   Q2: Feeling down, depressed or hopeless Not at all   PHQ-2 Score 0     Today's PHQ-9 Score:       2/15/2016     4:22 PM   PHQ-9 SCORE   PHQ-9 Total Score 2     Today's KASSI-7 Score:       2/15/2016     4:22 PM   KASSI-7 SCORE   Total Score 5       Problem list and histories reviewed & adjusted, as indicated.  Additional history: as documented.    Patient Active Problem List   Diagnosis    ? 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 Primary osteoarthritis of right hip  M16.11       2. S/P hip replacement, right  Z96.641               Subjective: Patient notes same old same old.      Objective: See treatment diary below      Assessment: Tolerated treatment well.          Patient would benefit from continued PT. Patient performed NuStep for ROM and to increase blood flow to the area being treated, prepare the muscle for strength training and lengthening and to improve overall tolerance to activity. Patient continues to well overall with program. Added hurdles today to challenge balance during functional activities.  Continuing to work on strength.        Patient will be re-evaluated at his next session.        Plan: Progress treatment as tolerated.         Diagnosis:    Precautions:    POC Expires Auth Status Start Date Expiration Date PT Visit Limit     No Auth NA NA NA   Date 2/24 2/27 3/3 3/6 3/10   Used        Remaining        Manuals        PROM        Mobs                                There Ex        Bike Nu Step LVL 8 5' NuStep LVL 8 5' NuStep LvL  8 5' NuStep LvL  8 5' NuStep LvL 8 5'   Hamstring Stretch 20\" 4x 20\" x 4 20\"x4 20\"x4 20\" x 4   Gastroc Stretch 20\" 4x 20\" x 4 20\"x4 20\"x4 20\" x 4   HS Curls Swiss Ball x20 DL, x20 SL  Swiss Ball x15, x15 SL  Swiss ball  X15, x15 SL Swiss ball  X15, x15 SL Swiss Ball x15, x15 SL    Lumbar Flexion         Patient Education         Neruo Re-Ed        Quad Set         Bridge X10  X10 single leg X10  X10 single leg X10  X10 single leg X10  X10 single leg X10   X1o single leg    Clamshell SL Black TB 5x10  SL Black 3x10  Supine x30 SL black 3x10 supine x30 SL black 3x10 supine x30 SL Black 3x10  Supine x30   SLR 5# 3x10 5# 3x10 5# 3x10 5# 3x10 5# 3x10   Hip ABD  5# 3x10 5# 3x10 5# 3x10 5# 3x10 5# 3x10  "    Ovarian cyst    Breast pain    Essential hypertension    Advanced directives, counseling/discussion    Obesity    Hypomagnesemia    Type 2 diabetes mellitus with mild nonproliferative retinopathy without macular edema, with long-term current use of insulin, unspecified laterality (H)    Fatty liver disease, nonalcoholic     Past Surgical History:   Procedure Laterality Date    CATARACT IOL, RT/LT  2008    LE    COLONOSCOPY N/A 2021    Procedure: Colonoscopy, With Polypectomy And Biopsy;  Surgeon: Teo Collins MD;  Location: [facility] OR    COLONOSCOPY WITH CO2 INSUFFLATION N/A 2021    Procedure: COLONOSCOPY, WITH CO2 INSUFFLATION;  Surgeon: Teo Collins MD;  Location: [facility] OR    HC DILATION/CURETTAGE DIAG/THER NON OB  1992    HC TYMPANOPLASTY W/O MASTOID, INIT/REV W/O OSS CHAIN RECONST      RELEASE CARPAL TUNNEL Right 2018    Procedure: Right Carpal Tunnel Release;  Surgeon: Ivania Moran MD;  Location: [facility] OR    RELEASE CARPAL TUNNEL Left 2018    Procedure: Left Carpal Tunnel Release;  Surgeon: Ivania Moran MD;  Location: [facility] OR    TYMPANOPLASTY      YAG CAPSULOTOMY OS (LEFT EYE)  2011      Social History     Tobacco Use    Smoking status: Never    Smokeless tobacco: Never   Substance Use Topics    Alcohol use: Yes     Comment: one drink a month      Problem (# of Occurrences) Relation (Name,Age of Onset)    Cancer (2) Half-Brother (lowelll): lung, Maternal Aunt: ovarian cancer.     Diabetes (8) Father (negar Graham):  of dm 70's, Sister, Sister, Sister (jalen jiménez), Brother, Brother, Half-Brother (pranayelll), Paternal Grandfather (constance graham)    Hypertension (2) Sister (jalen jiménez), Paternal Grandfather (constance graham)    Cerebrovascular Disease (2) Father (negar Graham), Paternal Grandfather (constance perdomosan)    C.A.D. (1) Father (negar Graham)    Glaucoma (1) Maternal  "  Hip Ext        Marching        Lateral Tap Down 8\" 3x10    Step up 10x 8\" 3x10 8\" 3x10 8\" 3x10 8\" 3x10    SLS        Side-stepping Black twisted 6 laps at plinth Black 5 laps  Black 5 laps Black 5 laps Black 5 laps   Leg Press 180# 3x10 DL  120# 2x10 # 2x10 DL  125# 2x10 SL  180# 2x10 DL    125# 2x10 # 2x10 DL    125# 2x10 # 2x10 DL    125# 2x10 SL   Wobble board balance        Step Up        Foam        Hurdles      4 hurdles  Fwd/lat  3 laps ea   BOSU        Excursion        Squats @ TRX 15x                Gait Training        @ Rail                         Modalities        CP PRN                                                       " Grandmother    Leukemia (2) Half-Brother, Half-Brother    Lymphoma (1) Paternal Cousin           Negative family history of: Macular Degeneration              albuterol (PROAIR HFA/PROVENTIL HFA/VENTOLIN HFA) 108 (90 Base) MCG/ACT inhaler, Inhale 2 puffs into the lungs every 4 hours as needed for shortness of breath  aspirin 81 MG EC tablet, Take 81 mg by mouth daily Please resume in 5 days.  (12/22/2018)  azelastine (ASTELIN) 0.1 % nasal spray, Spray 1 spray into both nostrils 2 times daily  CALCIUM + D OR, Take 1 tablet by mouth 2 times daily.  Continuous Blood Gluc Sensor (DEXCOM G6 SENSOR) MISC, 1 each every 10 days  Continuous Blood Gluc Sensor (DEXCOM G7 SENSOR) MISC, CHANGE SENSOR EVERY 10 DAYS AS DIRECTED  Continuous Blood Gluc Transmit (DEXCOM G6 TRANSMITTER) MISC, 1 each every 3 months Change every 3 months.  fexofenadine (ALLEGRA) 180 MG tablet, Take 1 tablet by mouth daily. 1 TABLET DAILY Failed claritin for symptom cotrol. Zyrtec increases heart rate.  fish oil-omega-3 fatty acids 1000 MG capsule, Take 1 capsule by mouth daily.  fluticasone (FLONASE) 50 MCG/ACT nasal spray, USE 1-2 SPRAYS IN EACH NOSTRIL ONCE DAILY  Glucosamine-Chondroitin (GLUCOSAMINE CHONDR COMPLEX PO),   hydrochlorothiazide (HYDRODIURIL) 12.5 MG tablet, Take 1 tablet (12.5 mg) by mouth daily  insulin aspart (NOVOLOG FLEXPEN) 100 UNIT/ML pen, Use 20-35 units three times daily with meals for up to 120 units daily  insulin glargine (BASAGLAR KWIKPEN) 100 UNIT/ML pen, Inject 50 Units Subcutaneous daily  insulin pen needle (BD LISA U/F) 32G X 4 MM miscellaneous, Use to inject 4-5 times daily.  latanoprost (XALATAN) 0.005 % ophthalmic solution, Place 1 drop into both eyes daily  latanoprost (XALATAN) 0.005 % ophthalmic solution, Place 1 drop into both eyes daily  losartan (COZAAR) 100 MG tablet, Take 1 tablet (100 mg) by mouth daily  magnesium 250 MG tablet, Take 1 tablet by mouth daily  metFORMIN (GLUCOPHAGE XR) 500 MG 24 hr tablet, TAKE  "TWO TABLETS BY MOUTH TWICE A DAY  mometasone-formoterol (DULERA) 100-5 MCG/ACT inhaler, Inhale 2 puffs into the lungs 2 times daily  montelukast (SINGULAIR) 10 MG tablet, Take 1 tablet (10 mg) by mouth At Bedtime  MULTIVITAMIN OR, Take 1 tablet by mouth daily.  NEEDLES, ANY SIZE, Pen needles for Novolog insulin pen. Use to inject 5-6  times daily.  NOVOLOG FLEXPEN 100 UNIT/ML soln, INJECT 35 units with breakfast 25 units with lunch and dinner MAX DAILY DOSE 90 UNITS  pantoprazole (PROTONIX) 40 MG EC tablet, Take 1 tablet (40 mg) by mouth daily  potassium chloride ER (KLOR-CON M) 10 MEQ CR tablet, Take 1 tablet (10 mEq) by mouth daily  rosuvastatin (CRESTOR) 10 MG tablet, Take 1 tablet (10 mg) by mouth daily  Semaglutide, 1 MG/DOSE, (OZEMPIC, 1 MG/DOSE,) 4 MG/3ML pen, Inject 1 mg Subcutaneous every 7 days  VITAMIN D 2000 UNIT OR TABS, Take 1 tablet by mouth daily.    fentaNYL (SUBLIMAZE) injection 25-50 mcg  flumazenil (ROMAZICON) injection 0.2 mg  lidocaine 1% with EPINEPHrine 1:100,000 injection 3 mL  lidocaine 1% with EPINEPHrine 1:100,000 injection 3 mL  midazolam (VERSED) injection 0.5-1 mg  naloxone (NARCAN) injection 0.1-0.4 mg      Allergies   Allergen Reactions    Latex Swelling    Mold     Tetanus Antitoxin Swelling    Tetracycline Swelling       ROS:  10 Point review of systems negative other noted above in HPI    OBJECTIVE:     BP (!) 153/82 (BP Location: Right arm, Patient Position: Sitting, Cuff Size: Adult Regular)   Pulse 73   Ht 1.6 m (5' 3\")   Wt 85.7 kg (189 lb)   LMP 03/19/2009 (Exact Date)   SpO2 92%   BMI 33.48 kg/m    Body mass index is 33.48 kg/m .      Gen: Alert, oriented, appropriately interactive, NAD  Resp: no audible wheeze, cough, or visible cyanosis.  No visible retractions or increased work of breathing.  Able to speak fully in complete sentences.  Abdomen: soft, non tender, non distended  External genitalia: no lesions however thinning and loss of vulvar atrophy present. Small " "excoriations present b/l labia majora   Vagina without masses, atrophic  Cervix: no masses or lesions or discharge   Psych: mentation appears normal, affect normal/bright, judgement and insight intact, normal speech and appearance well-groomed      In-Clinic Test Results:  No results found for this or any previous visit (from the past 24 hour(s)).    ASSESSMENT/PLAN:                                                      Maliha Pedro is a 63 year old  who presents today for vulvar irritation, referred from Elva Ortiz       ICD-10-CM    1. Lichen sclerosus et atrophicus of the vulva  N90.4 clobetasol (TEMOVATE) 0.05 % external ointment      2. Vulvar itching  L29.2 Ob/Gyn  Referral     Herpes Simplex Virus 1&2 by PCR     CANCELED: Herpes Simplex Virus 1&2 by PCR          Due to vulvar excoriations, HSV swab performed however suspect this is due to itching rather than active HSV.    Discussed vulvar and vaginal hygiene practices, and list of practices given to patient. Advised to avoid Over the counter \"feminine\" soaps/sprays. Instead, recommend Cetaphil cleanser or other gentle ph-neutral cleanser. No need to use any products inside of the vagina. Recommend keeping the area dry whenever possible.    Exam concerning for lichen sclerosis and high dose steroids recommended. If no improvement in symptoms in 4-8 weeks, recommend vulvar biopsies. Patient [facility] this weekend until next summer and has agreed to follow-up with a gynecologist out there. She declined biopsies in the office today.    Thank you for allowing me to participate in the care of your patient.       30 minutes spent on the date of the encounter doing chart review, history and exam, documentation and further activities as noted above       Zulay Garcia, [facility]   "

## 2025-06-15 ENCOUNTER — HEALTH MAINTENANCE LETTER (OUTPATIENT)
Age: 65
End: 2025-06-15

## 2025-07-17 DIAGNOSIS — H35.373 EPIRETINAL MEMBRANE (ERM) OF BOTH EYES: ICD-10-CM

## 2025-07-17 DIAGNOSIS — H40.003 BORDERLINE GLAUCOMA, BILATERAL: Primary | ICD-10-CM

## 2025-07-21 ENCOUNTER — LAB (OUTPATIENT)
Dept: LAB | Facility: CLINIC | Age: 65
End: 2025-07-21
Payer: COMMERCIAL

## 2025-07-21 DIAGNOSIS — E11.21 TYPE 2 DIABETES MELLITUS WITH DIABETIC NEPHROPATHY, WITH LONG-TERM CURRENT USE OF INSULIN (H): ICD-10-CM

## 2025-07-21 DIAGNOSIS — Z79.4 TYPE 2 DIABETES MELLITUS WITH DIABETIC NEPHROPATHY, WITH LONG-TERM CURRENT USE OF INSULIN (H): ICD-10-CM

## 2025-07-21 LAB
BUN SERPL-MCNC: 13.3 MG/DL (ref 8–23)
CREAT SERPL-MCNC: 0.68 MG/DL (ref 0.51–0.95)
EGFRCR SERPLBLD CKD-EPI 2021: >90 ML/MIN/1.73M2
POTASSIUM SERPL-SCNC: 4.2 MMOL/L (ref 3.4–5.3)
TSH SERPL DL<=0.005 MIU/L-ACNC: 1.51 UIU/ML (ref 0.3–4.2)

## 2025-07-21 PROCEDURE — 82565 ASSAY OF CREATININE: CPT

## 2025-07-21 PROCEDURE — 84132 ASSAY OF SERUM POTASSIUM: CPT

## 2025-07-21 PROCEDURE — 84520 ASSAY OF UREA NITROGEN: CPT

## 2025-07-21 PROCEDURE — 36415 COLL VENOUS BLD VENIPUNCTURE: CPT

## 2025-07-21 PROCEDURE — 84443 ASSAY THYROID STIM HORMONE: CPT

## 2025-07-22 ENCOUNTER — LAB (OUTPATIENT)
Dept: LAB | Facility: CLINIC | Age: 65
End: 2025-07-22
Payer: COMMERCIAL

## 2025-07-22 DIAGNOSIS — E78.5 HYPERLIPIDEMIA LDL GOAL <100: ICD-10-CM

## 2025-07-22 DIAGNOSIS — E11.21 TYPE 2 DIABETES MELLITUS WITH DIABETIC NEPHROPATHY, WITH LONG-TERM CURRENT USE OF INSULIN (H): ICD-10-CM

## 2025-07-22 DIAGNOSIS — Z79.4 TYPE 2 DIABETES MELLITUS WITH DIABETIC NEPHROPATHY, WITH LONG-TERM CURRENT USE OF INSULIN (H): ICD-10-CM

## 2025-07-22 LAB
CHOLEST SERPL-MCNC: 156 MG/DL
CREAT UR-MCNC: 138 MG/DL
FASTING STATUS PATIENT QL REPORTED: YES
FASTING STATUS PATIENT QL REPORTED: YES
GLUCOSE SERPL-MCNC: 215 MG/DL (ref 70–99)
HDLC SERPL-MCNC: 40 MG/DL
LDLC SERPL CALC-MCNC: 70 MG/DL
MICROALBUMIN UR-MCNC: 23 MG/L
MICROALBUMIN/CREAT UR: 16.67 MG/G CR (ref 0–25)
NONHDLC SERPL-MCNC: 116 MG/DL
TRIGL SERPL-MCNC: 231 MG/DL

## 2025-07-22 PROCEDURE — 80076 HEPATIC FUNCTION PANEL: CPT

## 2025-07-22 PROCEDURE — 82043 UR ALBUMIN QUANTITATIVE: CPT

## 2025-07-22 PROCEDURE — 80061 LIPID PANEL: CPT

## 2025-07-22 PROCEDURE — 82570 ASSAY OF URINE CREATININE: CPT

## 2025-07-22 PROCEDURE — 36415 COLL VENOUS BLD VENIPUNCTURE: CPT

## 2025-07-22 PROCEDURE — 82947 ASSAY GLUCOSE BLOOD QUANT: CPT

## 2025-07-23 ENCOUNTER — OFFICE VISIT (OUTPATIENT)
Dept: OPHTHALMOLOGY | Facility: CLINIC | Age: 65
End: 2025-07-23
Attending: OPHTHALMOLOGY
Payer: COMMERCIAL

## 2025-07-23 DIAGNOSIS — H35.373 EPIRETINAL MEMBRANE (ERM) OF BOTH EYES: ICD-10-CM

## 2025-07-23 DIAGNOSIS — H40.003 BORDERLINE GLAUCOMA, BILATERAL: ICD-10-CM

## 2025-07-23 DIAGNOSIS — E11.3299 NPDR (NONPROLIFERATIVE DIABETIC RETINOPATHY) (H): Primary | ICD-10-CM

## 2025-07-23 PROCEDURE — 92235 FLUORESCEIN ANGRPH MLTIFRAME: CPT | Performed by: OPHTHALMOLOGY

## 2025-07-23 PROCEDURE — 99207 OCT OPTIC NERVE RNFL SPECTRALIS OU (BOTH EYES): CPT | Mod: 26 | Performed by: OPHTHALMOLOGY

## 2025-07-23 PROCEDURE — 92250 FUNDUS PHOTOGRAPHY W/I&R: CPT | Performed by: OPHTHALMOLOGY

## 2025-07-23 PROCEDURE — 2022F DILAT RTA XM EVC RTNOPTHY: CPT | Performed by: OPHTHALMOLOGY

## 2025-07-23 PROCEDURE — G0463 HOSPITAL OUTPT CLINIC VISIT: HCPCS | Performed by: OPHTHALMOLOGY

## 2025-07-23 PROCEDURE — 92133 CPTRZD OPH DX IMG PST SGM ON: CPT | Performed by: OPHTHALMOLOGY

## 2025-07-23 PROCEDURE — 99207 FUNDUS PHOTOS OU (BOTH EYES): CPT | Mod: 26 | Performed by: OPHTHALMOLOGY

## 2025-07-23 PROCEDURE — 99213 OFFICE O/P EST LOW 20 MIN: CPT | Performed by: OPHTHALMOLOGY

## 2025-07-23 PROCEDURE — 92134 CPTRZ OPH DX IMG PST SGM RTA: CPT | Performed by: OPHTHALMOLOGY

## 2025-07-23 ASSESSMENT — REFRACTION_WEARINGRX
OS_SPHERE: -3.50
OS_SPHERE: -3.50
OS_ADD: +2.50
OD_SPHERE: -1.75
OD_ADD: +2.50
OD_ADD: +2.50
OD_CYLINDER: SPHERE
OS_CYLINDER: +0.25
OS_AXIS: 075
OS_CYLINDER: +0.25
OS_AXIS: 075
OD_CYLINDER: SPHERE
OS_ADD: +2.50
OD_SPHERE: -1.75

## 2025-07-23 ASSESSMENT — TONOMETRY
OS_IOP_MMHG: 12
OD_IOP_MMHG: 13
IOP_METHOD: TONOPEN

## 2025-07-23 ASSESSMENT — CUP TO DISC RATIO
OS_RATIO: 0.8
OD_RATIO: 0.9

## 2025-07-23 ASSESSMENT — SLIT LAMP EXAM - LIDS
COMMENTS: MEIBOMIAN GLAND DYSFUNCTION
COMMENTS: MEIBOMIAN GLAND DYSFUNCTION

## 2025-07-23 ASSESSMENT — VISUAL ACUITY
OD_CC: 20/25-3
CORRECTION_TYPE: GLASSES
OS_CC: 20/25
METHOD: SNELLEN - LINEAR

## 2025-07-23 ASSESSMENT — EXTERNAL EXAM - RIGHT EYE: OD_EXAM: DERMATOCHALASIS

## 2025-07-23 ASSESSMENT — EXTERNAL EXAM - LEFT EYE: OS_EXAM: DERMATOCHALASIS

## 2025-07-23 NOTE — PROGRESS NOTES
CC: s  Interim: VA right eye is a little blurry; chronic floater; no new flashes and floaters; no eye pain.   Reports that the right eye in the morning is closed    PMH: Diabetes mellitus II    Imaging    OCT 07/23/25   both eyes with good foveal contour; trace Epiretinal membrane; no fluid - stable  Right eye with trace sup Epiretinal membrane - stable     ONFL 12/02/24   Right eye: thinning TS TI  stable. G 69 stable  Left eye: Thinning TS stable. G 68 stable  stable compared to prior two ; mild progression compared to 5505-7698    24-2 OVF 12/11/24   Right eye: one spot of decreased sensitivity superiorly; stable compared to previous. Except for increased MD -1.8   Left eye: Superior spots of decreased sensitivity - stable. MD -1.0    Intraocular lens calculations reliable  Topography- regular astigmatism  Optos Autofluorescence: normal except for small hypoautofluorescent spots in areas of heme    Fluorescein angiography 07/23/25 : normal AV and choroidal filling ; multiple microaneurysms and peripheral vascular leakage; some peripheral capillary non perfusion; no neovascularization elsewhere; no neovascularization of the disc     Assessment & Plan:    # status post Cataract extraction intraocular lens right eye 07/09/24   With posterior capsular opacity (PCO) 12/02/24   Status post yag laser 12/02/24   Clear axis  No complications   Retina attached    # pseudophakia left eye   With posterior capsular opacity (PCO)  Consider yag in the future    #  open angle glaucoma both eyes   Possible low tension glaucoma   - large cup to disc ratio R>L  - Intraocular pressure 15/14    - pachmetry 583/613  -12/02/24  retinal nerve fiber layer stable   Right eye: thinning  TS/TI; stable  Left eye: Thinning TS,stable  stable compared to prior exams; some progression compared to 1362-2051   with stable VF and retinal nerve fiber layer  - last eye examination given mild progression over the course of several yrs and large  cups; would recommend to restart latanoprost  12/02/24 better compliance with latanoprost  12/11/24 intraocular pressure 13/14  07/23/25 patient states she is compliant with the eye drops. Intraocular pressure 13/12    # Type 2 diabetes mellitus with mod Nonproliferative diabetic retinopathy both eyes   Fluorescein angiography: normal AV and choroidal filling ; multiple microaneurysms and peripheral vascular leakage; no neovascularization elsewhere; no neovascularization of the disc   Stable  observe  Blood pressure (<120/80) and blood glucose (HbA1c <7.0) control discussed with patient. Patient advised that failure to adequately control each may lead to vision loss. The patient expressed understanding.    # Pseudophakia, left eye- Dr Rosario  -stable  -observe    # Posterior vitreous detachment of right eye  -stable  -observe    # Old inferior temporal small tear surrounded by pigment, no subretinal fluid right eye   Peripheral Retinal pigment epithelium/small lattice both eyes   Retina attached, no other tears  Retina detachment precautions were discussed with the patient (presence or increased in flashes, floaters or a curtain in the visual field) and was asked to return if any of the those occur    # Dry eye syndrome  artificial tears  As needed and warm compresses      # Mild Epiretinal membrane right eye  Stable ; Not visually significant     PLAN: follow up in  1 yr with optos fluorescein angiography and Optical Coherence Tomography     Recommend to follow up with glaucoma next available (before aug 25- patient traveling) pls check prescription at that miesha     Retina detachment precautions were discussed with the patient (presence or increased in flashes, floaters or a curtain in the visual field) and was asked to return if any of the those occur     Continue latanoprost- at bedtime both eyes   ~~~~~~~~~~~~~~~~~~~~~~~~~~~~~~~~~~   Complete documentation of historical and exam elements from today's encounter  can be found in the full encounter summary report (not reduplicated in this progress note).  I personally obtained the chief complaint(s) and history of present illness.  I confirmed and edited as necessary the review of systems, past medical/surgical history, family history, social history, and examination findings as documented by others; and I examined the patient myself.  I personally reviewed the relevant tests, images, and reports as documented above.  I formulated and edited as necessary the assessment and plan and discussed the findings and management plan with the patient and family    Lesly Tracy MD   of Ophthalmology.  Retina Service   Department of Ophthalmology and Visual Neurosciences   [facility]  Phone: [phone]   Fax: [phone]

## 2025-07-24 ENCOUNTER — RESULTS FOLLOW-UP (OUTPATIENT)
Dept: FAMILY MEDICINE | Facility: CLINIC | Age: 65
End: 2025-07-24

## 2025-07-24 ENCOUNTER — MYC MEDICAL ADVICE (OUTPATIENT)
Dept: FAMILY MEDICINE | Facility: CLINIC | Age: 65
End: 2025-07-24

## 2025-07-24 DIAGNOSIS — K76.0 FATTY LIVER DISEASE, NONALCOHOLIC: Primary | ICD-10-CM

## 2025-07-24 DIAGNOSIS — I10 ESSENTIAL HYPERTENSION: ICD-10-CM

## 2025-07-24 PROBLEM — L90.0 LICHEN SCLEROSUS ET ATROPHICUS: Status: ACTIVE | Noted: 2025-07-24

## 2025-07-24 LAB
ALBUMIN SERPL BCG-MCNC: 4.2 G/DL (ref 3.5–5.2)
ALP SERPL-CCNC: 58 U/L (ref 40–150)
ALT SERPL W P-5'-P-CCNC: 47 U/L (ref 0–50)
AST SERPL W P-5'-P-CCNC: 46 U/L (ref 0–45)
BILIRUB SERPL-MCNC: 0.4 MG/DL
BILIRUBIN DIRECT (ROCHE PRO & PURE): 0.14 MG/DL (ref 0–0.45)
PROT SERPL-MCNC: 7.2 G/DL (ref 6.4–8.3)

## 2025-07-24 NOTE — RESULT ENCOUNTER NOTE
EKG looked good/normal today with no changes compared to your last one.   Continue with the plan for the stress test.   Kind regards,  Elva Ortiz MD

## 2025-07-24 NOTE — TELEPHONE ENCOUNTER
This concern is being addressed in the following encounter Telephone with Elva Ortiz MD (07/24/2025) Megan Tineo RN, BSN, PHN

## 2025-07-25 PROBLEM — Z79.4 TYPE 2 DIABETES MELLITUS WITH BOTH EYES AFFECTED BY MODERATE NONPROLIFERATIVE RETINOPATHY AND MACULAR EDEMA, WITH LONG-TERM CURRENT USE OF INSULIN (H): Status: ACTIVE | Noted: 2025-07-25

## 2025-07-25 PROBLEM — E11.3299 TYPE 2 DIABETES MELLITUS WITH MILD NONPROLIFERATIVE RETINOPATHY WITHOUT MACULAR EDEMA, WITH LONG-TERM CURRENT USE OF INSULIN, UNSPECIFIED LATERALITY (H): Status: RESOLVED | Noted: 2017-02-08 | Resolved: 2025-07-25

## 2025-07-25 PROBLEM — Z79.4 TYPE 2 DIABETES MELLITUS WITH MILD NONPROLIFERATIVE RETINOPATHY WITHOUT MACULAR EDEMA, WITH LONG-TERM CURRENT USE OF INSULIN, UNSPECIFIED LATERALITY (H): Status: RESOLVED | Noted: 2017-02-08 | Resolved: 2025-07-25

## 2025-07-25 PROBLEM — H25.011 CORTICAL SENILE CATARACT OF RIGHT EYE: Status: RESOLVED | Noted: 2024-06-17 | Resolved: 2025-07-25

## 2025-07-25 PROBLEM — E11.3313 TYPE 2 DIABETES MELLITUS WITH BOTH EYES AFFECTED BY MODERATE NONPROLIFERATIVE RETINOPATHY AND MACULAR EDEMA, WITH LONG-TERM CURRENT USE OF INSULIN (H): Status: ACTIVE | Noted: 2025-07-25

## 2025-07-25 NOTE — PROGRESS NOTES
Endocrinology [facility]        Follow up for T2DM      ASSESSMENT/Plan:   64 y old woman with T2DM with comorbidity of obesity    # Type 2 DM  A1c  9.8% with poor blood glucose control, per CGM, blood sugar has been high majority of the time with almost no hypoglycemia.  - Cont Dexcom G7  - Hypertension controlled on losartan and hydrochlorothiazide, cont  - Dyslipidemia on 10 mg of Crestor doing well  - Continue in Metformin 1000 mg bid  - Increase Lantus 50 -> 60 units once daily and Novolog 20-40 units 4 times daily with meals and at bedtime  - Switch from Ozempic 2 mg weekly -> Mounjaro 5 mg weekly      Patient to contact the clinic if blood glucose (sugar) is under 70 two times in one week or above 200 for 3 consecutive days.        The Longitudinal plan of care for diabetes condition was addressed during this visit. Due to added complexity of care, we will continue to support Maliha, and the subsequent management of this condition(s) and with the ongoing continuity of care of this condition.    Destiny Dacosta, MS-4      I, Stacia Carbajal, saw this patient with the student. I discussed the patient with the student, reviewed the chart including notes, imaging and labs. I confirmed key parts of history and exam. I agree with the assessment and plan of care as documented in the student's note.     Key Findings: Increase in A1c related to dietary discretion and running out of Ozempic. Due to prior struggle with Ozempic including hyperglycemia switch to Mounjaro      Stacia Carbajal MD  Endocrinology and Diabetes  Telephone contact:  [facility] [phone]  [facility] [phone]         Interval history  6 month follow up for T2DM  Pt has been having higher blood sugars since May (was on a cruise, visiting family - found it difficult to maintain low carb, high vegetable diet). Additionally, ran out of ozempic end of June.  Gained about 10 lbs in 3 months.     Pt continues to spend most of her time in the [facility], [place] she has her own house [place] her [place] land with lots of family members around her, all siblings with diabetes.    Patient has found it difficult to maintain physical activity due to the heat of the summer and symptoms from asthma (could not refill dulera recently). Has appointment with pulmonology scheduled.       Current diabetic medications:  Metformin 1000 bid, patient recently increased glargine insulin to 60 units daily and Novolog to 30 units four times daily, Has not been on Ozempic 2 mg weekly since end of June     Intolerant to Jardiance with yeast infections    SMBG:  Dexcom sensor, Average blood sugar 264, time in range 17%, low 1%, very low 0  variable Bgs 33% GMI 9.6%   Pattern better during the morning, high throughout the entire day     Meal plan: 3 meals a day plus snacks,   Exercise: manage asthma symptoms so that pt can return to walking, going to [employer]     Complications  1. Retinopathy: yearly visit, no DR, cataract surgery this summer  2. Nephropathy: None  3. Neuropathy: No numbness no tingling no history of ulcerations  4. Hypoglycemia: Yes, however resolved with insulin adjustment  5. Macrovascular: No chest pain, no shortness of breath     The longitudinal plan of care for the diagnosis(es)/condition(s) as documented were addressed during this visit. Due to the added complexity in care, I will continue to support Fco in the subsequent management and with ongoing continuity of care.XCOM G7 is helpful    HTN: Losartan 100 mg daily, hydrochlorothiazide 12.5 daily  Dyslipidemia Crestor 10 mg     Previously used diabetes medications:   Bydureon in 2016, Ernst  Jardiance: yeast infection         PMH:      Patient Active Problem List   Diagnosis    ? 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    Displacement of lumbar intervertebral disc without myelopathy     Nonallopathic lesion of lumbar region    Nonallopathic lesion of thoracic region    Nonallopathic lesion of sacral region    Bartholin gland cyst    Cataract    Irritable bowel syndrome    Esophageal reflux    Seasonal allergic rhinitis    Latex sensitivity    Adenomatous polyp    HYPERLIPIDEMIA LDL GOAL <100    Ovarian cyst    Breast pain    Essential hypertension    Advanced directives, counseling/discussion    Obesity    Hypomagnesemia    Type 2 diabetes mellitus with mild nonproliferative retinopathy without macular edema, with long-term current use of insulin, unspecified laterality (H)             Medications:   Current Outpatient Prescriptions          Current Outpatient Medications   Medication Sig Dispense Refill    albuterol (PROAIR HFA/PROVENTIL HFA/VENTOLIN HFA) 108 (90 Base) MCG/ACT inhaler Inhale 2 puffs into the lungs every 4 hours as needed for shortness of breath / dyspnea 18 g 1    aspirin 81 MG EC tablet Take 81 mg by mouth daily Please resume in 5 days.  (12/22/2018)        CALCIUM + D OR Take 1 tablet by mouth 2 times daily.        Continuous Blood Gluc Sensor (DEXCOM G6 SENSOR) MISC 1 each every 10 days 18 each 1    Continuous Blood Gluc Transmit (DEXCOM G6 TRANSMITTER) MISC 1 each every 3 months Change every 3 months. 2 each 1    fexofenadine (ALLEGRA) 180 MG tablet Take 1 tablet by mouth daily. 1 TABLET DAILY Failed claritin for symptom cotrol. Zyrtec increases heart rate. 90 tablet 3    fish oil-omega-3 fatty acids 1000 MG capsule Take 1 capsule by mouth daily.        fluticasone (FLONASE) 50 MCG/ACT nasal spray USE 1-2 SPRAYS IN EACH NOSTRIL ONCE DAILY 48 g 3    Glucosamine-Chondroitin (GLUCOSAMINE CHONDR COMPLEX PO)          hydrochlorothiazide (HYDRODIURIL) 12.5 MG tablet Take 1 tablet (12.5 mg) by mouth daily 90 tablet 3    insulin aspart (NOVOLOG FLEXPEN) 100 UNIT/ML pen Use 20-35 units three times daily with meals for up to 120 units daily 120 mL 3    insulin glargine (BASAGLAR  KWIKPEN) 100 UNIT/ML pen INJECT 70 UNITS UNDER THE SKIN DAILY 90 mL 2    insulin pen needle (BD LISA U/F) 32G X 4 MM miscellaneous Use to inject 4-5 times daily. 600 each 1    latanoprost (XALATAN) 0.005 % ophthalmic solution Place 1 drop into both eyes daily 7.5 mL 11    losartan (COZAAR) 100 MG tablet Take 1 tablet (100 mg) by mouth daily 90 tablet 3    magnesium 250 MG tablet Take 1 tablet by mouth daily 90 tablet 3    metFORMIN (GLUCOPHAGE XR) 500 MG 24 hr tablet TAKE 2 TABLETS TWICE A  tablet 3    montelukast (SINGULAIR) 10 MG tablet Take 1 tablet (10 mg) by mouth At Bedtime 90 tablet 3    MULTIVITAMIN OR Take 1 tablet by mouth daily.        NEEDLES, ANY SIZE Pen needles for Novolog insulin pen. Use to inject 5-6  times daily. 4 Box 3    NOVOLOG FLEXPEN 100 UNIT/ML soln Sig 20-25 units two-three times daily. Max daily dose 75 units 90 mL 1    pantoprazole (PROTONIX) 40 MG EC tablet Take 1 tablet (40 mg) by mouth daily 90 tablet 3    potassium chloride ER (KLOR-CON M) 10 MEQ CR tablet Take 1 tablet (10 mEq) by mouth daily 90 tablet 3    rosuvastatin (CRESTOR) 10 MG tablet Take 1 tablet (10 mg) by mouth daily 90 tablet 3    semaglutide (OZEMPIC) 2 MG/3ML pen Inject 0.25 mg Subcutaneous every 7 days 3 mL 0    [START ON 7/10/2023] semaglutide (OZEMPIC) 2 MG/3ML pen Inject 0.5 mg Subcutaneous every 7 days 3 mL 0    [START ON 8/9/2023] Semaglutide, 1 MG/DOSE, (OZEMPIC) 4 MG/3ML pen Inject 1 mg Subcutaneous every 7 days 3 mL 0    VITAMIN D 2000 UNIT OR TABS Take 1 tablet by mouth daily.        amoxicillin-clavulanate (AUGMENTIN) 875-125 MG tablet Take 1 tablet by mouth 2 times daily for 7 days 14 tablet 0         Physical Exam  /79 (BP Location: Left arm, Patient Position: Sitting, Cuff Size: Adult Large)   Pulse 78   Wt 93.3 kg (205 lb 11.2 oz)   LMP 03/19/2009 (Exact Date)   SpO2 97%   BMI 36.44 kg/m      LMP 03/19/2009 (Exact Date)       Wt Readings from Last 4 Encounters:   07/24/25 90.7 kg (200  lb)   12/03/24 87.1 kg (192 lb)   12/02/24 87.1 kg (192 lb)   12/02/24 87.1 kg (192 lb)     Wt Readings from Last 4 Encounters:   07/24/25 90.7 kg (200 lb)   12/03/24 87.1 kg (192 lb)   12/02/24 87.1 kg (192 lb)   12/02/24 87.1 kg (192 lb)     General: Well appearing woman in no distress, abdominal obesity.  Psych: good eye contact, no pressured speech  Feet: No ulcerations, no edema    A1c 7/28/25 9.8%  A1c 11/30/24 7.8%   A1c 6/10/2024 7.2%  Lab Results   Component Value Date     11/29/2024    CHLORIDE 102 11/29/2024    CO2 27 11/29/2024     (H) 07/22/2025    CR 0.68 07/21/2025    CR 0.84 11/29/2024    CR 0.80 06/06/2024    CR 0.71 11/13/2023    CR 0.76 06/07/2023    CR 0.71 06/07/2023    SUSHANT 9.7 11/29/2024    MAG 1.9 11/29/2024    ALBUMIN 4.2 07/22/2025    ALKPHOS 58 07/22/2025    LDL 70 07/22/2025    HDL 40 (L) 07/22/2025    TRIG 231 (H) 07/22/2025    TSH 1.51 07/21/2025    TSH 1.95 06/06/2024    TSH 2.31 11/13/2023     Lab Results   Component Value Date    MICROL 23.0 07/22/2025    MICROL <12.0 06/06/2024    MICROL 12.4 11/13/2023    MICROL 29.7 06/07/2023    MICROL 11 11/28/2022     Lab Results   Component Value Date    A1C 7.8 (H) 11/29/2024    A1C 7.2 (H) 06/10/2024    A1C 9.3 (A) 06/27/2022    A1C 8.7 (H) 11/16/2021    A1C 8.3 (H) 05/05/2021       Lab Results   Component Value Date    HGB 14.1 11/29/2024                Lab Results   Component Value Date     HGB 15.1 06/08/2023

## 2025-07-25 NOTE — RESULT ENCOUNTER NOTE
Fco,  Your liver panel shows the AST is again mildly elevated.  I suspect this is from the fatty liver and your recent weight gain.  Hopefully, if you can get back to using the Ozempic and slowly get the weight down again we will see some improvement in the liver function.  Lets plan to recheck liver tests in 6 months with your next set of labs.  Kind regards,  Elva Ortiz MD

## 2025-07-28 ENCOUNTER — OFFICE VISIT (OUTPATIENT)
Dept: ENDOCRINOLOGY | Facility: CLINIC | Age: 65
End: 2025-07-28
Payer: COMMERCIAL

## 2025-07-28 ENCOUNTER — PATIENT OUTREACH (OUTPATIENT)
Dept: CARE COORDINATION | Facility: CLINIC | Age: 65
End: 2025-07-28

## 2025-07-28 VITALS
BODY MASS INDEX: 36.44 KG/M2 | WEIGHT: 205.7 LBS | HEART RATE: 78 BPM | SYSTOLIC BLOOD PRESSURE: 130 MMHG | OXYGEN SATURATION: 97 % | DIASTOLIC BLOOD PRESSURE: 79 MMHG

## 2025-07-28 DIAGNOSIS — Z79.4 TYPE 2 DIABETES MELLITUS WITH MILD NONPROLIFERATIVE RETINOPATHY WITHOUT MACULAR EDEMA, WITH LONG-TERM CURRENT USE OF INSULIN, UNSPECIFIED LATERALITY (H): ICD-10-CM

## 2025-07-28 DIAGNOSIS — E11.21 TYPE 2 DIABETES MELLITUS WITH DIABETIC NEPHROPATHY, WITH LONG-TERM CURRENT USE OF INSULIN (H): ICD-10-CM

## 2025-07-28 DIAGNOSIS — Z79.4 TYPE 2 DIABETES MELLITUS WITH DIABETIC NEPHROPATHY, WITH LONG-TERM CURRENT USE OF INSULIN (H): ICD-10-CM

## 2025-07-28 DIAGNOSIS — I10 ESSENTIAL HYPERTENSION: ICD-10-CM

## 2025-07-28 DIAGNOSIS — E11.3299 TYPE 2 DIABETES MELLITUS WITH MILD NONPROLIFERATIVE RETINOPATHY WITHOUT MACULAR EDEMA, WITH LONG-TERM CURRENT USE OF INSULIN, UNSPECIFIED LATERALITY (H): ICD-10-CM

## 2025-07-28 LAB
EST. AVERAGE GLUCOSE BLD GHB EST-MCNC: 235 MG/DL
HBA1C MFR BLD: 9.8 %

## 2025-07-28 PROCEDURE — 95251 CONT GLUC MNTR ANALYSIS I&R: CPT | Performed by: INTERNAL MEDICINE

## 2025-07-28 PROCEDURE — 99214 OFFICE O/P EST MOD 30 MIN: CPT | Mod: 25 | Performed by: INTERNAL MEDICINE

## 2025-07-28 PROCEDURE — 3078F DIAST BP <80 MM HG: CPT | Performed by: INTERNAL MEDICINE

## 2025-07-28 PROCEDURE — 83036 HEMOGLOBIN GLYCOSYLATED A1C: CPT | Performed by: INTERNAL MEDICINE

## 2025-07-28 PROCEDURE — 3048F LDL-C <100 MG/DL: CPT | Performed by: INTERNAL MEDICINE

## 2025-07-28 PROCEDURE — 3075F SYST BP GE 130 - 139MM HG: CPT | Performed by: INTERNAL MEDICINE

## 2025-07-28 RX ORDER — INSULIN GLARGINE 100 [IU]/ML
60 INJECTION, SOLUTION SUBCUTANEOUS DAILY
Qty: 108 ML | Refills: 0 | Status: SHIPPED | OUTPATIENT
Start: 2025-07-28 | End: 2026-01-24

## 2025-07-28 RX ORDER — INSULIN ASPART 100 [IU]/ML
INJECTION, SOLUTION INTRAVENOUS; SUBCUTANEOUS
Qty: 90 ML | Refills: 2 | Status: SHIPPED | OUTPATIENT
Start: 2025-07-28 | End: 2025-07-28

## 2025-07-28 RX ORDER — PEN NEEDLE, DIABETIC 32GX 5/32"
NEEDLE, DISPOSABLE MISCELLANEOUS
Qty: 1000 EACH | Refills: 1 | Status: SHIPPED | OUTPATIENT
Start: 2025-07-28

## 2025-07-28 RX ORDER — PEN NEEDLE, DIABETIC 32GX 5/32"
NEEDLE, DISPOSABLE MISCELLANEOUS
Qty: 600 EACH | Refills: 1 | Status: SHIPPED | OUTPATIENT
Start: 2025-07-28 | End: 2025-07-28

## 2025-07-28 RX ORDER — METFORMIN HYDROCHLORIDE 500 MG/1
1000 TABLET, EXTENDED RELEASE ORAL 2 TIMES DAILY
Qty: 720 TABLET | Refills: 0 | Status: SHIPPED | OUTPATIENT
Start: 2025-07-28 | End: 2026-01-24

## 2025-07-28 RX ORDER — METFORMIN HYDROCHLORIDE 500 MG/1
1000 TABLET, EXTENDED RELEASE ORAL 2 TIMES DAILY
Qty: 360 TABLET | Refills: 2 | Status: SHIPPED | OUTPATIENT
Start: 2025-07-28 | End: 2025-07-28

## 2025-07-28 RX ORDER — HYDROCHLOROTHIAZIDE 12.5 MG/1
12.5 TABLET ORAL DAILY
Qty: 90 TABLET | Refills: 1 | Status: CANCELLED | OUTPATIENT
Start: 2025-07-28

## 2025-07-28 RX ORDER — INSULIN ASPART 100 [IU]/ML
INJECTION, SOLUTION INTRAVENOUS; SUBCUTANEOUS
Qty: 260 ML | Refills: 3 | Status: SHIPPED | OUTPATIENT
Start: 2025-07-28 | End: 2026-01-28

## 2025-07-28 RX ORDER — INSULIN GLARGINE 100 [IU]/ML
60 INJECTION, SOLUTION SUBCUTANEOUS DAILY
Qty: 60 ML | Refills: 3 | Status: SHIPPED | OUTPATIENT
Start: 2025-07-28 | End: 2025-07-28

## 2025-07-28 RX ORDER — HYDROCHLOROTHIAZIDE 12.5 MG/1
12.5 TABLET ORAL DAILY
Qty: 180 TABLET | Refills: 1 | Status: SHIPPED | OUTPATIENT
Start: 2025-07-28

## 2025-07-28 NOTE — PATIENT INSTRUCTIONS
Self-Care Plan    RECOMMENDATIONS:   Fco, It was great to see you today.  The plan from today:  --Thank you for the treats!  --continue dulera twice daily  --consider using albuterol inhaler before you exercise  --good luck with your stress!  Please take care of yourself!    YOUR GOAL:  Stay safe and well.      Cystic Fibrosis :    Savannah Gusman   [phone]  [place] [facility] Nurse line:  Brent Restrepo  [phone]     [facility] Fax Number:      [phone]         Cystic Fibrosis Respiratory Therapists:   Marietta Brito                          [phone]          Alina Lamar   [phone]  Cystic Fibrosis Dietitians:              Louise Rawls              [phone]                            Pepper Rothman                        [phone]   Cystic Fibrosis Diabetes Nurse:    Larisa Alvarez               [phone]    Cystic Fibrosis Social Workers:     Katherin Cartwright              [phone]                     Bel Gutierrez               [phone]  Cystic Fibrosis Pharmacists:           Araseli Coker                              [phone]         Tsering Mathew      [phone]   Cystic Fibrosis Genetic Counselor:   Rhonda Monsalve    [phone]    [place] [facility] website:  [email or web address]    We have recently learned about an albuterol neb solution shortage due to a manufacturing delay. There is still a small supply coming in but not enough to meet the current demand. We do not yet have an estimate for when this will become widely available again.    We our asking our CF community to do the following:    Please take time to check your supply of albuterol neb solution at home. We recommend keeping at least a 2-week supply of albuterol nebs at home in case of illness.    2.  If you have an albuterol inhaler at home, you can use 4 puffs of the inhaler with a spacer in place of the nebulized albuterol at the start of your  "treatments. It is important to use a spacer for the best technique. If you do not have a spacer at home or have questions on technique, we will be happy to review and send one to your home address. Please also take a moment to check that your albuterol HFA inhaler is available and not .  inhalers may be less effective as the medication loses its potency or power. In some instances your team may suggest another alternative instead of an albuterol inhaler.    3.  Please take care in requesting refills. Albuterol neb solution is a life-saving medication for patients having severe asthma attacks and other emergency respiratory conditions. Let s work together to make sure that albuterol neb solution is available to those who need it urgently.    Reach out to your care team with questions and to confirm your planned alternative for albuterol nebs. MyChart will be the fastest way to connect. If possible, please reserve the nursing line for more urgent concerns as we are short on nursing staff.    Here are some reputable sites with more information:    https://www.[email or web address]/resilient-drug-supply/us-liquid-albuterol-tcrcpice-xygxenlp-oyashk-after-major-supplier-shuts-down    https://[email or web address].[employer]//health/albuterol-shortage    https://www.ashp.org/drug-shortages/current-shortages/drug-shortage-detail.aspx?jv=295&loginreturnUrl=SSOCheckOnly       MRN: [id number]   Clinic Date: 2025   Patient: Maliha Pedro     Annual Studies:   No results found for: \"IGG\"  No results found for: \"INS\"  There are no preventive care reminders to display for this patient.    Pulmonary Function Tests  FEV1: amount of air you can blow out in 1 second  FVC: total amount of air you can take in and blow out    Your Goals:             Latest Ref Rng & Units 2024     7:43 AM   PFT   FVC L 2.69    FEV1 L 2.16    FVC% % 97    FEV1% % 98           Airway Clearance: The Most Important Way to Keep Your Lungs " Healthy  Vest Settings:   Hill-Rom Frequencies: 8, 9, 10 Pressure 10 Then, Frequencies 18, 19, 20 Pressure 6     RespirTech: Quick Start with Pressure of     Do each frequency for 5 minutes; Deflate vest after each frequency & cough 3 times before beginning the next setting.    Vest and Neb Therapy should be done 1 times/day.    Good Nutrition Can Improve Lung Function and Overall Health    Take ALL of your vitamins with food    Take 1/2 of your enzymes before EVERY meal/snack and the other 1/2 mid-meal/snack    Wt Readings from Last 3 Encounters:   07/28/25 93.3 kg (205 lb 11.2 oz)   07/24/25 90.7 kg (200 lb)   12/03/24 87.1 kg (192 lb)       There is no height or weight on file to calculate BMI.         National CF Foundation Recommendations for BMI in CF Adults: Women: at least 22 Men: at least 23        Controlling Blood Sugars Helps Prevent Lung Infections & Improves Nutrition  Test blood sugar:    In the morning before eating (goal is )    2 hours after a meal (goal is less than 150)    When pre-meal glucose is greater than 150 add correction    At bedtime (if less than 100 eat a snack with 15 grams of carbohydrates  Last A1C Results:   Hemoglobin A1C   Date Value Ref Range Status   11/29/2024 7.8 (H) 0.0 - 5.6 % Final     Comment:     Normal <5.7%   Prediabetes 5.7-6.4%    Diabetes 6.5% or higher     Note: Adopted from ADA consensus guidelines.   06/27/2022 9.3 (A) 0.0 - 5.7 % Final     Afinion Hemoglobin A1c POCT   Date Value Ref Range Status   07/28/2025 9.8 (H) <=5.7 % Final     Comment:     Normal <5.7%   Prediabetes 5.7-6.4%    Diabetes 6.5% or higher     Note: Adopted from ADA consensus guidelines.         If diabetic, measure A1C every 6 months. Goal: Under 7%    Staying Healthy  Research:  If you are interested in learning about research opportunities or have questions, please contact the CF Research Team at [phone] or [email or web address].    CF Foundation:  Compass is a personalized  resource service to help you with the insurance, financial, legal and other issues you are facing.  It's free, confidential and available to anyone with CF.  Ask your  for more information or contact Compass directly at 394-FZFCKMM ([phone]) or compass@cff.org, or learn more at cff.org/compass.

## 2025-07-28 NOTE — TELEPHONE ENCOUNTER
Please enter/load the pharmacy so that when I go to sign the prescription if pharmacy is already selected.  Please route back to me once completed

## 2025-07-28 NOTE — TELEPHONE ENCOUNTER
Medication Question or Refill        What medication are you calling about (include dose and sig)?: hydroCHLOROthiazide 12.5 MG tablet     Preferred Pharmacy:  [facility] [place], [place] - [address], [address]  [address], [address]  [facility] [address]  Phone: [phone] Fax: [phone]        Controlled Substance Agreement on file:   CSA -- Patient Level:    CSA: None found at the patient level.       Who prescribed the medication?: PCP    Do you need a refill? Yes    When did you use the medication last? daily    Patient offered an appointment? No    Do you have any questions or concerns?  No

## 2025-07-28 NOTE — NURSING NOTE
Maliha Pedro's goals for this visit include:   Chief Complaint   Patient presents with    Diabetes     She requests these members of her care team be copied on today's visit information: No    PCP: Elva Ortiz    Referring Provider:  Referred Self, MD  No address on file    /79 (BP Location: Left arm, Patient Position: Sitting, Cuff Size: Adult Large)   Pulse 78   Wt 93.3 kg (205 lb 11.2 oz)   LMP 03/19/2009 (Exact Date)   SpO2 97%   BMI 36.44 kg/m      Do you need any medication refills at today's visit? Yes

## 2025-07-28 NOTE — LETTER
7/28/2025      Maliha Pedro  [address]  [place] [address]      Dear Colleague,    Thank you for referring your patient, Maliha Pedro, to the [facility]. Please see a copy of my visit note below.                            Endocrinology and Diabetes Clinic        Follow up for T2DM      ASSESSMENT/Plan:   64 y old woman with T2DM with comorbidity of obesity    # Type 2 DM  A1c  9.8% with poor blood glucose control, per CGM, blood sugar has been high majority of the time with almost no hypoglycemia.  - Cont Dexcom G7  - Hypertension controlled on losartan and hydrochlorothiazide, cont  - Dyslipidemia on 10 mg of Crestor doing well  - Continue in Metformin 1000 mg bid  - Increase Lantus 50 -> 60 units once daily and Novolog 20-40 units 4 times daily with meals and at bedtime  - Switch from Ozempic 2 mg weekly -> Mounjaro 5 mg weekly      Patient to contact the clinic if blood glucose (sugar) is under 70 two times in one week or above 200 for 3 consecutive days.        The Longitudinal plan of care for diabetes condition was addressed during this visit. Due to added complexity of care, we will continue to support Maliha, and the subsequent management of this condition(s) and with the ongoing continuity of care of this condition.    Destiny Dacosta, MS-4      I, Stacia Carbajal, saw this patient with the student. I discussed the patient with the student, reviewed the chart including notes, imaging and labs. I confirmed key parts of history and exam. I agree with the assessment and plan of care as documented in the student's note.     Key Findings: Increase in A1c related to dietary discretion and running out of Ozempic. Due to prior struggle with Ozempic including hyperglycemia switch to Mounjaro      Stacia Carbajal MD  Endocrinology and Diabetes  Telephone contact:  [facility] [phone]  [facility] [phone]          Interval history  6 month follow up for T2DM  Pt has been having higher blood sugars since May (was on a cruise, visiting family - found it difficult to maintain low carb, high vegetable diet). Additionally, ran out of ozempic end of June. Gained about 10 lbs in 3 months.     Pt continues to spend most of her time in the [place] [place], [place] has her own [place] her [place] land with lots of family members around her, all siblings with diabetes.    Patient has found it difficult to maintain physical activity due to the heat of the summer and symptoms from asthma (could not refill dulera recently). Has appointment with pulmonology scheduled.       Current diabetic medications:  Metformin 1000 bid, patient recently increased glargine insulin to 60 units daily and Novolog to 30 units four times daily, Has not been on Ozempic 2 mg weekly since end of June     Intolerant to Jardiance with yeast infections    SMBG:  Dexcom sensor, Average blood sugar 264, time in range 17%, low 1%, very low 0  variable Bgs 33% GMI 9.6%   Pattern better during the morning, high throughout the entire day     Meal plan: 3 meals a day plus snacks,   Exercise: manage asthma symptoms so that pt can return to walking, going to [employer]     Complications  1. Retinopathy: yearly visit, no DR, cataract surgery this summer  2. Nephropathy: None  3. Neuropathy: No numbness no tingling no history of ulcerations  4. Hypoglycemia: Yes, however resolved with insulin adjustment  5. Macrovascular: No chest pain, no shortness of breath     The longitudinal plan of care for the diagnosis(es)/condition(s) as documented were addressed during this visit. Due to the added complexity in care, I will continue to support Fco in the subsequent management and with ongoing continuity of care.XCOM G7 is helpful    HTN: Losartan 100 mg daily, hydrochlorothiazide 12.5 daily  Dyslipidemia Crestor 10 mg     Previously used diabetes medications:    Bydureon in 2016, Ernst Martinezance: yeast infection         PMH:      Patient Active Problem List   Diagnosis     ? 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     Displacement of lumbar intervertebral disc without myelopathy     Nonallopathic lesion of lumbar region     Nonallopathic lesion of thoracic region     Nonallopathic lesion of sacral region     Bartholin gland cyst     Cataract     Irritable bowel syndrome     Esophageal reflux     Seasonal allergic rhinitis     Latex sensitivity     Adenomatous polyp     HYPERLIPIDEMIA LDL GOAL <100     Ovarian cyst     Breast pain     Essential hypertension     Advanced directives, counseling/discussion     Obesity     Hypomagnesemia     Type 2 diabetes mellitus with mild nonproliferative retinopathy without macular edema, with long-term current use of insulin, unspecified laterality (H)             Medications:   Current Outpatient Prescriptions          Current Outpatient Medications   Medication Sig Dispense Refill     albuterol (PROAIR HFA/PROVENTIL HFA/VENTOLIN HFA) 108 (90 Base) MCG/ACT inhaler Inhale 2 puffs into the lungs every 4 hours as needed for shortness of breath / dyspnea 18 g 1     aspirin 81 MG EC tablet Take 81 mg by mouth daily Please resume in 5 days.  (12/22/2018)         CALCIUM + D OR Take 1 tablet by mouth 2 times daily.         Continuous Blood Gluc Sensor (DEXCOM G6 SENSOR) MISC 1 each every 10 days 18 each 1     Continuous Blood Gluc Transmit (DEXCOM G6 TRANSMITTER) MISC 1 each every 3 months Change every 3 months. 2 each 1     fexofenadine (ALLEGRA) 180 MG tablet Take 1 tablet by mouth daily. 1 TABLET DAILY Failed claritin for symptom cotrol. Zyrtec increases heart rate. 90 tablet 3     fish oil-omega-3 fatty acids 1000 MG capsule Take 1 capsule by mouth daily.         fluticasone (FLONASE) 50 MCG/ACT nasal spray USE 1-2 SPRAYS IN EACH NOSTRIL ONCE DAILY 48 g 3     Glucosamine-Chondroitin (GLUCOSAMINE CHONDR COMPLEX PO)            hydrochlorothiazide (HYDRODIURIL) 12.5 MG tablet Take 1 tablet (12.5 mg) by mouth daily 90 tablet 3     insulin aspart (NOVOLOG FLEXPEN) 100 UNIT/ML pen Use 20-35 units three times daily with meals for up to 120 units daily 120 mL 3     insulin glargine (BASAGLAR KWIKPEN) 100 UNIT/ML pen INJECT 70 UNITS UNDER THE SKIN DAILY 90 mL 2     insulin pen needle (BD LISA U/F) 32G X 4 MM miscellaneous Use to inject 4-5 times daily. 600 each 1     latanoprost (XALATAN) 0.005 % ophthalmic solution Place 1 drop into both eyes daily 7.5 mL 11     losartan (COZAAR) 100 MG tablet Take 1 tablet (100 mg) by mouth daily 90 tablet 3     magnesium 250 MG tablet Take 1 tablet by mouth daily 90 tablet 3     metFORMIN (GLUCOPHAGE XR) 500 MG 24 hr tablet TAKE 2 TABLETS TWICE A  tablet 3     montelukast (SINGULAIR) 10 MG tablet Take 1 tablet (10 mg) by mouth At Bedtime 90 tablet 3     MULTIVITAMIN OR Take 1 tablet by mouth daily.         NEEDLES, ANY SIZE Pen needles for Novolog insulin pen. Use to inject 5-6  times daily. 4 Box 3     NOVOLOG FLEXPEN 100 UNIT/ML soln Sig 20-25 units two-three times daily. Max daily dose 75 units 90 mL 1     pantoprazole (PROTONIX) 40 MG EC tablet Take 1 tablet (40 mg) by mouth daily 90 tablet 3     potassium chloride ER (KLOR-CON M) 10 MEQ CR tablet Take 1 tablet (10 mEq) by mouth daily 90 tablet 3     rosuvastatin (CRESTOR) 10 MG tablet Take 1 tablet (10 mg) by mouth daily 90 tablet 3     semaglutide (OZEMPIC) 2 MG/3ML pen Inject 0.25 mg Subcutaneous every 7 days 3 mL 0     [START ON 7/10/2023] semaglutide (OZEMPIC) 2 MG/3ML pen Inject 0.5 mg Subcutaneous every 7 days 3 mL 0     [START ON 8/9/2023] Semaglutide, 1 MG/DOSE, (OZEMPIC) 4 MG/3ML pen Inject 1 mg Subcutaneous every 7 days 3 mL 0     VITAMIN D 2000 UNIT OR TABS Take 1 tablet by mouth daily.         amoxicillin-clavulanate (AUGMENTIN) 875-125 MG tablet Take 1 tablet by mouth 2 times daily for 7 days 14 tablet 0         Physical  Exam  /79 (BP Location: Left arm, Patient Position: Sitting, Cuff Size: Adult Large)   Pulse 78   Wt 93.3 kg (205 lb 11.2 oz)   LMP 03/19/2009 (Exact Date)   SpO2 97%   BMI 36.44 kg/m      LMP 03/19/2009 (Exact Date)       Wt Readings from Last 4 Encounters:   07/24/25 90.7 kg (200 lb)   12/03/24 87.1 kg (192 lb)   12/02/24 87.1 kg (192 lb)   12/02/24 87.1 kg (192 lb)     Wt Readings from Last 4 Encounters:   07/24/25 90.7 kg (200 lb)   12/03/24 87.1 kg (192 lb)   12/02/24 87.1 kg (192 lb)   12/02/24 87.1 kg (192 lb)     General: Well appearing woman in no distress, abdominal obesity.  Psych: good eye contact, no pressured speech  Feet: No ulcerations, no edema    A1c 7/28/25 9.8%  A1c 11/30/24 7.8%   A1c 6/10/2024 7.2%  Lab Results   Component Value Date     11/29/2024    CHLORIDE 102 11/29/2024    CO2 27 11/29/2024     (H) 07/22/2025    CR 0.68 07/21/2025    CR 0.84 11/29/2024    CR 0.80 06/06/2024    CR 0.71 11/13/2023    CR 0.76 06/07/2023    CR 0.71 06/07/2023    SUSHANT 9.7 11/29/2024    MAG 1.9 11/29/2024    ALBUMIN 4.2 07/22/2025    ALKPHOS 58 07/22/2025    LDL 70 07/22/2025    HDL 40 (L) 07/22/2025    TRIG 231 (H) 07/22/2025    TSH 1.51 07/21/2025    TSH 1.95 06/06/2024    TSH 2.31 11/13/2023     Lab Results   Component Value Date    MICROL 23.0 07/22/2025    MICROL <12.0 06/06/2024    MICROL 12.4 11/13/2023    MICROL 29.7 06/07/2023    MICROL 11 11/28/2022     Lab Results   Component Value Date    A1C 7.8 (H) 11/29/2024    A1C 7.2 (H) 06/10/2024    A1C 9.3 (A) 06/27/2022    A1C 8.7 (H) 11/16/2021    A1C 8.3 (H) 05/05/2021       Lab Results   Component Value Date    HGB 14.1 11/29/2024                Lab Results   Component Value Date     HGB 15.1 06/08/2023               Again, thank you for allowing me to participate in the care of your patient.        Sincerely,        Stacia Carbajal MD    Electronically signed

## 2025-07-28 NOTE — PROGRESS NOTES
[facility]  [facility]  July 29, 2025         Assessment and Plan:   Maliha Pedro is a 65 year old female with mild persistent asthma without complications that is now well controlled on a ICS/LABA.       Mild intermittent asthma:  Fco describes stable asthma symptoms as long as she takes her antihistamine and nasal spray each morning.  Her allergies remain a trigger for worsening symptoms.  At her last visit ICS / LABA to Dulera with good results.  She has not used her albuterol inhaler for rescue although describes some symptoms that may benefit from its use.  She describes some mild dyspnea on exertion that may be associated with some chest discomfort.  She was recently seen by her primary who is recommended a stress test which is being performed in a few days.  I am in full agreement of pursuing this diagnostic testing.  We did discuss how she may benefit from 1 to 2 puffs of albuterol before activity to see if the symptoms could improve as well.  She will consider doing this once her stress test is completed and she has certainty that she does not have cardiovascular disease.  She also reports some difficulty with changes in environment.  She recently returned from the [facility] and was first in [place] where she developed some itching and scratching in her throat which is since resolved.  She thinks again that this is related to the environment and humidity.  She reports she also had to transition and when traveling to [place] with symptomatology that is now improved.  We did not obtain pulmonary function test today.              --Encouraged consistent use of albuterol prn for dyspnea on exertion but she will hold off until after her stress test as above  --continue allegra daily  --continue daily nasal sprays     2. GERD:  Fco describes good control of her symptoms on pantoprazole     3. Psychosocial:  Fco now lives most the the year in the  [facility].  She has a house that she shares with her sister and 3 dogs.  She has been doing some traveling with her family and expresses great gale.     4. Sleep hygiene:  I have referred Fco to sleep clinic for a sleep evaluation.  She does wake herself up from snoring.  She is scheduled to see them in November of this year.    5.  Dyspnea on exertion:  As above Fco will be undergoing a stress test which I do think is appropriate.  This is being managed by her primary care physician.  She also reports recent weight gain which could be contributing to some of her dyspnea and deconditioning.  She was recently changed from Ozempic to Mounjaro in hopes to pursue some weight loss.  This again is being managed by her primary physician.     Immunizations: tdap  Colonoscopy: 5/2026  Last DEXA: 6/2024  Last sputum culture: NA    Sara Holden MD MPH  Associate Professor of Medicine  Pulmonary, Allergy, Critical Care and Sleep Medicine        Interval History:     Fco does report some intermittent cough again related to allergies, postnasal drip and environmental triggers.  She does feel good stability with her current use of Dulera twice daily.  She does occasionally cough up sputum which she feels is directly related to her postnasal drip.  She does sometimes does have to control her cough with the use of cough drops.         Review of Systems:     CONSTITUTIONAL: no fever, no chills, no sweats, no change in weight, no change in energy, no change in appetite    INTEGUMENTARY/SKIN: no rash, no obvious new lesions    ENT/MOUTH: no sore throat, chronic nasal congestion and postnasal drip, pruritic ear canals managed with Aquaphor     RESPIRATORY: see interval history    CV: Mild exertional chest pain, no palpitations, recent with travel peripheral edema    GI: no nausea, no vomiting, some loose stool when eating the wrong food, occasional GERD    : Urinary incontinence    MUSCULOSKELETAL: Hand arthritis    ENDOCRINE:  Recently difficult to control    NEURO:  No headache    SLEEP: Sleep study in November          Past Medical and Surgical History:     Past Medical History:   Diagnosis Date    Adjustment disorder with mixed anxiety and depressed mood     following death of her adoptive father and then again after adoptive mother     Allergic rhinitis, cause unspecified     uses benadryl prn (sneezing/hoarse voice)    Bartholin gland cyst 2008    Chest pain, unspecified     neg dobutamine stress test  (reacted to dobutamine)    Dry eyes     ERM OD (epiretinal membrane, right eye)     BE mild    Esophageal reflux     EGD: -neg    Glaucoma suspect     Hearing problem     Hypertension     Irregular menstrual cycle     s/p D&C, since then more regular    Irritable bowel syndrome     colonoscopy -neg. Sx's especially prior to menses    MEDICAL HISTORY OF -     had health directive with : DANIEL Ford.  Full Code. Aunt (Charlie Tay) will be decision maker    Meningitis, unspecified(322.9) age 8    hospitalized for 9 mo    Mild persistent asthma without complication 2024    Need for prophylactic vaccination with tuberculosis (BCG) vaccine     Has pos Mantoux. Gets yearly cxr, all neg.     Other and unspecified hyperlipidemia     Pain in joint, shoulder region     bone spurs on MRI, s/p pool therapy    Tinnitus     Type II or unspecified type diabetes mellitus without mention of complication, not stated as uncontrolled Age 32    Follows with endocrine at [facility]: Shayne Lane MD    Unspecified cataract     RE    Unspecified sinusitis (chronic)         Wheezing     Has seen pulm  & . Told night ashtma, ? vocal chord spasm due to cold air.      Past Surgical History:   Procedure Laterality Date    CATARACT IOL, RT/LT  2008    LE    COLONOSCOPY N/A 2021    Procedure: Colonoscopy, With Polypectomy And Biopsy;  Surgeon: Teo Collins MD;  Location: [facility] OR    COLONOSCOPY  WITH CO2 INSUFFLATION N/A 2021    Procedure: COLONOSCOPY, WITH CO2 INSUFFLATION;  Surgeon: Teo Collins MD;  Location: [facility] OR    HC DILATION/CURETTAGE DIAG/THER NON OB  1992    HC TYMPANOPLASTY W/O MASTOID, INIT/REV W/O OSS CHAIN RECONST      PHACOEMULSIFICATION CLEAR CORNEA WITH STANDARD INTRAOCULAR LENS IMPLANT Right 2024    Procedure: RIGHT EYE PHACOEMULSIFICATION, CATARACT, WITH INTRAOCULAR LENS IMPLANT;  Surgeon: Lesly Tracy MD;  Location: [facility] OR    RELEASE CARPAL TUNNEL Right 2018    Procedure: Right Carpal Tunnel Release;  Surgeon: Ivania Moran MD;  Location: [facility]    RELEASE CARPAL TUNNEL Left 2018    Procedure: Left Carpal Tunnel Release;  Surgeon: Ivania Moran MD;  Location: [facility] OR    TYMPANOPLASTY      YAG CAPSULOTOMY OS (LEFT EYE)  2011           Family History:     Family History   Problem Relation Age of Onset    Diabetes Father          of dm 70's    C.A.D. Father     Cerebrovascular Disease Father     Diabetes Sister     Diabetes Sister     Diabetes Sister     Hypertension Sister     Diabetes Brother     Diabetes Brother     Cancer Half-Brother         lung    Diabetes Half-Brother     Leukemia Half-Brother     Glaucoma Maternal Grandmother     Diabetes Paternal Grandfather     Hypertension Paternal Grandfather     Cerebrovascular Disease Paternal Grandfather     Cancer Maternal Aunt         ovarian cancer.     Leukemia Half-Brother     Lymphoma Paternal Cousin     Macular Degeneration No family hx of             Social History:     Social History     Socioeconomic History    Marital status: Single     Spouse name: Not on file    Number of children: Not on file    Years of education: Not on file    Highest education level: Not on file   Occupational History    Not on file   Tobacco Use    Smoking status: Never     Passive exposure: Never    Smokeless tobacco: Never   Vaping Use    Vaping status: Never Used    Substance and Sexual Activity    Alcohol use: Yes     Comment: one drink a month    Drug use: No    Sexual activity: Not Currently     Partners: Male     Birth control/protection: Post-menopausal   Other Topics Concern    Parent/sibling w/ CABG, MI or angioplasty before 65F 55M? No     Service No    Blood Transfusions No    Caffeine Concern No    Occupational Exposure Yes     Comment: exposed to X-ray    Hobby Hazards No    Sleep Concern Yes     Comment: Hot Flashes    Stress Concern Yes     Comment: Work    Weight Concern Yes    Special Diet No    Back Care Yes     Comment: Back pain    Exercise Yes    Bike Helmet No    Seat Belt Yes    Self-Exams Yes   Social History Narrative    Works at  in surgical ICU    Born in the [place]. Moved to  in [place]. Initially lived in [place].    No children        2 cats     Social Drivers of Health     Financial Resource Strain: Low Risk  (7/23/2025)    Financial Resource Strain     Within the past 12 months, have you or your family members you live with been unable to get utilities (heat, electricity) when it was really needed?: No   Food Insecurity: Low Risk  (7/23/2025)    Food Insecurity     Within the past 12 months, did you worry that your food would run out before you got money to buy more?: No     Within the past 12 months, did the food you bought just not last and you didn t have money to get more?: No   Transportation Needs: Low Risk  (7/23/2025)    Transportation Needs     Within the past 12 months, has lack of transportation kept you from medical appointments, getting your medicines, non-medical meetings or appointments, work, or from getting things that you need?: No   Physical Activity: Insufficiently Active (7/23/2025)    Exercise Vital Sign     Days of Exercise per Week: 2 days     Minutes of Exercise per Session: 10 min   Stress: No Stress Concern Present (7/23/2025)    [employer] - Occupational Stress  Questionnaire     Feeling of Stress : Only a little   Social Connections: Unknown (7/23/2025)    Social Connection and Isolation Panel [NHANES]     Frequency of Communication with Friends and Family: Not on file     Frequency of Social Gatherings with Friends and Family: Three times a week     Attends Mu-ism Services: Not on file     Active Member of Clubs or Organizations: Not on file     Attends Club or Organization Meetings: Not on file     Marital Status: Not on file   Interpersonal Safety: Low Risk  (7/24/2025)    Interpersonal Safety     Do you feel physically and emotionally safe where you currently live?: Yes     Within the past 12 months, have you been hit, slapped, kicked or otherwise physically hurt by someone?: No     Within the past 12 months, have you been humiliated or emotionally abused in other ways by your partner or ex-partner?: No   Housing Stability: Low Risk  (7/23/2025)    Housing Stability     Do you have housing? : Yes     Are you worried about losing your housing?: No            Medications:     Current Outpatient Medications   Medication Sig Dispense Refill    albuterol (PROAIR HFA/PROVENTIL HFA/VENTOLIN HFA) 108 (90 Base) MCG/ACT inhaler Inhale 2 puffs into the lungs every 4 hours as needed for shortness of breath. 18 g 11    aspirin 81 MG EC tablet Take 81 mg by mouth daily Please resume in 5 days.  (12/22/2018)      azelastine (ASTELIN) 0.1 % nasal spray Spray 1 spray into both nostrils 2 times daily 90 mL 3    CALCIUM + D OR Take 1 tablet by mouth 2 times daily.      clobetasol (TEMOVATE) 0.05 % external ointment Apply topically at bedtime.      Continuous Glucose Sensor (DEXCOM G7 SENSOR) MISC Change every 10 days. 9 each 4    fexofenadine (ALLEGRA) 180 MG tablet Take 1 tablet by mouth daily. 1 TABLET DAILY Failed claritin for symptom cotrol. Zyrtec increases heart rate. 90 tablet 3    fish oil-omega-3 fatty acids 1000 MG capsule Take 1 capsule by mouth daily.      fluticasone  (FLONASE) 50 MCG/ACT nasal spray USE 1-2 SPRAYS IN EACH NOSTRIL ONCE DAILY 48 g 3    Glucosamine-Chondroitin (GLUCOSAMINE CHONDR COMPLEX PO)       hydroCHLOROthiazide 12.5 MG tablet Take 1 tablet (12.5 mg) by mouth daily. Please allow for 6 month supply for overseas travel 180 tablet 1    insulin aspart (NOVOLOG FLEXPEN) 100 UNIT/ML pen Use 20-40 units four times daily with meals and bedtime 260 mL 3    insulin glargine (LANTUS SOLOSTAR) 100 UNIT/ML pen Inject 60 Units subcutaneously daily. Sig 50-60 units daily as indicated 108 mL 0    insulin pen needle (BD LISA U/F) 32G X 4 MM miscellaneous Use to inject 4-5 times daily. 1000 each 1    latanoprost (XALATAN) 0.005 % ophthalmic solution Place 1 drop into both eyes daily. 7.5 mL 11    losartan (COZAAR) 100 MG tablet TAKE 1 TABLET (100 MG) BY MOUTH DAILY 90 tablet 1    magnesium 250 MG tablet Take 1 tablet by mouth daily 90 tablet 3    metFORMIN (GLUCOPHAGE XR) 500 MG 24 hr tablet Take 2 tablets (1,000 mg) by mouth 2 times daily. 720 tablet 0    mometasone-formoterol (DULERA) 100-5 MCG/ACT inhaler Inhale 2 puffs into the lungs 2 times daily. 39 g 3    montelukast (SINGULAIR) 10 MG tablet Take 1 tablet (10 mg) by mouth at bedtime 90 tablet 3    MULTIVITAMIN OR Take 1 tablet by mouth daily.      NEEDLES, ANY SIZE Pen needles for Novolog insulin pen. Use to inject 5-6  times daily. 4 Box 3    nitroGLYcerin (NITROSTAT) 0.4 MG sublingual tablet For chest pain place 1 tablet under the tongue every 5 minutes for 3 doses. If symptoms persist 5 minutes after 1st dose call 911. 20 tablet 2    nystatin (MYCOSTATIN) 811966 UNIT/ML suspension Take 5 mLs (500,000 Units) by mouth 4 times daily. 473 mL 1    pantoprazole (PROTONIX) 40 MG EC tablet Take 1 tablet (40 mg) by mouth daily. 90 tablet 3    potassium chloride brinda ER (KLOR-CON M10) 10 MEQ CR tablet TAKE ONE TABLET BY MOUTH ONCE DAILY 90 tablet 1    rosuvastatin (CRESTOR) 10 MG tablet Take 1 tablet (10 mg) by mouth daily. 90  "tablet 3    tirzepatide (MOUNJARO) 5 MG/0.5ML SOAJ auto-injector pen Inject 0.5 mLs (5 mg) subcutaneously once a week. 12.5 mL 0    VITAMIN D 2000 UNIT OR TABS Take 1 tablet by mouth daily.       Current Facility-Administered Medications   Medication Dose Route Frequency Provider Last Rate Last Admin    apraclonidine (IOPIDINE) 1 % ophthalmic solution 1 drop  1 drop Right Eye Once Lesly Tracy MD                Physical Exam:   /78   Pulse 79   Ht 1.6 m (5' 3\")   Wt 93.3 kg (205 lb 11.2 oz)   LMP 03/19/2009 (Exact Date)   SpO2 97%   BMI 36.44 kg/m      Constitutional:   Awake, alert and in no apparent distress     Eyes:   nonicteric     ENT:   oral mucosa moist without lesions, normal tm bilaterally, bilateral mucosa normal     Neck:   Supple without supraclavicular or cervical lymphadenopathy     Lungs:   Fair air flow.  No crackles. No rhonchi.  No wheezes.     Cardiovascular:   Normal S1 and S2.  RRR.  No murmur, gallop or rub.     Abdomen:   NABS, soft, nontender, nondistended.      Musculoskeletal:   No edema, no digital clubbing present     Neurologic:   Alert and conversant.     Skin:   Warm, dry.  No rash on limited exam.             Data:   All laboratory and imaging data reviewed.      Results from the last week:  Recent Results (from the past week)   AFINION HEMOGLOBIN A1C POCT    Collection Time: 07/28/25  9:18 AM   Result Value Ref Range    Estimated Average Glucose POCT 235 (H) <117    Afinion Hemoglobin A1c POCT 9.8 (H) <=5.7 %       PFT interpretation:   Spirometry interpretation: None performed today  Severity Z-score*   Normal > -1.645   Mild -1.65 to -2.5   Moderate -2.5 to -4   Severe < -4   * accounts for sex, age, height, ancestry     Use z-score to assess airflow obstruction, restriction and DLCO  - FEV1 z-score for airflow obstruction  - TLC z-score for restriction  - DLCO z-score for diffusion defect    40 minutes spent by me on the date of the encounter doing chart " review, history and exam, documentation and further activities per the note  Time documented is excluding time spent for PFT interpretation.

## 2025-07-29 ENCOUNTER — ANCILLARY PROCEDURE (OUTPATIENT)
Dept: MAMMOGRAPHY | Facility: CLINIC | Age: 65
End: 2025-07-29
Payer: COMMERCIAL

## 2025-07-29 ENCOUNTER — OFFICE VISIT (OUTPATIENT)
Dept: PULMONOLOGY | Facility: CLINIC | Age: 65
End: 2025-07-29
Attending: INTERNAL MEDICINE
Payer: COMMERCIAL

## 2025-07-29 VITALS
SYSTOLIC BLOOD PRESSURE: 136 MMHG | DIASTOLIC BLOOD PRESSURE: 78 MMHG | BODY MASS INDEX: 36.45 KG/M2 | HEIGHT: 63 IN | HEART RATE: 79 BPM | OXYGEN SATURATION: 97 % | WEIGHT: 205.7 LBS

## 2025-07-29 DIAGNOSIS — J45.30 MILD PERSISTENT ASTHMA WITHOUT COMPLICATION: Primary | ICD-10-CM

## 2025-07-29 DIAGNOSIS — I10 ESSENTIAL HYPERTENSION: ICD-10-CM

## 2025-07-29 DIAGNOSIS — R05.9 COUGH, UNSPECIFIED TYPE: ICD-10-CM

## 2025-07-29 DIAGNOSIS — Z12.31 VISIT FOR SCREENING MAMMOGRAM: ICD-10-CM

## 2025-07-29 PROCEDURE — 77063 BREAST TOMOSYNTHESIS BI: CPT | Performed by: RADIOLOGY

## 2025-07-29 PROCEDURE — 77067 SCR MAMMO BI INCL CAD: CPT | Performed by: RADIOLOGY

## 2025-07-29 PROCEDURE — 1126F AMNT PAIN NOTED NONE PRSNT: CPT | Performed by: INTERNAL MEDICINE

## 2025-07-29 PROCEDURE — 3075F SYST BP GE 130 - 139MM HG: CPT | Performed by: INTERNAL MEDICINE

## 2025-07-29 PROCEDURE — 99214 OFFICE O/P EST MOD 30 MIN: CPT | Performed by: INTERNAL MEDICINE

## 2025-07-29 PROCEDURE — G0463 HOSPITAL OUTPT CLINIC VISIT: HCPCS | Performed by: INTERNAL MEDICINE

## 2025-07-29 PROCEDURE — 3078F DIAST BP <80 MM HG: CPT | Performed by: INTERNAL MEDICINE

## 2025-07-29 RX ORDER — HYDROCHLOROTHIAZIDE 12.5 MG/1
12.5 TABLET ORAL DAILY
Qty: 180 TABLET | Refills: 1 | Status: CANCELLED | OUTPATIENT
Start: 2025-07-29

## 2025-07-29 ASSESSMENT — PAIN SCALES - GENERAL: PAINLEVEL_OUTOF10: NO PAIN (0)

## 2025-07-29 NOTE — PROGRESS NOTES
Chief Complaint/Presenting Concern: glaucoma evaluation    History of Present Illness:   Maliha Pedro is a 65 year old patient with a history of open-angle glaucoma, possible low-tension glaucoma, who was referred to the glaucoma clinic by Dr. Tracy.    Relevant Past Medical/Family/Social History:  Obesity, hyperlipidemia on statin, snoring with pending DEMETRIA study, mild persistent asthma    Relevant Review of Systems:noncontributory     Diagnosis: Primary open angle glaucoma each eye, mild stage each eye   Year diagnosis  Previous glaucoma surgery/laser: nne  Maximum intraocular pressure 20/20 mmHg   Currently Meds latanoprost nightly each eye   Family history: positive, great aunt, caused blindness  /613  Trauma history: negative  Steroid exposure: positive, none currently, has had oral steroids for asthma and topical for CE/IOL  Vasospastic disease: Migrane/Raynaud phenomenon: positive  A past hemodynamic crisis or Low BP:: negative  Meds AEs/intolerance: No drop intolerances  PMHx: Has asthma, hx severe asthma attack in [facility]. No other respiratory problems/Cardiac/Renal/Kidney stones/Sulfa Allergy  Anticoagulants: Aspirin 81 daily  Prior testing:  OCT Optic Nerve RNFL Spectralis 7/23/25  right eye: ST and IT thinning, stable, some progression compared to [id number]  left eye: ST thinning, stable, some progression compared to [id number]    Today's testing:  Visual field July 31, 2025:  Right eye - scattered non-specific superior and inferior losses, moderate reliability with 4/14 fixation losses  Left eye - superior and inferior paracentral losses, unreliable test with 10/14 fixation losses    Additional Ocular History:     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 Tracy  OS before 2014 Dr. Rosario  S/p YAG OU    PVD right eye     Old inferior temporal retinal tear right eye surrounded by pigment, no  laser  Annual observation with Dr. Tracy    Dry eyes each eye     Mild ERM right eye    Plan/Recommendations:  Discussed findings with patient.  IOP, VF, and OCT RNFL stable, continue same drops for now.   Continue Latanoprost at bedtime each eye '  Continue AT's as needed     RTC in 8-12 months VF, OCT RNFL ([place] and will return when she can)    Physician Attestation     Attending Physician Attestation:  Complete documentation of historical and exam elements from today's encounter can be found in the full encounter summary report (not reduplicated in this progress note). I personally obtained the chief complaint(s) and history of present illness. I confirmed and edited as necessary the review of systems, past medical/surgical history, family history, social history, and examination findings as documented by others; and I examined the patient myself. I personally reviewed the relevant tests, images, and reports as documented above. I personally reviewed the ophthalmic test(s) associated with this encounter, agree with the interpretation(s) as documented by the resident/fellow and have edited the corresponding report(s) as necessary. I formulated and edited as necessary the assessment and plan and discussed the findings and management plan with the patient and any family members present at the time of the visit.  Taj Soriano M.D., Glaucoma, July 31, 2025

## 2025-07-29 NOTE — LETTER
7/29/2025      Maliha Pedro  [address]  [place] [address]      Dear Colleague,    Thank you for referring your patient, Maliha Pedro, to the [facility] [place]. Please see a copy of my visit note below.    [facility]  July 29, 2025         Assessment and Plan:   Maliha Pedro is a 65 year old female with mild persistent asthma without complications that is now well controlled on a ICS/LABA.       Mild intermittent asthma:  Fco describes stable asthma symptoms as long as she takes her antihistamine and nasal spray each morning.  Her allergies remain a trigger for worsening symptoms.  At her last visit ICS / LABA to Dulera with good results.  She has not used her albuterol inhaler for rescue although describes some symptoms that may benefit from its use.  She describes some mild dyspnea on exertion that may be associated with some chest discomfort.  She was recently seen by her primary who is recommended a stress test which is being performed in a few days.  I am in full agreement of pursuing this diagnostic testing.  We did discuss how she may benefit from 1 to 2 puffs of albuterol before activity to see if the symptoms could improve as well.  She will consider doing this once her stress test is completed and she has certainty that she does not have cardiovascular disease.  She also reports some difficulty with changes in environment.  She recently returned from the [facility] and was first in [place] where she developed some itching and scratching in her throat which is since resolved.  She thinks again that this is related to the environment and humidity.  She reports she also had to transition and when traveling to [place] with symptomatology that is now improved.  We did not obtain pulmonary function test today.              --Encouraged consistent use of albuterol prn  for dyspnea on exertion but she will hold off until after her stress test as above  --continue allegra daily  --continue daily nasal sprays     2. GERD:  Fco describes good control of her symptoms on pantoprazole     3. Psychosocial:  Fco now lives most the the year in the [facility].  She has a house that she shares with her sister and 3 dogs.  She has been doing some traveling with her family and expresses great gale.     4. Sleep hygiene:  I have referred Fco to sleep clinic for a sleep evaluation.  She does wake herself up from snoring.  She is scheduled to see them in November of this year.    5.  Dyspnea on exertion:  As above Fco will be undergoing a stress test which I do think is appropriate.  This is being managed by her primary care physician.  She also reports recent weight gain which could be contributing to some of her dyspnea and deconditioning.  She was recently changed from Ozempic to Mounjaro in hopes to pursue some weight loss.  This again is being managed by her primary physician.     Immunizations: tdap  Colonoscopy: 5/2026  Last DEXA: 6/2024  Last sputum culture: NA    Sara Holden MD MPH  Associate Professor of Medicine  Pulmonary, Allergy, Critical Care and Sleep Medicine        Interval History:     Fco does report some intermittent cough again related to allergies, postnasal drip and environmental triggers.  She does feel good stability with her current use of Dulera twice daily.  She does occasionally cough up sputum which she feels is directly related to her postnasal drip.  She does sometimes does have to control her cough with the use of cough drops.         Review of Systems:     CONSTITUTIONAL: no fever, no chills, no sweats, no change in weight, no change in energy, no change in appetite    INTEGUMENTARY/SKIN: no rash, no obvious new lesions    ENT/MOUTH: no sore throat, chronic nasal congestion and postnasal drip, pruritic ear canals managed with Aquaphor     RESPIRATORY: see  interval history    CV: Mild exertional chest pain, no palpitations, recent with travel peripheral edema    GI: no nausea, no vomiting, some loose stool when eating the wrong food, occasional GERD    : Urinary incontinence    MUSCULOSKELETAL: Hand arthritis    ENDOCRINE: Recently difficult to control    NEURO:  No headache    SLEEP: Sleep study in November          Past Medical and Surgical History:     Past Medical History:   Diagnosis Date     Adjustment disorder with mixed anxiety and depressed mood     following death of her adoptive father and then again after adoptive mother      Allergic rhinitis, cause unspecified     uses benadryl prn (sneezing/hoarse voice)     Bartholin gland cyst 2008     Chest pain, unspecified     neg dobutamine stress test  (reacted to dobutamine)     Dry eyes      ERM OD (epiretinal membrane, right eye)     BE mild     Esophageal reflux     EGD: -neg     Glaucoma suspect      Hearing problem      Hypertension      Irregular menstrual cycle     s/p D&C, since then more regular     Irritable bowel syndrome     colonoscopy -neg. Sx's especially prior to menses     MEDICAL HISTORY OF -     had health directive with : DANIEL Ford.  Full Code. Aunt (Charlie Tay) will be decision maker     Meningitis, unspecified(322.9) age 8    hospitalized for 9 mo     Mild persistent asthma without complication 2024     Need for prophylactic vaccination with tuberculosis (BCG) vaccine     Has pos Mantoux. Gets yearly cxr, all neg.      Other and unspecified hyperlipidemia      Pain in joint, shoulder region     bone spurs on MRI, s/p pool therapy     Tinnitus      Type II or unspecified type diabetes mellitus without mention of complication, not stated as uncontrolled Age 32    Follows with endocrine [facility]: Shayne Lane MD     Unspecified cataract     RE     Unspecified sinusitis (chronic)     2006     Wheezing     Has seen pulm  & . Told night  ashtma, ? vocal chord spasm due to cold air.      Past Surgical History:   Procedure Laterality Date     CATARACT IOL, RT/LT  2008    LE     COLONOSCOPY N/A 2021    Procedure: Colonoscopy, With Polypectomy And Biopsy;  Surgeon: Teo Collins MD;  Location: [facility] OR     COLONOSCOPY WITH CO2 INSUFFLATION N/A 2021    Procedure: COLONOSCOPY, WITH CO2 INSUFFLATION;  Surgeon: Teo Collins MD;  Location: [facility] OR     HC DILATION/CURETTAGE DIAG/THER NON OB       HC TYMPANOPLASTY W/O MASTOID, INIT/REV W/O OSS CHAIN RECONST       PHACOEMULSIFICATION CLEAR CORNEA WITH STANDARD INTRAOCULAR LENS IMPLANT Right 2024    Procedure: RIGHT EYE PHACOEMULSIFICATION, CATARACT, WITH INTRAOCULAR LENS IMPLANT;  Surgeon: Lesly Tracy MD;  Location: [facility] OR     RELEASE CARPAL TUNNEL Right 2018    Procedure: Right Carpal Tunnel Release;  Surgeon: Ivania Moran MD;  Location: [facility]     RELEASE CARPAL TUNNEL Left 2018    Procedure: Left Carpal Tunnel Release;  Surgeon: Ivania Moran MD;  Location: [facility] OR     TYMPANOPLASTY       YAG CAPSULOTOMY OS (LEFT EYE)  2011           Family History:     Family History   Problem Relation Age of Onset     Diabetes Father          of dm 70's     C.A.D. Father      Cerebrovascular Disease Father      Diabetes Sister      Diabetes Sister      Diabetes Sister      Hypertension Sister      Diabetes Brother      Diabetes Brother      Cancer Half-Brother         lung     Diabetes Half-Brother      Leukemia Half-Brother      Glaucoma Maternal Grandmother      Diabetes Paternal Grandfather      Hypertension Paternal Grandfather      Cerebrovascular Disease Paternal Grandfather      Cancer Maternal Aunt         ovarian cancer.      Leukemia Half-Brother      Lymphoma Paternal Cousin      Macular Degeneration No family hx of             Social History:     Social History     Socioeconomic History     Marital  status: Single     Spouse name: Not on file     Number of children: Not on file     Years of education: Not on file     Highest education level: Not on file   Occupational History     Not on file   Tobacco Use     Smoking status: Never     Passive exposure: Never     Smokeless tobacco: Never   Vaping Use     Vaping status: Never Used   Substance and Sexual Activity     Alcohol use: Yes     Comment: one drink a month     Drug use: No     Sexual activity: Not Currently     Partners: Male     Birth control/protection: Post-menopausal   Other Topics Concern     Parent/sibling w/ CABG, MI or angioplasty before 65F 55M? 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 Moved to  in 1982. Initially lived in [place].    No children        2 cats     Social Drivers of Health     Financial Resource Strain: Low Risk  (7/23/2025)    Financial Resource Strain      Within the past 12 months, have you or your family members you live with been unable to get utilities (heat, electricity) when it was really needed?: No   Food Insecurity: Low Risk  (7/23/2025)    Food Insecurity      Within the past 12 months, did you worry that your food would run out before you got money to buy more?: No      Within the past 12 months, did the food you bought just not last and you didn t have money to get more?: No   Transportation Needs: Low Risk  (7/23/2025)    Transportation Needs      Within the past 12 months, has lack of transportation kept you from medical appointments, getting your medicines,  non-medical meetings or appointments, work, or from getting things that you need?: No   Physical Activity: Insufficiently Active (7/23/2025)    Exercise Vital Sign      Days of Exercise per Week: 2 days      Minutes of Exercise per Session: 10 min   Stress: No Stress Concern Present (7/23/2025)    [employer] - Occupational Stress Questionnaire      Feeling of Stress : Only a little   Social Connections: Unknown (7/23/2025)    Social Connection and Isolation Panel [NHANES]      Frequency of Communication with Friends and Family: Not on file      Frequency of Social Gatherings with Friends and Family: Three times a week      Attends Ep[employer] Services: Not on file      Active Member of Clubs or Organizations: Not on file      Attends Club or Organization Meetings: Not on file      Marital Status: Not on file   Interpersonal Safety: Low Risk  (7/24/2025)    Interpersonal Safety      Do you feel physically and emotionally safe where you currently live?: Yes      Within the past 12 months, have you been hit, slapped, kicked or otherwise physically hurt by someone?: No      Within the past 12 months, have you been humiliated or emotionally abused in other ways by your partner or ex-partner?: No   Housing Stability: Low Risk  (7/23/2025)    Housing Stability      Do you have housing? : Yes      Are you worried about losing your housing?: No            Medications:     Current Outpatient Medications   Medication Sig Dispense Refill     albuterol (PROAIR HFA/PROVENTIL HFA/VENTOLIN HFA) 108 (90 Base) MCG/ACT inhaler Inhale 2 puffs into the lungs every 4 hours as needed for shortness of breath. 18 g 11     aspirin 81 MG EC tablet Take 81 mg by mouth daily Please resume in 5 days.  (12/22/2018)       azelastine (ASTELIN) 0.1 % nasal spray Spray 1 spray into both nostrils 2 times daily 90 mL 3     CALCIUM + D OR Take 1 tablet by mouth 2 times daily.       clobetasol (TEMOVATE) 0.05 % external  ointment Apply topically at bedtime.       Continuous Glucose Sensor (DEXCOM G7 SENSOR) MISC Change every 10 days. 9 each 4     fexofenadine (ALLEGRA) 180 MG tablet Take 1 tablet by mouth daily. 1 TABLET DAILY Failed claritin for symptom cotrol. Zyrtec increases heart rate. 90 tablet 3     fish oil-omega-3 fatty acids 1000 MG capsule Take 1 capsule by mouth daily.       fluticasone (FLONASE) 50 MCG/ACT nasal spray USE 1-2 SPRAYS IN EACH NOSTRIL ONCE DAILY 48 g 3     Glucosamine-Chondroitin (GLUCOSAMINE CHONDR COMPLEX PO)        hydroCHLOROthiazide 12.5 MG tablet Take 1 tablet (12.5 mg) by mouth daily. Please allow for 6 month supply for overseas travel 180 tablet 1     insulin aspart (NOVOLOG FLEXPEN) 100 UNIT/ML pen Use 20-40 units four times daily with meals and bedtime 260 mL 3     insulin glargine (LANTUS SOLOSTAR) 100 UNIT/ML pen Inject 60 Units subcutaneously daily. Sig 50-60 units daily as indicated 108 mL 0     insulin pen needle (BD LISA U/F) 32G X 4 MM miscellaneous Use to inject 4-5 times daily. 1000 each 1     latanoprost (XALATAN) 0.005 % ophthalmic solution Place 1 drop into both eyes daily. 7.5 mL 11     losartan (COZAAR) 100 MG tablet TAKE 1 TABLET (100 MG) BY MOUTH DAILY 90 tablet 1     magnesium 250 MG tablet Take 1 tablet by mouth daily 90 tablet 3     metFORMIN (GLUCOPHAGE XR) 500 MG 24 hr tablet Take 2 tablets (1,000 mg) by mouth 2 times daily. 720 tablet 0     mometasone-formoterol (DULERA) 100-5 MCG/ACT inhaler Inhale 2 puffs into the lungs 2 times daily. 39 g 3     montelukast (SINGULAIR) 10 MG tablet Take 1 tablet (10 mg) by mouth at bedtime 90 tablet 3     MULTIVITAMIN OR Take 1 tablet by mouth daily.       NEEDLES, ANY SIZE Pen needles for Novolog insulin pen. Use to inject 5-6  times daily. 4 Box 3     nitroGLYcerin (NITROSTAT) 0.4 MG sublingual tablet For chest pain place 1 tablet under the tongue every 5 minutes for 3 doses. If symptoms persist 5 minutes after 1st dose call [phone]. 69  "tablet 2     nystatin (MYCOSTATIN) 085913 UNIT/ML suspension Take 5 mLs (500,000 Units) by mouth 4 times daily. 473 mL 1     pantoprazole (PROTONIX) 40 MG EC tablet Take 1 tablet (40 mg) by mouth daily. 90 tablet 3     potassium chloride brinda ER (KLOR-CON M10) 10 MEQ CR tablet TAKE ONE TABLET BY MOUTH ONCE DAILY 90 tablet 1     rosuvastatin (CRESTOR) 10 MG tablet Take 1 tablet (10 mg) by mouth daily. 90 tablet 3     tirzepatide (MOUNJARO) 5 MG/0.5ML SOAJ auto-injector pen Inject 0.5 mLs (5 mg) subcutaneously once a week. 12.5 mL 0     VITAMIN D 2000 UNIT OR TABS Take 1 tablet by mouth daily.       Current Facility-Administered Medications   Medication Dose Route Frequency Provider Last Rate Last Admin     apraclonidine (IOPIDINE) 1 % ophthalmic solution 1 drop  1 drop Right Eye Once Lesly Tracy MD                Physical Exam:   /78   Pulse 79   Ht 1.6 m (5' 3\")   Wt 93.3 kg (205 lb 11.2 oz)   LMP 03/19/2009 (Exact Date)   SpO2 97%   BMI 36.44 kg/m      Constitutional:   Awake, alert and in no apparent distress     Eyes:   nonicteric     ENT:   oral mucosa moist without lesions, normal tm bilaterally, bilateral mucosa normal     Neck:   Supple without supraclavicular or cervical lymphadenopathy     Lungs:   Fair air flow.  No crackles. No rhonchi.  No wheezes.     Cardiovascular:   Normal S1 and S2.  RRR.  No murmur, gallop or rub.     Abdomen:   NABS, soft, nontender, nondistended.      Musculoskeletal:   No edema, no digital clubbing present     Neurologic:   Alert and conversant.     Skin:   Warm, dry.  No rash on limited exam.             Data:   All laboratory and imaging data reviewed.      Results from the last week:  Recent Results (from the past week)   AFINION HEMOGLOBIN A1C POCT    Collection Time: 07/28/25  9:18 AM   Result Value Ref Range    Estimated Average Glucose POCT 235 (H) <117    Afinion Hemoglobin A1c POCT 9.8 (H) <=5.7 %       PFT interpretation:   Spirometry " interpretation: None performed today  Severity Z-score*   Normal > -1.645   Mild -1.65 to -2.5   Moderate -2.5 to -4   Severe < -4   * accounts for sex, age, height, ancestry     Use z-score to assess airflow obstruction, restriction and DLCO  - FEV1 z-score for airflow obstruction  - TLC z-score for restriction  - DLCO z-score for diffusion defect    40 minutes spent by me on the date of the encounter doing chart review, history and exam, documentation and further activities per the note  Time documented is excluding time spent for PFT interpretation.    Again, thank you for allowing me to participate in the care of your patient.        Sincerely,        Sara Holden MD    Electronically signed

## 2025-07-29 NOTE — NURSING NOTE
Chief Complaint   Patient presents with    RECHECK     Return Bronchiectasis     Medications reviewed and vital signs taken.   Suly Mckeon, CMA

## 2025-07-30 DIAGNOSIS — H40.003 BORDERLINE GLAUCOMA, BILATERAL: Primary | ICD-10-CM

## 2025-07-31 ENCOUNTER — OFFICE VISIT (OUTPATIENT)
Dept: OPHTHALMOLOGY | Facility: CLINIC | Age: 65
End: 2025-07-31
Attending: OPHTHALMOLOGY
Payer: COMMERCIAL

## 2025-07-31 DIAGNOSIS — H04.123 DRY EYES, BILATERAL: Primary | ICD-10-CM

## 2025-07-31 DIAGNOSIS — H40.1131 PRIMARY OPEN ANGLE GLAUCOMA (POAG) OF BOTH EYES, MILD STAGE: ICD-10-CM

## 2025-07-31 PROCEDURE — G0463 HOSPITAL OUTPT CLINIC VISIT: HCPCS | Performed by: OPHTHALMOLOGY

## 2025-07-31 PROCEDURE — 92020 GONIOSCOPY: CPT | Performed by: OPHTHALMOLOGY

## 2025-07-31 PROCEDURE — 92083 EXTENDED VISUAL FIELD XM: CPT | Performed by: OPHTHALMOLOGY

## 2025-07-31 PROCEDURE — 92015 DETERMINE REFRACTIVE STATE: CPT

## 2025-07-31 ASSESSMENT — VISUAL ACUITY
OD_CC+: -2
OS_CC: 20/25
CORRECTION_TYPE: GLASSES
METHOD: SNELLEN - LINEAR
OD_CC: 20/25

## 2025-07-31 ASSESSMENT — GONIOSCOPY
OS_INFERIOR: SS
OS_NASAL: CBB
OD_SUPERIOR: CBB
OD_NASAL: CBB
OS_SUPERIOR: CBB
OD_INFERIOR: SS
OD_TEMPORAL: CBB
OS_TEMPORAL: CBB

## 2025-07-31 ASSESSMENT — REFRACTION_MANIFEST
OD_CYLINDER: +0.75
OD_SPHERE: -2.75
OS_CYLINDER: +0.75
OD_AXIS: 101
OS_AXIS: 084
OD_ADD: +2.50
OS_ADD: +2.50
OS_SPHERE: -3.75

## 2025-07-31 ASSESSMENT — TONOMETRY
OS_IOP_MMHG: 16
OD_IOP_MMHG: 16
OS_IOP_MMHG: 16
IOP_METHOD: TONOPEN
OD_IOP_MMHG: 15
IOP_METHOD: APPLANATION

## 2025-07-31 ASSESSMENT — PACHYMETRY
OS_CT(UM): 613
OD_CT(UM): 583

## 2025-07-31 ASSESSMENT — REFRACTION_WEARINGRX
OS_AXIS: 075
OS_ADD: +2.50
OD_SPHERE: -1.75
OS_CYLINDER: +0.25
OS_SPHERE: -3.50
OD_ADD: +2.50
OD_CYLINDER: SPHERE

## 2025-07-31 ASSESSMENT — SLIT LAMP EXAM - LIDS
COMMENTS: MEIBOMIAN GLAND DYSFUNCTION
COMMENTS: MEIBOMIAN GLAND DYSFUNCTION

## 2025-07-31 ASSESSMENT — EXTERNAL EXAM - LEFT EYE: OS_EXAM: DERMATOCHALASIS

## 2025-07-31 ASSESSMENT — EXTERNAL EXAM - RIGHT EYE: OD_EXAM: DERMATOCHALASIS

## 2025-07-31 ASSESSMENT — CUP TO DISC RATIO
OD_RATIO: 0.9
OS_RATIO: 0.85

## 2025-07-31 NOTE — NURSING NOTE
Chief Complaints and History of Present Illnesses   Patient presents with    Glaucoma Evaluation     Chief Complaint(s) and History of Present Illness(es)       Glaucoma Evaluation              Laterality: both eyes              Comments    Pt. States that she is doing well. No change in VA BE. No pain BE. Does have more dryness BE. No flashes or floaters BE.   Latanoprost at bedtime BE  Systane 1 time per week BE.   Savannah Chamberlain COT 7:17 AM July 31, 2025

## 2025-08-01 ENCOUNTER — HOSPITAL ENCOUNTER (OUTPATIENT)
Dept: NUCLEAR MEDICINE | Facility: CLINIC | Age: 65
Setting detail: NUCLEAR MEDICINE
Discharge: HOME | End: 2025-08-01
Attending: FAMILY MEDICINE
Payer: COMMERCIAL

## 2025-08-01 ENCOUNTER — HOSPITAL ENCOUNTER (OUTPATIENT)
Dept: CARDIOLOGY | Facility: CLINIC | Age: 65
Setting detail: NUCLEAR MEDICINE
Discharge: HOME | End: 2025-08-01
Attending: FAMILY MEDICINE
Payer: COMMERCIAL

## 2025-08-01 DIAGNOSIS — R07.9 CHEST PAIN, UNSPECIFIED TYPE: ICD-10-CM

## 2025-08-01 LAB
CV STRESS MAX HR HE: 91
RATE PRESSURE PRODUCT: NORMAL
STRESS ECHO BASELINE DIASTOLIC HE: 61
STRESS ECHO BASELINE HR: 71 BPM
STRESS ECHO BASELINE SYSTOLIC BP: 152
STRESS ECHO CALCULATED PERCENT HR: 59 %
STRESS ECHO LAST STRESS DIASTOLIC BP: 63
STRESS ECHO LAST STRESS SYSTOLIC BP: 162
STRESS ECHO TARGET HR: 154

## 2025-08-01 PROCEDURE — 999N000128 HC STATISTIC PERIPHERAL IV START W/O US GUIDANCE

## 2025-08-01 PROCEDURE — 250N000011 HC RX IP 250 OP 636: Performed by: INTERNAL MEDICINE

## 2025-08-01 PROCEDURE — 343N000001 HC RX 343 MED OP 636: Performed by: FAMILY MEDICINE

## 2025-08-01 PROCEDURE — 93016 CV STRESS TEST SUPVJ ONLY: CPT | Performed by: INTERNAL MEDICINE

## 2025-08-01 PROCEDURE — 78452 HT MUSCLE IMAGE SPECT MULT: CPT

## 2025-08-01 PROCEDURE — 78452 HT MUSCLE IMAGE SPECT MULT: CPT | Mod: 26 | Performed by: RADIOLOGY

## 2025-08-01 PROCEDURE — 93017 CV STRESS TEST TRACING ONLY: CPT

## 2025-08-01 PROCEDURE — 93018 CV STRESS TEST I&R ONLY: CPT | Performed by: INTERNAL MEDICINE

## 2025-08-01 PROCEDURE — A9502 TC99M TETROFOSMIN: HCPCS | Performed by: FAMILY MEDICINE

## 2025-08-01 RX ORDER — AMINOPHYLLINE 25 MG/ML
50-100 INJECTION, SOLUTION INTRAVENOUS
Status: DISCONTINUED | OUTPATIENT
Start: 2025-08-01 | End: 2025-08-02 | Stop reason: HOSPADM

## 2025-08-01 RX ORDER — LIDOCAINE 40 MG/G
CREAM TOPICAL
Status: DISCONTINUED | OUTPATIENT
Start: 2025-08-01 | End: 2025-08-02 | Stop reason: HOSPADM

## 2025-08-01 RX ORDER — REGADENOSON 0.08 MG/ML
0.4 INJECTION, SOLUTION INTRAVENOUS ONCE
Status: DISCONTINUED | OUTPATIENT
Start: 2025-08-01 | End: 2025-08-02 | Stop reason: HOSPADM

## 2025-08-01 RX ORDER — CAFFEINE CITRATE 20 MG/ML
60 SOLUTION INTRAVENOUS
Status: ACTIVE | OUTPATIENT
Start: 2025-08-01 | End: 2025-08-01

## 2025-08-01 RX ORDER — NITROGLYCERIN 0.4 MG/1
0.4 TABLET SUBLINGUAL EVERY 5 MIN PRN
Status: ACTIVE | OUTPATIENT
Start: 2025-08-01 | End: 2025-08-01

## 2025-08-01 RX ORDER — CAFFEINE 200 MG
200 TABLET ORAL
Status: ACTIVE | OUTPATIENT
Start: 2025-08-01 | End: 2025-08-01

## 2025-08-01 RX ORDER — ALBUTEROL SULFATE 90 UG/1
2 INHALANT RESPIRATORY (INHALATION) EVERY 5 MIN PRN
Status: DISCONTINUED | OUTPATIENT
Start: 2025-08-01 | End: 2025-08-02 | Stop reason: HOSPADM

## 2025-08-01 RX ORDER — REGADENOSON 0.08 MG/ML
0.4 INJECTION, SOLUTION INTRAVENOUS ONCE
Status: COMPLETED | OUTPATIENT
Start: 2025-08-01 | End: 2025-08-01

## 2025-08-01 RX ORDER — SODIUM CHLORIDE 9 MG/ML
INJECTION, SOLUTION INTRAVENOUS CONTINUOUS PRN
Status: ACTIVE | OUTPATIENT
Start: 2025-08-01 | End: 2025-08-01

## 2025-08-01 RX ADMIN — REGADENOSON 0.4 MG: 0.08 INJECTION, SOLUTION INTRAVENOUS at 10:47

## 2025-08-01 RX ADMIN — TETROFOSMIN 10.7 MILLICURIE: 1.38 INJECTION, POWDER, LYOPHILIZED, FOR SOLUTION INTRAVENOUS at 09:40

## 2025-08-01 RX ADMIN — TETROFOSMIN 39.5 MILLICURIE: 1.38 INJECTION, POWDER, LYOPHILIZED, FOR SOLUTION INTRAVENOUS at 10:51

## 2025-08-05 ENCOUNTER — MYC MEDICAL ADVICE (OUTPATIENT)
Dept: FAMILY MEDICINE | Facility: CLINIC | Age: 65
End: 2025-08-05
Payer: COMMERCIAL

## 2025-08-05 ENCOUNTER — TELEPHONE (OUTPATIENT)
Dept: CARDIOLOGY | Facility: CLINIC | Age: 65
End: 2025-08-05
Payer: COMMERCIAL

## 2025-08-08 ENCOUNTER — OFFICE VISIT (OUTPATIENT)
Dept: CARDIOLOGY | Facility: CLINIC | Age: 65
End: 2025-08-08
Attending: INTERNAL MEDICINE
Payer: COMMERCIAL

## 2025-08-08 VITALS
BODY MASS INDEX: 36.17 KG/M2 | DIASTOLIC BLOOD PRESSURE: 84 MMHG | WEIGHT: 204.2 LBS | HEART RATE: 77 BPM | OXYGEN SATURATION: 94 % | SYSTOLIC BLOOD PRESSURE: 129 MMHG

## 2025-08-08 DIAGNOSIS — R07.9 CHEST PAIN, UNSPECIFIED TYPE: ICD-10-CM

## 2025-08-08 DIAGNOSIS — R94.39 ABNORMAL CARDIOVASCULAR STRESS TEST: ICD-10-CM

## 2025-08-08 PROCEDURE — 3078F DIAST BP <80 MM HG: CPT | Performed by: INTERNAL MEDICINE

## 2025-08-08 PROCEDURE — G0463 HOSPITAL OUTPT CLINIC VISIT: HCPCS | Performed by: INTERNAL MEDICINE

## 2025-08-08 PROCEDURE — 1126F AMNT PAIN NOTED NONE PRSNT: CPT | Performed by: INTERNAL MEDICINE

## 2025-08-08 PROCEDURE — 99204 OFFICE O/P NEW MOD 45 MIN: CPT | Mod: GC | Performed by: INTERNAL MEDICINE

## 2025-08-08 PROCEDURE — 3074F SYST BP LT 130 MM HG: CPT | Performed by: INTERNAL MEDICINE

## 2025-08-08 PROCEDURE — 93005 ELECTROCARDIOGRAM TRACING: CPT

## 2025-08-08 ASSESSMENT — PAIN SCALES - GENERAL: PAINLEVEL_OUTOF10: NO PAIN (0)

## 2025-08-11 ENCOUNTER — TELEPHONE (OUTPATIENT)
Dept: CARDIOLOGY | Facility: CLINIC | Age: 65
End: 2025-08-11
Payer: COMMERCIAL

## 2025-08-13 LAB
ATRIAL RATE - MUSE: 74 BPM
DIASTOLIC BLOOD PRESSURE - MUSE: NORMAL MMHG
INTERPRETATION ECG - MUSE: NORMAL
P AXIS - MUSE: 62 DEGREES
PR INTERVAL - MUSE: 180 MS
QRS DURATION - MUSE: 80 MS
QT - MUSE: 380 MS
QTC - MUSE: 421 MS
R AXIS - MUSE: 1 DEGREES
SYSTOLIC BLOOD PRESSURE - MUSE: NORMAL MMHG
T AXIS - MUSE: 25 DEGREES
VENTRICULAR RATE- MUSE: 74 BPM

## 2025-08-15 DIAGNOSIS — Z79.4 TYPE 2 DIABETES MELLITUS WITH MILD NONPROLIFERATIVE RETINOPATHY WITHOUT MACULAR EDEMA, WITH LONG-TERM CURRENT USE OF INSULIN, UNSPECIFIED LATERALITY (H): ICD-10-CM

## 2025-08-15 DIAGNOSIS — E11.3299 TYPE 2 DIABETES MELLITUS WITH MILD NONPROLIFERATIVE RETINOPATHY WITHOUT MACULAR EDEMA, WITH LONG-TERM CURRENT USE OF INSULIN, UNSPECIFIED LATERALITY (H): ICD-10-CM

## 2025-08-18 RX ORDER — ACYCLOVIR 400 MG/1
TABLET ORAL
Qty: 9 EACH | Refills: 0 | Status: SHIPPED | OUTPATIENT
Start: 2025-08-18

## 2025-08-19 ENCOUNTER — HOSPITAL ENCOUNTER (OUTPATIENT)
Facility: CLINIC | Age: 65
Discharge: HOME | End: 2025-08-19
Attending: HOSPITALIST | Admitting: HOSPITALIST
Payer: COMMERCIAL

## 2025-08-19 ENCOUNTER — TELEPHONE (OUTPATIENT)
Dept: CARDIOLOGY | Facility: CLINIC | Age: 65
End: 2025-08-19

## 2025-08-19 VITALS
OXYGEN SATURATION: 97 % | WEIGHT: 204.5 LBS | RESPIRATION RATE: 22 BRPM | HEIGHT: 63 IN | DIASTOLIC BLOOD PRESSURE: 72 MMHG | TEMPERATURE: 97.6 F | BODY MASS INDEX: 36.23 KG/M2 | HEART RATE: 65 BPM | SYSTOLIC BLOOD PRESSURE: 124 MMHG

## 2025-08-19 DIAGNOSIS — I20.0 UNSTABLE ANGINA (H): ICD-10-CM

## 2025-08-19 DIAGNOSIS — R07.9 CHEST PAIN, UNSPECIFIED TYPE: ICD-10-CM

## 2025-08-19 DIAGNOSIS — R94.39 ABNORMAL CARDIOVASCULAR STRESS TEST: ICD-10-CM

## 2025-08-19 PROBLEM — Z98.890 STATUS POST CORONARY ANGIOGRAM: Status: ACTIVE | Noted: 2025-08-19

## 2025-08-19 LAB
ACT BLD: 267 SECONDS (ref 74–150)
ACT BLD: 304 SECONDS (ref 74–150)
ANION GAP SERPL CALCULATED.3IONS-SCNC: 12 MMOL/L (ref 7–15)
APTT PPP: 29 SECONDS (ref 22–38)
ATRIAL RATE - MUSE: 66 BPM
BUN SERPL-MCNC: 14.5 MG/DL (ref 8–23)
CALCIUM SERPL-MCNC: 9.8 MG/DL (ref 8.8–10.4)
CHLORIDE SERPL-SCNC: 102 MMOL/L (ref 98–107)
CREAT SERPL-MCNC: 0.72 MG/DL (ref 0.51–0.95)
DIASTOLIC BLOOD PRESSURE - MUSE: NORMAL MMHG
EGFRCR SERPLBLD CKD-EPI 2021: >90 ML/MIN/1.73M2
ERYTHROCYTE [DISTWIDTH] IN BLOOD BY AUTOMATED COUNT: 14.4 % (ref 10–15)
GLUCOSE SERPL-MCNC: 173 MG/DL (ref 70–99)
HCO3 SERPL-SCNC: 26 MMOL/L (ref 22–29)
HCT VFR BLD AUTO: 45.1 % (ref 35–47)
HGB BLD-MCNC: 14.6 G/DL (ref 11.7–15.7)
INR PPP: 0.88 (ref 0.85–1.15)
INTERPRETATION ECG - MUSE: NORMAL
MCH RBC QN AUTO: 27 PG (ref 26.5–33)
MCHC RBC AUTO-ENTMCNC: 32.4 G/DL (ref 31.5–36.5)
MCV RBC AUTO: 83.4 FL (ref 78–100)
P AXIS - MUSE: 57 DEGREES
PLATELET # BLD AUTO: 297 10E3/UL (ref 150–450)
POTASSIUM SERPL-SCNC: 4.1 MMOL/L (ref 3.4–5.3)
PR INTERVAL - MUSE: 186 MS
PROTHROMBIN TIME: 12.3 SECONDS (ref 11.8–14.8)
QRS DURATION - MUSE: 78 MS
QT - MUSE: 388 MS
QTC - MUSE: 406 MS
R AXIS - MUSE: -4 DEGREES
RBC # BLD AUTO: 5.41 10E6/UL (ref 3.8–5.2)
SODIUM SERPL-SCNC: 140 MMOL/L (ref 135–145)
SYSTOLIC BLOOD PRESSURE - MUSE: NORMAL MMHG
T AXIS - MUSE: 22 DEGREES
VENTRICULAR RATE- MUSE: 66 BPM
WBC # BLD AUTO: 7.95 10E3/UL (ref 4–11)

## 2025-08-19 PROCEDURE — 85730 THROMBOPLASTIN TIME PARTIAL: CPT | Performed by: INTERNAL MEDICINE

## 2025-08-19 PROCEDURE — 99152 MOD SED SAME PHYS/QHP 5/>YRS: CPT | Performed by: HOSPITALIST

## 2025-08-19 PROCEDURE — 250N000013 HC RX MED GY IP 250 OP 250 PS 637: Performed by: INTERNAL MEDICINE

## 2025-08-19 PROCEDURE — 93458 L HRT ARTERY/VENTRICLE ANGIO: CPT | Mod: 26 | Performed by: HOSPITALIST

## 2025-08-19 PROCEDURE — 258N000003 HC RX IP 258 OP 636: Performed by: INTERNAL MEDICINE

## 2025-08-19 PROCEDURE — 80048 BASIC METABOLIC PNL TOTAL CA: CPT | Performed by: INTERNAL MEDICINE

## 2025-08-19 PROCEDURE — C1753 CATH, INTRAVAS ULTRASOUND: HCPCS | Performed by: HOSPITALIST

## 2025-08-19 PROCEDURE — 250N000011 HC RX IP 250 OP 636: Performed by: HOSPITALIST

## 2025-08-19 PROCEDURE — C1894 INTRO/SHEATH, NON-LASER: HCPCS | Performed by: HOSPITALIST

## 2025-08-19 PROCEDURE — 85610 PROTHROMBIN TIME: CPT | Performed by: INTERNAL MEDICINE

## 2025-08-19 PROCEDURE — 99153 MOD SED SAME PHYS/QHP EA: CPT | Performed by: HOSPITALIST

## 2025-08-19 PROCEDURE — 250N000009 HC RX 250: Performed by: HOSPITALIST

## 2025-08-19 PROCEDURE — 99152 MOD SED SAME PHYS/QHP 5/>YRS: CPT | Mod: GC | Performed by: HOSPITALIST

## 2025-08-19 PROCEDURE — 93458 L HRT ARTERY/VENTRICLE ANGIO: CPT | Performed by: HOSPITALIST

## 2025-08-19 PROCEDURE — 36415 COLL VENOUS BLD VENIPUNCTURE: CPT | Performed by: INTERNAL MEDICINE

## 2025-08-19 PROCEDURE — 85018 HEMOGLOBIN: CPT | Performed by: INTERNAL MEDICINE

## 2025-08-19 PROCEDURE — 92978 ENDOLUMINL IVUS OCT C 1ST: CPT | Mod: LM | Performed by: HOSPITALIST

## 2025-08-19 PROCEDURE — C1887 CATHETER, GUIDING: HCPCS | Performed by: HOSPITALIST

## 2025-08-19 PROCEDURE — 999N000054 HC STATISTIC EKG NON-CHARGEABLE

## 2025-08-19 PROCEDURE — 272N000001 HC OR GENERAL SUPPLY STERILE: Performed by: HOSPITALIST

## 2025-08-19 PROCEDURE — 85347 COAGULATION TIME ACTIVATED: CPT

## 2025-08-19 PROCEDURE — 92978 ENDOLUMINL IVUS OCT C 1ST: CPT | Mod: 26 | Performed by: HOSPITALIST

## 2025-08-19 RX ORDER — LIDOCAINE 40 MG/G
CREAM TOPICAL
Status: DISCONTINUED | OUTPATIENT
Start: 2025-08-19 | End: 2025-08-19 | Stop reason: HOSPADM

## 2025-08-19 RX ORDER — NALOXONE HYDROCHLORIDE 0.4 MG/ML
0.4 INJECTION, SOLUTION INTRAMUSCULAR; INTRAVENOUS; SUBCUTANEOUS
Status: DISCONTINUED | OUTPATIENT
Start: 2025-08-19 | End: 2025-08-19 | Stop reason: HOSPADM

## 2025-08-19 RX ORDER — FLUMAZENIL 0.1 MG/ML
0.2 INJECTION, SOLUTION INTRAVENOUS
Status: DISCONTINUED | OUTPATIENT
Start: 2025-08-19 | End: 2025-08-19 | Stop reason: HOSPADM

## 2025-08-19 RX ORDER — NITROGLYCERIN 5 MG/ML
VIAL (ML) INTRAVENOUS
Status: DISCONTINUED | OUTPATIENT
Start: 2025-08-19 | End: 2025-08-19 | Stop reason: HOSPADM

## 2025-08-19 RX ORDER — OXYCODONE HYDROCHLORIDE 5 MG/1
5 TABLET ORAL EVERY 4 HOURS PRN
Status: DISCONTINUED | OUTPATIENT
Start: 2025-08-19 | End: 2025-08-19 | Stop reason: HOSPADM

## 2025-08-19 RX ORDER — ACETAMINOPHEN 325 MG/1
650 TABLET ORAL EVERY 4 HOURS PRN
Status: DISCONTINUED | OUTPATIENT
Start: 2025-08-19 | End: 2025-08-19 | Stop reason: HOSPADM

## 2025-08-19 RX ORDER — FENTANYL CITRATE 50 UG/ML
25 INJECTION, SOLUTION INTRAMUSCULAR; INTRAVENOUS
Refills: 0 | Status: DISCONTINUED | OUTPATIENT
Start: 2025-08-19 | End: 2025-08-19 | Stop reason: HOSPADM

## 2025-08-19 RX ORDER — POTASSIUM CHLORIDE 750 MG/1
40 TABLET, EXTENDED RELEASE ORAL
Status: DISCONTINUED | OUTPATIENT
Start: 2025-08-19 | End: 2025-08-19 | Stop reason: HOSPADM

## 2025-08-19 RX ORDER — OXYCODONE HYDROCHLORIDE 10 MG/1
10 TABLET ORAL EVERY 4 HOURS PRN
Status: DISCONTINUED | OUTPATIENT
Start: 2025-08-19 | End: 2025-08-19 | Stop reason: HOSPADM

## 2025-08-19 RX ORDER — SODIUM CHLORIDE 9 MG/ML
INJECTION, SOLUTION INTRAVENOUS CONTINUOUS
Status: DISCONTINUED | OUTPATIENT
Start: 2025-08-19 | End: 2025-08-19 | Stop reason: HOSPADM

## 2025-08-19 RX ORDER — NALOXONE HYDROCHLORIDE 0.4 MG/ML
0.2 INJECTION, SOLUTION INTRAMUSCULAR; INTRAVENOUS; SUBCUTANEOUS
Status: DISCONTINUED | OUTPATIENT
Start: 2025-08-19 | End: 2025-08-19 | Stop reason: HOSPADM

## 2025-08-19 RX ORDER — ONDANSETRON 2 MG/ML
4 INJECTION INTRAMUSCULAR; INTRAVENOUS EVERY 6 HOURS PRN
Status: DISCONTINUED | OUTPATIENT
Start: 2025-08-19 | End: 2025-08-19 | Stop reason: HOSPADM

## 2025-08-19 RX ORDER — ASPIRIN 325 MG
325 TABLET ORAL ONCE
Status: COMPLETED | OUTPATIENT
Start: 2025-08-19 | End: 2025-08-19

## 2025-08-19 RX ORDER — HEPARIN SODIUM 1000 [USP'U]/ML
INJECTION, SOLUTION INTRAVENOUS; SUBCUTANEOUS
Status: DISCONTINUED | OUTPATIENT
Start: 2025-08-19 | End: 2025-08-19 | Stop reason: HOSPADM

## 2025-08-19 RX ORDER — FENTANYL CITRATE 50 UG/ML
INJECTION, SOLUTION INTRAMUSCULAR; INTRAVENOUS
Status: DISCONTINUED | OUTPATIENT
Start: 2025-08-19 | End: 2025-08-19 | Stop reason: HOSPADM

## 2025-08-19 RX ORDER — IOPAMIDOL 755 MG/ML
INJECTION, SOLUTION INTRAVASCULAR
Status: DISCONTINUED | OUTPATIENT
Start: 2025-08-19 | End: 2025-08-19 | Stop reason: HOSPADM

## 2025-08-19 RX ORDER — POTASSIUM CHLORIDE 750 MG/1
20 TABLET, EXTENDED RELEASE ORAL
Status: DISCONTINUED | OUTPATIENT
Start: 2025-08-19 | End: 2025-08-19 | Stop reason: HOSPADM

## 2025-08-19 RX ORDER — ATROPINE SULFATE 0.1 MG/ML
0.5 INJECTION INTRAVENOUS
Status: DISCONTINUED | OUTPATIENT
Start: 2025-08-19 | End: 2025-08-19 | Stop reason: HOSPADM

## 2025-08-19 RX ORDER — ASPIRIN 81 MG/1
243 TABLET, CHEWABLE ORAL ONCE
Status: COMPLETED | OUTPATIENT
Start: 2025-08-19 | End: 2025-08-19

## 2025-08-19 RX ORDER — NICARDIPINE HYDROCHLORIDE 2.5 MG/ML
INJECTION INTRAVENOUS
Status: DISCONTINUED | OUTPATIENT
Start: 2025-08-19 | End: 2025-08-19 | Stop reason: HOSPADM

## 2025-08-19 RX ADMIN — SODIUM CHLORIDE: 0.9 INJECTION, SOLUTION INTRAVENOUS at 07:52

## 2025-08-19 RX ADMIN — ASPIRIN 325 MG ORAL TABLET 325 MG: 325 PILL ORAL at 07:51

## 2025-08-19 ASSESSMENT — ACTIVITIES OF DAILY LIVING (ADL)
ADLS_ACUITY_SCORE: 41

## 2025-08-20 ENCOUNTER — TELEPHONE (OUTPATIENT)
Dept: CARDIOLOGY | Facility: CLINIC | Age: 65
End: 2025-08-20
Payer: COMMERCIAL

## 2025-08-25 DIAGNOSIS — N90.4 LICHEN SCLEROSUS ET ATROPHICUS OF THE VULVA: ICD-10-CM

## 2025-08-25 DIAGNOSIS — I10 ESSENTIAL HYPERTENSION: ICD-10-CM

## 2025-08-25 RX ORDER — LOSARTAN POTASSIUM 100 MG/1
100 TABLET ORAL DAILY
Qty: 90 TABLET | Refills: 1 | OUTPATIENT
Start: 2025-08-25

## 2025-08-25 RX ORDER — CLOBETASOL PROPIONATE 0.5 MG/G
OINTMENT TOPICAL AT BEDTIME
Qty: 60 G | Refills: 3 | Status: SHIPPED | OUTPATIENT
Start: 2025-08-25

## 2025-08-25 RX ORDER — POTASSIUM CHLORIDE 750 MG/1
10 TABLET, EXTENDED RELEASE ORAL DAILY
Qty: 90 TABLET | Refills: 2 | Status: SHIPPED | OUTPATIENT
Start: 2025-08-25

## 2025-08-25 RX ORDER — LOSARTAN POTASSIUM 100 MG/1
100 TABLET ORAL DAILY
Qty: 90 TABLET | Refills: 3 | Status: SHIPPED | OUTPATIENT
Start: 2025-08-25

## 2025-08-26 ENCOUNTER — TELEPHONE (OUTPATIENT)
Dept: ENDOCRINOLOGY | Facility: CLINIC | Age: 65
End: 2025-08-26
Payer: COMMERCIAL

## 2025-08-27 ENCOUNTER — TELEPHONE (OUTPATIENT)
Dept: PULMONOLOGY | Facility: CLINIC | Age: 65
End: 2025-08-27
Payer: COMMERCIAL

## (undated) DEVICE — EYE PACK CUSTOM ANTERIOR 30DEG TIP CENTURION PPK6682-04

## (undated) DEVICE — GLOVE BIOGEL PI MICRO SZ 6.0 48560

## (undated) DEVICE — EYE CANN IRR 25GA HYDRODISSECTING 585037

## (undated) DEVICE — DRAPE IOBAN INCISE 13X13" 6640EZ

## (undated) DEVICE — TUBING PRESSURE 30"

## (undated) DEVICE — SLEEVE TR BAND RADIAL COMPRESSION DEVICE 24CM TRB24-REG

## (undated) DEVICE — KIT DVC ANGIO IBASIXCOMPAK 13INX20ML 3WAY IN4430

## (undated) DEVICE — Device

## (undated) DEVICE — PACK HAND CUSTOM ASC

## (undated) DEVICE — SUCTION MANIFOLD NEPTUNE 2 SYS 1 PORT 702-025-000

## (undated) DEVICE — VALVE HEMOSTASIS .096" COPILOT MECH 1003331

## (undated) DEVICE — SOL WATER IRRIG 500ML BOTTLE 2F7113

## (undated) DEVICE — CATARACT BLADE PACK 2.5MM 58001898

## (undated) DEVICE — SHTH INTRO 0.021IN ID 6FR DIA

## (undated) DEVICE — DRAPE EYE SHEET 8441

## (undated) DEVICE — CATH GD VISTA BRITE BLU YLW 100CM 6FR XB3.5 6700540E

## (undated) DEVICE — EYE SHIELD PLASTIC

## (undated) DEVICE — PREP CHLORAPREP 26ML TINTED ORANGE  260815

## (undated) DEVICE — SU ETHILON 3-0 PS-1 18" 1663G

## (undated) DEVICE — KIT ENDO FIRST STEP DISINFECTANT 200ML W/POUCH EP-4

## (undated) DEVICE — GLOVE PROTEXIS W/NEU-THERA 7.5  2D73TE75

## (undated) DEVICE — SLING ARM MED 0814-0063

## (undated) DEVICE — CATH ANGIO SUPERTORQUE PLUS JR4 6FRX100CM 533621

## (undated) DEVICE — SU VICRYL 3-0 SH 8X18" UND J864D

## (undated) DEVICE — SLING ARM MED 79-99155

## (undated) DEVICE — PACK CATARACT CUSTOM ASC SEY15CPUMC

## (undated) DEVICE — EYE TIP IRRIGATION & ASPIRATION POLYMER 35D BENT 8065751511

## (undated) DEVICE — MANIFOLD KIT ANGIO AUTOMATED 014613

## (undated) DEVICE — TAPE MICROPORE 2"X1.5YD 1530S-2

## (undated) DEVICE — PREP DURAPREP REMOVER 4OZ 8611

## (undated) DEVICE — SOL NACL 0.9% IRRIG 1000ML BOTTLE 2F7124

## (undated) DEVICE — TAPE MICROPORE 1"X1.5YD 1530S-1

## (undated) DEVICE — LINEN ORTHO PACK 5446

## (undated) DEVICE — PACK HEART LEFT CUSTOM PC15OT92B

## (undated) DEVICE — BAG STERILE DISPOSABLE FOR PERMANENT SLED 39315-010

## (undated) DEVICE — PREP DURAPREP 26ML APL 8630

## (undated) DEVICE — DRAPE STOCKINETTE IMPERVIOUS 10" 21048

## (undated) DEVICE — LINEN TOWEL PACK X5 5464

## (undated) DEVICE — PREP CHLORAPREP CLEAR 3ML 260400

## (undated) DEVICE — DRSG GAUZE 4X4" TRAY 6939

## (undated) DEVICE — EYE NDL RETROBULBAR ATKINSON 25GA 1.5" 581637

## (undated) DEVICE — KIT HAND CONTROL ACIST 014644 AR-P54

## (undated) DEVICE — CATH ANGIO 6FR JL3.5 100CM ST+

## (undated) DEVICE — GW VASC .035IN DIA 260CML 7CML 3 MM RADIUS J CURVE 502455

## (undated) DEVICE — DRSG KERLIX 4 1/2"X4YDS ROLL 6715

## (undated) DEVICE — PAD CHUX UNDERPAD 23X24" 7136

## (undated) DEVICE — DRSG TEGADERM 4X4 3/4" 1626W

## (undated) RX ORDER — FENTANYL CITRATE 50 UG/ML
INJECTION, SOLUTION INTRAMUSCULAR; INTRAVENOUS
Status: DISPENSED
Start: 2024-07-09

## (undated) RX ORDER — KETOROLAC TROMETHAMINE 30 MG/ML
INJECTION, SOLUTION INTRAMUSCULAR; INTRAVENOUS
Status: DISPENSED
Start: 2018-12-17

## (undated) RX ORDER — GABAPENTIN 300 MG/1
CAPSULE ORAL
Status: DISPENSED
Start: 2018-12-17

## (undated) RX ORDER — FENTANYL CITRATE 50 UG/ML
INJECTION, SOLUTION INTRAMUSCULAR; INTRAVENOUS
Status: DISPENSED
Start: 2025-08-19

## (undated) RX ORDER — FENTANYL CITRATE 50 UG/ML
INJECTION, SOLUTION INTRAMUSCULAR; INTRAVENOUS
Status: DISPENSED
Start: 2018-11-19

## (undated) RX ORDER — LIDOCAINE HYDROCHLORIDE AND EPINEPHRINE 10; 10 MG/ML; UG/ML
INJECTION, SOLUTION INFILTRATION; PERINEURAL
Status: DISPENSED
Start: 2018-12-17

## (undated) RX ORDER — DEXAMETHASONE SODIUM PHOSPHATE 10 MG/ML
INJECTION, SOLUTION INTRAMUSCULAR; INTRAVENOUS
Status: DISPENSED
Start: 2018-12-17

## (undated) RX ORDER — ACETAMINOPHEN 325 MG/1
TABLET ORAL
Status: DISPENSED
Start: 2018-12-17

## (undated) RX ORDER — FENTANYL CITRATE 50 UG/ML
INJECTION, SOLUTION INTRAMUSCULAR; INTRAVENOUS
Status: DISPENSED
Start: 2018-12-17

## (undated) RX ORDER — EPINEPHRINE NASAL SOLUTION 1 MG/ML
SOLUTION NASAL
Status: DISPENSED
Start: 2018-12-17

## (undated) RX ORDER — SODIUM CHLORIDE 9 MG/ML
INJECTION, SOLUTION INTRAVENOUS
Status: DISPENSED
Start: 2025-08-19

## (undated) RX ORDER — ONDANSETRON 2 MG/ML
INJECTION INTRAMUSCULAR; INTRAVENOUS
Status: DISPENSED
Start: 2018-12-17

## (undated) RX ORDER — NICARDIPINE HCL-0.9% SOD CHLOR 1 MG/10 ML
SYRINGE (ML) INTRAVENOUS
Status: DISPENSED
Start: 2025-08-19

## (undated) RX ORDER — LIDOCAINE HYDROCHLORIDE 10 MG/ML
INJECTION, SOLUTION EPIDURAL; INFILTRATION; INTRACAUDAL; PERINEURAL
Status: DISPENSED
Start: 2025-08-19

## (undated) RX ORDER — DEXAMETHASONE SODIUM PHOSPHATE 4 MG/ML
INJECTION, SOLUTION INTRA-ARTICULAR; INTRALESIONAL; INTRAMUSCULAR; INTRAVENOUS; SOFT TISSUE
Status: DISPENSED
Start: 2018-12-17

## (undated) RX ORDER — BUPIVACAINE HYDROCHLORIDE 2.5 MG/ML
INJECTION, SOLUTION INFILTRATION; PERINEURAL
Status: DISPENSED
Start: 2018-12-17

## (undated) RX ORDER — ONDANSETRON 2 MG/ML
INJECTION INTRAMUSCULAR; INTRAVENOUS
Status: DISPENSED
Start: 2018-11-19

## (undated) RX ORDER — HEPARIN SODIUM 200 [USP'U]/100ML
INJECTION, SOLUTION INTRAVENOUS
Status: DISPENSED
Start: 2025-08-19

## (undated) RX ORDER — BACITRACIN 500 [USP'U]/G
OINTMENT OPHTHALMIC
Status: DISPENSED
Start: 2018-12-17

## (undated) RX ORDER — SIMETHICONE 40MG/0.6ML
SUSPENSION, DROPS(FINAL DOSAGE FORM)(ML) ORAL
Status: DISPENSED
Start: 2021-05-12

## (undated) RX ORDER — CEFAZOLIN SODIUM 2 G/50ML
SOLUTION INTRAVENOUS
Status: DISPENSED
Start: 2018-12-17

## (undated) RX ORDER — FENTANYL CITRATE 50 UG/ML
INJECTION, SOLUTION INTRAMUSCULAR; INTRAVENOUS
Status: DISPENSED
Start: 2021-05-12

## (undated) RX ORDER — ACETAMINOPHEN 325 MG/1
TABLET ORAL
Status: DISPENSED
Start: 2024-07-09

## (undated) RX ORDER — BACITRACIN 500 [USP'U]/G
OINTMENT OPHTHALMIC
Status: DISPENSED
Start: 2018-11-19

## (undated) RX ORDER — EPINEPHRINE NASAL SOLUTION 1 MG/ML
SOLUTION NASAL
Status: DISPENSED
Start: 2018-11-19

## (undated) RX ORDER — NITROGLYCERIN 5 MG/ML
VIAL (ML) INTRAVENOUS
Status: DISPENSED
Start: 2025-08-19

## (undated) RX ORDER — GLYCOPYRROLATE 0.2 MG/ML
INJECTION INTRAMUSCULAR; INTRAVENOUS
Status: DISPENSED
Start: 2018-12-17

## (undated) RX ORDER — LIDOCAINE HYDROCHLORIDE AND EPINEPHRINE 10; 10 MG/ML; UG/ML
INJECTION, SOLUTION INFILTRATION; PERINEURAL
Status: DISPENSED
Start: 2018-11-19

## (undated) RX ORDER — HEPARIN SODIUM 1000 [USP'U]/ML
INJECTION, SOLUTION INTRAVENOUS; SUBCUTANEOUS
Status: DISPENSED
Start: 2025-08-19

## (undated) RX ORDER — REGADENOSON 0.08 MG/ML
INJECTION, SOLUTION INTRAVENOUS
Status: DISPENSED
Start: 2025-08-01

## (undated) RX ORDER — CEFAZOLIN SODIUM 1 G/50ML
SOLUTION INTRAVENOUS
Status: DISPENSED
Start: 2018-11-19